# Patient Record
Sex: MALE | Race: WHITE | NOT HISPANIC OR LATINO | Employment: UNEMPLOYED | URBAN - METROPOLITAN AREA
[De-identification: names, ages, dates, MRNs, and addresses within clinical notes are randomized per-mention and may not be internally consistent; named-entity substitution may affect disease eponyms.]

---

## 2017-01-05 ENCOUNTER — HOSPITAL ENCOUNTER (OUTPATIENT)
Dept: WOUND CARE | Facility: CLINIC | Age: 52
Discharge: HOME | End: 2017-01-05
Attending: SURGERY | Admitting: SURGERY
Payer: MEDICARE

## 2017-01-05 PROCEDURE — A6212 FOAM DRG <=16 SQ IN W/BORDER: HCPCS

## 2017-01-05 PROCEDURE — A6022 COLLAGEN DRSG>16<=48 SQ IN: HCPCS

## 2017-01-05 PROCEDURE — 11042 DBRDMT SUBQ TIS 1ST 20SQCM/<: CPT

## 2017-01-06 NOTE — PROGRESS NOTES
"[facility]      DATE OF VISIT:  01/05/2017.       Nan Fountain is a 51-year-old nonparaplegic gentleman living at assisted living facility for care to his decubitus wounds.  Currently he has a left ischial wound that is recurrent.  His scrotal wound essentially has healed with the use of triad.  He has been using Chroma Gran on the left IT on a daily basis, rather than every other day, and while there seems to be some progress, there is a large chunk of \"proud flesh\" or hypertrophic granulation tissue that we felt the need to excise today sharply with scissors.  Hemostasis was achieved with silver nitrate.  Essentially that was a subcu excision.  Interestingly, this is right adjacent to very thick scar healed by secondary intention below that, so I do not know if it is mechanical in nature, being at the superior aspect of the ischial site, or not.  We will continue with the Chroma Gran on a daily basis.  He can cover that with a 4 x 4 and Medipore.  If his scrotal wound should open again, they can use triad p.r.n.  He is complaining of some scaliness over his right medial knee and we gave him some Sween cream that he can use p.r.n. and cover that with Mepilex if that becomes problematic.  Interestingly, after his visit we [facility], but it sounds like he has been snoring a lot and was having some withdrawal symptoms.  This is probably why [facility] was becoming [facility] him [facility] new [facility] through the visiting home services.  We are happy to cosign anything for any offloading surfaces, should the order come across our desk.  Otherwise, we will see Nan back in 02/16.  Please see nursing notes for dimensions of the wound.  His vital signs were within normal limits today.         BRAYDEN THOMAS MD             D: 01/05/2017 15:15   T: 01/05/2017 18:04   MT: DA      Name:     NAN FOUNTAIN   MRN:      " [id number]        Account:      [id number]   :      1965           Visit Date:   2017      Document: [id number]

## 2017-02-23 ENCOUNTER — HOSPITAL ENCOUNTER (OUTPATIENT)
Dept: WOUND CARE | Facility: CLINIC | Age: 52
Discharge: HOME | End: 2017-02-23
Attending: SURGERY | Admitting: SURGERY
Payer: MEDICARE

## 2017-02-23 PROCEDURE — 97602 WOUND(S) CARE NON-SELECTIVE: CPT

## 2017-02-23 PROCEDURE — A6212 FOAM DRG <=16 SQ IN W/BORDER: HCPCS

## 2017-02-23 NOTE — PROGRESS NOTES
[facility]       DATE OF SERVICE:  2017      Nan Fountain is a 51-year-old paraplegic gentleman who is here today for further evaluation and treatment of chronic pressure sore.  He has been using Promogran for the left IT wound, and even though it was sharply debrided and cauterized with silver nitrate last time, he is starting to get some regrowth of this hypertrophic granulation tissue again.  It was treated again with silver nitrate today and would like to use Medihoney alginate on a daily basis to try something different.  It might be somewhat antimicrobial, as well as debriding.  As far as the scrotum is concerned, the Triad worked well for that.  He only has very small little nicks.  Sween cream has been helping to preserve the right medial knee from breakdown on a p.r.n. basis.  Today Nan looked particularly somnolent and somewhat confused, not his usual peppy self, and we are wondering if that might be in part due to Lyrica which he just started for lower abdominal neuropathic pain.  He also states he has been having poor sleep and he also happens to be on Cymbalta for depression.  He denies using any illicit substances.  Essentially will just switch to Medihoney on a daily basis and see him back on .  Please see nursing notes for dimensions of the wound.  His vitals are within normal limits today.         BRAYDEN THOMAS MD             D: 2017 14:30   T: 2017 14:55   MT: JOSE      Name:     NAN FOUNTAIN   MRN:      [id number]        Account:      [id number]   :      1965           Visit Date:   2017      Document: [id number]

## 2017-04-06 ENCOUNTER — HOSPITAL ENCOUNTER (OUTPATIENT)
Dept: WOUND CARE | Facility: CLINIC | Age: 52
Discharge: HOME | End: 2017-04-06
Attending: SURGERY | Admitting: SURGERY
Payer: MEDICARE

## 2017-04-06 PROCEDURE — 97602 WOUND(S) CARE NON-SELECTIVE: CPT

## 2017-04-06 NOTE — PROGRESS NOTES
[facility]       DATE OF SERVICE:  04/06/2017      HISTORY OF PRESENT ILLNESS:  Parth Fountain is a 51-year-old paraplegic gentleman who has a longstanding history of decubitus wounds and surgeries in the past.  Currently is here for further followup of his chronic recurrent left ischial decubitus, which is within the previous scar tissue.  We have been using Medihoney on a daily basis and he usually covers that with a 4 x 4 folded in quarters and then held in position with Medipore tape along with skin prep.  There is a tiny little superficial kind of scrotal base wound on the right side that appears to be healing fine on its own.  As far as that wound is concerned, we will continue with some Calmoseptine that he has at home.  He feels that will be sufficient and he does not need a cover dressing for that side.  On the left side, we will continue with the Medihoney alginate even though the edges are a bit macerated.  I think there is less hypertrophic granulation tissue this visit and so I think that is more beneficial.  The patient can use a 4 x 4 and Medipore for coverage rather than the Mepilex, which usually does not stay.  The patient has been feeling ill the last several days, stating that he has had a very high fever and was seen by his primary yesterday.  It sounds like there was a dirty urine so he was started on Macrobid, although the cultures are not back yet.  He also has some appointments at the [facility] and asked them to help work him up as well.  Overall, he is quite subdued and not clear-headed today.  However, he is afebrile and his vitals are within normal limits with a BP of 125/71, heart rate of 84, respirations 16 and temperature 97.3.  At this point, we will see Parth back on 05/25.  Please see nursing notes for dimensions of the wounds.         BRAYDEN THOMAS MD             D: 04/06/2017 14:35   T: 04/06/2017 15:45   MT: DAREN      Name:     LEIA  NAN   MRN:      [id number]        Account:      [id number]   :      1965           Visit Date:   2017      Document: [id number]

## 2017-05-25 ENCOUNTER — HOSPITAL ENCOUNTER (OUTPATIENT)
Dept: WOUND CARE | Facility: CLINIC | Age: 52
Discharge: HOME | End: 2017-05-25
Attending: SURGERY | Admitting: SURGERY
Payer: MEDICARE

## 2017-05-25 PROCEDURE — 11042 DBRDMT SUBQ TIS 1ST 20SQCM/<: CPT
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
## 2017-06-20 ENCOUNTER — HOSPITAL ENCOUNTER (OUTPATIENT)
Dept: WOUND CARE | Facility: CLINIC | Age: 52
Discharge: HOME | End: 2017-06-20
Attending: PHYSICIAN ASSISTANT | Admitting: PHYSICIAN ASSISTANT
Payer: MEDICARE

## 2017-06-20 PROCEDURE — 27210376 ZZH DRESSING PROMGRAN

## 2017-06-20 PROCEDURE — 11042 DBRDMT SUBQ TIS 1ST 20SQCM/<: CPT

## 2017-06-20 PROCEDURE — 11042 DBRDMT SUBQ TIS 1ST 20SQCM/<: CPT | Performed by: PHYSICIAN ASSISTANT
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
## 2017-07-18 ENCOUNTER — HOSPITAL ENCOUNTER (OUTPATIENT)
Dept: WOUND CARE | Facility: CLINIC | Age: 52
Discharge: HOME | End: 2017-07-18
Attending: PHYSICIAN ASSISTANT | Admitting: PHYSICIAN ASSISTANT
Payer: MEDICARE

## 2017-07-18 VITALS
HEART RATE: 59 BPM | SYSTOLIC BLOOD PRESSURE: 121 MMHG | TEMPERATURE: 97.3 F | DIASTOLIC BLOOD PRESSURE: 75 MMHG | RESPIRATION RATE: 16 BRPM

## 2017-07-18 PROCEDURE — 11042 DBRDMT SUBQ TIS 1ST 20SQCM/<: CPT

## 2017-07-18 PROCEDURE — 11042 DBRDMT SUBQ TIS 1ST 20SQCM/<: CPT | Performed by: PHYSICIAN ASSISTANT

## 2017-07-18 PROCEDURE — A6021 COLLAGEN DRESSING <=16 SQ IN: HCPCS
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
## 2017-08-15 ENCOUNTER — HOSPITAL ENCOUNTER (OUTPATIENT)
Dept: WOUND CARE | Facility: CLINIC | Age: 52
Discharge: HOME | End: 2017-08-15
Attending: PHYSICIAN ASSISTANT | Admitting: PHYSICIAN ASSISTANT
Payer: MEDICARE

## 2017-08-15 VITALS
DIASTOLIC BLOOD PRESSURE: 71 MMHG | HEART RATE: 84 BPM | TEMPERATURE: 98.3 F | RESPIRATION RATE: 16 BRPM | SYSTOLIC BLOOD PRESSURE: 105 MMHG

## 2017-08-15 DIAGNOSIS — E63.9 NUTRITIONAL DEFICIENCY: Primary | ICD-10-CM

## 2017-08-15 PROCEDURE — 97597 DBRDMT OPN WND 1ST 20 CM/<: CPT | Performed by: PHYSICIAN ASSISTANT

## 2017-08-15 PROCEDURE — A6021 COLLAGEN DRESSING <=16 SQ IN: HCPCS

## 2017-08-15 PROCEDURE — 97597 DBRDMT OPN WND 1ST 20 CM/<: CPT

## 2017-08-15 RX ORDER — LACTOSE-REDUCED FOOD
1 LIQUID (ML) ORAL
Qty: 90 BOTTLE | Refills: 11 | Status: SHIPPED | OUTPATIENT
Start: 2017-08-15 | End: 2021-06-19

## 2017-08-16 ENCOUNTER — TELEPHONE (OUTPATIENT)
Dept: WOUND CARE | Facility: CLINIC | Age: 52
End: 2017-08-16

## 2017-08-31 ENCOUNTER — TELEPHONE (OUTPATIENT)
Dept: WOUND CARE | Facility: CLINIC | Age: 52
End: 2017-08-31

## 2017-08-31 NOTE — TELEPHONE ENCOUNTER
Shena, nurse with [facility], called to report a new open area on right IT 0.3x0.3x0.2; scrotal wound not improving with [employer] only.  Current plan is Promogran to left IT.  Writer discussed changes with [facility] Christine.  Promogran to all three areas until seen in clinic 9/12/2017.

## 2017-09-12 ENCOUNTER — HOSPITAL ENCOUNTER (OUTPATIENT)
Dept: WOUND CARE | Facility: CLINIC | Age: 52
Discharge: HOME | End: 2017-09-12
Attending: PHYSICIAN ASSISTANT | Admitting: PHYSICIAN ASSISTANT
Payer: MEDICARE

## 2017-09-12 VITALS
HEART RATE: 123 BPM | RESPIRATION RATE: 16 BRPM | SYSTOLIC BLOOD PRESSURE: 128 MMHG | TEMPERATURE: 99.2 F | DIASTOLIC BLOOD PRESSURE: 86 MMHG

## 2017-09-12 DIAGNOSIS — L89.303 DECUBITUS ULCER OF BUTTOCK, STAGE 3, UNSPECIFIED LATERALITY: Primary | ICD-10-CM

## 2017-09-12 PROCEDURE — 27210376 ZZH DRESSING PROMGRAN

## 2017-09-12 PROCEDURE — 97597 DBRDMT OPN WND 1ST 20 CM/<: CPT

## 2017-09-12 PROCEDURE — 11042 DBRDMT SUBQ TIS 1ST 20SQCM/<: CPT

## 2017-09-12 PROCEDURE — 97597 DBRDMT OPN WND 1ST 20 CM/<: CPT | Performed by: PHYSICIAN ASSISTANT
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
## 2017-10-09 ENCOUNTER — HOSPITAL ENCOUNTER (OUTPATIENT)
Dept: WOUND CARE | Facility: CLINIC | Age: 52
Discharge: HOME | End: 2017-10-09
Attending: PHYSICIAN ASSISTANT | Admitting: PHYSICIAN ASSISTANT
Payer: MEDICARE

## 2017-10-09 VITALS — TEMPERATURE: 98.4 F | SYSTOLIC BLOOD PRESSURE: 115 MMHG | DIASTOLIC BLOOD PRESSURE: 69 MMHG | HEART RATE: 80 BPM

## 2017-10-09 DIAGNOSIS — N30.01 ACUTE CYSTITIS WITH HEMATURIA: Primary | ICD-10-CM

## 2017-10-09 LAB
ALBUMIN UR-MCNC: 30 MG/DL
APPEARANCE UR: ABNORMAL
BACTERIA #/AREA URNS HPF: ABNORMAL /HPF
BILIRUB UR QL STRIP: NEGATIVE
CAOX CRY #/AREA URNS HPF: ABNORMAL /HPF
COLOR UR AUTO: YELLOW
GLUCOSE UR STRIP-MCNC: NEGATIVE MG/DL
HGB UR QL STRIP: ABNORMAL
HYALINE CASTS #/AREA URNS LPF: 8 /LPF (ref 0–2)
KETONES UR STRIP-MCNC: NEGATIVE MG/DL
LEUKOCYTE ESTERASE UR QL STRIP: ABNORMAL
MUCOUS THREADS #/AREA URNS LPF: PRESENT /LPF
NITRATE UR QL: NEGATIVE
PH UR STRIP: 6.5 PH (ref 5–7)
RBC #/AREA URNS AUTO: 114 /HPF (ref 0–2)
RENAL EPI CELLS #/AREA URNS HPF: 5 /HPF
SOURCE: ABNORMAL
SP GR UR STRIP: 1.01 (ref 1–1.03)
SQUAMOUS #/AREA URNS AUTO: 1 /HPF (ref 0–1)
UROBILINOGEN UR STRIP-MCNC: NORMAL MG/DL (ref 0–2)
WBC #/AREA URNS AUTO: 76 /HPF (ref 0–2)
WBC CLUMPS #/AREA URNS HPF: PRESENT /HPF
YEAST #/AREA URNS HPF: ABNORMAL /HPF

## 2017-10-09 PROCEDURE — 11042 DBRDMT SUBQ TIS 1ST 20SQCM/<: CPT | Performed by: PHYSICIAN ASSISTANT

## 2017-10-09 PROCEDURE — 27211158 ZZH IODOSORB GEL, 40 GM

## 2017-10-09 PROCEDURE — 87086 URINE CULTURE/COLONY COUNT: CPT | Performed by: PHYSICIAN ASSISTANT

## 2017-10-09 PROCEDURE — 81001 URINALYSIS AUTO W/SCOPE: CPT | Performed by: PHYSICIAN ASSISTANT

## 2017-10-09 PROCEDURE — 87088 URINE BACTERIA CULTURE: CPT | Performed by: PHYSICIAN ASSISTANT

## 2017-10-09 PROCEDURE — 87186 SC STD MICRODIL/AGAR DIL: CPT | Performed by: PHYSICIAN ASSISTANT

## 2017-10-09 PROCEDURE — 11042 DBRDMT SUBQ TIS 1ST 20SQCM/<: CPT

## 2017-10-09 RX ORDER — NITROFURANTOIN 25; 75 MG/1; MG/1
100 CAPSULE ORAL 2 TIMES DAILY
Qty: 20 CAPSULE | Refills: 0 | Status: SHIPPED | OUTPATIENT
Start: 2017-10-09 | End: 2017-10-19
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
## 2017-10-11 LAB
BACTERIA SPEC CULT: ABNORMAL
BACTERIA SPEC CULT: ABNORMAL
Lab: ABNORMAL
SPECIMEN SOURCE: ABNORMAL

## 2017-11-07 ENCOUNTER — HOSPITAL ENCOUNTER (OUTPATIENT)
Dept: WOUND CARE | Facility: CLINIC | Age: 52
Discharge: HOME | End: 2017-11-07
Attending: PHYSICIAN ASSISTANT | Admitting: PHYSICIAN ASSISTANT
Payer: MEDICARE

## 2017-11-07 VITALS
SYSTOLIC BLOOD PRESSURE: 134 MMHG | RESPIRATION RATE: 16 BRPM | TEMPERATURE: 98.7 F | HEART RATE: 94 BPM | DIASTOLIC BLOOD PRESSURE: 68 MMHG

## 2017-11-07 PROCEDURE — 27211158 ZZH IODOSORB GEL, 40 GM

## 2017-11-07 PROCEDURE — 11042 DBRDMT SUBQ TIS 1ST 20SQCM/<: CPT

## 2017-11-07 PROCEDURE — 11042 DBRDMT SUBQ TIS 1ST 20SQCM/<: CPT | Performed by: PHYSICIAN ASSISTANT

## 2017-11-07 PROCEDURE — 17250 CHEM CAUT OF GRANLTJ TISSUE: CPT
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
## 2017-11-29 ENCOUNTER — HOSPITAL ENCOUNTER (OUTPATIENT)
Dept: WOUND CARE | Facility: CLINIC | Age: 52
Discharge: HOME | End: 2017-11-29
Attending: PHYSICIAN ASSISTANT | Admitting: PHYSICIAN ASSISTANT
Payer: MEDICARE

## 2017-11-29 VITALS
SYSTOLIC BLOOD PRESSURE: 115 MMHG | RESPIRATION RATE: 16 BRPM | TEMPERATURE: 98.1 F | DIASTOLIC BLOOD PRESSURE: 71 MMHG | HEART RATE: 80 BPM

## 2017-11-29 PROCEDURE — 11042 DBRDMT SUBQ TIS 1ST 20SQCM/<: CPT | Performed by: PHYSICIAN ASSISTANT

## 2017-11-29 PROCEDURE — 27211158 ZZH IODOSORB GEL, 40 GM

## 2017-11-29 PROCEDURE — 11042 DBRDMT SUBQ TIS 1ST 20SQCM/<: CPT

## 2017-11-29 PROCEDURE — A6212 FOAM DRG <=16 SQ IN W/BORDER: HCPCS
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
## 2017-12-27 ENCOUNTER — HOSPITAL ENCOUNTER (OUTPATIENT)
Dept: WOUND CARE | Facility: CLINIC | Age: 52
Discharge: HOME | End: 2017-12-27
Attending: PHYSICIAN ASSISTANT | Admitting: PHYSICIAN ASSISTANT
Payer: MEDICARE

## 2017-12-27 VITALS — TEMPERATURE: 97.6 F | HEART RATE: 97 BPM | SYSTOLIC BLOOD PRESSURE: 99 MMHG | DIASTOLIC BLOOD PRESSURE: 71 MMHG

## 2017-12-27 PROCEDURE — 27211158 ZZH IODOSORB GEL, 40 GM

## 2017-12-27 PROCEDURE — 11042 DBRDMT SUBQ TIS 1ST 20SQCM/<: CPT | Performed by: PHYSICIAN ASSISTANT
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
## 2018-01-24 ENCOUNTER — HOSPITAL ENCOUNTER (OUTPATIENT)
Dept: WOUND CARE | Facility: CLINIC | Age: 53
Discharge: HOME | End: 2018-01-24
Attending: PHYSICIAN ASSISTANT | Admitting: PHYSICIAN ASSISTANT
Payer: MEDICARE

## 2018-01-24 VITALS — HEART RATE: 67 BPM | DIASTOLIC BLOOD PRESSURE: 74 MMHG | SYSTOLIC BLOOD PRESSURE: 130 MMHG | TEMPERATURE: 97.6 F

## 2018-01-24 PROCEDURE — A6212 FOAM DRG <=16 SQ IN W/BORDER: HCPCS

## 2018-01-24 PROCEDURE — 11042 DBRDMT SUBQ TIS 1ST 20SQCM/<: CPT | Performed by: PHYSICIAN ASSISTANT

## 2018-01-24 PROCEDURE — A6021 COLLAGEN DRESSING <=16 SQ IN: HCPCS

## 2018-01-24 PROCEDURE — 11042 DBRDMT SUBQ TIS 1ST 20SQCM/<: CPT
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
## 2018-02-19 ENCOUNTER — APPOINTMENT (OUTPATIENT)
Dept: CT IMAGING | Facility: CLINIC | Age: 53
End: 2018-02-19
Attending: EMERGENCY MEDICINE
Payer: MEDICARE

## 2018-02-19 ENCOUNTER — HOSPITAL ENCOUNTER (EMERGENCY)
Facility: CLINIC | Age: 53
Discharge: HOME | End: 2018-02-20
Attending: EMERGENCY MEDICINE | Admitting: EMERGENCY MEDICINE
Payer: MEDICARE
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
## 2018-02-19 LAB
ALBUMIN SERPL-MCNC: 4.2 G/DL (ref 3.4–5)
ALP SERPL-CCNC: 146 U/L (ref 40–150)
ALT SERPL W P-5'-P-CCNC: 21 U/L (ref 0–70)
AMPHETAMINES UR QL SCN: NEGATIVE
ANION GAP SERPL CALCULATED.3IONS-SCNC: 10 MMOL/L (ref 3–14)
AST SERPL W P-5'-P-CCNC: 36 U/L (ref 0–45)
BARBITURATES UR QL: NEGATIVE
BASOPHILS # BLD AUTO: 0.1 10E9/L (ref 0–0.2)
BASOPHILS # BLD AUTO: 0.1 10E9/L (ref 0–0.2)
BASOPHILS NFR BLD AUTO: 0.6 %
BASOPHILS NFR BLD AUTO: 0.6 %
BENZODIAZ UR QL: POSITIVE
BILIRUB SERPL-MCNC: 0.3 MG/DL (ref 0.2–1.3)
BUN SERPL-MCNC: 17 MG/DL (ref 7–30)
CALCIUM SERPL-MCNC: 9.6 MG/DL (ref 8.5–10.1)
CANNABINOIDS UR QL SCN: POSITIVE
CHLORIDE SERPL-SCNC: 99 MMOL/L (ref 94–109)
CO2 SERPL-SCNC: 24 MMOL/L (ref 20–32)
COCAINE UR QL: NEGATIVE
CREAT SERPL-MCNC: 0.67 MG/DL (ref 0.66–1.25)
DIFFERENTIAL METHOD BLD: ABNORMAL
DIFFERENTIAL METHOD BLD: NORMAL
EOSINOPHIL # BLD AUTO: 0.1 10E9/L (ref 0–0.7)
EOSINOPHIL # BLD AUTO: 0.1 10E9/L (ref 0–0.7)
EOSINOPHIL NFR BLD AUTO: 0.7 %
EOSINOPHIL NFR BLD AUTO: 0.7 %
ERYTHROCYTE [DISTWIDTH] IN BLOOD BY AUTOMATED COUNT: 13.2 % (ref 10–15)
ERYTHROCYTE [DISTWIDTH] IN BLOOD BY AUTOMATED COUNT: 13.5 % (ref 10–15)
ETHANOL SERPL-MCNC: 0.21 G/DL
ETHANOL UR QL SCN: POSITIVE
GFR SERPL CREATININE-BSD FRML MDRD: >90 ML/MIN/1.7M2
GLUCOSE SERPL-MCNC: 74 MG/DL (ref 70–99)
HCT VFR BLD AUTO: 46.6 % (ref 40–53)
HCT VFR BLD AUTO: 51.2 % (ref 40–53)
HGB BLD-MCNC: 16.4 G/DL (ref 13.3–17.7)
HGB BLD-MCNC: 18 G/DL (ref 13.3–17.7)
IMM GRANULOCYTES # BLD: 0.2 10E9/L (ref 0–0.4)
IMM GRANULOCYTES # BLD: 0.3 10E9/L (ref 0–0.4)
IMM GRANULOCYTES NFR BLD: 2.5 %
IMM GRANULOCYTES NFR BLD: 3.4 %
INR PPP: 0.91 (ref 0.86–1.14)
LACTATE BLD-SCNC: 2.5 MMOL/L (ref 0.7–2)
LIPASE SERPL-CCNC: 97 U/L (ref 73–393)
LYMPHOCYTES # BLD AUTO: 1.3 10E9/L (ref 0.8–5.3)
LYMPHOCYTES # BLD AUTO: 1.5 10E9/L (ref 0.8–5.3)
LYMPHOCYTES NFR BLD AUTO: 15.2 %
LYMPHOCYTES NFR BLD AUTO: 16.8 %
MCH RBC QN AUTO: 32.5 PG (ref 26.5–33)
MCH RBC QN AUTO: 32.7 PG (ref 26.5–33)
MCHC RBC AUTO-ENTMCNC: 35.2 G/DL (ref 31.5–36.5)
MCHC RBC AUTO-ENTMCNC: 35.2 G/DL (ref 31.5–36.5)
MCV RBC AUTO: 93 FL (ref 78–100)
MCV RBC AUTO: 93 FL (ref 78–100)
MONOCYTES # BLD AUTO: 0.5 10E9/L (ref 0–1.3)
MONOCYTES # BLD AUTO: 0.6 10E9/L (ref 0–1.3)
MONOCYTES NFR BLD AUTO: 6.1 %
MONOCYTES NFR BLD AUTO: 6.7 %
NEUTROPHILS # BLD AUTO: 6.4 10E9/L (ref 1.6–8.3)
NEUTROPHILS # BLD AUTO: 6.5 10E9/L (ref 1.6–8.3)
NEUTROPHILS NFR BLD AUTO: 72.7 %
NEUTROPHILS NFR BLD AUTO: 74 %
NRBC # BLD AUTO: 0 10*3/UL
NRBC # BLD AUTO: 0 10*3/UL
NRBC BLD AUTO-RTO: 0 /100
NRBC BLD AUTO-RTO: 0 /100
OPIATES UR QL SCN: NEGATIVE
PLATELET # BLD AUTO: 325 10E9/L (ref 150–450)
PLATELET # BLD AUTO: 344 10E9/L (ref 150–450)
POTASSIUM SERPL-SCNC: 3.7 MMOL/L (ref 3.4–5.3)
PROT SERPL-MCNC: 9.6 G/DL (ref 6.8–8.8)
RBC # BLD AUTO: 5.04 10E12/L (ref 4.4–5.9)
RBC # BLD AUTO: 5.5 10E12/L (ref 4.4–5.9)
SODIUM SERPL-SCNC: 133 MMOL/L (ref 133–144)
TSH SERPL DL<=0.005 MIU/L-ACNC: 0.4 MU/L (ref 0.4–4)
WBC # BLD AUTO: 8.8 10E9/L (ref 4–11)
WBC # BLD AUTO: 8.8 10E9/L (ref 4–11)

## 2018-02-19 PROCEDURE — 83605 ASSAY OF LACTIC ACID: CPT | Performed by: EMERGENCY MEDICINE

## 2018-02-19 PROCEDURE — 80320 DRUG SCREEN QUANTALCOHOLS: CPT | Performed by: EMERGENCY MEDICINE

## 2018-02-19 PROCEDURE — 25000132 ZZH RX MED GY IP 250 OP 250 PS 637: Mod: GY | Performed by: EMERGENCY MEDICINE

## 2018-02-19 PROCEDURE — 99285 EMERGENCY DEPT VISIT HI MDM: CPT | Mod: 25 | Performed by: EMERGENCY MEDICINE

## 2018-02-19 PROCEDURE — A9270 NON-COVERED ITEM OR SERVICE: HCPCS | Mod: GY | Performed by: EMERGENCY MEDICINE

## 2018-02-19 PROCEDURE — 96375 TX/PRO/DX INJ NEW DRUG ADDON: CPT | Performed by: EMERGENCY MEDICINE

## 2018-02-19 PROCEDURE — 85025 COMPLETE CBC W/AUTO DIFF WBC: CPT | Performed by: EMERGENCY MEDICINE

## 2018-02-19 PROCEDURE — 25000128 H RX IP 250 OP 636: Performed by: EMERGENCY MEDICINE

## 2018-02-19 PROCEDURE — 99285 EMERGENCY DEPT VISIT HI MDM: CPT | Mod: Z6 | Performed by: EMERGENCY MEDICINE

## 2018-02-19 PROCEDURE — 96374 THER/PROPH/DIAG INJ IV PUSH: CPT | Performed by: EMERGENCY MEDICINE

## 2018-02-19 PROCEDURE — 70450 CT HEAD/BRAIN W/O DYE: CPT

## 2018-02-19 PROCEDURE — 83690 ASSAY OF LIPASE: CPT | Performed by: EMERGENCY MEDICINE

## 2018-02-19 PROCEDURE — 84443 ASSAY THYROID STIM HORMONE: CPT | Performed by: EMERGENCY MEDICINE

## 2018-02-19 PROCEDURE — 80307 DRUG TEST PRSMV CHEM ANLYZR: CPT | Performed by: EMERGENCY MEDICINE

## 2018-02-19 PROCEDURE — 72125 CT NECK SPINE W/O DYE: CPT

## 2018-02-19 PROCEDURE — 85610 PROTHROMBIN TIME: CPT | Performed by: EMERGENCY MEDICINE

## 2018-02-19 PROCEDURE — 25000125 ZZHC RX 250: Performed by: EMERGENCY MEDICINE

## 2018-02-19 PROCEDURE — 80053 COMPREHEN METABOLIC PANEL: CPT | Performed by: EMERGENCY MEDICINE

## 2018-02-19 RX ORDER — FOLIC ACID 5 MG/ML
1 INJECTION, SOLUTION INTRAMUSCULAR; INTRAVENOUS; SUBCUTANEOUS ONCE
Status: COMPLETED | OUTPATIENT
Start: 2018-02-19 | End: 2018-02-19

## 2018-02-19 RX ORDER — ACETAMINOPHEN 325 MG/1
650 TABLET ORAL ONCE
Status: COMPLETED | OUTPATIENT
Start: 2018-02-19 | End: 2018-02-20

## 2018-02-19 RX ORDER — SODIUM CHLORIDE 9 MG/ML
INJECTION, SOLUTION INTRAVENOUS CONTINUOUS
Status: DISCONTINUED | OUTPATIENT
Start: 2018-02-19 | End: 2018-02-20 | Stop reason: HOSPADM

## 2018-02-19 RX ORDER — THIAMINE HYDROCHLORIDE 100 MG/ML
100 INJECTION, SOLUTION INTRAMUSCULAR; INTRAVENOUS ONCE
Status: COMPLETED | OUTPATIENT
Start: 2018-02-19 | End: 2018-02-19

## 2018-02-19 RX ORDER — TRAZODONE HYDROCHLORIDE 100 MG/1
100 TABLET ORAL ONCE
Status: COMPLETED | OUTPATIENT
Start: 2018-02-19 | End: 2018-02-19

## 2018-02-19 RX ORDER — MULTIPLE VITAMINS W/ MINERALS TAB 9MG-400MCG
1 TAB ORAL ONCE
Status: COMPLETED | OUTPATIENT
Start: 2018-02-19 | End: 2018-02-20

## 2018-02-19 RX ADMIN — TRAZODONE HYDROCHLORIDE 100 MG: 100 TABLET ORAL at 23:44

## 2018-02-19 RX ADMIN — FOLIC ACID 1 MG: 5 INJECTION, SOLUTION INTRAMUSCULAR; INTRAVENOUS; SUBCUTANEOUS at 22:52

## 2018-02-19 RX ADMIN — THIAMINE HYDROCHLORIDE 100 MG: 100 INJECTION, SOLUTION INTRAMUSCULAR; INTRAVENOUS at 22:53

## 2018-02-19 ASSESSMENT — ENCOUNTER SYMPTOMS
SHORTNESS OF BREATH: 0
CHILLS: 0
COUGH: 0
CONSTIPATION: 0
COLOR CHANGE: 0
FEVER: 0
BACK PAIN: 0
PALPITATIONS: 0
DIARRHEA: 0
HEMATURIA: 0
VOMITING: 0
ABDOMINAL PAIN: 0

## 2018-02-19 NOTE — ED AVS SNAPSHOT
[facility], Emergency Department    [address] [address]    Phone:  [phone]                                       Parth Fountain   MRN: [id number]    Department:  [facility], Emergency Department   Date of Visit:  2/19/2018           Patient Information     Date Of Birth          1965        Your diagnoses for this visit were:     Fall, initial encounter        You were seen by Daria Baugh MD.        Discharge Instructions         Please make an appointment to follow up with Your Primary Care Provider as soon as possible for reevaluation. You may need a referral to a neurologist.    Uncertain Causes of Fall  You have had a fall today. But the cause of your fall is not certain. Falls can occur due to slipping, tripping or losing your balance. A fall can also occur from a fainting spell or seizure.  While a fall can happen for a simple reason (tripping over something), falls in elderly people are often caused by a combination of things:    Age-related decline in function with worsening balance, stability, vision, and muscle strength    Chronic illness such as heart arrhythmias, heart valve disease, vascular disease, COPD, diabetes, strokes, arthritis    Effects or side effects of medicines    Dehydration.    Environmental hazards such as uneven or slippery ground, unfamiliar place, obstacles, uneven surfaces, or slippery ground    Situational factors (related to the activity being done, e.g., rushing to the bathroom)  Because the cause of your fall today is not certain, it is possible that a fainting spell or seizure was the cause. This means that it could happen again, without warning. If you fall again, without a cause, then you should return to this facility promptly to have further tests. Otherwise, follow up with your doctor as explained below.  It is normal to feel sore and tight in your muscles and back the next day, and not just the muscles you initially injured.  Remember, all the parts of your body are connected, so while initially one area hurts, the next day another may hurt. Also, when you injure yourself, it causes inflammation, which then causes the muscles to tighten up and hurt more. After the initial worsening, it should gradually improve over the next few days. However, more severe pain should be reported.  Even without a definite head injury, you can still get a concussion. Concussions and even bleeding can still occur, especially if you have had a recent injury or take blood thinner medicine. It is not unusual to have a mild headache and feel tired and even nauseous or dizzy.  Home care    Rest today and resume your normal activities as soon as you are feeling back to normal. It is best to remain with someone who can check on you for the next 24 hours to watch for another episode of falling.    If you were injured during the fall, follow the advice from your doctor regarding care of your injury.     If you become light-headed or dizzy, lie down immediately or sit and lean forward with your head down.    As a precaution, do not drive a car or operate dangerous equipment, do not take a bath alone (use a shower instead) and do not swim alone until you see your doctor. A condition causing fainting or seizures must be ruled out before resuming these activities.    You may use acetaminophen or ibuprofen to control pain, unless another pain medicine was prescribed. If you have chronic liver or kidney disease or ever had a stomach ulcer or gastrointestinal bleeding, talk with your doctor before using these medicines.    Keep your appointments for any further testing that may have been scheduled for you.  Follow-up care  Follow up with your healthcare provider, or as advised.  If X-rays or CT scan were done, you will be notified if there is a change in the reading, especially if it affects treatment.  Call 911  Call 911 if any of these occur:    Trouble  breathing    Confused or difficulty arousing    Fainting or loss of consciousness    Rapid or very slow heart rate    Seizure    Difficulty with speech or vision, weakness of an arm or leg    Difficulty walking or talking, loss of balance, numbness or weakness in one side of your body, facial droop  When to seek medical advice  Call your healthcare provider right away if any of these occur:    Another unexplained fall    Dizziness    Severe headache    Nausea and vomiting    Blood in vomit, stools (black or red color)  Date Last Reviewed: 11/5/2015 2000-2017 [employer]. [address] [place] [address]. All rights reserved. This information is not intended as a substitute for professional medical care. Always follow your healthcare professional's instructions.          Future Appointments        Provider Department Dept Phone Center    2/21/2018 1:30 PM Suzan Hanna PA-C [facility] [place] [phone] [facility]      24 Hour Appointment Hotline       To make an appointment at any [facility] clinic, call [phone] ([phone]). If you don't have a family doctor or clinic, we will help you find one. [facility] clinics are conveniently located to serve the needs of you and your family.             Review of your medicines      Our records show that you are taking the medicines listed below. If these are incorrect, please call your family doctor or clinic.        Dose / Directions Last dose taken    AMBIEN 10 MG tablet   Dose:  10 mg   Generic drug:  zolpidem        Take 10 mg by mouth nightly as needed.   Refills:  0        aspirin 325 MG tablet   Dose:  325 mg        Take 325 mg by mouth daily.   Refills:  0        baclofen 20 MG tablet   Commonly known as:  LIORESAL   Dose:  20 mg        Take 20 mg by mouth 4 times daily.   Refills:  0        BACTRIM DS PO   Dose:  1 tablet        Take 1 tablet by mouth daily   Refills:  0        bismuth  subsalicylate 525 MG/15ML Susp   Dose:  30 mL        Take 30 mLs by mouth every 4 hours as needed   Refills:  0        calcium carbonate 1250 MG tablet   Commonly known as:  OS-RIGOBERTO 500 mg [place]. Ca   Dose:  500 mg   Indication:  with meals        Take 500 mg by mouth 3 times daily   Refills:  0        * ENSURE Liqd   Dose:  3 Can        Take 3 Cans by mouth daily   Refills:  0        * ENSURE NUTRITION SHAKE Liqd   Dose:  1 Bottle   Quantity:  90 Bottle        Take 1 Bottle by mouth 3 times daily (with meals)   Refills:  11        eucerin cream   Dose:  0.5 inch        Apply 0.5 inches topically as needed.   Refills:  0        HYDROmorphone 4 MG tablet   Commonly known as:  DILAUDID   Dose:  4-8 mg   Quantity:  100 tablet        Take 4-8 mg by mouth every 3 hours as needed.   Refills:  0        LYRICA PO   Dose:  150 mg        Take 150 mg by mouth 2 times daily   Refills:  0        MULTIPLE VITAMIN/IRON OR        Refills:  0        order for DME   Quantity:  1 Month        Equipment being ordered: colostomy and urosotmy   Refills:  11        order for DME   Quantity:  1 Units        [facility] Fax: [phone]  Group 2 Air Mattress   Length of need: lifetime   Refills:  0        polyethylene glycol powder   Commonly known as:  MIRALAX/GLYCOLAX   Dose:  1 capful        Take 1 capful by mouth daily as needed.   Refills:  0        sennosides 8.6 MG tablet   Commonly known as:  SENOKOT   Dose:  1 tablet        Take 1 tablet by mouth 2 times daily as needed   Refills:  0        traZODone 100 MG tablet   Commonly known as:  DESYREL   Dose:  100 mg        Take 100 mg by mouth At Bedtime.   Refills:  0        TYLENOL 325 MG tablet   Dose:  2 tablet   Generic drug:  acetaminophen        Take 2 tablets by mouth every 4 hours as needed.   Refills:  0        VALIUM PO        5mg q noon; 10 mg q hs; and 5mg BID prn   Refills:  0        VITAMIN C CR PO   Dose:  500 mg        Take 500 mg by mouth 4 times daily.    Refills:  0        VITAMIN D PO   Dose:  800 Int'l Units        Take 800 Int'l Units by mouth 2 times daily.   Refills:  0        * Notice:  This list has 2 medication(s) that are the same as other medications prescribed for you. Read the directions carefully, and ask your doctor or other care provider to review them with you.            Procedures and tests performed during your visit     Procedure/Test Number of Times Performed    Alcohol ethyl 3    CBC with platelets differential 2    CT Head w/o Contrast 1    Cervical spine CT w/o contrast 1    Comprehensive metabolic panel 1    Drug abuse screen 6 urine (chem dep) 1    INR 1    Lactic acid whole blood 1    Lipase 1    TSH with free T4 reflex 1    UA with Microscopic 1    Urine Culture 1      Orders Needing Specimen Collection     None      Pending Results     Date and Time Order Name Status Description    2/20/2018 0241 Urine Culture In process             Pending Culture Results     Date and Time Order Name Status Description    2/20/2018 0241 Urine Culture In process             Pending Results Instructions     If you had any lab results that were not finalized at the time of your Discharge, you can call the ED Lab Result RN at [phone]. You will be contacted by this team for any positive Lab results or changes in treatment. The nurses are available 7 days a week from 10A to 6:30P.  You can leave a message 24 hours per day and they will return your call.        Thank you for choosing [facility]       Thank you for choosing [facility] for your care. Our goal is always to provide you with excellent care. Hearing back from our patients is one way we can continue to improve our services. Please take a few minutes to complete the written survey that you may receive in the mail after you visit with us. Thank you!        [employer] Information     [employer] lets you send messages to your doctor, view your test results, renew your prescriptions, schedule appointments  "and more. To sign up, go to [email or web address] . Click on \"Log in\" on the left side of the screen, which will take you to the Welcome page. Then click on \"Sign up Now\" on the right side of the page.     You will be asked to enter the access code listed below, as well as some personal information. Please follow the directions to create your username and password.     Your access code is: [email or web address]  Expires: 2018  4:57 AM     Your access code will  in 90 days. If you need help or a new code, please call your [facility] clinic or [phone].        Care EveryWhere ID     This is your Care EveryWhere ID. This could be used by other organizations to access your [facility] medical records  [id number]        Equal Access to Services     CHAVA MCGRATH : Khai Mcnulty, tri castillo, austen richter, jones michel . So [facility] [phone].    ATENCIÓN: Si habla español, tiene a harrison disposición servicios gratuitos de asistencia lingüística. Llame al [phone].    We comply with applicable federal civil rights laws and [place] laws. We do not discriminate on the basis of race, color, national origin, age, disability, sex, sexual orientation, or gender identity.            After Visit Summary       This is your record. Keep this with you and show to your community pharmacist(s) and doctor(s) at your next visit.                  "

## 2018-02-19 NOTE — ED AVS SNAPSHOT
[facility], [place], Emergency Department    [address] [place] [address]    Phone:  [phone]                                       Parth Fountain   MRN: [id number]    Department:  [facility], Emergency Department   Date of Visit:  2/19/2018           After Visit Summary Signature Page     I have received my discharge instructions, and my questions have been answered. I have discussed any challenges I see with this plan with the nurse or doctor.    ..........................................................................................................................................  Patient/Patient Representative Signature      ..........................................................................................................................................  Patient Representative Print Name and Relationship to Patient    ..................................................               ................................................  Date                                            Time    ..........................................................................................................................................  Reviewed by Signature/Title    ...................................................              ..............................................  Date                                                            Time

## 2018-02-20 VITALS
SYSTOLIC BLOOD PRESSURE: 134 MMHG | RESPIRATION RATE: 12 BRPM | DIASTOLIC BLOOD PRESSURE: 85 MMHG | WEIGHT: 114 LBS | TEMPERATURE: 98.1 F | OXYGEN SATURATION: 98 % | HEART RATE: 94 BPM | HEIGHT: 67 IN | BODY MASS INDEX: 17.89 KG/M2

## 2018-02-20 LAB
ALBUMIN UR-MCNC: 30 MG/DL
APPEARANCE UR: ABNORMAL
BILIRUB UR QL STRIP: NEGATIVE
COLOR UR AUTO: YELLOW
ETHANOL SERPL-MCNC: 0.06 G/DL
ETHANOL SERPL-MCNC: 0.14 G/DL
GLUCOSE UR STRIP-MCNC: NEGATIVE MG/DL
HGB UR QL STRIP: ABNORMAL
KETONES UR STRIP-MCNC: NEGATIVE MG/DL
LEUKOCYTE ESTERASE UR QL STRIP: ABNORMAL
MUCOUS THREADS #/AREA URNS LPF: PRESENT /LPF
NITRATE UR QL: NEGATIVE
PH UR STRIP: 6.5 PH (ref 5–7)
RBC #/AREA URNS AUTO: 6 /HPF (ref 0–2)
SOURCE: ABNORMAL
SP GR UR STRIP: 1.01 (ref 1–1.03)
UROBILINOGEN UR STRIP-MCNC: NORMAL MG/DL (ref 0–2)
WBC #/AREA URNS AUTO: 67 /HPF (ref 0–2)

## 2018-02-20 PROCEDURE — 80320 DRUG SCREEN QUANTALCOHOLS: CPT | Performed by: EMERGENCY MEDICINE

## 2018-02-20 PROCEDURE — A9270 NON-COVERED ITEM OR SERVICE: HCPCS | Mod: GY | Performed by: EMERGENCY MEDICINE

## 2018-02-20 PROCEDURE — 81001 URINALYSIS AUTO W/SCOPE: CPT | Mod: XU | Performed by: EMERGENCY MEDICINE

## 2018-02-20 PROCEDURE — 87186 SC STD MICRODIL/AGAR DIL: CPT | Performed by: EMERGENCY MEDICINE

## 2018-02-20 PROCEDURE — 25000132 ZZH RX MED GY IP 250 OP 250 PS 637: Mod: GY | Performed by: EMERGENCY MEDICINE

## 2018-02-20 PROCEDURE — 87086 URINE CULTURE/COLONY COUNT: CPT | Performed by: EMERGENCY MEDICINE

## 2018-02-20 PROCEDURE — 87088 URINE BACTERIA CULTURE: CPT | Performed by: EMERGENCY MEDICINE

## 2018-02-20 RX ADMIN — ACETAMINOPHEN 650 MG: 325 TABLET, FILM COATED ORAL at 00:12

## 2018-02-20 RX ADMIN — MULTIPLE VITAMINS W/ MINERALS TAB 1 TABLET: TAB at 01:02
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
## 2018-02-20 NOTE — ED NOTES
Pt was transferring to his bed from his wheelchair and slipped falling to the floor,scraped his head on the bed and hands on his wheelchair he thinks , no LOC, does complain of neck pain.

## 2018-02-20 NOTE — DISCHARGE INSTRUCTIONS
Please make an appointment to follow up with Your Primary Care Provider as soon as possible for reevaluation. You may need a referral to a neurologist.    Uncertain Causes of Fall  You have had a fall today. But the cause of your fall is not certain. Falls can occur due to slipping, tripping or losing your balance. A fall can also occur from a fainting spell or seizure.  While a fall can happen for a simple reason (tripping over something), falls in elderly people are often caused by a combination of things:    Age-related decline in function with worsening balance, stability, vision, and muscle strength    Chronic illness such as heart arrhythmias, heart valve disease, vascular disease, COPD, diabetes, strokes, arthritis    Effects or side effects of medicines    Dehydration.    Environmental hazards such as uneven or slippery ground, unfamiliar place, obstacles, uneven surfaces, or slippery ground    Situational factors (related to the activity being done, e.g., rushing to the bathroom)  Because the cause of your fall today is not certain, it is possible that a fainting spell or seizure was the cause. This means that it could happen again, without warning. If you fall again, without a cause, then you should return to this facility promptly to have further tests. Otherwise, follow up with your doctor as explained below.  It is normal to feel sore and tight in your muscles and back the next day, and not just the muscles you initially injured. Remember, all the parts of your body are connected, so while initially one area hurts, the next day another may hurt. Also, when you injure yourself, it causes inflammation, which then causes the muscles to tighten up and hurt more. After the initial worsening, it should gradually improve over the next few days. However, more severe pain should be reported.  Even without a definite head injury, you can still get a concussion. Concussions and even bleeding can still occur,  especially if you have had a recent injury or take blood thinner medicine. It is not unusual to have a mild headache and feel tired and even nauseous or dizzy.  Home care    Rest today and resume your normal activities as soon as you are feeling back to normal. It is best to remain with someone who can check on you for the next 24 hours to watch for another episode of falling.    If you were injured during the fall, follow the advice from your doctor regarding care of your injury.     If you become light-headed or dizzy, lie down immediately or sit and lean forward with your head down.    As a precaution, do not drive a car or operate dangerous equipment, do not take a bath alone (use a shower instead) and do not swim alone until you see your doctor. A condition causing fainting or seizures must be ruled out before resuming these activities.    You may use acetaminophen or ibuprofen to control pain, unless another pain medicine was prescribed. If you have chronic liver or kidney disease or ever had a stomach ulcer or gastrointestinal bleeding, talk with your doctor before using these medicines.    Keep your appointments for any further testing that may have been scheduled for you.  Follow-up care  Follow up with your healthcare provider, or as advised.  If X-rays or CT scan were done, you will be notified if there is a change in the reading, especially if it affects treatment.  Call 911  Call 911 if any of these occur:    Trouble breathing    Confused or difficulty arousing    Fainting or loss of consciousness    Rapid or very slow heart rate    Seizure    Difficulty with speech or vision, weakness of an arm or leg    Difficulty walking or talking, loss of balance, numbness or weakness in one side of your body, facial droop  When to seek medical advice  Call your healthcare provider right away if any of these occur:    Another unexplained fall    Dizziness    Severe headache    Nausea and vomiting    Blood in vomit,  stools (black or red color)  Date Last Reviewed: 11/5/2015 2000-2017 [employer]. [address] [place] [address], [place] [address]. All rights reserved. This information is not intended as a substitute for professional medical care. Always follow your healthcare professional's instructions.

## 2018-02-21 ENCOUNTER — TELEPHONE (OUTPATIENT)
Dept: EMERGENCY MEDICINE | Facility: CLINIC | Age: 53
End: 2018-02-21

## 2018-02-21 DIAGNOSIS — N39.0 URINARY TRACT INFECTION: ICD-10-CM

## 2018-02-21 DIAGNOSIS — L50.9 URTICARIA: ICD-10-CM

## 2018-02-21 RX ORDER — CEPHALEXIN 500 MG/1
500 CAPSULE ORAL 2 TIMES DAILY
Qty: 14 CAPSULE | Refills: 0 | Status: CANCELLED | OUTPATIENT
Start: 2018-02-21 | End: 2018-02-28

## 2018-02-21 ASSESSMENT — ENCOUNTER SYMPTOMS
NAUSEA: 1
NECK PAIN: 1
WOUND: 1
FATIGUE: 0
NECK STIFFNESS: 0

## 2018-02-21 NOTE — LETTER
February 23, 2018        Parth Fountain  [address]   [place] [address]          Dear Parth Fountain:    You were seen in the [facility] at [facility], [place], EMERGENCY DEPARTMENT on 2/19/2018.  We are unable to reach you by phone, so we are sending you this letter.     It is important that you call [facility] lab F/u nurse at [phone] as we have to make some changes in your treatment.     Best time to call back is between 10 a.m. and 6 p.m.      Sincerely,           [facility] F/u RN  [phone]
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
## 2018-02-22 LAB
BACTERIA SPEC CULT: ABNORMAL
BACTERIA SPEC CULT: ABNORMAL
SPECIMEN SOURCE: ABNORMAL

## 2018-02-22 RX ORDER — NITROFURANTOIN 25; 75 MG/1; MG/1
100 CAPSULE ORAL 2 TIMES DAILY
Qty: 10 CAPSULE | Refills: 0 | Status: CANCELLED | OUTPATIENT
Start: 2018-02-22 | End: 2018-02-27

## 2018-02-22 RX ORDER — CEPHALEXIN 500 MG/1
500 CAPSULE ORAL 4 TIMES DAILY
Qty: 40 CAPSULE | Refills: 0 | Status: CANCELLED | OUTPATIENT
Start: 2018-02-22 | End: 2018-03-04

## 2018-02-23 NOTE — TELEPHONE ENCOUNTER
Certified letter sent.  Will need to consult with Obs provider for treatment, treatment pending patient contact.      Carol Lopez RN    [facility] RN  Lung Nodule and ED Lab Results F/U RN  Epic pool (ED late result f/u RN) : [id number]   # [phone]

## 2018-02-24 RX ORDER — NITROFURANTOIN 25; 75 MG/1; MG/1
100 CAPSULE ORAL 2 TIMES DAILY
Qty: 20 CAPSULE | Refills: 0 | Status: SHIPPED | OUTPATIENT
Start: 2018-02-24 | End: 2018-03-06

## 2018-03-01 ENCOUNTER — HOSPITAL ENCOUNTER (OUTPATIENT)
Dept: WOUND CARE | Facility: CLINIC | Age: 53
Discharge: HOME | End: 2018-03-01
Attending: PHYSICIAN ASSISTANT | Admitting: PHYSICIAN ASSISTANT
Payer: MEDICARE

## 2018-03-01 VITALS — DIASTOLIC BLOOD PRESSURE: 82 MMHG | SYSTOLIC BLOOD PRESSURE: 139 MMHG | HEART RATE: 64 BPM | TEMPERATURE: 96.3 F

## 2018-03-01 DIAGNOSIS — L89.303 DECUBITUS ULCER OF BUTTOCK, STAGE 3, UNSPECIFIED LATERALITY: Primary | ICD-10-CM

## 2018-03-01 PROCEDURE — A6021 COLLAGEN DRESSING <=16 SQ IN: HCPCS

## 2018-03-01 PROCEDURE — 11042 DBRDMT SUBQ TIS 1ST 20SQCM/<: CPT

## 2018-03-01 PROCEDURE — 11042 DBRDMT SUBQ TIS 1ST 20SQCM/<: CPT | Performed by: PHYSICIAN ASSISTANT

## 2018-03-01 NOTE — MR AVS SNAPSHOT
MRN:[id number]                      After Visit Summary   3/1/2018    Parth Fountain    MRN: [id number]           Visit Information        Provider Department      3/1/2018  1:30 PM Suzan Hanna PA-C [facility]        Your next 10 appointments already scheduled     Mar 28, 2018  1:00 PM CDT   Return Visit with Suzan Hanna PA-C   [facility] ([facility])    [address]  [address]  [place] [address] [phone]                Further instructions from your care team             [facility]  [address], [place] [address]  Appointment Phone [phone] Nurse Advisors [phone]    Parth Fountain      1965  Home Health Care Agency Performing Wound Care:[facility] Phone: [phone] Fax: [phone]  Wound Dressing Change:Buttocks   Cleanse wound and surrounding skin with: Saline or wound cleasner  Cover wound with Endoform cut to size slightly larger than wound, moisten with saline  (this is supposed to dissolve into wound, if it doesn't remove and replace)  Cover wound with gauze and tegaderm to hold in place  Change dressing MWF  Repositioning:    Bed:  Reposition pt MINIMALLY every 1-2 hours in bed to relieve pressure and promote perfusion to tissue.     Chair:  When up to chair pt should not sit for longer than one hour total before either standing or returning to bed for at least 10 minutes, again to relieve pressure and promote perfusion to tissue.     o Pt should also sit on a chair cushion when up to the chair.     A diet high in protein is important for wound healing, we recommend getting 90 grams of protein per day. Taking protein shakes or bars are a good way to get extra protein in your diet.   **Set Up apt to have mattress and wheelchair evaluated by Yeimi Preciado at [facility].        Suzan Martinez  "BRENT 2018  Signing Physician Kaleb Daly M.D.    Call us at [phone] if you have any questions about your wounds, have redness or swelling around your wound, have a fever of 101 or greater or if you have any other problems or concerns. We answer the phone Monday through Friday 8 am to 4 pm, please leave a message as we check the voicemail frequently throughout the day.     Follow up with Provider - 4 weeks               MyChart Information     [employer] lets you send messages to your doctor, view your test results, renew your prescriptions, schedule appointments and more. To sign up, go to [email or web address] . Click on \"Log in\" on the left side of the screen, which will take you to the Welcome page. Then click on \"Sign up Now\" on the right side of the page.     You will be asked to enter the access code listed below, as well as some personal information. Please follow the directions to create your username and password.     Your access code is: [email or web address]  Expires: 2018  4:57 AM     Your access code will  in 90 days. If you need help or a new code, please call your [facility] clinic or [phone].        Care EveryWhere ID     This is your Care EveryWhere ID. This could be used by other organizations to access your [facility] medical records  [id number]        Equal Access to Services     CHAVA MCGRATH : Hadii rios Mcnulty, waaxda lujonnyadaha, qaybta kaalmajones saldana . So [facility] [phone].    ATENCIÓN: Si habla español, tiene a harrison disposición servicios gratuitos de asistencia lingüística. Llame al [phone].    We comply with applicable federal civil rights laws and [place] laws. We do not discriminate on the basis of race, color, national origin, age, disability, sex, sexual orientation, or gender identity.            "

## 2018-03-01 NOTE — DISCHARGE INSTRUCTIONS
[facility]  [address] [facility] [address] [address]  Appointment Phone [phone] Nurse Advisors [phone]    Parth Fountain      1965  Home Health Care Agency Performing Wound Care:[facility] Phone: [phone] Fax: [phone]  Wound Dressing Change:Buttocks   Cleanse wound and surrounding skin with: Saline or wound cleasner  Cover wound with Endoform cut to size slightly larger than wound, moisten with saline  (this is supposed to dissolve into wound, if it doesn't remove and replace)  Cover wound with gauze and tegaderm to hold in place  Change dressing MWF  Repositioning:    Bed:  Reposition pt MINIMALLY every 1-2 hours in bed to relieve pressure and promote perfusion to tissue.     Chair:  When up to chair pt should not sit for longer than one hour total before either standing or returning to bed for at least 10 minutes, again to relieve pressure and promote perfusion to tissue.     o Pt should also sit on a chair cushion when up to the chair.     A diet high in protein is important for wound healing, we recommend getting 90 grams of protein per day. Taking protein shakes or bars are a good way to get extra protein in your diet.   **Set Up apt to have mattress and wheelchair evaluated by Yeimi Preciado at [facility].        Suzan Martinez PA-C. March 1, 2018  Signing Physician Kaleb Daly M.D.    Call us at [phone] if you have any questions about your wounds, have redness or swelling around your wound, have a fever of 101 or greater or if you have any other problems or concerns. We answer the phone Monday through Friday 8 am to 4 pm, please leave a message as we check the voicemail frequently throughout the day.     Follow up with Provider - 4 weeks
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
## 2018-03-28 ENCOUNTER — HOSPITAL ENCOUNTER (OUTPATIENT)
Dept: WOUND CARE | Facility: CLINIC | Age: 53
Discharge: HOME | End: 2018-03-28
Attending: PHYSICIAN ASSISTANT | Admitting: PHYSICIAN ASSISTANT
Payer: MEDICARE

## 2018-03-28 VITALS — TEMPERATURE: 96.2 F | SYSTOLIC BLOOD PRESSURE: 134 MMHG | HEART RATE: 62 BPM | DIASTOLIC BLOOD PRESSURE: 79 MMHG

## 2018-03-28 PROCEDURE — A6021 COLLAGEN DRESSING <=16 SQ IN: HCPCS

## 2018-03-28 PROCEDURE — 11042 DBRDMT SUBQ TIS 1ST 20SQCM/<: CPT | Performed by: PHYSICIAN ASSISTANT

## 2018-03-28 PROCEDURE — 11042 DBRDMT SUBQ TIS 1ST 20SQCM/<: CPT

## 2018-03-28 NOTE — MR AVS SNAPSHOT
MRN:[id number]                      After Visit Summary   3/28/2018    Parth Fountain    MRN: [id number]           Visit Information        Provider Department      3/28/2018  1:00 PM Suzan Martinez PA-C [facility] [place]          Further instructions from your care team             [facility]  [address] [facility] [address], [place] [address]  Appointment Phone [phone] Nurse Advisors [phone]     Parth Fountain      1965  Home Health Care Agency Performing Wound Care:[facility] Phone: [phone] Fax: [phone]    Wound Dressing Change:Buttocks and bilateral ischial tuberosities  Cleanse wound and surrounding skin with: Saline or wound cleasner  Cover wound with Endoform cut to size slightly larger than wound, moisten with saline  (this is supposed to dissolve into wound, if it doesn't remove and replace)  Cover wound with gauze and tegaderm to hold in place  Change dressing Monday,Wednesday   Use Iodofoam dressing instead of Endoform on Fridays    Repositioning:    Bed:  Reposition pt MINIMALLY every 1-2 hours in bed to relieve pressure and promote perfusion to tissue.     Chair:  When up to chair pt should not sit for longer than one hour total before either standing or returning to bed for at least 10 minutes, again to relieve pressure and promote perfusion to tissue. Pt should also sit on a chair cushion when up to the chair.      A diet high in protein is important for wound healing, we recommend getting 90 grams of protein per day. Taking protein shakes or bars are a good way to get extra protein in your diet.     Follow up with your primary care physician about your insomnia.         Suzan Martinez PA-C. March 1, 2018  Signing Physician Kaleb Daly M.D.     Call us at [phone] if you have any questions about your wounds, have redness or swelling around your wound, have a fever of 101  "or greater or if you have any other problems or concerns. We answer the phone Monday through Friday 8 am to 4 pm, please leave a message as we check the voicemail frequently throughout the day.      Follow up with Renea 4 weeks       Amanda Information     [employer] lets you send messages to your doctor, view your test results, renew your prescriptions, schedule appointments and more. To sign up, go to [email or web address] . Click on \"Log in\" on the left side of the screen, which will take you to the Welcome page. Then click on \"Sign up Now\" on the right side of the page.     You will be asked to enter the access code listed below, as well as some personal information. Please follow the directions to create your username and password.     Your access code is: [email or web address]  Expires: 2018  5:57 AM     Your access code will  in 90 days. If you need help or a new code, please call your [facility] clinic or [phone].        Care EveryWhere ID     This is your Care EveryWhere ID. This could be used by other organizations to access your [facility] medical records  [id number]        Equal Access to Services     CHAVA MCGRATH : Khai Mcnulty, wadanna castillo, qaisabel richter, jones ruiz. So [facility] [phone].    ATENCIÓN: Si habla español, tiene a harrison disposición servicios gratuitos de asistencia lingüística. Paty al [phone].    We comply with applicable federal civil rights laws and [place] laws. We do not discriminate on the basis of race, color, national origin, age, disability, sex, sexual orientation, or gender identity.            "

## 2018-03-28 NOTE — DISCHARGE INSTRUCTIONS
[facility]  [address] [facility] [address] [address]  Appointment Phone [phone] Nurse Advisors [phone]     Parth Fountain      1965  Home Health Care Agency Performing Wound Care:[facility] Phone: [phone] Fax: [phone]    Wound Dressing Change:Buttocks and bilateral ischial tuberosities  Cleanse wound and surrounding skin with: Saline or wound cleasner  Cover wound with Endoform cut to size slightly larger than wound, moisten with saline  (this is supposed to dissolve into wound, if it doesn't remove and replace)  Cover wound with gauze and tegaderm to hold in place  Change dressing Monday,Wednesday   Use Iodofoam dressing instead of Endoform on Fridays    Repositioning:    Bed:  Reposition pt MINIMALLY every 1-2 hours in bed to relieve pressure and promote perfusion to tissue.     Chair:  When up to chair pt should not sit for longer than one hour total before either standing or returning to bed for at least 10 minutes, again to relieve pressure and promote perfusion to tissue. Pt should also sit on a chair cushion when up to the chair.      A diet high in protein is important for wound healing, we recommend getting 90 grams of protein per day. Taking protein shakes or bars are a good way to get extra protein in your diet.     Follow up with your primary care physician about your insomnia.         Suzan Martinez PA-C. March 1, 2018  Signing Physician Kaleb Daly M.D.     Call us at [phone] if you have any questions about your wounds, have redness or swelling around your wound, have a fever of 101 or greater or if you have any other problems or concerns. We answer the phone Monday through Friday 8 am to 4 pm, please leave a message as we check the voicemail frequently throughout the day.      Follow up with Renea 4 weeks
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
## 2018-04-06 ENCOUNTER — HOSPITAL ENCOUNTER (EMERGENCY)
Facility: CLINIC | Age: 53
Discharge: HOME | End: 2018-04-06
Attending: EMERGENCY MEDICINE | Admitting: EMERGENCY MEDICINE
Payer: MEDICARE

## 2018-04-06 ENCOUNTER — APPOINTMENT (OUTPATIENT)
Dept: CT IMAGING | Facility: CLINIC | Age: 53
End: 2018-04-06
Attending: EMERGENCY MEDICINE
Payer: MEDICARE

## 2018-04-06 ENCOUNTER — APPOINTMENT (OUTPATIENT)
Dept: GENERAL RADIOLOGY | Facility: CLINIC | Age: 53
End: 2018-04-06
Attending: EMERGENCY MEDICINE
Payer: MEDICARE

## 2018-04-06 VITALS
HEART RATE: 71 BPM | TEMPERATURE: 98.1 F | DIASTOLIC BLOOD PRESSURE: 104 MMHG | BODY MASS INDEX: 17.54 KG/M2 | WEIGHT: 112 LBS | SYSTOLIC BLOOD PRESSURE: 163 MMHG | RESPIRATION RATE: 12 BRPM | OXYGEN SATURATION: 100 %

## 2018-04-06 DIAGNOSIS — R11.0 NAUSEA: ICD-10-CM

## 2018-04-06 DIAGNOSIS — E86.0 DEHYDRATION: ICD-10-CM

## 2018-04-06 LAB
ALBUMIN SERPL-MCNC: 3.5 G/DL (ref 3.4–5)
ALBUMIN UR-MCNC: 10 MG/DL
ALP SERPL-CCNC: 147 U/L (ref 40–150)
ALT SERPL W P-5'-P-CCNC: 62 U/L (ref 0–70)
AMMONIA PLAS-SCNC: 35 UMOL/L (ref 10–50)
AMORPH CRY #/AREA URNS HPF: ABNORMAL /HPF
ANION GAP SERPL CALCULATED.3IONS-SCNC: 12 MMOL/L (ref 3–14)
APPEARANCE UR: ABNORMAL
AST SERPL W P-5'-P-CCNC: 61 U/L (ref 0–45)
BACTERIA #/AREA URNS HPF: ABNORMAL /HPF
BASOPHILS # BLD AUTO: 0 10E9/L (ref 0–0.2)
BASOPHILS NFR BLD AUTO: 0.5 %
BILIRUB SERPL-MCNC: 0.4 MG/DL (ref 0.2–1.3)
BILIRUB UR QL STRIP: NEGATIVE
BUN SERPL-MCNC: 21 MG/DL (ref 7–30)
CALCIUM SERPL-MCNC: 9.2 MG/DL (ref 8.5–10.1)
CHLORIDE SERPL-SCNC: 103 MMOL/L (ref 94–109)
CO2 SERPL-SCNC: 22 MMOL/L (ref 20–32)
COLOR UR AUTO: YELLOW
CREAT SERPL-MCNC: 0.48 MG/DL (ref 0.66–1.25)
DIFFERENTIAL METHOD BLD: ABNORMAL
EOSINOPHIL # BLD AUTO: 0 10E9/L (ref 0–0.7)
EOSINOPHIL NFR BLD AUTO: 0.6 %
ERYTHROCYTE [DISTWIDTH] IN BLOOD BY AUTOMATED COUNT: 13 % (ref 10–15)
ETHANOL SERPL-MCNC: <0.01 G/DL
GFR SERPL CREATININE-BSD FRML MDRD: >90 ML/MIN/1.7M2
GLUCOSE SERPL-MCNC: 88 MG/DL (ref 70–99)
GLUCOSE UR STRIP-MCNC: NEGATIVE MG/DL
HCT VFR BLD AUTO: 43.3 % (ref 40–53)
HGB BLD-MCNC: 15.2 G/DL (ref 13.3–17.7)
HGB UR QL STRIP: NEGATIVE
IMM GRANULOCYTES # BLD: 0.1 10E9/L (ref 0–0.4)
IMM GRANULOCYTES NFR BLD: 0.8 %
INR PPP: 0.94 (ref 0.86–1.14)
KETONES UR STRIP-MCNC: NEGATIVE MG/DL
LEUKOCYTE ESTERASE UR QL STRIP: ABNORMAL
LIPASE SERPL-CCNC: 102 U/L (ref 73–393)
LYMPHOCYTES # BLD AUTO: 0.8 10E9/L (ref 0.8–5.3)
LYMPHOCYTES NFR BLD AUTO: 12.6 %
MCH RBC QN AUTO: 33 PG (ref 26.5–33)
MCHC RBC AUTO-ENTMCNC: 35.1 G/DL (ref 31.5–36.5)
MCV RBC AUTO: 94 FL (ref 78–100)
MONOCYTES # BLD AUTO: 0.6 10E9/L (ref 0–1.3)
MONOCYTES NFR BLD AUTO: 8.7 %
MUCOUS THREADS #/AREA URNS LPF: PRESENT /LPF
NEUTROPHILS # BLD AUTO: 5.1 10E9/L (ref 1.6–8.3)
NEUTROPHILS NFR BLD AUTO: 76.8 %
NITRATE UR QL: POSITIVE
NRBC # BLD AUTO: 0 10*3/UL
NRBC BLD AUTO-RTO: 0 /100
PH UR STRIP: 6.5 PH (ref 5–7)
PLATELET # BLD AUTO: 147 10E9/L (ref 150–450)
POTASSIUM SERPL-SCNC: 3.7 MMOL/L (ref 3.4–5.3)
PROT SERPL-MCNC: 7.1 G/DL (ref 6.8–8.8)
RBC # BLD AUTO: 4.61 10E12/L (ref 4.4–5.9)
RBC #/AREA URNS AUTO: 2 /HPF (ref 0–2)
SODIUM SERPL-SCNC: 136 MMOL/L (ref 133–144)
SOURCE: ABNORMAL
SP GR UR STRIP: 1.01 (ref 1–1.03)
SQUAMOUS #/AREA URNS AUTO: <1 /HPF (ref 0–1)
TROPONIN I SERPL-MCNC: <0.015 UG/L (ref 0–0.04)
UROBILINOGEN UR STRIP-MCNC: NORMAL MG/DL (ref 0–2)
WBC # BLD AUTO: 6.7 10E9/L (ref 4–11)
WBC #/AREA URNS AUTO: 20 /HPF (ref 0–5)

## 2018-04-06 PROCEDURE — 87186 SC STD MICRODIL/AGAR DIL: CPT | Performed by: EMERGENCY MEDICINE

## 2018-04-06 PROCEDURE — 74176 CT ABD & PELVIS W/O CONTRAST: CPT

## 2018-04-06 PROCEDURE — 80320 DRUG SCREEN QUANTALCOHOLS: CPT | Performed by: EMERGENCY MEDICINE

## 2018-04-06 PROCEDURE — 93005 ELECTROCARDIOGRAM TRACING: CPT | Performed by: EMERGENCY MEDICINE

## 2018-04-06 PROCEDURE — 25000128 H RX IP 250 OP 636: Performed by: EMERGENCY MEDICINE

## 2018-04-06 PROCEDURE — 96375 TX/PRO/DX INJ NEW DRUG ADDON: CPT | Performed by: EMERGENCY MEDICINE

## 2018-04-06 PROCEDURE — 96374 THER/PROPH/DIAG INJ IV PUSH: CPT | Performed by: EMERGENCY MEDICINE

## 2018-04-06 PROCEDURE — 81001 URINALYSIS AUTO W/SCOPE: CPT | Performed by: EMERGENCY MEDICINE

## 2018-04-06 PROCEDURE — 82140 ASSAY OF AMMONIA: CPT | Performed by: EMERGENCY MEDICINE

## 2018-04-06 PROCEDURE — 93010 ELECTROCARDIOGRAM REPORT: CPT | Mod: Z6 | Performed by: EMERGENCY MEDICINE

## 2018-04-06 PROCEDURE — 99285 EMERGENCY DEPT VISIT HI MDM: CPT | Mod: 25 | Performed by: EMERGENCY MEDICINE

## 2018-04-06 PROCEDURE — 85025 COMPLETE CBC W/AUTO DIFF WBC: CPT | Performed by: EMERGENCY MEDICINE

## 2018-04-06 PROCEDURE — 96361 HYDRATE IV INFUSION ADD-ON: CPT | Performed by: EMERGENCY MEDICINE

## 2018-04-06 PROCEDURE — 83690 ASSAY OF LIPASE: CPT | Performed by: EMERGENCY MEDICINE

## 2018-04-06 PROCEDURE — 85610 PROTHROMBIN TIME: CPT | Performed by: EMERGENCY MEDICINE

## 2018-04-06 PROCEDURE — A9270 NON-COVERED ITEM OR SERVICE: HCPCS | Mod: GY | Performed by: EMERGENCY MEDICINE

## 2018-04-06 PROCEDURE — 25000132 ZZH RX MED GY IP 250 OP 250 PS 637: Mod: GY | Performed by: EMERGENCY MEDICINE

## 2018-04-06 PROCEDURE — 74019 RADEX ABDOMEN 2 VIEWS: CPT

## 2018-04-06 PROCEDURE — 84484 ASSAY OF TROPONIN QUANT: CPT | Performed by: EMERGENCY MEDICINE

## 2018-04-06 PROCEDURE — 87086 URINE CULTURE/COLONY COUNT: CPT | Performed by: EMERGENCY MEDICINE

## 2018-04-06 PROCEDURE — 87088 URINE BACTERIA CULTURE: CPT | Performed by: EMERGENCY MEDICINE

## 2018-04-06 PROCEDURE — 80053 COMPREHEN METABOLIC PANEL: CPT | Performed by: EMERGENCY MEDICINE

## 2018-04-06 RX ORDER — PROMETHAZINE HYDROCHLORIDE 25 MG/ML
12.5 INJECTION, SOLUTION INTRAMUSCULAR; INTRAVENOUS ONCE
Status: COMPLETED | OUTPATIENT
Start: 2018-04-06 | End: 2018-04-06

## 2018-04-06 RX ORDER — HYDROMORPHONE HYDROCHLORIDE 2 MG/1
4 TABLET ORAL ONCE
Status: COMPLETED | OUTPATIENT
Start: 2018-04-06 | End: 2018-04-06

## 2018-04-06 RX ORDER — SODIUM CHLORIDE 9 MG/ML
1000 INJECTION, SOLUTION INTRAVENOUS CONTINUOUS
Status: DISCONTINUED | OUTPATIENT
Start: 2018-04-06 | End: 2018-04-07 | Stop reason: HOSPADM

## 2018-04-06 RX ORDER — ONDANSETRON 2 MG/ML
8 INJECTION INTRAMUSCULAR; INTRAVENOUS ONCE
Status: COMPLETED | OUTPATIENT
Start: 2018-04-06 | End: 2018-04-06

## 2018-04-06 RX ORDER — ONDANSETRON 4 MG/1
4 TABLET, ORALLY DISINTEGRATING ORAL EVERY 6 HOURS PRN
Qty: 10 TABLET | Refills: 0 | Status: SHIPPED | OUTPATIENT
Start: 2018-04-06 | End: 2018-04-09

## 2018-04-06 RX ADMIN — PROMETHAZINE HYDROCHLORIDE 12.5 MG: 25 INJECTION INTRAMUSCULAR; INTRAVENOUS at 19:37

## 2018-04-06 RX ADMIN — ONDANSETRON 8 MG: 2 INJECTION INTRAMUSCULAR; INTRAVENOUS at 17:55

## 2018-04-06 RX ADMIN — SODIUM CHLORIDE 1000 ML: 9 INJECTION, SOLUTION INTRAVENOUS at 20:00

## 2018-04-06 RX ADMIN — HYDROMORPHONE HYDROCHLORIDE 4 MG: 2 TABLET ORAL at 23:18

## 2018-04-06 RX ADMIN — HYDROMORPHONE HYDROCHLORIDE 4 MG: 2 TABLET ORAL at 20:22

## 2018-04-06 RX ADMIN — RANITIDINE HYDROCHLORIDE 50 MG: 25 INJECTION INTRAMUSCULAR; INTRAVENOUS at 20:04

## 2018-04-06 RX ADMIN — SODIUM CHLORIDE 1000 ML: 9 INJECTION, SOLUTION INTRAVENOUS at 17:56

## 2018-04-06 ASSESSMENT — ENCOUNTER SYMPTOMS
FATIGUE: 1
NAUSEA: 1
APPETITE CHANGE: 1
VOMITING: 1
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
## 2018-04-06 NOTE — ED AVS SNAPSHOT
[facility], [place], Emergency Department    [address] [place] [id number]    Phone:  [phone]                                       Parth Fountain   MRN: [id number]    Department:  [facility], Emergency Department   Date of Visit:  4/6/2018           After Visit Summary Signature Page     I have received my discharge instructions, and my questions have been answered. I have discussed any challenges I see with this plan with the nurse or doctor.    ..........................................................................................................................................  Patient/Patient Representative Signature      ..........................................................................................................................................  Patient Representative Print Name and Relationship to Patient    ..................................................               ................................................  Date                                            Time    ..........................................................................................................................................  Reviewed by Signature/Title    ...................................................              ..............................................  Date                                                            Time

## 2018-04-06 NOTE — ED AVS SNAPSHOT
[facility], Emergency Department    [address] [facility] [address]    Phone:  [phone]                                       Parth Fountain   MRN: [id number]    Department:  [facility], Emergency Department   Date of Visit:  4/6/2018           Patient Information     Date Of Birth          1965        Your diagnoses for this visit were:     Nausea possible gastritis    Dehydration        You were seen by Byron Bowling MD and Ernesto Davenport MD.        Discharge Instructions       Keep hydrated.    Prescription for Zofran and Zantac [facility]    Please make an appointment to follow up with Your Primary Care Provider next week for recheck.    Return to the ER for worsening or fever.        Discharge References/Attachments     DEHYDRATION (ADULT) (ENGLISH)    GASTRITIS (ADULT) (ENGLISH)      Your next 10 appointments already scheduled     Apr 24, 2018  1:30 PM CDT   Return Visit with Suzan Martinez PA-C   [facility] [place] ([facility])    [address] [address] [phone]              24 Hour Appointment Hotline       To make an appointment at any [place] clinic, call [phone] ([phone]). If you don't have a family doctor or clinic, we will help you find one. [place] clinics are conveniently located to serve the needs of you and your family.             Review of your medicines      START taking        Dose / Directions Last dose taken    ondansetron 4 MG ODT tab   Commonly known as:  ZOFRAN ODT   Dose:  4 mg   Quantity:  10 tablet        Take 1 tablet (4 mg) by mouth every 6 hours as needed for nausea   Refills:  0        ranitidine 150 MG tablet   Commonly known as:  ZANTAC   Dose:  150 mg   Quantity:  60 tablet        Take 1 tablet (150 mg) by mouth 2 times daily   Refills:  0          Our records show that you are taking the medicines listed  below. If these are incorrect, please call your family doctor or clinic.        Dose / Directions Last dose taken    AMBIEN 10 MG tablet   Dose:  10 mg   Generic drug:  zolpidem        Take 10 mg by mouth nightly as needed.   Refills:  0        aspirin 325 MG tablet   Dose:  325 mg        Take 325 mg by mouth daily.   Refills:  0        baclofen 20 MG tablet   Commonly known as:  LIORESAL   Dose:  20 mg        Take 20 mg by mouth 4 times daily.   Refills:  0        BACTRIM DS PO   Dose:  1 tablet        Take 1 tablet by mouth daily   Refills:  0        bismuth subsalicylate 525 MG/15ML Susp   Dose:  30 mL        Take 30 mLs by mouth every 4 hours as needed   Refills:  0        calcium carbonate 1250 MG tablet   Commonly known as:  OS-RIGOBERTO 500 mg [place]. Ca   Dose:  500 mg   Indication:  with meals        Take 500 mg by mouth 3 times daily   Refills:  0        * ENSURE Liqd   Dose:  3 Can        Take 3 Cans by mouth daily   Refills:  0        * ENSURE NUTRITION SHAKE Liqd   Dose:  1 Bottle   Quantity:  90 Bottle        Take 1 Bottle by mouth 3 times daily (with meals)   Refills:  11        eucerin cream   Dose:  0.5 inch        Apply 0.5 inches topically as needed.   Refills:  0        HYDROmorphone 4 MG tablet   Commonly known as:  DILAUDID   Dose:  4-8 mg   Quantity:  100 tablet        Take 4-8 mg by mouth every 3 hours as needed.   Refills:  0        LYRICA PO   Dose:  150 mg        Take 150 mg by mouth 2 times daily   Refills:  0        MULTIPLE VITAMIN/IRON OR        Refills:  0        order for DME   Quantity:  1 Month        Equipment being ordered: colostomy and urosotmy   Refills:  11        * order for DME   Quantity:  1 Units        [employer] Fax: [phone]  Group 2 Air Mattress   Length of need: lifetime   Refills:  0        * order for DME   Quantity:  1 Device        Equipment being ordered: Pressure Mapping of wheelchair and home assessment of mattress with Yeimi Preciado   Refills:  0         polyethylene glycol powder   Commonly known as:  MIRALAX/GLYCOLAX   Dose:  1 capful        Take 1 capful by mouth daily as needed.   Refills:  0        sennosides 8.6 MG tablet   Commonly known as:  SENOKOT   Dose:  1 tablet        Take 1 tablet by mouth 2 times daily as needed   Refills:  0        traZODone 100 MG tablet   Commonly known as:  DESYREL   Dose:  100 mg        Take 100 mg by mouth At Bedtime.   Refills:  0        TYLENOL 325 MG tablet   Dose:  2 tablet   Generic drug:  acetaminophen        Take 2 tablets by mouth every 4 hours as needed.   Refills:  0        VALIUM PO        5mg q noon; 10 mg q hs; and 5mg BID prn   Refills:  0        VITAMIN C CR PO   Dose:  500 mg        Take 500 mg by mouth 4 times daily.   Refills:  0        VITAMIN D PO   Dose:  800 Int'l Units        Take 800 Int'l Units by mouth 2 times daily.   Refills:  0        * Notice:  This list has 4 medication(s) that are the same as other medications prescribed for you. Read the directions carefully, and ask your doctor or other care provider to review them with you.            Prescriptions were sent or printed at these locations (2 Prescriptions)                   [facility] [place], [place] - [address] [facility]   [address] [facility] [facility] [address], [facility] [address]    Telephone:  [phone]   Fax:  [phone]   Hours:                  E-Prescribed (2 of 2)         ondansetron (ZOFRAN ODT) 4 MG ODT tab               ranitidine (ZANTAC) 150 MG tablet                Procedures and tests performed during your visit     Abd/pelvis CT no contrast - Stone Protocol    Abdomen XR, 2 vw, flat and upright    Alcohol    Ammonia    CBC with platelets differential    Comprehensive metabolic panel    EKG 12-lead, tracing only    INR    Lipase    Peripheral IV: Standard    Pulse oximetry nursing    Troponin I    UA with Microscopic reflex to Culture    Urine Culture Aerobic Bacterial      Orders Needing Specimen Collection     None      Pending  "Results     Date and Time Order Name Status Description    2018 Abd/pelvis CT no contrast - Stone Protocol Preliminary     2018 1931 Urine Culture Aerobic Bacterial Preliminary     2018 1852 Abdomen XR, 2 vw, flat and upright Preliminary             Pending Culture Results     Date and Time Order Name Status Description    2018 1931 Urine Culture Aerobic Bacterial Preliminary             Pending Results Instructions     If you had any lab results that were not finalized at the time of your Discharge, you can call the ED Lab Result RN at [phone]. You will be contacted by this team for any positive Lab results or changes in treatment. The nurses are available 7 days a week from 10A to 6:30P.  You can leave a message 24 hours per day and they will return your call.        Thank you for choosing [facility]       Thank you for choosing [facility] for your care. Our goal is always to provide you with excellent care. Hearing back from our patients is one way we can continue to improve our services. Please take a few minutes to complete the written survey that you may receive in the mail after you visit with us. Thank you!        [employer] Information     [employer] lets you send messages to your doctor, view your test results, renew your prescriptions, schedule appointments and more. To sign up, go to [email or web address] . Click on \"Log in\" on the left side of the screen, which will take you to the Welcome page. Then click on \"Sign up Now\" on the right side of the page.     You will be asked to enter the access code listed below, as well as some personal information. Please follow the directions to create your username and password.     Your access code is: [email or web address]  Expires: 2018  5:57 AM     Your access code will  in 90 days. If you need help or a new code, please call your [facility] clinic or [phone].        Care EveryWhere ID     This is your Care EveryWhere ID. This could be " used by other organizations to access your [place] medical records  [id number]        Equal Access to Services     CHAVA MCGRATH : Khai Mcnulty, tri castillo, jones marcus. So [facility] [phone].    ATENCIÓN: Si habla español, tiene a harrison disposición servicios gratuitos de asistencia lingüística. Llame al [phone].    We comply with applicable federal civil rights laws and [place] laws. We do not discriminate on the basis of race, color, national origin, age, disability, sex, sexual orientation, or gender identity.            After Visit Summary       This is your record. Keep this with you and show to your community pharmacist(s) and doctor(s) at your next visit.

## 2018-04-06 NOTE — ED NOTES
BIBA with complaints of weakness, nausea and vomiting. Per EMS h has had some poss EKG findings, wanted to be seen by doc.

## 2018-04-07 ENCOUNTER — TELEPHONE (OUTPATIENT)
Dept: EMERGENCY MEDICINE | Facility: CLINIC | Age: 53
End: 2018-04-07

## 2018-04-07 DIAGNOSIS — N39.0 URINARY TRACT INFECTION: ICD-10-CM
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
## 2018-04-07 NOTE — DISCHARGE INSTRUCTIONS
Keep hydrated.    Prescription for Zofran and Zantac were E [facility]    Please make an appointment to follow up with Your Primary Care Provider next week for recheck.    Return to the ER for worsening or fever.

## 2018-04-07 NOTE — ED NOTES
Pt feeling much better, EMS arrived to take pt home. Pt needs to go by stretcher d/t his paraplegia as well as not having his own wheelchair here. Pt is too weak to transition himself from wheelchair to wheelchair. Wheel chair van was not an option d/t not enough transferring strength/hands.

## 2018-04-09 LAB
BACTERIA SPEC CULT: ABNORMAL
BACTERIA SPEC CULT: ABNORMAL
INTERPRETATION ECG - MUSE: NORMAL
Lab: ABNORMAL
SPECIMEN SOURCE: ABNORMAL

## 2018-04-09 NOTE — TELEPHONE ENCOUNTER
[facility] Emergency Department Lab result notification:    [place] ED lab result protocol used  Urine Culture Protocol    Reason for call  Notify of lab results, assess symptoms,  review ED providers recommendations/discharge instructions (if necessary) and advise per ED lab result f/u protocol    Lab Result  Final Urine Culture Report on 4/9/18.  Emergency Dept discharge antibiotic prescribed: None  Bacteria #1: >100,000 colonies/mL Escherichia coli   Bacteria #2: 10,000 to 50,000 colonies/mL Enterococcus faecium  Recommendations in treatment per  ED Lab result protocol.  Informationfrom ED visit on 04/07/2018 on previous notes in this encounter    RN Assessment (Patient s current Symptoms), include time called.  [Insert Left message here if message left]  At 1021 Left voicemail message requesting a call back to [phone] between 10 a.m. and 6:30 p.m., 7 days a week for patient's ED/UC lab results.  May leave a message 24/7, if no one available.       Martina Ochoa RN  [place] [facility] RN  Lung Nodule and ED Lab Result F/u RN  Epic pool (ED late result f/u RN): P [id number]   INCIDENTAL RADIOLOGY F/U NURSES: P [id number]  Ph# [phone]      Copy of Lab result   Exam Information   Exam Date Exam Time Accession # Results    4/6/18  7:31 PM [id number]    Component Results   Component Collected Lab   Specimen Description 04/06/2018  7:31    Unspecified Urine   Special Requests 04/06/2018  7:31 PM 75   Specimen received in preservative   Culture Micro (Abnormal) 04/06/2018  7:31    >100,000 colonies/mL   Escherichia coli      Culture Micro (Abnormal) 04/06/2018  7:31    10,000 to 50,000 colonies/mL   Enterococcus faecium      Culture & Susceptibility   ENTEROCOCCUS FAECIUM   Antibiotic Interpretation Sensitivity Unit Method Status   AMPICILLIN Sensitive <=2 ug/mL JM Final   NITROFURANTOIN Intermediate 64 ug/mL JM Final   PENICILLIN Sensitive 0.25 ug/mL JM Final   VANCOMYCIN  Sensitive <=0.5 ug/mL JM Final         ESCHERICHIA COLI   Antibiotic Interpretation Sensitivity Unit Method Status   AMPICILLIN Resistant >=32 ug/mL JM Final   AMPICILLIN/SULBACTAM Resistant >=32 ug/mL JM Final   CEFAZOLIN Sensitive <=4 ug/mL JM Final   Comment: Cefazolin JM breakpoints are for the treatment of uncomplicated urinary tract   infections.  For the treatment of systemic infections, please contact the   laboratory for additional testing.   CEFEPIME Sensitive <=1 ug/mL JM Final   CEFOXITIN Sensitive <=4 ug/mL JM Final   CEFTAZIDIME Sensitive <=1 ug/mL JM Final   CEFTRIAXONE Sensitive <=1 ug/mL JM Final   CIPROFLOXACIN Resistant >=4 ug/mL JM Final   GENTAMICIN Sensitive <=1 ug/mL JM Final   LEVOFLOXACIN Resistant >=8 ug/mL JM Final   NITROFURANTOIN Resistant 256 ug/mL JM Final   Piperacillin/Tazo Sensitive <=4 ug/mL JM Final   TOBRAMYCIN Sensitive <=1 ug/mL JM Final   Trimethoprim/Sulfa Resistant >=16/304 ug/mL JM Final

## 2018-04-10 RX ORDER — CEFDINIR 300 MG/1
300 CAPSULE ORAL 2 TIMES DAILY
Qty: 14 CAPSULE | Refills: 0 | Status: SHIPPED | OUTPATIENT
Start: 2018-04-10 | End: 2018-04-17

## 2018-04-10 RX ORDER — AMOXICILLIN 500 MG/1
500 CAPSULE ORAL 2 TIMES DAILY
Qty: 14 CAPSULE | Refills: 0 | Status: SHIPPED | OUTPATIENT
Start: 2018-04-10 | End: 2018-04-17

## 2018-04-10 NOTE — TELEPHONE ENCOUNTER
[facility]  Emergency Department Lab result notification     Patient/parent Name  Parth MCLEAN Assessment (Patient s current Symptoms), include time called.  [Insert Left message here if message left]  At 1149 pt states with regards to symptoms -  Nothing severe, I have a urostomy bag and when my urine is clear not infected, I don't get white stuff coming out of my stoma but when I have one there is the white/off white stuff there sometimes it can get bad that It plugs my stoma.  pt acknowledges sediment in bag at this time. Afebrile, denies any other symptoms.  Has been on prophylactic  Bactrim DS     At 1154 Consulted Lily ASIF,  ER CHARLES, RN notified Lily of situation she will look into treatment options she had another patient to attend to, therefore this RN will contact Lily for recommendations after 20 minutes or so.     At 1226 spoke with Lily CHANCE, who recommended starting Amoxicilin 500 mg BID 7, and cefdinir 300 mg BID 7 days while on these antibiotics pt can stop bactrim Ds that he takes regularly and he should follow up with primary for recheck after antibiotics completed.     RN Recommendations/Instructions per [place] ED lab result protocol  At 1230 relayed provider recommendations to pt who verbalized understanding.      Please Contact your PCP clinic or return to the Emergency department if your:      Symptoms worsen or other concerning symptom's.    PCP follow-up Questions asked: YES       Martina Ochoa RN  [place] [facility] RN  Lung Nodule and ED Lab Result F/u RN  Epic pool (ED late result f/u RN): P [id number]  FV INCIDENTAL RADIOLOGY F/U NURSES: P [id number]  # [phone]

## 2018-04-24 ENCOUNTER — HOSPITAL ENCOUNTER (OUTPATIENT)
Dept: WOUND CARE | Facility: CLINIC | Age: 53
Discharge: HOME | End: 2018-04-24
Attending: PHYSICIAN ASSISTANT | Admitting: PHYSICIAN ASSISTANT
Payer: MEDICARE

## 2018-04-24 VITALS — HEART RATE: 86 BPM | DIASTOLIC BLOOD PRESSURE: 95 MMHG | SYSTOLIC BLOOD PRESSURE: 157 MMHG

## 2018-04-24 PROCEDURE — 11042 DBRDMT SUBQ TIS 1ST 20SQCM/<: CPT | Performed by: PHYSICIAN ASSISTANT

## 2018-04-24 PROCEDURE — 11042 DBRDMT SUBQ TIS 1ST 20SQCM/<: CPT

## 2018-04-24 PROCEDURE — A6212 FOAM DRG <=16 SQ IN W/BORDER: HCPCS

## 2018-04-24 PROCEDURE — A6261 WOUND FILLER GEL/PASTE /OZ: HCPCS

## 2018-04-24 NOTE — MR AVS SNAPSHOT
MRN:[id number]                      After Visit Summary   4/24/2018    Parth Fountain    MRN: [id number]           Visit Information        Provider Department      4/24/2018  1:30 PM Suzan Martinez PA-C [facility]          Further instructions from your care team              [facility]  [address] [facility] [address], [place] [address]  Appointment Phone [phone] Nurse Advisors [phone]      Parth Fountain      1965  Home Health Care Agency Performing Wound Care:[employer] Phone: [phone] Fax: [phone]     Wound Dressing Change:Buttocks and bilateral ischial tuberosities   Cleanse wound and surrounding skin with: Saline or wound cleasner  Cover wound with Endoform cut to size slightly larger than wound, moisten with saline  (this is supposed to dissolve into wound, if it doesn't remove and replace)  Cover wound with gauze and tape  Change dressing Monday,Wednesday   Use Iodosorb dressing instead of Endoform on Fridays  Wound Dressing Change:Right Foot, heel and Right ankle  Cleanse wound with saline or wound cleanser  Cover wound with wound gel and mepilex border or gauze and tape  Change dressing MWF     Repositioning:    Bed:  Reposition pt MINIMALLY every 1-2 hours in bed to relieve pressure and promote perfusion to tissue.     Chair:  When up to chair pt should not sit for longer than one hour total before either standing or returning to bed for at least 10 minutes, again to relieve pressure and promote perfusion to tissue. Pt should also sit on a chair cushion when up to the chair.       A diet high in protein is important for wound healing, we recommend getting 90 grams of protein per day. Taking protein shakes or bars are a good way to get extra protein in your diet.             Suzan Martinez PA-C. April 24, 2018  Signing Physician Kaleb Daly M.D.      Call us at  "[phone] if you have any questions about your wounds, have redness or swelling around your wound, have a fever of 101 or greater or if you have any other problems or concerns. We answer the phone Monday through Friday 8 am to 4 pm, please leave a message as we check the voicemail frequently throughout the day.       Follow up with Renea 4 weeks       [facility]manda Information     [employer] lets you send messages to your doctor, view your test results, renew your prescriptions, schedule appointments and more. To sign up, go to [email or web address] . Click on \"Log in\" on the left side of the screen, which will take you to the Welcome page. Then click on \"Sign up Now\" on the right side of the page.     You will be asked to enter the access code listed below, as well as some personal information. Please follow the directions to create your username and password.     Your access code is: [email or web address]  Expires: 2018  5:57 AM     Your access code will  in 90 days. If you need help or a new code, please call your [place] clinic or [phone].        Care EveryWhere ID     This is your Care EveryWhere ID. This could be used by other organizations to access your [place] medical records  [id number]        Equal Access to Services     CHAVA MCGRATH : Khai Mcnulty, waaxda luqadaha, qaybta kaalmada neelam, jones ruiz. So [facility] [phone].    ATENCIÓN: Si habla español, tiene a harrison disposición servicios gratuitos de asistencia lingüística. Llame al [phone].    We comply with applicable federal civil rights laws and [place] laws. We do not discriminate on the basis of race, color, national origin, age, disability, sex, sexual orientation, or gender identity.            "

## 2018-04-24 NOTE — DISCHARGE INSTRUCTIONS
[facility]  [address] [facility] [address], [place] [address]  Appointment Phone [phone] Nurse Advisors [phone]      Parth Fountain      1965  Home Health Care Agency Performing Wound Care:[employer] Phone: [phone] Fax: [phone]     Wound Dressing Change:Buttocks and bilateral ischial tuberosities   Cleanse wound and surrounding skin with: Saline or wound cleasner  Cover wound with Endoform cut to size slightly larger than wound, moisten with saline  (this is supposed to dissolve into wound, if it doesn't remove and replace)  Cover wound with gauze and tape  Change dressing Monday,Wednesday   Use Iodosorb dressing instead of Endoform on Fridays  Wound Dressing Change:Right Foot, heel and Right ankle  Cleanse wound with saline or wound cleanser  Cover wound with wound gel and mepilex border or gauze and tape  Change dressing MWF     Repositioning:    Bed:  Reposition pt MINIMALLY every 1-2 hours in bed to relieve pressure and promote perfusion to tissue.     Chair:  When up to chair pt should not sit for longer than one hour total before either standing or returning to bed for at least 10 minutes, again to relieve pressure and promote perfusion to tissue. Pt should also sit on a chair cushion when up to the chair.       A diet high in protein is important for wound healing, we recommend getting 90 grams of protein per day. Taking protein shakes or bars are a good way to get extra protein in your diet.             Suzan Martinez PA-C. April 24, 2018  Signing Physician Kaleb Daly M.D.      Call us at [phone] if you have any questions about your wounds, have redness or swelling around your wound, have a fever of 101 or greater or if you have any other problems or concerns. We answer the phone Monday through Friday 8 am to 4 pm, please leave a message as we check the voicemail frequently throughout the day.       Follow up with Renea  4 weeks
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
## 2018-05-02 ENCOUNTER — TELEPHONE (OUTPATIENT)
Dept: WOUND CARE | Facility: CLINIC | Age: 53
End: 2018-05-02

## 2018-05-02 NOTE — TELEPHONE ENCOUNTER
Pt calling to report that he is noticing yellow slough in right ankle wound and right heel. He would like to use Medihoney gel to both areas since he has this product at his home. New orders sent to his home care agency.

## 2018-05-02 NOTE — PATIENT INSTRUCTIONS
[facility]  [address] [facility] [address], [place] [address]  Appointment Phone [phone] Nurse Advisors [phone]      Parth Fountain      1965  Home Health Care Agency Performing Wound Care:[facility] Phone: [phone] Fax: [phone]      Wound Dressing Change:Buttocks and bilateral ischial tuberosities   Cleanse wound and surrounding skin with: Saline or wound cleasner  Cover wound with Endoform cut to size slightly larger than wound, moisten with saline  (this is supposed to dissolve into wound, if it doesn't remove and replace)  Cover wound with gauze and tape  Change dressing Monday,Wednesday   Use Iodosorb dressing instead of Endoform on Fridays  Wound Dressing Change:Right Foot, heel and Right ankle  Cleanse wound with saline or wound cleanser  Cover wound with wound gel and mepilex border or gauze and tape. May use Medihoney gel if fibrin tissue is noted in wound bed  Change dressing MWF      Repositioning:    Bed:  Reposition pt MINIMALLY every 1-2 hours in bed to relieve pressure and promote perfusion to tissue.     Chair:  When up to chair pt should not sit for longer than one hour total before either standing or returning to bed for at least 10 minutes, again to relieve pressure and promote perfusion to tissue. Pt should also sit on a chair cushion when up to the chair.       A diet high in protein is important for wound healing, we recommend getting 90 grams of protein per day. Taking protein shakes or bars are a good way to get extra protein in your diet.               Suzan Martinez PA-C. April 24, 2018  Signing Physician Kaleb Daly M.D.      Call us at [phone] if you have any questions about your wounds, have redness or swelling around your wound, have a fever of 101 or greater or if you have any other problems or concerns. We answer the phone Monday through Friday 8 am to 4 pm, please leave a message as we check the  voicemail frequently throughout the day.       Follow up with Renea 4 weeks

## 2018-05-23 ENCOUNTER — HOSPITAL ENCOUNTER (OUTPATIENT)
Dept: WOUND CARE | Facility: CLINIC | Age: 53
Discharge: HOME | End: 2018-05-23
Attending: PHYSICIAN ASSISTANT | Admitting: PHYSICIAN ASSISTANT
Payer: MEDICARE

## 2018-05-23 VITALS — SYSTOLIC BLOOD PRESSURE: 100 MMHG | HEART RATE: 87 BPM | DIASTOLIC BLOOD PRESSURE: 58 MMHG | TEMPERATURE: 97.1 F

## 2018-05-23 DIAGNOSIS — L89.893: ICD-10-CM

## 2018-05-23 DIAGNOSIS — L89.313 DECUBITUS ULCER OF RIGHT BUTTOCK, STAGE 3 (H): ICD-10-CM

## 2018-05-23 PROCEDURE — 17250 CHEM CAUT OF GRANLTJ TISSUE: CPT

## 2018-05-23 PROCEDURE — 11042 DBRDMT SUBQ TIS 1ST 20SQCM/<: CPT | Performed by: PHYSICIAN ASSISTANT

## 2018-05-23 PROCEDURE — 11042 DBRDMT SUBQ TIS 1ST 20SQCM/<: CPT

## 2018-05-23 NOTE — MR AVS SNAPSHOT
MRN:[id number]                      After Visit Summary   5/23/2018    Parth Fountain    MRN: [id number]           Visit Information        Provider Department      5/23/2018  1:00 PM Suzan Martinez PA-C [facility] [place]          Further instructions from your care team             [facility]  [address] [facility] [address], [place] [address]  Appointment Phone [phone] Nurse Advisors [phone]    Parth Fountain      1965  Home Health Care Agency Performing Wound Care:[facility] Phone: [phone] Fax: [phone]  Wound Dressing Change:Right Lower Leg, All Open area on buttocks  Cleanse wound with: saline or wound cleanser  Cover wound with Medihoney Gel  Cover wound with gauze,  Change dressing daily.  Repositioning:    Bed:  Reposition pt MINIMALLY every 1-2 hours in bed to relieve pressure and promote perfusion to tissue.     Chair:  When up to chair pt should not sit for longer than one hour total before returning to bed for at least 10 minutes, again to relieve pressure and promote perfusion to tissue.     o Pt should also sit on a chair cushion when up to the chair.   Needs to eat better and drink more water -overall needs better nutrition and sleep to promote wound healing.   Suzan Martinez PA-C. May 23, 2018    Call us at [phone] if you have any questions about your wounds, have redness or swelling around your wound, have a fever of 101 or greater or if you have any other problems or concerns. We answer the phone Monday through Friday 8 am to 4 pm, please leave a message as we check the voicemail frequently throughout the day.     Follow up with Provider - 4 weeks         Care EveryWhere ID     This is your Care EveryWhere ID. This could be used by other organizations to access your [facility] medical records  [id number]        Equal Access to Services     CHAVA MORE: Khai diaz  arun Mcnulty, tri castillo, austen richter, jones ruiz. So [facility] [phone].    ATENCIÓN: Si habla español, tiene a harrison disposición servicios gratuitos de asistencia lingüística. Llame al [phone].    We comply with applicable federal civil rights laws and [place] laws. We do not discriminate on the basis of race, color, national origin, age, disability, sex, sexual orientation, or gender identity.

## 2018-05-23 NOTE — DISCHARGE INSTRUCTIONS
[facility]  [address] [facility] [address], [place] [address]  Appointment Phone [phone] Nurse Advisors [phone]    Parth Fountain      1965  Home Health Care Agency Performing Wound Care:[email or web address] Phone: [phone] Fax: [phone]  Wound Dressing Change:Right Lower Leg, All Open area on buttocks  Cleanse wound with: saline or wound cleanser  Cover wound with Medihoney Gel  Cover wound with gauze,  Change dressing daily.  Repositioning:    Bed:  Reposition pt MINIMALLY every 1-2 hours in bed to relieve pressure and promote perfusion to tissue.     Chair:  When up to chair pt should not sit for longer than one hour total before returning to bed for at least 10 minutes, again to relieve pressure and promote perfusion to tissue.     o Pt should also sit on a chair cushion when up to the chair.   Needs to eat better and drink more water -overall needs better nutrition and sleep to promote wound healing.   Suzan Martinez PA-C. May 23, 2018    Call us at [phone] if you have any questions about your wounds, have redness or swelling around your wound, have a fever of 101 or greater or if you have any other problems or concerns. We answer the phone Monday through Friday 8 am to 4 pm, please leave a message as we check the voicemail frequently throughout the day.     Follow up with Provider - 4 weeks

## 2018-05-28 ENCOUNTER — HOSPITAL ENCOUNTER (EMERGENCY)
Facility: CLINIC | Age: 53
Discharge: HOME | End: 2018-05-28
Attending: EMERGENCY MEDICINE | Admitting: EMERGENCY MEDICINE
Payer: MEDICARE

## 2018-05-28 ENCOUNTER — APPOINTMENT (OUTPATIENT)
Dept: ULTRASOUND IMAGING | Facility: CLINIC | Age: 53
End: 2018-05-28
Attending: EMERGENCY MEDICINE
Payer: MEDICARE

## 2018-05-28 VITALS
DIASTOLIC BLOOD PRESSURE: 101 MMHG | OXYGEN SATURATION: 99 % | HEART RATE: 70 BPM | SYSTOLIC BLOOD PRESSURE: 179 MMHG | RESPIRATION RATE: 16 BRPM | TEMPERATURE: 98.6 F | HEIGHT: 67 IN

## 2018-05-28 DIAGNOSIS — R10.10 PAIN OF UPPER ABDOMEN: ICD-10-CM

## 2018-05-28 DIAGNOSIS — N39.0 RECURRENT UTI: ICD-10-CM

## 2018-05-28 LAB
ALBUMIN SERPL-MCNC: 3.1 G/DL (ref 3.4–5)
ALBUMIN UR-MCNC: 10 MG/DL
ALP SERPL-CCNC: 162 U/L (ref 40–150)
ALT SERPL W P-5'-P-CCNC: 45 U/L (ref 0–70)
AMORPH CRY #/AREA URNS HPF: ABNORMAL /HPF
AMPHETAMINES UR QL SCN: NEGATIVE
ANION GAP SERPL CALCULATED.3IONS-SCNC: 13 MMOL/L (ref 3–14)
APPEARANCE UR: ABNORMAL
AST SERPL W P-5'-P-CCNC: 86 U/L (ref 0–45)
BACTERIA #/AREA URNS HPF: ABNORMAL /HPF
BARBITURATES UR QL: NEGATIVE
BASOPHILS # BLD AUTO: 0 10E9/L (ref 0–0.2)
BASOPHILS NFR BLD AUTO: 0.5 %
BENZODIAZ UR QL: POSITIVE
BILIRUB SERPL-MCNC: 1.4 MG/DL (ref 0.2–1.3)
BILIRUB UR QL STRIP: NEGATIVE
BUN SERPL-MCNC: 16 MG/DL (ref 7–30)
CALCIUM SERPL-MCNC: 8.1 MG/DL (ref 8.5–10.1)
CANNABINOIDS UR QL SCN: POSITIVE
CHLORIDE SERPL-SCNC: 100 MMOL/L (ref 94–109)
CO2 SERPL-SCNC: 19 MMOL/L (ref 20–32)
COCAINE UR QL: NEGATIVE
COLOR UR AUTO: YELLOW
CREAT SERPL-MCNC: 0.52 MG/DL (ref 0.66–1.25)
DIFFERENTIAL METHOD BLD: ABNORMAL
EOSINOPHIL # BLD AUTO: 0 10E9/L (ref 0–0.7)
EOSINOPHIL NFR BLD AUTO: 0.2 %
ERYTHROCYTE [DISTWIDTH] IN BLOOD BY AUTOMATED COUNT: 13.3 % (ref 10–15)
ETHANOL SERPL-MCNC: <0.01 G/DL
ETHANOL UR QL SCN: NEGATIVE
GFR SERPL CREATININE-BSD FRML MDRD: >90 ML/MIN/1.7M2
GLUCOSE BLDC GLUCOMTR-MCNC: 147 MG/DL (ref 70–99)
GLUCOSE BLDC GLUCOMTR-MCNC: 65 MG/DL (ref 70–99)
GLUCOSE SERPL-MCNC: 61 MG/DL (ref 70–99)
GLUCOSE UR STRIP-MCNC: NEGATIVE MG/DL
HCT VFR BLD AUTO: 40.3 % (ref 40–53)
HGB BLD-MCNC: 14 G/DL (ref 13.3–17.7)
HGB UR QL STRIP: ABNORMAL
IMM GRANULOCYTES # BLD: 0 10E9/L (ref 0–0.4)
IMM GRANULOCYTES NFR BLD: 0.2 %
INR PPP: 1.03 (ref 0.86–1.14)
KETONES UR STRIP-MCNC: 40 MG/DL
LEUKOCYTE ESTERASE UR QL STRIP: ABNORMAL
LIPASE SERPL-CCNC: 160 U/L (ref 73–393)
LYMPHOCYTES # BLD AUTO: 0.8 10E9/L (ref 0.8–5.3)
LYMPHOCYTES NFR BLD AUTO: 11.8 %
MCH RBC QN AUTO: 32.9 PG (ref 26.5–33)
MCHC RBC AUTO-ENTMCNC: 34.7 G/DL (ref 31.5–36.5)
MCV RBC AUTO: 95 FL (ref 78–100)
MONOCYTES # BLD AUTO: 0.6 10E9/L (ref 0–1.3)
MONOCYTES NFR BLD AUTO: 8.4 %
MUCOUS THREADS #/AREA URNS LPF: PRESENT /LPF
NEUTROPHILS # BLD AUTO: 5.2 10E9/L (ref 1.6–8.3)
NEUTROPHILS NFR BLD AUTO: 78.9 %
NITRATE UR QL: POSITIVE
NRBC # BLD AUTO: 0 10*3/UL
NRBC BLD AUTO-RTO: 0 /100
OPIATES UR QL SCN: POSITIVE
PH UR STRIP: 6.5 PH (ref 5–7)
PLATELET # BLD AUTO: 185 10E9/L (ref 150–450)
POTASSIUM SERPL-SCNC: 3.8 MMOL/L (ref 3.4–5.3)
PROT SERPL-MCNC: 6.5 G/DL (ref 6.8–8.8)
RBC # BLD AUTO: 4.25 10E12/L (ref 4.4–5.9)
RBC #/AREA URNS AUTO: 1 /HPF (ref 0–2)
SODIUM SERPL-SCNC: 132 MMOL/L (ref 133–144)
SOURCE: ABNORMAL
SP GR UR STRIP: 1.01 (ref 1–1.03)
TRANS CELLS #/AREA URNS HPF: <1 /HPF (ref 0–1)
UROBILINOGEN UR STRIP-MCNC: NORMAL MG/DL (ref 0–2)
WBC # BLD AUTO: 6.5 10E9/L (ref 4–11)
WBC #/AREA URNS AUTO: 26 /HPF (ref 0–5)

## 2018-05-28 PROCEDURE — 87086 URINE CULTURE/COLONY COUNT: CPT | Performed by: EMERGENCY MEDICINE

## 2018-05-28 PROCEDURE — 80320 DRUG SCREEN QUANTALCOHOLS: CPT | Performed by: EMERGENCY MEDICINE

## 2018-05-28 PROCEDURE — 99285 EMERGENCY DEPT VISIT HI MDM: CPT | Mod: 25 | Performed by: EMERGENCY MEDICINE

## 2018-05-28 PROCEDURE — 25000128 H RX IP 250 OP 636: Performed by: EMERGENCY MEDICINE

## 2018-05-28 PROCEDURE — 00000146 ZZHCL STATISTIC GLUCOSE BY METER IP

## 2018-05-28 PROCEDURE — 96376 TX/PRO/DX INJ SAME DRUG ADON: CPT | Performed by: EMERGENCY MEDICINE

## 2018-05-28 PROCEDURE — 85025 COMPLETE CBC W/AUTO DIFF WBC: CPT | Performed by: EMERGENCY MEDICINE

## 2018-05-28 PROCEDURE — 76705 ECHO EXAM OF ABDOMEN: CPT

## 2018-05-28 PROCEDURE — 96361 HYDRATE IV INFUSION ADD-ON: CPT | Performed by: EMERGENCY MEDICINE

## 2018-05-28 PROCEDURE — 80053 COMPREHEN METABOLIC PANEL: CPT | Performed by: EMERGENCY MEDICINE

## 2018-05-28 PROCEDURE — 96374 THER/PROPH/DIAG INJ IV PUSH: CPT | Performed by: EMERGENCY MEDICINE

## 2018-05-28 PROCEDURE — 99285 EMERGENCY DEPT VISIT HI MDM: CPT | Mod: Z6 | Performed by: EMERGENCY MEDICINE

## 2018-05-28 PROCEDURE — 96375 TX/PRO/DX INJ NEW DRUG ADDON: CPT | Performed by: EMERGENCY MEDICINE

## 2018-05-28 PROCEDURE — 83690 ASSAY OF LIPASE: CPT | Performed by: EMERGENCY MEDICINE

## 2018-05-28 PROCEDURE — 85610 PROTHROMBIN TIME: CPT | Performed by: EMERGENCY MEDICINE

## 2018-05-28 PROCEDURE — 81001 URINALYSIS AUTO W/SCOPE: CPT | Performed by: EMERGENCY MEDICINE

## 2018-05-28 PROCEDURE — 87088 URINE BACTERIA CULTURE: CPT | Performed by: EMERGENCY MEDICINE

## 2018-05-28 PROCEDURE — 25000125 ZZHC RX 250: Performed by: EMERGENCY MEDICINE

## 2018-05-28 PROCEDURE — 87186 SC STD MICRODIL/AGAR DIL: CPT | Performed by: EMERGENCY MEDICINE

## 2018-05-28 PROCEDURE — 80307 DRUG TEST PRSMV CHEM ANLYZR: CPT | Performed by: EMERGENCY MEDICINE

## 2018-05-28 RX ORDER — ONDANSETRON 2 MG/ML
4 INJECTION INTRAMUSCULAR; INTRAVENOUS EVERY 30 MIN PRN
Status: DISCONTINUED | OUTPATIENT
Start: 2018-05-28 | End: 2018-05-28 | Stop reason: HOSPADM

## 2018-05-28 RX ORDER — HYDROMORPHONE HYDROCHLORIDE 1 MG/ML
0.5 INJECTION, SOLUTION INTRAMUSCULAR; INTRAVENOUS; SUBCUTANEOUS ONCE
Status: COMPLETED | OUTPATIENT
Start: 2018-05-28 | End: 2018-05-28

## 2018-05-28 RX ORDER — HYDROMORPHONE HYDROCHLORIDE 1 MG/ML
0.5 INJECTION, SOLUTION INTRAMUSCULAR; INTRAVENOUS; SUBCUTANEOUS
Status: COMPLETED | OUTPATIENT
Start: 2018-05-28 | End: 2018-05-28

## 2018-05-28 RX ORDER — ONDANSETRON 4 MG/1
4 TABLET, ORALLY DISINTEGRATING ORAL EVERY 6 HOURS PRN
Qty: 10 TABLET | Refills: 0 | Status: SHIPPED | OUTPATIENT
Start: 2018-05-28 | End: 2018-05-31

## 2018-05-28 RX ORDER — HYDROMORPHONE HYDROCHLORIDE 1 MG/ML
0.5 INJECTION, SOLUTION INTRAMUSCULAR; INTRAVENOUS; SUBCUTANEOUS
Status: DISCONTINUED | OUTPATIENT
Start: 2018-05-28 | End: 2018-05-28

## 2018-05-28 RX ORDER — SUCRALFATE 1 G/1
1 TABLET ORAL
Qty: 28 TABLET | Refills: 0 | Status: SHIPPED | OUTPATIENT
Start: 2018-05-28 | End: 2018-06-04

## 2018-05-28 RX ORDER — SODIUM CHLORIDE 9 MG/ML
1000 INJECTION, SOLUTION INTRAVENOUS CONTINUOUS
Status: DISCONTINUED | OUTPATIENT
Start: 2018-05-28 | End: 2018-05-28 | Stop reason: HOSPADM

## 2018-05-28 RX ADMIN — ONDANSETRON 4 MG: 2 INJECTION INTRAMUSCULAR; INTRAVENOUS at 14:41

## 2018-05-28 RX ADMIN — HYDROMORPHONE HYDROCHLORIDE 0.5 MG: 1 INJECTION, SOLUTION INTRAMUSCULAR; INTRAVENOUS; SUBCUTANEOUS at 15:35

## 2018-05-28 RX ADMIN — SODIUM CHLORIDE 1000 ML: 9 INJECTION, SOLUTION INTRAVENOUS at 14:39

## 2018-05-28 RX ADMIN — SODIUM CHLORIDE 1000 ML: 9 INJECTION, SOLUTION INTRAVENOUS at 16:31

## 2018-05-28 RX ADMIN — HYDROMORPHONE HYDROCHLORIDE 0.5 MG: 1 INJECTION, SOLUTION INTRAMUSCULAR; INTRAVENOUS; SUBCUTANEOUS at 19:45

## 2018-05-28 RX ADMIN — HYDROMORPHONE HYDROCHLORIDE 0.5 MG: 1 INJECTION, SOLUTION INTRAMUSCULAR; INTRAVENOUS; SUBCUTANEOUS at 16:49

## 2018-05-28 RX ADMIN — PANTOPRAZOLE SODIUM 40 MG: 40 INJECTION, POWDER, FOR SOLUTION INTRAVENOUS at 14:43

## 2018-05-28 RX ADMIN — HYDROMORPHONE HYDROCHLORIDE 0.5 MG: 1 INJECTION, SOLUTION INTRAMUSCULAR; INTRAVENOUS; SUBCUTANEOUS at 14:40
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
## 2018-05-28 NOTE — ED TRIAGE NOTES
Pt BIBA for abdominal pain and nausea started that last night. C/O reduced appetite. Zofran given by EMS. Hx of paraplegia and frequent UTIs. Resides in an independent living center and receives home health services. Has urosotomy and colostomy.

## 2018-05-28 NOTE — ED AVS SNAPSHOT
[facility], Emergency Department    [address] [facility] [address]    Phone:  [phone]                                       Parth Fountain   MRN: [id number]    Department:  [facility], Emergency Department   Date of Visit:  5/28/2018           Patient Information     Date Of Birth          1965        Your diagnoses for this visit were:     Pain of upper abdomen with nausea/vomiting    Recurrent UTI        You were seen by Ernesto Davenport MD.        Discharge Instructions       Continue your ranitidine and antibiotics.    Keep hydrated.    Return to the ER for any worsening.    Check your culture results in 2 days.    Please make an appointment to follow up with Your Primary Care Provider in 2 days if not improving, otherwise follow-up in 1 week for recheck.        Discharge References/Attachments     EPIGASTRIC PAIN (UNCERTAIN CAUSE) (ENGLISH)      Your next 10 appointments already scheduled     Jun 20, 2018  1:00 PM CDT   Return Visit with Suzan Martinez PA-C   [facility] ([facility])    [address] [address] [phone]              24 Hour Appointment Hotline       To make an appointment at any [place] clinic, call [phone] ([phone]). If you don't have a family doctor or clinic, we will help you find one. [place] clinics are conveniently located to serve the needs of you and your family.             Review of your medicines      START taking        Dose / Directions Last dose taken    ondansetron 4 MG ODT tab   Commonly known as:  ZOFRAN ODT   Dose:  4 mg   Quantity:  10 tablet        Take 1 tablet (4 mg) by mouth every 6 hours as needed   Refills:  0        sucralfate 1 GM tablet   Commonly known as:  CARAFATE   Dose:  1 g   Quantity:  28 tablet        Take 1 tablet (1 g) by mouth 4 times daily (before meals and nightly) for 7 days   Refills:  0          Our records show that  you are taking the medicines listed below. If these are incorrect, please call your family doctor or clinic.        Dose / Directions Last dose taken    AMBIEN 10 MG tablet   Dose:  10 mg   Generic drug:  zolpidem        Take 10 mg by mouth nightly as needed.   Refills:  0        aspirin 325 MG tablet   Dose:  325 mg        Take 325 mg by mouth daily.   Refills:  0        baclofen 20 MG tablet   Commonly known as:  LIORESAL   Dose:  20 mg        Take 20 mg by mouth 4 times daily.   Refills:  0        BACTRIM DS PO   Dose:  1 tablet        Take 1 tablet by mouth daily   Refills:  0        bismuth subsalicylate 525 MG/15ML Susp   Dose:  30 mL        Take 30 mLs by mouth every 4 hours as needed   Refills:  0        calcium carbonate 500 MG tablet   Commonly known as:  OS-RIGOBERTO 500 mg [place] Ca   Dose:  500 mg   Indication:  with meals        Take 500 mg by mouth 3 times daily   Refills:  0        CYMBALTA PO   Dose:  60 mg        Take 60 mg by mouth   Refills:  0        * ENSURE Liqd   Dose:  3 Can        Take 3 Cans by mouth daily   Refills:  0        * ENSURE NUTRITION SHAKE Liqd   Dose:  1 Bottle   Quantity:  90 Bottle        Take 1 Bottle by mouth 3 times daily (with meals)   Refills:  11        eucerin cream   Dose:  0.5 inch        Apply 0.5 inches topically as needed.   Refills:  0        HYDROmorphone 4 MG tablet   Commonly known as:  DILAUDID   Dose:  4-8 mg   Quantity:  100 tablet        Take 4-8 mg by mouth every 3 hours as needed.   Refills:  0        * LYRICA PO   Dose:  150 mg        Take 150 mg by mouth 2 times daily   Refills:  0        * LYRICA PO        Refills:  0        MULTIPLE VITAMIN/IRON OR        Refills:  0        order for DME   Quantity:  1 Month        Equipment being ordered: colostomy and urosotmy   Refills:  11        * order for DME   Quantity:  1 Units        [employer] Fax: [phone]  Group 2 Air Mattress   Length of need: lifetime   Refills:  0        * order for DME    Quantity:  1 Device        Equipment being ordered: Pressure Mapping of wheelchair and home assessment of mattress with Yeimi Preciado   Refills:  0        polyethylene glycol powder   Commonly known as:  MIRALAX/GLYCOLAX   Dose:  1 capful        Take 1 capful by mouth daily as needed.   Refills:  0        ranitidine 150 MG tablet   Commonly known as:  ZANTAC   Dose:  150 mg   Quantity:  60 tablet        Take 1 tablet (150 mg) by mouth 2 times daily   Refills:  0        sennosides 8.6 MG tablet   Commonly known as:  SENOKOT   Dose:  1 tablet        Take 1 tablet by mouth 2 times daily as needed   Refills:  0        traZODone 100 MG tablet   Commonly known as:  DESYREL   Dose:  100 mg        Take 100 mg by mouth At Bedtime.   Refills:  0        TYLENOL 325 MG tablet   Dose:  2 tablet   Generic drug:  acetaminophen        Take 2 tablets by mouth every 4 hours as needed.   Refills:  0        VALIUM PO        5mg q noon; 10 mg q hs; and 5mg BID prn   Refills:  0        VITAMIN C CR PO   Dose:  500 mg        Take 500 mg by mouth 4 times daily.   Refills:  0        VITAMIN D PO   Dose:  800 Int'l Units        Take 800 Int'l Units by mouth 2 times daily.   Refills:  0        * Notice:  This list has 6 medication(s) that are the same as other medications prescribed for you. Read the directions carefully, and ask your doctor or other care provider to review them with you.            Prescriptions were sent or printed at these locations (2 Prescriptions)                   Other Prescriptions                Printed at Department/Unit printer (2 of 2)         ondansetron (ZOFRAN ODT) 4 MG ODT tab               sucralfate (CARAFATE) 1 GM tablet                Procedures and tests performed during your visit     Abdomen US, limited (RUQ only)    Alcohol    CBC with platelets differential    Cardiac Continuous Monitoring    Comprehensive metabolic panel    Drug abuse screen 6 urine (tox)    Glucose by meter    INR    Lipase     Peripheral IV: Standard    Pulse oximetry nursing    UA with Microscopic reflex to Culture    Urine Culture Aerobic Bacterial      Orders Needing Specimen Collection     None      Pending Results     Date and Time Order Name Status Description    5/28/2018 1651 Abdomen US, limited (RUQ only) Preliminary     5/28/2018 1613 Urine Culture Aerobic Bacterial Preliminary             Pending Culture Results     Date and Time Order Name Status Description    5/28/2018 1613 Urine Culture Aerobic Bacterial Preliminary             Pending Results Instructions     If you had any lab results that were not finalized at the time of your Discharge, you can call the ED Lab Result RN at [phone]. You will be contacted by this team for any positive Lab results or changes in treatment. The nurses are available 7 days a week from 10A to 6:30P.  You can leave a message 24 hours per day and they will return your call.        Thank you for choosing [facility]       Thank you for choosing [facility] for your care. Our goal is always to provide you with excellent care. Hearing back from our patients is one way we can continue to improve our services. Please take a few minutes to complete the written survey that you may receive in the mail after you visit with us. Thank you!        Care EveryWhere ID     This is your Care EveryWhere ID. This could be used by other organizations to access your [facility] medical records  [id number]        Equal Access to Services     CHAVA MCGRATH AH: Khai Mcnulty, tri castillo, jones marcus. So [facility] [phone].    ATENCIÓN: Si habla español, tiene a harrison disposición servicios gratuitos de asistencia lingüística. Llame al [phone].    We comply with applicable federal civil rights laws and [place] laws. We do not discriminate on the basis of race, color, national origin, age, disability, sex, sexual orientation, or gender  identity.            After Visit Summary       This is your record. Keep this with you and show to your community pharmacist(s) and doctor(s) at your next visit.

## 2018-05-28 NOTE — ED NOTES
Bed: ED10  Expected date:   Expected time:   Means of arrival:   Comments:  Parth Fountain     1965  [facility] abd pain nausea and vomiting, zofran

## 2018-05-28 NOTE — ED AVS SNAPSHOT
[facility] [place], Emergency Department    [address] [place] [address]    Phone:  [phone]                                       Parth Fountain   MRN: [id number]    Department:  [facility], Emergency Department   Date of Visit:  5/28/2018           After Visit Summary Signature Page     I have received my discharge instructions, and my questions have been answered. I have discussed any challenges I see with this plan with the nurse or doctor.    ..........................................................................................................................................  Patient/Patient Representative Signature      ..........................................................................................................................................  Patient Representative Print Name and Relationship to Patient    ..................................................               ................................................  Date                                            Time    ..........................................................................................................................................  Reviewed by Signature/Title    ...................................................              ..............................................  Date                                                            Time

## 2018-05-29 NOTE — DISCHARGE INSTRUCTIONS
Continue your ranitidine and antibiotics.    Keep hydrated.    Return to the ER for any worsening.    Check your culture results in 2 days.    Please make an appointment to follow up with Your Primary Care Provider in 2 days if not improving, otherwise follow-up in 1 week for recheck.

## 2018-05-29 NOTE — PROGRESS NOTES
Emergency Social Work Services Note    Date of  Intervention: 05/28/18  Last Emergency Department Visit:  5/18/18 at [facility]  Care Plan:  No  Collaborated with:  Marina, bedside RN; patient; Antony, Dispatcher at [facility]    Data:  Pt presented from home (an independent assisted living apt) for abdominal pain and nausea.  Marina, CARIDAD, states that pt is ready to d/c back home and needs a ride.      Intervention:  Chart reviewed.  KEARA met with pt who confirms his address and that he has a key/badge to get into his apt.  He states he is unable to sit in a standard w/c due to lack of tone in his legs and core due to his paraplegia, therefore needs a stretcher ride.  KEARA informed pt that the ride will be arranged with [facility].      KEARA called and arranged a stretcher ride with [facility] (Antony) for pickup at 8:15-8:30pm.  SW will complete PCS and give to nursing.    Assessment:  Stretcher ride back home.    Plan:    Anticipated Disposition:  Home with services.  Per chart, pt has home care services.    Barriers to d/c plan:  None.    Follow Up:  KEARA available as needed.    DESTIN Barnett  Social Work Services  Emergency Department   [phone] phone  [phone] pager  On-call pager, [phone], 1600 to midnight

## 2018-05-29 NOTE — ADDENDUM NOTE
Encounter addended by: Suzan Martinez PA-C on: 5/28/2018  8:41 PM<BR>     Actions taken: Pend clinical note

## 2018-05-29 NOTE — ADDENDUM NOTE
Encounter addended by: Suzan Martinez PA-C on: 5/29/2018  9:19 AM<BR>     Actions taken: Sign clinical note
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
## 2018-05-30 LAB
BACTERIA SPEC CULT: ABNORMAL
BACTERIA SPEC CULT: ABNORMAL
Lab: ABNORMAL
SPECIMEN SOURCE: ABNORMAL

## 2018-05-31 ENCOUNTER — TELEPHONE (OUTPATIENT)
Dept: EMERGENCY MEDICINE | Facility: CLINIC | Age: 53
End: 2018-05-31

## 2018-05-31 ENCOUNTER — TELEPHONE (OUTPATIENT)
Dept: WOUND CARE | Facility: CLINIC | Age: 53
End: 2018-05-31

## 2018-05-31 DIAGNOSIS — N30.00 ACUTE CYSTITIS WITHOUT HEMATURIA: ICD-10-CM

## 2018-05-31 DIAGNOSIS — L89.893 DECUBITUS ULCER OF RIGHT FOOT, STAGE 3 (H): ICD-10-CM

## 2018-05-31 DIAGNOSIS — L89.893 PRESSURE ULCER OF LEFT FOOT, STAGE 3 (H): Primary | ICD-10-CM

## 2018-05-31 RX ORDER — CEPHALEXIN 500 MG/1
500 CAPSULE ORAL 2 TIMES DAILY
Qty: 14 CAPSULE | Refills: 0 | Status: SHIPPED | OUTPATIENT
Start: 2018-05-31 | End: 2018-06-07

## 2018-05-31 NOTE — TELEPHONE ENCOUNTER
[facility]  [address] [facility] [address], [place] [address]  Appointment Phone [phone] Nurse Advisors [phone]    Parth Fountain      1965    [employer] [facility] Phone: [phone] Fax: [phone]    Wound Dressing Change to left foot, right lower leg, all open area on buttocks  Cleanse wounds with wound cleanser or saline  Cover wounds with Medihoney gel  Followed by gauze and medipore tape  Change dressing daily    Suzan Martinez PA-C. May 31, 2018  Signing Physician Dr. Kaleb Daly    Call us at [phone] if you have any questions about your wounds, have redness or swelling around your wound, have a fever of 101 or greater or if you have any other problems or concerns. We answer the phone Monday through Friday 8 am to 4 pm, please leave a message as we check the voicemail frequently throughout the day.     Follow up with Provider - June 20, 1:00pm

## 2018-05-31 NOTE — TELEPHONE ENCOUNTER
"[facility] Emergency Department Lab result notification [Adult-Male]    [facility] ED lab result protocol used  Urine Culture    Reason for call  Notify of lab results, assess symptoms,  review ED providers recommendations/discharge instructions (if necessary) and advise per ED lab result f/u protocol    Lab Result (including Rx patient on, if applicable)  Final Urine Culture Report on 5/31/18  Emergency Dept discharge antibiotic prescribed: None. Per care everywhere pt on Bactrim DS Prophylactically daily, and provider states \"Continue your ranitidine and antibiotics\"  #1. Bacteria, >100,000 colonies/mL Escherichia coli,  is [RESISTANT] to antibiotic.   #2. Bacteria, 10,000 to 50,000 colonies/mL Staphylococcus aureus,  is [RESISTANT] to antibiotic.   Change in treatment as per [facility] ED Lab result protocol.  Information table from ED Provider visit on 5/28/18  ED diagnosis: Pain of upper abdomen - with nausea/vomiting, hypoglycemia, ketosis   Recurrent UTI      ED provider Ernesto Davenport MD   Symptoms reported at ED visit (Chief complaint, HPI) Chief Complaint   Patient presents with     Abdominal Pain      HPI  Parth Fountain is a 53 year old male who paraplegic male with history significant for substance abuse, recurrent UTIs, and decubitus ulcers who presents the ER via ambulance for evaluation of upper abdominal pain associated with dry heaves and inability to take fluids over the last 24 hours.  Patient states he has had some upper abdominal pain for the last week.  Patient states he has been taking Pepto-Bismol and which is why his colostomy bag has turned black.  The patient states that he has had no hematemesis, but states his pain has been getting worse.  Patient states his urine is appearing very concentrated in his urostomy bag.  Patient states that the last 24 hours he has been unable to eat or drink anything which is why he presents here to the ER for evaluation.      ED providers Impression " and Plan (applicable information) Patient clinically was found to have some hypoglycemia and ketones in his urine but refused his IV banana bag.  Patient was given some apple juice but repeat glucose was only 65.  Patient will undergo a right upper quadrant ultrasound to rule out biliary disease but at this time the patient more than likely has a gastritis/PUD if the ultrasound is negative and hospitalization was recommended.     I have reviewed the findings, diagnosis, and plan with the patient.  Working diagnoses:   Pain of upper abdomen - with nausea/vomiting, hypoglycemia, ketosis   Recurrent UTI    Addendum:  Patient refusing hospitalization stating that he feels better and is able to drink fluids.  Right upper quadrant ultrasound reportedly negative.  At this time the patient will be sent home with the medications below          New Prescriptions     ONDANSETRON (ZOFRAN ODT) 4 MG ODT TAB    Take 1 tablet (4 mg) by mouth every 6 hours as needed     SUCRALFATE (CARAFATE) 1 GM TABLET    Take 1 tablet (1 g) by mouth 4 times daily (before meals and nightly) for 7 days       Continue your ranitidine and antibiotics.     Keep hydrated.     Return to the ER for any worsening.     Check your culture results in 2 days.     Please make an appointment to follow up with Your Primary Care Provider in 2 days if not improving, otherwise follow-up in 1 week for recheck.    Ernesto Davenport MD   Significant Medical hx, if applicable See highlighted above   Coumadin/Warfarin [Yes or No] No   Creatinine Level (mg/dl) 0.52   Creatinine clearance (ml/min), if applicable NA   Allergies NKA   Weight, if applicable NA      RN Assessment (Patient s current Symptoms), include time called.  [Insert Left message here if message left]  10:25 am Pt says he is feeling much better. Has not seen his PCP yet.     RN Recommendations/Instructions per [facility] ED lab result protocol  Patient notified of lab result and treatment  recommendations.  Rx for Cephalexin (Keflex) 500 mg capsule, 1 capsule (500 mg) by mouth BID times daily for 7 days. sent to [facility]  RN reviewed information about Advised to finish full course of antibiotics as prescribed and drink plenty of fluids. And follow up with your PCP as the ED provider recommended.      Please Contact your PCP clinic or return to the Emergency department if your:    Symptoms return.    Symptoms do not improve after 3 days on antibiotic.    Symptoms do not resolve after completing antibiotic.    Symptoms worsen or other concerning symptom's.    PCP follow-up Questions asked: YES       La Nena Israel RN  [facility] RN  Lung Nodule and ED Lab Result F/u RN  Epic pool (ED late result f/u RN): [phone] [id number]  Ph# [phone]

## 2018-06-01 NOTE — TELEPHONE ENCOUNTER
Patient calling reporting he is not happy with medihoney gel wants to try santyl. Rx sent to pharmacy for santyl.

## 2018-06-20 ENCOUNTER — HOSPITAL ENCOUNTER (OUTPATIENT)
Dept: WOUND CARE | Facility: CLINIC | Age: 53
Discharge: HOME | End: 2018-06-20
Attending: PHYSICIAN ASSISTANT | Admitting: PHYSICIAN ASSISTANT
Payer: MEDICARE

## 2018-06-20 VITALS — DIASTOLIC BLOOD PRESSURE: 94 MMHG | HEART RATE: 76 BPM | SYSTOLIC BLOOD PRESSURE: 149 MMHG | TEMPERATURE: 97.7 F

## 2018-06-20 DIAGNOSIS — L89.313 DECUBITUS ULCER OF ISCHIAL AREA, RIGHT, STAGE III (H): ICD-10-CM

## 2018-06-20 DIAGNOSIS — L89.323 DECUBITUS ULCER OF ISCHIAL AREA, LEFT, STAGE III (H): ICD-10-CM

## 2018-06-20 PROCEDURE — 17250 CHEM CAUT OF GRANLTJ TISSUE: CPT

## 2018-06-20 PROCEDURE — 11042 DBRDMT SUBQ TIS 1ST 20SQCM/<: CPT

## 2018-06-20 PROCEDURE — 11042 DBRDMT SUBQ TIS 1ST 20SQCM/<: CPT | Performed by: PHYSICIAN ASSISTANT
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
## 2018-06-20 NOTE — DISCHARGE INSTRUCTIONS
[facility]  [address] [facility] [address], [place] [address]  Appointment Phone [phone] Nurse Advisors [phone]    Parth Fountain      1965  Home Health Care Agency Performing Wound Care:[facility] Phone: [phone] Fax: [phone]    Wound Dressing Change: Left IT, Right IT, Left Buttocks  Cleanse wound with: saline or wound cleanser  Cover wound with Santyl (as thick as nickel)  Cover wound with gauze  Change dressing daily.  Repositioning:    Bed:  Reposition pt MINIMALLY every 1-2 hours in bed to relieve pressure and promote perfusion to tissue.     Chair:  When up to chair pt should not sit for longer than one hour total before either returning to bed for at least 10 minutes, again to relieve pressure and promote perfusion to tissue.     o Pt should also sit on a chair cushion when up to the chair.        Suzan Martinez PA-C. June 20, 2018    Call us at [phone] if you have any questions about your wounds, have redness or swelling around your wound, have a fever of 101 or greater or if you have any other problems or concerns. We answer the phone Monday through Friday 8 am to 4 pm, please leave a message as we check the voicemail frequently throughout the day.     Follow up with Provider - 4 weeks

## 2018-06-20 NOTE — MR AVS SNAPSHOT
MRN:[id number]                      After Visit Summary   6/20/2018    Parth Fountain    MRN: [id number]           Visit Information        Provider Department      6/20/2018  1:00 PM Suzan Martinez PA-C [facility]          Further instructions from your care team             [facility]  [address] [facility] [address], [place] [address]  Appointment Phone [phone] Nurse Advisors [phone]    Parth Fountain      1965  Home Health Care Agency Performing Wound Care:[facility] Phone: [phone] Fax: [phone]    Wound Dressing Change: Left IT, Right IT, Left Buttocks  Cleanse wound with: saline or wound cleanser  Cover wound with Santyl (as thick as nickel)  Cover wound with gauze  Change dressing daily.  Repositioning:    Bed:  Reposition pt MINIMALLY every 1-2 hours in bed to relieve pressure and promote perfusion to tissue.     Chair:  When up to chair pt should not sit for longer than one hour total before either returning to bed for at least 10 minutes, again to relieve pressure and promote perfusion to tissue.     o Pt should also sit on a chair cushion when up to the chair.        Suzan Martinez PA-C. June 20, 2018    Call us at [phone] if you have any questions about your wounds, have redness or swelling around your wound, have a fever of 101 or greater or if you have any other problems or concerns. We answer the phone Monday through Friday 8 am to 4 pm, please leave a message as we check the voicemail frequently throughout the day.     Follow up with Provider - 4 weeks               Care EveryWhere ID     This is your Care EveryWhere ID. This could be used by other organizations to access your [facility] medical records  [id number]        Equal Access to Services     CHAVA MCGRATH AH: Khai Mcnulty, tri castillo, jones marcus  maritza michel ah. So [facility] [phone].    ATENCIÓN: Si habla español, tiene a harrison disposición servicios gratuitos de asistencia lingüística. Llame al [phone].    We comply with applicable federal civil rights laws and [place] laws. We do not discriminate on the basis of race, color, national origin, age, disability, sex, sexual orientation, or gender identity.

## 2018-07-07 ENCOUNTER — APPOINTMENT (OUTPATIENT)
Dept: GENERAL RADIOLOGY | Facility: CLINIC | Age: 53
End: 2018-07-07
Attending: EMERGENCY MEDICINE
Payer: MEDICARE

## 2018-07-07 ENCOUNTER — APPOINTMENT (OUTPATIENT)
Dept: CT IMAGING | Facility: CLINIC | Age: 53
End: 2018-07-07
Attending: EMERGENCY MEDICINE
Payer: MEDICARE

## 2018-07-07 ENCOUNTER — HOSPITAL ENCOUNTER (EMERGENCY)
Facility: CLINIC | Age: 53
Discharge: HOME | End: 2018-07-07
Attending: EMERGENCY MEDICINE | Admitting: EMERGENCY MEDICINE
Payer: MEDICARE

## 2018-07-07 VITALS
SYSTOLIC BLOOD PRESSURE: 150 MMHG | OXYGEN SATURATION: 100 % | HEART RATE: 92 BPM | RESPIRATION RATE: 11 BRPM | TEMPERATURE: 99.3 F | DIASTOLIC BLOOD PRESSURE: 95 MMHG
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
## 2018-07-07 LAB
ALBUMIN SERPL-MCNC: 4.1 G/DL (ref 3.4–5)
ALBUMIN UR-MCNC: 30 MG/DL
ALP SERPL-CCNC: 150 U/L (ref 40–150)
ALT SERPL W P-5'-P-CCNC: 36 U/L (ref 0–70)
ANION GAP SERPL CALCULATED.3IONS-SCNC: 19 MMOL/L (ref 3–14)
APPEARANCE UR: CLEAR
APTT PPP: 32 SEC (ref 22–37)
AST SERPL W P-5'-P-CCNC: 55 U/L (ref 0–45)
BACTERIA #/AREA URNS HPF: ABNORMAL /HPF
BASOPHILS # BLD AUTO: 0 10E9/L (ref 0–0.2)
BASOPHILS NFR BLD AUTO: 0 %
BILIRUB SERPL-MCNC: 0.5 MG/DL (ref 0.2–1.3)
BILIRUB UR QL STRIP: NEGATIVE
BUN SERPL-MCNC: 22 MG/DL (ref 7–30)
CALCIUM SERPL-MCNC: 8.9 MG/DL (ref 8.5–10.1)
CHLORIDE SERPL-SCNC: 96 MMOL/L (ref 94–109)
CK SERPL-CCNC: 96 U/L (ref 30–300)
CO2 SERPL-SCNC: 16 MMOL/L (ref 20–32)
COLOR UR AUTO: ABNORMAL
CREAT SERPL-MCNC: 0.63 MG/DL (ref 0.66–1.25)
DIFFERENTIAL METHOD BLD: ABNORMAL
EOSINOPHIL # BLD AUTO: 0 10E9/L (ref 0–0.7)
EOSINOPHIL NFR BLD AUTO: 0 %
ERYTHROCYTE [DISTWIDTH] IN BLOOD BY AUTOMATED COUNT: 13.9 % (ref 10–15)
ETHANOL SERPL-MCNC: 0.09 G/DL
GFR SERPL CREATININE-BSD FRML MDRD: >90 ML/MIN/1.7M2
GLUCOSE SERPL-MCNC: 78 MG/DL (ref 70–99)
GLUCOSE UR STRIP-MCNC: NEGATIVE MG/DL
HCT VFR BLD AUTO: 47.3 % (ref 40–53)
HGB BLD-MCNC: 16.5 G/DL (ref 13.3–17.7)
HGB UR QL STRIP: ABNORMAL
HYALINE CASTS #/AREA URNS LPF: 1 /LPF (ref 0–2)
IMM GRANULOCYTES # BLD: 0 10E9/L (ref 0–0.4)
IMM GRANULOCYTES NFR BLD: 0.2 %
INR PPP: 1.11 (ref 0.86–1.14)
KETONES UR STRIP-MCNC: >150 MG/DL
LEUKOCYTE ESTERASE UR QL STRIP: NEGATIVE
LYMPHOCYTES # BLD AUTO: 1.1 10E9/L (ref 0.8–5.3)
LYMPHOCYTES NFR BLD AUTO: 8.7 %
MCH RBC QN AUTO: 33.7 PG (ref 26.5–33)
MCHC RBC AUTO-ENTMCNC: 34.9 G/DL (ref 31.5–36.5)
MCV RBC AUTO: 97 FL (ref 78–100)
MONOCYTES # BLD AUTO: 0.2 10E9/L (ref 0–1.3)
MONOCYTES NFR BLD AUTO: 1.9 %
MUCOUS THREADS #/AREA URNS LPF: PRESENT /LPF
NEUTROPHILS # BLD AUTO: 10.8 10E9/L (ref 1.6–8.3)
NEUTROPHILS NFR BLD AUTO: 89.2 %
NITRATE UR QL: NEGATIVE
NRBC # BLD AUTO: 0 10*3/UL
NRBC BLD AUTO-RTO: 0 /100
PH UR STRIP: 6.5 PH (ref 5–7)
PLATELET # BLD AUTO: 208 10E9/L (ref 150–450)
POTASSIUM SERPL-SCNC: 4.1 MMOL/L (ref 3.4–5.3)
PROT SERPL-MCNC: 8.2 G/DL (ref 6.8–8.8)
RBC # BLD AUTO: 4.89 10E12/L (ref 4.4–5.9)
RBC #/AREA URNS AUTO: <1 /HPF (ref 0–2)
SODIUM SERPL-SCNC: 130 MMOL/L (ref 133–144)
SOURCE: ABNORMAL
SP GR UR STRIP: 1.01 (ref 1–1.03)
SQUAMOUS #/AREA URNS AUTO: <1 /HPF (ref 0–1)
TROPONIN I SERPL-MCNC: <0.015 UG/L (ref 0–0.04)
UROBILINOGEN UR STRIP-MCNC: NORMAL MG/DL (ref 0–2)
WBC # BLD AUTO: 12.1 10E9/L (ref 4–11)
WBC #/AREA URNS AUTO: 3 /HPF (ref 0–5)

## 2018-07-07 PROCEDURE — 85610 PROTHROMBIN TIME: CPT | Performed by: EMERGENCY MEDICINE

## 2018-07-07 PROCEDURE — 71045 X-RAY EXAM CHEST 1 VIEW: CPT

## 2018-07-07 PROCEDURE — 70450 CT HEAD/BRAIN W/O DYE: CPT | Mod: XS

## 2018-07-07 PROCEDURE — 84484 ASSAY OF TROPONIN QUANT: CPT | Performed by: EMERGENCY MEDICINE

## 2018-07-07 PROCEDURE — 40000556 ZZH STATISTIC PERIPHERAL IV START W US GUIDANCE

## 2018-07-07 PROCEDURE — 96361 HYDRATE IV INFUSION ADD-ON: CPT | Performed by: EMERGENCY MEDICINE

## 2018-07-07 PROCEDURE — 25000128 H RX IP 250 OP 636: Performed by: EMERGENCY MEDICINE

## 2018-07-07 PROCEDURE — 96360 HYDRATION IV INFUSION INIT: CPT | Performed by: EMERGENCY MEDICINE

## 2018-07-07 PROCEDURE — A9270 NON-COVERED ITEM OR SERVICE: HCPCS | Mod: GY | Performed by: EMERGENCY MEDICINE

## 2018-07-07 PROCEDURE — 85730 THROMBOPLASTIN TIME PARTIAL: CPT | Performed by: EMERGENCY MEDICINE

## 2018-07-07 PROCEDURE — 82550 ASSAY OF CK (CPK): CPT | Performed by: EMERGENCY MEDICINE

## 2018-07-07 PROCEDURE — 80053 COMPREHEN METABOLIC PANEL: CPT | Performed by: EMERGENCY MEDICINE

## 2018-07-07 PROCEDURE — 70486 CT MAXILLOFACIAL W/O DYE: CPT

## 2018-07-07 PROCEDURE — 85025 COMPLETE CBC W/AUTO DIFF WBC: CPT | Performed by: EMERGENCY MEDICINE

## 2018-07-07 PROCEDURE — 99285 EMERGENCY DEPT VISIT HI MDM: CPT | Mod: 25 | Performed by: EMERGENCY MEDICINE

## 2018-07-07 PROCEDURE — 25000132 ZZH RX MED GY IP 250 OP 250 PS 637: Mod: GY | Performed by: EMERGENCY MEDICINE

## 2018-07-07 PROCEDURE — 99284 EMERGENCY DEPT VISIT MOD MDM: CPT | Mod: Z6 | Performed by: EMERGENCY MEDICINE

## 2018-07-07 PROCEDURE — 81001 URINALYSIS AUTO W/SCOPE: CPT | Performed by: EMERGENCY MEDICINE

## 2018-07-07 PROCEDURE — 80320 DRUG SCREEN QUANTALCOHOLS: CPT | Performed by: EMERGENCY MEDICINE

## 2018-07-07 PROCEDURE — 93005 ELECTROCARDIOGRAM TRACING: CPT | Performed by: EMERGENCY MEDICINE

## 2018-07-07 RX ORDER — LIDOCAINE HYDROCHLORIDE 10 MG/ML
INJECTION, SOLUTION EPIDURAL; INFILTRATION; INTRACAUDAL; PERINEURAL
Status: DISCONTINUED
Start: 2018-07-07 | End: 2018-07-07 | Stop reason: HOSPADM

## 2018-07-07 RX ORDER — HYDROMORPHONE HYDROCHLORIDE 2 MG/1
4 TABLET ORAL ONCE
Status: COMPLETED | OUTPATIENT
Start: 2018-07-07 | End: 2018-07-07

## 2018-07-07 RX ADMIN — HYDROMORPHONE HYDROCHLORIDE 4 MG: 2 TABLET ORAL at 13:48

## 2018-07-07 RX ADMIN — SODIUM CHLORIDE 1000 ML: 900 INJECTION, SOLUTION INTRAVENOUS at 16:10

## 2018-07-07 RX ADMIN — SODIUM CHLORIDE 1000 ML: 9 INJECTION, SOLUTION INTRAVENOUS at 13:11

## 2018-07-07 ASSESSMENT — ENCOUNTER SYMPTOMS
ABDOMINAL PAIN: 0
WOUND: 1

## 2018-07-07 NOTE — ED TRIAGE NOTES
Patient BIBA, baseline paraplegia, did fall at home today intoxicated. Medics found him on the floor today covered in his own ostomy and urostomy output. Bottle of [place] next to bedside. This is their 3rd response to his house since last night. Patient was unwilling to go to hospital, but police put on a transport hold. Patient living conditions unsanitary, SW updated. Laceration above patient's brow.

## 2018-07-07 NOTE — PROGRESS NOTES
"Social Work: Assessment with Discharge Plan    Patient Name:  Parth Fountain  :  1965  Age:  53 year old  MRN:  [id number]  Risk/Complexity Score:     Completed assessment with:  Chart review, ED RN, ED MD, PCA staff, home care staff, patient    Presenting Information   Reason for Referral:  Discharge plan and Potential need for community services upon discharge  Date of Intake:  2018  Referral Source:  Nurse and EMT  Decision Maker:  patient  Alternate Decision Maker:  none  Health Care Directive:  Declined completing  Living Situation:  Apartment with assistive services available  Previous Functional Status:  Assistance with Transportation:  Medicaid, Laundry:  PCAs on site and Housekeeping:  PCAs on site  Patient and family understanding of hospitalization:  \"I don't want to be here. They say I'm here because I drink and used narcotics, I didn't.\"  Cultural/Language/Spiritual Considerations:  Jain per demographics  Adjustment to Illness:  Appears irritable about ED visit, wishes to d/c    Physical Health  Reason for Admission:  No diagnosis found.  Services Needed/Recommended:  Home with homecare    Mental Health/Chemical Dependency  Diagnosis:  Patient denies any concerns but per chart review and PCA staff, patient is chronic ETOH   Support/Services in Place:  none  Services Needed/Recommended:  Supportive counseling    Support System  Significant relationship at present time:  PCAs and home care RN  Family of origin is available for support:  Reside [place], [place]  Other support available:  none  Gaps in support system:  Per PCAs, patient controls how much support he receives  Patient is caregiver to:  None     Provider Information   Primary Care Physician:  Maria Ines Emmanuel   [phone]   Clinic:  [address] 53*      :  (patient unsure who  may be)    Financial   Income Source:  disability  Financial " "Concerns:  None reported.  Insurance:    Payor/Plan Subscriber Name Rel Member # Group #   MEDICARE - MEDICARE NAN DUPREE  [id number]       ATTN CLAIMS, [address]   MEDICAID [place] - MEDIC[place]* NAN DUPREE  [id number]       [address] [address], [address]       Discharge Plan   Patient and family discharge goal:  Return to his apartment with home care   Provided education on discharge plan:  YES  Patient agreeable to discharge plan:  YES  A list of Medicare Certified Facilities was provided to the patient and/or family to encourage patient choice. Patient's choices for facility are:  Not applicable at time of assessment.   Will NH provide Skilled rehabilitation or complex medical:  Not applicable at time of assessment.   General information regarding anticipated insurance coverage and possible out of pocket cost was discussed. Patient and patient's family are aware patient may incur the cost of transportation to the facility, pending insurance payment: YES  Barriers to discharge:  TBD - Pending patient's progress and further recommendation.    Discharge Recommendations   Anticipated Disposition:  Home with services  Transportation Needs:  Medical:  Wheelchair  Name of Transportation Company and Phone:  [employer] (Ph: [phone])    Additional comments   SW consulted for assessment given concerns by EMS for patient's living conditions. Per EMS report, \"patient was found unresponsive, laying right-lateral on bedroom floor...empty 1.75 L bottle of [place] with trazodone and valium next to bed. He presented with lacerations to his head with both dired and fresh blood. His skin is dirty/oily. His cabrera bag and colostomy both spilled. He was saturated in his own waste...this is 3rd ambulance response to patient's residence in less than 12 hours. The patient is a chronic alcohol drinker.\" Patient brought to ED via EMS on a transport hold. SW met with patient to discuss. Patient is alert and oriented. He " "appears disheveled, dried blood on face laceration, hair appears dirty. Patient is evasive in behavior towards SW, states he does not wish to be in the ED, wants to go home. Appears to have diminished insight into his situation, denies any type of recent ETOH or narcotic abuse. SW obtained some information from patient and collateral information from chart review, PCA staff and home care staff.     Parth is a 53 year old paraplegic (reports paraplegic since August 2009) who resides in an independent \"with assisted services\" apartment in [place], [place]. He is noted as having 'dementia dx' on his chart records but amandantly denies any memory or other cognitive impairment. Parth reports he used to reside in a SNF but transferred into a memory care solely to re-active his CADI waiver benefits. He then moved from memory care into his apartment ~ 6 months ago. Parth has a manual and power w/c, hospital bed and trapeze in his apartment. Parth makes his own decisions, no guardianship or conservatorship in place. He has daily home care for wound care, cites good working relationship with his RN. PCA services available at his building and help him daily with household chores. Parth notes he manages his own toileting, transfers and rx as well as ostomy and urostomy bag changes. Parth notes occassional ETOH use but denies it is problemmatic and appears irritated that \"people seem to think it's an issue.\"     KEARA spoke to PCA and home care staff. PCA noted that Parth makes choices for when he wishes PCA assistance. They noted that every day, Parth's apartment \"trashed\" so staff do a daily cleanup each morning. They noted that Parth is a chronic ETOH user and appears to be depressed. KEARA also spoke to home care agency, unable to reach his direct care RN but spoke to supervisor. She noted that patient seen daily for wound care, no documented concerns re ETOH or vulnerable adult reports. She reports patient is on cefpodoxime 200 mg " BID for UTI which started 7/5/18. Updated ED MD. Home care RN visit scheduled for tomorrow, Sunday 7/8.    Plan: TBD - Pending patient's progress and further recommendation. Updated ED MD about living situation. Once medically stable, anticipate d/c back to apartment with assisted services. Patient is eager to d/c as soon as possible. PCA services avail 24 hours/day. Home Care RN daily visits to resume once d/c. Anticipate will need w/c transportation. KEARA filed vulnerable adult report ([id number]) given concerns about home hygiene and patient's disposition although this unfortunately appears to be chronic situation.     [facility] (p) [phone] (f) [phone] - home care RN visit scheduled for tomorrow, 7/8/18. KEARA will fax AVS for their review.  [facility] [phone]  Family contact - step dad shree Israel (p) [phone] - patient did NOT give permission for us to update family, noted he would do this on his own.     Zoila Crum, SUZE, [facility]  Social Work Services, Emergency Dept [facility]  Pager: [phone] Mon-Sat 9 am - 9 pm, on-call/after hours pager [phone]

## 2018-07-07 NOTE — ED AVS SNAPSHOT
[facility], Emergency Department    [address] [facility] [address]    Phone:  [phone]                                       Parth Fountain   MRN: [id number]    Department:  [facility], Emergency Department   Date of Visit:  7/7/2018           Patient Information     Date Of Birth          1965        Your diagnoses for this visit were:     Fall, initial encounter        You were seen by Mariusz Arce DO.      Follow-up Information     Follow up with Maria Ines Emmanuel In 1 day.    Specialty:  Internal Medicine    Contact information:    [facility]  [address]  [facility] [id number] [phone]          Discharge Instructions       See your doctor on Monday.  Continue taking your antibiotics.  Be careful about falling.  Return here if you feeling sick,  Fever.    Your next 10 appointments already scheduled     Jul 25, 2018  1:00 PM CDT   Return Visit with Suzan Martinez PA-C   [facility] [place] ([facility])    [address]  [address]  [facility] [address] [phone]              24 Hour Appointment Hotline       To make an appointment at any [facility], call [phone] ([phone]). If you don't have a family doctor or clinic, we will help you find one. [facility] clinics are conveniently located to serve the needs of you and your family.             Review of your medicines      Our records show that you are taking the medicines listed below. If these are incorrect, please call your family doctor or clinic.        Dose / Directions Last dose taken    AMBIEN 10 MG tablet   Dose:  10 mg   Generic drug:  zolpidem        Take 10 mg by mouth nightly as needed.   Refills:  0        aspirin 325 MG tablet   Dose:  325 mg        Take 325 mg by mouth daily.   Refills:  0        baclofen 20 MG tablet   Commonly known as:  LIORESAL   Dose:  20 mg        Take 20 mg by mouth 4 times daily.   Refills:  0         BACTRIM DS PO   Dose:  1 tablet        Take 1 tablet by mouth daily   Refills:  0        bismuth subsalicylate 525 MG/15ML Susp   Dose:  30 mL        Take 30 mLs by mouth every 4 hours as needed   Refills:  0        calcium carbonate 500 MG tablet   Commonly known as:  OS-RIGOBERTO 500 mg [place]. Ca   Dose:  500 mg   Indication:  with meals        Take 500 mg by mouth 3 times daily   Refills:  0        collagenase ointment   Commonly known as:  SANTYL   Quantity:  30 g        Apply topically daily   Refills:  2        CYMBALTA PO   Dose:  60 mg        Take 60 mg by mouth   Refills:  0        * ENSURE Liqd   Dose:  3 Can        Take 3 Cans by mouth daily   Refills:  0        * ENSURE NUTRITION SHAKE Liqd   Dose:  1 Bottle   Quantity:  90 Bottle        Take 1 Bottle by mouth 3 times daily (with meals)   Refills:  11        eucerin cream   Dose:  0.5 inch        Apply 0.5 inches topically as needed.   Refills:  0        HYDROmorphone 4 MG tablet   Commonly known as:  DILAUDID   Dose:  4-8 mg   Quantity:  100 tablet        Take 4-8 mg by mouth every 3 hours as needed.   Refills:  0        * LYRICA PO   Dose:  150 mg        Take 150 mg by mouth 2 times daily   Refills:  0        * LYRICA PO        Refills:  0        MULTIPLE VITAMIN/IRON OR        Refills:  0        polyethylene glycol powder   Commonly known as:  MIRALAX/GLYCOLAX   Dose:  1 capful        Take 1 capful by mouth daily as needed.   Refills:  0        ranitidine 150 MG tablet   Commonly known as:  ZANTAC   Dose:  150 mg   Quantity:  60 tablet        Take 1 tablet (150 mg) by mouth 2 times daily   Refills:  0        sennosides 8.6 MG tablet   Commonly known as:  SENOKOT   Dose:  1 tablet        Take 1 tablet by mouth 2 times daily as needed   Refills:  0        traZODone 100 MG tablet   Commonly known as:  DESYREL   Dose:  100 mg        Take 100 mg by mouth At Bedtime.   Refills:  0        TYLENOL 325 MG tablet   Dose:  2 tablet   Generic drug:  acetaminophen         Take 2 tablets by mouth every 4 hours as needed.   Refills:  0        VALIUM PO        5mg q noon; 10 mg q hs; and 5mg BID prn   Refills:  0        VITAMIN C CR PO   Dose:  500 mg        Take 500 mg by mouth 4 times daily.   Refills:  0        VITAMIN D PO   Dose:  800 Int'l Units        Take 800 Int'l Units by mouth 2 times daily.   Refills:  0        * Notice:  This list has 4 medication(s) that are the same as other medications prescribed for you. Read the directions carefully, and ask your doctor or other care provider to review them with you.            Procedures and tests performed during your visit     Alcohol level blood    CBC with platelets differential    CK total    Cardiac Continuous Monitoring    Comprehensive metabolic panel    EKG 12-lead, tracing only    Head CT w/o contrast    INR    Maxillofacial  CT w/o contrast    Partial thromboplastin time    Peripheral IV: Standard    Pulse oximetry nursing    Troponin I    UA with Microscopic reflex to Culture    Vascular Access Care Adult IP Consult    XR Chest Port 1 View      Orders Needing Specimen Collection     None      Pending Results     Date and Time Order Name Status Description    7/7/2018 1203 EKG 12-lead, tracing only Preliminary             Pending Culture Results     No orders found from 7/5/2018 to 7/8/2018.            Pending Results Instructions     If you had any lab results that were not finalized at the time of your Discharge, you can call the ED Lab Result RN at [phone]. You will be contacted by this team for any positive Lab results or changes in treatment. The nurses are available 7 days a week from 10A to 6:30P.  You can leave a message 24 hours per day and they will return your call.        Thank you for choosing [facility]       Thank you for choosing [facility] for your care. Our goal is always to provide you with excellent care. Hearing back from our patients is one way we can continue to improve our services. Please take a  few minutes to complete the written survey that you may receive in the mail after you visit with us. Thank you!        Care EveryWhere ID     This is your Care EveryWhere ID. This could be used by other organizations to access your [facility] medical records  [id number]        Equal Access to Services     CHAVA MCGRATH : Khai Mcnulty, tri castillo, austen richter, jones ruiz. So [facility] [phone].    ATENCIÓN: Si habla español, tiene a harrison disposición servicios gratuitos de asistencia lingüística. Llame al [phone].    We comply with applicable federal civil rights laws and [place] laws. We do not discriminate on the basis of race, color, national origin, age, disability, sex, sexual orientation, or gender identity.            After Visit Summary       This is your record. Keep this with you and show to your community pharmacist(s) and doctor(s) at your next visit.

## 2018-07-07 NOTE — DISCHARGE INSTRUCTIONS
See your doctor on Monday.  Continue taking your antibiotics.  Be careful about falling.  Return here if you feeling sick,  Fever.

## 2018-07-07 NOTE — ED NOTES
Bed: ED21  Expected date:   Expected time:   Means of arrival:   Comments:  EMS Service:  TAMARA 639    Med/Trauma C/o:  53-yr MALE; C/o fall (previous paraplegia); ETOH(?); lac to eyebrow.    Triaged:  YELLOW    ETA:  ~10:55am    Neeraj Robertson  July 7, 2018

## 2018-07-07 NOTE — ED AVS SNAPSHOT
[facility], Emergency Department    [address] [place] [address]    Phone:  [phone]                                       Parth Fountain   MRN: [id number]    Department:  [facility], Emergency Department   Date of Visit:  7/7/2018           After Visit Summary Signature Page     I have received my discharge instructions, and my questions have been answered. I have discussed any challenges I see with this plan with the nurse or doctor.    ..........................................................................................................................................  Patient/Patient Representative Signature      ..........................................................................................................................................  Patient Representative Print Name and Relationship to Patient    ..................................................               ................................................  Date                                            Time    ..........................................................................................................................................  Reviewed by Signature/Title    ...................................................              ..............................................  Date                                                            Time

## 2018-07-07 NOTE — LETTER
Transition Communication Hand-off for Care Transitions to Next Level of Care Provider    Name: Parth Fountain  : 1965  MRN #: [id number]  Primary Care Provider: GUS TAYLOR     Primary Clinic: [facility] [address] [facility] [address]     Reason for Hospitalization:  [facility] ED for evaluation post fall  Admit Date/Time: 2018 11:02 AM  Discharge Date: 2018  Payor Source: Payor: MEDICARE / Plan: MEDICARE / Product Type: Medicare /         Reason for Communication Hand-off Referral: Non-adherence to plan of care related to:  Other continued ETOH consumptoin, compliance issues with caregivers    Discharge Plan:  ELMO Alba brought on a  hold to the ED for evaluation post fall. EMS noted concerns about patient's living conditions and his ETOH abuse. Parth evaluated and once medically stable d/c back to his apartment with assisted services. He has daily wound care visits from RN so we updated home care staff as well as the caregivers who are avail 24 hours/day at Parth's apartment building.      Concern for non-adherence with plan of care:   Y/N - Yes    Follow-up plan:  Future Appointments  Date Time Provider Department Center   2018 1:00 PM [facility]an [facility], [facility]  Social Work Services, Emergency Dept [facility]  Pager: [phone] Mon-Sat 9 am - 9 pm, on-call/after hours pager [phone]    AVS/Discharge Summary is the source of truth; this is a helpful guide for improved communication of patient story
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
## 2018-07-08 LAB — INTERPRETATION ECG - MUSE: NORMAL

## 2018-07-25 ENCOUNTER — HOSPITAL ENCOUNTER (OUTPATIENT)
Dept: WOUND CARE | Facility: CLINIC | Age: 53
Discharge: HOME | End: 2018-07-25
Attending: PHYSICIAN ASSISTANT | Admitting: PHYSICIAN ASSISTANT
Payer: MEDICARE

## 2018-07-25 VITALS — TEMPERATURE: 98.5 F | SYSTOLIC BLOOD PRESSURE: 149 MMHG | DIASTOLIC BLOOD PRESSURE: 87 MMHG

## 2018-07-25 DIAGNOSIS — L89.323 DECUBITUS ULCER OF ISCHIAL AREA, LEFT, STAGE III (H): ICD-10-CM

## 2018-07-25 DIAGNOSIS — L89.313 DECUBITUS ULCER OF ISCHIAL AREA, RIGHT, STAGE III (H): ICD-10-CM

## 2018-07-25 PROCEDURE — 11042 DBRDMT SUBQ TIS 1ST 20SQCM/<: CPT | Performed by: PHYSICIAN ASSISTANT

## 2018-07-25 PROCEDURE — 11042 DBRDMT SUBQ TIS 1ST 20SQCM/<: CPT

## 2018-07-25 PROCEDURE — A6209 FOAM DRSG <=16 SQ IN W/O BDR: HCPCS

## 2018-07-25 NOTE — MR AVS SNAPSHOT
MRN:[id number]                      After Visit Summary   7/25/2018    Parth Fountain    MRN: [id number]           Visit Information        Provider Department      7/25/2018  1:00 PM Suzan Martinez PA-C [facility] [place]          Further instructions from your care team             [facility]  [address] [facility] [address], [place] [address]  Appointment Phone [phone] Nurse Advisors [phone]     Parth Fountain      1965  Home Health Care Agency Performing Wound Care:[facility] Phone: [phone] Fax: [phone]     Wound Dressing Change: Left IT, Right IT, Left Buttocks and Right Scrotal  Cleanse wound with: saline or wound cleanser  Pack wounds with hydrofera blue ready  Cover wound with gauze  Change dressing MWF.  Repositioning:    Bed:  Reposition pt MINIMALLY every 1-2 hours in bed to relieve pressure and promote perfusion to tissue.     Chair:  When up to chair pt should not sit for longer than one hour total before either returning to bed for at least 10 minutes, again to relieve pressure and promote perfusion to tissue.       Pt should also sit on a chair cushion when up to the chair.          Suzan Martinez PA-C.July 25, 2018  Call us at [phone] if you have any questions about your wounds, have redness or swelling around your wound, have a fever of 101 or greater or if you have any other problems or concerns. We answer the phone Monday through Friday 8 am to 4 pm, please leave a message as we check the voicemail frequently throughout the day.      Follow up with Provider - 4 weeks          Care EveryWhere ID     This is your Care EveryWhere ID. This could be used by other organizations to access your [place] medical records  [id number]        Equal Access to Services     CHAVA MCGRATH AH: Khai Mcnulty, tri castillo, jones marcus  roby michel ah. So [facility] [phone].    ATENCIÓN: Si habla español, tiene a harrison disposición servicios gratuitos de asistencia lingüística. Llame al [phone].    We comply with applicable federal civil rights laws and [place] laws. We do not discriminate on the basis of race, color, national origin, age, disability, sex, sexual orientation, or gender identity.
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
## 2018-07-25 NOTE — DISCHARGE INSTRUCTIONS
[facility]  [address] [facility] [address], [place] [address]  Appointment Phone [phone] Nurse Advisors [phone]     Parth Fountain      1965  Home Health Care Agency Performing Wound Care:[facility] Phone: [phone] Fax: [phone]     Wound Dressing Change: Left IT, Right IT, Left Buttocks and Right Scrotal  Cleanse wound with: saline or wound cleanser  Pack wounds with hydrofera blue ready  Cover wound with gauze  Change dressing MWF.  Repositioning:    Bed:  Reposition pt MINIMALLY every 1-2 hours in bed to relieve pressure and promote perfusion to tissue.     Chair:  When up to chair pt should not sit for longer than one hour total before either returning to bed for at least 10 minutes, again to relieve pressure and promote perfusion to tissue.       Pt should also sit on a chair cushion when up to the chair.          Suzan Martinez PA-C.July 25, 2018  Call us at [phone] if you have any questions about your wounds, have redness or swelling around your wound, have a fever of 101 or greater or if you have any other problems or concerns. We answer the phone Monday through Friday 8 am to 4 pm, please leave a message as we check the voicemail frequently throughout the day.      Follow up with Provider - 4 weeks

## 2018-08-21 ENCOUNTER — MEDICAL CORRESPONDENCE (OUTPATIENT)
Dept: HEALTH INFORMATION MANAGEMENT | Facility: CLINIC | Age: 53
End: 2018-08-21

## 2018-08-22 ENCOUNTER — HOSPITAL ENCOUNTER (OUTPATIENT)
Dept: WOUND CARE | Facility: CLINIC | Age: 53
Discharge: HOME | End: 2018-08-22
Attending: PHYSICIAN ASSISTANT | Admitting: PHYSICIAN ASSISTANT
Payer: MEDICARE

## 2018-08-22 VITALS
RESPIRATION RATE: 18 BRPM | HEART RATE: 115 BPM | TEMPERATURE: 98.3 F | DIASTOLIC BLOOD PRESSURE: 89 MMHG | SYSTOLIC BLOOD PRESSURE: 128 MMHG

## 2018-08-22 DIAGNOSIS — L89.323 DECUBITUS ULCER OF ISCHIAL AREA, LEFT, STAGE III (H): ICD-10-CM

## 2018-08-22 DIAGNOSIS — L89.313 DECUBITUS ULCER OF ISCHIAL AREA, RIGHT, STAGE III (H): ICD-10-CM

## 2018-08-22 PROCEDURE — 11042 DBRDMT SUBQ TIS 1ST 20SQCM/<: CPT

## 2018-08-22 PROCEDURE — A6209 FOAM DRSG <=16 SQ IN W/O BDR: HCPCS

## 2018-08-22 PROCEDURE — 11042 DBRDMT SUBQ TIS 1ST 20SQCM/<: CPT | Performed by: PHYSICIAN ASSISTANT

## 2018-08-22 NOTE — MR AVS SNAPSHOT
MRN:[id number]                      After Visit Summary   8/22/2018    Parth Fountain    MRN: [id number]           Visit Information        Provider Department      8/22/2018 11:30 AM Suzan Martinez PA-C [facility] [place]          Further instructions from your care team              [facility]  [address] [facility] [address], [place] [address]  Appointment Phone [phone] Nurse Advisors [phone]      Parth Fountain      1965  Home Health Care Agency Performing Wound Care:[facility] Phone: [phone] Fax: [phone]      Wound Dressing Change: Left IT, Right IT, Right Distal IT, Left Buttocks and Right Scrotal, Left Scrotal  Cleanse wound with: saline or wound cleanser  Pack wounds with hydrofera blue ready  Cover wound with gauze  Change dressing MWF    Repositioning:    Bed:  Reposition pt MINIMALLY every 1-2 hours in bed to relieve pressure and promote perfusion to tissue.     Chair:  When up to chair pt should not sit for longer than one hour total before either returning to bed for at least 10 minutes, again to relieve pressure and promote perfusion to tissue. Pt should also sit on a chair cushion when up to the chair.           Suzan Martinez PA-C August 22, 2018  Call us at [phone] if you have any questions about your wounds, have redness or swelling around your wound, have a fever of 101 or greater or if you have any other problems or concerns. We answer the phone Monday through Friday 8 am to 4 pm, please leave a message as we check the voicemail frequently throughout the day.       Follow up with Renea in 4 weeks           Care EveryWhere ID     This is your Care EveryWhere ID. This could be used by other organizations to access your [place] medical records  [id number]        Equal Access to Services     CHAVA MCGRATH AH: Khai Mcnulty, tri castillo, austen romano  jones richter ah. So [facility] [phone].    ATENCIÓN: Si habla español, tiene a harrison disposición servicios gratuitos de asistencia lingüística. Llame al [phone].    We comply with applicable federal civil rights laws and [place] laws. We do not discriminate on the basis of race, color, national origin, age, disability, sex, sexual orientation, or gender identity.

## 2018-08-22 NOTE — DISCHARGE INSTRUCTIONS
[facility]  [address] [facility] [address], [place] [address]  Appointment Phone [phone] Nurse Advisors [phone]      Parth Fountain      1965  Home Health Care Agency Performing Wound Care:[facility] Phone: [phone] Fax: [phone]      Wound Dressing Change: Left IT, Right IT, Right Distal IT, Left Buttocks and Right Scrotal, Left Scrotal  Cleanse wound with: saline or wound cleanser  Pack wounds with hydrofera blue ready  Cover wound with gauze  Change dressing MWF    Repositioning:    Bed:  Reposition pt MINIMALLY every 1-2 hours in bed to relieve pressure and promote perfusion to tissue.     Chair:  When up to chair pt should not sit for longer than one hour total before either returning to bed for at least 10 minutes, again to relieve pressure and promote perfusion to tissue. Pt should also sit on a chair cushion when up to the chair.           Suzan Martinez PA-C August 22, 2018  Call us at [phone] if you have any questions about your wounds, have redness or swelling around your wound, have a fever of 101 or greater or if you have any other problems or concerns. We answer the phone Monday through Friday 8 am to 4 pm, please leave a message as we check the voicemail frequently throughout the day.       Follow up with Renea in 4 weeks
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
## 2018-09-19 ENCOUNTER — HOSPITAL ENCOUNTER (OUTPATIENT)
Dept: WOUND CARE | Facility: CLINIC | Age: 53
Discharge: HOME | End: 2018-09-19
Attending: PHYSICIAN ASSISTANT | Admitting: PHYSICIAN ASSISTANT
Payer: MEDICARE

## 2018-09-19 VITALS
SYSTOLIC BLOOD PRESSURE: 139 MMHG | DIASTOLIC BLOOD PRESSURE: 87 MMHG | RESPIRATION RATE: 18 BRPM | HEART RATE: 102 BPM | TEMPERATURE: 97.5 F

## 2018-09-19 DIAGNOSIS — L89.323 DECUBITUS ULCER OF ISCHIAL AREA, LEFT, STAGE III (H): ICD-10-CM

## 2018-09-19 DIAGNOSIS — L89.313 DECUBITUS ULCER OF ISCHIAL AREA, RIGHT, STAGE III (H): ICD-10-CM

## 2018-09-19 PROCEDURE — 11042 DBRDMT SUBQ TIS 1ST 20SQCM/<: CPT

## 2018-09-19 PROCEDURE — A6022 COLLAGEN DRSG>16<=48 SQ IN: HCPCS

## 2018-09-19 PROCEDURE — 11042 DBRDMT SUBQ TIS 1ST 20SQCM/<: CPT | Performed by: PHYSICIAN ASSISTANT
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
## 2018-09-19 NOTE — MR AVS SNAPSHOT
MRN:[id number]                      After Visit Summary   9/19/2018    Parth Fountain    MRN: [id number]           Visit Information        Provider Department      9/19/2018 11:30 AM Suzan Martinez PA-C [facility] [place]        Your next 10 appointments already scheduled     Oct 16, 2018  1:00 PM CDT   Return Visit with Suzan Martinez PA-C   [facility] [place] ([facility])    [address]  [address]  [place] [address] [phone]                Further instructions from your care team              [facility] [place]  [address], [place] [address]  Appointment Phone [phone] Nurse Advisors [phone]      Parth Fountain      1965  Home Health Care Agency Performing Wound Care:[facility] Phone: [phone] Fax: [phone]      Wound Dressing Change: Left IT, Right IT, Right Distal IT, Left Buttocks and Right Scrotal, Left Scrotal  Cleanse wound with: saline or wound cleanser  Pack wounds with promgran/cassandra  Cover wound with gauze  Change dressing MWF     Repositioning:    Bed:  Reposition pt MINIMALLY every 1-2 hours in bed to relieve pressure and promote perfusion to tissue.     Chair:  When up to chair pt should not sit for longer than one hour total before either returning to bed for at least 10 minutes, again to relieve pressure and promote perfusion to tissue. Pt should also sit on a chair cushion when up to the chair.           Suzan Martinez PA-C September 19, 2018    Call us at [phone] if you have any questions about your wounds, have redness or swelling around your wound, have a fever of 101 or greater or if you have any other problems or concerns. We answer the phone Monday through Friday 8 am to 4 pm, please leave a message as we check the voicemail frequently throughout the day.       Follow up with Renea in 4  weeks           Care EveryWhere ID     This is your Care EveryWhere ID. This could be used by other organizations to access your [place] medical records  [id number]        Equal Access to Services     CHAVA MCGRATH : Khai Mcnulty, tri castillo, austen richter, jones ruiz. So [facility] [phone].    ATENCIÓN: Si habla español, tiene a harrison disposición servicios gratuitos de asistencia lingüística. Llame al [phone].    We comply with applicable federal civil rights laws and [place] laws. We do not discriminate on the basis of race, color, national origin, age, disability, sex, sexual orientation, or gender identity.

## 2018-09-19 NOTE — DISCHARGE INSTRUCTIONS
[facility]  [address] [facility] [address], [place] [address]  Appointment Phone [phone] Nurse Advisors [phone]      Parth Fountain      1965  Home Health Care Agency Performing Wound Care:[facility] Phone: [phone] Fax: [phone]      Wound Dressing Change: Left IT, Right IT, Right Distal IT, Left Buttocks and Right Scrotal, Left Scrotal  Cleanse wound with: saline or wound cleanser  Pack wounds with promgran/cassandra  Cover wound with gauze  Change dressing MWF     Repositioning:    Bed:  Reposition pt MINIMALLY every 1-2 hours in bed to relieve pressure and promote perfusion to tissue.     Chair:  When up to chair pt should not sit for longer than one hour total before either returning to bed for at least 10 minutes, again to relieve pressure and promote perfusion to tissue. Pt should also sit on a chair cushion when up to the chair.           Suzan Martinez PA-C September 19, 2018    Call us at [phone] if you have any questions about your wounds, have redness or swelling around your wound, have a fever of 101 or greater or if you have any other problems or concerns. We answer the phone Monday through Friday 8 am to 4 pm, please leave a message as we check the voicemail frequently throughout the day.       Follow up with Renea in 4 weeks

## 2018-10-22 ENCOUNTER — HOSPITAL ENCOUNTER (OUTPATIENT)
Dept: WOUND CARE | Facility: CLINIC | Age: 53
Discharge: HOME | End: 2018-10-22
Attending: PHYSICIAN ASSISTANT | Admitting: PHYSICIAN ASSISTANT
Payer: MEDICARE

## 2018-10-22 VITALS
DIASTOLIC BLOOD PRESSURE: 105 MMHG | SYSTOLIC BLOOD PRESSURE: 183 MMHG | HEART RATE: 112 BPM | TEMPERATURE: 98.8 F | RESPIRATION RATE: 18 BRPM

## 2018-10-22 DIAGNOSIS — L89.313 DECUBITUS ULCER OF ISCHIAL AREA, RIGHT, STAGE III (H): ICD-10-CM

## 2018-10-22 DIAGNOSIS — L89.323 DECUBITUS ULCER OF ISCHIAL AREA, LEFT, STAGE III (H): ICD-10-CM

## 2018-10-22 PROCEDURE — 11042 DBRDMT SUBQ TIS 1ST 20SQCM/<: CPT | Performed by: PHYSICIAN ASSISTANT

## 2018-10-22 PROCEDURE — A6209 FOAM DRSG <=16 SQ IN W/O BDR: HCPCS

## 2018-10-22 PROCEDURE — 11042 DBRDMT SUBQ TIS 1ST 20SQCM/<: CPT
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
## 2018-10-22 NOTE — MR AVS SNAPSHOT
MRN:[id number]                      After Visit Summary   10/22/2018    Parth Fountain    MRN: [id number]           Visit Information        Provider Department      10/22/2018  1:15 PM Suzan Martinez PA-C [facility] [place]          Further instructions from your care team              [facility]  [address] [facility] [address], [place] [address]  Appointment Phone [phone] Nurse Advisors [phone]      Parth Fountain      1965  Home Health Care Agency Performing Wound Care:[facility] Phone: [phone] Fax: [phone]      Wound Dressing Change: Left IT, Right IT, Right Distal IT, Left Buttocks and Right Scrotal, Left Scrotal  Cleanse wound with: saline or wound cleanser  Pack wounds with promgran/cassandra (Monday and Wed) alternating with hydrofera blue ready (Friday)  Cover wound with gauze  Change dressing MWF      Repositioning:  Bed:  Reposition pt MINIMALLY every 1-2 hours in bed to relieve pressure and promote perfusion to tissue.   Chair:  When up to chair pt should not sit for longer than one hour total before either returning to bed for at least 10 minutes, again to relieve pressure and promote perfusion to tissue. Pt should also sit on a chair cushion when up to the chair.           Suzan Martinez PA-C October 22, 2018       Call us at [phone] if you have any questions about your wounds, have redness or swelling around your wound, have a fever of 101 or greater or if you have any other problems or concerns. We answer the phone Monday through Friday 8 am to 4 pm, please leave a message as we check the voicemail frequently throughout the day.       Follow up with Renea in 4-6 weeks              Care EveryWhere ID     This is your Care EveryWhere ID. This could be used by other organizations to access your [place] medical records  [id number]        Equal Access to Services     CHAVA MCGRATH  AH: Khai Mcnulty, wadawitda lujonnyadaha, qamariamata kajones tiwari. So [facility] [phone].    ATENCIÓN: Si habla español, tiene a harrison disposición servicios gratuitos de asistencia lingüística. Llame al [phone].    We comply with applicable federal civil rights laws and [place] laws. We do not discriminate on the basis of race, color, national origin, age, disability, sex, sexual orientation, or gender identity.

## 2018-10-22 NOTE — DISCHARGE INSTRUCTIONS
[facility]  [address] [facility] [address], [place] [address]  Appointment Phone [phone] Nurse Advisors [phone]      Parth Fountain      1965  Home Health Care Agency Performing Wound Care:[facility] Phone: [phone] Fax: [phone]      Wound Dressing Change: Left IT, Right IT, Right Distal IT, Left Buttocks and Right Scrotal, Left Scrotal  Cleanse wound with: saline or wound cleanser  Pack wounds with promgran/cassandra (Monday and Wed) alternating with hydrofera blue ready (Friday)  Cover wound with gauze  Change dressing MWF      Repositioning:  Bed:  Reposition pt MINIMALLY every 1-2 hours in bed to relieve pressure and promote perfusion to tissue.   Chair:  When up to chair pt should not sit for longer than one hour total before either returning to bed for at least 10 minutes, again to relieve pressure and promote perfusion to tissue. Pt should also sit on a chair cushion when up to the chair.           Suzan Martinez PA-C October 22, 2018       Call us at [phone] if you have any questions about your wounds, have redness or swelling around your wound, have a fever of 101 or greater or if you have any other problems or concerns. We answer the phone Monday through Friday 8 am to 4 pm, please leave a message as we check the voicemail frequently throughout the day.       Follow up with Renea in 4-6 weeks

## 2018-12-03 ENCOUNTER — HOSPITAL ENCOUNTER (OUTPATIENT)
Dept: WOUND CARE | Facility: CLINIC | Age: 53
Discharge: HOME | End: 2018-12-03
Attending: PHYSICIAN ASSISTANT | Admitting: PHYSICIAN ASSISTANT
Payer: MEDICARE

## 2018-12-03 VITALS
SYSTOLIC BLOOD PRESSURE: 152 MMHG | HEART RATE: 93 BPM | DIASTOLIC BLOOD PRESSURE: 98 MMHG | RESPIRATION RATE: 18 BRPM | TEMPERATURE: 98.5 F

## 2018-12-03 DIAGNOSIS — L89.893 PRESSURE INJURY OF LEFT CALF, STAGE 3 (H): ICD-10-CM

## 2018-12-03 DIAGNOSIS — L89.323 DECUBITUS ULCER OF ISCHIAL AREA, LEFT, STAGE III (H): ICD-10-CM

## 2018-12-03 DIAGNOSIS — L89.313 DECUBITUS ULCER OF ISCHIAL AREA, RIGHT, STAGE III (H): ICD-10-CM

## 2018-12-03 PROCEDURE — 11042 DBRDMT SUBQ TIS 1ST 20SQCM/<: CPT

## 2018-12-03 PROCEDURE — 17250 CHEM CAUT OF GRANLTJ TISSUE: CPT | Mod: 59 | Performed by: PHYSICIAN ASSISTANT

## 2018-12-03 PROCEDURE — G0463 HOSPITAL OUTPT CLINIC VISIT: HCPCS | Mod: 25

## 2018-12-03 PROCEDURE — 17250 CHEM CAUT OF GRANLTJ TISSUE: CPT

## 2018-12-03 PROCEDURE — A6022 COLLAGEN DRSG>16<=48 SQ IN: HCPCS

## 2018-12-03 PROCEDURE — 11042 DBRDMT SUBQ TIS 1ST 20SQCM/<: CPT | Performed by: PHYSICIAN ASSISTANT

## 2018-12-03 NOTE — PROGRESS NOTES
Patient arrived for wound care visit. Certified Wound Care Nurse time spent evaluating patient record, completed a full evaluation and documented wound(s) & sandra-wound skin; provided recommendation based on treatment plan. Applied dressing, reviewed discharge instructions, patient education, and discussed plan of care with appropriate medical team staff members and patient and/or family members.

## 2018-12-03 NOTE — MR AVS SNAPSHOT
MRN:[id number]                      After Visit Summary   12/3/2018    Parth Fountain    MRN: [id number]           Visit Information        Provider Department      12/3/2018  1:00 PM Suzan Martinez PA-C [facility] [place]          Further instructions from your care team              [facility]  [address] [facility] [address], [place] [address]  Appointment Phone [phone] Nurse Advisors [phone]      Parth Fountain      1965  Home Health Care Agency Performing Wound Care:[facility] Phone: [phone] Fax: [phone]      Wound Dressing Change:Left Lateral Lower Leg  Cleanse wound with: saline or wound cleanser  Pack wounds with promgran/cassandra (Monday and Wed) alternating with hydrofera blue ready (Friday)  Cover wound with gauze  Change dressing MWF    Wound Dressing Change:Left IT, Right IT, Right Distal IT, Left Buttocks and Right Scrotal, Left Scrotal  Cleanse wound with saline or wound cleanser  Apply Triad Paste or Critic Aid Barrier Cream into all wounds (no cover dressings)   Apply daily  Wear underwear if worrying about getting it on pants     Repositioning:  Bed:  Reposition pt MINIMALLY every 1-2 hours in bed to relieve pressure and promote perfusion to tissue.   Chair:  When up to chair pt should not sit for longer than one hour total before either returning to bed for at least 10 minutes, again to relieve pressure and promote perfusion to tissue. Pt should also sit on a chair cushion when up to the chair.           Suzan Martinez PA-C December 3, 2018  Signing Physician Kaleb Daly M.D.         Call us at [phone] if you have any questions about your wounds, have redness or swelling around your wound, have a fever of 101 or greater or if you have any other problems or concerns. We answer the phone Monday through Friday 8 am to 4 pm, please leave a message as we check the voicemail  frequently throughout the day.       Follow up with Renea in 4-6 weeks       Care EveryWhere ID     This is your Care EveryWhere ID. This could be used by other organizations to access your [place] medical records  [id number]        Equal Access to Services     CHAVA RUIZ: Khai Mcnulty, tri castillo, austen kaalbrandon richter, jones ruiz. So [facility] [phone].    ATENCIÓN: Si habla español, tiene a harrison disposición servicios gratuitos de asistencia lingüística. Llame al [phone].    We comply with applicable federal civil rights laws and [place] laws. We do not discriminate on the basis of race, color, national origin, age, disability, sex, sexual orientation, or gender identity.
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
## 2018-12-03 NOTE — DISCHARGE INSTRUCTIONS
[facility]  [address] [facility] [address] [address]  Appointment Phone [phone] Nurse Advisors [phone]      Parth Fountain      1965  Home Health Care Agency Performing Wound Care:[facility] Phone: [phone] Fax: [phone]      Wound Dressing Change:Left Lateral Lower Leg  Cleanse wound with: saline or wound cleanser  Pack wounds with promgran/cassandra (Monday and Wed) alternating with hydrofera blue ready (Friday)  Cover wound with gauze  Change dressing MWF    Wound Dressing Change:Left IT, Right IT, Right Distal IT, Left Buttocks and Right Scrotal, Left Scrotal  Cleanse wound with saline or wound cleanser  Apply Triad Paste or Critic Aid Barrier Cream into all wounds (no cover dressings)   Apply daily  Wear underwear if worrying about getting it on pants     Repositioning:  Bed:  Reposition pt MINIMALLY every 1-2 hours in bed to relieve pressure and promote perfusion to tissue.   Chair:  When up to chair pt should not sit for longer than one hour total before either returning to bed for at least 10 minutes, again to relieve pressure and promote perfusion to tissue. Pt should also sit on a chair cushion when up to the chair.           Suzan Martinez PA-C December 3, 2018  Signing Physician Kaleb Daly M.D.         Call us at [phone] if you have any questions about your wounds, have redness or swelling around your wound, have a fever of 101 or greater or if you have any other problems or concerns. We answer the phone Monday through Friday 8 am to 4 pm, please leave a message as we check the voicemail frequently throughout the day.       Follow up with Renea in 4-6 weeks

## 2018-12-31 ENCOUNTER — TELEPHONE (OUTPATIENT)
Dept: WOUND CARE | Facility: CLINIC | Age: 53
End: 2018-12-31

## 2018-12-31 NOTE — TELEPHONE ENCOUNTER
Patients home care nurse calling reporting increase wound size on scrotal wounds and wants to cover them with gauze. She also notes that leg wound is very sloughy and wants to try santyl again. Orders given. Patient will follow up as scheduled.

## 2019-01-14 ENCOUNTER — HOSPITAL ENCOUNTER (OUTPATIENT)
Dept: WOUND CARE | Facility: CLINIC | Age: 54
Discharge: HOME | End: 2019-01-14
Attending: PHYSICIAN ASSISTANT | Admitting: PHYSICIAN ASSISTANT
Payer: MEDICARE

## 2019-01-14 DIAGNOSIS — L89.313 DECUBITUS ULCER OF ISCHIAL AREA, RIGHT, STAGE III (H): ICD-10-CM

## 2019-01-14 DIAGNOSIS — L89.323 DECUBITUS ULCER OF ISCHIAL AREA, LEFT, STAGE III (H): ICD-10-CM

## 2019-01-14 DIAGNOSIS — L89.893 PRESSURE INJURY OF LEFT CALF, STAGE 3 (H): ICD-10-CM

## 2019-01-14 PROCEDURE — A6212 FOAM DRG <=16 SQ IN W/BORDER: HCPCS

## 2019-01-14 PROCEDURE — 11042 DBRDMT SUBQ TIS 1ST 20SQCM/<: CPT | Performed by: PHYSICIAN ASSISTANT

## 2019-01-14 PROCEDURE — 11042 DBRDMT SUBQ TIS 1ST 20SQCM/<: CPT

## 2019-01-14 PROCEDURE — A6196 ALGINATE DRESSING <=16 SQ IN: HCPCS
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
## 2019-01-23 ENCOUNTER — HOSPITAL ENCOUNTER (EMERGENCY)
Facility: CLINIC | Age: 54
Discharge: HOME | End: 2019-01-23
Attending: FAMILY MEDICINE | Admitting: FAMILY MEDICINE
Payer: MEDICARE

## 2019-01-23 VITALS
TEMPERATURE: 96.5 F | DIASTOLIC BLOOD PRESSURE: 77 MMHG | SYSTOLIC BLOOD PRESSURE: 142 MMHG | HEART RATE: 85 BPM | OXYGEN SATURATION: 98 % | RESPIRATION RATE: 18 BRPM

## 2019-01-23 DIAGNOSIS — F10.10 ALCOHOL ABUSE: ICD-10-CM

## 2019-01-23 DIAGNOSIS — G82.20 PARAPLEGIA (H): ICD-10-CM

## 2019-01-23 PROCEDURE — 99283 EMERGENCY DEPT VISIT LOW MDM: CPT | Performed by: FAMILY MEDICINE

## 2019-01-23 PROCEDURE — 99283 EMERGENCY DEPT VISIT LOW MDM: CPT | Mod: Z6 | Performed by: FAMILY MEDICINE

## 2019-01-23 NOTE — ED AVS SNAPSHOT
[facility], [place], Emergency Department  [address]  [facility] [address]  Phone:  [phone]  Fax:  [phone]                                    Parth Fountain   MRN: [id number]    Department:  [facility], Emergency Department   Date of Visit:  1/23/2019           After Visit Summary Signature Page    I have received my discharge instructions, and my questions have been answered. I have discussed any challenges I see with this plan with the nurse or doctor.    ..........................................................................................................................................  Patient/Patient Representative Signature      ..........................................................................................................................................  Patient Representative Print Name and Relationship to Patient    ..................................................               ................................................  Date                                   Time    ..........................................................................................................................................  Reviewed by Signature/Title    ...................................................              ..............................................  Date                                               Time          22EPIC Rev 08/18

## 2019-01-24 NOTE — ED NOTES
Bed: ED17  Expected date: 1/23/19  Expected time: 8:29 PM  Means of arrival: Ambulance  Comments:  Clay 642  53M  Etoh, para

## 2019-01-24 NOTE — DISCHARGE INSTRUCTIONS
Discharged home follow-up with your primary care physician and discuss any possible concerns about alcohol use.
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
## 2019-02-12 ENCOUNTER — TELEPHONE (OUTPATIENT)
Dept: WOUND CARE | Facility: CLINIC | Age: 54
End: 2019-02-12

## 2019-02-18 ENCOUNTER — HOSPITAL ENCOUNTER (OUTPATIENT)
Dept: WOUND CARE | Facility: CLINIC | Age: 54
Discharge: HOME | End: 2019-02-18
Attending: PHYSICIAN ASSISTANT | Admitting: PHYSICIAN ASSISTANT
Payer: MEDICARE

## 2019-02-18 VITALS — RESPIRATION RATE: 16 BRPM | DIASTOLIC BLOOD PRESSURE: 64 MMHG | SYSTOLIC BLOOD PRESSURE: 113 MMHG | TEMPERATURE: 97.7 F

## 2019-02-18 DIAGNOSIS — L89.323 DECUBITUS ULCER OF ISCHIAL AREA, LEFT, STAGE III (H): ICD-10-CM

## 2019-02-18 DIAGNOSIS — L89.893 PRESSURE INJURY OF LEFT CALF, STAGE 3 (H): ICD-10-CM

## 2019-02-18 DIAGNOSIS — L89.313 DECUBITUS ULCER OF ISCHIAL AREA, RIGHT, STAGE III (H): ICD-10-CM

## 2019-02-18 DIAGNOSIS — L89.893 PRESSURE ULCER OF OTHER SITE, STAGE 3 (H): ICD-10-CM

## 2019-02-18 PROCEDURE — 11042 DBRDMT SUBQ TIS 1ST 20SQCM/<: CPT

## 2019-02-18 PROCEDURE — 11042 DBRDMT SUBQ TIS 1ST 20SQCM/<: CPT | Performed by: PHYSICIAN ASSISTANT

## 2019-02-18 PROCEDURE — A6022 COLLAGEN DRSG>16<=48 SQ IN: HCPCS
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
## 2019-02-18 NOTE — DISCHARGE INSTRUCTIONS
[facility]  [address] [facility] [address], [place] [address]  Appointment Phone [phone] Nurse Advisors [phone]    Parth Fountain      1965    Home Health Care Agency Performing Wound Care:[facility] Phone: [phone] Fax: [phone]   Wound Dressing Change:All Pelvis and left leg wound  Cleanse wound with saline or wound cleanser  Skin Care: Apply moisturizing cream to skin surrounding the wound (but not in the wound):right and left ischial tuberosity critic aid cream  Cover wound with cassandra cut to size of wound (this will dissolve into the wound)  Cover wound with gauze  Change dressing MWF.   Repositioning:Bedrest to promote wound healing.    Bed:  Reposition pt MINIMALLY every 1-2 hours in bed to relieve pressure and promote perfusion to tissue.     Chair:  When up to chair pt should not sit for longer than one hour total before either standing or returning to bed for at least 10 minutes, again to relieve pressure and promote perfusion to tissue.     o Pt should also sit on a chair cushion when up to the chair.        Suzan Martinez PA-C. February 18, 2019    Call us at [phone] if you have any questions about your wounds, have redness or swelling around your wound, have a fever of 101 or greater or if you have any other problems or concerns. We answer the phone Monday through Friday 8 am to 4 pm, please leave a message as we check the voicemail frequently throughout the day.     Follow up with Provider - 8 weeks

## 2019-05-01 ENCOUNTER — HOSPITAL ENCOUNTER (OUTPATIENT)
Dept: WOUND CARE | Facility: CLINIC | Age: 54
Discharge: HOME | End: 2019-05-01
Attending: PHYSICIAN ASSISTANT | Admitting: PHYSICIAN ASSISTANT
Payer: MEDICARE

## 2019-05-01 VITALS
TEMPERATURE: 97.3 F | HEART RATE: 89 BPM | SYSTOLIC BLOOD PRESSURE: 147 MMHG | DIASTOLIC BLOOD PRESSURE: 81 MMHG | RESPIRATION RATE: 18 BRPM

## 2019-05-01 DIAGNOSIS — L89.323 DECUBITUS ULCER OF ISCHIAL AREA, LEFT, STAGE III (H): ICD-10-CM

## 2019-05-01 DIAGNOSIS — L89.893 PRESSURE ULCER OF OTHER SITE, STAGE 3 (H): ICD-10-CM

## 2019-05-01 DIAGNOSIS — L89.893 PRESSURE INJURY OF LEFT CALF, STAGE 3 (H): ICD-10-CM

## 2019-05-01 DIAGNOSIS — L89.313 DECUBITUS ULCER OF ISCHIAL AREA, RIGHT, STAGE III (H): ICD-10-CM

## 2019-05-01 PROCEDURE — 11042 DBRDMT SUBQ TIS 1ST 20SQCM/<: CPT

## 2019-05-01 PROCEDURE — A6261 WOUND FILLER GEL/PASTE /OZ: HCPCS

## 2019-05-01 PROCEDURE — 11042 DBRDMT SUBQ TIS 1ST 20SQCM/<: CPT | Performed by: PHYSICIAN ASSISTANT
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
## 2019-05-01 NOTE — DISCHARGE INSTRUCTIONS
[facility]  [address] [facility] [address], [place] [address]  Appointment Phone [phone] Nurse Advisors [phone]     Parth Fountain 1965    Home Health Care Agency Performing Wound Care:[email or web address] Phone: [phone] Fax: [phone]    Wound Dressing Change:All Pelvic and left heel wound  Cleanse wounds with saline or wound cleanser  Skin Care: Apply moisturizing cream to skin surrounding the wound (but not in the wound):right and left ischial tuberosity critic aid cream, Sween 24 to lower leg dry skin  Cover wounds with Iodosorb  Cover wounds with gauze  Change dressing MWF.    Repositioning:Bedrest to promote wound healing.  Bed: Reposition pt MINIMALLY every 1-2 hours in bed to relieve pressure and promote perfusion to tissue.  Chair: When up to chair pt should not sit for longer than one hour total before returning to bed for at least 10 minutes, again to relieve pressure and promote perfusion to tissue. Pt should also sit on a chair cushion when up to the chair.    Suzan Martinez PA-C. May 1, 2019    Call us at [phone] if you have any questions about your wounds, have redness or swelling around your wound, have a fever of 101 or greater or if you have any other problems or concerns. We answer the phone Monday through Friday 8 am to 4 pm, please leave a message as we check the voicemail frequently throughout the day.    Follow up in 8 weeks

## 2019-06-26 ENCOUNTER — HOSPITAL ENCOUNTER (OUTPATIENT)
Dept: WOUND CARE | Facility: CLINIC | Age: 54
Discharge: HOME | End: 2019-06-26
Attending: PHYSICIAN ASSISTANT | Admitting: PHYSICIAN ASSISTANT
Payer: MEDICARE

## 2019-06-26 VITALS
HEART RATE: 98 BPM | SYSTOLIC BLOOD PRESSURE: 142 MMHG | RESPIRATION RATE: 16 BRPM | TEMPERATURE: 97.6 F | DIASTOLIC BLOOD PRESSURE: 94 MMHG

## 2019-06-26 DIAGNOSIS — L89.893 PRESSURE ULCER OF OTHER SITE, STAGE 3 (H): ICD-10-CM

## 2019-06-26 DIAGNOSIS — L89.323 DECUBITUS ULCER OF ISCHIAL AREA, LEFT, STAGE III (H): ICD-10-CM

## 2019-06-26 DIAGNOSIS — L89.313 DECUBITUS ULCER OF ISCHIAL AREA, RIGHT, STAGE III (H): ICD-10-CM

## 2019-06-26 PROCEDURE — 11042 DBRDMT SUBQ TIS 1ST 20SQCM/<: CPT

## 2019-06-26 PROCEDURE — 11042 DBRDMT SUBQ TIS 1ST 20SQCM/<: CPT | Performed by: PHYSICIAN ASSISTANT

## 2019-06-26 PROCEDURE — A6261 WOUND FILLER GEL/PASTE /OZ: HCPCS

## 2019-06-26 RX ORDER — ASCORBIC ACID 500 MG
500 TABLET ORAL
COMMUNITY
Start: 2019-04-19 | End: 2019-09-18

## 2019-06-26 RX ORDER — DULOXETIN HYDROCHLORIDE 60 MG/1
60 CAPSULE, DELAYED RELEASE ORAL EVERY MORNING
COMMUNITY
Start: 2019-06-06 | End: 2023-10-31

## 2019-06-26 RX ORDER — PREGABALIN 150 MG/1
300 CAPSULE ORAL 2 TIMES DAILY
Status: ON HOLD | COMMUNITY
Start: 2019-06-03 | End: 2023-11-06

## 2019-06-26 NOTE — DISCHARGE INSTRUCTIONS
[facility]  [address] [facility] [address], [place] [address]  Appointment Phone [phone] Nurse Advisors [phone]    Parth Fountain      1965    Home Health Care Agency Performing Wound Care:[facility] Phone:  [phone] Fax: [phone]  Wound Dressing Change:All Pelvic and left heel wound  Cleanse wounds with saline or wound cleanser  Skin Care: Apply moisturizing cream to skin surrounding the wound (but not in the  wound):right and left ischial tuberosity critic aid cream, Sween 24 to lower leg dry skin  Cover wounds with Iodosorb  Cover wounds with gauze  Change dressing MWF.  Repositioning:Bedrest to promote wound healing.  Bed: Reposition pt MINIMALLY every 1-2 hours in bed to relieve pressure and promote  perfusion to tissue.    Chair: When up to chair pt should not sit for longer than one hour total before  returning to bed for at least 10 minutes, again to relieve pressure and promote  perfusion to tissue. Pt should also sit on a chair cushion when up to the chair.   Suzan Martinez PA-C. June 26, 2019    Call us at [phone] if you have any questions about your wounds, have redness or swelling around your wound, have a fever of 101 or greater or if you have any other problems or concerns. We answer the phone Monday through Friday 8 am to 4 pm, please leave a message as we check the voicemail frequently throughout the day.     Follow up with Provider -  In 3 months
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
## 2019-09-18 ENCOUNTER — HOSPITAL ENCOUNTER (OUTPATIENT)
Dept: WOUND CARE | Facility: CLINIC | Age: 54
Discharge: HOME | End: 2019-09-18
Attending: PHYSICIAN ASSISTANT | Admitting: PHYSICIAN ASSISTANT
Payer: MEDICARE

## 2019-09-18 VITALS
RESPIRATION RATE: 16 BRPM | TEMPERATURE: 99 F | DIASTOLIC BLOOD PRESSURE: 91 MMHG | SYSTOLIC BLOOD PRESSURE: 147 MMHG | HEART RATE: 112 BPM

## 2019-09-18 DIAGNOSIS — L89.313 DECUBITUS ULCER OF ISCHIAL AREA, RIGHT, STAGE III (H): ICD-10-CM

## 2019-09-18 DIAGNOSIS — L89.323 DECUBITUS ULCER OF ISCHIAL AREA, LEFT, STAGE III (H): ICD-10-CM

## 2019-09-18 DIAGNOSIS — L89.893 PRESSURE ULCER OF OTHER SITE, STAGE 3 (H): ICD-10-CM

## 2019-09-18 DIAGNOSIS — G82.20 PARALYSIS OF BOTH LOWER LIMBS (H): ICD-10-CM

## 2019-09-18 DIAGNOSIS — I70.223 ATHEROSCLEROSIS OF NATIVE ARTERY OF BOTH LOWER EXTREMITIES WITH REST PAIN (H): Primary | ICD-10-CM

## 2019-09-18 PROCEDURE — 17250 CHEM CAUT OF GRANLTJ TISSUE: CPT

## 2019-09-18 PROCEDURE — 17250 CHEM CAUT OF GRANLTJ TISSUE: CPT | Performed by: PHYSICIAN ASSISTANT

## 2019-09-18 PROCEDURE — A6021 COLLAGEN DRESSING <=16 SQ IN: HCPCS

## 2019-09-18 NOTE — DISCHARGE INSTRUCTIONS
[facility]  [address] [address]  Appointment Phone [phone] Nurse Advisors [phone]    Parth Fountain 1965    Home Health Care Agency Performing Wound Care:  [facility] Phone: [phone] Fax: [phone]    Wound Dressing Change left ischial tuberosity and scrotal wound  Cleanse wounds with saline or wound cleanser  Cover wounds with Endoform  Cover wounds with gauze  Change dressing Monday, Wednesday and Friday    May use Sween 24 lotion to dry skin on lower legs    Please call our office if you want to change the type of dressings    Please call [facility] [phone] ([address] [place], [place]) to schedule your JOVAN appointment if you have not heard from them in two business days. Arrive 15 minutes early. If you need to cancel or change the appointment please call [facility] [phone] ([address] [place], [place])    Repositioning:Bedrest to promote wound healing.  Bed: Reposition pt MINIMALLY every 1-2 hours in bed to relieve pressure and promote perfusion to tissue.  Chair: When up to chair pt should not sit for longer than one hour total before returning to bed for at least 10 minutes, again to relieve pressure and promote  perfusion to tissue. Pt should also sit on a chair cushion when up to the chair.    Suzan Martinez PA-C. September 18, 2019    Call us at [phone] if you have any questions about your wounds, have redness or  swelling around your wound, have a fever of 101 or greater or if you have any other  problems or concerns. We answer the phone Monday through Friday 8 am to 4 pm,  please leave a message as we check the voicemail frequently throughout the day.    Follow up with Provider - In 3 months
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
## 2020-01-13 ENCOUNTER — HOSPITAL ENCOUNTER (OUTPATIENT)
Dept: WOUND CARE | Facility: CLINIC | Age: 55
Discharge: HOME | End: 2020-01-13
Attending: PHYSICIAN ASSISTANT | Admitting: PHYSICIAN ASSISTANT
Payer: MEDICARE

## 2020-01-13 VITALS
HEART RATE: 103 BPM | SYSTOLIC BLOOD PRESSURE: 134 MMHG | DIASTOLIC BLOOD PRESSURE: 74 MMHG | TEMPERATURE: 98 F | RESPIRATION RATE: 18 BRPM

## 2020-01-13 DIAGNOSIS — L89.893 PRESSURE ULCER OF OTHER SITE, STAGE 3 (H): ICD-10-CM

## 2020-01-13 DIAGNOSIS — L89.323 DECUBITUS ULCER OF ISCHIAL AREA, LEFT, STAGE III (H): ICD-10-CM

## 2020-01-13 PROCEDURE — 11042 DBRDMT SUBQ TIS 1ST 20SQCM/<: CPT | Performed by: PHYSICIAN ASSISTANT

## 2020-01-13 PROCEDURE — 11042 DBRDMT SUBQ TIS 1ST 20SQCM/<: CPT

## 2020-01-13 PROCEDURE — A6021 COLLAGEN DRESSING <=16 SQ IN: HCPCS
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
## 2020-01-13 NOTE — DISCHARGE INSTRUCTIONS
[facility]  [address], [place] [address]  Appointment Phone [phone]    Parth Fountain 1965    Home Health Care Agency Performing Wound Care:  [facility] Phone: [phone] Fax: [phone]    Wound Dressing Change perineum/scrotal wound  Cleanse wounds with saline or wound cleanser  Cover wounds with Endoform Antimicrobial  Cover wounds with gauze  Change dressing Monday, Wednesday and Friday    Suzan Martinez PA-C. January 13, 2020    Call us at [phone] if you have any questions about your wounds, have redness or  swelling around your wound, have a fever of 101 or greater or if you have any other  problems or concerns. We answer the phone Monday through Friday 8 am to 4 pm,  please leave a message as we check the voicemail frequently throughout the day.    Follow up with Renea Martinez PA-C in 3 months

## 2020-03-02 ENCOUNTER — TELEPHONE (OUTPATIENT)
Dept: WOUND CARE | Facility: CLINIC | Age: 55
End: 2020-03-02

## 2020-03-02 NOTE — TELEPHONE ENCOUNTER
[facility]ound    Who is the name of the provider?:  Michelle      What is the location you see this provider at?: [place]    Reason for call: Today new wound noted on left lateral thigh, with heavy drainage, 0.3 x 0.3 x 3.0 cm      Can we leave a detailed message on this number?  YES

## 2020-03-02 NOTE — TELEPHONE ENCOUNTER
LVM for nurse to apply absorptive product such as alginate silver, an abd pad and change it 3 x weekly. Recommend that the patient should be seen sooner for this wound. Called patient and LVM for him to make another appointment for this new wound sooner than his next one he already has scheduled.  Note I had to contact his brother for the patient's phone number as there was none listed.

## 2020-03-11 ENCOUNTER — TELEPHONE (OUTPATIENT)
Dept: WOUND CARE | Facility: CLINIC | Age: 55
End: 2020-03-11

## 2020-03-11 NOTE — TELEPHONE ENCOUNTER
Left a message for patient to call and make an appointment to see Renea Martinez PA-C sooner than 4-7-2020 to evaluate his new wound.

## 2020-03-11 NOTE — TELEPHONE ENCOUNTER
[facility]    Who is the name of the provider?:  Michelle      What is the location you see this provider at?: [place]    Reason for call:  Home care told him wound healing clinic is supposed to call him re his new wound    Can we leave a detailed message on this number?  YES

## 2020-03-26 ENCOUNTER — MEDICAL CORRESPONDENCE (OUTPATIENT)
Dept: HEALTH INFORMATION MANAGEMENT | Facility: CLINIC | Age: 55
End: 2020-03-26

## 2020-04-08 ENCOUNTER — TELEPHONE (OUTPATIENT)
Dept: WOUND CARE | Facility: CLINIC | Age: 55
End: 2020-04-08

## 2020-04-08 NOTE — TELEPHONE ENCOUNTER
REYNALDOM for Homecare Nurse Shena [phone] that the patient missed his appointment today with the clinic and we do not have a full wound assessment in order to create a new plan and order supplies. If she wants to give us her full wound care assessment we can work with that.   RUBEN for [employer] and let her know we cannot fill this out the surgical dressing prescription from [facility] because we have not seen this patient and not sure where the supply request came from.

## 2020-04-15 ENCOUNTER — HOSPITAL ENCOUNTER (OUTPATIENT)
Dept: WOUND CARE | Facility: CLINIC | Age: 55
End: 2020-04-15
Attending: PHYSICIAN ASSISTANT
Payer: MEDICARE

## 2020-04-15 DIAGNOSIS — L89.893 PRESSURE ULCER OF LEFT LEG, STAGE 3 (H): ICD-10-CM

## 2020-04-15 DIAGNOSIS — L89.223 PRESSURE ULCER OF UPPER THIGH, LEFT, STAGE III (H): ICD-10-CM

## 2020-04-15 DIAGNOSIS — L89.893 PRESSURE ULCER OF OTHER SITE, STAGE 3 (H): ICD-10-CM

## 2020-04-15 PROCEDURE — 99442 ZZC PHYSICIAN TELEPHONE EVALUATION 11-20 MIN: CPT | Performed by: PHYSICIAN ASSISTANT

## 2020-04-15 RX ORDER — PSEUDOEPHED/ACETAMINOPH/DIPHEN 30MG-500MG
500 TABLET ORAL EVERY 6 HOURS PRN
COMMUNITY
Start: 2019-11-18

## 2020-04-15 RX ORDER — TIZANIDINE 2 MG/1
2 TABLET ORAL 3 TIMES DAILY
COMMUNITY
Start: 2020-03-26 | End: 2021-06-19

## 2020-04-15 RX ORDER — LOPERAMIDE HCL 2 MG
2 CAPSULE ORAL 4 TIMES DAILY PRN
COMMUNITY
Start: 2019-04-22 | End: 2020-12-16

## 2020-04-15 NOTE — PROGRESS NOTES
Patient evaluated during telephone visit. Certified Wound Care Nurse time spent evaluating patient record, completed a full evaluation and documented wound(s) & sandra-wound skin; provided recommendation based on treatment Reviewed discharge instructions, patient education, and discussed plan of care with appropriate medical team staff members and patient and/or family members.

## 2020-04-15 NOTE — DISCHARGE INSTRUCTIONS
"[facility]  [address], [place] [address]  Appointment Phone [phone]    Parth AQUILES Fountain 1965    Home Health Care Agency Performing Wound Care:  [facility] Phone: [phone] Fax: [phone]    Wound dressing change perineum/scrotal wound  Cleanse wounds with saline or wound cleanser. Cover wounds with Iodosorb. Cover wounds with gauze and tape. Change dressing Monday,Wednesday and Friday    Wound Dressing recommendation to left thigh ulcer  Irrigate wound with saline or wound cleanser. Apply 1/4\" Iodoform packing strip into wound and cover with ABD pad and tape. Change Monday, Wednesday and Friday    [facility] will call you post COVID [phone] ([address] [place], [place]) to schedule your CT scan appointment . Arrive 15 minutes early.     ICD10 code for thigh wound L89.223  ICD10 code for scrotal wounds L89.893    An order for wound care supplies was sent to Bindu Martinez PA-C. April 15, 2020    Call us at [phone] if you have any questions about your wounds, have redness or  swelling around your wound, have a fever of 101 or greater or if you have any other  problems or concerns. We answer the phone Monday through Friday 8 am to 4 pm,  please leave a message as we check the voicemail frequently throughout the day.    Follow up with Dr. Peña in the clinic May 21st at 10:40  "
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
## 2020-05-13 ENCOUNTER — TELEPHONE (OUTPATIENT)
Dept: SURGERY | Facility: CLINIC | Age: 55
End: 2020-05-13

## 2020-05-13 NOTE — TELEPHONE ENCOUNTER
Called her [phone] and waiting for her to call back to schedule a video visit to get the best plan in place from the start.

## 2020-06-11 ENCOUNTER — HOSPITAL ENCOUNTER (OUTPATIENT)
Dept: WOUND CARE | Facility: CLINIC | Age: 55
Discharge: HOME | End: 2020-06-11
Attending: SURGERY | Admitting: SURGERY
Payer: MEDICARE

## 2020-06-11 VITALS
RESPIRATION RATE: 20 BRPM | SYSTOLIC BLOOD PRESSURE: 155 MMHG | HEART RATE: 107 BPM | DIASTOLIC BLOOD PRESSURE: 94 MMHG | TEMPERATURE: 96.6 F

## 2020-06-11 DIAGNOSIS — L89.223 PRESSURE ULCER OF UPPER THIGH, LEFT, STAGE III (H): ICD-10-CM

## 2020-06-11 DIAGNOSIS — L89.893 PRESSURE ULCER OF OTHER SITE, STAGE 3 (H): ICD-10-CM

## 2020-06-11 PROCEDURE — 11042 DBRDMT SUBQ TIS 1ST 20SQCM/<: CPT

## 2020-06-11 RX ORDER — CALCIUM CARBONATE 500(1250)
TABLET ORAL
COMMUNITY
Start: 2020-06-09 | End: 2020-09-09

## 2020-06-11 RX ORDER — CEPHALEXIN 500 MG/1
500 CAPSULE ORAL
COMMUNITY
Start: 2020-06-06 | End: 2020-07-16

## 2020-06-11 RX ORDER — ASCORBIC ACID 500 MG
TABLET ORAL
COMMUNITY
Start: 2020-06-02 | End: 2020-09-09

## 2020-06-11 RX ORDER — FOLIC ACID/MV,IRON,MIN/LUTEIN 0.4-18-25
1 TABLET ORAL DAILY
Status: ON HOLD | COMMUNITY
Start: 2020-05-21 | End: 2021-06-25

## 2020-06-11 RX ORDER — CEPHALEXIN 500 MG/1
CAPSULE ORAL
COMMUNITY
Start: 2020-06-06 | End: 2020-07-16

## 2020-06-11 NOTE — DISCHARGE INSTRUCTIONS
[facility]  [address] [address]    Call us at [phone] if you have any questions about your wounds, have redness or swelling around your wound, have a fever of 101 or greater or if you have any other problems or concerns. We answer the phone Monday through Friday 8 am to 4 pm, please leave a message as we check the voicemail frequently throughout the day.     Parth Fountain      1965    [facility] Performing Wound Care:  [facility] Phone: [phone] Fax: [phone]    Wound dressing change right hip  Cleanse wounds with saline or wound cleanser.   Cover wounds with AMD gauze.   Cover wounds with gauze and tape.   Change dressing daily    Wound Dressing recommendation to left thigh ulcer  Irrigate wound with saline or wound cleanser.   Pack wound with AMD gauze   Cover with ABD pad and tape.   Change dressing daily    Sween cream to skin as needed     Bekah Peña M.D.. June 11, 2020    Follow up with Provider - 4 weeks

## 2020-06-18 NOTE — PROGRESS NOTES
Visit Date:   06/11/2020      [facility]      This is a 55-year-old paraplegic gentleman I have known for many years.  I am actually not regularly seeing him anymore since he is really not much of a surgical candidate and he has had small wounds lately.  He has been followed by our physician's assistant, who is currently out on maternity leave, so I am seeing Parth again today.  For the most part, he is receiving his cares from [facility].  He has a small little stage II, kind of in the right hip region, but his main wound, right now is actually on the left lateral thigh distal to the area of previous incisions.  The pocket has quite a bit of undermining and the opening to the wound really is quite small, so I was not able to evaluate it very well other than to say that this is very friable bloody tissue inside.  In order to facilitate dressing changes and make sure that we are not missing anything, a Parth gave his written informed consent for a sharp excisional debridement of this left lateral thigh wound.  The electrocautery was used and this helped to excise skin and subcutaneous tissue to allow easier entry into this very hypertrophic lined pocket.  I actually used a curet and a rongeur and tried to get rid of some of the very friable tissue bloody tissue.  Ultimately, the Bovie was used to cauterize the entire surface of the wound and Surgifoam was used in addition for hemostasis.  I think the easiest thing here certainly is to use a dry AMD gauze for the left thigh and then might as well use it on the right hip as well which was much smaller and just a stage II.  Please see nursing notes for dimensions of the wounds and photos today.  His vitals were also included in their note.  He tolerated the procedure without difficulty or complication and I think he understands very well what we need to do to try and stimulate this to heal.  At this point, we will see Parth back in about a month.  If  his homecare has any issues or concerns, they can contact us.    Labs:   Recent Labs   Lab Test 18  1315  13  0801   ALBUMIN 4.1   < >  --    HGB 16.5   < > 7.7*   INR 1.11   < >  --    WBC 12.1*   < > 9.3   CRP  --   --  52.2*    < > = values in this interval not displayed.     Nutrition requirements were discussed with patient today.  Vitals:  BP (!) 155/94   Pulse 107   Temp 96.6  F (35.9  C) (Temporal)   Resp 20   Wound:     Photo:                    BRAYDEN THOMAS MD             D: 2020   T: 2020   MT:       Name:     NAN DUPREE   MRN:      [id number]        Account:      [id number]   :      1965           Visit Date:   2020      Document: [id number]

## 2020-06-19 ENCOUNTER — TELEPHONE (OUTPATIENT)
Dept: WOUND CARE | Facility: CLINIC | Age: 55
End: 2020-06-19

## 2020-06-19 NOTE — TELEPHONE ENCOUNTER
Patient's home care nurse called, only authorized for 3 visits per week for the patient.  Orders are for daily visits.

## 2020-06-19 NOTE — TELEPHONE ENCOUNTER
Returned call, spoke with Moses.  Advised to teach some one at group home to do dressings or they will need to change the dressings 3 times per week.  They are re certifying the the patient for home care today, so they will assess and take care of the matter.

## 2020-07-16 ENCOUNTER — HOSPITAL ENCOUNTER (OUTPATIENT)
Dept: WOUND CARE | Facility: CLINIC | Age: 55
Discharge: HOME | End: 2020-07-16
Attending: SURGERY | Admitting: SURGERY
Payer: MEDICARE

## 2020-07-16 VITALS
RESPIRATION RATE: 20 BRPM | HEART RATE: 109 BPM | DIASTOLIC BLOOD PRESSURE: 66 MMHG | SYSTOLIC BLOOD PRESSURE: 91 MMHG | TEMPERATURE: 98.3 F

## 2020-07-16 DIAGNOSIS — L89.213 PRESSURE ULCER OF RIGHT HIP, STAGE 3 (H): ICD-10-CM

## 2020-07-16 DIAGNOSIS — L89.223 PRESSURE ULCER OF UPPER THIGH, LEFT, STAGE III (H): ICD-10-CM

## 2020-07-16 PROCEDURE — 97602 WOUND(S) CARE NON-SELECTIVE: CPT

## 2020-07-16 PROCEDURE — 17250 CHEM CAUT OF GRANLTJ TISSUE: CPT

## 2020-07-16 NOTE — DISCHARGE INSTRUCTIONS
[facility]  [address] [address]    Call us at [phone] if you have any questions about your wounds, have redness or  swelling around your wound, have a fever of 101 or greater or if you have any other  problems or concerns. We answer the phone Monday through Friday 8 am to 4 pm,  please leave a message as we check the voicemail frequently throughout the day.    Parth Fountain 1965    [facility] Phone: [phone] Fax: [phone]    Wound dressing change recommendations to right hip and left thigh ulcer  Cleanse wounds with saline or wound cleanser.  Cover wounds with AMD gauze  Cover wounds with ABD pad and tape  Change dressing daily    Sween cream to skin as needed    Bekah Peña M.D. July 16, 2020    Follow up with Renea Martinez PA-C in 4 weeks

## 2020-07-17 NOTE — PROGRESS NOTES
Visit Date:   07/16/2020      [facility] VISIT NOTE      SUBJECTIVE:  This is a 55-year-old paraplegic gentleman who we have known for decades.  He is here today for his followup on a couple wounds, one of the left lateral thigh and the other kind of the right hip crease.  He is status post bilateral G[place] procedures, so he basically has pretty floppy legs, but he seems to do fairly well with that, despite the lack of stability.  He is here today in rare form, probably high on something very, very loquacious and exuberant, which is not typical.  He states that he was unable to get any of the products for wound care that we had ordered at his last visit last month.  It sounds like his Home Health, [facility] care providers did not get anywhere with [employer] either, so they have just been using whatever gauze products they can for the wounds.  He did try using some Calmoseptine for the small areas of the right hip crease, but just gauze for the left lateral thigh.  The left thigh is fairly clean and beefy after a pretty aggressive debridement of a very hypertrophic granular wound.  There is some slight undermining, but otherwise it looks quite clean.  We had ordered AMD for him with some ABD and some Medipore tape and some 4 x 4s and we will try to facilitate another order to [employer] or to his home health service for that.  As far as his right hip crease is concerned, there is some hypertrophic granulation tissue, albeit very small, so this was treated today with some silver nitrate after using topical 4% lidocaine per protocol by our nursing staff.  He tolerated this fine because he has no sensation in that area.  We will continue to order AMD on a daily basis for the left thigh as well as the right hip crease and he can follow-up with our physician's assistant, Renea Martinez, on 08/19 at 10:10 in the morning.  Please see nursing notes for dimensions of the wounds today.  His vitals  were within normal limits, other than a heart rate of 109.  You can also see photos of the wounds in the nursing notes.         BRAYDEN THOMAS MD             D: 2020   T: 2020   MT: DARNELL      Name:     NAN DUPREE   MRN:      [id number]        Account:      [id number]   :      1965           Visit Date:   2020      Document: [id number]

## 2020-08-19 ENCOUNTER — HOSPITAL ENCOUNTER (OUTPATIENT)
Dept: WOUND CARE | Facility: CLINIC | Age: 55
Discharge: HOME | End: 2020-08-19
Attending: PHYSICIAN ASSISTANT | Admitting: PHYSICIAN ASSISTANT
Payer: MEDICARE

## 2020-08-19 VITALS
RESPIRATION RATE: 18 BRPM | SYSTOLIC BLOOD PRESSURE: 151 MMHG | DIASTOLIC BLOOD PRESSURE: 96 MMHG | HEART RATE: 92 BPM | TEMPERATURE: 98.1 F

## 2020-08-19 DIAGNOSIS — L89.213 PRESSURE ULCER OF RIGHT HIP, STAGE 3 (H): ICD-10-CM

## 2020-08-19 DIAGNOSIS — L89.223 PRESSURE ULCER OF UPPER THIGH, LEFT, STAGE III (H): ICD-10-CM

## 2020-08-19 PROCEDURE — 11042 DBRDMT SUBQ TIS 1ST 20SQCM/<: CPT

## 2020-08-19 PROCEDURE — 11042 DBRDMT SUBQ TIS 1ST 20SQCM/<: CPT | Performed by: PHYSICIAN ASSISTANT

## 2020-08-19 RX ORDER — OMEGA-3S/DHA/EPA/FISH OIL/D3 300MG-1000
CAPSULE ORAL
COMMUNITY
Start: 2020-08-01 | End: 2020-09-09
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
## 2020-08-19 NOTE — DISCHARGE INSTRUCTIONS
"[facility]  [address] [facility] [address] [address]    Call us at [phone] if you have any questions about your wounds, have redness or  swelling around your wound, have a fever of 101 or greater or if you have any other  problems or concerns. We answer the phone Monday through Friday 8 am to 4 pm,  please leave a message as we check the voicemail frequently throughout the day.    Parth Fountain 1965    [facility] Phone: [phone] Fax: [phone]    Wound dressing change recommendations to right hip and left lateral thigh ulcers  Cleanse wounds with saline or wound cleanser  Cover wounds with Stacie  Cover wounds with ABD pad and 2\" medipore tape  Change dressing Monday, Wednesday and Friday    Pain scabs on both feet with Betadine, no cover dressing needed. Do Monday, Wednesday and Friday    Suzan Martinez PA-C, August 19, 2020    Follow up with Renea Martinez PA-C in 4 weeks  " Refill requested for Methylphenidate 54 mg    Last fill 7-19-18, #30 with 0 refills.  Send to [facility]    Last med check = 5-4-18  Next appt = 11-6-18

## 2020-09-08 ENCOUNTER — TELEPHONE (OUTPATIENT)
Dept: WOUND CARE | Facility: CLINIC | Age: 55
End: 2020-09-08

## 2020-09-09 ENCOUNTER — APPOINTMENT (OUTPATIENT)
Dept: ULTRASOUND IMAGING | Facility: CLINIC | Age: 55
DRG: 872 | End: 2020-09-09
Attending: EMERGENCY MEDICINE
Payer: MEDICARE

## 2020-09-09 ENCOUNTER — APPOINTMENT (OUTPATIENT)
Dept: ULTRASOUND IMAGING | Facility: CLINIC | Age: 55
DRG: 872 | End: 2020-09-09
Attending: INTERNAL MEDICINE
Payer: MEDICARE

## 2020-09-09 ENCOUNTER — HOSPITAL ENCOUNTER (INPATIENT)
Facility: CLINIC | Age: 55
LOS: 3 days | Discharge: HOME HEALTH | DRG: 872 | End: 2020-09-12
Attending: INTERNAL MEDICINE | Admitting: INTERNAL MEDICINE
Payer: MEDICARE

## 2020-09-09 ENCOUNTER — TELEPHONE (OUTPATIENT)
Dept: WOUND CARE | Facility: CLINIC | Age: 55
End: 2020-09-09

## 2020-09-09 DIAGNOSIS — L89.223 PRESSURE ULCER OF UPPER THIGH, LEFT, STAGE III (H): ICD-10-CM

## 2020-09-09 DIAGNOSIS — L89.213 PRESSURE ULCER OF RIGHT HIP, STAGE 3 (H): ICD-10-CM

## 2020-09-09 DIAGNOSIS — S81.801A OPEN WOUND OF RIGHT LOWER LEG, INITIAL ENCOUNTER: ICD-10-CM

## 2020-09-09 DIAGNOSIS — G82.20 PARALYSIS OF BOTH LOWER LIMBS (H): Primary | ICD-10-CM

## 2020-09-09 DIAGNOSIS — S80.11XA CONTUSION OF MULTIPLE SITES OF RIGHT LOWER EXTREMITY, INITIAL ENCOUNTER: ICD-10-CM

## 2020-09-09 DIAGNOSIS — R50.9 FEVER AND CHILLS: ICD-10-CM

## 2020-09-09 DIAGNOSIS — S82.209S CLOSED FRACTURE OF SHAFT OF TIBIA, UNSPECIFIED FRACTURE MORPHOLOGY, UNSPECIFIED LATERALITY, SEQUELA: ICD-10-CM

## 2020-09-09 DIAGNOSIS — M79.89 SWELLING OF LIMB: ICD-10-CM

## 2020-09-09 DIAGNOSIS — R50.9 HYPERTHERMIA-INDUCED DEFECT: ICD-10-CM

## 2020-09-09 DIAGNOSIS — T14.8XXA HEMATOMA OF SKIN: ICD-10-CM

## 2020-09-09 LAB
ANION GAP SERPL CALCULATED.3IONS-SCNC: 6 MMOL/L (ref 3–14)
APTT PPP: 28 SEC (ref 22–37)
BASOPHILS # BLD AUTO: 0 10E9/L (ref 0–0.2)
BASOPHILS NFR BLD AUTO: 0.3 %
BUN SERPL-MCNC: 16 MG/DL (ref 7–30)
CALCIUM SERPL-MCNC: 10.5 MG/DL (ref 8.5–10.1)
CHLORIDE SERPL-SCNC: 104 MMOL/L (ref 94–109)
CO2 SERPL-SCNC: 26 MMOL/L (ref 20–32)
CREAT SERPL-MCNC: 0.64 MG/DL (ref 0.66–1.25)
DIFFERENTIAL METHOD BLD: ABNORMAL
EOSINOPHIL # BLD AUTO: 0.1 10E9/L (ref 0–0.7)
EOSINOPHIL NFR BLD AUTO: 0.4 %
ERYTHROCYTE [DISTWIDTH] IN BLOOD BY AUTOMATED COUNT: 13.2 % (ref 10–15)
GFR SERPL CREATININE-BSD FRML MDRD: >90 ML/MIN/{1.73_M2}
GLUCOSE SERPL-MCNC: 78 MG/DL (ref 70–99)
HCT VFR BLD AUTO: 37.3 % (ref 40–53)
HGB BLD-MCNC: 12.1 G/DL (ref 13.3–17.7)
IMM GRANULOCYTES # BLD: 0.1 10E9/L (ref 0–0.4)
IMM GRANULOCYTES NFR BLD: 0.8 %
INR PPP: 1.08 (ref 0.86–1.14)
LACTATE BLD-SCNC: 0.7 MMOL/L (ref 0.7–2)
LYMPHOCYTES # BLD AUTO: 1.1 10E9/L (ref 0.8–5.3)
LYMPHOCYTES NFR BLD AUTO: 8 %
MCH RBC QN AUTO: 27.9 PG (ref 26.5–33)
MCHC RBC AUTO-ENTMCNC: 32.4 G/DL (ref 31.5–36.5)
MCV RBC AUTO: 86 FL (ref 78–100)
MONOCYTES # BLD AUTO: 1.1 10E9/L (ref 0–1.3)
MONOCYTES NFR BLD AUTO: 7.8 %
NEUTROPHILS # BLD AUTO: 11.4 10E9/L (ref 1.6–8.3)
NEUTROPHILS NFR BLD AUTO: 82.7 %
NRBC # BLD AUTO: 0 10*3/UL
NRBC BLD AUTO-RTO: 0 /100
PLATELET # BLD AUTO: 502 10E9/L (ref 150–450)
POTASSIUM SERPL-SCNC: 4.3 MMOL/L (ref 3.4–5.3)
RBC # BLD AUTO: 4.34 10E12/L (ref 4.4–5.9)
SODIUM SERPL-SCNC: 136 MMOL/L (ref 133–144)
WBC # BLD AUTO: 13.8 10E9/L (ref 4–11)

## 2020-09-09 PROCEDURE — 85730 THROMBOPLASTIN TIME PARTIAL: CPT | Performed by: INTERNAL MEDICINE

## 2020-09-09 PROCEDURE — 12000001 ZZH R&B MED SURG/OB UMMC

## 2020-09-09 PROCEDURE — 0J9N3ZX DRAINAGE OF RIGHT LOWER LEG SUBCUTANEOUS TISSUE AND FASCIA, PERCUTANEOUS APPROACH, DIAGNOSTIC: ICD-10-PCS | Performed by: EMERGENCY MEDICINE

## 2020-09-09 PROCEDURE — 25000128 H RX IP 250 OP 636: Performed by: EMERGENCY MEDICINE

## 2020-09-09 PROCEDURE — 76882 US LMTD JT/FCL EVL NVASC XTR: CPT | Mod: RT

## 2020-09-09 PROCEDURE — 25000132 ZZH RX MED GY IP 250 OP 250 PS 637: Mod: GY | Performed by: PHYSICIAN ASSISTANT

## 2020-09-09 PROCEDURE — 40000556 ZZH STATISTIC PERIPHERAL IV START W US GUIDANCE

## 2020-09-09 PROCEDURE — 85610 PROTHROMBIN TIME: CPT | Performed by: INTERNAL MEDICINE

## 2020-09-09 PROCEDURE — 99283 EMERGENCY DEPT VISIT LOW MDM: CPT | Mod: Z6 | Performed by: INTERNAL MEDICINE

## 2020-09-09 PROCEDURE — 36415 COLL VENOUS BLD VENIPUNCTURE: CPT | Performed by: INTERNAL MEDICINE

## 2020-09-09 PROCEDURE — 87070 CULTURE OTHR SPECIMN AEROBIC: CPT | Performed by: EMERGENCY MEDICINE

## 2020-09-09 PROCEDURE — 99223 1ST HOSP IP/OBS HIGH 75: CPT | Mod: AI | Performed by: INTERNAL MEDICINE

## 2020-09-09 PROCEDURE — 99207 ZZC APP CREDIT; MD BILLING SHARED VISIT: CPT | Performed by: PHYSICIAN ASSISTANT

## 2020-09-09 PROCEDURE — 93971 EXTREMITY STUDY: CPT | Mod: RT

## 2020-09-09 PROCEDURE — 83605 ASSAY OF LACTIC ACID: CPT | Performed by: INTERNAL MEDICINE

## 2020-09-09 PROCEDURE — 96365 THER/PROPH/DIAG IV INF INIT: CPT | Performed by: INTERNAL MEDICINE

## 2020-09-09 PROCEDURE — 82565 ASSAY OF CREATININE: CPT | Performed by: PHYSICIAN ASSISTANT

## 2020-09-09 PROCEDURE — 85025 COMPLETE CBC W/AUTO DIFF WBC: CPT | Performed by: INTERNAL MEDICINE

## 2020-09-09 PROCEDURE — 99285 EMERGENCY DEPT VISIT HI MDM: CPT | Mod: 25 | Performed by: INTERNAL MEDICINE

## 2020-09-09 PROCEDURE — 25000132 ZZH RX MED GY IP 250 OP 250 PS 637: Mod: GY | Performed by: INTERNAL MEDICINE

## 2020-09-09 PROCEDURE — 36415 COLL VENOUS BLD VENIPUNCTURE: CPT | Performed by: PHYSICIAN ASSISTANT

## 2020-09-09 PROCEDURE — 80048 BASIC METABOLIC PNL TOTAL CA: CPT | Performed by: INTERNAL MEDICINE

## 2020-09-09 RX ORDER — PREGABALIN 75 MG/1
150 CAPSULE ORAL 2 TIMES DAILY
Status: DISCONTINUED | OUTPATIENT
Start: 2020-09-09 | End: 2020-09-12 | Stop reason: HOSPADM

## 2020-09-09 RX ORDER — DOCUSATE SODIUM 100 MG/1
100 CAPSULE, LIQUID FILLED ORAL 2 TIMES DAILY
Status: DISCONTINUED | OUTPATIENT
Start: 2020-09-09 | End: 2020-09-12 | Stop reason: HOSPADM

## 2020-09-09 RX ORDER — PROCHLORPERAZINE MALEATE 10 MG
10 TABLET ORAL EVERY 6 HOURS PRN
Status: DISCONTINUED | OUTPATIENT
Start: 2020-09-09 | End: 2020-09-12 | Stop reason: HOSPADM

## 2020-09-09 RX ORDER — MULTIPLE VITAMINS W/ MINERALS TAB 9MG-400MCG
1 TAB ORAL DAILY
Status: DISCONTINUED | OUTPATIENT
Start: 2020-09-10 | End: 2020-09-12 | Stop reason: HOSPADM

## 2020-09-09 RX ORDER — ASCORBIC ACID 500 MG
500 TABLET ORAL 4 TIMES DAILY
Status: DISCONTINUED | OUTPATIENT
Start: 2020-09-09 | End: 2020-09-12 | Stop reason: HOSPADM

## 2020-09-09 RX ORDER — AMOXICILLIN 250 MG
1 CAPSULE ORAL 2 TIMES DAILY PRN
Status: DISCONTINUED | OUTPATIENT
Start: 2020-09-09 | End: 2020-09-12 | Stop reason: HOSPADM

## 2020-09-09 RX ORDER — TIZANIDINE 2 MG/1
2 TABLET ORAL 3 TIMES DAILY
Status: DISCONTINUED | OUTPATIENT
Start: 2020-09-09 | End: 2020-09-12 | Stop reason: HOSPADM

## 2020-09-09 RX ORDER — PROCHLORPERAZINE 25 MG
25 SUPPOSITORY, RECTAL RECTAL EVERY 12 HOURS PRN
Status: DISCONTINUED | OUTPATIENT
Start: 2020-09-09 | End: 2020-09-12 | Stop reason: HOSPADM

## 2020-09-09 RX ORDER — VANCOMYCIN HYDROCHLORIDE 1 G/200ML
1000 INJECTION, SOLUTION INTRAVENOUS EVERY 12 HOURS
Status: DISCONTINUED | OUTPATIENT
Start: 2020-09-10 | End: 2020-09-11

## 2020-09-09 RX ORDER — DULOXETIN HYDROCHLORIDE 60 MG/1
60 CAPSULE, DELAYED RELEASE ORAL DAILY
Status: DISCONTINUED | OUTPATIENT
Start: 2020-09-10 | End: 2020-09-12 | Stop reason: HOSPADM

## 2020-09-09 RX ORDER — ACETAMINOPHEN 325 MG/1
650 TABLET ORAL EVERY 6 HOURS PRN
Status: DISCONTINUED | OUTPATIENT
Start: 2020-09-09 | End: 2020-09-12 | Stop reason: HOSPADM

## 2020-09-09 RX ORDER — CLINDAMYCIN HCL 300 MG
300 CAPSULE ORAL EVERY 6 HOURS SCHEDULED
Status: DISCONTINUED | OUTPATIENT
Start: 2020-09-10 | End: 2020-09-10

## 2020-09-09 RX ORDER — VANCOMYCIN HYDROCHLORIDE 1 G/200ML
1000 INJECTION, SOLUTION INTRAVENOUS ONCE
Status: COMPLETED | OUTPATIENT
Start: 2020-09-09 | End: 2020-09-09

## 2020-09-09 RX ORDER — CALCIUM CARBONATE 500(1250)
500 TABLET ORAL 3 TIMES DAILY
Status: DISCONTINUED | OUTPATIENT
Start: 2020-09-10 | End: 2020-09-12 | Stop reason: HOSPADM

## 2020-09-09 RX ORDER — ONDANSETRON 2 MG/ML
4 INJECTION INTRAMUSCULAR; INTRAVENOUS EVERY 6 HOURS PRN
Status: DISCONTINUED | OUTPATIENT
Start: 2020-09-09 | End: 2020-09-12 | Stop reason: HOSPADM

## 2020-09-09 RX ORDER — TRAZODONE HYDROCHLORIDE 100 MG/1
100 TABLET ORAL AT BEDTIME
Status: DISCONTINUED | OUTPATIENT
Start: 2020-09-09 | End: 2020-09-12 | Stop reason: HOSPADM

## 2020-09-09 RX ORDER — BACLOFEN 20 MG/1
20 TABLET ORAL 4 TIMES DAILY
Status: DISCONTINUED | OUTPATIENT
Start: 2020-09-09 | End: 2020-09-12 | Stop reason: HOSPADM

## 2020-09-09 RX ORDER — POLYETHYLENE GLYCOL 3350 17 G/17G
17 POWDER, FOR SOLUTION ORAL DAILY PRN
Status: DISCONTINUED | OUTPATIENT
Start: 2020-09-09 | End: 2020-09-12 | Stop reason: HOSPADM

## 2020-09-09 RX ORDER — CLINDAMYCIN HCL 300 MG
300 CAPSULE ORAL ONCE
Status: COMPLETED | OUTPATIENT
Start: 2020-09-09 | End: 2020-09-09

## 2020-09-09 RX ORDER — ONDANSETRON 4 MG/1
4 TABLET, ORALLY DISINTEGRATING ORAL EVERY 6 HOURS PRN
Status: DISCONTINUED | OUTPATIENT
Start: 2020-09-09 | End: 2020-09-12 | Stop reason: HOSPADM

## 2020-09-09 RX ORDER — VANCOMYCIN HYDROCHLORIDE 1 G/200ML
1000 INJECTION, SOLUTION INTRAVENOUS EVERY 12 HOURS
Status: DISCONTINUED | OUTPATIENT
Start: 2020-09-09 | End: 2020-09-09

## 2020-09-09 RX ORDER — NALOXONE HYDROCHLORIDE 0.4 MG/ML
.1-.4 INJECTION, SOLUTION INTRAMUSCULAR; INTRAVENOUS; SUBCUTANEOUS
Status: DISCONTINUED | OUTPATIENT
Start: 2020-09-09 | End: 2020-09-12 | Stop reason: HOSPADM

## 2020-09-09 RX ORDER — ACETAMINOPHEN 325 MG/1
650 TABLET ORAL ONCE
Status: COMPLETED | OUTPATIENT
Start: 2020-09-09 | End: 2020-09-09

## 2020-09-09 RX ORDER — LOPERAMIDE HCL 2 MG
2 CAPSULE ORAL 4 TIMES DAILY PRN
Status: DISCONTINUED | OUTPATIENT
Start: 2020-09-09 | End: 2020-09-12 | Stop reason: HOSPADM

## 2020-09-09 RX ORDER — LIDOCAINE 40 MG/G
CREAM TOPICAL
Status: DISCONTINUED | OUTPATIENT
Start: 2020-09-09 | End: 2020-09-12 | Stop reason: HOSPADM

## 2020-09-09 RX ORDER — VARENICLINE TARTRATE 1 MG/1
1 TABLET, FILM COATED ORAL 2 TIMES DAILY
Status: DISCONTINUED | OUTPATIENT
Start: 2020-09-09 | End: 2020-09-12 | Stop reason: HOSPADM

## 2020-09-09 RX ORDER — AMOXICILLIN 250 MG
2 CAPSULE ORAL 2 TIMES DAILY PRN
Status: DISCONTINUED | OUTPATIENT
Start: 2020-09-09 | End: 2020-09-12 | Stop reason: HOSPADM

## 2020-09-09 RX ADMIN — CLINDAMYCIN HYDROCHLORIDE 300 MG: 300 CAPSULE ORAL at 15:04

## 2020-09-09 RX ADMIN — VANCOMYCIN HYDROCHLORIDE 1000 MG: 1 INJECTION, SOLUTION INTRAVENOUS at 20:33

## 2020-09-09 RX ADMIN — ACETAMINOPHEN 650 MG: 325 TABLET, FILM COATED ORAL at 21:51

## 2020-09-09 ASSESSMENT — MIFFLIN-ST. JEOR
SCORE: 1443.61
SCORE: 1478.53

## 2020-09-09 NOTE — ED NOTES
SIGN-OUT:  - Assumed care of this patient from Dr. Miller   - Pending at shift change: DVT ultrasound  - Tentative plan from original EM Physician: Follow-up on DVT ultrasound, if negative consider treating for cellulitis with clindamycin    UPDATES / REASSESSMENT:  - Results: DVT ultrasound without signs of DVT.  - Reassessment:   -On reassessment patient has an area on the lateral leg that is discrete, fluctuant extending down toward midcalf.  Does not appear to be an abscess, more consistent with seroma however patient does have an elevated white count and low-grade temp of 100.4.  Not particularly warm, red.  Not tender though patient has no sensation.  Obtained a soft tissue ultrasound to better characterize the mass performed, a complex fluid collection hematoma versus abscess; needle aspiration which showed dimas blood.  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      DISPOSITION:  -Admit for concern for infected hematoma.    MD Jenni Calabrese Evan Martin, MD  09/09/20 7501

## 2020-09-09 NOTE — ED TRIAGE NOTES
Pt BIBA from home. R leg swollen, warm to touch this morning. Hx DVT. Pt/staff reports R leg is now cold to touch. Vitals stable en route with EMS.

## 2020-09-09 NOTE — ED NOTES
Bed: ED26  Expected date:   Expected time:   Means of arrival:   Comments:  [address]  56yo M from group home, possible blood clot in leg, paraplegic

## 2020-09-09 NOTE — TELEPHONE ENCOUNTER
[facility] Wound    Who is the name of the provider?:  Michelle      What is the location you see this provider at?: [place]    Reason for call:  Called yesterday wanting a call back from RN re large, new wound on lower right leg.      Can we leave a detailed message on this number?  YES

## 2020-09-09 NOTE — TELEPHONE ENCOUNTER
Spoke to Parth and he says he saw his nurse Shena and she told him to go in to the ED. He describes that his leg is swollen with a big discolored deep blister on his lower leg. I recommended that he go into the ED to get it looked.

## 2020-09-09 NOTE — PHARMACY-VANCOMYCIN DOSING SERVICE
Pharmacy Vancomycin Initial Note  Date of Service 2020  Patient's  1965  55 year old, male    Indication: Skin and Soft Tissue Infection    Current estimated CrCl = Estimated Creatinine Clearance: 110.2 mL/min (A) (based on SCr of 0.64 mg/dL (L)).    Creatinine for last 3 days  2020:  2:24 PM Creatinine 0.64 mg/dL    Recent Vancomycin Level(s) for last 3 days  No results found for requested labs within last 72 hours.      Vancomycin IV Administrations (past 72 hours)      No vancomycin orders with administrations in past 72 hours.                Nephrotoxins and other renal medications (From now, onward)    Start     Dose/Rate Route Frequency Ordered Stop    09/10/20 0800  vancomycin (VANCOCIN) 1000 mg in dextrose 5% 200 mL PREMIX      1,000 mg  200 mL/hr over 1 Hours Intravenous EVERY 12 HOURS 20 1840      20 1840  vancomycin (VANCOCIN) 1000 mg in dextrose 5% 200 mL PREMIX      1,000 mg  200 mL/hr over 1 Hours Intravenous ONCE 20 1839            Contrast Orders - past 72 hours (72h ago, onward)    None                Plan:  1.  Start vancomycin  1000 mg IV q12h.   2.  Goal Trough Level: 10-15 mg/L   3.  Pharmacy will check trough levels as appropriate in 1-3 Days.    4. Serum creatinine levels will be ordered daily for the first week of therapy and at least twice weekly for subsequent weeks.    5. [place] method utilized to dose vancomycin therapy: Method 2    Urbano Leon, TristenD, BCPS
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
## 2020-09-10 ENCOUNTER — APPOINTMENT (OUTPATIENT)
Dept: OCCUPATIONAL THERAPY | Facility: CLINIC | Age: 55
DRG: 872 | End: 2020-09-10
Payer: MEDICARE

## 2020-09-10 ENCOUNTER — APPOINTMENT (OUTPATIENT)
Dept: GENERAL RADIOLOGY | Facility: CLINIC | Age: 55
DRG: 872 | End: 2020-09-10
Attending: PHYSICIAN ASSISTANT
Payer: MEDICARE

## 2020-09-10 ENCOUNTER — APPOINTMENT (OUTPATIENT)
Dept: ULTRASOUND IMAGING | Facility: CLINIC | Age: 55
DRG: 872 | End: 2020-09-10
Attending: STUDENT IN AN ORGANIZED HEALTH CARE EDUCATION/TRAINING PROGRAM
Payer: MEDICARE

## 2020-09-10 LAB
ALBUMIN SERPL-MCNC: 3.3 G/DL (ref 3.4–5)
ALP SERPL-CCNC: 112 U/L (ref 40–150)
ALT SERPL W P-5'-P-CCNC: 22 U/L (ref 0–70)
ANION GAP SERPL CALCULATED.3IONS-SCNC: 7 MMOL/L (ref 3–14)
AST SERPL W P-5'-P-CCNC: 15 U/L (ref 0–45)
BASOPHILS # BLD AUTO: 0.1 10E9/L (ref 0–0.2)
BASOPHILS NFR BLD AUTO: 0.4 %
BILIRUB SERPL-MCNC: 0.9 MG/DL (ref 0.2–1.3)
BUN SERPL-MCNC: 15 MG/DL (ref 7–30)
CALCIUM SERPL-MCNC: 9.7 MG/DL (ref 8.5–10.1)
CHLORIDE SERPL-SCNC: 104 MMOL/L (ref 94–109)
CO2 SERPL-SCNC: 26 MMOL/L (ref 20–32)
CREAT SERPL-MCNC: 0.66 MG/DL (ref 0.66–1.25)
CREAT SERPL-MCNC: 0.72 MG/DL (ref 0.66–1.25)
CRP SERPL-MCNC: 130 MG/L (ref 0–8)
DIFFERENTIAL METHOD BLD: ABNORMAL
EOSINOPHIL # BLD AUTO: 0.1 10E9/L (ref 0–0.7)
EOSINOPHIL NFR BLD AUTO: 1.1 %
ERYTHROCYTE [DISTWIDTH] IN BLOOD BY AUTOMATED COUNT: 13.3 % (ref 10–15)
ERYTHROCYTE [SEDIMENTATION RATE] IN BLOOD BY WESTERGREN METHOD: 68 MM/H (ref 0–20)
GFR SERPL CREATININE-BSD FRML MDRD: >90 ML/MIN/{1.73_M2}
GFR SERPL CREATININE-BSD FRML MDRD: >90 ML/MIN/{1.73_M2}
GLUCOSE SERPL-MCNC: 80 MG/DL (ref 70–99)
HCT VFR BLD AUTO: 34.4 % (ref 40–53)
HGB BLD-MCNC: 11 G/DL (ref 13.3–17.7)
IMM GRANULOCYTES # BLD: 0.1 10E9/L (ref 0–0.4)
IMM GRANULOCYTES NFR BLD: 1 %
LYMPHOCYTES # BLD AUTO: 1.6 10E9/L (ref 0.8–5.3)
LYMPHOCYTES NFR BLD AUTO: 13.4 %
MCH RBC QN AUTO: 28.3 PG (ref 26.5–33)
MCHC RBC AUTO-ENTMCNC: 32 G/DL (ref 31.5–36.5)
MCV RBC AUTO: 88 FL (ref 78–100)
MONOCYTES # BLD AUTO: 1 10E9/L (ref 0–1.3)
MONOCYTES NFR BLD AUTO: 8.4 %
NEUTROPHILS # BLD AUTO: 8.9 10E9/L (ref 1.6–8.3)
NEUTROPHILS NFR BLD AUTO: 75.7 %
NRBC # BLD AUTO: 0 10*3/UL
NRBC BLD AUTO-RTO: 0 /100
PLATELET # BLD AUTO: 458 10E9/L (ref 150–450)
POTASSIUM SERPL-SCNC: 3.7 MMOL/L (ref 3.4–5.3)
PROT SERPL-MCNC: 7.6 G/DL (ref 6.8–8.8)
RBC # BLD AUTO: 3.89 10E12/L (ref 4.4–5.9)
SODIUM SERPL-SCNC: 137 MMOL/L (ref 133–144)
WBC # BLD AUTO: 11.7 10E9/L (ref 4–11)

## 2020-09-10 PROCEDURE — 86140 C-REACTIVE PROTEIN: CPT | Performed by: PHYSICIAN ASSISTANT

## 2020-09-10 PROCEDURE — 25000128 H RX IP 250 OP 636: Performed by: PHYSICIAN ASSISTANT

## 2020-09-10 PROCEDURE — 73590 X-RAY EXAM OF LOWER LEG: CPT | Mod: RT

## 2020-09-10 PROCEDURE — 36415 COLL VENOUS BLD VENIPUNCTURE: CPT | Performed by: STUDENT IN AN ORGANIZED HEALTH CARE EDUCATION/TRAINING PROGRAM

## 2020-09-10 PROCEDURE — G0463 HOSPITAL OUTPT CLINIC VISIT: HCPCS

## 2020-09-10 PROCEDURE — 97530 THERAPEUTIC ACTIVITIES: CPT | Mod: GO | Performed by: OCCUPATIONAL THERAPIST

## 2020-09-10 PROCEDURE — 85652 RBC SED RATE AUTOMATED: CPT | Performed by: PHYSICIAN ASSISTANT

## 2020-09-10 PROCEDURE — 99233 SBSQ HOSP IP/OBS HIGH 50: CPT | Performed by: STUDENT IN AN ORGANIZED HEALTH CARE EDUCATION/TRAINING PROGRAM

## 2020-09-10 PROCEDURE — 85025 COMPLETE CBC W/AUTO DIFF WBC: CPT | Performed by: PHYSICIAN ASSISTANT

## 2020-09-10 PROCEDURE — 80053 COMPREHEN METABOLIC PANEL: CPT | Performed by: PHYSICIAN ASSISTANT

## 2020-09-10 PROCEDURE — 25000132 ZZH RX MED GY IP 250 OP 250 PS 637: Mod: GY | Performed by: PHYSICIAN ASSISTANT

## 2020-09-10 PROCEDURE — 36415 COLL VENOUS BLD VENIPUNCTURE: CPT | Performed by: PHYSICIAN ASSISTANT

## 2020-09-10 PROCEDURE — 87040 BLOOD CULTURE FOR BACTERIA: CPT | Performed by: STUDENT IN AN ORGANIZED HEALTH CARE EDUCATION/TRAINING PROGRAM

## 2020-09-10 PROCEDURE — 97535 SELF CARE MNGMENT TRAINING: CPT | Mod: GO | Performed by: OCCUPATIONAL THERAPIST

## 2020-09-10 PROCEDURE — 12000001 ZZH R&B MED SURG/OB UMMC

## 2020-09-10 PROCEDURE — 93971 EXTREMITY STUDY: CPT | Mod: LT

## 2020-09-10 PROCEDURE — 97165 OT EVAL LOW COMPLEX 30 MIN: CPT | Mod: GO | Performed by: OCCUPATIONAL THERAPIST

## 2020-09-10 PROCEDURE — 25000132 ZZH RX MED GY IP 250 OP 250 PS 637: Mod: GY | Performed by: STUDENT IN AN ORGANIZED HEALTH CARE EDUCATION/TRAINING PROGRAM

## 2020-09-10 RX ORDER — VITAMIN B COMPLEX
25 TABLET ORAL 4 TIMES DAILY
Status: DISCONTINUED | OUTPATIENT
Start: 2020-09-10 | End: 2020-09-12 | Stop reason: HOSPADM

## 2020-09-10 RX ADMIN — Medication 25 MCG: at 15:26

## 2020-09-10 RX ADMIN — TIZANIDINE 2 MG: 2 TABLET ORAL at 15:26

## 2020-09-10 RX ADMIN — TIZANIDINE 2 MG: 2 TABLET ORAL at 09:18

## 2020-09-10 RX ADMIN — CALCIUM 500 MG: 500 TABLET ORAL at 20:18

## 2020-09-10 RX ADMIN — CLINDAMYCIN HYDROCHLORIDE 300 MG: 300 CAPSULE ORAL at 00:16

## 2020-09-10 RX ADMIN — VARENICLINE TARTRATE 1 MG: 1 TABLET, FILM COATED ORAL at 09:18

## 2020-09-10 RX ADMIN — PREGABALIN 150 MG: 75 CAPSULE ORAL at 09:18

## 2020-09-10 RX ADMIN — CALCIUM 500 MG: 500 TABLET ORAL at 09:18

## 2020-09-10 RX ADMIN — BACLOFEN 20 MG: 20 TABLET ORAL at 09:19

## 2020-09-10 RX ADMIN — OXYCODONE HYDROCHLORIDE AND ACETAMINOPHEN 500 MG: 500 TABLET ORAL at 09:19

## 2020-09-10 RX ADMIN — BACLOFEN 20 MG: 20 TABLET ORAL at 11:55

## 2020-09-10 RX ADMIN — BISMUTH SUBSALICYLATE 30 ML: 262 LIQUID ORAL at 10:34

## 2020-09-10 RX ADMIN — PREGABALIN 150 MG: 75 CAPSULE ORAL at 20:17

## 2020-09-10 RX ADMIN — CALCIUM 500 MG: 500 TABLET ORAL at 18:16

## 2020-09-10 RX ADMIN — BACLOFEN 20 MG: 20 TABLET ORAL at 00:16

## 2020-09-10 RX ADMIN — CLINDAMYCIN HYDROCHLORIDE 300 MG: 300 CAPSULE ORAL at 06:33

## 2020-09-10 RX ADMIN — CHOLECALCIFEROL TAB 10 MCG (400 UNIT) 20 MCG: 10 TAB at 09:18

## 2020-09-10 RX ADMIN — LOPERAMIDE HYDROCHLORIDE 2 MG: 2 CAPSULE ORAL at 20:17

## 2020-09-10 RX ADMIN — VANCOMYCIN HYDROCHLORIDE 1000 MG: 1 INJECTION, SOLUTION INTRAVENOUS at 20:16

## 2020-09-10 RX ADMIN — VANCOMYCIN HYDROCHLORIDE 1000 MG: 1 INJECTION, SOLUTION INTRAVENOUS at 09:18

## 2020-09-10 RX ADMIN — Medication 25 MCG: at 18:16

## 2020-09-10 RX ADMIN — ACETAMINOPHEN 650 MG: 325 TABLET, FILM COATED ORAL at 19:16

## 2020-09-10 RX ADMIN — DULOXETINE HYDROCHLORIDE 60 MG: 60 CAPSULE, DELAYED RELEASE ORAL at 09:19

## 2020-09-10 RX ADMIN — VARENICLINE TARTRATE 1 MG: 1 TABLET, FILM COATED ORAL at 00:16

## 2020-09-10 RX ADMIN — ACETAMINOPHEN 650 MG: 325 TABLET, FILM COATED ORAL at 09:23

## 2020-09-10 RX ADMIN — VARENICLINE TARTRATE 1 MG: 1 TABLET, FILM COATED ORAL at 20:17

## 2020-09-10 RX ADMIN — TRAZODONE HYDROCHLORIDE 100 MG: 100 TABLET ORAL at 00:16

## 2020-09-10 RX ADMIN — OXYCODONE HYDROCHLORIDE AND ACETAMINOPHEN 500 MG: 500 TABLET ORAL at 18:16

## 2020-09-10 RX ADMIN — TIZANIDINE 2 MG: 2 TABLET ORAL at 20:16

## 2020-09-10 RX ADMIN — OXYCODONE HYDROCHLORIDE AND ACETAMINOPHEN 500 MG: 500 TABLET ORAL at 00:16

## 2020-09-10 RX ADMIN — OXYCODONE HYDROCHLORIDE AND ACETAMINOPHEN 500 MG: 500 TABLET ORAL at 11:55

## 2020-09-10 RX ADMIN — ASPIRIN 325 MG: 325 TABLET, DELAYED RELEASE ORAL at 09:18

## 2020-09-10 RX ADMIN — CHOLECALCIFEROL TAB 10 MCG (400 UNIT) 20 MCG: 10 TAB at 00:16

## 2020-09-10 RX ADMIN — Medication 25 MCG: at 20:17

## 2020-09-10 RX ADMIN — MULTIPLE VITAMINS W/ MINERALS TAB 1 TABLET: TAB at 09:18

## 2020-09-10 RX ADMIN — BACLOFEN 20 MG: 20 TABLET ORAL at 20:17

## 2020-09-10 RX ADMIN — PREGABALIN 150 MG: 75 CAPSULE ORAL at 00:16

## 2020-09-10 RX ADMIN — TIZANIDINE 2 MG: 2 TABLET ORAL at 00:16

## 2020-09-10 RX ADMIN — OXYCODONE HYDROCHLORIDE AND ACETAMINOPHEN 500 MG: 500 TABLET ORAL at 20:18

## 2020-09-10 RX ADMIN — BACLOFEN 20 MG: 20 TABLET ORAL at 15:26

## 2020-09-10 ASSESSMENT — ACTIVITIES OF DAILY LIVING (ADL)
ADLS_ACUITY_SCORE: 25
ADLS_ACUITY_SCORE: 20
ADLS_ACUITY_SCORE: 16
ADLS_ACUITY_SCORE: 20

## 2020-09-10 ASSESSMENT — MIFFLIN-ST. JEOR: SCORE: 1488.97

## 2020-09-10 NOTE — PROGRESS NOTES
Care Coordinator Progress Note    Admission Date/Time:  9/9/2020  Attending MD:  Blanco Vasquez MD    Data  Chart reviewed, discussed with interdisciplinary team.   Patient was admitted for:    Hematoma of skin  Fever and chills.    Assessment  Patient is a 55yr old male with a prior medical history of paraplegia, colostomy and ilial conduit, alcohol abuse, hx of DVT, chronic pressure ulcers who was admitted for R leg swelling and discoloration. Writer attempted to discuss home services/living arrangement. Patient voiced that he had been really busy today, confirms he is from a group home like setting and requested writer follow up with him tomorrow to further discuss discharge planning. RNCC will continue to follow for discharge planning.      Plan  Anticipated Discharge Date:  TBD  Anticipated Discharge Plan:  TBD, likely return to prior living arrangement.    Rosie Rodriguez, RNCC, BSN    [facility]    Medicine Group  [address] [place] [id number]    [email or web address]    Office: [phone] Pager: [phone]  To contact the weekend RNCC, page [phone].
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
## 2020-09-10 NOTE — PLAN OF CARE
[id number]:  Neuro: A&Ox4.   Activity: Not out of bed. Up with lift. Refuses repositions.   Vitals: VSS ex T-max 100.2.      LDAS: L PIV SL.   Cardiac: WNL. No acute changes.    Respiratory: Denies SOB. Stable on RA.   GI/: Urostomy in place with good urine output. Colostomy in place with brown soft stool.   Skin: R leg wound- skin is dusky and swollen.   Pain: Denied.   Diet: Regular.   Plan: Continue to monitor and follow POC.
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
## 2020-09-10 NOTE — PLAN OF CARE
PT- DEFER.     Discharge Planner PT   Patient plan for discharge: home  Current status: PT consult orders received. Per OT communication pt at baseline for mobility, no skilled inpatient PT needs identified. OT to follow while inpatient.   Barriers to return to prior living situation: no PT barriers  Recommendations for discharge: home w/ OP therapy, defer to OT note  Rationale for recommendations: see above. Will complete orders.        Entered by: Eve Prajapati 09/10/2020 12:37 PM
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
## 2020-09-10 NOTE — PHARMACY-ADMISSION MEDICATION HISTORY
"Admission medication history interview status for the 9/9/2020 admission is complete. See Epic admission navigator for allergy information, pharmacy, prior to admission medications and immunization status.     Medication history interview sources:  patient, surescripts pharmacy filling history    Changes made to PTA medication list (reason)  Added: none  Deleted: none  Changed: Bismuth to every AM (from prn), changed directions for vitamin d, duloxetine, loperamide, MVI, Tizanidine    Additional medication history information (including reliability of information, actions taken by pharmacist):     The patient was very knowledgeable about his medication regimen, and has every indication of being compliant with this regimen.    I removed the \"allergy\" to sertraline as Parth says he used to take and had no problems with the medication.    Parth would like his bismuth subsalicylate scheduled every morning as he takes it as home.      Prior to Admission medications    Medication Sig Last Dose Taking? Auth Provider   ACETAMINOPHEN EXTRA STRENGTH 500 MG tablet 500-1,000 mg every 6 hours as needed for mild pain or fever   at prn Yes Reported, Patient   Ascorbic Acid (VITAMIN C CR PO) Take 500 mg by mouth 4 times daily. 9/9/2020 at Unknown time Yes Reported, Patient   aspirin 325 MG tablet Take 325 mg by mouth daily. 9/9/2020 at Unknown time Yes Reported, Patient   baclofen (LIORESAL) 20 MG tablet Take 20 mg by mouth 4 times daily. 9/9/2020 at Unknown time Yes Reported, Patient   Bismuth Subsalicylate 525 MG/15ML SUSP Take 30 mLs by mouth every morning  9/9/2020 at AM Yes Reported, Patient   calcium carbonate (OS-RIGOBERTO 500 MG [place]. [place]) 500 MG tablet Take 500 mg by mouth 3 times daily  9/9/2020 at Unknown time Yes Reported, Patient   CERTAVITE/ANTIOXIDANTS tablet Take 1 tablet by mouth daily  9/9/2020 at Unknown time Yes Reported, Patient   Cholecalciferol (VITAMIN D PO) Take 400 Int'l Units by mouth 4 times daily  9/9/2020 at " "Unknown time Yes Reported, Patient   DULoxetine (CYMBALTA) 60 MG capsule Take 60 mg by mouth daily  9/9/2020 at Unknown time Yes Reported, Patient   loperamide (IMODIUM) 2 MG capsule Take 2 mg by mouth 4 times daily as needed for other (loose stool (ostomy))   at prn Yes Reported, Patient   LYRICA 150 MG capsule Take 150 mg by mouth 2 times daily  9/9/2020 at Unknown time Yes Reported, Patient   Skin Protectants, Misc. (EUCERIN) cream Apply 0.5 inches topically as needed for dry skin   at prn Yes Reported, Patient   tiZANidine (ZANAFLEX) 2 MG tablet Take 2 mg by mouth 3 times daily  9/9/2020 at Unknown time Yes Reported, Patient   TRAZodone (DESYREL) 100 MG tablet Take 100 mg by mouth At Bedtime. 9/8/2020 at HS Yes Reported, Patient   Varenicline Tartrate (CHANTIX PO) Take 1 mg by mouth 2 times daily  9/9/2020 at Unknown time Yes Reported, Patient   Nutritional Supplements (ENSURE NUTRITION SHAKE) LIQD Take 1 Bottle by mouth 3 times daily (with meals)   Suzan Martinez PA-C   order for [facility] Order Phone [phone] Fax [phone]    Primary Dressing to scrotal wound  Iodosorb   Qty 1 tube  Primary Dressing to left thigh 1/4\" Iodoform packing strip  Qty 1 bottle  Secondary Dressing to scrotal  2x2 gauze   Qty 200 ct loaf  Secondary Dressing 2\" medipore tape Qty 4 rolls  Secondary Dressing to thigh  5x9 ABD pad Qty 30  1 bottle of wound cleanser  Length of Need: 1 month  Frequency of dressing change: every other day   Suzan Martinez PA-C         Medication history completed by:      Guanako Wolf PharmD, [facility]    [phone]  Pager [phone]      "

## 2020-09-10 NOTE — PLAN OF CARE
Discharge Planner OT   Patient plan for discharge: home w/ OP therapy  Current status: pt mod A ADLS supine, VSS thoughout  Barriers to return to prior living situation: medical stauts  Recommendations for discharge: Home w/ OP therapy  Rationale for recommendations:   OP therapy at     [facility]  [address] [place] [address]    With Holli Bruno, OTR/L, ATP  Occupational Therapist   [phone]     To address w/c concerns, pt w/ BLE edema, unable to elevate LEs in current w/c and needs to return to supine to elevate BLE, and to address pt's sore on bottom and adjust cushion, w/c prn.          Entered by: Rosey Etienne 09/10/2020 9:51 AM
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
## 2020-09-10 NOTE — SUMMARY OF CARE
Reason for admission: Open wound of right lower leg  Admitted from:  ED  Report received from: Emi RN   2 RN skin assessment completed by: This writer and Chasity Santana, wound, pressure injury on hip and thigh, bruising, incisions   Bed Algorithm reevaluated: Yes   Was Pulsate ordered?: Yes  Belongings: Aleah pack, shirt, pants.

## 2020-09-11 LAB
ALBUMIN SERPL-MCNC: 3.4 G/DL (ref 3.4–5)
ALBUMIN UR-MCNC: 10 MG/DL
ALP SERPL-CCNC: 120 U/L (ref 40–150)
ALT SERPL W P-5'-P-CCNC: 20 U/L (ref 0–70)
ANION GAP SERPL CALCULATED.3IONS-SCNC: 5 MMOL/L (ref 3–14)
APPEARANCE UR: ABNORMAL
AST SERPL W P-5'-P-CCNC: 18 U/L (ref 0–45)
BACTERIA #/AREA URNS HPF: ABNORMAL /HPF
BASOPHILS # BLD AUTO: 0 10E9/L (ref 0–0.2)
BASOPHILS NFR BLD AUTO: 0.4 %
BILIRUB SERPL-MCNC: 0.5 MG/DL (ref 0.2–1.3)
BILIRUB UR QL STRIP: NEGATIVE
BUN SERPL-MCNC: 18 MG/DL (ref 7–30)
CALCIUM SERPL-MCNC: 10.3 MG/DL (ref 8.5–10.1)
CHLORIDE SERPL-SCNC: 105 MMOL/L (ref 94–109)
CO2 SERPL-SCNC: 28 MMOL/L (ref 20–32)
COLOR UR AUTO: YELLOW
CREAT SERPL-MCNC: 0.58 MG/DL (ref 0.66–1.25)
DIFFERENTIAL METHOD BLD: ABNORMAL
EOSINOPHIL # BLD AUTO: 0.2 10E9/L (ref 0–0.7)
EOSINOPHIL NFR BLD AUTO: 1.5 %
ERYTHROCYTE [DISTWIDTH] IN BLOOD BY AUTOMATED COUNT: 13.2 % (ref 10–15)
GFR SERPL CREATININE-BSD FRML MDRD: >90 ML/MIN/{1.73_M2}
GLUCOSE SERPL-MCNC: 82 MG/DL (ref 70–99)
GLUCOSE UR STRIP-MCNC: NEGATIVE MG/DL
HCT VFR BLD AUTO: 36.9 % (ref 40–53)
HGB BLD-MCNC: 12 G/DL (ref 13.3–17.7)
HGB UR QL STRIP: NEGATIVE
IMM GRANULOCYTES # BLD: 0.1 10E9/L (ref 0–0.4)
IMM GRANULOCYTES NFR BLD: 1 %
KETONES UR STRIP-MCNC: 10 MG/DL
LEUKOCYTE ESTERASE UR QL STRIP: ABNORMAL
LYMPHOCYTES # BLD AUTO: 1.2 10E9/L (ref 0.8–5.3)
LYMPHOCYTES NFR BLD AUTO: 11.5 %
MCH RBC QN AUTO: 28.5 PG (ref 26.5–33)
MCHC RBC AUTO-ENTMCNC: 32.5 G/DL (ref 31.5–36.5)
MCV RBC AUTO: 88 FL (ref 78–100)
MONOCYTES # BLD AUTO: 0.7 10E9/L (ref 0–1.3)
MONOCYTES NFR BLD AUTO: 6.8 %
MUCOUS THREADS #/AREA URNS LPF: PRESENT /LPF
NEUTROPHILS # BLD AUTO: 8.1 10E9/L (ref 1.6–8.3)
NEUTROPHILS NFR BLD AUTO: 78.8 %
NITRATE UR QL: POSITIVE
NRBC # BLD AUTO: 0 10*3/UL
NRBC BLD AUTO-RTO: 0 /100
PH UR STRIP: 6.5 PH (ref 5–7)
PLATELET # BLD AUTO: 489 10E9/L (ref 150–450)
POTASSIUM SERPL-SCNC: 3.9 MMOL/L (ref 3.4–5.3)
PROT SERPL-MCNC: 8.1 G/DL (ref 6.8–8.8)
RBC # BLD AUTO: 4.21 10E12/L (ref 4.4–5.9)
RBC #/AREA URNS AUTO: 0 /HPF (ref 0–2)
SODIUM SERPL-SCNC: 138 MMOL/L (ref 133–144)
SOURCE: ABNORMAL
SP GR UR STRIP: 1.01 (ref 1–1.03)
TRANS CELLS #/AREA URNS HPF: 1 /HPF (ref 0–1)
UROBILINOGEN UR STRIP-MCNC: NORMAL MG/DL (ref 0–2)
VANCOMYCIN SERPL-MCNC: 9.8 MG/L
WBC # BLD AUTO: 10.3 10E9/L (ref 4–11)
WBC #/AREA URNS AUTO: 11 /HPF (ref 0–5)

## 2020-09-11 PROCEDURE — 87186 SC STD MICRODIL/AGAR DIL: CPT | Performed by: STUDENT IN AN ORGANIZED HEALTH CARE EDUCATION/TRAINING PROGRAM

## 2020-09-11 PROCEDURE — 25000132 ZZH RX MED GY IP 250 OP 250 PS 637: Mod: GY | Performed by: PHYSICIAN ASSISTANT

## 2020-09-11 PROCEDURE — 81001 URINALYSIS AUTO W/SCOPE: CPT | Performed by: STUDENT IN AN ORGANIZED HEALTH CARE EDUCATION/TRAINING PROGRAM

## 2020-09-11 PROCEDURE — 87088 URINE BACTERIA CULTURE: CPT | Performed by: STUDENT IN AN ORGANIZED HEALTH CARE EDUCATION/TRAINING PROGRAM

## 2020-09-11 PROCEDURE — 85025 COMPLETE CBC W/AUTO DIFF WBC: CPT | Performed by: STUDENT IN AN ORGANIZED HEALTH CARE EDUCATION/TRAINING PROGRAM

## 2020-09-11 PROCEDURE — 99233 SBSQ HOSP IP/OBS HIGH 50: CPT | Performed by: STUDENT IN AN ORGANIZED HEALTH CARE EDUCATION/TRAINING PROGRAM

## 2020-09-11 PROCEDURE — 12000001 ZZH R&B MED SURG/OB UMMC

## 2020-09-11 PROCEDURE — 80053 COMPREHEN METABOLIC PANEL: CPT | Performed by: STUDENT IN AN ORGANIZED HEALTH CARE EDUCATION/TRAINING PROGRAM

## 2020-09-11 PROCEDURE — 80202 ASSAY OF VANCOMYCIN: CPT | Performed by: STUDENT IN AN ORGANIZED HEALTH CARE EDUCATION/TRAINING PROGRAM

## 2020-09-11 PROCEDURE — 87086 URINE CULTURE/COLONY COUNT: CPT | Performed by: STUDENT IN AN ORGANIZED HEALTH CARE EDUCATION/TRAINING PROGRAM

## 2020-09-11 PROCEDURE — 25000128 H RX IP 250 OP 636: Performed by: PHYSICIAN ASSISTANT

## 2020-09-11 PROCEDURE — 25000132 ZZH RX MED GY IP 250 OP 250 PS 637: Mod: GY | Performed by: STUDENT IN AN ORGANIZED HEALTH CARE EDUCATION/TRAINING PROGRAM

## 2020-09-11 PROCEDURE — 36415 COLL VENOUS BLD VENIPUNCTURE: CPT | Performed by: STUDENT IN AN ORGANIZED HEALTH CARE EDUCATION/TRAINING PROGRAM

## 2020-09-11 RX ORDER — SULFAMETHOXAZOLE/TRIMETHOPRIM 800-160 MG
1 TABLET ORAL 3 TIMES DAILY
Status: DISCONTINUED | OUTPATIENT
Start: 2020-09-11 | End: 2020-09-12 | Stop reason: HOSPADM

## 2020-09-11 RX ORDER — SULFAMETHOXAZOLE AND TRIMETHOPRIM 400; 80 MG/1; MG/1
1 TABLET ORAL 2 TIMES DAILY
Status: DISCONTINUED | OUTPATIENT
Start: 2020-09-11 | End: 2020-09-11

## 2020-09-11 RX ADMIN — Medication 25 MCG: at 19:18

## 2020-09-11 RX ADMIN — DULOXETINE HYDROCHLORIDE 60 MG: 60 CAPSULE, DELAYED RELEASE ORAL at 10:07

## 2020-09-11 RX ADMIN — TIZANIDINE 2 MG: 2 TABLET ORAL at 23:37

## 2020-09-11 RX ADMIN — Medication 25 MCG: at 14:03

## 2020-09-11 RX ADMIN — BACLOFEN 20 MG: 20 TABLET ORAL at 23:37

## 2020-09-11 RX ADMIN — PREGABALIN 150 MG: 75 CAPSULE ORAL at 20:49

## 2020-09-11 RX ADMIN — TRAZODONE HYDROCHLORIDE 100 MG: 100 TABLET ORAL at 00:05

## 2020-09-11 RX ADMIN — PREGABALIN 150 MG: 75 CAPSULE ORAL at 10:06

## 2020-09-11 RX ADMIN — TIZANIDINE 2 MG: 2 TABLET ORAL at 10:08

## 2020-09-11 RX ADMIN — Medication 25 MCG: at 23:37

## 2020-09-11 RX ADMIN — ASPIRIN 325 MG: 325 TABLET, DELAYED RELEASE ORAL at 10:07

## 2020-09-11 RX ADMIN — SULFAMETHOXAZOLE AND TRIMETHOPRIM 1 TABLET: 800; 160 TABLET ORAL at 16:28

## 2020-09-11 RX ADMIN — BACLOFEN 20 MG: 20 TABLET ORAL at 10:07

## 2020-09-11 RX ADMIN — BISMUTH SUBSALICYLATE 30 ML: 262 LIQUID ORAL at 10:05

## 2020-09-11 RX ADMIN — VARENICLINE TARTRATE 1 MG: 1 TABLET, FILM COATED ORAL at 20:49

## 2020-09-11 RX ADMIN — BACLOFEN 20 MG: 20 TABLET ORAL at 19:18

## 2020-09-11 RX ADMIN — ACETAMINOPHEN 650 MG: 325 TABLET, FILM COATED ORAL at 23:59

## 2020-09-11 RX ADMIN — OXYCODONE HYDROCHLORIDE AND ACETAMINOPHEN 500 MG: 500 TABLET ORAL at 10:09

## 2020-09-11 RX ADMIN — CALCIUM 500 MG: 500 TABLET ORAL at 14:03

## 2020-09-11 RX ADMIN — BACLOFEN 20 MG: 20 TABLET ORAL at 14:02

## 2020-09-11 RX ADMIN — TRAZODONE HYDROCHLORIDE 100 MG: 100 TABLET ORAL at 23:38

## 2020-09-11 RX ADMIN — OXYCODONE HYDROCHLORIDE AND ACETAMINOPHEN 500 MG: 500 TABLET ORAL at 23:37

## 2020-09-11 RX ADMIN — OXYCODONE HYDROCHLORIDE AND ACETAMINOPHEN 500 MG: 500 TABLET ORAL at 19:18

## 2020-09-11 RX ADMIN — SULFAMETHOXAZOLE AND TRIMETHOPRIM 1 TABLET: 800; 160 TABLET ORAL at 20:49

## 2020-09-11 RX ADMIN — MULTIPLE VITAMINS W/ MINERALS TAB 1 TABLET: TAB at 10:09

## 2020-09-11 RX ADMIN — CALCIUM 500 MG: 500 TABLET ORAL at 20:48

## 2020-09-11 RX ADMIN — VARENICLINE TARTRATE 1 MG: 1 TABLET, FILM COATED ORAL at 10:08

## 2020-09-11 RX ADMIN — OXYCODONE HYDROCHLORIDE AND ACETAMINOPHEN 500 MG: 500 TABLET ORAL at 14:02

## 2020-09-11 RX ADMIN — CALCIUM 500 MG: 500 TABLET ORAL at 10:06

## 2020-09-11 RX ADMIN — Medication 25 MCG: at 10:08

## 2020-09-11 RX ADMIN — TIZANIDINE 2 MG: 2 TABLET ORAL at 14:03

## 2020-09-11 RX ADMIN — VANCOMYCIN HYDROCHLORIDE 1000 MG: 1 INJECTION, SOLUTION INTRAVENOUS at 10:01

## 2020-09-11 ASSESSMENT — ACTIVITIES OF DAILY LIVING (ADL)
ADLS_ACUITY_SCORE: 16

## 2020-09-11 ASSESSMENT — MIFFLIN-ST. JEOR: SCORE: 1482.62

## 2020-09-11 NOTE — PHARMACY-VANCOMYCIN DOSING SERVICE
Drug Level Evaluation  Patient was receiving Vancomycin .  A level was drawn at 0913 on 9/11. The measured level result is 9.8 mg/L  This medication level is invalid because the medication was discontinued. No further assessment can be made at this time.      Guanako RamonD, [facility]    [phone]  Pager [phone]

## 2020-09-11 NOTE — PROGRESS NOTES
Care Coordinator - Discharge Planning    Admission Date/Time:  9/9/2020  Attending MD:  Blanco Vasquez MD     Data  Date of initial CC assessment:  9/11/2020  Chart reviewed, discussed with interdisciplinary team.   Patient was admitted for:   1. Hematoma of skin    2. Fever and chills         Assessment   Full assessment completed in previous note    Coordination of Care and Referrals:   Patient lives in a group home like setting called [facility] (Phone: [phone], Fax [phone]). Writer spoke with Samanta, [facility] staff member. Samanta confirms that patient can return over the weekend, no time constraints, would like to be updated on the day of discharge. Patient is open to [facility] for skilled nursing visits, resumption order in, they are aware of his hospitalization and potential discharge over the weekend. Patient will require a stretcher ride at discharge, confirmed that the group home is w/c accessible and staff will be there upon his arrival. Discharge medications to be sent to [facility] in [place]. Provider paged at this time to confirm discharge plan, RNCC will continue to follow for discharge planning.     [facility] (RN)  Phone: [phone]  Fax: [phone] Addendum:  Per discussion with the primary provider, patient may be medically ready for discharge tomorrow. Due to medical complexity, a [facility] stretcher ride has been arranged for 2:30pm tomorrow, PCS completed, bedside nurse updated. Weekend CC to assist w/rescheduleding the ride and updating the group home if patient is not medically ready tomorrow.      Plan  Anticipated Discharge Date:  9/12?   Anticipated Discharge Plan:  Return to group home w/resumption of home care services.     Rosie Rodriguez, FERNANDO, BSN    [facility]    Medicine Group  [address] [place] [address]    [email or web address]  [email or web address]    Office: [phone] Pager: [phone]  To  contact the weekend RNCC, page [phone].

## 2020-09-11 NOTE — DISCHARGE INSTRUCTIONS
Discharging nurse, please fax discharge orders to the following:     [facility]: Fax# [phone]   [facility]: Fax# [phone]

## 2020-09-11 NOTE — PLAN OF CARE
"Assumed cares [id number]    /63 (BP Location: Right arm)   Pulse 96   Temp 98.5  F (36.9  C) (Oral)   Resp 18   Ht 1.778 m (5' 10\")   Wt 65.8 kg (145 lb)   SpO2 91%   BMI 20.81 kg/m      Pain: Yes, chronic pain resolved with tylenol  Neuro: A&Ox4  Respiratory: WDL  Cardiac/Neurovascular: WDL  GI/: Urostomy and colostomy. Pt did not want RN to empty colostomy this shift and prefers to manage it independently (as much as possible)   Nutrition: Regular diet, tolerating well. Picks at food and eats slowly  Activity:  Assist x1, lift to transfer, able to turn independently when reminded  Skin: Chronic pressure ulcers, incisions, wounds, PIV  Access: PIV saline locked    Events this shift: No acute events this shift. Retimed trazodone to midnight per patient request    Plan: Continue to monitor and follow plan of care.     "

## 2020-09-11 NOTE — PLAN OF CARE
Neuro: A&Ox4; WDL  GI: colostomy; pt states cares for by self; refused assessment; regular diet  : voids with urostomy into cabrera bag; good output this shift  Resp: VSS on RA; denies SOB; refused resp assessment  Mobility: on pulsate mattress; refused repositioning; able to turn self with minimal help  Cardiac: WDL  Skin: stated wounds on feet open to air; R leg swelling; chronic pressure ulcers on legs; mepilex in place on bottom for blanchable redness; refused skin assessment.  Lines/Drains: L PIV saline locked and patent; urostomy with cabrera bag attached; colostomy (prefers to care for self; refused assessment)  Pain: states upper back pain relieved with repositioning; denied PRN tylenol this shift  Behavior: frustrated but calm  VS: VSS on RA; refused AM vitals    Plan of Care: Trazodone given and pt requested to sleep most of night.  Refused AM vitals and most of RN assessments; continue cares and assessments per provider instruction; monitor for changes.
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
## 2020-09-11 NOTE — PLAN OF CARE
"Assumed cares 0700 to 1900.     /65 (BP Location: Right arm)   Pulse 96   Temp 97.5  F (36.4  C) (Oral)   Resp 18   Ht 1.778 m (5' 10\")   Wt 64.7 kg (142 lb 11.2 oz)   SpO2 98%   BMI 20.48 kg/m       Pain: Reported in back, pt states this is chronic. Admin tylenol x 2.  Neuro: A and O x 4.  Respiratory: Lung sounds clear and equal on RA.  Cardiac/Neurovascular: HR and pulses WDL. Numbness in LEs. LEs have mild edema. RLE dusky in color.  GI/: Bowel sounds active. Colostomy appliance intact, has stool in it but pt refused to allow staff to empty it @ this time. Urostomy appliance intact, adequate UOP.  Nutrition: Good intake.  Activity: Pt turns self in bed. Encouraged pt to shift wx and turn self frequently.  Skin: Small open area on bottom, covered with mepilex per WOC RN recommendation. One wound on R hip and another similar wound on L thigh, per WOC these aren't due to be changed until tomorrow.  Lines: PIV in LUE, saline locked, site WDL.   Events this shift: Podiatry consulted. Xray of R leg done. US of L leg done. WOC saw pt. Pt placed on pulsatte mattress.     Plan: Cont to monitor.    Galilea Senior, RN on 9/10/2020 at 7:00 PM      "

## 2020-09-12 ENCOUNTER — PATIENT OUTREACH (OUTPATIENT)
Dept: CARE COORDINATION | Facility: CLINIC | Age: 55
End: 2020-09-12

## 2020-09-12 VITALS
RESPIRATION RATE: 16 BRPM | OXYGEN SATURATION: 98 % | HEART RATE: 95 BPM | BODY MASS INDEX: 20.79 KG/M2 | HEIGHT: 70 IN | DIASTOLIC BLOOD PRESSURE: 78 MMHG | WEIGHT: 145.2 LBS | TEMPERATURE: 97.7 F | SYSTOLIC BLOOD PRESSURE: 144 MMHG

## 2020-09-12 LAB
ALBUMIN SERPL-MCNC: 3.2 G/DL (ref 3.4–5)
ALP SERPL-CCNC: 113 U/L (ref 40–150)
ALT SERPL W P-5'-P-CCNC: 17 U/L (ref 0–70)
ANION GAP SERPL CALCULATED.3IONS-SCNC: 4 MMOL/L (ref 3–14)
AST SERPL W P-5'-P-CCNC: 16 U/L (ref 0–45)
BASOPHILS # BLD AUTO: 0.1 10E9/L (ref 0–0.2)
BASOPHILS NFR BLD AUTO: 0.5 %
BILIRUB SERPL-MCNC: 0.3 MG/DL (ref 0.2–1.3)
BUN SERPL-MCNC: 21 MG/DL (ref 7–30)
CALCIUM SERPL-MCNC: 9.7 MG/DL (ref 8.5–10.1)
CHLORIDE SERPL-SCNC: 106 MMOL/L (ref 94–109)
CO2 SERPL-SCNC: 28 MMOL/L (ref 20–32)
CREAT SERPL-MCNC: 0.6 MG/DL (ref 0.66–1.25)
DIFFERENTIAL METHOD BLD: ABNORMAL
EOSINOPHIL # BLD AUTO: 0.1 10E9/L (ref 0–0.7)
EOSINOPHIL NFR BLD AUTO: 1.4 %
ERYTHROCYTE [DISTWIDTH] IN BLOOD BY AUTOMATED COUNT: 13.2 % (ref 10–15)
GFR SERPL CREATININE-BSD FRML MDRD: >90 ML/MIN/{1.73_M2}
GLUCOSE SERPL-MCNC: 87 MG/DL (ref 70–99)
HCT VFR BLD AUTO: 36.2 % (ref 40–53)
HGB BLD-MCNC: 11.5 G/DL (ref 13.3–17.7)
IMM GRANULOCYTES # BLD: 0.1 10E9/L (ref 0–0.4)
IMM GRANULOCYTES NFR BLD: 1 %
LABORATORY COMMENT REPORT: NORMAL
LYMPHOCYTES # BLD AUTO: 1.1 10E9/L (ref 0.8–5.3)
LYMPHOCYTES NFR BLD AUTO: 11.9 %
MCH RBC QN AUTO: 27.6 PG (ref 26.5–33)
MCHC RBC AUTO-ENTMCNC: 31.8 G/DL (ref 31.5–36.5)
MCV RBC AUTO: 87 FL (ref 78–100)
MONOCYTES # BLD AUTO: 0.7 10E9/L (ref 0–1.3)
MONOCYTES NFR BLD AUTO: 7.1 %
NEUTROPHILS # BLD AUTO: 7.4 10E9/L (ref 1.6–8.3)
NEUTROPHILS NFR BLD AUTO: 78.1 %
NRBC # BLD AUTO: 0 10*3/UL
NRBC BLD AUTO-RTO: 0 /100
PLATELET # BLD AUTO: 542 10E9/L (ref 150–450)
POTASSIUM SERPL-SCNC: 4 MMOL/L (ref 3.4–5.3)
PROT SERPL-MCNC: 7.7 G/DL (ref 6.8–8.8)
RBC # BLD AUTO: 4.16 10E12/L (ref 4.4–5.9)
SARS-COV-2 RNA SPEC QL NAA+PROBE: NEGATIVE
SARS-COV-2 RNA SPEC QL NAA+PROBE: NORMAL
SODIUM SERPL-SCNC: 137 MMOL/L (ref 133–144)
SPECIMEN SOURCE: NORMAL
SPECIMEN SOURCE: NORMAL
WBC # BLD AUTO: 9.4 10E9/L (ref 4–11)

## 2020-09-12 PROCEDURE — 25000132 ZZH RX MED GY IP 250 OP 250 PS 637: Mod: GY | Performed by: STUDENT IN AN ORGANIZED HEALTH CARE EDUCATION/TRAINING PROGRAM

## 2020-09-12 PROCEDURE — 86769 SARS-COV-2 COVID-19 ANTIBODY: CPT | Performed by: STUDENT IN AN ORGANIZED HEALTH CARE EDUCATION/TRAINING PROGRAM

## 2020-09-12 PROCEDURE — 25000132 ZZH RX MED GY IP 250 OP 250 PS 637: Mod: GY | Performed by: PHYSICIAN ASSISTANT

## 2020-09-12 PROCEDURE — 85025 COMPLETE CBC W/AUTO DIFF WBC: CPT | Performed by: STUDENT IN AN ORGANIZED HEALTH CARE EDUCATION/TRAINING PROGRAM

## 2020-09-12 PROCEDURE — 36415 COLL VENOUS BLD VENIPUNCTURE: CPT | Performed by: STUDENT IN AN ORGANIZED HEALTH CARE EDUCATION/TRAINING PROGRAM

## 2020-09-12 PROCEDURE — 80053 COMPREHEN METABOLIC PANEL: CPT | Performed by: STUDENT IN AN ORGANIZED HEALTH CARE EDUCATION/TRAINING PROGRAM

## 2020-09-12 PROCEDURE — 99239 HOSP IP/OBS DSCHRG MGMT >30: CPT | Performed by: STUDENT IN AN ORGANIZED HEALTH CARE EDUCATION/TRAINING PROGRAM

## 2020-09-12 PROCEDURE — U0003 INFECTIOUS AGENT DETECTION BY NUCLEIC ACID (DNA OR RNA); SEVERE ACUTE RESPIRATORY SYNDROME CORONAVIRUS 2 (SARS-COV-2) (CORONAVIRUS DISEASE [COVID-19]), AMPLIFIED PROBE TECHNIQUE, MAKING USE OF HIGH THROUGHPUT TECHNOLOGIES AS DESCRIBED BY CMS-2020-01-R: HCPCS | Performed by: STUDENT IN AN ORGANIZED HEALTH CARE EDUCATION/TRAINING PROGRAM

## 2020-09-12 RX ORDER — SULFAMETHOXAZOLE/TRIMETHOPRIM 800-160 MG
1 TABLET ORAL 3 TIMES DAILY
Qty: 42 TABLET | Refills: 0 | Status: SHIPPED | OUTPATIENT
Start: 2020-09-12 | End: 2020-11-18

## 2020-09-12 RX ORDER — SULFAMETHOXAZOLE/TRIMETHOPRIM 800-160 MG
1 TABLET ORAL 3 TIMES DAILY
Qty: 42 TABLET | Refills: 0 | Status: SHIPPED | OUTPATIENT
Start: 2020-09-12 | End: 2020-09-12

## 2020-09-12 RX ADMIN — SULFAMETHOXAZOLE AND TRIMETHOPRIM 1 TABLET: 800; 160 TABLET ORAL at 08:45

## 2020-09-12 RX ADMIN — VARENICLINE TARTRATE 1 MG: 1 TABLET, FILM COATED ORAL at 08:45

## 2020-09-12 RX ADMIN — Medication 25 MCG: at 15:25

## 2020-09-12 RX ADMIN — CALCIUM 500 MG: 500 TABLET ORAL at 08:44

## 2020-09-12 RX ADMIN — Medication 25 MCG: at 11:21

## 2020-09-12 RX ADMIN — DULOXETINE HYDROCHLORIDE 60 MG: 60 CAPSULE, DELAYED RELEASE ORAL at 08:44

## 2020-09-12 RX ADMIN — CALCIUM 500 MG: 500 TABLET ORAL at 15:24

## 2020-09-12 RX ADMIN — OXYCODONE HYDROCHLORIDE AND ACETAMINOPHEN 500 MG: 500 TABLET ORAL at 15:25

## 2020-09-12 RX ADMIN — BACLOFEN 20 MG: 20 TABLET ORAL at 11:21

## 2020-09-12 RX ADMIN — OXYCODONE HYDROCHLORIDE AND ACETAMINOPHEN 500 MG: 500 TABLET ORAL at 08:45

## 2020-09-12 RX ADMIN — SULFAMETHOXAZOLE AND TRIMETHOPRIM 1 TABLET: 800; 160 TABLET ORAL at 15:25

## 2020-09-12 RX ADMIN — TIZANIDINE 2 MG: 2 TABLET ORAL at 08:45

## 2020-09-12 RX ADMIN — ACETAMINOPHEN 650 MG: 325 TABLET, FILM COATED ORAL at 11:20

## 2020-09-12 RX ADMIN — OXYCODONE HYDROCHLORIDE AND ACETAMINOPHEN 500 MG: 500 TABLET ORAL at 11:21

## 2020-09-12 RX ADMIN — MULTIPLE VITAMINS W/ MINERALS TAB 1 TABLET: TAB at 08:45

## 2020-09-12 RX ADMIN — PREGABALIN 150 MG: 75 CAPSULE ORAL at 08:45

## 2020-09-12 RX ADMIN — Medication 25 MCG: at 08:46

## 2020-09-12 RX ADMIN — BACLOFEN 20 MG: 20 TABLET ORAL at 15:24

## 2020-09-12 RX ADMIN — BISMUTH SUBSALICYLATE 30 ML: 262 LIQUID ORAL at 08:44

## 2020-09-12 RX ADMIN — ASPIRIN 325 MG: 325 TABLET, DELAYED RELEASE ORAL at 08:44

## 2020-09-12 ASSESSMENT — ACTIVITIES OF DAILY LIVING (ADL)
ADLS_ACUITY_SCORE: 16

## 2020-09-12 ASSESSMENT — MIFFLIN-ST. JEOR: SCORE: 1489.87

## 2020-09-12 ASSESSMENT — PAIN DESCRIPTION - DESCRIPTORS: DESCRIPTORS: ACHING;HEADACHE
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
## 2020-09-12 NOTE — PLAN OF CARE
"Assumed cares 1900 to 0700    /75 (BP Location: Right arm)   Pulse 78   Temp 98.9  F (37.2  C) (Oral)   Resp 18   Ht 1.778 m (5' 10\")   Wt 65.1 kg (143 lb 9.6 oz)   SpO2 97%   BMI 20.60 kg/m      Neuros: A&o x4.   Cardiac: WNL.   Resp: WNL on RA.   GI/: Urostomy & colostomy bags, pt refused assessment, appliance change & drainage this shift, stated that he will do it in the daytime.  Nutrition: Regular diet, good appetite.   IV/Drains: PIV SL.   Pain: Back, tylenol given 1x.   Skin: Mepilex in coccyx, right leg swelling, scabs on both feet, pt refused full skin assessment & wound cares.  Activity: Paraplegic. Pt refused repositioning, stated that he is able to shift in bed independently.     Plan of care: Discharge today at 2:30 pm back to group home.       "

## 2020-09-12 NOTE — PLAN OF CARE
Major Shift Events: VSS on room air. A&Ox4. Assist of one for cares. Wound care done per WOC,RN recs. Bed bath given. Fair appetite. Skipped breakfast. Ate majority of lunch tray. Ostomy intact. Urostomy draining yellow urine. UA sent. No complaints of pain. Will report to oncoming staff.   Plan: Tentative discharge tomorrow 9/12/20  *For vital signs and complete assessments, please see documentation flowsheets.
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
## 2020-09-13 NOTE — PROGRESS NOTES
Care Coordinator - Discharge Planning    Admission Date/Time:  9/9/2020  Attending MD:  Xin att. providers found     Data  Chart reviewed, discussed with interdisciplinary team.   Patient was admitted for:   1. Paralysis of both lower limbs (H)    2. Hematoma of skin    3. Fever and chills    4. Pressure ulcer of right hip, stage 3 (H)    5. Pressure ulcer of upper thigh, left, stage III (H)    6. Open wound of right lower leg, initial encounter    7. Closed fracture of shaft of tibia, unspecified fracture morphology, unspecified laterality, sequela         Received VM from AMADOU Love DON that when pt discharged he did not receive his antibiotic prescription.       Spoke with [facility] who confirmed we could carrier to the patient however pt would be charged $50  fee.   Reviewed that it was our mistake for not sending it with the patient.  She will discuss with her manager.    Spoke with AMADOU Love.  She confirmed she was able to secure the prescription from pt ;normal pharmacy last night but needs our pharmacy to release the prescription so the patient insurance can be billed.  Updated  [facility].     AMADOU called [facility] and asked orders to be faxed. RN can be reached at [phone].   left for RN.  ORders faxed to [employer] [phone]    Shae Avilez, RN BSN, PHN, ACM-RN  RN Care Coordinator  Internal Medicine    9/13/2020 8:26 AM

## 2020-09-14 LAB
BACTERIA SPEC CULT: NO GROWTH
SPECIMEN SOURCE: NORMAL

## 2020-09-14 NOTE — PLAN OF CARE
Occupational Therapy Discharge Summary    Reason for therapy discharge:    Discharged to home with home therapy.    Progress towards therapy goal(s). See goals on Care Plan in [facility] electronic health record for goal details.  Goals partially met.  Barriers to achieving goals:   discharge from facility.    Therapy recommendation(s):    Continued therapy is recommended.  Rationale/Recommendations:  To maximize ADL IND and safety.

## 2020-09-15 LAB
COVID-19 SPIKE RBD ABY TITER: NORMAL
COVID-19 SPIKE RBD ABY: NEGATIVE

## 2020-09-15 NOTE — PROGRESS NOTES
Attempted post discharge call  No answer and no voice mail box  No further calls were made at this time

## 2020-09-16 ENCOUNTER — HOSPITAL ENCOUNTER (OUTPATIENT)
Dept: WOUND CARE | Facility: CLINIC | Age: 55
Discharge: HOME | End: 2020-09-16
Attending: PHYSICIAN ASSISTANT | Admitting: PHYSICIAN ASSISTANT
Payer: MEDICARE

## 2020-09-16 VITALS
SYSTOLIC BLOOD PRESSURE: 160 MMHG | DIASTOLIC BLOOD PRESSURE: 103 MMHG | RESPIRATION RATE: 20 BRPM | TEMPERATURE: 97.2 F | HEART RATE: 132 BPM

## 2020-09-16 DIAGNOSIS — L89.223 PRESSURE ULCER OF UPPER THIGH, LEFT, STAGE III (H): ICD-10-CM

## 2020-09-16 DIAGNOSIS — L89.213 PRESSURE ULCER OF RIGHT HIP, STAGE 3 (H): ICD-10-CM

## 2020-09-16 LAB
BACTERIA SPEC CULT: ABNORMAL
BACTERIA SPEC CULT: NO GROWTH
BACTERIA SPEC CULT: NO GROWTH
Lab: ABNORMAL
SPECIMEN SOURCE: ABNORMAL
SPECIMEN SOURCE: NORMAL
SPECIMEN SOURCE: NORMAL

## 2020-09-16 PROCEDURE — 17250 CHEM CAUT OF GRANLTJ TISSUE: CPT | Performed by: PHYSICIAN ASSISTANT

## 2020-09-16 PROCEDURE — 17250 CHEM CAUT OF GRANLTJ TISSUE: CPT

## 2020-09-16 RX ORDER — CALCIUM CARBONATE 500(1250)
TABLET ORAL
COMMUNITY
Start: 2020-08-31 | End: 2020-12-16

## 2020-09-16 RX ORDER — ASCORBIC ACID 500 MG
TABLET ORAL
COMMUNITY
Start: 2020-08-31 | End: 2021-06-19

## 2020-09-16 RX ORDER — OMEGA-3S/DHA/EPA/FISH OIL/D3 300MG-1000
CAPSULE ORAL
COMMUNITY
Start: 2020-08-31 | End: 2021-02-26
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
## 2020-10-13 ENCOUNTER — TELEPHONE (OUTPATIENT)
Dept: WOUND CARE | Facility: CLINIC | Age: 55
End: 2020-10-13
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
## 2020-10-13 NOTE — TELEPHONE ENCOUNTER
M for the nurse Rafaela and asked her to send pics of his new foot wounds and we can make a plan if they are granular she can use the cassandra and cover with gauze. Left phone number [phone] to text pictures or she can call and we can talk.

## 2020-10-14 ASSESSMENT — ENCOUNTER SYMPTOMS
FEVER: 1
COLOR CHANGE: 1

## 2020-10-19 NOTE — TELEPHONE ENCOUNTER
Returned call to Rafaela who saw patient this morning and applied Stacie and ABD pads to wound beds and they look better today than when she initiated call on the 13th. Patient has a telephone visit in two days with Suzan Martinez PA-C.

## 2020-10-21 ENCOUNTER — HOSPITAL ENCOUNTER (OUTPATIENT)
Dept: WOUND CARE | Facility: CLINIC | Age: 55
End: 2020-10-21
Attending: PHYSICIAN ASSISTANT
Payer: MEDICARE

## 2020-10-21 DIAGNOSIS — S31.109S RIGHT GROIN WOUND, SEQUELA: ICD-10-CM

## 2020-10-21 DIAGNOSIS — L89.223 PRESSURE ULCER OF UPPER THIGH, LEFT, STAGE III (H): ICD-10-CM

## 2020-10-21 PROBLEM — S81.802A OPEN WOUND OF LEFT LOWER LEG: Status: ACTIVE | Noted: 2020-09-09

## 2020-10-21 PROCEDURE — 99441 PR PHYSICIAN TELEPHONE EVALUATION 5-10 MIN: CPT | Mod: 95 | Performed by: PHYSICIAN ASSISTANT

## 2020-10-21 RX ORDER — IBUPROFEN 200 MG
1 CAPSULE ORAL EVERY 8 HOURS
Status: ON HOLD | COMMUNITY
Start: 2020-06-09 | End: 2021-06-25
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
## 2020-10-21 NOTE — DISCHARGE INSTRUCTIONS
"[facility]  [address] [facility] [address], [facility] [address]    Call us at [phone] if you have any questions about your wounds, have redness or  swelling around your wound, have a fever of 101 or greater or if you have any other  problems or concerns. We answer the phone Monday through Friday 8 am to 4 pm,  please leave a message as we check the voicemail frequently throughout the day.    Parth Fountain 1965    [facility] Phone: [phone] Fax: [phone]    Wound dressing change recommendations to open areas on lower extremities:  Cleanse wounds with saline or wound cleanser  Cover wounds with Stacie  Cover wounds with ABD pad and 2\" medipore tape  Change dressing Monday, Wednesday and Friday    May use Betadine on any scabs on toes    May use Calmoseptine or Stacie to right groin/hip ulcer, cover with gauze and tape, change Monday, Wednesday and Friday    Suzan Martinez PA-C, October 21, 2020    Follow up with Renea Martinez PA-C as scheduled  "

## 2020-11-18 ENCOUNTER — HOSPITAL ENCOUNTER (OUTPATIENT)
Dept: WOUND CARE | Facility: CLINIC | Age: 55
Discharge: HOME | End: 2020-11-18
Attending: PHYSICIAN ASSISTANT | Admitting: PHYSICIAN ASSISTANT
Payer: MEDICARE

## 2020-11-18 VITALS
SYSTOLIC BLOOD PRESSURE: 105 MMHG | DIASTOLIC BLOOD PRESSURE: 67 MMHG | HEART RATE: 147 BPM | TEMPERATURE: 99.8 F | RESPIRATION RATE: 20 BRPM

## 2020-11-18 DIAGNOSIS — S81.802D OPEN WOUND OF LEFT LOWER LEG, SUBSEQUENT ENCOUNTER: ICD-10-CM

## 2020-11-18 PROCEDURE — 11042 DBRDMT SUBQ TIS 1ST 20SQCM/<: CPT | Performed by: PHYSICIAN ASSISTANT

## 2020-11-18 RX ORDER — VARENICLINE TARTRATE 1 MG/1
1 TABLET, FILM COATED ORAL 2 TIMES DAILY
Status: ON HOLD | COMMUNITY
Start: 2020-10-30 | End: 2021-09-10

## 2020-11-18 RX ORDER — LOPERAMIDE HCL 2 MG
4 CAPSULE ORAL AT BEDTIME
COMMUNITY
Start: 2020-11-13 | End: 2024-01-02
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
## 2020-11-18 NOTE — DISCHARGE INSTRUCTIONS
"[facility]  [address] [facility] [address], [facility] [address]    Call us at [phone] if you have any questions about your wounds, have redness or  swelling around your wound, have a fever of 101 or greater or if you have any other  problems or concerns. We answer the phone Monday through Friday 8 am to 4 pm,  please leave a message as we check the voicemail frequently throughout the day.    Parth Fountain 1965    [employer] [facility] Phone: [phone] Fax: [phone]    Wound dressing change recommendations to open areas on lower extremities:  Cleanse wounds with saline or wound cleanser  Cover wounds with Stacie  Cover wounds with ABD pad and 2\" medipore tape  Change dressing Monday, Wednesday and Friday    May use Betadine on any scabs on toes    May use Calmoseptine or Stacie to right groin/hip ulcer, cover with gauze and tape,  change Monday, Wednesday and Friday    Suzan Martinez PA-C, November 18, 2020    Follow up with Renea Martinez PA-C as scheduled  "

## 2020-12-09 ENCOUNTER — TELEPHONE (OUTPATIENT)
Dept: WOUND CARE | Facility: CLINIC | Age: 55
End: 2020-12-09

## 2020-12-09 NOTE — TELEPHONE ENCOUNTER
"[facility]    Who is the name of the provider?:  Michelle       What is the location you see this provider at?: [place]    Reason for call:  Needs to reorder wound care supplies today or tomorrow.  Need ICD10 codes more specific than \"unspecified open wound on right and left lower legs.\"    Can we leave a detailed message on this number?  YES       "

## 2020-12-09 NOTE — TELEPHONE ENCOUNTER
1. Stage III pressure ulcer of right hip  2. Full-thickness traumatic ulcerations of left lower leg and toes with fat-layer exposed    Called Shena LEY RN with [facility] and gave her the diagnosis listed in the note. And told her the cause. So she can order supplies. Told her she can call back.

## 2020-12-10 ENCOUNTER — TELEPHONE (OUTPATIENT)
Dept: WOUND CARE | Facility: CLINIC | Age: 55
End: 2020-12-10

## 2020-12-10 NOTE — TELEPHONE ENCOUNTER
Returned call to Shena to let her know that  S81.812A - Laceration without foreign body would be an appropriate ICD 10 code to use.

## 2020-12-10 NOTE — TELEPHONE ENCOUNTER
[facility]    Who is the name of the provider?:  Michelle      What is the location you see this provider at?: [place]    Reason for call:  Code given yesterday is the same code they already have which does not work.  Does the provider agree with this diagnosis code:  S81.812A - Laceration without foreign body    Can we leave a detailed message on this number?  YES

## 2020-12-15 NOTE — ADDENDUM NOTE
Encounter addended by: Ana M Cosme RN on: 12/15/2020 11:28 AM   Actions taken: Flowsheet data copied forward, LDA properties accepted, Flowsheet accepted, LDA removed

## 2020-12-16 ENCOUNTER — HOSPITAL ENCOUNTER (OUTPATIENT)
Dept: WOUND CARE | Facility: CLINIC | Age: 55
End: 2020-12-16
Attending: PHYSICIAN ASSISTANT
Payer: MEDICARE

## 2020-12-16 DIAGNOSIS — S31.109S RIGHT GROIN WOUND, SEQUELA: ICD-10-CM

## 2020-12-16 DIAGNOSIS — S81.802D OPEN WOUND OF LEFT LOWER LEG, SUBSEQUENT ENCOUNTER: ICD-10-CM

## 2020-12-16 PROCEDURE — 99213 OFFICE O/P EST LOW 20 MIN: CPT | Mod: 95 | Performed by: PHYSICIAN ASSISTANT

## 2020-12-16 RX ORDER — DIPHENHYD/PHENYLEPH/ACETAMINOP 12.5-5-325
TABLET ORAL
COMMUNITY
Start: 2019-11-07

## 2020-12-16 RX ORDER — SULFAMETHOXAZOLE/TRIMETHOPRIM 800-160 MG
TABLET ORAL
COMMUNITY
Start: 2020-09-12 | End: 2021-06-19

## 2020-12-16 NOTE — DISCHARGE INSTRUCTIONS
"[facility]  [address] [facility] [address] [address]    Call us at [phone] if you have any questions about your wounds, have redness or  swelling around your wound, have a fever of 101 or greater or if you have any other  problems or concerns. We answer the phone Monday through Friday 8 am to 4 pm,  please leave a message as we check the voicemail frequently throughout the day.    Parth Fountain 1965    [facility] Phone: [phone] Fax: [phone]    Wound dressing change recommendations to open areas on lower extremities and right groin  Cleanse wounds with saline or wound cleanser  Cover wounds with Silvercel  Cover wounds with ABD pad and 2\" medipore tape  Change dressing Monday, Wednesday and Friday    Suzan Martinez PA-C, December 16, 2020    Follow up as scheduled  "
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
## 2020-12-16 NOTE — PROGRESS NOTES
Patient evaluated during video visit. Certified Wound Care Nurse time spent evaluating patient record, completed a full evaluation and documented wound(s) & sandra-wound skin; provided recommendation based on treatment plan. Reviewed discharge instructions, patient education, and discussed plan of care with appropriate medical team staff members and patient and/or family members.

## 2021-01-13 ENCOUNTER — HOSPITAL ENCOUNTER (OUTPATIENT)
Dept: WOUND CARE | Facility: CLINIC | Age: 56
Discharge: HOME | End: 2021-01-13
Attending: PHYSICIAN ASSISTANT | Admitting: PHYSICIAN ASSISTANT
Payer: MEDICARE

## 2021-01-13 VITALS
DIASTOLIC BLOOD PRESSURE: 94 MMHG | HEART RATE: 105 BPM | TEMPERATURE: 98.9 F | SYSTOLIC BLOOD PRESSURE: 153 MMHG | RESPIRATION RATE: 20 BRPM

## 2021-01-13 DIAGNOSIS — S81.802S OPEN WOUND OF LEFT LOWER LEG, SEQUELA: ICD-10-CM

## 2021-01-13 DIAGNOSIS — S81.802D OPEN WOUND OF LEFT LOWER LEG, SUBSEQUENT ENCOUNTER: ICD-10-CM

## 2021-01-13 DIAGNOSIS — S31.109S RIGHT GROIN WOUND, SEQUELA: ICD-10-CM

## 2021-01-13 PROCEDURE — 17250 CHEM CAUT OF GRANLTJ TISSUE: CPT | Performed by: PHYSICIAN ASSISTANT

## 2021-01-13 PROCEDURE — 17250 CHEM CAUT OF GRANLTJ TISSUE: CPT

## 2021-01-13 NOTE — DISCHARGE INSTRUCTIONS
"[facility]  [address], [facility] [address]    Call us at [phone] if you have any questions about your wounds, have redness or  swelling around your wound, have a fever of 101 or greater or if you have any other  problems or concerns. We answer the phone Monday through Friday 8 am to 4 pm,  please leave a message as we check the voicemail frequently throughout the day.    Parth Fountain 1965    [facility] Phone: [phone] Fax: [phone]    Wound dressing change recommendations to right lateral lower leg and left lower leg ulcers  Cleanse wounds with saline or wound cleanser  Cover wounds with Xeroform  Cover wounds with ABD pad and 2\" medipore tape  Change dressing Monday, Wednesday and Friday    Wound dressing change to right groin  Cleanse wound with saline or wound cleanser  Cover wound with Silvercel  Cover with gauze and 2\" medipore tape  Change Monday, Wednesday and Friday    Suzan Martinez PA-C, January 13, 2021    Follow up as scheduled  "

## 2021-01-13 NOTE — PROGRESS NOTES
HISTORY OF PRESENT ILLNESS:   Mr. Parth Fountain is a 55-year-old paraplegic gentleman who returns to us today to follow-up on chronic pelvic pressure ulcers as well as lower leg wounds. PMHx of DVT, chronic UTI, EtOh abuse, and s/p colostomy. He has a long history of pressure ulcers with subsequent corrective surgeries by Dr. Peña. Parth's pelvic wounds have been very difficult to heal due to his surgical history. He has very little tissue overlying his bone and it is mostly made up of scar tissue. His progress waxes and wanes.    10/21/20: Televisit. Thigh wound healed. Scraped left leg and foot during a transfer and has several shallow ulcers on left lower leg and toes. Has an ulcer in his right groin/hip crease. This waxes and wanes. Uses Stacie when open and calmoseptine when more closed, this is working well for him.   11/18/20: Return visit. All of his wounds have improved. Has new traumatic wounds on bilateral shins.   12/16/20: Returns via video visit. Shin wounds are improving but very friable and hypergranular. Hip crease slightly more open today, also appears friable. Has been using Stacie to all wounds.   01/13/21: Returns for in office visit.  Leg wounds are improving with use of calcium alginate.  However, Parth notes that the dressings are sticking to the wounds and causing bleeding when removed.  His right groin wound is now quite hypertrophic.     OFFLOADING: On a Group 2 mattress. Still utilizing RoHo cushion on his wheelchair.      DRESSINGS: Dressing all wounds with calcium alginate      SOCIAL HISTORY:  Lives in a group home. He is still receiving home health care through [facility]. that comes and does dressing changes daily.      PHYSICAL EXAMINATION   GENERAL:  Parth is alert and oriented and in no acute distress.  Wound (used by [facility] only) 11/18/20 1234 Left pressure injury (Active)   Dressing Appearance moist drainage 01/13/21 1000   Length (cm) 1 01/13/21 1000   Width (cm) 2  01/13/21 1000   Depth (cm) 0.2 01/13/21 1000   Wound (cm^2) 2 cm^2 01/13/21 1000   Wound Volume (cm^3) 0.4 cm^3 01/13/21 1000   Wound healing % 25.93 01/13/21 1000   Drainage Characteristics/Odor yellow 01/13/21 1000   Drainage Amount moderate 01/13/21 1000            PROCEDURE: After verbal consent was obtained, hypergranulation tissue was treated with silver nitrate. Patient tolerated this well.     ASSESSMENT:    1. Stage III pressure ulcer of right hip  2. Full-thickness traumatic ulcerations of bilateral lower leg and toes with fat-layer exposed      PLAN:    1. Dress all leg wounds with Xeroform and cover dressing of choice  2. Dress right groin wound with calcium alginate and cover dressing of choice     FOLLOWUP:  6 weeks       TIFFANIE SINGH PA-C

## 2021-02-26 ENCOUNTER — HOSPITAL ENCOUNTER (OUTPATIENT)
Dept: WOUND CARE | Facility: CLINIC | Age: 56
Discharge: HOME | End: 2021-02-26
Attending: PHYSICIAN ASSISTANT | Admitting: PHYSICIAN ASSISTANT
Payer: MEDICARE

## 2021-02-26 VITALS — DIASTOLIC BLOOD PRESSURE: 94 MMHG | TEMPERATURE: 98.2 F | HEART RATE: 108 BPM | SYSTOLIC BLOOD PRESSURE: 167 MMHG

## 2021-02-26 DIAGNOSIS — S81.802S OPEN WOUND OF LEFT LOWER LEG, SEQUELA: Primary | ICD-10-CM

## 2021-02-26 DIAGNOSIS — S31.109S RIGHT GROIN WOUND, SEQUELA: ICD-10-CM

## 2021-02-26 DIAGNOSIS — S91.101S: ICD-10-CM

## 2021-02-26 PROCEDURE — 17250 CHEM CAUT OF GRANLTJ TISSUE: CPT | Performed by: PHYSICIAN ASSISTANT

## 2021-02-26 PROCEDURE — 17250 CHEM CAUT OF GRANLTJ TISSUE: CPT

## 2021-02-26 RX ORDER — CALCIUM CARBONATE 500(1250)
TABLET ORAL
COMMUNITY
Start: 2021-01-28 | End: 2021-06-19

## 2021-02-26 NOTE — DISCHARGE INSTRUCTIONS
"[facility]  [address] [facility] [address], [place] [address]     Call us at [phone] if you have any questions about your wounds, have redness or swelling around your wound, have a fever of 101 or greater or if you have any other problems or concerns. We answer the phone Monday through Friday 8 am to 4 pm, please leave a message as we check the voicemail frequently throughout the day.    Parth Fountain 1965  [employer] Phone: [phone] Fax: [phone]    Please call [facility] [phone] ([address] [place], [place]) to schedule your xray appointment if you have not heard from them in two business days.    Wound dressing change recommendations to right anterior Hip; Left lower leg ulcers and all foot and toe ulcers  Cleanse wounds with saline or wound cleanser  Cover wounds with Iodosorb gel  Cover wounds with gauze or ABD pad and secure with 2\" medipore tape  Change dressing Monday, Wednesday and Friday    Suzan Martinez PA-C, February 26, 2021  Follow up as scheduled  "

## 2021-03-16 NOTE — PROGRESS NOTES
HISTORY OF PRESENT ILLNESS:   Mr. Parth Fountain is a 55-year-old paraplegic gentleman who returns to us today to follow-up on chronic pelvic pressure ulcers as well as lower leg wounds. PMHx of DVT, chronic UTI, EtOh abuse, and s/p colostomy. He has a long history of pressure ulcers with subsequent corrective surgeries by Dr. Peña. Parth's pelvic wounds have been very difficult to heal due to his surgical history. He has very little tissue overlying his bone and it is mostly made up of scar tissue. His progress waxes and wanes.    10/21/20: Televisit. Thigh wound healed. Scraped left leg and foot during a transfer and has several shallow ulcers on left lower leg and toes. Has an ulcer in his right groin/hip crease. This waxes and wanes. Uses Stacie when open and calmoseptine when more closed, this is working well for him.   11/18/20: Return visit. All of his wounds have improved. Has new traumatic wounds on bilateral shins.   12/16/20: Returns via video visit. Shin wounds are improving but very friable and hypergranular. Hip crease slightly more open today, also appears friable. Has been using Stacie to all wounds.   01/13/21: Returns for in office visit.  Leg wounds are improving with use of calcium alginate.  However, Parth notes that the dressings are sticking to the wounds and causing bleeding when removed.  His right groin wound is now quite hypertrophic.  2/26/21: Returns for follow-up. Reports that he continues to reinjure his R lower leg. Hip wound is not really improving. Has new wound on R great toe with exposed bone.      OFFLOADING: On a Group 2 mattress. Still utilizing RoHo cushion on his wheelchair.      DRESSINGS: Dressing all wounds with calcium alginate      SOCIAL HISTORY:  Lives in a group home. He is still receiving home health care through [facility]. that comes and does dressing changes daily.      PHYSICAL EXAMINATION   GENERAL:  Parth is alert and oriented and in no acute  distress.  See wound care flowsheet for detailed exam and wound measurements.         PROCEDURE: After verbal consent was obtained, hypergranulation tissue was treated with silver nitrate. Patient tolerated this well.     ASSESSMENT:    1. Stage III pressure ulcer of right hip  2. Stage 4 pressure ulcer of R great toe  3. Full-thickness traumatic ulcerations of bilateral lower leg and toes with fat-layer exposed      PLAN:    1. Dress all leg wounds with Iodosorb and cover dressing of choice  2. X-ray of R great toe - suspect osteomyelitis     FOLLOWUP:  6 weeks     TIFFANIE SINGH PA-C

## 2021-04-09 ENCOUNTER — HOSPITAL ENCOUNTER (OUTPATIENT)
Dept: WOUND CARE | Facility: CLINIC | Age: 56
Discharge: HOME | End: 2021-04-09
Attending: PHYSICIAN ASSISTANT | Admitting: PHYSICIAN ASSISTANT
Payer: MEDICARE

## 2021-04-09 VITALS — TEMPERATURE: 98.6 F | HEART RATE: 74 BPM | SYSTOLIC BLOOD PRESSURE: 149 MMHG | DIASTOLIC BLOOD PRESSURE: 85 MMHG

## 2021-04-09 DIAGNOSIS — S31.109S RIGHT GROIN WOUND, SEQUELA: ICD-10-CM

## 2021-04-09 DIAGNOSIS — S81.802S OPEN WOUND OF LEFT LOWER LEG, SEQUELA: ICD-10-CM

## 2021-04-09 DIAGNOSIS — G82.20 PARALYSIS OF BOTH LOWER LIMBS (H): ICD-10-CM

## 2021-04-09 DIAGNOSIS — I73.9 PAD (PERIPHERAL ARTERY DISEASE) (H): Primary | ICD-10-CM

## 2021-04-09 PROCEDURE — 17250 CHEM CAUT OF GRANLTJ TISSUE: CPT | Performed by: PHYSICIAN ASSISTANT

## 2021-04-09 PROCEDURE — 17250 CHEM CAUT OF GRANLTJ TISSUE: CPT

## 2021-04-09 RX ORDER — ASPIRIN 325 MG/1
TABLET, FILM COATED ORAL
COMMUNITY
Start: 2021-02-27 | End: 2021-06-19

## 2021-04-09 NOTE — DISCHARGE INSTRUCTIONS
"   [facility]  [address] [facility] [address], [place] [address]    Call us at [phone] if you have any questions about your wounds, have redness or swelling around your wound, have a fever of 101 or greater or if you have any other problems or concerns. We answer the phone Monday through Friday 8 am to 4 pm, please leave a message as we check the voicemail frequently throughout the day.     Parth Fountain      1965    [employer] Phone: [phone] Fax: [phone]    Please call [facility] [phone] ([address] [place]) to schedule your xray appointment if you have not heard from them in two business days.  Please call [facility] Phone:  [phone] [address] [place] [address] for Ultra sound JOVAN and Duplex of left leg    Wound dressing change recommendations to right anterior Hip; Left lower leg ulcers and all foot and toe ulcers  Cleanse wounds with saline or wound cleanser  Cover all wounds with Xeroform dressings   Cover wounds with gauze or ABD pad and secure with 2\" medipore tape  Spandage from toes to knees  Change dressing Monday, Wednesday and Friday     Suzan Martinez PA-C. April 9, 2021  Return appointment as scheduled in 6 weeks    If you had a positive experience please indicate on your patient satisfaction survey form that [facility] will be sending you.    "

## 2021-04-09 NOTE — PROGRESS NOTES
HISTORY OF PRESENT ILLNESS:   Mr. Parth Fountain is a 56-year-old paraplegic gentleman who returns to us today to follow-up on chronic pelvic pressure ulcers as well as lower leg wounds. PMHx of DVT, chronic UTI, EtOh abuse, and s/p colostomy. He has a long history of pressure ulcers with subsequent corrective surgeries by Dr. Peña. Parth's pelvic wounds have been very difficult to heal due to his surgical history. He has very little tissue overlying his bone and it is mostly made up of scar tissue. His progress waxes and wanes.    10/21/20: Televisit. Thigh wound healed. Scraped left leg and foot during a transfer and has several shallow ulcers on left lower leg and toes. Has an ulcer in his right groin/hip crease. This waxes and wanes. Uses Stacie when open and calmoseptine when more closed, this is working well for him.   11/18/20: Return visit. All of his wounds have improved. Has new traumatic wounds on bilateral shins.   12/16/20: Returns via video visit. Shin wounds are improving but very friable and hypergranular. Hip crease slightly more open today, also appears friable. Has been using Stacie to all wounds.   01/13/21: Returns for in office visit.  Leg wounds are improving with use of calcium alginate.  However, Parth notes that the dressings are sticking to the wounds and causing bleeding when removed.  His right groin wound is now quite hypertrophic.  2/26/21: Returns for follow-up. Reports that he continues to reinjure his R lower leg. Hip wound is not really improving. Has new wound on R great toe with exposed bone.   4/9/21: Returns for follow-up. Still has exposed bone on R great toe and has not had the x-ray done we ordered at last visit. His R lower leg has worsened. R hip about the same.      OFFLOADING: On a Group 2 mattress. Still utilizing RoHo cushion on his wheelchair.      DRESSINGS: Dressing all wounds with calcium alginate      SOCIAL HISTORY:  Lives in a group home. He is still receiving  home health care through [facility]. that comes and does dressing changes daily.      PHYSICAL EXAMINATION   GENERAL:  Parth is alert and oriented and in no acute distress.  CV: Absent pedal pulses bilaterally  INTEGUMENTARY:  Wound (used by [facility] only) 10/21/20 1307 Right groin pressure injury (Active)   Thickness/Stage Stage 3 04/09/21 1100   Dressing Appearance moist drainage 04/09/21 1100   Base granulating;moist 04/09/21 1100   Periwound ecchymotic;edematous;excoriated 04/09/21 1100   Periwound Temperature warm 04/09/21 1100   Periwound Skin Turgor soft 04/09/21 1100   Length (cm) 0.8 04/09/21 1100   Width (cm) 1.5 04/09/21 1100   Depth (cm) 0.4 04/09/21 1100   Wound (cm^2) 1.2 cm^2 04/09/21 1100   Wound Volume (cm^3) 0.48 cm^3 04/09/21 1100   Wound healing % -380 04/09/21 1100   Drainage Characteristics/Odor serosanguineous 04/09/21 1100   Drainage Amount moderate 04/09/21 1100   Care, Wound chemical cautery applied 04/09/21 1100       Wound (used by [facility] only) 11/18/20 1234 Left pressure injury (Active)   Thickness/Stage Stage 3 04/09/21 1100   Dressing Appearance moist drainage 04/09/21 1100   Base granulating;moist 04/09/21 1100   Periwound excoriated;edematous;ecchymotic 04/09/21 1100   Periwound Temperature warm 04/09/21 1100   Periwound Skin Turgor soft 04/09/21 1100   Edges open 04/09/21 1100   Length (cm) 1.2 04/09/21 1100   Width (cm) 0.9 04/09/21 1100   Depth (cm) 0.1 04/09/21 1100   Wound (cm^2) 1.08 cm^2 04/09/21 1100   Wound Volume (cm^3) 0.11 cm^3 04/09/21 1100   Wound healing % 60 04/09/21 1100   Drainage Characteristics/Odor serosanguineous 04/09/21 1100   Drainage Amount moderate 04/09/21 1100   Care, Wound chemical cautery applied 04/09/21 1100       Wound (used by [facility] only) 02/26/21 1347 Left pressure injury (Active)   Thickness/Stage Stage 4 04/09/21 1100   Dressing Appearance moist drainage 04/09/21 1100   Base exposed structure 04/09/21 1100   Periwound excoriated 04/09/21 1100    Periwound Temperature warm 04/09/21 1100   Periwound Skin Turgor firm 04/09/21 1100   Edges callused 04/09/21 1100   Length (cm) 1.4 04/09/21 1100   Width (cm) 1 04/09/21 1100   Depth (cm) 0.1 04/09/21 1100   Wound (cm^2) 1.4 cm^2 04/09/21 1100   Wound Volume (cm^3) 0.14 cm^3 04/09/21 1100   Wound healing % -460 04/09/21 1100   Drainage Characteristics/Odor serosanguineous 04/09/21 1100   Drainage Amount moderate 04/09/21 1100   Care, Wound non-select wound debridement performed 04/09/21 1100   Dressing Care dressing applied 04/09/21 1100           PROCEDURE: After verbal consent was obtained, hypergranulation tissue was treated with silver nitrate. Patient tolerated this well.     ASSESSMENT:    1. Stage III pressure ulcer of right hip  2. Stage 4 pressure ulcer of R great toe  3. Full-thickness traumatic ulcerations of bilateral lower leg and toes with fat-layer exposed      PLAN:    1. Dress all leg wounds with xeroform and cover dressing of choice  2. X-ray of R great toe - suspect osteomyelitis  3. JOVAN and duplex of RLE to r/o arterial disease     FOLLOWUP:  6 weeks     TIFFANIE SINGH PA-C

## 2021-05-04 ENCOUNTER — TELEPHONE (OUTPATIENT)
Dept: WOUND CARE | Facility: CLINIC | Age: 56
End: 2021-05-04

## 2021-05-04 NOTE — TELEPHONE ENCOUNTER
Attempted to reach patient. No answer and voice mail is full. Left message for is home care nurse Shena to give patient a message: Can we switch his in clinic visit at 10:50 on Friday May 21 to a telephone visit which would require pictures of his wound be text to us? If he can not text pictures, can he come at 2:50 that same day for an in office visit.

## 2021-05-19 ENCOUNTER — HOSPITAL ENCOUNTER (OUTPATIENT)
Dept: WOUND CARE | Facility: CLINIC | Age: 56
End: 2021-05-19
Attending: PHYSICIAN ASSISTANT
Payer: MEDICARE

## 2021-05-19 DIAGNOSIS — S81.802S OPEN WOUND OF LEFT LOWER LEG, SEQUELA: ICD-10-CM

## 2021-05-19 DIAGNOSIS — S31.109S RIGHT GROIN WOUND, SEQUELA: ICD-10-CM

## 2021-05-19 PROCEDURE — 99442 PR PHYSICIAN TELEPHONE EVALUATION 11-20 MIN: CPT | Performed by: PHYSICIAN ASSISTANT

## 2021-05-19 NOTE — DISCHARGE INSTRUCTIONS
"   [facility]  [address] [facility] [address], [place] [address]    Call us at [phone] if you have any questions about your wounds, have redness or swelling around your wound, have a fever of 101 or greater or if you have any other problems or concerns. We answer the phone Monday through Friday 8 am to 4 pm, please leave a message as we check the voicemail frequently throughout the day.     Parth Fountain      1965    [employer] [facility] Phone: [phone] Fax: [phone]    Please call [facility] [phone] ([address]) to schedule your xray appointment if you have not heard from them in two business days.  Please call [facility] Phone: [phone] [address] [place] [address] for Ultra sound JOVAN and Duplex of left leg    Wound dressing change recommendations to right anterior Hip; Left lower leg ulcers and all foot and toe ulcers  Cleanse wounds with saline or wound cleanser  Cover all wounds with Xeroform dressings  Cover wounds with gauze or ABD pad and secure with 2\" medipore tape  Spandage from toes to knees  Change dressing Monday, Wednesday and Friday    Wound care to Left IT: M-W-F  Cleanse wound with wound cleanser, pat dry  Apply layer of Iodosorb gel to cover wound bed, cover with ABD or Mepilex dressing per preference  Change every Monday, Wednesday, Friday        Suzan Martinez PA-C. May 19, 2021    Follow up as scheduled at [facility]  If you had a positive experience please indicate on your patient satisfaction survey form that [facility] will be sending you.    If you have any billing related questions please call the [facility] Business office at [phone]. The clinic staff does not handle billing related matters.    "

## 2021-05-19 NOTE — PROGRESS NOTES
HISTORY OF PRESENT ILLNESS:   Mr. Parth Fountain is a 56-year-old paraplegic gentleman who returns to us today to follow-up on chronic pelvic pressure ulcers as well as lower leg wounds. PMHx of DVT, chronic UTI, EtOh abuse, and s/p colostomy. He has a long history of pressure ulcers with subsequent corrective surgeries by Dr. Peña. Parth's pelvic wounds have been very difficult to heal due to his surgical history. He has very little tissue overlying his bone and it is mostly made up of scar tissue. His progress waxes and wanes.    10/21/20: Televisit. Thigh wound healed. Scraped left leg and foot during a transfer and has several shallow ulcers on left lower leg and toes. Has an ulcer in his right groin/hip crease. This waxes and wanes. Uses Stacie when open and calmoseptine when more closed, this is working well for him.   11/18/20: Return visit. All of his wounds have improved. Has new traumatic wounds on bilateral shins.   12/16/20: Returns via video visit. Shin wounds are improving but very friable and hypergranular. Hip crease slightly more open today, also appears friable. Has been using Stacie to all wounds.   01/13/21: Returns for in office visit.  Leg wounds are improving with use of calcium alginate.  However, Parth notes that the dressings are sticking to the wounds and causing bleeding when removed.  His right groin wound is now quite hypertrophic.  2/26/21: Returns for follow-up. Reports that he continues to reinjure his R lower leg. Hip wound is not really improving. Has new wound on R great toe with exposed bone.   4/9/21: Returns for follow-up. Still has exposed bone on R great toe and has not had the x-ray done we ordered at last visit. His R lower leg has worsened. R hip about the same.   5/19/21: Returns for follow-up via telephone, has been using Xeroform. Has not done vascular studies or xray as recommended. Hip wound is improving. Has a recurrence of his L IT wound. His legs are marginally  "better, reports they are \"squishy.\"      OFFLOADING: On a Group 2 mattress. Still utilizing RoHo cushion on his wheelchair.      DRESSINGS: Dressing all wounds with calcium alginate      SOCIAL HISTORY:  Lives in a group home. He is still receiving home health care through [facility]. that comes and does dressing changes daily.      PHYSICAL EXAMINATION   INTEGUMENTARY:        ASSESSMENT:    1. Stage III pressure ulcer of right hip  2. Stage 4 pressure ulcer of R great toe  3. Full-thickness traumatic ulcerations of bilateral lower leg and toes with fat-layer exposed      PLAN:    1. Dress all leg wounds and hip wound with xeroform and cover dressing of choice  2. Start Iodosorb on IT wound  3. X-ray of R great toe - suspect osteomyelitis  4. JOVAN and duplex of RLE to r/o arterial disease     FOLLOWUP:  1 month     The patient has been notified of following:     \"This telephone visit will be conducted via a call between you and your physician/provider. We have found that certain health care needs can be provided without the need for a physical exam.  This service lets us provide the care you need with a short phone conversation.  If a prescription is necessary we can send it directly to your pharmacy.  If lab work is needed we can place an order for that and you can then stop by our lab to have the test done at a later time.    If during the course of the call the physician/provider feels a telephone visit is not appropriate, you will not be charged for this service.\"     Patient has given verbal consent for Telephone visit?  Yes  DURATION OF CALL: 12 minutes    TIFFANIE SINGH PA-C    "

## 2021-05-21 ENCOUNTER — TELEPHONE (OUTPATIENT)
Dept: WOUND CARE | Facility: CLINIC | Age: 56
End: 2021-05-21

## 2021-06-16 ENCOUNTER — TELEPHONE (OUTPATIENT)
Dept: WOUND CARE | Facility: CLINIC | Age: 56
End: 2021-06-16

## 2021-06-19 ENCOUNTER — APPOINTMENT (OUTPATIENT)
Dept: INTERVENTIONAL RADIOLOGY/VASCULAR | Facility: CLINIC | Age: 56
DRG: 871 | End: 2021-06-19
Payer: MEDICARE

## 2021-06-19 ENCOUNTER — APPOINTMENT (OUTPATIENT)
Dept: CT IMAGING | Facility: CLINIC | Age: 56
DRG: 871 | End: 2021-06-19
Attending: INTERNAL MEDICINE
Payer: MEDICARE

## 2021-06-19 ENCOUNTER — HOSPITAL ENCOUNTER (INPATIENT)
Facility: CLINIC | Age: 56
LOS: 11 days | Discharge: NURSING, REHAB OR LONG-TERM CARE | DRG: 871 | End: 2021-06-30
Attending: INTERNAL MEDICINE | Admitting: FAMILY MEDICINE
Payer: MEDICARE

## 2021-06-19 DIAGNOSIS — A41.9 SEPSIS, DUE TO UNSPECIFIED ORGANISM, UNSPECIFIED WHETHER ACUTE ORGAN DYSFUNCTION PRESENT (H): ICD-10-CM

## 2021-06-19 DIAGNOSIS — Z11.52 ENCOUNTER FOR SCREENING LABORATORY TESTING FOR COVID-19 VIRUS: ICD-10-CM

## 2021-06-19 DIAGNOSIS — N13.2 HYDRONEPHROSIS WITH URINARY OBSTRUCTION DUE TO URETERAL CALCULUS: ICD-10-CM

## 2021-06-19 DIAGNOSIS — R52 DEAFFERENTATION PAIN: ICD-10-CM

## 2021-06-19 DIAGNOSIS — R00.0 TACHYCARDIA, UNSPECIFIED: ICD-10-CM

## 2021-06-19 DIAGNOSIS — N39.0 RECURRENT UTI: ICD-10-CM

## 2021-06-19 DIAGNOSIS — J06.9 VIRAL UPPER RESPIRATORY TRACT INFECTION: Primary | ICD-10-CM

## 2021-06-19 DIAGNOSIS — Z98.890 HISTORY OF ILEAL CONDUIT: ICD-10-CM

## 2021-06-19 LAB
ALBUMIN SERPL-MCNC: 3.8 G/DL (ref 3.4–5)
ALBUMIN UR-MCNC: 30 MG/DL
ALP SERPL-CCNC: 103 U/L (ref 40–150)
ALT SERPL W P-5'-P-CCNC: 20 U/L (ref 0–70)
ANION GAP SERPL CALCULATED.3IONS-SCNC: 7 MMOL/L (ref 3–14)
APPEARANCE UR: ABNORMAL
AST SERPL W P-5'-P-CCNC: 26 U/L (ref 0–45)
B-HCG FREE SERPL-ACNC: 1.2 [IU]/L (ref 0.86–1.14)
BACTERIA #/AREA URNS HPF: ABNORMAL /HPF
BASOPHILS # BLD AUTO: 0.1 10E9/L (ref 0–0.2)
BASOPHILS NFR BLD AUTO: 0.3 %
BILIRUB SERPL-MCNC: 0.9 MG/DL (ref 0.2–1.3)
BILIRUB UR QL STRIP: NEGATIVE
BUN SERPL-MCNC: 17 MG/DL (ref 7–30)
CALCIUM SERPL-MCNC: 11 MG/DL (ref 8.5–10.1)
CHLORIDE SERPL-SCNC: 103 MMOL/L (ref 94–109)
CO2 BLDCOV-SCNC: 24 MMOL/L (ref 21–28)
CO2 SERPL-SCNC: 23 MMOL/L (ref 20–32)
COLOR UR AUTO: YELLOW
CREAT SERPL-MCNC: 0.98 MG/DL (ref 0.66–1.25)
CRP SERPL-MCNC: 73 MG/L (ref 0–8)
DIFFERENTIAL METHOD BLD: ABNORMAL
EOSINOPHIL # BLD AUTO: 0 10E9/L (ref 0–0.7)
EOSINOPHIL NFR BLD AUTO: 0.1 %
ERYTHROCYTE [DISTWIDTH] IN BLOOD BY AUTOMATED COUNT: 13 % (ref 10–15)
GFR SERPL CREATININE-BSD FRML MDRD: 84 ML/MIN/{1.73_M2}
GLUCOSE SERPL-MCNC: 78 MG/DL (ref 70–99)
GLUCOSE UR STRIP-MCNC: NEGATIVE MG/DL
HCT VFR BLD AUTO: 49.9 % (ref 40–53)
HGB BLD-MCNC: 16.2 G/DL (ref 13.3–17.7)
HGB UR QL STRIP: NEGATIVE
IMM GRANULOCYTES # BLD: 0.5 10E9/L (ref 0–0.4)
IMM GRANULOCYTES NFR BLD: 1.7 %
KETONES UR STRIP-MCNC: 10 MG/DL
LABORATORY COMMENT REPORT: NORMAL
LACTATE BLD-SCNC: 2.9 MMOL/L (ref 0.7–2.1)
LACTATE BLD-SCNC: 5.1 MMOL/L (ref 0.7–2)
LEUKOCYTE ESTERASE UR QL STRIP: ABNORMAL
LYMPHOCYTES # BLD AUTO: 0.3 10E9/L (ref 0.8–5.3)
LYMPHOCYTES NFR BLD AUTO: 1.1 %
MCH RBC QN AUTO: 27.5 PG (ref 26.5–33)
MCHC RBC AUTO-ENTMCNC: 32.5 G/DL (ref 31.5–36.5)
MCV RBC AUTO: 85 FL (ref 78–100)
MONOCYTES # BLD AUTO: 1.4 10E9/L (ref 0–1.3)
MONOCYTES NFR BLD AUTO: 4.9 %
MUCOUS THREADS #/AREA URNS LPF: PRESENT /LPF
NEUTROPHILS # BLD AUTO: 26.1 10E9/L (ref 1.6–8.3)
NEUTROPHILS NFR BLD AUTO: 91.9 %
NITRATE UR QL: NEGATIVE
NRBC # BLD AUTO: 0 10*3/UL
NRBC BLD AUTO-RTO: 0 /100
PCO2 BLDV: 45 MM HG (ref 40–50)
PH BLDV: 7.33 PH (ref 7.32–7.43)
PH UR STRIP: 7.5 PH (ref 5–7)
PLATELET # BLD AUTO: 453 10E9/L (ref 150–450)
PO2 BLDV: 36 MM HG (ref 25–47)
POTASSIUM SERPL-SCNC: 4.4 MMOL/L (ref 3.4–5.3)
PROT SERPL-MCNC: 8.5 G/DL (ref 6.8–8.8)
RADIOLOGIST FLAGS: ABNORMAL
RBC # BLD AUTO: 5.89 10E12/L (ref 4.4–5.9)
RBC #/AREA URNS AUTO: 4 /HPF (ref 0–2)
SAO2 % BLDV FROM PO2: 65 %
SARS-COV-2 RNA RESP QL NAA+PROBE: NEGATIVE
SODIUM SERPL-SCNC: 133 MMOL/L (ref 133–144)
SOURCE: ABNORMAL
SP GR UR STRIP: 1.02 (ref 1–1.03)
SPECIMEN SOURCE: NORMAL
SQUAMOUS #/AREA URNS AUTO: 0 /HPF (ref 0–1)
UROBILINOGEN UR STRIP-MCNC: NORMAL MG/DL (ref 0–2)
WBC # BLD AUTO: 28.4 10E9/L (ref 4–11)
WBC #/AREA URNS AUTO: 47 /HPF (ref 0–5)

## 2021-06-19 PROCEDURE — 120N000002 HC R&B MED SURG/OB UMMC

## 2021-06-19 PROCEDURE — 96365 THER/PROPH/DIAG IV INF INIT: CPT | Mod: 59 | Performed by: INTERNAL MEDICINE

## 2021-06-19 PROCEDURE — 80053 COMPREHEN METABOLIC PANEL: CPT | Performed by: STUDENT IN AN ORGANIZED HEALTH CARE EDUCATION/TRAINING PROGRAM

## 2021-06-19 PROCEDURE — 272N000508 HC NEEDLE CR8

## 2021-06-19 PROCEDURE — 74176 CT ABD & PELVIS W/O CONTRAST: CPT | Mod: MC

## 2021-06-19 PROCEDURE — 36415 COLL VENOUS BLD VENIPUNCTURE: CPT | Performed by: INTERNAL MEDICINE

## 2021-06-19 PROCEDURE — 96360 HYDRATION IV INFUSION INIT: CPT | Mod: 59 | Performed by: INTERNAL MEDICINE

## 2021-06-19 PROCEDURE — 83605 ASSAY OF LACTIC ACID: CPT

## 2021-06-19 PROCEDURE — 87040 BLOOD CULTURE FOR BACTERIA: CPT | Performed by: INTERNAL MEDICINE

## 2021-06-19 PROCEDURE — 0T9030Z DRAINAGE OF RIGHT KIDNEY WITH DRAINAGE DEVICE, PERCUTANEOUS APPROACH: ICD-10-PCS | Performed by: RADIOLOGY

## 2021-06-19 PROCEDURE — 99152 MOD SED SAME PHYS/QHP 5/>YRS: CPT | Mod: GC | Performed by: RADIOLOGY

## 2021-06-19 PROCEDURE — C1729 CATH, DRAINAGE: HCPCS

## 2021-06-19 PROCEDURE — 99291 CRITICAL CARE FIRST HOUR: CPT | Mod: 25 | Performed by: INTERNAL MEDICINE

## 2021-06-19 PROCEDURE — 87186 SC STD MICRODIL/AGAR DIL: CPT | Performed by: INTERNAL MEDICINE

## 2021-06-19 PROCEDURE — 87077 CULTURE AEROBIC IDENTIFY: CPT | Performed by: INTERNAL MEDICINE

## 2021-06-19 PROCEDURE — 96375 TX/PRO/DX INJ NEW DRUG ADDON: CPT | Performed by: INTERNAL MEDICINE

## 2021-06-19 PROCEDURE — 999N000127 HC STATISTIC PERIPHERAL IV START W US GUIDANCE

## 2021-06-19 PROCEDURE — 258N000003 HC RX IP 258 OP 636: Performed by: INTERNAL MEDICINE

## 2021-06-19 PROCEDURE — 50432 PLMT NEPHROSTOMY CATHETER: CPT

## 2021-06-19 PROCEDURE — C1769 GUIDE WIRE: HCPCS

## 2021-06-19 PROCEDURE — 87086 URINE CULTURE/COLONY COUNT: CPT | Performed by: INTERNAL MEDICINE

## 2021-06-19 PROCEDURE — 85025 COMPLETE CBC W/AUTO DIFF WBC: CPT | Performed by: INTERNAL MEDICINE

## 2021-06-19 PROCEDURE — U0003 INFECTIOUS AGENT DETECTION BY NUCLEIC ACID (DNA OR RNA); SEVERE ACUTE RESPIRATORY SYNDROME CORONAVIRUS 2 (SARS-COV-2) (CORONAVIRUS DISEASE [COVID-19]), AMPLIFIED PROBE TECHNIQUE, MAKING USE OF HIGH THROUGHPUT TECHNOLOGIES AS DESCRIBED BY CMS-2020-01-R: HCPCS | Performed by: INTERNAL MEDICINE

## 2021-06-19 PROCEDURE — 74176 CT ABD & PELVIS W/O CONTRAST: CPT | Mod: 26 | Performed by: RADIOLOGY

## 2021-06-19 PROCEDURE — 81001 URINALYSIS AUTO W/SCOPE: CPT | Performed by: STUDENT IN AN ORGANIZED HEALTH CARE EDUCATION/TRAINING PROGRAM

## 2021-06-19 PROCEDURE — 85610 PROTHROMBIN TIME: CPT

## 2021-06-19 PROCEDURE — 50432 PLMT NEPHROSTOMY CATHETER: CPT | Mod: RT | Performed by: RADIOLOGY

## 2021-06-19 PROCEDURE — U0005 INFEC AGEN DETEC AMPLI PROBE: HCPCS | Performed by: INTERNAL MEDICINE

## 2021-06-19 PROCEDURE — 86140 C-REACTIVE PROTEIN: CPT | Performed by: INTERNAL MEDICINE

## 2021-06-19 PROCEDURE — 87800 DETECT AGNT MULT DNA DIREC: CPT | Performed by: INTERNAL MEDICINE

## 2021-06-19 PROCEDURE — 81001 URINALYSIS AUTO W/SCOPE: CPT | Performed by: INTERNAL MEDICINE

## 2021-06-19 PROCEDURE — 99152 MOD SED SAME PHYS/QHP 5/>YRS: CPT

## 2021-06-19 PROCEDURE — 93005 ELECTROCARDIOGRAM TRACING: CPT | Performed by: INTERNAL MEDICINE

## 2021-06-19 PROCEDURE — 99285 EMERGENCY DEPT VISIT HI MDM: CPT | Mod: 25 | Performed by: INTERNAL MEDICINE

## 2021-06-19 PROCEDURE — 84484 ASSAY OF TROPONIN QUANT: CPT | Performed by: STUDENT IN AN ORGANIZED HEALTH CARE EDUCATION/TRAINING PROGRAM

## 2021-06-19 PROCEDURE — 250N000011 HC RX IP 250 OP 636: Performed by: STUDENT IN AN ORGANIZED HEALTH CARE EDUCATION/TRAINING PROGRAM

## 2021-06-19 PROCEDURE — 36556 INSERT NON-TUNNEL CV CATH: CPT | Performed by: INTERNAL MEDICINE

## 2021-06-19 PROCEDURE — 80053 COMPREHEN METABOLIC PANEL: CPT | Performed by: INTERNAL MEDICINE

## 2021-06-19 PROCEDURE — 99221 1ST HOSP IP/OBS SF/LOW 40: CPT | Mod: GC | Performed by: UROLOGY

## 2021-06-19 PROCEDURE — 250N000011 HC RX IP 250 OP 636: Performed by: INTERNAL MEDICINE

## 2021-06-19 PROCEDURE — 82803 BLOOD GASES ANY COMBINATION: CPT

## 2021-06-19 PROCEDURE — 96361 HYDRATE IV INFUSION ADD-ON: CPT | Performed by: INTERNAL MEDICINE

## 2021-06-19 PROCEDURE — 83605 ASSAY OF LACTIC ACID: CPT | Performed by: INTERNAL MEDICINE

## 2021-06-19 PROCEDURE — 36556 INSERT NON-TUNNEL CV CATH: CPT | Performed by: EMERGENCY MEDICINE

## 2021-06-19 PROCEDURE — C9803 HOPD COVID-19 SPEC COLLECT: HCPCS | Performed by: INTERNAL MEDICINE

## 2021-06-19 PROCEDURE — 99223 1ST HOSP IP/OBS HIGH 75: CPT | Mod: AI | Performed by: FAMILY MEDICINE

## 2021-06-19 PROCEDURE — 93010 ELECTROCARDIOGRAM REPORT: CPT | Performed by: INTERNAL MEDICINE

## 2021-06-19 PROCEDURE — 255N000002 HC RX 255 OP 636: Performed by: RADIOLOGY

## 2021-06-19 PROCEDURE — 250N000009 HC RX 250: Performed by: STUDENT IN AN ORGANIZED HEALTH CARE EDUCATION/TRAINING PROGRAM

## 2021-06-19 PROCEDURE — 96366 THER/PROPH/DIAG IV INF ADDON: CPT | Performed by: INTERNAL MEDICINE

## 2021-06-19 RX ORDER — NALOXONE HYDROCHLORIDE 0.4 MG/ML
0.4 INJECTION, SOLUTION INTRAMUSCULAR; INTRAVENOUS; SUBCUTANEOUS
Status: DISCONTINUED | OUTPATIENT
Start: 2021-06-19 | End: 2021-06-19 | Stop reason: HOSPADM

## 2021-06-19 RX ORDER — NALOXONE HYDROCHLORIDE 0.4 MG/ML
0.2 INJECTION, SOLUTION INTRAMUSCULAR; INTRAVENOUS; SUBCUTANEOUS
Status: DISCONTINUED | OUTPATIENT
Start: 2021-06-19 | End: 2021-06-19 | Stop reason: HOSPADM

## 2021-06-19 RX ORDER — HYDROMORPHONE HYDROCHLORIDE 1 MG/ML
0.5 INJECTION, SOLUTION INTRAMUSCULAR; INTRAVENOUS; SUBCUTANEOUS ONCE
Status: DISCONTINUED | OUTPATIENT
Start: 2021-06-19 | End: 2021-06-19

## 2021-06-19 RX ORDER — HYDRALAZINE HYDROCHLORIDE 20 MG/ML
10 INJECTION INTRAMUSCULAR; INTRAVENOUS ONCE
Status: COMPLETED | OUTPATIENT
Start: 2021-06-19 | End: 2021-06-19

## 2021-06-19 RX ORDER — PIPERACILLIN SODIUM, TAZOBACTAM SODIUM 3; .375 G/15ML; G/15ML
3.38 INJECTION, POWDER, LYOPHILIZED, FOR SOLUTION INTRAVENOUS ONCE
Status: COMPLETED | OUTPATIENT
Start: 2021-06-19 | End: 2021-06-19

## 2021-06-19 RX ORDER — KETOROLAC TROMETHAMINE 30 MG/ML
30 INJECTION, SOLUTION INTRAMUSCULAR; INTRAVENOUS ONCE
Status: COMPLETED | OUTPATIENT
Start: 2021-06-19 | End: 2021-06-19

## 2021-06-19 RX ORDER — ONDANSETRON 4 MG/1
4 TABLET, ORALLY DISINTEGRATING ORAL ONCE
Status: COMPLETED | OUTPATIENT
Start: 2021-06-19 | End: 2021-06-19

## 2021-06-19 RX ORDER — ONDANSETRON 2 MG/ML
4 INJECTION INTRAMUSCULAR; INTRAVENOUS ONCE
Status: COMPLETED | OUTPATIENT
Start: 2021-06-19 | End: 2021-06-19

## 2021-06-19 RX ORDER — ONDANSETRON 2 MG/ML
4 INJECTION INTRAMUSCULAR; INTRAVENOUS ONCE
Status: DISCONTINUED | OUTPATIENT
Start: 2021-06-19 | End: 2021-06-19

## 2021-06-19 RX ORDER — FLUMAZENIL 0.1 MG/ML
0.2 INJECTION, SOLUTION INTRAVENOUS
Status: DISCONTINUED | OUTPATIENT
Start: 2021-06-19 | End: 2021-06-19 | Stop reason: HOSPADM

## 2021-06-19 RX ORDER — ONDANSETRON 2 MG/ML
4 INJECTION INTRAMUSCULAR; INTRAVENOUS EVERY 6 HOURS PRN
Status: DISCONTINUED | OUTPATIENT
Start: 2021-06-19 | End: 2021-06-20

## 2021-06-19 RX ORDER — SODIUM CHLORIDE 9 MG/ML
INJECTION, SOLUTION INTRAVENOUS CONTINUOUS
Status: DISCONTINUED | OUTPATIENT
Start: 2021-06-19 | End: 2021-06-20

## 2021-06-19 RX ORDER — FENTANYL CITRATE 50 UG/ML
25-50 INJECTION, SOLUTION INTRAMUSCULAR; INTRAVENOUS EVERY 5 MIN PRN
Status: DISCONTINUED | OUTPATIENT
Start: 2021-06-19 | End: 2021-06-19 | Stop reason: HOSPADM

## 2021-06-19 RX ORDER — IODIXANOL 320 MG/ML
50 INJECTION, SOLUTION INTRAVASCULAR ONCE
Status: COMPLETED | OUTPATIENT
Start: 2021-06-19 | End: 2021-06-19

## 2021-06-19 RX ADMIN — PIPERACILLIN SODIUM AND TAZOBACTAM SODIUM 3.38 G: 3; .375 INJECTION, POWDER, LYOPHILIZED, FOR SOLUTION INTRAVENOUS at 19:36

## 2021-06-19 RX ADMIN — SODIUM CHLORIDE: 900 INJECTION, SOLUTION INTRAVENOUS at 23:12

## 2021-06-19 RX ADMIN — LIDOCAINE HYDROCHLORIDE 6 ML: 10 INJECTION, SOLUTION EPIDURAL; INFILTRATION; INTRACAUDAL; PERINEURAL at 22:05

## 2021-06-19 RX ADMIN — IODIXANOL 20 ML: 320 INJECTION, SOLUTION INTRAVASCULAR at 22:20

## 2021-06-19 RX ADMIN — HYDROMORPHONE HYDROCHLORIDE 1 MG: 1 INJECTION, SOLUTION INTRAMUSCULAR; INTRAVENOUS; SUBCUTANEOUS at 23:35

## 2021-06-19 RX ADMIN — KETOROLAC TROMETHAMINE 30 MG: 30 INJECTION, SOLUTION INTRAMUSCULAR; INTRAVENOUS at 18:18

## 2021-06-19 RX ADMIN — MIDAZOLAM 1 MG: 1 INJECTION INTRAMUSCULAR; INTRAVENOUS at 21:57

## 2021-06-19 RX ADMIN — SODIUM CHLORIDE 1000 ML: 9 INJECTION, SOLUTION INTRAVENOUS at 18:17

## 2021-06-19 RX ADMIN — ONDANSETRON 4 MG: 4 TABLET, ORALLY DISINTEGRATING ORAL at 19:12

## 2021-06-19 RX ADMIN — FENTANYL CITRATE 50 MCG: 50 INJECTION, SOLUTION INTRAMUSCULAR; INTRAVENOUS at 21:58

## 2021-06-19 RX ADMIN — HYDRALAZINE HYDROCHLORIDE 10 MG: 20 INJECTION INTRAMUSCULAR; INTRAVENOUS at 18:20

## 2021-06-19 RX ADMIN — ONDANSETRON 4 MG: 2 INJECTION INTRAMUSCULAR; INTRAVENOUS at 23:47

## 2021-06-19 RX ADMIN — VANCOMYCIN HYDROCHLORIDE 1500 MG: 10 INJECTION, POWDER, LYOPHILIZED, FOR SOLUTION INTRAVENOUS at 21:29

## 2021-06-19 ASSESSMENT — ENCOUNTER SYMPTOMS
WHEEZING: 0
LIGHT-HEADEDNESS: 0
NECK PAIN: 0
TROUBLE SWALLOWING: 0
ABDOMINAL PAIN: 1
SHORTNESS OF BREATH: 0
VOMITING: 0
DIARRHEA: 0
BACK PAIN: 1
HEMATURIA: 0
ADENOPATHY: 0
NUMBNESS: 0
HEADACHES: 1
CONFUSION: 0
NAUSEA: 1
CHILLS: 0
PALPITATIONS: 0
COUGH: 0
FEVER: 0
FLANK PAIN: 1
WEAKNESS: 0
DIFFICULTY URINATING: 1

## 2021-06-19 ASSESSMENT — MIFFLIN-ST. JEOR: SCORE: 1445.42

## 2021-06-19 NOTE — LETTER
Transition Communication Hand-off for Care Transitions to Next Level of Care Provider    Name: Parth Fountain  : 1965  MRN #: [id number]  Primary Care Provider: GUS TAYLOR     Primary Clinic: [facility] [address]  [facility] [address]     Reason for Hospitalization:  History of ileal conduit [Z98.890]  Hydronephrosis with urinary obstruction due to ureteral calculus [N13.2]  Sepsis, due to unspecified organism, unspecified whether acute organ dysfunction present (H) [A41.9]  Admit Date/Time: 2021  5:19 PM  Discharge Date:  Transition to group home today and home care agency will continue to follow at  setting.  Payor Source: Payor: MEDICARE / Plan: MEDICARE / Product Type: Medicare /   Discharge Plan:  Transition back to  Setting.  Discharge Needs Assessment:  Needs      Most Recent Value   Equipment Currently Used at Home  tub bench, wheelchair, manual, wheelchair, power          Follow-up plan:    Future Appointments   Date Time Provider Department Center   2021  8:20 AM Moose Vides MD [facility]   2021 11:30 AM Suzan Martinez PA-C [facility]              Referrals     Future Labs/Procedures    Adult Urology Referral     Comments:    Please be aware that coverage of these services is subject to the terms and limitations of your health insurance plan.  Call member services at your health plan with any benefit or coverage questions.  [facility] will call you to coordinate your care as prescribed by the provider.  If you don t hear from a representative within 2 business days, please call the number listed above.      Home care nursing referral     Comments:    [employer] (RN)   Phone: [phone]   Fax: [phone]    Resumption of skilled nursing for wound care.      Your provider has ordered home care nursing services. If you have not been contacted within 2 days of your discharge please call the inpatient  department phone number at [phone] .            Christina Boyd RN
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
## 2021-06-19 NOTE — ED TRIAGE NOTES
Pt arrives via EMS from group home. Presents with increased spasms and pain at urostomy site. Notes cloudy output from urostomy and possible stones in bag. History of paraplegia. EMS gave 1mg dilaudid and 4mg Zofran. 500 mL of NS. BS 98

## 2021-06-20 ENCOUNTER — APPOINTMENT (OUTPATIENT)
Dept: CARDIOLOGY | Facility: CLINIC | Age: 56
DRG: 871 | End: 2021-06-20
Attending: STUDENT IN AN ORGANIZED HEALTH CARE EDUCATION/TRAINING PROGRAM
Payer: MEDICARE

## 2021-06-20 ENCOUNTER — APPOINTMENT (OUTPATIENT)
Dept: GENERAL RADIOLOGY | Facility: CLINIC | Age: 56
DRG: 871 | End: 2021-06-20
Payer: MEDICARE

## 2021-06-20 PROBLEM — S81.802A OPEN WOUND OF LEFT LOWER LEG: Status: RESOLVED | Noted: 2020-09-09 | Resolved: 2021-06-20

## 2021-06-20 PROBLEM — M86.9 OSTEOMYELITIS OF ANKLE OR FOOT: Status: ACTIVE | Noted: 2017-06-12

## 2021-06-20 PROBLEM — M79.2 NEUROPATHIC PAIN: Status: ACTIVE | Noted: 2017-09-14

## 2021-06-20 PROBLEM — M85.89 OSTEOPENIA OF MULTIPLE SITES: Status: ACTIVE | Noted: 2017-06-26

## 2021-06-20 PROBLEM — Z93.3 COLOSTOMY IN PLACE (H): Status: ACTIVE | Noted: 2017-06-12

## 2021-06-20 PROBLEM — M86.9 OSTEOMYELITIS OF ANKLE OR FOOT: Status: RESOLVED | Noted: 2017-06-12 | Resolved: 2021-06-20

## 2021-06-20 PROBLEM — M62.50 MUSCULAR WASTING AND DISUSE ATROPHY: Status: ACTIVE | Noted: 2021-06-20

## 2021-06-20 LAB
ALBUMIN SERPL-MCNC: 2.6 G/DL (ref 3.4–5)
ALBUMIN SERPL-MCNC: 3.4 G/DL (ref 3.4–5)
ALBUMIN UR-MCNC: 100 MG/DL
ALP SERPL-CCNC: 69 U/L (ref 40–150)
ALP SERPL-CCNC: 98 U/L (ref 40–150)
ALT SERPL W P-5'-P-CCNC: 19 U/L (ref 0–70)
ALT SERPL W P-5'-P-CCNC: 19 U/L (ref 0–70)
ANION GAP SERPL CALCULATED.3IONS-SCNC: 12 MMOL/L (ref 3–14)
ANION GAP SERPL CALCULATED.3IONS-SCNC: 8 MMOL/L (ref 3–14)
APPEARANCE UR: ABNORMAL
AST SERPL W P-5'-P-CCNC: 16 U/L (ref 0–45)
AST SERPL W P-5'-P-CCNC: 27 U/L (ref 0–45)
BACTERIA #/AREA URNS HPF: ABNORMAL /HPF
BACTERIA SPEC CULT: NORMAL
BASOPHILS # BLD AUTO: 0 10E9/L (ref 0–0.2)
BASOPHILS NFR BLD AUTO: 0.1 %
BILIRUB SERPL-MCNC: 0.8 MG/DL (ref 0.2–1.3)
BILIRUB SERPL-MCNC: 1.2 MG/DL (ref 0.2–1.3)
BILIRUB UR QL STRIP: NEGATIVE
BUN SERPL-MCNC: 23 MG/DL (ref 7–30)
BUN SERPL-MCNC: 24 MG/DL (ref 7–30)
CALCIUM SERPL-MCNC: 10.9 MG/DL (ref 8.5–10.1)
CALCIUM SERPL-MCNC: 9.8 MG/DL (ref 8.5–10.1)
CHLORIDE SERPL-SCNC: 107 MMOL/L (ref 94–109)
CHLORIDE SERPL-SCNC: 107 MMOL/L (ref 94–109)
CO2 SERPL-SCNC: 17 MMOL/L (ref 20–32)
CO2 SERPL-SCNC: 22 MMOL/L (ref 20–32)
COLOR UR AUTO: ABNORMAL
CREAT SERPL-MCNC: 1.28 MG/DL (ref 0.66–1.25)
CREAT SERPL-MCNC: 1.29 MG/DL (ref 0.66–1.25)
CREAT SERPL-MCNC: 1.3 MG/DL (ref 0.66–1.25)
DIFFERENTIAL METHOD BLD: ABNORMAL
EOSINOPHIL # BLD AUTO: 0 10E9/L (ref 0–0.7)
EOSINOPHIL NFR BLD AUTO: 0 %
ERYTHROCYTE [DISTWIDTH] IN BLOOD BY AUTOMATED COUNT: 13.4 % (ref 10–15)
GFR SERPL CREATININE-BSD FRML MDRD: 60 ML/MIN/{1.73_M2}
GFR SERPL CREATININE-BSD FRML MDRD: 61 ML/MIN/{1.73_M2}
GFR SERPL CREATININE-BSD FRML MDRD: 61 ML/MIN/{1.73_M2}
GLUCOSE SERPL-MCNC: 78 MG/DL (ref 70–99)
GLUCOSE SERPL-MCNC: 79 MG/DL (ref 70–99)
GLUCOSE UR STRIP-MCNC: NEGATIVE MG/DL
HCT VFR BLD AUTO: 33.9 % (ref 40–53)
HGB BLD-MCNC: 11.2 G/DL (ref 13.3–17.7)
HGB UR QL STRIP: ABNORMAL
IMM GRANULOCYTES # BLD: 0.2 10E9/L (ref 0–0.4)
IMM GRANULOCYTES NFR BLD: 1 %
KETONES UR STRIP-MCNC: NEGATIVE MG/DL
LACTATE BLD-SCNC: 1.4 MMOL/L (ref 0.7–2)
LEUKOCYTE ESTERASE UR QL STRIP: ABNORMAL
LYMPHOCYTES # BLD AUTO: 0.2 10E9/L (ref 0.8–5.3)
LYMPHOCYTES NFR BLD AUTO: 1.1 %
Lab: NORMAL
MAGNESIUM SERPL-MCNC: 1.8 MG/DL (ref 1.6–2.3)
MCH RBC QN AUTO: 27.7 PG (ref 26.5–33)
MCHC RBC AUTO-ENTMCNC: 33 G/DL (ref 31.5–36.5)
MCV RBC AUTO: 84 FL (ref 78–100)
MONOCYTES # BLD AUTO: 0.8 10E9/L (ref 0–1.3)
MONOCYTES NFR BLD AUTO: 3.8 %
NEUTROPHILS # BLD AUTO: 20 10E9/L (ref 1.6–8.3)
NEUTROPHILS NFR BLD AUTO: 94 %
NITRATE UR QL: NEGATIVE
NRBC # BLD AUTO: 0 10*3/UL
NRBC BLD AUTO-RTO: 0 /100
PH UR STRIP: 7 PH (ref 5–7)
PHOSPHATE SERPL-MCNC: 2.9 MG/DL (ref 2.5–4.5)
PLATELET # BLD AUTO: 325 10E9/L (ref 150–450)
POTASSIUM SERPL-SCNC: 4 MMOL/L (ref 3.4–5.3)
POTASSIUM SERPL-SCNC: 5.3 MMOL/L (ref 3.4–5.3)
PROT SERPL-MCNC: 5.8 G/DL (ref 6.8–8.8)
PROT SERPL-MCNC: 7.7 G/DL (ref 6.8–8.8)
RBC # BLD AUTO: 4.04 10E12/L (ref 4.4–5.9)
RBC #/AREA URNS AUTO: 27 /HPF (ref 0–2)
SODIUM SERPL-SCNC: 135 MMOL/L (ref 133–144)
SODIUM SERPL-SCNC: 137 MMOL/L (ref 133–144)
SOURCE: ABNORMAL
SP GR UR STRIP: 1.01 (ref 1–1.03)
SPECIMEN SOURCE: NORMAL
SQUAMOUS #/AREA URNS AUTO: 0 /HPF (ref 0–1)
TROPONIN I SERPL-MCNC: <0.015 UG/L (ref 0–0.04)
UROBILINOGEN UR STRIP-MCNC: NORMAL MG/DL (ref 0–2)
VANCOMYCIN SERPL-MCNC: 13.6 MG/L
WBC # BLD AUTO: 21.3 10E9/L (ref 4–11)
WBC #/AREA URNS AUTO: 35 /HPF (ref 0–5)

## 2021-06-20 PROCEDURE — 80053 COMPREHEN METABOLIC PANEL: CPT | Performed by: STUDENT IN AN ORGANIZED HEALTH CARE EDUCATION/TRAINING PROGRAM

## 2021-06-20 PROCEDURE — 83605 ASSAY OF LACTIC ACID: CPT | Performed by: STUDENT IN AN ORGANIZED HEALTH CARE EDUCATION/TRAINING PROGRAM

## 2021-06-20 PROCEDURE — 250N000011 HC RX IP 250 OP 636: Performed by: FAMILY MEDICINE

## 2021-06-20 PROCEDURE — 83735 ASSAY OF MAGNESIUM: CPT | Performed by: STUDENT IN AN ORGANIZED HEALTH CARE EDUCATION/TRAINING PROGRAM

## 2021-06-20 PROCEDURE — 250N000011 HC RX IP 250 OP 636: Performed by: STUDENT IN AN ORGANIZED HEALTH CARE EDUCATION/TRAINING PROGRAM

## 2021-06-20 PROCEDURE — 120N000002 HC R&B MED SURG/OB UMMC

## 2021-06-20 PROCEDURE — 258N000003 HC RX IP 258 OP 636: Performed by: STUDENT IN AN ORGANIZED HEALTH CARE EDUCATION/TRAINING PROGRAM

## 2021-06-20 PROCEDURE — 71045 X-RAY EXAM CHEST 1 VIEW: CPT | Mod: 26 | Performed by: RADIOLOGY

## 2021-06-20 PROCEDURE — 255N000002 HC RX 255 OP 636: Performed by: FAMILY MEDICINE

## 2021-06-20 PROCEDURE — 93321 DOPPLER ECHO F-UP/LMTD STD: CPT

## 2021-06-20 PROCEDURE — 250N000013 HC RX MED GY IP 250 OP 250 PS 637: Performed by: STUDENT IN AN ORGANIZED HEALTH CARE EDUCATION/TRAINING PROGRAM

## 2021-06-20 PROCEDURE — 999N000065 XR CHEST PORT 1 VIEW

## 2021-06-20 PROCEDURE — 36592 COLLECT BLOOD FROM PICC: CPT | Performed by: FAMILY MEDICINE

## 2021-06-20 PROCEDURE — 36592 COLLECT BLOOD FROM PICC: CPT | Performed by: STUDENT IN AN ORGANIZED HEALTH CARE EDUCATION/TRAINING PROGRAM

## 2021-06-20 PROCEDURE — 80202 ASSAY OF VANCOMYCIN: CPT | Performed by: FAMILY MEDICINE

## 2021-06-20 PROCEDURE — 82565 ASSAY OF CREATININE: CPT | Performed by: STUDENT IN AN ORGANIZED HEALTH CARE EDUCATION/TRAINING PROGRAM

## 2021-06-20 PROCEDURE — 93325 DOPPLER ECHO COLOR FLOW MAPG: CPT | Mod: 26 | Performed by: INTERNAL MEDICINE

## 2021-06-20 PROCEDURE — 93308 TTE F-UP OR LMTD: CPT | Mod: 26 | Performed by: INTERNAL MEDICINE

## 2021-06-20 PROCEDURE — 85025 COMPLETE CBC W/AUTO DIFF WBC: CPT | Performed by: STUDENT IN AN ORGANIZED HEALTH CARE EDUCATION/TRAINING PROGRAM

## 2021-06-20 PROCEDURE — 258N000003 HC RX IP 258 OP 636: Performed by: FAMILY MEDICINE

## 2021-06-20 PROCEDURE — 93321 DOPPLER ECHO F-UP/LMTD STD: CPT | Mod: 26 | Performed by: INTERNAL MEDICINE

## 2021-06-20 PROCEDURE — 84100 ASSAY OF PHOSPHORUS: CPT | Performed by: STUDENT IN AN ORGANIZED HEALTH CARE EDUCATION/TRAINING PROGRAM

## 2021-06-20 RX ORDER — SODIUM CHLORIDE, SODIUM LACTATE, POTASSIUM CHLORIDE, CALCIUM CHLORIDE 600; 310; 30; 20 MG/100ML; MG/100ML; MG/100ML; MG/100ML
INJECTION, SOLUTION INTRAVENOUS CONTINUOUS
Status: DISCONTINUED | OUTPATIENT
Start: 2021-06-20 | End: 2021-06-22

## 2021-06-20 RX ORDER — NALOXONE HYDROCHLORIDE 0.4 MG/ML
0.4 INJECTION, SOLUTION INTRAMUSCULAR; INTRAVENOUS; SUBCUTANEOUS
Status: DISCONTINUED | OUTPATIENT
Start: 2021-06-20 | End: 2021-06-30 | Stop reason: HOSPADM

## 2021-06-20 RX ORDER — VARENICLINE TARTRATE 0.5 MG/1
1 TABLET, FILM COATED ORAL 2 TIMES DAILY
Status: DISCONTINUED | OUTPATIENT
Start: 2021-06-20 | End: 2021-06-30 | Stop reason: HOSPADM

## 2021-06-20 RX ORDER — BACLOFEN 10 MG/1
10 TABLET ORAL 4 TIMES DAILY
Status: DISCONTINUED | OUTPATIENT
Start: 2021-06-20 | End: 2021-06-30 | Stop reason: HOSPADM

## 2021-06-20 RX ORDER — MEROPENEM 1 G/1
1 INJECTION, POWDER, FOR SOLUTION INTRAVENOUS EVERY 8 HOURS
Status: DISCONTINUED | OUTPATIENT
Start: 2021-06-20 | End: 2021-06-20

## 2021-06-20 RX ORDER — CALCIUM CARBONATE 500(1250)
1 TABLET ORAL
Status: DISCONTINUED | OUTPATIENT
Start: 2021-06-20 | End: 2021-06-30 | Stop reason: HOSPADM

## 2021-06-20 RX ORDER — FAMOTIDINE 10 MG
10 TABLET ORAL 2 TIMES DAILY
Status: DISCONTINUED | OUTPATIENT
Start: 2021-06-20 | End: 2021-06-30 | Stop reason: HOSPADM

## 2021-06-20 RX ORDER — OXYCODONE HYDROCHLORIDE 5 MG/1
5 TABLET ORAL EVERY 4 HOURS PRN
Status: DISCONTINUED | OUTPATIENT
Start: 2021-06-20 | End: 2021-06-26

## 2021-06-20 RX ORDER — NALOXONE HYDROCHLORIDE 0.4 MG/ML
0.2 INJECTION, SOLUTION INTRAMUSCULAR; INTRAVENOUS; SUBCUTANEOUS
Status: DISCONTINUED | OUTPATIENT
Start: 2021-06-20 | End: 2021-06-30 | Stop reason: HOSPADM

## 2021-06-20 RX ORDER — TRAZODONE HYDROCHLORIDE 100 MG/1
100 TABLET ORAL AT BEDTIME
Status: DISCONTINUED | OUTPATIENT
Start: 2021-06-20 | End: 2021-06-30 | Stop reason: HOSPADM

## 2021-06-20 RX ORDER — PIPERACILLIN SODIUM, TAZOBACTAM SODIUM 3; .375 G/15ML; G/15ML
3.38 INJECTION, POWDER, LYOPHILIZED, FOR SOLUTION INTRAVENOUS EVERY 6 HOURS
Status: DISCONTINUED | OUTPATIENT
Start: 2021-06-20 | End: 2021-06-20

## 2021-06-20 RX ORDER — DULOXETIN HYDROCHLORIDE 60 MG/1
60 CAPSULE, DELAYED RELEASE ORAL DAILY
Status: DISCONTINUED | OUTPATIENT
Start: 2021-06-20 | End: 2021-06-30 | Stop reason: HOSPADM

## 2021-06-20 RX ORDER — HYDROMORPHONE HYDROCHLORIDE 1 MG/ML
0.5 INJECTION, SOLUTION INTRAMUSCULAR; INTRAVENOUS; SUBCUTANEOUS
Status: COMPLETED | OUTPATIENT
Start: 2021-06-20 | End: 2021-06-20

## 2021-06-20 RX ORDER — PREGABALIN 75 MG/1
150 CAPSULE ORAL 2 TIMES DAILY
Status: DISCONTINUED | OUTPATIENT
Start: 2021-06-20 | End: 2021-06-30 | Stop reason: HOSPADM

## 2021-06-20 RX ORDER — LOPERAMIDE HCL 2 MG
4 CAPSULE ORAL AT BEDTIME
Status: DISCONTINUED | OUTPATIENT
Start: 2021-06-20 | End: 2021-06-30 | Stop reason: HOSPADM

## 2021-06-20 RX ORDER — HYDROMORPHONE HYDROCHLORIDE 1 MG/ML
0.5 INJECTION, SOLUTION INTRAMUSCULAR; INTRAVENOUS; SUBCUTANEOUS
Status: DISCONTINUED | OUTPATIENT
Start: 2021-06-20 | End: 2021-06-20

## 2021-06-20 RX ORDER — PIPERACILLIN SODIUM, TAZOBACTAM SODIUM 3; .375 G/15ML; G/15ML
3.38 INJECTION, POWDER, LYOPHILIZED, FOR SOLUTION INTRAVENOUS EVERY 6 HOURS
Status: DISCONTINUED | OUTPATIENT
Start: 2021-06-20 | End: 2021-06-22

## 2021-06-20 RX ORDER — LIDOCAINE 40 MG/G
CREAM TOPICAL
Status: DISCONTINUED | OUTPATIENT
Start: 2021-06-20 | End: 2021-06-30 | Stop reason: HOSPADM

## 2021-06-20 RX ORDER — MULTIPLE VITAMINS W/ MINERALS TAB 9MG-400MCG
1 TAB ORAL DAILY
Status: DISCONTINUED | OUTPATIENT
Start: 2021-06-20 | End: 2021-06-30 | Stop reason: HOSPADM

## 2021-06-20 RX ORDER — ACETAMINOPHEN 325 MG/1
650 TABLET ORAL EVERY 6 HOURS
Status: DISCONTINUED | OUTPATIENT
Start: 2021-06-20 | End: 2021-06-26

## 2021-06-20 RX ORDER — ASCORBIC ACID 500 MG
500 TABLET ORAL 4 TIMES DAILY
Status: DISCONTINUED | OUTPATIENT
Start: 2021-06-20 | End: 2021-06-20

## 2021-06-20 RX ORDER — ONDANSETRON 4 MG/1
4 TABLET, ORALLY DISINTEGRATING ORAL EVERY 6 HOURS PRN
Status: DISCONTINUED | OUTPATIENT
Start: 2021-06-20 | End: 2021-06-30 | Stop reason: HOSPADM

## 2021-06-20 RX ADMIN — VARENICLINE TARTRATE 1 MG: 1 TABLET, FILM COATED ORAL at 20:29

## 2021-06-20 RX ADMIN — Medication 500 MG: at 11:00

## 2021-06-20 RX ADMIN — BACLOFEN 10 MG: 10 TABLET ORAL at 15:30

## 2021-06-20 RX ADMIN — Medication 500 MG: at 18:05

## 2021-06-20 RX ADMIN — ACETAMINOPHEN 650 MG: 325 TABLET, FILM COATED ORAL at 12:32

## 2021-06-20 RX ADMIN — PIPERACILLIN SODIUM AND TAZOBACTAM SODIUM 3.38 G: 3; .375 INJECTION, POWDER, LYOPHILIZED, FOR SOLUTION INTRAVENOUS at 12:32

## 2021-06-20 RX ADMIN — DULOXETINE HYDROCHLORIDE 60 MG: 60 CAPSULE, DELAYED RELEASE ORAL at 08:03

## 2021-06-20 RX ADMIN — SODIUM CHLORIDE, POTASSIUM CHLORIDE, SODIUM LACTATE AND CALCIUM CHLORIDE 1000 ML: 600; 310; 30; 20 INJECTION, SOLUTION INTRAVENOUS at 00:28

## 2021-06-20 RX ADMIN — ONDANSETRON 4 MG: 4 TABLET, ORALLY DISINTEGRATING ORAL at 09:09

## 2021-06-20 RX ADMIN — SODIUM CHLORIDE, POTASSIUM CHLORIDE, SODIUM LACTATE AND CALCIUM CHLORIDE: 600; 310; 30; 20 INJECTION, SOLUTION INTRAVENOUS at 14:19

## 2021-06-20 RX ADMIN — PREGABALIN 150 MG: 75 CAPSULE ORAL at 20:29

## 2021-06-20 RX ADMIN — OXYCODONE HYDROCHLORIDE 5 MG: 5 TABLET ORAL at 09:09

## 2021-06-20 RX ADMIN — FAMOTIDINE 10 MG: 10 TABLET, FILM COATED ORAL at 11:00

## 2021-06-20 RX ADMIN — VANCOMYCIN HYDROCHLORIDE 1250 MG: 10 INJECTION, POWDER, LYOPHILIZED, FOR SOLUTION INTRAVENOUS at 20:32

## 2021-06-20 RX ADMIN — SODIUM CHLORIDE, POTASSIUM CHLORIDE, SODIUM LACTATE AND CALCIUM CHLORIDE 1000 ML: 600; 310; 30; 20 INJECTION, SOLUTION INTRAVENOUS at 03:45

## 2021-06-20 RX ADMIN — SODIUM CHLORIDE: 900 INJECTION, SOLUTION INTRAVENOUS at 02:42

## 2021-06-20 RX ADMIN — ACETAMINOPHEN 650 MG: 325 TABLET, FILM COATED ORAL at 18:05

## 2021-06-20 RX ADMIN — BACLOFEN 10 MG: 10 TABLET ORAL at 20:29

## 2021-06-20 RX ADMIN — FAMOTIDINE 10 MG: 10 TABLET, FILM COATED ORAL at 20:31

## 2021-06-20 RX ADMIN — OXYCODONE HYDROCHLORIDE 5 MG: 5 TABLET ORAL at 20:29

## 2021-06-20 RX ADMIN — ACETAMINOPHEN 650 MG: 325 TABLET, FILM COATED ORAL at 02:40

## 2021-06-20 RX ADMIN — VARENICLINE TARTRATE 1 MG: 1 TABLET, FILM COATED ORAL at 08:04

## 2021-06-20 RX ADMIN — Medication 1 TABLET: at 08:03

## 2021-06-20 RX ADMIN — HYDROMORPHONE HYDROCHLORIDE 0.5 MG: 1 INJECTION, SOLUTION INTRAMUSCULAR; INTRAVENOUS; SUBCUTANEOUS at 14:01

## 2021-06-20 RX ADMIN — PIPERACILLIN SODIUM AND TAZOBACTAM SODIUM 3.38 G: 3; .375 INJECTION, POWDER, LYOPHILIZED, FOR SOLUTION INTRAVENOUS at 02:41

## 2021-06-20 RX ADMIN — PREGABALIN 150 MG: 75 CAPSULE ORAL at 08:03

## 2021-06-20 RX ADMIN — Medication 500 MG: at 08:04

## 2021-06-20 RX ADMIN — BISMUTH SUBSALICYLATE 30 ML: 262 LIQUID ORAL at 08:04

## 2021-06-20 RX ADMIN — BACLOFEN 10 MG: 10 TABLET ORAL at 08:03

## 2021-06-20 RX ADMIN — SODIUM CHLORIDE, POTASSIUM CHLORIDE, SODIUM LACTATE AND CALCIUM CHLORIDE 500 ML: 600; 310; 30; 20 INJECTION, SOLUTION INTRAVENOUS at 04:46

## 2021-06-20 RX ADMIN — LOPERAMIDE HYDROCHLORIDE 4 MG: 2 CAPSULE ORAL at 02:41

## 2021-06-20 RX ADMIN — SODIUM CHLORIDE, POTASSIUM CHLORIDE, SODIUM LACTATE AND CALCIUM CHLORIDE: 600; 310; 30; 20 INJECTION, SOLUTION INTRAVENOUS at 06:10

## 2021-06-20 RX ADMIN — ACETAMINOPHEN 650 MG: 325 TABLET, FILM COATED ORAL at 08:03

## 2021-06-20 RX ADMIN — BACLOFEN 10 MG: 10 TABLET ORAL at 11:00

## 2021-06-20 RX ADMIN — TRAZODONE HYDROCHLORIDE 100 MG: 50 TABLET ORAL at 02:41

## 2021-06-20 RX ADMIN — SODIUM CHLORIDE, POTASSIUM CHLORIDE, SODIUM LACTATE AND CALCIUM CHLORIDE: 600; 310; 30; 20 INJECTION, SOLUTION INTRAVENOUS at 22:49

## 2021-06-20 RX ADMIN — MEROPENEM 1 G: 1 INJECTION, POWDER, FOR SOLUTION INTRAVENOUS at 05:27

## 2021-06-20 RX ADMIN — ENOXAPARIN SODIUM 40 MG: 40 INJECTION SUBCUTANEOUS at 10:59

## 2021-06-20 RX ADMIN — PIPERACILLIN SODIUM AND TAZOBACTAM SODIUM 3.38 G: 3; .375 INJECTION, POWDER, LYOPHILIZED, FOR SOLUTION INTRAVENOUS at 18:05

## 2021-06-20 RX ADMIN — HUMAN ALBUMIN MICROSPHERES AND PERFLUTREN 6 ML: 10; .22 INJECTION, SOLUTION INTRAVENOUS at 11:59

## 2021-06-20 ASSESSMENT — ACTIVITIES OF DAILY LIVING (ADL)
DIFFICULTY_EATING/SWALLOWING: NO
DRESSING/BATHING_DIFFICULTY: YES
FALL_HISTORY_WITHIN_LAST_SIX_MONTHS: NO
TOILETING_ASSISTANCE: TOILETING DIFFICULTY, DEPENDENT
DRESSING/BATHING: DRESSING DIFFICULTY, DEPENDENT
CONCENTRATING,_REMEMBERING_OR_MAKING_DECISIONS_DIFFICULTY: NO
WALKING_OR_CLIMBING_STAIRS: TRANSFERRING DIFFICULTY, DEPENDENT
DOING_ERRANDS_INDEPENDENTLY_DIFFICULTY: YES
TOILETING_ISSUES: YES
DIFFICULTY_COMMUNICATING: NO
WEAR_GLASSES_OR_BLIND: NO
WALKING_OR_CLIMBING_STAIRS_DIFFICULTY: YES
EQUIPMENT_CURRENTLY_USED_AT_HOME: WHEELCHAIR, POWER

## 2021-06-20 ASSESSMENT — MIFFLIN-ST. JEOR: SCORE: 1402.33

## 2021-06-20 NOTE — ED NOTES
At approx 2320 writer at bedside, pt started to hyperventilate and have a panic attack, pt had mottled appearing skin and cyanotic around his lips. Pt was tachypneic in high 20s, tachycardic in 120-130s and diaphoretic. Pts oxygen was in the 80's on RA. Pt placed on oxymask 10L. During this time Dr. Stein and Dr. Cain at bedside assessing pt. Pt was c/o right sided flank pain and nausea. IM dilaudid and zofran given per providers orders (see MAR). Pt was able to calm down once IM dilaudid was given, respirations returned to low 20's and was able to keep oxygen saturations above 92% on RA, and cyanosis resolved. Was central line is accessed plan to give a liter of fluids. Will continue to monitor very closely and update providers with any changes.

## 2021-06-20 NOTE — PRE-PROCEDURE
GENERAL PRE-PROCEDURE:   Procedure:  Right PNT placement  Date/Time:  6/19/2021 9:55 PM    Written consent obtained?: Yes    Risks and benefits: Risks, benefits and alternatives were discussed    Consent given by:  Patient  Patient states understanding of procedure being performed: Yes    Patient's understanding of procedure matches consent: Yes    Procedure consent matches procedure scheduled: Yes    Expected level of sedation:  Moderate  Appropriately NPO:  Yes  ASA Class:  Class 2- mild systemic disease, no acute problems, no functional limitations  Mallampati  :  Grade 2- soft palate, base of uvula, tonsillar pillars, and portion of posterior pharyngeal wall visible  Lungs:  Lungs clear with good breath sounds bilaterally  Heart:  Normal heart sounds and rate  History & Physical reviewed:  History and physical reviewed and no updates needed  Statement of review:  I have reviewed the lab findings, diagnostic data, medications, and the plan for sedation

## 2021-06-20 NOTE — ED NOTES
Narrative: Central Line Placement     PROCEDURE:  Central Line Placement with Ultrasound Guidance.      Indications: Vascular access      Consent:                Risks (including but not limited to: pneumothorax,  bleeding, infection or artery puncture), benefits and alternatives were discussed with  patient and consent for procedure was obtained.      Timeout:                 Universal protocol was followed. TIME OUT     conducted just prior to starting procedure confirmed patient identity,    site/side, procedure, patient position, and availability of correct equipment    and implants.?  Yes      Medication:            Lidocaine      Procedure note:      Right Internal Jugular approach was selected and the right anterior neck was prepped, cleansed and    draped in a sterile fashion.  Mask, gown and gloves were used per sterile  protocol.  Patient was then placed into Trendelenburg position and lidocaine was used for local anesthesia.  Landmarks were identified and Vascular probe with ultrasound was used in a sterile fashion for guidence.  Introducer needle was then used to gain access to the central venous circulation.  Using Seldinger technique the  Triple lumen catheter was placed.  Catheter port(s) were aspirated and flushed.  Central line was sutured in place and sterile dressing applied.     Appropriate placement was confirmed by x-ray and no pneumothoraxwas visualized.      Patient Status:        Patient tolerated the procedure   well. There were  no complications.               Renny Vargas MD  06/20/21 0054

## 2021-06-20 NOTE — ED NOTES
Delay in IV fluids and IV antibiotics due to loss of IV access. IV team at bedside attempting to place IV. Once there is access will restart IV access and begin IV antibiotics.

## 2021-06-20 NOTE — ED NOTES
Writer went to give pt IV dilaudid and pt no longer in room. IR team transported pt to IR for nephrology tube placement.

## 2021-06-20 NOTE — ED NOTES
PIV placed by vascular access team. IV fluids restarted and IV vancomycin started. Pt c/o right side flank pain and spasms. Dr. Cain notified of pt request and will place order for IV pain medication.

## 2021-06-20 NOTE — ED NOTES
Dr. Cain notified of critical lactic level of 5.1 and that PIV access was lost again. Plan for provider to place internal jugular.
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
## 2021-06-20 NOTE — PHARMACY-VANCOMYCIN DOSING SERVICE
"Pharmacy Vancomycin Initial Note  Date of Service 2021  Patient's  1965  56 year old, male    Indication: Sepsis and Urinary Tract Infection    Current estimated CrCl = Estimated Creatinine Clearance: 73.8 mL/min (based on SCr of 0.98 mg/dL).    Creatinine for last 3 days  2021:  5:26 PM Creatinine 0.98 mg/dL    Recent Vancomycin Level(s) for last 3 days  No results found for requested labs within last 72 hours.      Vancomycin IV Administrations (past 72 hours)      No vancomycin orders with administrations in past 72 hours.                Nephrotoxins and other renal medications (From now, onward)    Start     Dose/Rate Route Frequency Ordered Stop    21 0800  vancomycin (VANCOCIN) 750 mg in sodium chloride 0.9 % 250 mL intermittent infusion      750 mg  over 90 Minutes Intravenous EVERY 12 HOURS 21 19121 1830  piperacillin-tazobactam (ZOSYN) 3.375 g vial to attach to  mL bag     Note to Pharmacy: For [facility], [facility] and [facility]: For Zosyn-naive patients, use the \"Zosyn initial dose + extended infusion\" order panel.    3.375 g  over 30 Minutes Intravenous ONCE 21 1827      21 1830  vancomycin 1500 mg in 0.9% NaCl 250 ml intermittent infusion 1,500 mg      1,500 mg  over 90 Minutes Intravenous ONCE 21 1829            Contrast Orders - past 72 hours (72h ago, onward)    None          Loading dose: 1500 mg at 00:00 2021.  Regimen: 750 mg every 12 hours for 3 doses.  Start time: 19:17 on 2021  Exposure target: AUC24 (range)400-600 mg/L.hr   AUC24,ss: 484 mg/L.hr  PAUC*: 70 %  Ctrough,ss: 15.5 mg/L  Pconc*: 28 %  Tox.: 11 %          Plan:  1. Give vancomycin 1500mg IV x1 now, then start vancomycin 750mg IV q12 hours  2. Vancomycin monitoring method: AUC  3. Vancomycin therapeutic monitoring goal: 400-600 mg*h/L  4. Pharmacy will check vancomycin levels as appropriate in 1-3 Days.    5. Serum creatinine levels will be ordered daily for the first week " of therapy and at least twice weekly for subsequent weeks.      Urbano eLon, PharmD, BCPS
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
## 2021-06-20 NOTE — PROGRESS NOTES
Patient Name: Parth Fountain  Medical Record Number: [id number]  Today's Date: 6/19/2021    Procedure: nephrostomy tube placement  Right   Proceduralist: Dr. SYMONE Meng, Dr. ROBERT Dick    Patient in room: 2135  Procedure Start: 2200  Procedure end:  2216  Sedation medications administered: 1 mg Versed, 50 mcg Fentanyl.  Patient out of room: 2225    Report given to: BRAYDON Mccormick    Other Notes: Pt arrived to IR room 1 from ed 4. Consent reviewed. Pt denies any questions or concerns regarding procedure. Pt positioned supine and monitored per protocol. Pt tolerated procedure without any noted complications. Pt transferred back to ED 4 with RN

## 2021-06-20 NOTE — PHARMACY-VANCOMYCIN DOSING SERVICE
Pharmacy Vancomycin Note  Date of Service 2021  Patient's  1965   56 year old, male    Indication: Sepsis and Urinary Tract Infection  Day of Therapy: 2  Current vancomycin regimen:  1500 mg IV x1 loading dose, followed by 750 mg IV q12h (patient only received loading dose prior to this level)  Current vancomycin monitoring method: AUC  Current vancomycin therapeutic monitoring goal: 400-600 mg*h/L    Current estimated CrCl = Estimated Creatinine Clearance: 57.4 mL/min (A) (based on SCr of 1.3 mg/dL (H)).    Creatinine for last 3 days  2021:  5:26 PM Creatinine 0.98 mg/dL; 11:20 PM Creatinine 1.28 mg/dL  2021:  4:06 AM Creatinine 1.29 mg/dL;  9:40 AM Creatinine 1.30 mg/dL    Recent Vancomycin Levels (past 3 days)  2021:  9:40 AM Vancomycin Level 13.6 mg/L    Vancomycin IV Administrations (past 72 hours)                   vancomycin 1500 mg in 0.9% NaCl 250 ml intermittent infusion 1,500 mg (mg) 1,500 mg New Bag 21 212                Nephrotoxins and other renal medications (From now, onward)    Start     Dose/Rate Route Frequency Ordered Stop    21 2130  vancomycin 1250 mg in 0.9% NaCl 250 mL intermittent infusion 1,250 mg      1,250 mg  over 90 Minutes Intravenous EVERY 24 HOURS 21 1039               Contrast Orders - past 72 hours (72h ago, onward)    Start     Dose/Rate Route Frequency Ordered Stop    21 2140  iodixanol (VISIPAQUE 320) injection 50 mL      50 mL NEPHROSTOMY TUBE ONCE 21 2220          Interpretation of levels and current regimen:  Vancomycin level is reflective of AUC greater than 600    Has serum creatinine changed greater than 50% in last 72 hours: No     Urine output:  diminished urine output    Renal Function: Worsening    Loading dose: N/A  Regimen: 1250 mg every 24 hours for 4 doses.  Start time: 22:37 on 2021  Exposure target: AUC24 (range)400-600 mg/L.hr   AUC24,ss: 536 mg/L.hr  PAUC*: 93 %  Ctrough,ss: 15.2  mg/L  Pconc*: 17 %  Tox.: 10 %    Plan:  1. Change dose to vancomycin 1250 mg IV q24h  2. Vancomycin monitoring method: AUC  3. Vancomycin therapeutic monitoring goal: 400-600 mg*h/L  4. Pharmacy will check vancomycin levels as appropriate in 1-3 Days.  5. Serum creatinine levels will be ordered daily for the first week of therapy and at least twice weekly for subsequent weeks.    Eve Chavira, PharmD Resident
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
## 2021-06-20 NOTE — ED NOTES
Dr. Cain at bedside inserting peripheral internal jugular due to loss of PIV and need for IV fluids STAT for critical lactic level.

## 2021-06-20 NOTE — ED NOTES
PIV placed by IV team infiltrated instantly. IV team placed a 22g in left lower forearm that is now infusing with no difficulties. IV fluids and antibiotics restarted. Will continue to monitor closely. Dr. Cain is aware of the delay in treatment. Istat INR completed and is within normal range, Dr. Cain is aware.

## 2021-06-20 NOTE — PROGRESS NOTES
"Addendum  Repeat blood pressure at 4:15 /51. This is 3/4 way through 1L bolus LR.  Shirlene Stein MD 4:22 AM      Brief Prog Note    Assessed patient at bedside (saw BP was 108/92). He was resting comfortably. I awoke him to repeat his pressure, which was 80s systolic on multiple attempts w/ MAPS >60 in both arms. Parth is feeling well right now and doesn't have any complaints.     Exam:   Vital signs:  Temp: 98.6  F (37  C) Temp src: Oral BP: (!) 75/64 Pulse: 104   Resp: 18 SpO2: 97 % O2 Device: None (Room air) Oxygen Delivery: 10 LPM Height: 165.1 cm (5' 5\") Weight: 65.5 kg (144 lb 8 oz)  Estimated body mass index is 24.05 kg/m  as calculated from the following:    Height as of this encounter: 1.651 m (5' 5\").    Weight as of this encounter: 65.5 kg (144 lb 8 oz).    Appears quite well.   Oriented to person, place, time, and event.   /bright spirits. Appropriately interactive.   Mildly tachycardic (but improved from prior).   Good cap refill.   Diaphoretic still.     Assessment/Plan:   Severe sepsis and currently hypotensive (new) but w/ MAPS >60. Repeat lactic acid hasn't been drawn yet- I spoke w/ nurse and she'll be calling lab about this. Patient looks quite well clinically Currently running 1L LR (note-- fluid resuscitation has been very interrupted d/t difficulty w/ access and then transferring upstairs. After this liter LR, he'll have received a total of 2.5L, granted it'll be over ~11 hours. Still dry on exam, so can bolus more fluids PRN. Continue checking pressures q15min. If MAPs fall  <60 or patient has new altered mentation, would talk w/ ICU about peripheral pressor vs broadening antibiotic coverage.     - Follow-up lactic acid (lab at bedside)  - Finish 1L LR  - Serial Bps q15 min; RN checking another right now- will page me if MAP <60 (goal MAP >60)  - Recheck patient in ~1 hour, or sooner as needed  - Broaden antibiotic coverage-- stop zosyn; start meropenem    Shirlene Stein MD "

## 2021-06-20 NOTE — PROGRESS NOTES
1/2 Blood cultures from 6/19/21 positive for GNR on 1st day.  Klebsiella pneumoniae bacteremia. Likely from urinary source.   - continue vanc/zosyn  - daily BCX until clearing  - susceptibilities pending    Renea Hurst MD
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
## 2021-06-20 NOTE — PLAN OF CARE
Major Shift Events: Received pt from ER at 0145, patient diaphoretic HR sinus tach, BP systolic 110's, RA 99% 02 saturation. Patient's BP at 0330 dropped to 70's systolic. Patient received fluid bolus total of 1.5L, BP systolic remained in the 110's after with MAP's above 60. Urostomy and Nephrostomy bags draining adequate output, patient denies pain, temp 98.6. Lactic Acid now 1.4    2 nurse skin assessment complete with primary RN and CARIDAD Hernandez    Refer to flow sheets for vitals and complete assessment.

## 2021-06-20 NOTE — PLAN OF CARE
Problem: Adult Inpatient Plan of Care  Goal: Absence of Hospital-Acquired Illness or Injury  Intervention: Identify and Manage Fall Risk  Recent Flowsheet Documentation  Taken 6/20/2021 1200 by Kaity Ambrose RN  Safety Promotion/Fall Prevention:   bed alarm on   fall prevention program maintained   safety round/check completed   room organization consistent  Taken 6/20/2021 0800 by Kaity Ambrose RN  Safety Promotion/Fall Prevention:   bed alarm on   fall prevention program maintained   safety round/check completed   room organization consistent  Intervention: Prevent Skin Injury  Recent Flowsheet Documentation  Taken 6/20/2021 1200 by Kaity Ambrose RN  Body Position: position changed independently  Taken 6/20/2021 0800 by Kaity Ambrose RN  Body Position: position changed independently  Intervention: Prevent and Manage VTE (Venous Thromboembolism) Risk  Recent Flowsheet Documentation  Taken 6/20/2021 1200 by Kaity Ambrose RN  VTE Prevention/Management:   anticoagulant therapy maintained   PROM (passive range of motion) performed   AROM (active range of motion) performed  Taken 6/20/2021 0800 by Kaity Ambrose RN  VTE Prevention/Management:   anticoagulant therapy maintained   PROM (passive range of motion) performed   AROM (active range of motion) performed  Intervention: Prevent Infection  Recent Flowsheet Documentation  Taken 6/20/2021 1200 by Kaity Ambrose RN  Infection Prevention:   environmental surveillance performed   rest/sleep promoted   personal protective equipment utilized   hand hygiene promoted   equipment surfaces disinfected   single patient room provided  Taken 6/20/2021 0800 by Kaity Ambrose RN  Infection Prevention:   environmental surveillance performed   rest/sleep promoted   personal protective equipment utilized   hand hygiene promoted   equipment surfaces disinfected   single patient room provided     Problem: Adjustment to Illness (Sepsis/Septic Shock)  Goal: Optimal  Coping  Outcome: Improving     Problem: Infection Progression (Sepsis)  Goal: Absence of Infection Signs and Symptoms  Outcome: Improving  Intervention: Initiate Sepsis Management  Recent Flowsheet Documentation  Taken 6/20/2021 1200 by Kaity Ambrose RN  Infection Prevention:   environmental surveillance performed   rest/sleep promoted   personal protective equipment utilized   hand hygiene promoted   equipment surfaces disinfected   single patient room provided  Taken 6/20/2021 0800 by Kaity Ambrose RN  Infection Prevention:   environmental surveillance performed   rest/sleep promoted   personal protective equipment utilized   hand hygiene promoted   equipment surfaces disinfected   single patient room provided  Intervention: Promote Recovery  Recent Flowsheet Documentation  Taken 6/20/2021 1200 by Kaity Ambrose RN  Activity Management: activity adjusted per tolerance  Taken 6/20/2021 0800 by Kaity Ambrose RN  Activity Management: activity adjusted per tolerance     Problem: Nutrition Impaired (Sepsis/Septic Shock)  Goal: Optimal Nutrition Intake  Outcome: Improving   Pt  A&Ox4, able to verbalize needs. Pt O2 saturation 94% on RA. VSS, sinus tach on monitor (see chart for details). Pt paraplegic PTA, able to reposition self. Pt nephrostomy and urostomy tubes patent, see charst for volumes. Plan of care to continue IV fluids, IV ABX, monitor VS closely,  manage pain, and drain care discussed with pt. Pt verbalized  understanding. All questions and concerns addressed at this time. Will continue to monitor and note all changes.

## 2021-06-20 NOTE — PROCEDURES
[facility]    Procedure: Right PNT placement    Date/Time: 6/19/2021 10:24 PM  Performed by: Korey Meng MD  Authorized by: Korey Meng MD   IR Fellow Physician: Korey Meng    UNIVERSAL PROTOCOL   Site Marked: NA  Prior Images Obtained and Reviewed:  Yes  Required items: Required blood products, implants, devices and special equipment available    Patient identity confirmed:  Verbally with patient, arm band, provided demographic data and hospital-assigned identification number  Patient was reevaluated immediately before administering moderate or deep sedation or anesthesia  Confirmation Checklist:  Patient's identity using two indicators, relevant allergies, procedure was appropriate and matched the consent or emergent situation and correct equipment/implants were available  Time out: Immediately prior to the procedure a time out was called    Universal Protocol: the Joint Commission Universal Protocol was followed    Preparation: Patient was prepped and draped in usual sterile fashion    ESBL (mL):  1         ANESTHESIA    Anesthesia: Local infiltration  Local Anesthetic:  Lidocaine 1% without epinephrine  Anesthetic Total (mL):  10      SEDATION    Patient Sedated: Yes    Sedation Type:  Moderate (conscious) sedation  Vital signs: Vital signs monitored during sedation    See dictated procedure note for full details.  Findings: 8F PNT placed in right kidney.   Purulent urine aspirated and sent to lab for analysis    Specimens: none    Complications: None    Condition: Stable    Plan: Right PNT to gravity drainage    PROCEDURE   Patient Tolerance:  Patient tolerated the procedure well with no immediate complications    Length of time physician/provider present for 1:1 monitoring during sedation: 20

## 2021-06-20 NOTE — PHARMACY-ADMISSION MEDICATION HISTORY
Admission Medication History Completed by Pharmacy    See [facility] Admission Navigator for allergy information, preferred outpatient pharmacy, prior to admission medications and immunization status.     Medication History Sources:     Patient, Dispense Report    Changes made to PTA medication list (reason):    Added:  o None    Deleted:  o Aspirin Adult 325 mg tablet (duplicate)  o Calcium carbonate 500 mg, elemental, (OSCAL) 500 mg tablet (duplicate)  o Ensure Nutrition Shake - Take 1 bottle PO TID (with meals)  o Sulfamethoxasole-trimethoprim (BACTRIM DS) 800-160 mg tablet  o Tizanidine 2 mg tablet - Take 2 mg PO TID  o Vitamin C (Ascorbic Acid) 500 mg Tablet (duplicate)    Changed:   o Baclofen 20 mg tablet, take 20 mg PO QID -> Baclofen 10 mg tablet, take 10 mg PO QID  o Chantix 1 mg tablet, no instructions -> take 1 mg PO BID  o Loperamide 2 mg capsule, take 4 mg PO with 1st loose stool then 2 mg with each additional loose stool -> take 4 mg PO at bedtime    Additional Information:    Patient was a reliable historian.    Prior to Admission medications    Medication Sig Last Dose Taking? Auth Provider   ACETAMINOPHEN EXTRA STRENGTH 500 MG tablet 500-1,000 mg every 6 hours as needed for mild pain or fever  6/19/2021 at 1300 Yes Reported, Patient   Ascorbic Acid (VITAMIN C CR PO) Take 500 mg by mouth 4 times daily. 6/19/2021 at AM Yes Reported, Patient   aspirin 325 MG tablet Take 325 mg by mouth daily. 6/19/2021 at 0730 Yes Reported, Patient   baclofen (LIORESAL) 10 MG tablet Take 10 mg by mouth 4 times daily  6/19/2021 at 1200 Yes Reported, Patient   Bismuth Subsalicylate 525 MG/15ML SUSP Take 30 mLs by mouth every morning  6/19/2021 at AM Yes Reported, Patient   calcium carbonate 500 mg, elemental, (OSCAL 500) 1250 (500 Ca) MG TABS tablet Take 1 tablet by mouth every 8 hours 6/19/2021 at AM Yes Reported, Patient   CERTAVITE/ANTIOXIDANTS tablet Take 1 tablet by mouth daily  6/19/2021 at AM Yes Reported, Patient    CHANTIX 1 MG tablet Take 1 mg by mouth 2 times daily  6/19/2021 at AM Yes Reported, Patient   cholecalciferol (VITAMIN D3) 10 mcg (400 units) TABS tablet TAKE 400IU (1 TABLET) BY MOUTH 4 TIMES A DAY. 6/19/2021 at AM Yes Reported, Patient   DULoxetine (CYMBALTA) 60 MG capsule Take 60 mg by mouth daily  6/19/2021 at AM Yes Reported, Patient   loperamide (IMODIUM) 2 MG capsule Take 4 mg by mouth At Bedtime  6/18/2021 at PM Yes Reported, Patient   LYRICA 150 MG capsule Take 150 mg by mouth 2 times daily  6/19/2021 at AM Yes Reported, Patient   Skin Protectants, Misc. (EUCERIN) cream Apply 0.5 inches topically as needed for dry skin  Past Month Yes Reported, Patient   TRAZodone (DESYREL) 100 MG tablet Take 100 mg by mouth At Bedtime. 6/18/2021 at PM Yes Reported, Patient   Disposable Gloves (VINYL GLOVES ONE SIZE) MISC Size Large. For ostomy use. For home use.   Reported, Patient     Date completed: 06/19/21    Medication history completed by: Ericka Green

## 2021-06-20 NOTE — ED NOTES
Pts third PIV infiltrated. Dr. Cain notified that pt has only received 1/4 of a liter of IV fluid bolus and one dose of IV zosyn. Writer notified PICC RN and was told that they do not place PICC lines on the weekend. Dr. Cain is aware and will attempt to place internal jugular.

## 2021-06-20 NOTE — ED NOTES
Pt back to ED rm 4 from IR. Pt is vitally stable, alert and oriented x4. IV infusing with fluids and vancomycin. Nephrology tube insertion site is clean, dry and intact. Pt denies any pain, will continue to monitor closely. Waiting for inpatient bed assignment.

## 2021-06-21 ENCOUNTER — APPOINTMENT (OUTPATIENT)
Dept: GENERAL RADIOLOGY | Facility: CLINIC | Age: 56
DRG: 871 | End: 2021-06-21
Payer: MEDICARE

## 2021-06-21 ENCOUNTER — APPOINTMENT (OUTPATIENT)
Dept: SPEECH THERAPY | Facility: CLINIC | Age: 56
DRG: 871 | End: 2021-06-21
Attending: STUDENT IN AN ORGANIZED HEALTH CARE EDUCATION/TRAINING PROGRAM
Payer: MEDICARE

## 2021-06-21 LAB
ALBUMIN SERPL-MCNC: 2.9 G/DL (ref 3.4–5)
ALP SERPL-CCNC: 85 U/L (ref 40–150)
ALT SERPL W P-5'-P-CCNC: 24 U/L (ref 0–70)
ANION GAP SERPL CALCULATED.3IONS-SCNC: 8 MMOL/L (ref 3–14)
AST SERPL W P-5'-P-CCNC: 33 U/L (ref 0–45)
BACTERIA SPEC CULT: NORMAL
BACTERIA SPEC CULT: NORMAL
BASOPHILS # BLD AUTO: 0 10E9/L (ref 0–0.2)
BASOPHILS NFR BLD AUTO: 0.1 %
BILIRUB SERPL-MCNC: 0.5 MG/DL (ref 0.2–1.3)
BUN SERPL-MCNC: 24 MG/DL (ref 7–30)
C PNEUM DNA SPEC QL NAA+PROBE: NOT DETECTED
CALCIUM SERPL-MCNC: 10.5 MG/DL (ref 8.5–10.1)
CHLORIDE SERPL-SCNC: 104 MMOL/L (ref 94–109)
CO2 SERPL-SCNC: 20 MMOL/L (ref 20–32)
CREAT SERPL-MCNC: 1.05 MG/DL (ref 0.66–1.25)
CRP SERPL-MCNC: 460 MG/L (ref 0–8)
DIFFERENTIAL METHOD BLD: ABNORMAL
EOSINOPHIL # BLD AUTO: 0 10E9/L (ref 0–0.7)
EOSINOPHIL NFR BLD AUTO: 0.3 %
ERYTHROCYTE [DISTWIDTH] IN BLOOD BY AUTOMATED COUNT: 14.1 % (ref 10–15)
FLUAV H1 2009 PAND RNA SPEC QL NAA+PROBE: NOT DETECTED
FLUAV H1 RNA SPEC QL NAA+PROBE: NOT DETECTED
FLUAV H3 RNA SPEC QL NAA+PROBE: NOT DETECTED
FLUAV RNA SPEC QL NAA+PROBE: NOT DETECTED
FLUBV RNA SPEC QL NAA+PROBE: NOT DETECTED
GFR SERPL CREATININE-BSD FRML MDRD: 76 ML/MIN/{1.73_M2}
GLUCOSE BLDC GLUCOMTR-MCNC: 126 MG/DL (ref 70–99)
GLUCOSE BLDC GLUCOMTR-MCNC: 135 MG/DL (ref 70–99)
GLUCOSE BLDC GLUCOMTR-MCNC: 71 MG/DL (ref 70–99)
GLUCOSE BLDC GLUCOMTR-MCNC: 96 MG/DL (ref 70–99)
GLUCOSE BLDC GLUCOMTR-MCNC: 98 MG/DL (ref 70–99)
GLUCOSE SERPL-MCNC: 41 MG/DL (ref 70–99)
HADV DNA SPEC QL NAA+PROBE: NOT DETECTED
HCOV PNL SPEC NAA+PROBE: NOT DETECTED
HCT VFR BLD AUTO: 42.4 % (ref 40–53)
HGB BLD-MCNC: 13.4 G/DL (ref 13.3–17.7)
HMPV RNA SPEC QL NAA+PROBE: NOT DETECTED
HPIV1 RNA SPEC QL NAA+PROBE: NOT DETECTED
HPIV2 RNA SPEC QL NAA+PROBE: NOT DETECTED
HPIV3 RNA SPEC QL NAA+PROBE: NOT DETECTED
HPIV4 RNA SPEC QL NAA+PROBE: NOT DETECTED
IMM GRANULOCYTES # BLD: 0.1 10E9/L (ref 0–0.4)
IMM GRANULOCYTES NFR BLD: 0.8 %
INTERPRETATION ECG - MUSE: NORMAL
LACTATE BLD-SCNC: 1.1 MMOL/L (ref 0.7–2)
LACTATE BLD-SCNC: 2.4 MMOL/L (ref 0.7–2)
LACTATE BLD-SCNC: 2.5 MMOL/L (ref 0.7–2)
LYMPHOCYTES # BLD AUTO: 0.4 10E9/L (ref 0.8–5.3)
LYMPHOCYTES NFR BLD AUTO: 2.8 %
M PNEUMO DNA SPEC QL NAA+PROBE: NOT DETECTED
MAGNESIUM SERPL-MCNC: 2.1 MG/DL (ref 1.6–2.3)
MCH RBC QN AUTO: 27.9 PG (ref 26.5–33)
MCHC RBC AUTO-ENTMCNC: 31.6 G/DL (ref 31.5–36.5)
MCV RBC AUTO: 88 FL (ref 78–100)
MICROBIOLOGIST REVIEW: ABNORMAL
MONOCYTES # BLD AUTO: 0.4 10E9/L (ref 0–1.3)
MONOCYTES NFR BLD AUTO: 2.6 %
NEUTROPHILS # BLD AUTO: 13.6 10E9/L (ref 1.6–8.3)
NEUTROPHILS NFR BLD AUTO: 93.4 %
NRBC # BLD AUTO: 0 10*3/UL
NRBC BLD AUTO-RTO: 0 /100
PHOSPHATE SERPL-MCNC: 3 MG/DL (ref 2.5–4.5)
PLATELET # BLD AUTO: 265 10E9/L (ref 150–450)
POTASSIUM SERPL-SCNC: 4.3 MMOL/L (ref 3.4–5.3)
PROT SERPL-MCNC: 7.4 G/DL (ref 6.8–8.8)
RBC # BLD AUTO: 4.81 10E12/L (ref 4.4–5.9)
RSV RNA SPEC QL NAA+PROBE: NOT DETECTED
RSV RNA SPEC QL NAA+PROBE: NOT DETECTED
RV+EV RNA SPEC QL NAA+PROBE: ABNORMAL
SODIUM SERPL-SCNC: 132 MMOL/L (ref 133–144)
SPECIMEN SOURCE: NORMAL
WBC # BLD AUTO: 14.6 10E9/L (ref 4–11)

## 2021-06-21 PROCEDURE — 250N000013 HC RX MED GY IP 250 OP 250 PS 637: Performed by: STUDENT IN AN ORGANIZED HEALTH CARE EDUCATION/TRAINING PROGRAM

## 2021-06-21 PROCEDURE — 87633 RESP VIRUS 12-25 TARGETS: CPT | Performed by: STUDENT IN AN ORGANIZED HEALTH CARE EDUCATION/TRAINING PROGRAM

## 2021-06-21 PROCEDURE — 83605 ASSAY OF LACTIC ACID: CPT | Performed by: PHYSICIAN ASSISTANT

## 2021-06-21 PROCEDURE — 36415 COLL VENOUS BLD VENIPUNCTURE: CPT | Performed by: FAMILY MEDICINE

## 2021-06-21 PROCEDURE — 258N000003 HC RX IP 258 OP 636: Performed by: STUDENT IN AN ORGANIZED HEALTH CARE EDUCATION/TRAINING PROGRAM

## 2021-06-21 PROCEDURE — 999N001017 HC STATISTIC GLUCOSE BY METER IP

## 2021-06-21 PROCEDURE — 80053 COMPREHEN METABOLIC PANEL: CPT | Performed by: STUDENT IN AN ORGANIZED HEALTH CARE EDUCATION/TRAINING PROGRAM

## 2021-06-21 PROCEDURE — 87486 CHLMYD PNEUM DNA AMP PROBE: CPT | Performed by: STUDENT IN AN ORGANIZED HEALTH CARE EDUCATION/TRAINING PROGRAM

## 2021-06-21 PROCEDURE — 36592 COLLECT BLOOD FROM PICC: CPT | Performed by: STUDENT IN AN ORGANIZED HEALTH CARE EDUCATION/TRAINING PROGRAM

## 2021-06-21 PROCEDURE — 36415 COLL VENOUS BLD VENIPUNCTURE: CPT | Performed by: STUDENT IN AN ORGANIZED HEALTH CARE EDUCATION/TRAINING PROGRAM

## 2021-06-21 PROCEDURE — 120N000002 HC R&B MED SURG/OB UMMC

## 2021-06-21 PROCEDURE — 250N000011 HC RX IP 250 OP 636: Performed by: STUDENT IN AN ORGANIZED HEALTH CARE EDUCATION/TRAINING PROGRAM

## 2021-06-21 PROCEDURE — 999N000198 HC STATISTIC WOC PT EDUCATION, 16-30 MIN

## 2021-06-21 PROCEDURE — 258N000003 HC RX IP 258 OP 636: Performed by: FAMILY MEDICINE

## 2021-06-21 PROCEDURE — 86140 C-REACTIVE PROTEIN: CPT | Performed by: STUDENT IN AN ORGANIZED HEALTH CARE EDUCATION/TRAINING PROGRAM

## 2021-06-21 PROCEDURE — 92610 EVALUATE SWALLOWING FUNCTION: CPT | Mod: GN | Performed by: SPEECH-LANGUAGE PATHOLOGIST

## 2021-06-21 PROCEDURE — 85025 COMPLETE CBC W/AUTO DIFF WBC: CPT | Performed by: STUDENT IN AN ORGANIZED HEALTH CARE EDUCATION/TRAINING PROGRAM

## 2021-06-21 PROCEDURE — 83605 ASSAY OF LACTIC ACID: CPT | Performed by: FAMILY MEDICINE

## 2021-06-21 PROCEDURE — 87581 M.PNEUMON DNA AMP PROBE: CPT | Performed by: STUDENT IN AN ORGANIZED HEALTH CARE EDUCATION/TRAINING PROGRAM

## 2021-06-21 PROCEDURE — 87040 BLOOD CULTURE FOR BACTERIA: CPT | Performed by: STUDENT IN AN ORGANIZED HEALTH CARE EDUCATION/TRAINING PROGRAM

## 2021-06-21 PROCEDURE — 71045 X-RAY EXAM CHEST 1 VIEW: CPT

## 2021-06-21 PROCEDURE — 36592 COLLECT BLOOD FROM PICC: CPT | Performed by: PHYSICIAN ASSISTANT

## 2021-06-21 PROCEDURE — 83605 ASSAY OF LACTIC ACID: CPT | Performed by: STUDENT IN AN ORGANIZED HEALTH CARE EDUCATION/TRAINING PROGRAM

## 2021-06-21 PROCEDURE — 250N000011 HC RX IP 250 OP 636: Performed by: FAMILY MEDICINE

## 2021-06-21 PROCEDURE — 71045 X-RAY EXAM CHEST 1 VIEW: CPT | Mod: 26 | Performed by: RADIOLOGY

## 2021-06-21 PROCEDURE — 83735 ASSAY OF MAGNESIUM: CPT | Performed by: STUDENT IN AN ORGANIZED HEALTH CARE EDUCATION/TRAINING PROGRAM

## 2021-06-21 PROCEDURE — 84100 ASSAY OF PHOSPHORUS: CPT | Performed by: STUDENT IN AN ORGANIZED HEALTH CARE EDUCATION/TRAINING PROGRAM

## 2021-06-21 PROCEDURE — 99233 SBSQ HOSP IP/OBS HIGH 50: CPT | Mod: GC | Performed by: FAMILY MEDICINE

## 2021-06-21 PROCEDURE — 92526 ORAL FUNCTION THERAPY: CPT | Mod: GN | Performed by: SPEECH-LANGUAGE PATHOLOGIST

## 2021-06-21 RX ORDER — OXYCODONE HYDROCHLORIDE 5 MG/1
5 TABLET ORAL ONCE
Status: DISCONTINUED | OUTPATIENT
Start: 2021-06-21 | End: 2021-06-21

## 2021-06-21 RX ORDER — NICOTINE POLACRILEX 4 MG
15-30 LOZENGE BUCCAL
Status: DISCONTINUED | OUTPATIENT
Start: 2021-06-21 | End: 2021-06-30 | Stop reason: HOSPADM

## 2021-06-21 RX ORDER — DEXTROSE MONOHYDRATE 25 G/50ML
25-50 INJECTION, SOLUTION INTRAVENOUS
Status: DISCONTINUED | OUTPATIENT
Start: 2021-06-21 | End: 2021-06-30 | Stop reason: HOSPADM

## 2021-06-21 RX ORDER — LOPERAMIDE HCL 2 MG
2 CAPSULE ORAL 4 TIMES DAILY PRN
Status: DISCONTINUED | OUTPATIENT
Start: 2021-06-21 | End: 2021-06-30 | Stop reason: HOSPADM

## 2021-06-21 RX ADMIN — LOPERAMIDE HYDROCHLORIDE 2 MG: 2 CAPSULE ORAL at 18:16

## 2021-06-21 RX ADMIN — VARENICLINE TARTRATE 1 MG: 1 TABLET, FILM COATED ORAL at 21:07

## 2021-06-21 RX ADMIN — SODIUM CHLORIDE, POTASSIUM CHLORIDE, SODIUM LACTATE AND CALCIUM CHLORIDE 1000 ML: 600; 310; 30; 20 INJECTION, SOLUTION INTRAVENOUS at 14:32

## 2021-06-21 RX ADMIN — PIPERACILLIN SODIUM AND TAZOBACTAM SODIUM 3.38 G: 3; .375 INJECTION, POWDER, LYOPHILIZED, FOR SOLUTION INTRAVENOUS at 12:22

## 2021-06-21 RX ADMIN — VANCOMYCIN HYDROCHLORIDE 1250 MG: 10 INJECTION, POWDER, LYOPHILIZED, FOR SOLUTION INTRAVENOUS at 21:07

## 2021-06-21 RX ADMIN — DULOXETINE HYDROCHLORIDE 60 MG: 60 CAPSULE, DELAYED RELEASE ORAL at 09:33

## 2021-06-21 RX ADMIN — LOPERAMIDE HYDROCHLORIDE 4 MG: 2 CAPSULE ORAL at 00:09

## 2021-06-21 RX ADMIN — BACLOFEN 10 MG: 10 TABLET ORAL at 09:34

## 2021-06-21 RX ADMIN — LOPERAMIDE HYDROCHLORIDE 4 MG: 2 CAPSULE ORAL at 21:07

## 2021-06-21 RX ADMIN — DEXTROSE 15 G: 15 GEL ORAL at 10:13

## 2021-06-21 RX ADMIN — FAMOTIDINE 10 MG: 10 TABLET, FILM COATED ORAL at 21:07

## 2021-06-21 RX ADMIN — PIPERACILLIN SODIUM AND TAZOBACTAM SODIUM 3.38 G: 3; .375 INJECTION, POWDER, LYOPHILIZED, FOR SOLUTION INTRAVENOUS at 06:10

## 2021-06-21 RX ADMIN — Medication 500 MG: at 12:22

## 2021-06-21 RX ADMIN — SODIUM CHLORIDE, POTASSIUM CHLORIDE, SODIUM LACTATE AND CALCIUM CHLORIDE: 600; 310; 30; 20 INJECTION, SOLUTION INTRAVENOUS at 09:35

## 2021-06-21 RX ADMIN — DEXTROSE 15 G: 15 GEL ORAL at 11:21

## 2021-06-21 RX ADMIN — BISMUTH SUBSALICYLATE 30 ML: 262 LIQUID ORAL at 09:36

## 2021-06-21 RX ADMIN — BACLOFEN 10 MG: 10 TABLET ORAL at 12:22

## 2021-06-21 RX ADMIN — FAMOTIDINE 10 MG: 10 TABLET, FILM COATED ORAL at 09:33

## 2021-06-21 RX ADMIN — PIPERACILLIN SODIUM AND TAZOBACTAM SODIUM 3.38 G: 3; .375 INJECTION, POWDER, LYOPHILIZED, FOR SOLUTION INTRAVENOUS at 18:16

## 2021-06-21 RX ADMIN — TRAZODONE HYDROCHLORIDE 100 MG: 50 TABLET ORAL at 21:07

## 2021-06-21 RX ADMIN — ACETAMINOPHEN 650 MG: 325 TABLET, FILM COATED ORAL at 12:22

## 2021-06-21 RX ADMIN — OXYCODONE HYDROCHLORIDE 5 MG: 5 TABLET ORAL at 09:33

## 2021-06-21 RX ADMIN — ENOXAPARIN SODIUM 40 MG: 40 INJECTION SUBCUTANEOUS at 09:35

## 2021-06-21 RX ADMIN — Medication 500 MG: at 18:16

## 2021-06-21 RX ADMIN — PIPERACILLIN SODIUM AND TAZOBACTAM SODIUM 3.38 G: 3; .375 INJECTION, POWDER, LYOPHILIZED, FOR SOLUTION INTRAVENOUS at 00:11

## 2021-06-21 RX ADMIN — SODIUM CHLORIDE, POTASSIUM CHLORIDE, SODIUM LACTATE AND CALCIUM CHLORIDE 1000 ML: 600; 310; 30; 20 INJECTION, SOLUTION INTRAVENOUS at 09:35

## 2021-06-21 RX ADMIN — BACLOFEN 10 MG: 10 TABLET ORAL at 16:16

## 2021-06-21 RX ADMIN — VARENICLINE TARTRATE 1 MG: 1 TABLET, FILM COATED ORAL at 09:34

## 2021-06-21 RX ADMIN — PREGABALIN 150 MG: 75 CAPSULE ORAL at 09:34

## 2021-06-21 RX ADMIN — Medication 1 TABLET: at 09:34

## 2021-06-21 RX ADMIN — Medication 500 MG: at 09:34

## 2021-06-21 RX ADMIN — BACLOFEN 10 MG: 10 TABLET ORAL at 21:11

## 2021-06-21 RX ADMIN — PREGABALIN 150 MG: 75 CAPSULE ORAL at 21:07

## 2021-06-21 RX ADMIN — ACETAMINOPHEN 650 MG: 325 TABLET, FILM COATED ORAL at 00:15

## 2021-06-21 RX ADMIN — ACETAMINOPHEN 650 MG: 325 TABLET, FILM COATED ORAL at 18:16
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
## 2021-06-21 NOTE — PROGRESS NOTES
"Sepsis Evaluation Progress Note    I was called to see Parth Fountain due to abnormal vital signs triggering the Sepsis SIRS screening alert. He is known to have an infection.     Physical Exam   Vital Signs:  Temp: 98.7  F (37.1  C) Temp src: Oral BP: 138/76 Pulse: 105   Resp: 18 SpO2: 96 % O2 Device: Nasal cannula Oxygen Delivery: 2 LPM     General: in no acute distress  Mental Status: AAOx4.   CVS: Tachycardic, HS dual  Resp: CTAB    Data   Lactic Acid   Date Value Ref Range Status   06/20/2021 1.4 0.7 - 2.0 mmol/L Final   06/19/2021 5.1 (HH) 0.7 - 2.0 mmol/L Final     Comment:     Critical Value called to and read back by  DEMETRIUS BOSCH IN [facility] 6/20/21, 23:30 BY 1129.       Lactate for Sepsis Protocol   Date Value Ref Range Status   06/21/2021 2.4 (H) 0.7 - 2.0 mmol/L Final     Comment:     Significant result called to and read back by BERNICE   09/09/2020 0.7 0.7 - 2.0 mmol/L Final       Assessment & Plan   Parth Fountain meets SIRS criteria but does NOT have a lactate >2 or other evidence of acute organ damage.  These vital sign, lab and physical exam findings constitute a diagnosis of SEPSIS.    Sepsis Time-Zero (time Sepsis diagnosis confirmed): 23:20  06/20/21    Anti-infectives (From now, onward)    Start     Dose/Rate Route Frequency Ordered Stop    06/20/21 2130  vancomycin 1250 mg in 0.9% NaCl 250 mL intermittent infusion 1,250 mg      1,250 mg  over 90 Minutes Intravenous EVERY 24 HOURS 06/20/21 1039      06/20/21 1200  piperacillin-tazobactam (ZOSYN) 3.375 g vial to attach to  mL bag     Note to Pharmacy: For [facility], [facility] and [facility]: For Zosyn-naive patients, use the \"Zosyn initial dose + extended infusion\" order panel.    3.375 g  over 30 Minutes Intravenous EVERY 6 HOURS 06/20/21 1124          Current antibiotic coverage is appropriate for source of infection. IV Vanc/Zosyn    # Severe sepsis  # Pyelonephritis   # Obstructive Nephrolithiasis (BL) w/ right-sided hydronephrosis  # Bacteremia   # " Hypoglycemia  Pt with Sepsis and bacteremia 2/2 to pyelonephritis currently being treated with IV Vanc/Zosy and 125 ml/hr LR who on exam is in no acute distress. Of note does have a sore throat and cough. Though there is a source of infection it is possible there could also be an pneumonia given upper respiratory symptoms, needing supplemental O2, and Klebsiella can cause pneumonia. Pt also has a lacate of 2.4, with elevated CRP and white blood count. Less likely a PE given improvement in O2 sats with supplementation, no CP, on DVT prophylaxis, and known cause for tachycardia. If not improving this would be next step. Also noted on labs to be hypoglycemic likely due to sepsis as not a known diabetic.   - 1 L of LR over 2 hours bolus  - CXR  - RSV   - Hypoglycemia protocol    1330  Lactate 2.5 will give 1 L LR bolus over 1 hour and repeat LA at 1700      Disposition: The patient will remain on the current unit. We will continue to monitor this patient closely..  Dulce Subramanian MD    Sepsis Criteria   Sepsis: 2+ SIRS criteria due to infection  Severe Sepsis: Sepsis AND 1+ new sign of acute organ dysfunction (Note: lactate >2 or acute encephalopathy each qualify as organ dysfunction)  Septic Shock: Sepsis AND hypotension despite volume resuscitation with 30 ml/kg crystalloid or lactate >=4  Note: HYPOTENSION is defined as 2 BP readings measured 3 hrs apart that have a SBP <90, MAP <65, or decrease >40 mmHg, occurring 6 hrs before or after t-zero

## 2021-06-21 NOTE — PROGRESS NOTES
Rapid Response Team Note    Assessment   In assessment a rapid response was called on Parth Fountain due to SIRS/Sepsis trigger. This presentation is likely due to klebsiella bacteremia from urinary source vs developing PNA vs PE and worsened by T8/9 paraplgia.     Plan   -  Agree with Vanc/Zosyn, repeat cultures as ordered by primary team  -  Primary team plans to order IV bolus  -  Agree with plan for CXR as d/w primary team. Consider V/Q if ongoing hypoxia given new hypoxia (also with HAYDEE)   -  The [facility] primary team was able to be reached and they are in agreement with the above plan.  -  Disposition: The patient will remain on the current unit. We will continue to monitor this patient closely.  -  Reassessment and plan follow-up will be performed by the primary team    Susie Bob PA-C  [facility] AMCOM Job Code Contact #[phone]    Hospital Course   Brief Summary of events leading to rapid response:   Patient is admitted with bacteremia due to urologic source. He underwent PNT placement  per IR. He reports feeling better today. He denies O2 use at BL but says he was put on oxygen overnight. Denies dyspnea or chest pain. No ongoing fevers. Continues to have pain at PNT site since surgery. No change in stool output into ostomy     Admission Diagnosis:   History of ileal conduit [Z98.890]  Hydronephrosis with urinary obstruction due to ureteral calculus [N13.2]  Sepsis, due to unspecified organism, unspecified whether acute organ dysfunction present (H) [A41.9]     Physical Exam   Temp: 99.4  F (37.4  C) Temp  Min: 98.6  F (37  C)  Max: 99.4  F (37.4  C)  Resp: 18 Resp  Min: 15  Max: 18  SpO2: 96 % SpO2  Min: 90 %  Max: 97 %  Pulse: 105 Pulse  Min: 92  Max: 119    No data recorded  BP: 138/76 Systolic (24hrs), Av , Min:86 , Max:138   Diastolic (24hrs), Av, Min:56, Max:93     I/Os: I/O last 3 completed shifts:  In: 3459.17 [P.O.:720; I.V.:2739.17]  Out: 2450 [Urine:2450]  "    Exam:   General: chronically ill appearing  Mental Status: AAOx4.  CV: Tachycardic to 100s, no murmur noted  Resp: Breathing comfortably 2L. Lung sounds diminished due to abdominal pain with inhalation  Abdomen: Urostomy and PNT draining clear urine. Colostomy with gas and some green stool. Soft, non-tender with active bowel sounds. No guarding or rebound   Skin: No rash noted     Significant Results and Procedures   Lactic Acid:   Recent Labs   Lab Test 06/21/21  0844 06/20/21  0406 06/19/21  2320 06/19/21  1807 09/09/20  2326   LACT  --  1.4 5.1* 2.9*  --    LACTS 2.4*  --   --   --  0.7     CBC:   Recent Labs   Lab Test 06/21/21  0844 06/20/21  0406 06/19/21  1726   WBC 14.6* 21.3* 28.4*   HGB 13.4 11.2* 16.2   HCT 42.4 33.9* 49.9    325 453*        Sepsis Evaluation   The patient is known to have an infection.  Parth Fountain meets SIRS criteria AND has a lactate >2 or other evidence of acute organ damage.  These vital signs, lab and physical exam findings constitute a diagnosis of SEVERE SEPSIS.    Sepsis Time-Zero (time severe sepsis diagnosis confirmed): 2243 06/19/21 as this was the time when Lactate resulted, and the level was > 2.0     Anti-infectives (From now, onward)    Start     Dose/Rate Route Frequency Ordered Stop    06/20/21 2130  vancomycin 1250 mg in 0.9% NaCl 250 mL intermittent infusion 1,250 mg      1,250 mg  over 90 Minutes Intravenous EVERY 24 HOURS 06/20/21 1039      06/20/21 1200  piperacillin-tazobactam (ZOSYN) 3.375 g vial to attach to  mL bag     Note to Pharmacy: For [facility], [facility] and [facility]: For Zosyn-naive patients, use the \"Zosyn initial dose + extended infusion\" order panel.    3.375 g  over 30 Minutes Intravenous EVERY 6 HOURS 06/20/21 1124          Current antibiotic coverage is appropriate for source of infection.    3 Hour Severe Sepsis Bundle Completion:  1. Initial Lactic Acid result shown above. Repeat lactic acid ordered for 2 hours from now.   2. Blood Cultures " before Antibiotics: Yes  3. Broad Spectrum Antibiotics Administered: yes  4. Fluids: Per primary
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
## 2021-06-21 NOTE — PROGRESS NOTES
Pt alert *4 VSS,sats 96% 2l NC afebrile Tmax 99.ST from day shift MD aware ,nephrostomy and urostomy and colostomy intact adequate output ,pt able to reposition wounds dry no drainage.
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
## 2021-06-21 NOTE — PROGRESS NOTES
Brief Urology Progress Note:    Message sent for outpatient follow-up in 2-3 weeks to discuss definitive stone management. Please notify Urology if patient remains hospitalized at that time so outpatient cares can be further coordinated. Urology will sign off.    Al Milton MD  Urology Resident, PGY-2

## 2021-06-22 ENCOUNTER — APPOINTMENT (OUTPATIENT)
Dept: SPEECH THERAPY | Facility: CLINIC | Age: 56
DRG: 871 | End: 2021-06-22
Payer: MEDICARE

## 2021-06-22 ENCOUNTER — APPOINTMENT (OUTPATIENT)
Dept: EDUCATION SERVICES | Facility: CLINIC | Age: 56
End: 2021-06-22
Payer: MEDICARE

## 2021-06-22 LAB
ALBUMIN SERPL-MCNC: 2 G/DL (ref 3.4–5)
ALP SERPL-CCNC: 69 U/L (ref 40–150)
ALT SERPL W P-5'-P-CCNC: 16 U/L (ref 0–70)
ANION GAP SERPL CALCULATED.3IONS-SCNC: 4 MMOL/L (ref 3–14)
AST SERPL W P-5'-P-CCNC: 17 U/L (ref 0–45)
BACTERIA SPEC CULT: ABNORMAL
BASOPHILS # BLD AUTO: 0 10E9/L (ref 0–0.2)
BASOPHILS NFR BLD AUTO: 0.3 %
BILIRUB SERPL-MCNC: 0.4 MG/DL (ref 0.2–1.3)
BUN SERPL-MCNC: 15 MG/DL (ref 7–30)
CALCIUM SERPL-MCNC: 9.7 MG/DL (ref 8.5–10.1)
CHLORIDE SERPL-SCNC: 107 MMOL/L (ref 94–109)
CO2 SERPL-SCNC: 26 MMOL/L (ref 20–32)
CREAT SERPL-MCNC: 0.84 MG/DL (ref 0.66–1.25)
DIFFERENTIAL METHOD BLD: ABNORMAL
EOSINOPHIL # BLD AUTO: 0.2 10E9/L (ref 0–0.7)
EOSINOPHIL NFR BLD AUTO: 1.7 %
ERYTHROCYTE [DISTWIDTH] IN BLOOD BY AUTOMATED COUNT: 14.4 % (ref 10–15)
GFR SERPL CREATININE-BSD FRML MDRD: >90 ML/MIN/{1.73_M2}
GLUCOSE SERPL-MCNC: 108 MG/DL (ref 70–99)
HCT VFR BLD AUTO: 34 % (ref 40–53)
HGB BLD-MCNC: 10.8 G/DL (ref 13.3–17.7)
IMM GRANULOCYTES # BLD: 0.1 10E9/L (ref 0–0.4)
IMM GRANULOCYTES NFR BLD: 1 %
LYMPHOCYTES # BLD AUTO: 0.6 10E9/L (ref 0.8–5.3)
LYMPHOCYTES NFR BLD AUTO: 5.4 %
MAGNESIUM SERPL-MCNC: 1.8 MG/DL (ref 1.6–2.3)
MCH RBC QN AUTO: 27.6 PG (ref 26.5–33)
MCHC RBC AUTO-ENTMCNC: 31.8 G/DL (ref 31.5–36.5)
MCV RBC AUTO: 87 FL (ref 78–100)
MONOCYTES # BLD AUTO: 0.5 10E9/L (ref 0–1.3)
MONOCYTES NFR BLD AUTO: 4.6 %
NEUTROPHILS # BLD AUTO: 9.9 10E9/L (ref 1.6–8.3)
NEUTROPHILS NFR BLD AUTO: 87 %
NRBC # BLD AUTO: 0 10*3/UL
NRBC BLD AUTO-RTO: 0 /100
PHOSPHATE SERPL-MCNC: 1.9 MG/DL (ref 2.5–4.5)
PLATELET # BLD AUTO: 226 10E9/L (ref 150–450)
POTASSIUM SERPL-SCNC: 4 MMOL/L (ref 3.4–5.3)
PROT SERPL-MCNC: 5.7 G/DL (ref 6.8–8.8)
RBC # BLD AUTO: 3.92 10E12/L (ref 4.4–5.9)
SODIUM SERPL-SCNC: 137 MMOL/L (ref 133–144)
SPECIMEN SOURCE: ABNORMAL
VANCOMYCIN SERPL-MCNC: 16.5 MG/L
WBC # BLD AUTO: 11.4 10E9/L (ref 4–11)

## 2021-06-22 PROCEDURE — 250N000013 HC RX MED GY IP 250 OP 250 PS 637: Performed by: STUDENT IN AN ORGANIZED HEALTH CARE EDUCATION/TRAINING PROGRAM

## 2021-06-22 PROCEDURE — 84100 ASSAY OF PHOSPHORUS: CPT | Performed by: STUDENT IN AN ORGANIZED HEALTH CARE EDUCATION/TRAINING PROGRAM

## 2021-06-22 PROCEDURE — 85025 COMPLETE CBC W/AUTO DIFF WBC: CPT | Performed by: STUDENT IN AN ORGANIZED HEALTH CARE EDUCATION/TRAINING PROGRAM

## 2021-06-22 PROCEDURE — 250N000011 HC RX IP 250 OP 636: Performed by: STUDENT IN AN ORGANIZED HEALTH CARE EDUCATION/TRAINING PROGRAM

## 2021-06-22 PROCEDURE — 258N000003 HC RX IP 258 OP 636: Performed by: STUDENT IN AN ORGANIZED HEALTH CARE EDUCATION/TRAINING PROGRAM

## 2021-06-22 PROCEDURE — 80053 COMPREHEN METABOLIC PANEL: CPT | Performed by: STUDENT IN AN ORGANIZED HEALTH CARE EDUCATION/TRAINING PROGRAM

## 2021-06-22 PROCEDURE — 92526 ORAL FUNCTION THERAPY: CPT | Mod: GN

## 2021-06-22 PROCEDURE — 80202 ASSAY OF VANCOMYCIN: CPT | Performed by: STUDENT IN AN ORGANIZED HEALTH CARE EDUCATION/TRAINING PROGRAM

## 2021-06-22 PROCEDURE — 36592 COLLECT BLOOD FROM PICC: CPT | Performed by: STUDENT IN AN ORGANIZED HEALTH CARE EDUCATION/TRAINING PROGRAM

## 2021-06-22 PROCEDURE — 99233 SBSQ HOSP IP/OBS HIGH 50: CPT | Mod: GC | Performed by: STUDENT IN AN ORGANIZED HEALTH CARE EDUCATION/TRAINING PROGRAM

## 2021-06-22 PROCEDURE — 120N000002 HC R&B MED SURG/OB UMMC

## 2021-06-22 PROCEDURE — 87040 BLOOD CULTURE FOR BACTERIA: CPT | Performed by: STUDENT IN AN ORGANIZED HEALTH CARE EDUCATION/TRAINING PROGRAM

## 2021-06-22 PROCEDURE — 83735 ASSAY OF MAGNESIUM: CPT | Performed by: STUDENT IN AN ORGANIZED HEALTH CARE EDUCATION/TRAINING PROGRAM

## 2021-06-22 RX ORDER — VANCOMYCIN HYDROCHLORIDE 1 G/200ML
1000 INJECTION, SOLUTION INTRAVENOUS EVERY 12 HOURS
Status: DISCONTINUED | OUTPATIENT
Start: 2021-06-22 | End: 2021-06-22

## 2021-06-22 RX ORDER — BENZONATATE 100 MG/1
100 CAPSULE ORAL 3 TIMES DAILY PRN
Status: DISCONTINUED | OUTPATIENT
Start: 2021-06-22 | End: 2021-06-30 | Stop reason: HOSPADM

## 2021-06-22 RX ORDER — GUAIFENESIN 600 MG/1
600 TABLET, EXTENDED RELEASE ORAL 2 TIMES DAILY
Status: DISCONTINUED | OUTPATIENT
Start: 2021-06-22 | End: 2021-06-27

## 2021-06-22 RX ORDER — CEFTRIAXONE 2 G/1
2 INJECTION, POWDER, FOR SOLUTION INTRAMUSCULAR; INTRAVENOUS EVERY 24 HOURS
Status: DISCONTINUED | OUTPATIENT
Start: 2021-06-22 | End: 2021-06-24

## 2021-06-22 RX ADMIN — ACETAMINOPHEN 650 MG: 325 TABLET, FILM COATED ORAL at 06:19

## 2021-06-22 RX ADMIN — PIPERACILLIN SODIUM AND TAZOBACTAM SODIUM 3.38 G: 3; .375 INJECTION, POWDER, LYOPHILIZED, FOR SOLUTION INTRAVENOUS at 00:06

## 2021-06-22 RX ADMIN — FAMOTIDINE 10 MG: 10 TABLET, FILM COATED ORAL at 20:10

## 2021-06-22 RX ADMIN — OXYCODONE HYDROCHLORIDE 5 MG: 5 TABLET ORAL at 18:42

## 2021-06-22 RX ADMIN — PREGABALIN 150 MG: 75 CAPSULE ORAL at 08:18

## 2021-06-22 RX ADMIN — TRAZODONE HYDROCHLORIDE 100 MG: 50 TABLET ORAL at 22:47

## 2021-06-22 RX ADMIN — BISMUTH SUBSALICYLATE 30 ML: 262 LIQUID ORAL at 08:21

## 2021-06-22 RX ADMIN — SODIUM CHLORIDE, POTASSIUM CHLORIDE, SODIUM LACTATE AND CALCIUM CHLORIDE: 600; 310; 30; 20 INJECTION, SOLUTION INTRAVENOUS at 08:52

## 2021-06-22 RX ADMIN — PREGABALIN 150 MG: 75 CAPSULE ORAL at 20:10

## 2021-06-22 RX ADMIN — VANCOMYCIN HYDROCHLORIDE 1000 MG: 1 INJECTION, SOLUTION INTRAVENOUS at 09:43

## 2021-06-22 RX ADMIN — BENZONATATE 100 MG: 100 CAPSULE ORAL at 11:12

## 2021-06-22 RX ADMIN — ENOXAPARIN SODIUM 40 MG: 40 INJECTION SUBCUTANEOUS at 09:31

## 2021-06-22 RX ADMIN — BACLOFEN 10 MG: 10 TABLET ORAL at 11:54

## 2021-06-22 RX ADMIN — POTASSIUM & SODIUM PHOSPHATES POWDER PACK 280-160-250 MG 2 PACKET: 280-160-250 PACK at 20:10

## 2021-06-22 RX ADMIN — SODIUM CHLORIDE, POTASSIUM CHLORIDE, SODIUM LACTATE AND CALCIUM CHLORIDE 1000 ML: 600; 310; 30; 20 INJECTION, SOLUTION INTRAVENOUS at 16:26

## 2021-06-22 RX ADMIN — CEFTRIAXONE 2 G: 2 INJECTION, POWDER, FOR SOLUTION INTRAMUSCULAR; INTRAVENOUS at 11:00

## 2021-06-22 RX ADMIN — LOPERAMIDE HYDROCHLORIDE 2 MG: 2 CAPSULE ORAL at 08:52

## 2021-06-22 RX ADMIN — VARENICLINE TARTRATE 1 MG: 1 TABLET, FILM COATED ORAL at 08:18

## 2021-06-22 RX ADMIN — VARENICLINE TARTRATE 1 MG: 1 TABLET, FILM COATED ORAL at 20:10

## 2021-06-22 RX ADMIN — ACETAMINOPHEN 650 MG: 325 TABLET, FILM COATED ORAL at 00:06

## 2021-06-22 RX ADMIN — BACLOFEN 10 MG: 10 TABLET ORAL at 20:10

## 2021-06-22 RX ADMIN — ACETAMINOPHEN 650 MG: 325 TABLET, FILM COATED ORAL at 12:56

## 2021-06-22 RX ADMIN — BACLOFEN 10 MG: 10 TABLET ORAL at 15:35

## 2021-06-22 RX ADMIN — GUAIFENESIN 600 MG: 600 TABLET ORAL at 20:10

## 2021-06-22 RX ADMIN — POTASSIUM & SODIUM PHOSPHATES POWDER PACK 280-160-250 MG 2 PACKET: 280-160-250 PACK at 09:30

## 2021-06-22 RX ADMIN — PIPERACILLIN SODIUM AND TAZOBACTAM SODIUM 3.38 G: 3; .375 INJECTION, POWDER, LYOPHILIZED, FOR SOLUTION INTRAVENOUS at 06:19

## 2021-06-22 RX ADMIN — ACETAMINOPHEN 650 MG: 325 TABLET, FILM COATED ORAL at 18:15

## 2021-06-22 RX ADMIN — Medication 500 MG: at 08:18

## 2021-06-22 RX ADMIN — OXYCODONE HYDROCHLORIDE 5 MG: 5 TABLET ORAL at 08:17

## 2021-06-22 RX ADMIN — LOPERAMIDE HYDROCHLORIDE 4 MG: 2 CAPSULE ORAL at 22:47

## 2021-06-22 RX ADMIN — Medication 500 MG: at 18:15

## 2021-06-22 RX ADMIN — BACLOFEN 10 MG: 10 TABLET ORAL at 08:18

## 2021-06-22 RX ADMIN — DULOXETINE HYDROCHLORIDE 60 MG: 60 CAPSULE, DELAYED RELEASE ORAL at 08:18

## 2021-06-22 RX ADMIN — POTASSIUM & SODIUM PHOSPHATES POWDER PACK 280-160-250 MG 2 PACKET: 280-160-250 PACK at 14:17

## 2021-06-22 RX ADMIN — Medication 500 MG: at 11:54

## 2021-06-22 RX ADMIN — Medication 1 TABLET: at 08:17

## 2021-06-22 RX ADMIN — FAMOTIDINE 10 MG: 10 TABLET, FILM COATED ORAL at 08:18

## 2021-06-22 NOTE — PLAN OF CARE
Major Shift Events:  A&Ox4. No tele orders. -140s. Sating >93% on 1L NC, unable to wean to RA d/t frequent desaturations. Fair, productive cough. Tessalon pearls given x1.  BM via colostomy. Good UO from urostomy and nephrostomy. Tolerating regular diet. 1L LR bolus given. Phosphorous replaced. T&R as tolerated, pt refusing to be turned every 2 hours. Pt educated on the importance for repositioning for pressure sores. Pulsate mattress ordered and pt refused mattress, agreeable to turn every couple hours if he does not need to switch to the pulsate mattress.      Plan: Transfer to med/surg when bed available. Phos recheck at 0200.     For vital signs and complete assessments, please see documentation flowsheets.
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
## 2021-06-22 NOTE — PROGRESS NOTES
Antimicrobial Stewardship Team Note    Antimicrobial Stewardship Program - A joint venture between [facility] and  Physicians to optimize antibiotic management.  NOT a formal consult - Restricted Antimicrobial Review     Patient: Parth Fountain  MRN: [id number]  Allergies: Patient has no known allergies.    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 He was admitted on 6/219 with sepsis w/ obstructive ureteral stone.     HPI:  The patient presented to the emergency department on 6/19 with right flank and back pain, abdominal pain, nausea, difficulty urinating, and headache. Upon physical exam, distension and tenderness present in abdomen. CT of the abdomen and pelvis found obstructing right ureteral stone measuring 8 mm and mild right hydronephrosis. Urology was consulted and recommended the patient follow-up after discharge with a kidney stone specialist to discuss outpatient PCNL. IR consulted & decompressed his kidney and placed a right sided nephrostomy tube placement, which drained clear yellow urine. After IR PNT placement, patient clinically worsened - Patient was rigorous w/ mottled appearing skin, tachypneic to high 20s, tachycardic to high 120s, lactate 5.1 Due to signs of severe sepsis the patient was initiated on sepsis protocol w/ fluids and Zosyn in the ED. WBC 28.4, urine culture and blood cultures obtained for further work-up. Urine culture showed mixed urogenital javad and UA remarkable for slightly cloudy urine, large leukocyte esterase, WBC 47, bacteria (few), ketones 10, protein albumin 30. Blood cultures from 6/19 positive for GNR - Klebsiella pneumonia bacteremia.   *The patient does have a history of ESBL UTI in 2010, and a more recent UTI in 2020 w/ E faecalis sensitive to Zosyn.      Interval History  6/20 - Placed on nasal cannula for supp. O2. Given one dose of  meropenem. Continued on vancomycin + Zosyn for bacteremia likely due to UTI/ urinary source.  6/21 - Meropenem discontinued. Patient remained septic w/ with tachycardia and elevated WBC & CRP. Abnormal vital signs (lactate 2.5) triggering the Sepsis SIRS protocol again, later resolved.  Medicine team considering possible pneumonia given upper respiratory symptoms (cough, sore throat) & supplemental O2. Chest XR on 6/21 showed increased retrocardiac and perihilar opacities and trace bilateral pleural effusions (atelectasis vs. infection).   6/22 - Patient continued on vancomycin and Zosyn. Blood culture sensitivities show resistance to ampicillin only. Remains on supplemental oxygen, WBC (14.6) & CRP (13.6) downtrending. Repeat blood cultures NGTD x 1 day. Respiratory panel PCR (NP swab) detected human rhinovirus/enterovirus.        Active Anti-infective Medications   (From admission, onward)                 Start     Stop    06/20/21 2130  vancomycin 1250 mg  1,250 mg,   Intravenous,   EVERY 24 HOURS     Sepsis, Urinary Tract Infection        --    06/20/21 1200  piperacillin-tazobactam  3.375 g,   Intravenous,   EVERY 6 HOURS     Pyelonephritis, Sepsis        --                  Assessment:   Bacteremia w/ likely urinary tract source   Klebsiella pneumoniae found in blood cultures indicates true gram-negative bacteremia, with likely source being the urinary tract. Urine analysis results support the urinary tract as source of infection, and obstructing right stones likely contributing to patient's symptoms & pain. Pneumonia is not likely source of bacteremia, as rhinovirus/enterovirus detected indicate viral infection and did not detect Klebsiella. Current vancomycin therapy no longer needed, as blood cultures show gram negative bacteremia and no MRSA detected. Zosyn therapy should be narrowed appropriately - Klebsiella pneumoniae and previous ESBL UTI infection do put patient at risk for ESBL infection, however  sensitivities from blood cultures do not show ESBL present & the culture is only resistant to ampicillin. An agent such as ceftriaxone will provide adequate Klebsiella coverage, but as the patient has been tolerating other oral medications, transitioning to oral abx therapy is recommended.  An oral agent such as levofloxacin would provide adequate Klebsiella coverage with appropriate bioavailability and is preferred for ease of discharge. The presence of obstruction w/ ureteral stones may be a nidus for infection, and along with the patient's complicated history, a duration of therapy of 14 days from first negative blood cultures is appropriate.     Recommendation/Interventions:  Agree with stopping vancomycin/zosyn  Agree with ceftriaxone 2 gm IV daily, when ready for oral therapy, recommend levofloxacin 750 mg PO once daily to complete 14-day abx course from negative blood cultures    Pharmacy took the following actions: Completed note and paged team.    Discussed with ID Staff - Dr. Antony Avila, LINH; Dr. Haile, PharmD: Dr. Cooley, PharmD     Renea Valladares, PharmD PD4 student   [phone]    Vital Signs/Clinical Features:  Vitals         06/20 0700  -  06/21 0659 06/21 0700  -  06/22 0659 06/22 0700  -  06/22 1545   Most Recent    Temp ( F) 98.5 -  99.1    98 -  99.4    98.5 -  98.8     98.8 (37.1)    Pulse 92 -  119    85 -  115    94 -  105     105    Resp 15 -  18    16 -  18    17 -  18     18    BP 86/58 -  136/70    101/63 -  156/99    120/74 -  143/90     120/74    SpO2 (%) 90 -  97    92 -  98    94 -  96     95            Labs  Estimated Creatinine Clearance: 88.8 mL/min (based on SCr of 0.84 mg/dL).  Recent Labs   Lab Test 06/19/21  1726 06/19/21  2320 06/20/21  0406 06/20/21  0940 06/21/21  0844 06/22/21  0656   CR 0.98 1.28* 1.29* 1.30* 1.05 0.84       Recent Labs   Lab Test 09/11/20  0913 09/12/20  0642 06/19/21  1726 06/20/21  0406 06/21/21  0844 06/22/21  0656   WBC 10.3 9.4 28.4* 21.3* 14.6*  11.4*   ANEU 8.1 7.4 26.1* 20.0* 13.6* 9.9*   ALYM 1.2 1.1 0.3* 0.2* 0.4* 0.6*   NICOLE 0.7 0.7 1.4* 0.8 0.4 0.5   AEOS 0.2 0.1 0.0 0.0 0.0 0.2   HGB 12.0* 11.5* 16.2 11.2* 13.4 10.8*   HCT 36.9* 36.2* 49.9 33.9* 42.4 34.0*   MCV 88 87 85 84 88 87   * 542* 453* 325 265 226       Recent Labs   Lab Test 09/12/20  0642 06/19/21  1726 06/19/21  2320 06/20/21  0406 06/21/21  0844 06/22/21  0656   BILITOTAL 0.3 0.9 1.2 0.8 0.5 0.4   ALKPHOS 113 103 98 69 85 69   ALBUMIN 3.2* 3.8 3.4 2.6* 2.9* 2.0*   AST 16 26 27 16 33 17   ALT 17 20 19 19 24 16       Recent Labs   Lab Test 11/23/13  0801 02/19/18  2215 09/10/20  0609 06/19/21  1726 06/19/21  1807 06/19/21  2320 06/20/21  0406 06/21/21  0844 06/21/21  1200 06/21/21  1647   LACT  --  2.5*  --   --  2.9* 5.1* 1.4  --  2.5* 1.1   CRP 52.2*  --  130.0* 73.0*  --   --   --  460.0*  --   --    SED  --   --  68*  --   --   --   --   --   --   --        Recent Labs   Lab Test 06/22/21  0656   VANCOMYCIN 16.5       Culture Results:  7-Day Micro Results       Procedure Component Value Units Date/Time    Blood culture [[id number]] Collected: 06/22/21 0710    Order Status: Completed Lab Status: Preliminary result Updated: 06/22/21 1256    Specimen: Blood      Specimen Description Blood     Culture Micro No growth after 4 hours    Blood culture [[id number]] Collected: 06/22/21 0655    Order Status: Completed Lab Status: Preliminary result Updated: 06/22/21 1253    Specimen: Blood      Specimen Description Blood     Culture Micro No growth after 4 hours    Respiratory Panel PCR - NP Swab [[id number]]  (Abnormal) Collected: 06/21/21 1445    Order Status: Completed Lab Status: Final result Updated: 06/21/21 1820    Specimen: Nasopharyngeal swab      Adenovirus Not Detected     Coronavirus Not Detected     Comment: This test detects Coronavirus 229E, HKU1, NL63, and OC43 but does not   distinguish between them. It does not detect MERS ( Respiratory   Syndrome), SARS (Severe Acute  Respiratory Syndrome) or 2019-nCoV (2019 Novel)   Coronavirus.          Human Metapneumovirus Not Detected     Human Rhinovirus/Enterovirus Detected, Abnormal Result     Comment: Critical Value/Significant Value called to and read back by  Nelli Bradford RN 06/21/21 @1814 ZAK          Influenza A Not Detected     Influenza A, H1 Not Detected     Influenza A 2009 H1N1 Not Detected     Influenza A, H3 Not Detected     Influenza B Not Detected     Parainfluenza Virus 1 Not Detected     Parainfluenza Virus 2 Not Detected     Parainfluenza Virus 3 Not Detected     Parainfluenza Virus 4 Not Detected     Respiratory Syncytial Virus A Not Detected     Respiratory Syncytial Virus B Not Detected     Chlamydia pneumoniae Not Detected     Mycoplasma pneumoniae Not Detected     Respiratory Virus Comment See comment below     Comment:    The ePlex Respiratory Viral Panel is a qualitative nucleic acid, multiplex, in   vitro diagnostic test for the simultaneous detection and identification of   multiple respiratory viral and bacterial nucleic acids in nasopharyngeal swabs   collected in viral transport media from individuals exhibiting signs and   symptoms of respiratory infection.  The test detects Adenovirus, Coronavirus, Human Metapneumovirus, Human   Rhinovirus/Enterovirus, Influenza A (H1, H3, 2009 H1N1), Influenza B,   Respiratory Syncytial Virus A, Respiratory Syncytial Virus B, Parainfluenza   Virus (1, 2, 3, 4), Chlamydia pneumoniae and Mycoplasma pneumoniae.  The assay has received FDA approval for the testing of nasopharyngeal (NP)   swabs only. This test has been verified and is performed by the [facility]   [facility] at [facility]. This test is used for   clinical purposes and should not be regarded as investigational or for   research.  This laboratory is certified under the Clinical Laboratory Improvement   Amendments of 1988 (CLIA-88) as qualified to perform high complexity clinical    laboratory testing.         Blood culture [[id number]] Collected: 06/21/21 0939    Order Status: Completed Lab Status: Preliminary result Updated: 06/22/21 1252    Specimen: Blood      Specimen Description Blood Blue port     Culture Micro No growth after 1 day    Blood culture [[id number]] Collected: 06/21/21 0843    Order Status: Completed Lab Status: Preliminary result Updated: 06/22/21 1252    Specimen: Blood      Specimen Description Blood Right Hand     Culture Micro No growth after 1 day    Urine Culture Aerobic Bacterial [[id number]] Collected: 06/19/21 2215    Order Status: Completed Lab Status: Final result Updated: 06/21/21 0413    Specimen: Unspecified Urine      Specimen Description Unspecified Urine     Culture Micro >100,000 colonies/mL  mixed urogenital javad  Susceptibility testing not routinely done        Multiple morphotypes present with no predominant organism.  Growth consistent with   probable contamination during collection.  Suggest repeat specimen if clinically   indicated.      Blood Culture ONE site [[id number]]  (Abnormal)  (Susceptibility) Collected: 06/19/21 1935    Order Status: Completed Lab Status: Final result Updated: 06/22/21 0725    Specimen: Blood from Arm, Forearm Only, Right      Specimen Description Blood Right Arm     Culture Micro Cultured on the 1st day of incubation:  Klebsiella pneumoniae        Critical Value/Significant Value, preliminary result only, called to and read back by  Kaity Ambrose R.N. on [id number] at 11:26am 06.20.2021 JT.        (Note)  POSITIVE for KLEBSIELLA PNEUMONIAE by Verigene multiplex nucleic acid  test. Final identification and antimicrobial susceptibility testing  will be verified by standard methods.    Specimen tested with Verigene multiplex, gram-negative blood culture  nucleic acid test for the following targets: Acinetobacter sp.,  Citrobacter sp., Enterobacter sp., Proteus sp., E. coli, K.  pneumoniae/oxytoca, P. aeruginosa, and the following  resistance  markers: CTXM, KPC, NDM, VIM, IMP and OXA.      Critical Value/Significant Value called to and read back by MAGDIEL NICOLAS RN @ 6/20/21 1350 MK       Susceptibility       Klebsiella pneumoniae (1)       Antibiotic Interpretation Method Status    AMPICILLIN Resistant JM Final     Intrinsically Resistant    AMPICILLIN/SULBACTAM Sensitive JM Final    CEFEPIME Sensitive JM Final    CEFTAZIDIME Sensitive JM Final    CEFTRIAXONE Sensitive JM Final    CIPROFLOXACIN Sensitive JM Final    GENTAMICIN Sensitive JM Final    LEVOFLOXACIN Sensitive JM Final    Piperacillin/Tazo Sensitive JM Final    TOBRAMYCIN Sensitive JM Final    Trimethoprim/Sulfa Sensitive JM Final    MEROPENEM Sensitive JM Final                       Blood Culture ONE site [[id number]] Collected: 06/19/21 1908    Order Status: Completed Lab Status: Preliminary result Updated: 06/22/21 0716    Specimen: Blood      Specimen Description Blood Left Arm     Special Requests Received in aerobic bottle only     Culture Micro No growth after 3 days    Blood Culture ONE site     Order Status: Canceled Lab Status: No result     Specimen: Blood     Blood Culture ONE site     Order Status: Canceled Lab Status: No result     Specimen: Blood     Urine Culture [[id number]] Collected: 06/19/21 1812    Order Status: Completed Lab Status: Final result Updated: 06/20/21 1644    Specimen: Unspecified Urine from Urine clean catch      Specimen Description Unspecified Urine     Special Requests Specimen received in preservative     Culture Micro 50,000 to 100,000 colonies/mL  mixed urogenital javad  Susceptibility testing not routinely done      Stone analysis     Order Status: No result Lab Status: No result     Specimen: Calculus/Stone     Blood Culture ONE site     Order Status: Canceled Lab Status: No result     Specimen: Blood             Recent Labs   Lab Test 05/28/18  1613 07/07/18  1513 09/11/20  1623 06/19/21  1813 06/19/21  2215   URINEPH 6.5 6.5 6.5  7.5* 7.0   NITRITE Positive* Negative Positive* Negative Negative   LEUKEST Large* Negative Small* Large* Large*   WBCU 26* 3 11* 47* 35*             Recent Labs   Lab Test 06/21/21  1445   IFLUA Not Detected   FLUAH1 Not Detected   FLUAH3 Not Detected   DM9931 Not Detected   IFLUB Not Detected   RSVA Not Detected   RSVB Not Detected   PIV1 Not Detected   PIV2 Not Detected   PIV3 Not Detected   HMPV Not Detected       Recent Labs   Lab Test 11/09/13  1800   CDBPCT Negative: Clostridium difficile target DNA sequences NOT detected, presumed  negative for Clostridium difficile toxin B or the number of bacteria present  may be below the limit of detection for the test.  FDA approved assay performed using REES46 GeneXpert real-time PCR.  A negative result does not exclude actual disease due to Clostridium difficile  and may be due to improper collection, handling and storage of the specimen or  the number of organisms in the specimen is below the detection limit of the  assay.       Imaging: Xr Chest Port 1 View    Result Date: 6/21/2021  EXAM: XR CHEST PORT 1 VIEW  6/21/2021 10:15 AM HISTORY:  Cough, tachycardia, concern for infection   COMPARISON:  6/20/2021 FINDINGS: Single view of the chest. Postsurgical changes of thoracic spine. Right IJ central venous catheter tip at the low SVC. Trachea is midline. Cardiomediastinal silhouette is within normal limits. Retrocardiac and perihilar opacities. Low lung volumes. Blunting of the costophrenic sulci. No pneumothorax. Degenerative change of the left shoulder. Healed fracture left clavicle.     IMPRESSION: 1. Increased retrocardiac and perihilar opacities, atelectasis (in the setting of low lung volumes) versus infection. 2. Trace bilateral pleural effusions. I have personally reviewed the examination and initial interpretation and I agree with the findings. LIZZY PARIKH, DO    Xr Chest Port 1 View    Result Date: 6/20/2021  EXAM: XR chest portable 1 view  6/20/2021 2:26  AM  HISTORY: Right IJ placement COMPARISON: 7/7/2018 TECHNIQUE: Portable AP radiograph of the chest FINDINGS: Postsurgical changes of the spine. Right IJ CVC with tip projecting over the inferior SVC. Cardiac silhouette appears within normal limits. Interstitial perihilar pulmonary opacities, left greater than right, similar to comparisons. No pleural effusion. No pneumothorax.     IMPRESSION: 1.  Right IJ CVC appears within normal limits, tip projects over the lower SVC. 2.  Stable interstitial perihilar opacities. I have personally reviewed the examination and initial interpretation and I agree with the findings. AMANDA GUEVARA MD    Ir Nephrostomy Tube Placement Right    Result Date: 6/20/2021  PROCEDURES 6/19/2021: 1. Percutaneous right antegrade pyelogram (through needle). 2. Percutaneous right nephrostomy tube placement. Clinical History: 56-year-old male with paraplegia and bilateral renal calculi presenting with obstructing right UVJ calculus with hydronephrosis. Concern for sepsis. Comparisons: CT abdomen pelvis 6/19/2021. Staff Radiologist: Eduardo Dick M.D. Fellow(s): Korey Meng M.D. Monitoring: Patient was placed on continuous monitoring with intravenous conscious sedation administered by the IR nursing staff and supervised by the IR attending. Patient remained stable throughout the procedure. Medications: 1. Versed IV: 1 mg 2. Fentanyl IV: 50 mcg Sedation time: 20 minutes face-to-face. Fluoroscopy time: 3.8 minutes. Contrast: No intravenous contrast administered. 20 mL of Visipaque 320 in the right renal pelvic calyceal system. PROCEDURE: The patient understood the limitations, alternatives, and risks of the procedure and requested the procedure be performed. Both written and oral consent were obtained. Patient placed prone on the interventional table. The right flank was prepped and draped in the usual sterile fashion. 1% lidocaine without epinephrine was used for local anesthesia. Under  ultrasound guidance, 21 gauge needle nephrostomy was made into a posterior calyx. Cloudy urine aspirated and sent to the laboratory for analysis. Antegrade pyelography was performed through the needle. Needle exchanged over 0.018 guidewire for the [employer] AccuStick II sheath/cannula. Cannula and inner coaxial 4 Grenadian dilator removed over guidewire. 6 Grenadian sheath removed over 0.035 and 0.018 guidewires. 0.018 guidewire left as a safety wire. Track dilated over 0.035 guidewire to 8 Grenadian size. 8 Grenadian scanner locking pigtail catheter was advanced over the guidewire into the collecting system under fluoroscopic guidance. Guidewire removed. Pigtail formed and locked in the renal pelvis. Position documented with contrast. 2-0 nylon catheter-retaining suture and sterile dressing applied. Catheter to gravity drainage. No immediate complication.  FINDINGS: Moderately dilated right pelvicalyceal system.     IMPRESSION: 1. Percutaneous antegrade pyelogram performed, demonstrating moderate right hydronephrosis. 2. 8 Grenadian scanner locking pigtail catheter placed as right percutaneous nephrostomy tube. Catheter to gravity drainage. 3. Cloudy urine aspirated and sent to the lab for analysis. PLAN: -Follow up with urology. -Patient to come for routine exchange of right PNT in 3 months or sooner if required. Procedure performed by Dr. Meng under my supervision.  I, Dr. Eduardo Dick, was present for the entire procedure. I have personally reviewed the examination and initial interpretation and I agree with the findings. EDUARDO DICK MD    Echo Limited    Result Date: 2021  [id number]^MAYTE  [facility],[place] [facility] [address] [place] [id number]  Name: NAN DUPREE MRN: [id number] : 1965 Study Date: 2021 11:42 AM Age: 56 yrs Gender: Male Patient Location: [facility] [place] Reason For Study: Tachycardia  Ordering Physician: MAYTE ELLIOTT Referring Physician: MAYTE ELLIOTT Performed By: Luis Juan Carlosyaricleopatraisaias  BSA: 1.7 m2 Height: 65 in Weight: 144 lb HR: 104 BP: 124/64 mmHg ______________________________________________________________________________ Procedure Limited Portable Echo Adult. Contrast Optison. ______________________________________________________________________________ Interpretation Summary Left ventricular function, chamber size, wall motion, and wall thickness are normal.The EF is 60-65%. Right ventricular function, chamber size, wall motion, and thickness are normal. Trace tricuspid insufficiency is present. The peak velocity of the tricuspid regurgitant jet is not obtainable. The inferior vena cava was normal in size with preserved respiratory variability. No pericardial effusion is present.  No recent echo for direct comparison ______________________________________________________________________________ Left Ventricle Left ventricular function, chamber size, wall motion, and wall thickness are normal.The EF is 60-65%. Left ventricular diastolic function is not assessable. No regional wall motion abnormalities are seen.  Right Ventricle Right ventricular function, chamber size, wall motion, and thickness are normal.  Atria The atria cannot be assessed.  Mitral Valve Mild mitral annular calcification is present.  Aortic Valve Aortic valve is normal in structure and function. The aortic valve is tricuspid. No aortic regurgitation is present.  Tricuspid Valve The tricuspid valve is normal. Trace tricuspid insufficiency is present. The peak velocity of the tricuspid regurgitant jet is not obtainable.  Pulmonic Valve The pulmonic valve is normal.  Vessels The aorta root is normal. The inferior vena cava was normal in size with preserved respiratory variability.  Pericardium No pericardial effusion is present.  Compared to Previous Study No recent echo for direct comparison.  ______________________________________________________________________________ MMode/2D Measurements & Calculations asc Aorta Diam: 3.1 cm  Doppler Measurements & Calculations PA V2 max: 99.9 cm/sec PA max P.0 mmHg  ______________________________________________________________________________ Report approved by: Arin CORDOVA 2021 08:00 AM       Ct Abdomen Pelvis W/o Contrast    Result Date: 2021  EXAMINATION: CT ABDOMEN PELVIS W/O CONTRAST, 2021 6:51 PM TECHNIQUE:  Helical CT images from the lung bases through the symphysis pubis were obtained without contrast.  Coronal and sagittal reformatted images were generated at a workstation for further assessment. COMPARISON: CT 2018 HISTORY: Right flank pain FINDINGS: Liver: No suspicious liver lesions on this noncontrast examination. Surgical clip adjacent to the falciform ligament. Gallbladder: No cholelithiasis or evidence of acute cholecystitis. No intra- or extra-hepatic biliary ductal dilatation. Spleen: Unremarkable noncontrast appearance. Pancreas: Partial fatty atrophy. Adrenal glands: No discrete nodules. Kidneys: 8 mm stone of the right ureter (series 2, image 55) with mild right hydronephrosis. There are multiple additional stones within the renal pelves, including a 10 mm right renal stone and a 11 mm left renal stone. No left hydronephrosis. Mild nonspecific right perinephric fat stranding. There is additionally fat stranding adjacent to the proximal right ureter, likely from passage of stone. Postsurgical changes of right lower quadrant urostomy with ileal conduit. Subcentimeter hypodensity of the left kidney, too small to characterize by CT, but likely represents a benign renal cyst. Pelvic organs: No pelvic mass. Bowel: Postsurgical changes of colostomy. No evidence of obstruction. There is hyperdense material within the appendix without appendiceal dilatation or periappendiceal inflammatory changes. Lymph nodes: No suspicious  lymphadenopathy. Peritoneum / Retroperitoneum: No pneumoperitoneum. No organized fluid collections. No free air. Abdominal vasculature: No abdominal aortic aneurysm. Atherosclerotic calcifications of the abdominal aorta and iliac arteries. Bones and soft tissues: Posterior spinal fusion of the thoracic spine is partially visualized. Chronic posterior left rib fracture deformities. Bilateral dislocation of the hips. Degenerative changes of the visualized spine. Again demonstrated are indentations in the posterior subcutaneous tissues posterior to the ischium bilaterally, not significantly changed from 4/6/2018 that may represent decubitus ulcers. Lower thorax: Mild bibasilar atelectasis. The heart is not enlarged. Coronary artery calcification. Small sliding hiatal hernia.     IMPRESSION: 1. Obstructing right ureteral stone measuring 8 mm. Mild right hydronephrosis. There is mild nonspecific right perinephric fat stranding. Fat stranding adjacent to the proximal right ureter, likely from passage of stone. 2. Multiple additional stones of the renal pelves bilaterally. No left hydronephrosis. 3. Postsurgical changes of right lower quadrant urostomy. 4. Not significantly changed from 4/6/2018, indentations in the skin posterior to the ischium bilaterally, may represent previous or current decubitus ulcers. [Urgent Result: Obstructing right ureteral stone with mild right hydronephrosis] Finding was identified on 6/19/2021 7:11 PM. Dr. Cain was contacted by Dr. Indigo Dimas at 6/19/2021 7:15 PM and verbalized understanding of the urgent finding. I have personally reviewed the examination and initial interpretation and I agree with the findings. VIDA YUAN MD

## 2021-06-22 NOTE — PLAN OF CARE
End of Shift Summary. See flowsheets for vital signs and detailed assessment.    Changes this shift: Triggered SIRS alert, lactic 2.4->2.5->1.4 today. Received total of 2L fluid bolus. Another set of blood cultures sent today. Nasal swab positive for human rhinovirus. Pt with hypoglycemia, improved with glucose gel and increased PO intake. Pt refused WOC to change appliances, states he changes them himself. Refuses turns, states he does not have a tailbone so he cannot get bedsores.     Plan:  Monitor for increased signs of sepsis and hypoglycemia.

## 2021-06-22 NOTE — TELEPHONE ENCOUNTER
Called and spoke to Tiffani at [facility] who can see the order and will reach out to the patient to schedule his JOVAN and arterial duplex ultrasound.   
Parth is currently admitted.     2 attempts have been made to schedule US per Dr. Martinez.    Attempts:   6/14/2021  MB Full  6/17/2021 MB Full    Dorothy TIPTON   
Parth needs an order or referral for vascular to set up an appointment.    He called and they had no record of him needing anything.  
General

## 2021-06-22 NOTE — CONSULTS
"  06/22/21 0943 DouglastingCarolina larson, RN     Patient seen at bedside alone for PNT education. Is unable to do any cares independently. States he lives in a group home which has \"RN\" care three times a week. Discussed all cares needed for managing PNT including anchoring tube, site care and bandage change, emptying and recording drainage and when to call care team. Literature given: Handwashing and Skin Care and Percutaneous Nephrostomy Tube Home Care Instructions.    "

## 2021-06-22 NOTE — PROGRESS NOTES
Major Shift Events:  Patient sleeping most of the shift. Reports being tired and not sleeping well yesterday. Good urine output from drains. VSS.   Plan: Continue to monitor infection.   For vital signs and complete assessments, please see documentation flowsheets.

## 2021-06-22 NOTE — PHARMACY-VANCOMYCIN DOSING SERVICE
"Pharmacy Vancomycin Note  Date of Service 2021  Patient's  1965   56 year old, male    Indication: Sepsis and Urinary Tract Infection  Day of Therapy: 4   Current vancomycin regimen:  1250mg IV q24h  Current vancomycin monitoring method: AUC  Current vancomycin therapeutic monitoring goal: 400-600 mg*h/L    Current estimated CrCl = Estimated Creatinine Clearance: 88.8 mL/min (based on SCr of 0.84 mg/dL).    Creatinine for last 3 days  2021:  5:26 PM Creatinine 0.98 mg/dL; 11:20 PM Creatinine 1.28 mg/dL  2021:  4:06 AM Creatinine 1.29 mg/dL;  9:40 AM Creatinine 1.30 mg/dL  2021:  8:44 AM Creatinine 1.05 mg/dL  2021:  6:56 AM Creatinine 0.84 mg/dL    Recent Vancomycin Levels (past 3 days)  2021:  9:40 AM Vancomycin Level 13.6 mg/L  2021:  6:56 AM Vancomycin Level 16.5 mg/L    Vancomycin IV Administrations (past 72 hours)                   vancomycin 1250 mg in 0.9% NaCl 250 mL intermittent infusion 1,250 mg (mg) 1,250 mg New Bag 21     1,250 mg New Bag 21    vancomycin 1500 mg in 0.9% NaCl 250 ml intermittent infusion 1,500 mg (mg) 1,500 mg New Bag 21                Nephrotoxins and other renal medications (From now, onward)    Start     Dose/Rate Route Frequency Ordered Stop    21 213  vancomycin 1250 mg in 0.9% NaCl 250 mL intermittent infusion 1,250 mg      1,250 mg  over 90 Minutes Intravenous EVERY 24 HOURS 21 1039      21 1200  piperacillin-tazobactam (ZOSYN) 3.375 g vial to attach to  mL bag     Note to Pharmacy: For [facility], [facility] and [facility]: For Zosyn-naive patients, use the \"Zosyn initial dose + extended infusion\" order panel.    3.375 g  over 30 Minutes Intravenous EVERY 6 HOURS 21 1124               Contrast Orders - past 72 hours (72h ago, onward)    Start     Dose/Rate Route Frequency Ordered Stop    21 1200  perflutren diluted 1mL to 2mL with saline (OPTISON) diluted injection 6 mL      6 mL " Intravenous ONCE 06/20/21 1159 06/20/21 1159    06/19/21 2140  iodixanol (VISIPAQUE 320) injection 50 mL      50 mL NEPHROSTOMY TUBE ONCE 06/19/21 2137 06/19/21 2220          Interpretation of levels and current regimen:  Vancomycin level is reflective of AUC less than 400    Has serum creatinine changed greater than 50% in last 72 hours: No    Urine output:  unable to determine    Renal Function: Improving    Old Regimen:  Loading dose: N/A  Regimen: 1250 mg every 24 hours   Exposure target: AUC24 (range)400-600 mg/L.hr   AUC24,ss: 357 mg/L.hr  PAUC*: 27 %  Ctrough,ss: 8.1 mg/L  Pconc*: 0 %  Tox.: 5 %    New Regimen:  Loading dose: N/A  Regimen: 1000 mg every 12 hours   Exposure target: AUC24 (range)400-600 mg/L.hr   AUC24,ss: 565 mg/L.hr  PAUC*: 98 %  Ctrough,ss: 16.9 mg/L  Pconc*: 26 %  Tox.: 13 %    Plan:  1. Increase Dose to Vancomycin 1000mg IV q12h (15.7mg/kg)  2. Vancomycin monitoring method: AUC  3. Vancomycin therapeutic monitoring goal: 400-600 mg*h/L  4. Pharmacy will check vancomycin levels as appropriate in 1-3 Days.  5. Serum creatinine levels will be ordered a minimum of twice weekly.    Damián Woodward, [facility]

## 2021-06-22 NOTE — PLAN OF CARE
Speech Language Therapy Discharge Summary    Reason for therapy discharge:    All goals and outcomes met, no further needs identified.    Progress towards therapy goal(s). See goals on Care Plan in [facility] electronic health record for goal details.  Goals met    Therapy recommendation(s):    No further therapy is recommended.          Recommend pt continue regular tetxures and thin liquids. Pt should be fully upright and alert for all PO, take small sips/bites, self-select softer textures, and slow pacing. Pt is able to independently recall and implement safe swallow strategies; goals met.

## 2021-06-23 ENCOUNTER — APPOINTMENT (OUTPATIENT)
Dept: CT IMAGING | Facility: CLINIC | Age: 56
DRG: 871 | End: 2021-06-23
Attending: STUDENT IN AN ORGANIZED HEALTH CARE EDUCATION/TRAINING PROGRAM
Payer: MEDICARE

## 2021-06-23 ENCOUNTER — APPOINTMENT (OUTPATIENT)
Dept: GENERAL RADIOLOGY | Facility: CLINIC | Age: 56
DRG: 871 | End: 2021-06-23
Attending: INTERNAL MEDICINE
Payer: MEDICARE

## 2021-06-23 LAB
ALBUMIN SERPL-MCNC: 1.9 G/DL (ref 3.4–5)
ALP SERPL-CCNC: 75 U/L (ref 40–150)
ALT SERPL W P-5'-P-CCNC: 15 U/L (ref 0–70)
ANION GAP SERPL CALCULATED.3IONS-SCNC: 5 MMOL/L (ref 3–14)
AST SERPL W P-5'-P-CCNC: 15 U/L (ref 0–45)
BASOPHILS # BLD AUTO: 0 10E9/L (ref 0–0.2)
BASOPHILS NFR BLD AUTO: 0.4 %
BILIRUB SERPL-MCNC: 0.3 MG/DL (ref 0.2–1.3)
BUN SERPL-MCNC: 11 MG/DL (ref 7–30)
CALCIUM SERPL-MCNC: 9 MG/DL (ref 8.5–10.1)
CHLORIDE SERPL-SCNC: 103 MMOL/L (ref 94–109)
CO2 SERPL-SCNC: 29 MMOL/L (ref 20–32)
CREAT SERPL-MCNC: 0.78 MG/DL (ref 0.66–1.25)
CRP SERPL-MCNC: 240 MG/L (ref 0–8)
DIFFERENTIAL METHOD BLD: ABNORMAL
EOSINOPHIL # BLD AUTO: 0.1 10E9/L (ref 0–0.7)
EOSINOPHIL NFR BLD AUTO: 1.5 %
ERYTHROCYTE [DISTWIDTH] IN BLOOD BY AUTOMATED COUNT: 14.1 % (ref 10–15)
ERYTHROCYTE [DISTWIDTH] IN BLOOD BY AUTOMATED COUNT: 14.2 % (ref 10–15)
GFR SERPL CREATININE-BSD FRML MDRD: >90 ML/MIN/{1.73_M2}
GLUCOSE SERPL-MCNC: 121 MG/DL (ref 70–99)
HCT VFR BLD AUTO: 30.8 % (ref 40–53)
HCT VFR BLD AUTO: 31.5 % (ref 40–53)
HGB BLD-MCNC: 10.1 G/DL (ref 13.3–17.7)
HGB BLD-MCNC: 9.8 G/DL (ref 13.3–17.7)
IMM GRANULOCYTES # BLD: 0.1 10E9/L (ref 0–0.4)
IMM GRANULOCYTES NFR BLD: 1.5 %
LACTATE BLD-SCNC: 0.8 MMOL/L (ref 0.7–2)
LYMPHOCYTES # BLD AUTO: 1 10E9/L (ref 0.8–5.3)
LYMPHOCYTES NFR BLD AUTO: 10.8 %
MAGNESIUM SERPL-MCNC: 1.7 MG/DL (ref 1.6–2.3)
MCH RBC QN AUTO: 27.1 PG (ref 26.5–33)
MCH RBC QN AUTO: 27.3 PG (ref 26.5–33)
MCHC RBC AUTO-ENTMCNC: 31.8 G/DL (ref 31.5–36.5)
MCHC RBC AUTO-ENTMCNC: 32.1 G/DL (ref 31.5–36.5)
MCV RBC AUTO: 85 FL (ref 78–100)
MCV RBC AUTO: 85 FL (ref 78–100)
MONOCYTES # BLD AUTO: 0.9 10E9/L (ref 0–1.3)
MONOCYTES NFR BLD AUTO: 9.2 %
MRSA DNA SPEC QL NAA+PROBE: NEGATIVE
NEUTROPHILS # BLD AUTO: 7.2 10E9/L (ref 1.6–8.3)
NEUTROPHILS NFR BLD AUTO: 76.6 %
NRBC # BLD AUTO: 0 10*3/UL
NRBC BLD AUTO-RTO: 0 /100
PHOSPHATE SERPL-MCNC: 3.1 MG/DL (ref 2.5–4.5)
PHOSPHATE SERPL-MCNC: 3.4 MG/DL (ref 2.5–4.5)
PLATELET # BLD AUTO: 233 10E9/L (ref 150–450)
PLATELET # BLD AUTO: 237 10E9/L (ref 150–450)
POTASSIUM SERPL-SCNC: 4 MMOL/L (ref 3.4–5.3)
PROCALCITONIN SERPL-MCNC: 7.39 NG/ML
PROT SERPL-MCNC: 5.5 G/DL (ref 6.8–8.8)
RBC # BLD AUTO: 3.62 10E12/L (ref 4.4–5.9)
RBC # BLD AUTO: 3.7 10E12/L (ref 4.4–5.9)
SODIUM SERPL-SCNC: 137 MMOL/L (ref 133–144)
SPECIMEN SOURCE: NORMAL
WBC # BLD AUTO: 9.4 10E9/L (ref 4–11)
WBC # BLD AUTO: 9.4 10E9/L (ref 4–11)

## 2021-06-23 PROCEDURE — 84145 PROCALCITONIN (PCT): CPT | Performed by: STUDENT IN AN ORGANIZED HEALTH CARE EDUCATION/TRAINING PROGRAM

## 2021-06-23 PROCEDURE — 250N000011 HC RX IP 250 OP 636: Performed by: STUDENT IN AN ORGANIZED HEALTH CARE EDUCATION/TRAINING PROGRAM

## 2021-06-23 PROCEDURE — 84100 ASSAY OF PHOSPHORUS: CPT | Performed by: STUDENT IN AN ORGANIZED HEALTH CARE EDUCATION/TRAINING PROGRAM

## 2021-06-23 PROCEDURE — 250N000013 HC RX MED GY IP 250 OP 250 PS 637: Performed by: STUDENT IN AN ORGANIZED HEALTH CARE EDUCATION/TRAINING PROGRAM

## 2021-06-23 PROCEDURE — 80053 COMPREHEN METABOLIC PANEL: CPT | Performed by: STUDENT IN AN ORGANIZED HEALTH CARE EDUCATION/TRAINING PROGRAM

## 2021-06-23 PROCEDURE — 71045 X-RAY EXAM CHEST 1 VIEW: CPT | Mod: 26 | Performed by: RADIOLOGY

## 2021-06-23 PROCEDURE — 999N000127 HC STATISTIC PERIPHERAL IV START W US GUIDANCE

## 2021-06-23 PROCEDURE — 85027 COMPLETE CBC AUTOMATED: CPT | Performed by: STUDENT IN AN ORGANIZED HEALTH CARE EDUCATION/TRAINING PROGRAM

## 2021-06-23 PROCEDURE — 86140 C-REACTIVE PROTEIN: CPT | Performed by: STUDENT IN AN ORGANIZED HEALTH CARE EDUCATION/TRAINING PROGRAM

## 2021-06-23 PROCEDURE — 258N000003 HC RX IP 258 OP 636: Performed by: STUDENT IN AN ORGANIZED HEALTH CARE EDUCATION/TRAINING PROGRAM

## 2021-06-23 PROCEDURE — 99233 SBSQ HOSP IP/OBS HIGH 50: CPT | Mod: GC | Performed by: STUDENT IN AN ORGANIZED HEALTH CARE EDUCATION/TRAINING PROGRAM

## 2021-06-23 PROCEDURE — 87040 BLOOD CULTURE FOR BACTERIA: CPT | Performed by: STUDENT IN AN ORGANIZED HEALTH CARE EDUCATION/TRAINING PROGRAM

## 2021-06-23 PROCEDURE — 74177 CT ABD & PELVIS W/CONTRAST: CPT | Mod: ME

## 2021-06-23 PROCEDURE — 83735 ASSAY OF MAGNESIUM: CPT | Performed by: STUDENT IN AN ORGANIZED HEALTH CARE EDUCATION/TRAINING PROGRAM

## 2021-06-23 PROCEDURE — 74177 CT ABD & PELVIS W/CONTRAST: CPT | Mod: 26 | Performed by: RADIOLOGY

## 2021-06-23 PROCEDURE — G1004 CDSM NDSC: HCPCS | Mod: GC | Performed by: RADIOLOGY

## 2021-06-23 PROCEDURE — 87641 MR-STAPH DNA AMP PROBE: CPT | Performed by: STUDENT IN AN ORGANIZED HEALTH CARE EDUCATION/TRAINING PROGRAM

## 2021-06-23 PROCEDURE — 87640 STAPH A DNA AMP PROBE: CPT | Performed by: STUDENT IN AN ORGANIZED HEALTH CARE EDUCATION/TRAINING PROGRAM

## 2021-06-23 PROCEDURE — 85025 COMPLETE CBC W/AUTO DIFF WBC: CPT | Performed by: STUDENT IN AN ORGANIZED HEALTH CARE EDUCATION/TRAINING PROGRAM

## 2021-06-23 PROCEDURE — 36592 COLLECT BLOOD FROM PICC: CPT | Performed by: STUDENT IN AN ORGANIZED HEALTH CARE EDUCATION/TRAINING PROGRAM

## 2021-06-23 PROCEDURE — 120N000002 HC R&B MED SURG/OB UMMC

## 2021-06-23 PROCEDURE — 83605 ASSAY OF LACTIC ACID: CPT | Performed by: INTERNAL MEDICINE

## 2021-06-23 PROCEDURE — 36415 COLL VENOUS BLD VENIPUNCTURE: CPT | Performed by: STUDENT IN AN ORGANIZED HEALTH CARE EDUCATION/TRAINING PROGRAM

## 2021-06-23 PROCEDURE — 71045 X-RAY EXAM CHEST 1 VIEW: CPT

## 2021-06-23 RX ORDER — IOPAMIDOL 755 MG/ML
88 INJECTION, SOLUTION INTRAVASCULAR ONCE
Status: COMPLETED | OUTPATIENT
Start: 2021-06-23 | End: 2021-06-23

## 2021-06-23 RX ORDER — PIPERACILLIN SODIUM, TAZOBACTAM SODIUM 3; .375 G/15ML; G/15ML
3.38 INJECTION, POWDER, LYOPHILIZED, FOR SOLUTION INTRAVENOUS EVERY 6 HOURS
Status: DISCONTINUED | OUTPATIENT
Start: 2021-06-23 | End: 2021-06-24

## 2021-06-23 RX ORDER — VANCOMYCIN HYDROCHLORIDE 1 G/200ML
1000 INJECTION, SOLUTION INTRAVENOUS EVERY 12 HOURS
Status: DISCONTINUED | OUTPATIENT
Start: 2021-06-23 | End: 2021-06-24

## 2021-06-23 RX ADMIN — PIPERACILLIN SODIUM AND TAZOBACTAM SODIUM 3.38 G: 3; .375 INJECTION, POWDER, LYOPHILIZED, FOR SOLUTION INTRAVENOUS at 02:07

## 2021-06-23 RX ADMIN — VARENICLINE TARTRATE 1 MG: 1 TABLET, FILM COATED ORAL at 20:13

## 2021-06-23 RX ADMIN — FAMOTIDINE 10 MG: 10 TABLET, FILM COATED ORAL at 07:35

## 2021-06-23 RX ADMIN — BACLOFEN 10 MG: 10 TABLET ORAL at 20:13

## 2021-06-23 RX ADMIN — Medication 500 MG: at 07:35

## 2021-06-23 RX ADMIN — ACETAMINOPHEN 650 MG: 325 TABLET, FILM COATED ORAL at 00:25

## 2021-06-23 RX ADMIN — VANCOMYCIN HYDROCHLORIDE 1000 MG: 1 INJECTION, SOLUTION INTRAVENOUS at 14:54

## 2021-06-23 RX ADMIN — PIPERACILLIN SODIUM AND TAZOBACTAM SODIUM 3.38 G: 3; .375 INJECTION, POWDER, LYOPHILIZED, FOR SOLUTION INTRAVENOUS at 07:33

## 2021-06-23 RX ADMIN — TRAZODONE HYDROCHLORIDE 100 MG: 50 TABLET ORAL at 22:13

## 2021-06-23 RX ADMIN — Medication 500 MG: at 11:59

## 2021-06-23 RX ADMIN — VANCOMYCIN HYDROCHLORIDE 1000 MG: 1 INJECTION, SOLUTION INTRAVENOUS at 02:57

## 2021-06-23 RX ADMIN — ACETAMINOPHEN 650 MG: 325 TABLET, FILM COATED ORAL at 06:44

## 2021-06-23 RX ADMIN — BACLOFEN 10 MG: 10 TABLET ORAL at 07:35

## 2021-06-23 RX ADMIN — BISMUTH SUBSALICYLATE 30 ML: 262 LIQUID ORAL at 07:39

## 2021-06-23 RX ADMIN — ENOXAPARIN SODIUM 40 MG: 40 INJECTION SUBCUTANEOUS at 10:19

## 2021-06-23 RX ADMIN — BACLOFEN 10 MG: 10 TABLET ORAL at 11:59

## 2021-06-23 RX ADMIN — PREGABALIN 150 MG: 75 CAPSULE ORAL at 20:13

## 2021-06-23 RX ADMIN — FAMOTIDINE 10 MG: 10 TABLET, FILM COATED ORAL at 22:13

## 2021-06-23 RX ADMIN — GUAIFENESIN 600 MG: 600 TABLET ORAL at 07:35

## 2021-06-23 RX ADMIN — ACETAMINOPHEN 650 MG: 325 TABLET, FILM COATED ORAL at 18:45

## 2021-06-23 RX ADMIN — PREGABALIN 150 MG: 75 CAPSULE ORAL at 07:35

## 2021-06-23 RX ADMIN — IOPAMIDOL 88 ML: 755 INJECTION, SOLUTION INTRAVENOUS at 11:40

## 2021-06-23 RX ADMIN — SODIUM CHLORIDE, POTASSIUM CHLORIDE, SODIUM LACTATE AND CALCIUM CHLORIDE 1000 ML: 600; 310; 30; 20 INJECTION, SOLUTION INTRAVENOUS at 02:07

## 2021-06-23 RX ADMIN — PIPERACILLIN SODIUM AND TAZOBACTAM SODIUM 3.38 G: 3; .375 INJECTION, POWDER, LYOPHILIZED, FOR SOLUTION INTRAVENOUS at 18:46

## 2021-06-23 RX ADMIN — Medication 500 MG: at 18:45

## 2021-06-23 RX ADMIN — BACLOFEN 10 MG: 10 TABLET ORAL at 16:18

## 2021-06-23 RX ADMIN — ACETAMINOPHEN 650 MG: 325 TABLET, FILM COATED ORAL at 13:08

## 2021-06-23 RX ADMIN — LOPERAMIDE HYDROCHLORIDE 4 MG: 2 CAPSULE ORAL at 22:13

## 2021-06-23 RX ADMIN — PIPERACILLIN SODIUM AND TAZOBACTAM SODIUM 3.38 G: 3; .375 INJECTION, POWDER, LYOPHILIZED, FOR SOLUTION INTRAVENOUS at 13:08

## 2021-06-23 RX ADMIN — GUAIFENESIN 600 MG: 600 TABLET ORAL at 20:13

## 2021-06-23 RX ADMIN — ONDANSETRON 4 MG: 4 TABLET, ORALLY DISINTEGRATING ORAL at 06:47

## 2021-06-23 RX ADMIN — VARENICLINE TARTRATE 1 MG: 1 TABLET, FILM COATED ORAL at 07:35

## 2021-06-23 RX ADMIN — DULOXETINE HYDROCHLORIDE 60 MG: 60 CAPSULE, DELAYED RELEASE ORAL at 07:35

## 2021-06-23 RX ADMIN — OXYCODONE HYDROCHLORIDE 5 MG: 5 TABLET ORAL at 11:59

## 2021-06-23 RX ADMIN — Medication 1 TABLET: at 07:35

## 2021-06-23 RX ADMIN — LOPERAMIDE HYDROCHLORIDE 2 MG: 2 CAPSULE ORAL at 18:48

## 2021-06-23 NOTE — PROGRESS NOTES
Major Shift Events:  Spiked Temp overnight. ABX changed. 1L fluid bolus given. Chest x-ray obtained. Labs and blood cultures drawn. T high of 102/7/ Most recent 99.7.   Plan: Continue to monitor.   For vital signs and complete assessments, please see documentation flowsheets.

## 2021-06-23 NOTE — PLAN OF CARE
Major Shift Events:  A&Ox4. No tele orders. -140s. Tmax 100 degree F, most recent temp 98.3 degree F. Sating >93% on 1L NC, unable to wean to RA d/t frequent desaturations. Fair cough, sputum sample needed. BM via colostomy. Good UO from urostomy and nephrostomy. Abdominal CT completed. Regular diet, poor appetite. T&R as tolerated, pt refusing to be turned every 2 hours.     Plan: Transfer to med/surg when bed available.      For vital signs and complete assessments, please see documentation flowsheets.

## 2021-06-23 NOTE — PROGRESS NOTES
"[facility]   BRIEF PROGRESS NOTE    SUBJECTIVE  Paged by RN about new fever to 102.7.     Patient feeling feverish/ chilled alternating.     OBJECTIVE:  /76 (BP Location: Left arm)   Pulse 114   Temp 102.7  F (39.3  C)   Resp 16   Ht 1.651 m (5' 5\")   Wt 65.5 kg (144 lb 8 oz)   SpO2 98%   BMI 24.05 kg/m      Exam:   General: Alert and oriented, uncomfortable, slightly diaphoretic  Skin: Warm and dry, no abnormalities noted.  Eyes: Extra-ocular muscles intact, pupils equal and reactive.  ENT: Speech intact, nasal passages open, no hearing impairment noted.  CV: Tachycardic. No cyanosis or pallor, warm and well perfused.  Respiratory: Diffuse rales bilaterally. No respiratory distress, no accessory muscle use.  Neuro: Gait and station normal, comprehension intact. Gross and fine motor skills intact.   Psychiatric: Mood and affect appear normal.   Extremities: Warm, able to move all four extremities at will.      ASSESSMENT/PLAN:    # Severe sepsis  Patient with known Klebsiella bacteremia likely related to pyelonephritis of obstructing kidney stone with new fever to 102.7, worsened tachycardia to 120s. Lactate 0.8. Urine clear in bag. Possibly just normal fever curve of pyelonephritis, but given that this is new fever since admission in the setting of coming off vanc/zosyn earlier today, will expand coverage and re-evaluate. Other likely source would be a developing pneumonia given +rhinovirus and poor-sounding lungs.    1. Repeat blood cx, cbc, crp, procal  2. 1L LR bolus  3. CTX-->vanc/zosyn    Please see daily rounding note for full A/P.  Smooth Hansen MD  1:35 AM    "
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
## 2021-06-23 NOTE — PHARMACY-VANCOMYCIN DOSING SERVICE
"Pharmacy Vancomycin Initial Note (Restart)  Date of Service 2021  Patient's  1965  56 year old, male    Indication: Pyelonephritis and Sepsis    Current estimated CrCl = Estimated Creatinine Clearance: 88.8 mL/min (based on SCr of 0.84 mg/dL).    Creatinine for last 3 days  2021:  4:06 AM Creatinine 1.29 mg/dL;  9:40 AM Creatinine 1.30 mg/dL  2021:  8:44 AM Creatinine 1.05 mg/dL  2021:  6:56 AM Creatinine 0.84 mg/dL    Recent Vancomycin Level(s) for last 3 days  2021:  9:40 AM Vancomycin Level 13.6 mg/L  2021:  6:56 AM Vancomycin Level 16.5 mg/L      Vancomycin IV Administrations (past 72 hours)                   vancomycin (VANCOCIN) 1000 mg in dextrose 5% 200 mL PREMIX (mg) 1,000 mg New Bag 21 0943    vancomycin 1250 mg in 0.9% NaCl 250 mL intermittent infusion 1,250 mg (mg) 1,250 mg New Bag 21     1,250 mg New Bag 21                Nephrotoxins and other renal medications (From now, onward)    Start     Dose/Rate Route Frequency Ordered Stop    21 0200  vancomycin (VANCOCIN) 1000 mg in dextrose 5% 200 mL PREMIX      1,000 mg  200 mL/hr over 1 Hours Intravenous EVERY 12 HOURS 21 0115      21 0130  piperacillin-tazobactam (ZOSYN) 3.375 g vial to attach to  mL bag     Note to Pharmacy: For [facility], [facility] and [facility]: For Zosyn-naive patients, use the \"Zosyn initial dose + extended infusion\" order panel.    3.375 g  over 30 Minutes Intravenous EVERY 6 HOURS 21 0115            Contrast Orders - past 72 hours (72h ago, onward)    Start     Dose/Rate Route Frequency Ordered Stop    21 1200  perflutren diluted 1mL to 2mL with saline (OPTISON) diluted injection 6 mL      6 mL Intravenous ONCE 21 1159 21 1159          Restart prior treatment course.  Regimen: Continue 1000 mg every 12 hours   Start time: 13:26 on 2021  Exposure target: AUC24 (range)400-600 mg/L.hr   AUC24,ss: 568 mg/L.hr  PAUC*: 97 " %  Ctrough,ss: 16.8 mg/L  Pconc*: 26 %  Tox.: 12 %          Plan:  1. Restart vancomycin  1000 mg IV q12h (Prior dose)  2. Vancomycin monitoring method: AUC  3. Vancomycin therapeutic monitoring goal: 400-600 mg*h/L  4. Pharmacy will check vancomycin levels as appropriate in 1-3 Days.    5. Serum creatinine levels will be ordered daily for the first week of therapy and at least twice weekly for subsequent weeks.      Bernardo Thompson, MARYH

## 2021-06-24 ENCOUNTER — APPOINTMENT (OUTPATIENT)
Dept: OCCUPATIONAL THERAPY | Facility: CLINIC | Age: 56
DRG: 871 | End: 2021-06-24
Payer: MEDICARE

## 2021-06-24 ENCOUNTER — PRE VISIT (OUTPATIENT)
Dept: UROLOGY | Facility: CLINIC | Age: 56
End: 2021-06-24

## 2021-06-24 LAB
ALBUMIN SERPL-MCNC: 2.1 G/DL (ref 3.4–5)
ALP SERPL-CCNC: 94 U/L (ref 40–150)
ALT SERPL W P-5'-P-CCNC: 14 U/L (ref 0–70)
ANION GAP SERPL CALCULATED.3IONS-SCNC: 3 MMOL/L (ref 3–14)
AST SERPL W P-5'-P-CCNC: 12 U/L (ref 0–45)
BASOPHILS # BLD AUTO: 0.1 10E9/L (ref 0–0.2)
BASOPHILS NFR BLD AUTO: 0.5 %
BILIRUB SERPL-MCNC: 0.4 MG/DL (ref 0.2–1.3)
BUN SERPL-MCNC: 14 MG/DL (ref 7–30)
CALCIUM SERPL-MCNC: 9.2 MG/DL (ref 8.5–10.1)
CHLORIDE SERPL-SCNC: 102 MMOL/L (ref 94–109)
CO2 SERPL-SCNC: 32 MMOL/L (ref 20–32)
CREAT SERPL-MCNC: 0.82 MG/DL (ref 0.66–1.25)
CRP SERPL-MCNC: 220 MG/L (ref 0–8)
DIFFERENTIAL METHOD BLD: ABNORMAL
EOSINOPHIL # BLD AUTO: 0.2 10E9/L (ref 0–0.7)
EOSINOPHIL NFR BLD AUTO: 2.1 %
ERYTHROCYTE [DISTWIDTH] IN BLOOD BY AUTOMATED COUNT: 13.9 % (ref 10–15)
ERYTHROCYTE [DISTWIDTH] IN BLOOD BY AUTOMATED COUNT: 14.1 % (ref 10–15)
GFR SERPL CREATININE-BSD FRML MDRD: >90 ML/MIN/{1.73_M2}
GLUCOSE SERPL-MCNC: 85 MG/DL (ref 70–99)
HCT VFR BLD AUTO: 31.9 % (ref 40–53)
HCT VFR BLD AUTO: 33.4 % (ref 40–53)
HGB BLD-MCNC: 10.4 G/DL (ref 13.3–17.7)
HGB BLD-MCNC: 10.8 G/DL (ref 13.3–17.7)
IMM GRANULOCYTES # BLD: 0.4 10E9/L (ref 0–0.4)
IMM GRANULOCYTES NFR BLD: 3.8 %
LACTATE BLD-SCNC: 0.8 MMOL/L (ref 0.7–2)
LYMPHOCYTES # BLD AUTO: 1.1 10E9/L (ref 0.8–5.3)
LYMPHOCYTES NFR BLD AUTO: 11.4 %
MAGNESIUM SERPL-MCNC: 1.8 MG/DL (ref 1.6–2.3)
MCH RBC QN AUTO: 27.5 PG (ref 26.5–33)
MCH RBC QN AUTO: 27.6 PG (ref 26.5–33)
MCHC RBC AUTO-ENTMCNC: 32.3 G/DL (ref 31.5–36.5)
MCHC RBC AUTO-ENTMCNC: 32.6 G/DL (ref 31.5–36.5)
MCV RBC AUTO: 84 FL (ref 78–100)
MCV RBC AUTO: 85 FL (ref 78–100)
MONOCYTES # BLD AUTO: 1.4 10E9/L (ref 0–1.3)
MONOCYTES NFR BLD AUTO: 14.8 %
NEUTROPHILS # BLD AUTO: 6.5 10E9/L (ref 1.6–8.3)
NEUTROPHILS NFR BLD AUTO: 67.4 %
NRBC # BLD AUTO: 0 10*3/UL
NRBC BLD AUTO-RTO: 0 /100
PHOSPHATE SERPL-MCNC: 3.9 MG/DL (ref 2.5–4.5)
PLATELET # BLD AUTO: 249 10E9/L (ref 150–450)
PLATELET # BLD AUTO: 254 10E9/L (ref 150–450)
POTASSIUM SERPL-SCNC: 4 MMOL/L (ref 3.4–5.3)
PROT SERPL-MCNC: 6.1 G/DL (ref 6.8–8.8)
RBC # BLD AUTO: 3.78 10E12/L (ref 4.4–5.9)
RBC # BLD AUTO: 3.92 10E12/L (ref 4.4–5.9)
SODIUM SERPL-SCNC: 137 MMOL/L (ref 133–144)
VANCOMYCIN SERPL-MCNC: 26.4 MG/L
WBC # BLD AUTO: 10.2 10E9/L (ref 4–11)
WBC # BLD AUTO: 9.7 10E9/L (ref 4–11)

## 2021-06-24 PROCEDURE — 80053 COMPREHEN METABOLIC PANEL: CPT | Performed by: STUDENT IN AN ORGANIZED HEALTH CARE EDUCATION/TRAINING PROGRAM

## 2021-06-24 PROCEDURE — 85025 COMPLETE CBC W/AUTO DIFF WBC: CPT | Performed by: INTERNAL MEDICINE

## 2021-06-24 PROCEDURE — 250N000011 HC RX IP 250 OP 636: Performed by: STUDENT IN AN ORGANIZED HEALTH CARE EDUCATION/TRAINING PROGRAM

## 2021-06-24 PROCEDURE — 999N000111 HC STATISTIC OT IP EVAL DEFER: Performed by: OCCUPATIONAL THERAPIST

## 2021-06-24 PROCEDURE — 36415 COLL VENOUS BLD VENIPUNCTURE: CPT | Performed by: STUDENT IN AN ORGANIZED HEALTH CARE EDUCATION/TRAINING PROGRAM

## 2021-06-24 PROCEDURE — 97110 THERAPEUTIC EXERCISES: CPT | Mod: GO | Performed by: OCCUPATIONAL THERAPIST

## 2021-06-24 PROCEDURE — 85027 COMPLETE CBC AUTOMATED: CPT | Performed by: STUDENT IN AN ORGANIZED HEALTH CARE EDUCATION/TRAINING PROGRAM

## 2021-06-24 PROCEDURE — 87040 BLOOD CULTURE FOR BACTERIA: CPT | Performed by: STUDENT IN AN ORGANIZED HEALTH CARE EDUCATION/TRAINING PROGRAM

## 2021-06-24 PROCEDURE — 80202 ASSAY OF VANCOMYCIN: CPT | Performed by: STUDENT IN AN ORGANIZED HEALTH CARE EDUCATION/TRAINING PROGRAM

## 2021-06-24 PROCEDURE — 99233 SBSQ HOSP IP/OBS HIGH 50: CPT | Mod: GC | Performed by: STUDENT IN AN ORGANIZED HEALTH CARE EDUCATION/TRAINING PROGRAM

## 2021-06-24 PROCEDURE — 258N000003 HC RX IP 258 OP 636: Performed by: STUDENT IN AN ORGANIZED HEALTH CARE EDUCATION/TRAINING PROGRAM

## 2021-06-24 PROCEDURE — 36592 COLLECT BLOOD FROM PICC: CPT | Performed by: STUDENT IN AN ORGANIZED HEALTH CARE EDUCATION/TRAINING PROGRAM

## 2021-06-24 PROCEDURE — 97165 OT EVAL LOW COMPLEX 30 MIN: CPT | Mod: GO | Performed by: OCCUPATIONAL THERAPIST

## 2021-06-24 PROCEDURE — 97535 SELF CARE MNGMENT TRAINING: CPT | Mod: GO | Performed by: OCCUPATIONAL THERAPIST

## 2021-06-24 PROCEDURE — 250N000013 HC RX MED GY IP 250 OP 250 PS 637: Performed by: STUDENT IN AN ORGANIZED HEALTH CARE EDUCATION/TRAINING PROGRAM

## 2021-06-24 PROCEDURE — 99222 1ST HOSP IP/OBS MODERATE 55: CPT | Performed by: INTERNAL MEDICINE

## 2021-06-24 PROCEDURE — 120N000002 HC R&B MED SURG/OB UMMC

## 2021-06-24 PROCEDURE — 999N000147 HC STATISTIC PT IP EVAL DEFER

## 2021-06-24 PROCEDURE — 84100 ASSAY OF PHOSPHORUS: CPT | Performed by: STUDENT IN AN ORGANIZED HEALTH CARE EDUCATION/TRAINING PROGRAM

## 2021-06-24 PROCEDURE — 86140 C-REACTIVE PROTEIN: CPT | Performed by: STUDENT IN AN ORGANIZED HEALTH CARE EDUCATION/TRAINING PROGRAM

## 2021-06-24 PROCEDURE — 83605 ASSAY OF LACTIC ACID: CPT | Performed by: STUDENT IN AN ORGANIZED HEALTH CARE EDUCATION/TRAINING PROGRAM

## 2021-06-24 PROCEDURE — 83735 ASSAY OF MAGNESIUM: CPT | Performed by: STUDENT IN AN ORGANIZED HEALTH CARE EDUCATION/TRAINING PROGRAM

## 2021-06-24 RX ORDER — CEFTRIAXONE 2 G/1
2 INJECTION, POWDER, FOR SOLUTION INTRAMUSCULAR; INTRAVENOUS EVERY 24 HOURS
Status: DISCONTINUED | OUTPATIENT
Start: 2021-06-24 | End: 2021-06-25

## 2021-06-24 RX ORDER — SODIUM CHLORIDE, SODIUM LACTATE, POTASSIUM CHLORIDE, CALCIUM CHLORIDE 600; 310; 30; 20 MG/100ML; MG/100ML; MG/100ML; MG/100ML
INJECTION, SOLUTION INTRAVENOUS CONTINUOUS
Status: DISCONTINUED | OUTPATIENT
Start: 2021-06-24 | End: 2021-06-28

## 2021-06-24 RX ADMIN — LOPERAMIDE HYDROCHLORIDE 4 MG: 2 CAPSULE ORAL at 22:01

## 2021-06-24 RX ADMIN — BACLOFEN 10 MG: 10 TABLET ORAL at 12:04

## 2021-06-24 RX ADMIN — OXYCODONE HYDROCHLORIDE 5 MG: 5 TABLET ORAL at 08:37

## 2021-06-24 RX ADMIN — PIPERACILLIN SODIUM AND TAZOBACTAM SODIUM 3.38 G: 3; .375 INJECTION, POWDER, LYOPHILIZED, FOR SOLUTION INTRAVENOUS at 08:50

## 2021-06-24 RX ADMIN — GUAIFENESIN 600 MG: 600 TABLET ORAL at 19:59

## 2021-06-24 RX ADMIN — DULOXETINE HYDROCHLORIDE 60 MG: 60 CAPSULE, DELAYED RELEASE ORAL at 08:38

## 2021-06-24 RX ADMIN — PIPERACILLIN SODIUM AND TAZOBACTAM SODIUM 3.38 G: 3; .375 INJECTION, POWDER, LYOPHILIZED, FOR SOLUTION INTRAVENOUS at 12:35

## 2021-06-24 RX ADMIN — TOBRAMYCIN 100 MG: 40 INJECTION INTRAMUSCULAR; INTRAVENOUS at 16:12

## 2021-06-24 RX ADMIN — SODIUM CHLORIDE, POTASSIUM CHLORIDE, SODIUM LACTATE AND CALCIUM CHLORIDE 1000 ML: 600; 310; 30; 20 INJECTION, SOLUTION INTRAVENOUS at 22:00

## 2021-06-24 RX ADMIN — BACLOFEN 10 MG: 10 TABLET ORAL at 16:10

## 2021-06-24 RX ADMIN — BENZONATATE 100 MG: 100 CAPSULE ORAL at 17:23

## 2021-06-24 RX ADMIN — OXYCODONE HYDROCHLORIDE 5 MG: 5 TABLET ORAL at 17:24

## 2021-06-24 RX ADMIN — BACLOFEN 10 MG: 10 TABLET ORAL at 08:38

## 2021-06-24 RX ADMIN — GUAIFENESIN 600 MG: 600 TABLET ORAL at 08:37

## 2021-06-24 RX ADMIN — VANCOMYCIN HYDROCHLORIDE 1000 MG: 1 INJECTION, SOLUTION INTRAVENOUS at 01:23

## 2021-06-24 RX ADMIN — ACETAMINOPHEN 650 MG: 325 TABLET, FILM COATED ORAL at 06:52

## 2021-06-24 RX ADMIN — FAMOTIDINE 10 MG: 10 TABLET, FILM COATED ORAL at 19:59

## 2021-06-24 RX ADMIN — BACLOFEN 10 MG: 10 TABLET ORAL at 19:59

## 2021-06-24 RX ADMIN — FAMOTIDINE 10 MG: 10 TABLET, FILM COATED ORAL at 08:38

## 2021-06-24 RX ADMIN — Medication 500 MG: at 08:39

## 2021-06-24 RX ADMIN — ENOXAPARIN SODIUM 40 MG: 40 INJECTION SUBCUTANEOUS at 09:53

## 2021-06-24 RX ADMIN — VARENICLINE TARTRATE 1 MG: 1 TABLET, FILM COATED ORAL at 08:38

## 2021-06-24 RX ADMIN — PREGABALIN 150 MG: 75 CAPSULE ORAL at 19:59

## 2021-06-24 RX ADMIN — VARENICLINE TARTRATE 1 MG: 1 TABLET, FILM COATED ORAL at 19:59

## 2021-06-24 RX ADMIN — ACETAMINOPHEN 650 MG: 325 TABLET, FILM COATED ORAL at 00:38

## 2021-06-24 RX ADMIN — SODIUM CHLORIDE, POTASSIUM CHLORIDE, SODIUM LACTATE AND CALCIUM CHLORIDE 1000 ML: 600; 310; 30; 20 INJECTION, SOLUTION INTRAVENOUS at 08:39

## 2021-06-24 RX ADMIN — TRAZODONE HYDROCHLORIDE 100 MG: 50 TABLET ORAL at 22:01

## 2021-06-24 RX ADMIN — PREGABALIN 150 MG: 75 CAPSULE ORAL at 08:37

## 2021-06-24 RX ADMIN — Medication 500 MG: at 17:24

## 2021-06-24 RX ADMIN — ACETAMINOPHEN 650 MG: 325 TABLET, FILM COATED ORAL at 18:32

## 2021-06-24 RX ADMIN — OXYCODONE HYDROCHLORIDE 5 MG: 5 TABLET ORAL at 22:31

## 2021-06-24 RX ADMIN — ACETAMINOPHEN 650 MG: 325 TABLET, FILM COATED ORAL at 12:04

## 2021-06-24 RX ADMIN — PIPERACILLIN SODIUM AND TAZOBACTAM SODIUM 3.38 G: 3; .375 INJECTION, POWDER, LYOPHILIZED, FOR SOLUTION INTRAVENOUS at 00:38

## 2021-06-24 RX ADMIN — CEFTRIAXONE SODIUM 2 G: 2 INJECTION, POWDER, FOR SOLUTION INTRAMUSCULAR; INTRAVENOUS at 17:26

## 2021-06-24 RX ADMIN — Medication 1 TABLET: at 08:37

## 2021-06-24 RX ADMIN — SODIUM CHLORIDE, POTASSIUM CHLORIDE, SODIUM LACTATE AND CALCIUM CHLORIDE: 600; 310; 30; 20 INJECTION, SOLUTION INTRAVENOUS at 09:53

## 2021-06-24 ASSESSMENT — ACTIVITIES OF DAILY LIVING (ADL): PREVIOUS_RESPONSIBILITIES: HOUSEKEEPING;LAUNDRY

## 2021-06-24 ASSESSMENT — MIFFLIN-ST. JEOR: SCORE: 1445.87
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
## 2021-06-24 NOTE — PLAN OF CARE
Major Shift Events: Mr. Fountain had no acute events this shift. Patient complained of persistent cough, and received PRN oxy for flank pain from coughing. T max this shift 99.3. Tolerating general diet. Pt refusing repositioning, and getting up to the wheel chair until there is a cushion. VSS.     Plan: Notify primary team of changes, continue with POC.     For vital signs and complete assessments, please see documentation flowsheets.
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
## 2021-06-24 NOTE — PLAN OF CARE
PT / 6D - PT orders received. Per chart review and discussion with OT - pt lives in group home and pivot transfers to/from electric wheelchair at baseline. Pt will only require one therapy discipline to address rehab needs. OT to follow while inpatient. PT complete orders.

## 2021-06-24 NOTE — PROGRESS NOTES
OT: It is of concern that this pt has been hospitalized for 5 days with no Trapeze, despite pt request as well as no assistance for OOB. Per pt report he has been supine for past 5 days, therapist today offering OOB at time of evaluation, pt requesting Geomat or another type of cushion,  one is ordered. RN encouraged to get/assist pt to a chair or WC to promote continued functional I and cardiopulmonary fitness as well as pressure sore prevention as therapist now leaving for the day. Therapies will continue to promote/facilitate mobility next session.
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
## 2021-06-24 NOTE — PROGRESS NOTES
Major Shift Events:  Stable shift T max 100.2. Continue IV abx. Resistive to turn and repositions. Rested well overnight.  Plan: Transfer to med/surg. Potential discharge 6/25.  For vital signs and complete assessments, please see documentation flowsheets.

## 2021-06-24 NOTE — PLAN OF CARE
Time of notification: 9:27 AM  Provider notified: Dr. Hurst  Patient status: FYI: Vanco level 26.4  Temp:  [98.3  F (36.8  C)-100.2  F (37.9  C)] 99  F (37.2  C)  Pulse:  [] 102  Resp:  [13-16] 15  BP: (106-146)/(56-91) 142/89  SpO2:  [93 %-97 %] 94 %  Orders received: no orders

## 2021-06-24 NOTE — PHARMACY-VANCOMYCIN DOSING SERVICE
"Pharmacy Vancomycin Note  Date of Service 2021  Patient's  1965   56 year old, male    Indication: Pyelonephritis and Sepsis  Day of Therapy: 6  Current vancomycin regimen:  1000 mg IV q12h  Current vancomycin monitoring method: AUC  Current vancomycin therapeutic monitoring goal: 400-600 mg*h/L    Current estimated CrCl = Estimated Creatinine Clearance: 97.1 mL/min (based on SCr of 0.82 mg/dL).    Creatinine for last 3 days  2021:  6:56 AM Creatinine 0.84 mg/dL  2021:  4:10 AM Creatinine 0.78 mg/dL  2021:  4:35 AM Creatinine 0.82 mg/dL    Recent Vancomycin Levels (past 3 days)  2021:  6:56 AM Vancomycin Level 16.5 mg/L  2021:  7:37 AM Vancomycin Level 26.4 mg/L (6.25 hr level)     Vancomycin IV Administrations (past 72 hours)                   vancomycin (VANCOCIN) 1000 mg in dextrose 5% 200 mL PREMIX (mg) 1,000 mg New Bag 21 0123     1,000 mg New Bag 21 1454     1,000 mg New Bag  0257    vancomycin (VANCOCIN) 1000 mg in dextrose 5% 200 mL PREMIX (mg) 1,000 mg New Bag 21 0943    vancomycin 1250 mg in 0.9% NaCl 250 mL intermittent infusion 1,250 mg (mg) 1,250 mg New Bag 21 2107                Nephrotoxins and other renal medications (From now, onward)    Start     Dose/Rate Route Frequency Ordered Stop    21 0200  vancomycin (VANCOCIN) 1000 mg in dextrose 5% 200 mL PREMIX      1,000 mg  200 mL/hr over 1 Hours Intravenous EVERY 12 HOURS 21 0115      21 0130  piperacillin-tazobactam (ZOSYN) 3.375 g vial to attach to  mL bag     Note to Pharmacy: For [facility], [facility] and [facility]: For Zosyn-naive patients, use the \"Zosyn initial dose + extended infusion\" order panel.    3.375 g  over 30 Minutes Intravenous EVERY 6 HOURS 21 0115               Contrast Orders - past 72 hours (72h ago, onward)    Start     Dose/Rate Route Frequency Ordered Stop    21 1130  iopamidol (ISOVUE-370) solution 88 mL      88 mL Intravenous ONCE 21 1123 " 06/23/21 1140          Interpretation of levels and current regimen:  Vancomycin level is reflective of AUC greater than 600    Has serum creatinine changed greater than 50% in last 72 hours: No    Urine output:  good urine output    Renal Function: Stable    Old Regimen:  Loading dose: N/A  Regimen: 1000 mg IV every 12 hours.  Exposure target: AUC24 (range)400-600 mg/L.hr   AUC24,ss: 610 mg/L.hr  Probability of AUC24 > 400: 100 %  Ctrough,ss: 19.3 mg/L  Probability of Ctrough,ss > 20: 43 %  Probability of nephrotoxicity (Lodise GIBSON 2009): 16 %    New Regimen:  Loading dose: N/A  Regimen: 750 mg IV every 12 hours.  Exposure target: AUC24 (range)400-600 mg/L.hr   AUC24,ss: 463 mg/L.hr  Probability of AUC24 > 400: 84 %  Ctrough,ss: 14.7 mg/L  Probability of Ctrough,ss > 20: 8 %  Probability of nephrotoxicity (Lodise GIBSON 2009): 10 %      Plan:  1. Decrease Dose to vancomycin 750mg IV q12h (11.8mg/kg)    2. Vancomycin monitoring method: AUC  3. Vancomycin therapeutic monitoring goal: 400-600 mg*h/L  4. Pharmacy will check vancomycin levels as appropriate in 1-3 Days.  5. Serum creatinine levels will be ordered a minimum of twice weekly.    Damián Woodward, [facility]

## 2021-06-25 LAB
ALBUMIN SERPL-MCNC: 2.2 G/DL (ref 3.4–5)
ALP SERPL-CCNC: 99 U/L (ref 40–150)
ALT SERPL W P-5'-P-CCNC: 12 U/L (ref 0–70)
ANION GAP SERPL CALCULATED.3IONS-SCNC: 6 MMOL/L (ref 3–14)
AST SERPL W P-5'-P-CCNC: 10 U/L (ref 0–45)
BACTERIA SPEC CULT: NO GROWTH
BASOPHILS # BLD AUTO: 0 10E9/L (ref 0–0.2)
BASOPHILS NFR BLD AUTO: 0 %
BILIRUB SERPL-MCNC: 0.3 MG/DL (ref 0.2–1.3)
BUN SERPL-MCNC: 10 MG/DL (ref 7–30)
CALCIUM SERPL-MCNC: 9.6 MG/DL (ref 8.5–10.1)
CHLORIDE SERPL-SCNC: 98 MMOL/L (ref 94–109)
CO2 SERPL-SCNC: 30 MMOL/L (ref 20–32)
CREAT SERPL-MCNC: 0.73 MG/DL (ref 0.66–1.25)
DIFFERENTIAL METHOD BLD: ABNORMAL
EOSINOPHIL # BLD AUTO: 0.3 10E9/L (ref 0–0.7)
EOSINOPHIL NFR BLD AUTO: 2.6 %
ERYTHROCYTE [DISTWIDTH] IN BLOOD BY AUTOMATED COUNT: 13.9 % (ref 10–15)
GFR SERPL CREATININE-BSD FRML MDRD: >90 ML/MIN/{1.73_M2}
GLUCOSE SERPL-MCNC: 78 MG/DL (ref 70–99)
HCT VFR BLD AUTO: 32.5 % (ref 40–53)
HGB BLD-MCNC: 10.2 G/DL (ref 13.3–17.7)
LYMPHOCYTES # BLD AUTO: 1.4 10E9/L (ref 0.8–5.3)
LYMPHOCYTES NFR BLD AUTO: 14.7 %
Lab: NORMAL
MAGNESIUM SERPL-MCNC: 1.8 MG/DL (ref 1.6–2.3)
MCH RBC QN AUTO: 27.1 PG (ref 26.5–33)
MCHC RBC AUTO-ENTMCNC: 31.4 G/DL (ref 31.5–36.5)
MCV RBC AUTO: 86 FL (ref 78–100)
METAMYELOCYTES # BLD: 0.2 10E9/L
METAMYELOCYTES NFR BLD MANUAL: 1.7 %
MONOCYTES # BLD AUTO: 1.3 10E9/L (ref 0–1.3)
MONOCYTES NFR BLD AUTO: 12.9 %
MYELOCYTES # BLD: 0.4 10E9/L
MYELOCYTES NFR BLD MANUAL: 4.3 %
NEUTROPHILS # BLD AUTO: 6.2 10E9/L (ref 1.6–8.3)
NEUTROPHILS NFR BLD AUTO: 63.8 %
PHOSPHATE SERPL-MCNC: 3.2 MG/DL (ref 2.5–4.5)
PLATELET # BLD AUTO: 255 10E9/L (ref 150–450)
PLATELET # BLD EST: ABNORMAL 10*3/UL
POTASSIUM SERPL-SCNC: 3.6 MMOL/L (ref 3.4–5.3)
PROCALCITONIN SERPL-MCNC: 1.7 NG/ML
PROT SERPL-MCNC: 6.4 G/DL (ref 6.8–8.8)
RBC # BLD AUTO: 3.76 10E12/L (ref 4.4–5.9)
RBC MORPH BLD: NORMAL
SODIUM SERPL-SCNC: 134 MMOL/L (ref 133–144)
SPECIMEN SOURCE: NORMAL
WBC # BLD AUTO: 9.7 10E9/L (ref 4–11)

## 2021-06-25 PROCEDURE — 99239 HOSP IP/OBS DSCHRG MGMT >30: CPT | Mod: GC | Performed by: FAMILY MEDICINE

## 2021-06-25 PROCEDURE — 83735 ASSAY OF MAGNESIUM: CPT | Performed by: STUDENT IN AN ORGANIZED HEALTH CARE EDUCATION/TRAINING PROGRAM

## 2021-06-25 PROCEDURE — 99232 SBSQ HOSP IP/OBS MODERATE 35: CPT | Mod: GC | Performed by: STUDENT IN AN ORGANIZED HEALTH CARE EDUCATION/TRAINING PROGRAM

## 2021-06-25 PROCEDURE — 250N000013 HC RX MED GY IP 250 OP 250 PS 637: Performed by: STUDENT IN AN ORGANIZED HEALTH CARE EDUCATION/TRAINING PROGRAM

## 2021-06-25 PROCEDURE — 99233 SBSQ HOSP IP/OBS HIGH 50: CPT | Performed by: INTERNAL MEDICINE

## 2021-06-25 PROCEDURE — 258N000003 HC RX IP 258 OP 636: Performed by: STUDENT IN AN ORGANIZED HEALTH CARE EDUCATION/TRAINING PROGRAM

## 2021-06-25 PROCEDURE — 85025 COMPLETE CBC W/AUTO DIFF WBC: CPT | Performed by: STUDENT IN AN ORGANIZED HEALTH CARE EDUCATION/TRAINING PROGRAM

## 2021-06-25 PROCEDURE — 80053 COMPREHEN METABOLIC PANEL: CPT | Performed by: STUDENT IN AN ORGANIZED HEALTH CARE EDUCATION/TRAINING PROGRAM

## 2021-06-25 PROCEDURE — 250N000011 HC RX IP 250 OP 636: Performed by: STUDENT IN AN ORGANIZED HEALTH CARE EDUCATION/TRAINING PROGRAM

## 2021-06-25 PROCEDURE — 120N000002 HC R&B MED SURG/OB UMMC

## 2021-06-25 PROCEDURE — 84145 PROCALCITONIN (PCT): CPT | Performed by: STUDENT IN AN ORGANIZED HEALTH CARE EDUCATION/TRAINING PROGRAM

## 2021-06-25 PROCEDURE — 84100 ASSAY OF PHOSPHORUS: CPT | Performed by: STUDENT IN AN ORGANIZED HEALTH CARE EDUCATION/TRAINING PROGRAM

## 2021-06-25 PROCEDURE — 36592 COLLECT BLOOD FROM PICC: CPT | Performed by: STUDENT IN AN ORGANIZED HEALTH CARE EDUCATION/TRAINING PROGRAM

## 2021-06-25 RX ORDER — ERTAPENEM 1 G/1
1 INJECTION, POWDER, LYOPHILIZED, FOR SOLUTION INTRAMUSCULAR; INTRAVENOUS EVERY 24 HOURS
Status: DISCONTINUED | OUTPATIENT
Start: 2021-06-25 | End: 2021-06-28

## 2021-06-25 RX ORDER — GUAIFENESIN 600 MG/1
600 TABLET, EXTENDED RELEASE ORAL 2 TIMES DAILY PRN
Qty: 60 TABLET | Refills: 0 | Status: ON HOLD | OUTPATIENT
Start: 2021-06-25 | End: 2021-10-11

## 2021-06-25 RX ORDER — CIPROFLOXACIN 500 MG/1
500 TABLET, FILM COATED ORAL 2 TIMES DAILY
Qty: 18 TABLET | Refills: 0 | Status: SHIPPED | OUTPATIENT
Start: 2021-06-25 | End: 2021-07-04

## 2021-06-25 RX ADMIN — FAMOTIDINE 10 MG: 10 TABLET, FILM COATED ORAL at 08:41

## 2021-06-25 RX ADMIN — BACLOFEN 10 MG: 10 TABLET ORAL at 19:24

## 2021-06-25 RX ADMIN — ACETAMINOPHEN 650 MG: 325 TABLET, FILM COATED ORAL at 12:25

## 2021-06-25 RX ADMIN — TRAZODONE HYDROCHLORIDE 100 MG: 50 TABLET ORAL at 23:40

## 2021-06-25 RX ADMIN — ACETAMINOPHEN 650 MG: 325 TABLET, FILM COATED ORAL at 00:13

## 2021-06-25 RX ADMIN — BACLOFEN 10 MG: 10 TABLET ORAL at 14:59

## 2021-06-25 RX ADMIN — ENOXAPARIN SODIUM 40 MG: 40 INJECTION SUBCUTANEOUS at 08:42

## 2021-06-25 RX ADMIN — ACETAMINOPHEN 650 MG: 325 TABLET, FILM COATED ORAL at 19:24

## 2021-06-25 RX ADMIN — BISMUTH SUBSALICYLATE 30 ML: 262 LIQUID ORAL at 08:42

## 2021-06-25 RX ADMIN — VARENICLINE TARTRATE 1 MG: 1 TABLET, FILM COATED ORAL at 20:51

## 2021-06-25 RX ADMIN — BENZONATATE 100 MG: 100 CAPSULE ORAL at 14:58

## 2021-06-25 RX ADMIN — GUAIFENESIN 600 MG: 600 TABLET ORAL at 08:41

## 2021-06-25 RX ADMIN — OXYCODONE HYDROCHLORIDE 5 MG: 5 TABLET ORAL at 20:49

## 2021-06-25 RX ADMIN — BACLOFEN 10 MG: 10 TABLET ORAL at 12:25

## 2021-06-25 RX ADMIN — Medication 500 MG: at 08:42

## 2021-06-25 RX ADMIN — Medication 500 MG: at 20:56

## 2021-06-25 RX ADMIN — Medication 1 TABLET: at 08:41

## 2021-06-25 RX ADMIN — VARENICLINE TARTRATE 1 MG: 1 TABLET, FILM COATED ORAL at 08:41

## 2021-06-25 RX ADMIN — SODIUM CHLORIDE, POTASSIUM CHLORIDE, SODIUM LACTATE AND CALCIUM CHLORIDE: 600; 310; 30; 20 INJECTION, SOLUTION INTRAVENOUS at 08:47

## 2021-06-25 RX ADMIN — GUAIFENESIN 600 MG: 600 TABLET ORAL at 19:24

## 2021-06-25 RX ADMIN — BENZONATATE 100 MG: 100 CAPSULE ORAL at 21:06

## 2021-06-25 RX ADMIN — ACETAMINOPHEN 650 MG: 325 TABLET, FILM COATED ORAL at 08:40

## 2021-06-25 RX ADMIN — PREGABALIN 150 MG: 75 CAPSULE ORAL at 19:24

## 2021-06-25 RX ADMIN — PREGABALIN 150 MG: 75 CAPSULE ORAL at 08:41

## 2021-06-25 RX ADMIN — ERTAPENEM SODIUM 1 G: 1 INJECTION, POWDER, LYOPHILIZED, FOR SOLUTION INTRAMUSCULAR; INTRAVENOUS at 10:29

## 2021-06-25 RX ADMIN — OXYCODONE HYDROCHLORIDE 5 MG: 5 TABLET ORAL at 08:57

## 2021-06-25 RX ADMIN — LOPERAMIDE HYDROCHLORIDE 4 MG: 2 CAPSULE ORAL at 23:41

## 2021-06-25 RX ADMIN — DULOXETINE HYDROCHLORIDE 60 MG: 60 CAPSULE, DELAYED RELEASE ORAL at 08:40

## 2021-06-25 RX ADMIN — BACLOFEN 10 MG: 10 TABLET ORAL at 08:41

## 2021-06-25 RX ADMIN — OXYCODONE HYDROCHLORIDE 5 MG: 5 TABLET ORAL at 14:59

## 2021-06-25 RX ADMIN — FAMOTIDINE 10 MG: 10 TABLET, FILM COATED ORAL at 20:51

## 2021-06-25 RX ADMIN — SODIUM CHLORIDE, POTASSIUM CHLORIDE, SODIUM LACTATE AND CALCIUM CHLORIDE: 600; 310; 30; 20 INJECTION, SOLUTION INTRAVENOUS at 15:45

## 2021-06-25 RX ADMIN — Medication 500 MG: at 12:25

## 2021-06-25 ASSESSMENT — MIFFLIN-ST. JEOR: SCORE: 1459.03
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
## 2021-06-25 NOTE — PROGRESS NOTES
"[facility]   BRIEF PROGRESS NOTE    SUBJECTIVE  Paged by RN for new fever today to 101.2, tachy to 105.  Patient felt somewhat chilled initially, now better. Denies fevers. No worsened SOB or sputum production. No abd pain, n/v. Lots of yello, noncloudy urine output today.      OBJECTIVE:  BP (!) 152/84 (BP Location: Right arm)   Pulse 105   Temp 101.2  F (38.4  C) (Oral)   Resp 18   Ht 1.651 m (5' 5\")   Wt 69.9 kg (154 lb 1.6 oz)   SpO2 95%   BMI 25.64 kg/m      Exam:   General: Alert and oriented, in no acute distress.  Skin: Warm and dry, no abnormalities noted.  Eyes: Extra-ocular muscles intact, pupils equal and reactive.  ENT: Speech intact with  Unchanged horse voice, nasal passages open, no hearing impairment noted.  CV: tachycardic. No cyanosis or pallor, warm and well perfused.  Respiratory: Mild coarse breath sounds bilaterally, improved from 6/23. No respiratory distress, no accessory muscle use.  Psychiatric: Mood and affect appear normal.   Extremities: Warm, able to move all four extremities at will.      ASSESSMENT/PLAN:    # Fever  #Pylenonephritis with obstructing stone  Patient with new fever today to 101.2, tachy to 105. Earlier today stopped vanc/zosyn. Start CTX, tobra x1 dose. Lactate normal at 0.8. Fever likely related to ongoing pyelonephritis. Other likely source could be possible brewing pneumonia, though with symptomatic improvement and improved lung sounds on exam from 2 nights ago. No other obvious source of infection.    1. CBC, blood cultures  2. 1L LR  3. Continue current abx regimen for now  4. Continue to monitor for worsening clinical change or worsening blood markers.    Please see daily rounding note for full A/P.  Smooth Hansen MD  8:49 PM    "
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
## 2021-06-25 NOTE — DISCHARGE SUMMARY
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 Subsalicylate 525 MG/15ML SUSP Take 30 mLs by mouth every morning       CHANTIX 1 MG tablet Take 1 mg by mouth 2 times daily       cholecalciferol (VITAMIN D3) 10 mcg (400 units) TABS tablet TAKE 400IU (1 TABLET) BY MOUTH 4 TIMES A DAY.      DULoxetine (CYMBALTA) 60 MG capsule Take 60 mg by mouth daily       loperamide (IMODIUM) 2 MG capsule Take 4 mg by mouth At Bedtime       LYRICA 150 MG capsule Take 150 mg by mouth 2 times daily       Skin Protectants, Misc. (EUCERIN) cream Apply 0.5 inches topically as needed for dry skin       TRAZodone (DESYREL) 100 MG tablet Take 100 mg by mouth At Bedtime.      Disposable Gloves (VINYL GLOVES ONE SIZE) MISC Size Large. For ostomy use. For home use.         STOP taking these medications       Ascorbic Acid (VITAMIN C CR PO) Comments:   Reason for Stopping:         calcium carbonate 500 mg, elemental, (OSCAL 500) 1250 (500 Ca) MG TABS tablet Comments:   Reason for Stopping:         CERTAVITE/ANTIOXIDANTS tablet Comments:   Reason for Stopping:             Allergies   No  Known Allergies

## 2021-06-25 NOTE — PROGRESS NOTES
Care Management Follow Up    Length of Stay (days): 6    Expected Discharge Date: 06/28/21     Concerns to be Addressed:       Patient plan of care discussed at interdisciplinary rounds: Yes    Anticipated Discharge Disposition: Group Home(stretcher ride)     Anticipated Discharge Services:  Home care through   Anticipated Discharge DME: None    Patient/family educated on Medicare website which has current facility and service quality ratings:  NA  Education Provided on the Discharge Plan:  Yes  Patient/Family in Agreement with the Plan: unable to assess    Referrals Placed by CM/SW:  Home infusion  Private pay costs discussed: Not applicable    Additional Information:  This writer called and spoke with Tao , patient's RN at the  to update and discuss about discharge. Parth may need IV antibiotics at discharge. Tao states that they do not usually have nurses at the  that will be able to assist with administration of IV antibiotics. They prefer if providers are able to switch the antibiotics to oral. This was communicated to the providing team- they will speak with infectious disease to come up with a plan for discharge. At this time, the discharge is planned for Monday of next week.      [facility] (RN for wound care)   Phone: [phone]   Fax: [phone]       Swapna Small RN   Float RN Care Coordinator  Pager [phone] (unit RNCC pager)     To get in touch with the Weekend & Holiday on call RN Care Coordinator:  Pager:  [phone] OR Care Coordinator job code/pager [phone]

## 2021-06-25 NOTE — PROGRESS NOTES
Triggered sepsis- Provider notified 1L bolus ordered with cultures and lac.  Pt A&O 4 moving upper extremities only  Pain : oxy and scheduled tylenol  Resp: cough  On 2Lnc  CV: BP Elevated, HR low 100's  Access: TLC Tight internal jugular  Tubes: Nephrostomy and urostomy   Skin: scabs blisters and lesions on bilateral legs
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
## 2021-06-26 LAB
ALBUMIN SERPL-MCNC: 2.2 G/DL (ref 3.4–5)
ALP SERPL-CCNC: 98 U/L (ref 40–150)
ALT SERPL W P-5'-P-CCNC: 12 U/L (ref 0–70)
ANION GAP SERPL CALCULATED.3IONS-SCNC: 5 MMOL/L (ref 3–14)
AST SERPL W P-5'-P-CCNC: 12 U/L (ref 0–45)
BASOPHILS # BLD AUTO: 0 10E9/L (ref 0–0.2)
BASOPHILS NFR BLD AUTO: 0 %
BILIRUB SERPL-MCNC: 0.5 MG/DL (ref 0.2–1.3)
BUN SERPL-MCNC: 8 MG/DL (ref 7–30)
CALCIUM SERPL-MCNC: 9.5 MG/DL (ref 8.5–10.1)
CHLORIDE SERPL-SCNC: 97 MMOL/L (ref 94–109)
CO2 SERPL-SCNC: 30 MMOL/L (ref 20–32)
CREAT SERPL-MCNC: 0.71 MG/DL (ref 0.66–1.25)
CRP SERPL-MCNC: 180 MG/L (ref 0–8)
DIFFERENTIAL METHOD BLD: ABNORMAL
EOSINOPHIL # BLD AUTO: 0 10E9/L (ref 0–0.7)
EOSINOPHIL NFR BLD AUTO: 0 %
ERYTHROCYTE [DISTWIDTH] IN BLOOD BY AUTOMATED COUNT: 13.6 % (ref 10–15)
GFR SERPL CREATININE-BSD FRML MDRD: >90 ML/MIN/{1.73_M2}
GLUCOSE SERPL-MCNC: 77 MG/DL (ref 70–99)
HCT VFR BLD AUTO: 33 % (ref 40–53)
HGB BLD-MCNC: 10.6 G/DL (ref 13.3–17.7)
LYMPHOCYTES # BLD AUTO: 0.9 10E9/L (ref 0.8–5.3)
LYMPHOCYTES NFR BLD AUTO: 8 %
MAGNESIUM SERPL-MCNC: 1.7 MG/DL (ref 1.6–2.3)
MCH RBC QN AUTO: 27.2 PG (ref 26.5–33)
MCHC RBC AUTO-ENTMCNC: 32.1 G/DL (ref 31.5–36.5)
MCV RBC AUTO: 85 FL (ref 78–100)
METAMYELOCYTES # BLD: 0.4 10E9/L
METAMYELOCYTES NFR BLD MANUAL: 3.6 %
MONOCYTES # BLD AUTO: 0.4 10E9/L (ref 0–1.3)
MONOCYTES NFR BLD AUTO: 3.6 %
MYELOCYTES # BLD: 0.6 10E9/L
MYELOCYTES NFR BLD MANUAL: 5.4 %
NEUTROPHILS # BLD AUTO: 9.3 10E9/L (ref 1.6–8.3)
NEUTROPHILS NFR BLD AUTO: 79.4 %
NEUTS HYPERSEG BLD QL SMEAR: PRESENT
NRBC # BLD AUTO: 0.2 10*3/UL
NRBC BLD AUTO-RTO: 2 /100
PHOSPHATE SERPL-MCNC: 3.2 MG/DL (ref 2.5–4.5)
PLATELET # BLD AUTO: 322 10E9/L (ref 150–450)
PLATELET # BLD EST: ABNORMAL 10*3/UL
POTASSIUM SERPL-SCNC: 4.2 MMOL/L (ref 3.4–5.3)
PROT SERPL-MCNC: 6.7 G/DL (ref 6.8–8.8)
RBC # BLD AUTO: 3.89 10E12/L (ref 4.4–5.9)
SODIUM SERPL-SCNC: 131 MMOL/L (ref 133–144)
WBC # BLD AUTO: 11.7 10E9/L (ref 4–11)

## 2021-06-26 PROCEDURE — 120N000002 HC R&B MED SURG/OB UMMC

## 2021-06-26 PROCEDURE — 86140 C-REACTIVE PROTEIN: CPT | Performed by: STUDENT IN AN ORGANIZED HEALTH CARE EDUCATION/TRAINING PROGRAM

## 2021-06-26 PROCEDURE — 36592 COLLECT BLOOD FROM PICC: CPT | Performed by: STUDENT IN AN ORGANIZED HEALTH CARE EDUCATION/TRAINING PROGRAM

## 2021-06-26 PROCEDURE — 84100 ASSAY OF PHOSPHORUS: CPT | Performed by: STUDENT IN AN ORGANIZED HEALTH CARE EDUCATION/TRAINING PROGRAM

## 2021-06-26 PROCEDURE — 250N000011 HC RX IP 250 OP 636: Performed by: STUDENT IN AN ORGANIZED HEALTH CARE EDUCATION/TRAINING PROGRAM

## 2021-06-26 PROCEDURE — 99233 SBSQ HOSP IP/OBS HIGH 50: CPT | Mod: GC | Performed by: STUDENT IN AN ORGANIZED HEALTH CARE EDUCATION/TRAINING PROGRAM

## 2021-06-26 PROCEDURE — 83735 ASSAY OF MAGNESIUM: CPT | Performed by: STUDENT IN AN ORGANIZED HEALTH CARE EDUCATION/TRAINING PROGRAM

## 2021-06-26 PROCEDURE — 258N000003 HC RX IP 258 OP 636: Performed by: STUDENT IN AN ORGANIZED HEALTH CARE EDUCATION/TRAINING PROGRAM

## 2021-06-26 PROCEDURE — 250N000013 HC RX MED GY IP 250 OP 250 PS 637: Performed by: STUDENT IN AN ORGANIZED HEALTH CARE EDUCATION/TRAINING PROGRAM

## 2021-06-26 PROCEDURE — 85025 COMPLETE CBC W/AUTO DIFF WBC: CPT | Performed by: STUDENT IN AN ORGANIZED HEALTH CARE EDUCATION/TRAINING PROGRAM

## 2021-06-26 PROCEDURE — 80053 COMPREHEN METABOLIC PANEL: CPT | Performed by: STUDENT IN AN ORGANIZED HEALTH CARE EDUCATION/TRAINING PROGRAM

## 2021-06-26 RX ORDER — ACETAMINOPHEN 325 MG/1
975 TABLET ORAL EVERY 6 HOURS
Status: DISCONTINUED | OUTPATIENT
Start: 2021-06-26 | End: 2021-06-30 | Stop reason: HOSPADM

## 2021-06-26 RX ORDER — ONDANSETRON 2 MG/ML
4-8 INJECTION INTRAMUSCULAR; INTRAVENOUS EVERY 6 HOURS PRN
Status: DISCONTINUED | OUTPATIENT
Start: 2021-06-26 | End: 2021-06-30 | Stop reason: HOSPADM

## 2021-06-26 RX ORDER — HYDROMORPHONE HYDROCHLORIDE 1 MG/ML
.3-.5 INJECTION, SOLUTION INTRAMUSCULAR; INTRAVENOUS; SUBCUTANEOUS EVERY 4 HOURS PRN
Status: DISCONTINUED | OUTPATIENT
Start: 2021-06-26 | End: 2021-06-27

## 2021-06-26 RX ORDER — OXYCODONE HYDROCHLORIDE 5 MG/1
5-10 TABLET ORAL EVERY 4 HOURS PRN
Status: DISCONTINUED | OUTPATIENT
Start: 2021-06-26 | End: 2021-06-29

## 2021-06-26 RX ADMIN — BACLOFEN 10 MG: 10 TABLET ORAL at 09:33

## 2021-06-26 RX ADMIN — FAMOTIDINE 10 MG: 10 TABLET, FILM COATED ORAL at 21:17

## 2021-06-26 RX ADMIN — DULOXETINE HYDROCHLORIDE 60 MG: 60 CAPSULE, DELAYED RELEASE ORAL at 09:31

## 2021-06-26 RX ADMIN — HYDROMORPHONE HYDROCHLORIDE 0.5 MG: 1 INJECTION, SOLUTION INTRAMUSCULAR; INTRAVENOUS; SUBCUTANEOUS at 23:43

## 2021-06-26 RX ADMIN — ONDANSETRON 4 MG: 2 INJECTION INTRAMUSCULAR; INTRAVENOUS at 08:07

## 2021-06-26 RX ADMIN — ACETAMINOPHEN 975 MG: 325 TABLET, FILM COATED ORAL at 14:35

## 2021-06-26 RX ADMIN — TRAZODONE HYDROCHLORIDE 100 MG: 50 TABLET ORAL at 23:43

## 2021-06-26 RX ADMIN — ACETAMINOPHEN 650 MG: 325 TABLET, FILM COATED ORAL at 01:02

## 2021-06-26 RX ADMIN — OXYCODONE HYDROCHLORIDE 10 MG: 5 TABLET ORAL at 15:46

## 2021-06-26 RX ADMIN — VARENICLINE TARTRATE 1 MG: 1 TABLET, FILM COATED ORAL at 09:37

## 2021-06-26 RX ADMIN — ACETAMINOPHEN 975 MG: 325 TABLET, FILM COATED ORAL at 21:16

## 2021-06-26 RX ADMIN — BACLOFEN 10 MG: 10 TABLET ORAL at 21:16

## 2021-06-26 RX ADMIN — Medication 500 MG: at 09:33

## 2021-06-26 RX ADMIN — BACLOFEN 10 MG: 10 TABLET ORAL at 15:46

## 2021-06-26 RX ADMIN — ACETAMINOPHEN 650 MG: 325 TABLET, FILM COATED ORAL at 08:00

## 2021-06-26 RX ADMIN — LOPERAMIDE HYDROCHLORIDE 4 MG: 2 CAPSULE ORAL at 23:44

## 2021-06-26 RX ADMIN — SODIUM CHLORIDE, POTASSIUM CHLORIDE, SODIUM LACTATE AND CALCIUM CHLORIDE: 600; 310; 30; 20 INJECTION, SOLUTION INTRAVENOUS at 18:08

## 2021-06-26 RX ADMIN — PREGABALIN 150 MG: 75 CAPSULE ORAL at 09:34

## 2021-06-26 RX ADMIN — HYDROMORPHONE HYDROCHLORIDE 0.5 MG: 1 INJECTION, SOLUTION INTRAMUSCULAR; INTRAVENOUS; SUBCUTANEOUS at 12:43

## 2021-06-26 RX ADMIN — HYDROMORPHONE HYDROCHLORIDE 0.5 MG: 1 INJECTION, SOLUTION INTRAMUSCULAR; INTRAVENOUS; SUBCUTANEOUS at 18:05

## 2021-06-26 RX ADMIN — OXYCODONE HYDROCHLORIDE 5 MG: 5 TABLET ORAL at 09:35

## 2021-06-26 RX ADMIN — SODIUM CHLORIDE, POTASSIUM CHLORIDE, SODIUM LACTATE AND CALCIUM CHLORIDE 150 ML/HR: 600; 310; 30; 20 INJECTION, SOLUTION INTRAVENOUS at 09:58

## 2021-06-26 RX ADMIN — BACLOFEN 10 MG: 10 TABLET ORAL at 12:44

## 2021-06-26 RX ADMIN — ENOXAPARIN SODIUM 40 MG: 40 INJECTION SUBCUTANEOUS at 09:36

## 2021-06-26 RX ADMIN — PREGABALIN 150 MG: 75 CAPSULE ORAL at 21:16

## 2021-06-26 RX ADMIN — GUAIFENESIN 600 MG: 600 TABLET ORAL at 21:16

## 2021-06-26 RX ADMIN — Medication 500 MG: at 12:44

## 2021-06-26 RX ADMIN — VARENICLINE TARTRATE 1 MG: 1 TABLET, FILM COATED ORAL at 21:45

## 2021-06-26 RX ADMIN — Medication 500 MG: at 18:08

## 2021-06-26 RX ADMIN — FAMOTIDINE 10 MG: 10 TABLET, FILM COATED ORAL at 09:32

## 2021-06-26 RX ADMIN — GUAIFENESIN 600 MG: 600 TABLET ORAL at 09:34

## 2021-06-26 RX ADMIN — ERTAPENEM SODIUM 1 G: 1 INJECTION, POWDER, LYOPHILIZED, FOR SOLUTION INTRAMUSCULAR; INTRAVENOUS at 09:36

## 2021-06-26 RX ADMIN — OXYCODONE HYDROCHLORIDE 5 MG: 5 TABLET ORAL at 04:19

## 2021-06-26 ASSESSMENT — ACTIVITIES OF DAILY LIVING (ADL): ADLS_ACUITY_SCORE: 20

## 2021-06-26 NOTE — PLAN OF CARE
Major Shift Events: Pt remains alert and oriented x4. VSS afebrile. Received tylenol around the clock  And IVAB as prescribed. Required 2 person assistance. In no acute distress. Remains on nasal cannula 2 liters 02 sat within normal limits. Active bowel sounds x 4 quads. Medicated x1 for nausea with Zofran  IV effective, regular diet  Poor appetite although  Intake of ensure and encouraged po intake. Colostomy active with liquid brown stool. Adequate urine via urostomy and right  nephrostomy tube. Repositioned and skin integrity maintained see flow sheet for assessment. Pain management  Control with dilaudid 0.5mg IV and oxycodone 10 mg po.pt on contact and droplet isolation precautions observed.    Plan: Report given to MAGALY Farah RN 7 B 23 . LAYA [place] parent notified transfer. Pt transferred via bed accompanied by transport with belongings and precautions observed.
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
## 2021-06-27 LAB
ALBUMIN SERPL-MCNC: 2.3 G/DL (ref 3.4–5)
ALP SERPL-CCNC: 84 U/L (ref 40–150)
ALT SERPL W P-5'-P-CCNC: 12 U/L (ref 0–70)
ANION GAP SERPL CALCULATED.3IONS-SCNC: 6 MMOL/L (ref 3–14)
AST SERPL W P-5'-P-CCNC: 23 U/L (ref 0–45)
BACTERIA SPEC CULT: NO GROWTH
BACTERIA SPEC CULT: NO GROWTH
BASOPHILS # BLD AUTO: 0.2 10E9/L (ref 0–0.2)
BASOPHILS NFR BLD AUTO: 1.7 %
BILIRUB SERPL-MCNC: 0.2 MG/DL (ref 0.2–1.3)
BUN SERPL-MCNC: 9 MG/DL (ref 7–30)
CALCIUM SERPL-MCNC: 9.8 MG/DL (ref 8.5–10.1)
CHLORIDE SERPL-SCNC: 98 MMOL/L (ref 94–109)
CO2 SERPL-SCNC: 30 MMOL/L (ref 20–32)
CREAT SERPL-MCNC: 0.64 MG/DL (ref 0.66–1.25)
CRP SERPL-MCNC: 150 MG/L (ref 0–8)
DIFFERENTIAL METHOD BLD: ABNORMAL
EOSINOPHIL # BLD AUTO: 0.3 10E9/L (ref 0–0.7)
EOSINOPHIL NFR BLD AUTO: 2.6 %
ERYTHROCYTE [DISTWIDTH] IN BLOOD BY AUTOMATED COUNT: 13.7 % (ref 10–15)
GFR SERPL CREATININE-BSD FRML MDRD: >90 ML/MIN/{1.73_M2}
GLUCOSE SERPL-MCNC: 88 MG/DL (ref 70–99)
HCT VFR BLD AUTO: 32.1 % (ref 40–53)
HGB BLD-MCNC: 10.2 G/DL (ref 13.3–17.7)
LABORATORY COMMENT REPORT: NORMAL
LYMPHOCYTES # BLD AUTO: 1.3 10E9/L (ref 0.8–5.3)
LYMPHOCYTES NFR BLD AUTO: 11.2 %
MAGNESIUM SERPL-MCNC: 2 MG/DL (ref 1.6–2.3)
MCH RBC QN AUTO: 27.3 PG (ref 26.5–33)
MCHC RBC AUTO-ENTMCNC: 31.8 G/DL (ref 31.5–36.5)
MCV RBC AUTO: 86 FL (ref 78–100)
MONOCYTES # BLD AUTO: 0.9 10E9/L (ref 0–1.3)
MONOCYTES NFR BLD AUTO: 7.8 %
MYELOCYTES # BLD: 0.4 10E9/L
MYELOCYTES NFR BLD MANUAL: 3.4 %
NEUTROPHILS # BLD AUTO: 8.4 10E9/L (ref 1.6–8.3)
NEUTROPHILS NFR BLD AUTO: 73.3 %
PHOSPHATE SERPL-MCNC: 3.5 MG/DL (ref 2.5–4.5)
PLATELET # BLD AUTO: 335 10E9/L (ref 150–450)
PLATELET # BLD EST: ABNORMAL 10*3/UL
POTASSIUM SERPL-SCNC: 4.3 MMOL/L (ref 3.4–5.3)
PROCALCITONIN SERPL-MCNC: 0.52 NG/ML
PROT SERPL-MCNC: 6.6 G/DL (ref 6.8–8.8)
RBC # BLD AUTO: 3.74 10E12/L (ref 4.4–5.9)
RBC MORPH BLD: NORMAL
SARS-COV-2 RNA RESP QL NAA+PROBE: NEGATIVE
SODIUM SERPL-SCNC: 134 MMOL/L (ref 133–144)
SPECIMEN SOURCE: NORMAL
WBC # BLD AUTO: 11.5 10E9/L (ref 4–11)

## 2021-06-27 PROCEDURE — 84145 PROCALCITONIN (PCT): CPT | Performed by: STUDENT IN AN ORGANIZED HEALTH CARE EDUCATION/TRAINING PROGRAM

## 2021-06-27 PROCEDURE — 250N000011 HC RX IP 250 OP 636: Performed by: STUDENT IN AN ORGANIZED HEALTH CARE EDUCATION/TRAINING PROGRAM

## 2021-06-27 PROCEDURE — 250N000013 HC RX MED GY IP 250 OP 250 PS 637: Performed by: STUDENT IN AN ORGANIZED HEALTH CARE EDUCATION/TRAINING PROGRAM

## 2021-06-27 PROCEDURE — 120N000002 HC R&B MED SURG/OB UMMC

## 2021-06-27 PROCEDURE — 36592 COLLECT BLOOD FROM PICC: CPT | Performed by: STUDENT IN AN ORGANIZED HEALTH CARE EDUCATION/TRAINING PROGRAM

## 2021-06-27 PROCEDURE — 80053 COMPREHEN METABOLIC PANEL: CPT | Performed by: STUDENT IN AN ORGANIZED HEALTH CARE EDUCATION/TRAINING PROGRAM

## 2021-06-27 PROCEDURE — 99233 SBSQ HOSP IP/OBS HIGH 50: CPT | Mod: GC | Performed by: STUDENT IN AN ORGANIZED HEALTH CARE EDUCATION/TRAINING PROGRAM

## 2021-06-27 PROCEDURE — 83735 ASSAY OF MAGNESIUM: CPT | Performed by: STUDENT IN AN ORGANIZED HEALTH CARE EDUCATION/TRAINING PROGRAM

## 2021-06-27 PROCEDURE — 85025 COMPLETE CBC W/AUTO DIFF WBC: CPT | Performed by: INTERNAL MEDICINE

## 2021-06-27 PROCEDURE — 86140 C-REACTIVE PROTEIN: CPT | Performed by: STUDENT IN AN ORGANIZED HEALTH CARE EDUCATION/TRAINING PROGRAM

## 2021-06-27 PROCEDURE — U0005 INFEC AGEN DETEC AMPLI PROBE: HCPCS | Performed by: STUDENT IN AN ORGANIZED HEALTH CARE EDUCATION/TRAINING PROGRAM

## 2021-06-27 PROCEDURE — 84100 ASSAY OF PHOSPHORUS: CPT | Performed by: STUDENT IN AN ORGANIZED HEALTH CARE EDUCATION/TRAINING PROGRAM

## 2021-06-27 PROCEDURE — 258N000003 HC RX IP 258 OP 636: Performed by: STUDENT IN AN ORGANIZED HEALTH CARE EDUCATION/TRAINING PROGRAM

## 2021-06-27 PROCEDURE — U0003 INFECTIOUS AGENT DETECTION BY NUCLEIC ACID (DNA OR RNA); SEVERE ACUTE RESPIRATORY SYNDROME CORONAVIRUS 2 (SARS-COV-2) (CORONAVIRUS DISEASE [COVID-19]), AMPLIFIED PROBE TECHNIQUE, MAKING USE OF HIGH THROUGHPUT TECHNOLOGIES AS DESCRIBED BY CMS-2020-01-R: HCPCS | Performed by: STUDENT IN AN ORGANIZED HEALTH CARE EDUCATION/TRAINING PROGRAM

## 2021-06-27 RX ORDER — OXYCODONE HYDROCHLORIDE 5 MG/1
5 TABLET ORAL EVERY 6 HOURS PRN
Qty: 12 TABLET | Refills: 0 | Status: CANCELLED | OUTPATIENT
Start: 2021-06-27 | End: 2021-06-30

## 2021-06-27 RX ORDER — HYDROMORPHONE HYDROCHLORIDE 1 MG/ML
0.3 INJECTION, SOLUTION INTRAMUSCULAR; INTRAVENOUS; SUBCUTANEOUS EVERY 4 HOURS PRN
Status: DISCONTINUED | OUTPATIENT
Start: 2021-06-27 | End: 2021-06-28

## 2021-06-27 RX ORDER — GUAIFENESIN 600 MG/1
600 TABLET, EXTENDED RELEASE ORAL 2 TIMES DAILY PRN
Status: DISCONTINUED | OUTPATIENT
Start: 2021-06-27 | End: 2021-06-30 | Stop reason: HOSPADM

## 2021-06-27 RX ADMIN — OXYCODONE HYDROCHLORIDE 10 MG: 5 TABLET ORAL at 11:52

## 2021-06-27 RX ADMIN — PREGABALIN 150 MG: 75 CAPSULE ORAL at 19:47

## 2021-06-27 RX ADMIN — OXYCODONE HYDROCHLORIDE 10 MG: 5 TABLET ORAL at 17:24

## 2021-06-27 RX ADMIN — DULOXETINE HYDROCHLORIDE 60 MG: 60 CAPSULE, DELAYED RELEASE ORAL at 07:54

## 2021-06-27 RX ADMIN — PREGABALIN 150 MG: 75 CAPSULE ORAL at 08:13

## 2021-06-27 RX ADMIN — TRAZODONE HYDROCHLORIDE 100 MG: 50 TABLET ORAL at 23:22

## 2021-06-27 RX ADMIN — ACETAMINOPHEN 975 MG: 325 TABLET, FILM COATED ORAL at 14:52

## 2021-06-27 RX ADMIN — HYDROMORPHONE HYDROCHLORIDE 0.3 MG: 1 INJECTION, SOLUTION INTRAMUSCULAR; INTRAVENOUS; SUBCUTANEOUS at 14:51

## 2021-06-27 RX ADMIN — BACLOFEN 10 MG: 10 TABLET ORAL at 21:13

## 2021-06-27 RX ADMIN — ERTAPENEM SODIUM 1 G: 1 INJECTION, POWDER, LYOPHILIZED, FOR SOLUTION INTRAMUSCULAR; INTRAVENOUS at 10:29

## 2021-06-27 RX ADMIN — Medication 500 MG: at 17:24

## 2021-06-27 RX ADMIN — Medication 500 MG: at 11:52

## 2021-06-27 RX ADMIN — GUAIFENESIN 600 MG: 600 TABLET ORAL at 07:56

## 2021-06-27 RX ADMIN — VARENICLINE TARTRATE 1 MG: 1 TABLET, FILM COATED ORAL at 19:48

## 2021-06-27 RX ADMIN — HYDROMORPHONE HYDROCHLORIDE 0.3 MG: 1 INJECTION, SOLUTION INTRAMUSCULAR; INTRAVENOUS; SUBCUTANEOUS at 19:47

## 2021-06-27 RX ADMIN — ENOXAPARIN SODIUM 40 MG: 40 INJECTION SUBCUTANEOUS at 07:53

## 2021-06-27 RX ADMIN — SODIUM CHLORIDE, POTASSIUM CHLORIDE, SODIUM LACTATE AND CALCIUM CHLORIDE: 600; 310; 30; 20 INJECTION, SOLUTION INTRAVENOUS at 08:17

## 2021-06-27 RX ADMIN — ACETAMINOPHEN 975 MG: 325 TABLET, FILM COATED ORAL at 19:47

## 2021-06-27 RX ADMIN — BISMUTH SUBSALICYLATE 30 ML: 262 LIQUID ORAL at 08:03

## 2021-06-27 RX ADMIN — FAMOTIDINE 10 MG: 10 TABLET, FILM COATED ORAL at 07:53

## 2021-06-27 RX ADMIN — SODIUM CHLORIDE, POTASSIUM CHLORIDE, SODIUM LACTATE AND CALCIUM CHLORIDE: 600; 310; 30; 20 INJECTION, SOLUTION INTRAVENOUS at 21:15

## 2021-06-27 RX ADMIN — ACETAMINOPHEN 975 MG: 325 TABLET, FILM COATED ORAL at 07:54

## 2021-06-27 RX ADMIN — OXYCODONE HYDROCHLORIDE 10 MG: 5 TABLET ORAL at 23:21

## 2021-06-27 RX ADMIN — Medication 1 TABLET: at 07:55

## 2021-06-27 RX ADMIN — FAMOTIDINE 10 MG: 10 TABLET, FILM COATED ORAL at 19:47

## 2021-06-27 RX ADMIN — VARENICLINE TARTRATE 1 MG: 1 TABLET, FILM COATED ORAL at 07:54

## 2021-06-27 RX ADMIN — ONDANSETRON 4 MG: 4 TABLET, ORALLY DISINTEGRATING ORAL at 23:27

## 2021-06-27 RX ADMIN — OXYCODONE HYDROCHLORIDE 10 MG: 5 TABLET ORAL at 07:29

## 2021-06-27 RX ADMIN — Medication 500 MG: at 07:55

## 2021-06-27 RX ADMIN — BACLOFEN 10 MG: 10 TABLET ORAL at 17:24

## 2021-06-27 RX ADMIN — HYDROMORPHONE HYDROCHLORIDE 0.5 MG: 1 INJECTION, SOLUTION INTRAMUSCULAR; INTRAVENOUS; SUBCUTANEOUS at 08:13

## 2021-06-27 RX ADMIN — BACLOFEN 10 MG: 10 TABLET ORAL at 07:55

## 2021-06-27 RX ADMIN — LOPERAMIDE HYDROCHLORIDE 4 MG: 2 CAPSULE ORAL at 21:13

## 2021-06-27 RX ADMIN — BACLOFEN 10 MG: 10 TABLET ORAL at 11:52

## 2021-06-27 RX ADMIN — SODIUM CHLORIDE, POTASSIUM CHLORIDE, SODIUM LACTATE AND CALCIUM CHLORIDE: 600; 310; 30; 20 INJECTION, SOLUTION INTRAVENOUS at 14:59

## 2021-06-27 ASSESSMENT — ACTIVITIES OF DAILY LIVING (ADL)
ADLS_ACUITY_SCORE: 21
ADLS_ACUITY_SCORE: 18
ADLS_ACUITY_SCORE: 20
ADLS_ACUITY_SCORE: 21
ADLS_ACUITY_SCORE: 18
ADLS_ACUITY_SCORE: 21

## 2021-06-27 NOTE — PROGRESS NOTES
Pt A&O 4 moving upper extremities only  Pain : scheduled tylenol amd iv dilaudid  Resp: cough Infrequest  On 2Lnc  CV: BP stable, HR low 90's  Access: TLC right internal jugular, piv x 1  Tubes: Nephrostomy and urostomy and colostomy  Skin: scabs blisters and and wound on bilateral legs
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
## 2021-06-28 PROBLEM — N12 PYELONEPHRITIS: Status: ACTIVE | Noted: 2021-06-28

## 2021-06-28 LAB
ALBUMIN SERPL-MCNC: 2.2 G/DL (ref 3.4–5)
ALP SERPL-CCNC: 79 U/L (ref 40–150)
ALT SERPL W P-5'-P-CCNC: 12 U/L (ref 0–70)
ANION GAP SERPL CALCULATED.3IONS-SCNC: 4 MMOL/L (ref 3–14)
AST SERPL W P-5'-P-CCNC: 9 U/L (ref 0–45)
BACTERIA SPEC CULT: NO GROWTH
BACTERIA SPEC CULT: NO GROWTH
BASOPHILS # BLD AUTO: 0.1 10E9/L (ref 0–0.2)
BASOPHILS NFR BLD AUTO: 0.5 %
BILIRUB SERPL-MCNC: 0.6 MG/DL (ref 0.2–1.3)
BUN SERPL-MCNC: 9 MG/DL (ref 7–30)
CALCIUM SERPL-MCNC: 9.6 MG/DL (ref 8.5–10.1)
CHLORIDE SERPL-SCNC: 98 MMOL/L (ref 94–109)
CO2 SERPL-SCNC: 31 MMOL/L (ref 20–32)
CREAT SERPL-MCNC: 0.59 MG/DL (ref 0.66–1.25)
CRP SERPL-MCNC: 120 MG/L (ref 0–8)
DIFFERENTIAL METHOD BLD: ABNORMAL
EOSINOPHIL # BLD AUTO: 0.3 10E9/L (ref 0–0.7)
EOSINOPHIL NFR BLD AUTO: 2.9 %
ERYTHROCYTE [DISTWIDTH] IN BLOOD BY AUTOMATED COUNT: 13.5 % (ref 10–15)
GFR SERPL CREATININE-BSD FRML MDRD: >90 ML/MIN/{1.73_M2}
GLUCOSE SERPL-MCNC: 82 MG/DL (ref 70–99)
HCT VFR BLD AUTO: 32.6 % (ref 40–53)
HGB BLD-MCNC: 10.2 G/DL (ref 13.3–17.7)
IMM GRANULOCYTES # BLD: 0.5 10E9/L (ref 0–0.4)
IMM GRANULOCYTES NFR BLD: 5 %
LYMPHOCYTES # BLD AUTO: 1.4 10E9/L (ref 0.8–5.3)
LYMPHOCYTES NFR BLD AUTO: 13.6 %
MAGNESIUM SERPL-MCNC: 2 MG/DL (ref 1.6–2.3)
MCH RBC QN AUTO: 26.8 PG (ref 26.5–33)
MCHC RBC AUTO-ENTMCNC: 31.3 G/DL (ref 31.5–36.5)
MCV RBC AUTO: 86 FL (ref 78–100)
MONOCYTES # BLD AUTO: 0.8 10E9/L (ref 0–1.3)
MONOCYTES NFR BLD AUTO: 7.5 %
NEUTROPHILS # BLD AUTO: 7.3 10E9/L (ref 1.6–8.3)
NEUTROPHILS NFR BLD AUTO: 70.5 %
NRBC # BLD AUTO: 0 10*3/UL
NRBC BLD AUTO-RTO: 0 /100
PHOSPHATE SERPL-MCNC: 3.5 MG/DL (ref 2.5–4.5)
PLATELET # BLD AUTO: 392 10E9/L (ref 150–450)
POTASSIUM SERPL-SCNC: 4 MMOL/L (ref 3.4–5.3)
PROT SERPL-MCNC: 6.6 G/DL (ref 6.8–8.8)
RBC # BLD AUTO: 3.8 10E12/L (ref 4.4–5.9)
SODIUM SERPL-SCNC: 133 MMOL/L (ref 133–144)
SPECIMEN SOURCE: NORMAL
SPECIMEN SOURCE: NORMAL
WBC # BLD AUTO: 10.4 10E9/L (ref 4–11)

## 2021-06-28 PROCEDURE — 250N000011 HC RX IP 250 OP 636: Performed by: STUDENT IN AN ORGANIZED HEALTH CARE EDUCATION/TRAINING PROGRAM

## 2021-06-28 PROCEDURE — 99232 SBSQ HOSP IP/OBS MODERATE 35: CPT | Mod: GC | Performed by: STUDENT IN AN ORGANIZED HEALTH CARE EDUCATION/TRAINING PROGRAM

## 2021-06-28 PROCEDURE — 84100 ASSAY OF PHOSPHORUS: CPT | Performed by: STUDENT IN AN ORGANIZED HEALTH CARE EDUCATION/TRAINING PROGRAM

## 2021-06-28 PROCEDURE — 250N000013 HC RX MED GY IP 250 OP 250 PS 637: Performed by: STUDENT IN AN ORGANIZED HEALTH CARE EDUCATION/TRAINING PROGRAM

## 2021-06-28 PROCEDURE — 258N000003 HC RX IP 258 OP 636: Performed by: STUDENT IN AN ORGANIZED HEALTH CARE EDUCATION/TRAINING PROGRAM

## 2021-06-28 PROCEDURE — 85025 COMPLETE CBC W/AUTO DIFF WBC: CPT | Performed by: STUDENT IN AN ORGANIZED HEALTH CARE EDUCATION/TRAINING PROGRAM

## 2021-06-28 PROCEDURE — 120N000002 HC R&B MED SURG/OB UMMC

## 2021-06-28 PROCEDURE — 99231 SBSQ HOSP IP/OBS SF/LOW 25: CPT | Performed by: INTERNAL MEDICINE

## 2021-06-28 PROCEDURE — 86140 C-REACTIVE PROTEIN: CPT | Performed by: STUDENT IN AN ORGANIZED HEALTH CARE EDUCATION/TRAINING PROGRAM

## 2021-06-28 PROCEDURE — 80053 COMPREHEN METABOLIC PANEL: CPT | Performed by: STUDENT IN AN ORGANIZED HEALTH CARE EDUCATION/TRAINING PROGRAM

## 2021-06-28 PROCEDURE — 83735 ASSAY OF MAGNESIUM: CPT | Performed by: STUDENT IN AN ORGANIZED HEALTH CARE EDUCATION/TRAINING PROGRAM

## 2021-06-28 PROCEDURE — 36592 COLLECT BLOOD FROM PICC: CPT | Performed by: STUDENT IN AN ORGANIZED HEALTH CARE EDUCATION/TRAINING PROGRAM

## 2021-06-28 RX ORDER — CIPROFLOXACIN 500 MG/1
500 TABLET, FILM COATED ORAL EVERY 12 HOURS SCHEDULED
Status: DISCONTINUED | OUTPATIENT
Start: 2021-06-29 | End: 2021-06-30 | Stop reason: HOSPADM

## 2021-06-28 RX ADMIN — OXYCODONE HYDROCHLORIDE 10 MG: 5 TABLET ORAL at 06:08

## 2021-06-28 RX ADMIN — DULOXETINE HYDROCHLORIDE 60 MG: 60 CAPSULE, DELAYED RELEASE ORAL at 08:27

## 2021-06-28 RX ADMIN — HYDROMORPHONE HYDROCHLORIDE 0.3 MG: 1 INJECTION, SOLUTION INTRAMUSCULAR; INTRAVENOUS; SUBCUTANEOUS at 03:02

## 2021-06-28 RX ADMIN — ACETAMINOPHEN 975 MG: 325 TABLET, FILM COATED ORAL at 02:51

## 2021-06-28 RX ADMIN — ONDANSETRON 4 MG: 4 TABLET, ORALLY DISINTEGRATING ORAL at 10:40

## 2021-06-28 RX ADMIN — SODIUM CHLORIDE, POTASSIUM CHLORIDE, SODIUM LACTATE AND CALCIUM CHLORIDE: 600; 310; 30; 20 INJECTION, SOLUTION INTRAVENOUS at 03:05

## 2021-06-28 RX ADMIN — PREGABALIN 150 MG: 75 CAPSULE ORAL at 08:27

## 2021-06-28 RX ADMIN — ENOXAPARIN SODIUM 40 MG: 40 INJECTION SUBCUTANEOUS at 08:28

## 2021-06-28 RX ADMIN — OXYCODONE HYDROCHLORIDE 10 MG: 5 TABLET ORAL at 19:58

## 2021-06-28 RX ADMIN — Medication 500 MG: at 08:28

## 2021-06-28 RX ADMIN — FAMOTIDINE 10 MG: 10 TABLET, FILM COATED ORAL at 08:28

## 2021-06-28 RX ADMIN — BACLOFEN 10 MG: 10 TABLET ORAL at 19:53

## 2021-06-28 RX ADMIN — OXYCODONE HYDROCHLORIDE 10 MG: 5 TABLET ORAL at 15:06

## 2021-06-28 RX ADMIN — Medication 500 MG: at 12:50

## 2021-06-28 RX ADMIN — Medication 500 MG: at 18:20

## 2021-06-28 RX ADMIN — ACETAMINOPHEN 975 MG: 325 TABLET, FILM COATED ORAL at 19:52

## 2021-06-28 RX ADMIN — Medication 1 TABLET: at 08:27

## 2021-06-28 RX ADMIN — ACETAMINOPHEN 975 MG: 325 TABLET, FILM COATED ORAL at 15:06

## 2021-06-28 RX ADMIN — ACETAMINOPHEN 975 MG: 325 TABLET, FILM COATED ORAL at 08:27

## 2021-06-28 RX ADMIN — LOPERAMIDE HYDROCHLORIDE 4 MG: 2 CAPSULE ORAL at 22:25

## 2021-06-28 RX ADMIN — FAMOTIDINE 10 MG: 10 TABLET, FILM COATED ORAL at 19:53

## 2021-06-28 RX ADMIN — BACLOFEN 10 MG: 10 TABLET ORAL at 12:50

## 2021-06-28 RX ADMIN — BISMUTH SUBSALICYLATE 30 ML: 262 LIQUID ORAL at 08:30

## 2021-06-28 RX ADMIN — BACLOFEN 10 MG: 10 TABLET ORAL at 15:06

## 2021-06-28 RX ADMIN — VARENICLINE TARTRATE 1 MG: 1 TABLET, FILM COATED ORAL at 22:25

## 2021-06-28 RX ADMIN — VARENICLINE TARTRATE 1 MG: 1 TABLET, FILM COATED ORAL at 08:27

## 2021-06-28 RX ADMIN — PREGABALIN 150 MG: 75 CAPSULE ORAL at 19:53

## 2021-06-28 RX ADMIN — TRAZODONE HYDROCHLORIDE 100 MG: 50 TABLET ORAL at 22:57

## 2021-06-28 RX ADMIN — OXYCODONE HYDROCHLORIDE 10 MG: 5 TABLET ORAL at 10:40

## 2021-06-28 RX ADMIN — ERTAPENEM SODIUM 1 G: 1 INJECTION, POWDER, LYOPHILIZED, FOR SOLUTION INTRAMUSCULAR; INTRAVENOUS at 08:22

## 2021-06-28 RX ADMIN — BACLOFEN 10 MG: 10 TABLET ORAL at 08:28

## 2021-06-28 ASSESSMENT — ACTIVITIES OF DAILY LIVING (ADL)
ADLS_ACUITY_SCORE: 20
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
## 2021-06-28 NOTE — PLAN OF CARE
"Vital signs:  Temp: 98.2  F (36.8  C) Temp src: Oral BP: (!) 151/83 Pulse: 90   Resp: 18 SpO2: 94 % O2 Device: Nasal cannula Oxygen Delivery: 2 LPM Height: 165.1 cm (5' 5\") Weight: 71.2 kg (157 lb)    [id number]:    Activity: Repositions self in bed. Refuses help with turning.   Neuros: A & O x4. Neuro intact.   Cardiac: WDL. BPs 130s-150s/70s-80s. Asymptomatic.   Respiratory: LS coarse throughout. O2 sats above 92% on RA when awake. De-sats to 86% when sleeping, applied 2 L/min NC, O2 sats remained above 92%. Denies SOB. Unlabored.   GI/: BS+, has gas in colostomy bag, had small stool output, patient manages. Urostomy and right PNT has good clear yellow urine output.   Diet: On regular diet. Patient stated no appetite.   Skin: Healing ulcers on bilateral heels w/ mepilex, scars throughout body from previous procedures.   Lines: LR at 150 mL/hr and TKO via triple internal jugular.   Labs: None.   Pain/nausea: PRN IV Dilaudid 0.3mg x2 and PRN oral oxycodone 10mg x2 effective for abdominal pain control, slept during night. PRN Zofran x1 for nausea from coughing fit at 2330 effective.   New changes this shift: None.   Plan: Continue POC.     "
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
## 2021-06-28 NOTE — PROGRESS NOTES
Care Management Follow Up    Length of Stay (days): 9    Expected Discharge Date: 06/28/21     Concerns to be Addressed:       Patient plan of care discussed at interdisciplinary rounds: Yes    Anticipated Discharge Disposition: Group Home(stretcher ride)     Anticipated Discharge Services: Home Care, resumption   Anticipated Discharge DME: None    Patient/family educated on Medicare website which has current facility and service quality ratings:  NA  Education Provided on the Discharge Plan:  Yes  Patient/Family in Agreement with the Plan: unable to assess    Referrals Placed by CM/SW:  NA  Private pay costs discussed: Not applicable    Additional Information:  Per MD team patient is anticipated to be medically ready for discharge to home Wednesday on po abx.  Updated AMADOU Burger RN, of this plan.  He asked that patient return home in the morning if possible.  Will need stretcher ride arranged.      Group Home   CARIDAD Burger   Ph: [phone]  Fax: [phone]    [facility] (RN)   Phone: [phone]  Fax: [phone]          [facility] Thompson  Phone: [phone]  Pager: [phone]  To contact the weekend RNCC, Page: [phone]

## 2021-06-29 LAB
ALBUMIN SERPL-MCNC: 2.3 G/DL (ref 3.4–5)
ALP SERPL-CCNC: 86 U/L (ref 40–150)
ALT SERPL W P-5'-P-CCNC: 12 U/L (ref 0–70)
ANION GAP SERPL CALCULATED.3IONS-SCNC: 6 MMOL/L (ref 3–14)
AST SERPL W P-5'-P-CCNC: 12 U/L (ref 0–45)
BACTERIA SPEC CULT: NO GROWTH
BACTERIA SPEC CULT: NO GROWTH
BASOPHILS # BLD AUTO: 0.1 10E9/L (ref 0–0.2)
BASOPHILS NFR BLD AUTO: 0.6 %
BILIRUB SERPL-MCNC: 0.3 MG/DL (ref 0.2–1.3)
BUN SERPL-MCNC: 11 MG/DL (ref 7–30)
CALCIUM SERPL-MCNC: 10.3 MG/DL (ref 8.5–10.1)
CHLORIDE SERPL-SCNC: 95 MMOL/L (ref 94–109)
CO2 SERPL-SCNC: 29 MMOL/L (ref 20–32)
CREAT SERPL-MCNC: 0.7 MG/DL (ref 0.66–1.25)
DIFFERENTIAL METHOD BLD: ABNORMAL
EOSINOPHIL # BLD AUTO: 0.2 10E9/L (ref 0–0.7)
EOSINOPHIL NFR BLD AUTO: 1.8 %
ERYTHROCYTE [DISTWIDTH] IN BLOOD BY AUTOMATED COUNT: 13.7 % (ref 10–15)
GFR SERPL CREATININE-BSD FRML MDRD: >90 ML/MIN/{1.73_M2}
GLUCOSE SERPL-MCNC: 87 MG/DL (ref 70–99)
HCT VFR BLD AUTO: 35.1 % (ref 40–53)
HGB BLD-MCNC: 11.1 G/DL (ref 13.3–17.7)
IMM GRANULOCYTES # BLD: 0.5 10E9/L (ref 0–0.4)
IMM GRANULOCYTES NFR BLD: 4.3 %
INTERPRETATION ECG - MUSE: NORMAL
LYMPHOCYTES # BLD AUTO: 1.7 10E9/L (ref 0.8–5.3)
LYMPHOCYTES NFR BLD AUTO: 14.1 %
MAGNESIUM SERPL-MCNC: 2.1 MG/DL (ref 1.6–2.3)
MCH RBC QN AUTO: 27 PG (ref 26.5–33)
MCHC RBC AUTO-ENTMCNC: 31.6 G/DL (ref 31.5–36.5)
MCV RBC AUTO: 85 FL (ref 78–100)
MONOCYTES # BLD AUTO: 0.7 10E9/L (ref 0–1.3)
MONOCYTES NFR BLD AUTO: 5.8 %
NEUTROPHILS # BLD AUTO: 8.8 10E9/L (ref 1.6–8.3)
NEUTROPHILS NFR BLD AUTO: 73.4 %
NRBC # BLD AUTO: 0 10*3/UL
NRBC BLD AUTO-RTO: 0 /100
PHOSPHATE SERPL-MCNC: 3.6 MG/DL (ref 2.5–4.5)
PLATELET # BLD AUTO: 505 10E9/L (ref 150–450)
POTASSIUM SERPL-SCNC: 4.1 MMOL/L (ref 3.4–5.3)
PROCALCITONIN SERPL-MCNC: 0.23 NG/ML
PROT SERPL-MCNC: 7.2 G/DL (ref 6.8–8.8)
RBC # BLD AUTO: 4.11 10E12/L (ref 4.4–5.9)
SODIUM SERPL-SCNC: 131 MMOL/L (ref 133–144)
SPECIMEN SOURCE: NORMAL
SPECIMEN SOURCE: NORMAL
WBC # BLD AUTO: 12 10E9/L (ref 4–11)

## 2021-06-29 PROCEDURE — 36592 COLLECT BLOOD FROM PICC: CPT | Performed by: STUDENT IN AN ORGANIZED HEALTH CARE EDUCATION/TRAINING PROGRAM

## 2021-06-29 PROCEDURE — 85025 COMPLETE CBC W/AUTO DIFF WBC: CPT | Performed by: STUDENT IN AN ORGANIZED HEALTH CARE EDUCATION/TRAINING PROGRAM

## 2021-06-29 PROCEDURE — 84145 PROCALCITONIN (PCT): CPT | Performed by: STUDENT IN AN ORGANIZED HEALTH CARE EDUCATION/TRAINING PROGRAM

## 2021-06-29 PROCEDURE — 250N000011 HC RX IP 250 OP 636: Performed by: STUDENT IN AN ORGANIZED HEALTH CARE EDUCATION/TRAINING PROGRAM

## 2021-06-29 PROCEDURE — 93005 ELECTROCARDIOGRAM TRACING: CPT

## 2021-06-29 PROCEDURE — 80053 COMPREHEN METABOLIC PANEL: CPT | Performed by: STUDENT IN AN ORGANIZED HEALTH CARE EDUCATION/TRAINING PROGRAM

## 2021-06-29 PROCEDURE — 83735 ASSAY OF MAGNESIUM: CPT | Performed by: STUDENT IN AN ORGANIZED HEALTH CARE EDUCATION/TRAINING PROGRAM

## 2021-06-29 PROCEDURE — 250N000013 HC RX MED GY IP 250 OP 250 PS 637: Performed by: STUDENT IN AN ORGANIZED HEALTH CARE EDUCATION/TRAINING PROGRAM

## 2021-06-29 PROCEDURE — 93010 ELECTROCARDIOGRAM REPORT: CPT | Performed by: INTERNAL MEDICINE

## 2021-06-29 PROCEDURE — 99232 SBSQ HOSP IP/OBS MODERATE 35: CPT | Mod: GC | Performed by: FAMILY MEDICINE

## 2021-06-29 PROCEDURE — 84100 ASSAY OF PHOSPHORUS: CPT | Performed by: STUDENT IN AN ORGANIZED HEALTH CARE EDUCATION/TRAINING PROGRAM

## 2021-06-29 PROCEDURE — 120N000002 HC R&B MED SURG/OB UMMC

## 2021-06-29 RX ORDER — OXYCODONE HYDROCHLORIDE 5 MG/1
5-10 TABLET ORAL EVERY 6 HOURS PRN
Status: DISCONTINUED | OUTPATIENT
Start: 2021-06-29 | End: 2021-06-30 | Stop reason: HOSPADM

## 2021-06-29 RX ADMIN — BACLOFEN 10 MG: 10 TABLET ORAL at 13:18

## 2021-06-29 RX ADMIN — ACETAMINOPHEN 975 MG: 325 TABLET, FILM COATED ORAL at 13:17

## 2021-06-29 RX ADMIN — PREGABALIN 150 MG: 75 CAPSULE ORAL at 21:13

## 2021-06-29 RX ADMIN — OXYCODONE HYDROCHLORIDE 10 MG: 5 TABLET ORAL at 22:55

## 2021-06-29 RX ADMIN — CIPROFLOXACIN HYDROCHLORIDE 500 MG: 500 TABLET, FILM COATED ORAL at 08:10

## 2021-06-29 RX ADMIN — ACETAMINOPHEN 975 MG: 325 TABLET, FILM COATED ORAL at 21:13

## 2021-06-29 RX ADMIN — OXYCODONE HYDROCHLORIDE 10 MG: 5 TABLET ORAL at 04:50

## 2021-06-29 RX ADMIN — Medication 500 MG: at 19:10

## 2021-06-29 RX ADMIN — CIPROFLOXACIN HYDROCHLORIDE 500 MG: 500 TABLET, FILM COATED ORAL at 21:13

## 2021-06-29 RX ADMIN — ACETAMINOPHEN 975 MG: 325 TABLET, FILM COATED ORAL at 04:50

## 2021-06-29 RX ADMIN — Medication 1 TABLET: at 08:09

## 2021-06-29 RX ADMIN — OXYCODONE HYDROCHLORIDE 10 MG: 5 TABLET ORAL at 16:56

## 2021-06-29 RX ADMIN — Medication 500 MG: at 08:10

## 2021-06-29 RX ADMIN — TRAZODONE HYDROCHLORIDE 100 MG: 50 TABLET ORAL at 22:55

## 2021-06-29 RX ADMIN — VARENICLINE TARTRATE 1 MG: 1 TABLET, FILM COATED ORAL at 21:13

## 2021-06-29 RX ADMIN — VARENICLINE TARTRATE 1 MG: 1 TABLET, FILM COATED ORAL at 08:08

## 2021-06-29 RX ADMIN — PREGABALIN 150 MG: 75 CAPSULE ORAL at 08:14

## 2021-06-29 RX ADMIN — FAMOTIDINE 10 MG: 10 TABLET, FILM COATED ORAL at 21:13

## 2021-06-29 RX ADMIN — ACETAMINOPHEN 975 MG: 325 TABLET, FILM COATED ORAL at 08:07

## 2021-06-29 RX ADMIN — Medication 500 MG: at 13:18

## 2021-06-29 RX ADMIN — BACLOFEN 10 MG: 10 TABLET ORAL at 16:56

## 2021-06-29 RX ADMIN — FAMOTIDINE 10 MG: 10 TABLET, FILM COATED ORAL at 08:09

## 2021-06-29 RX ADMIN — OXYCODONE HYDROCHLORIDE 10 MG: 5 TABLET ORAL at 00:09

## 2021-06-29 RX ADMIN — LOPERAMIDE HYDROCHLORIDE 4 MG: 2 CAPSULE ORAL at 21:13

## 2021-06-29 RX ADMIN — BACLOFEN 10 MG: 10 TABLET ORAL at 21:13

## 2021-06-29 RX ADMIN — DULOXETINE HYDROCHLORIDE 60 MG: 60 CAPSULE, DELAYED RELEASE ORAL at 08:09

## 2021-06-29 RX ADMIN — BISMUTH SUBSALICYLATE 30 ML: 262 LIQUID ORAL at 08:10

## 2021-06-29 RX ADMIN — ONDANSETRON 4 MG: 4 TABLET, ORALLY DISINTEGRATING ORAL at 14:18

## 2021-06-29 RX ADMIN — BACLOFEN 10 MG: 10 TABLET ORAL at 08:09

## 2021-06-29 RX ADMIN — ENOXAPARIN SODIUM 40 MG: 40 INJECTION SUBCUTANEOUS at 08:10

## 2021-06-29 RX ADMIN — OXYCODONE HYDROCHLORIDE 10 MG: 5 TABLET ORAL at 09:47

## 2021-06-29 ASSESSMENT — ACTIVITIES OF DAILY LIVING (ADL)
ADLS_ACUITY_SCORE: 20
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
## 2021-06-29 NOTE — PLAN OF CARE
"/69 (BP Location: Right arm)   Pulse 82   Temp 98.6  F (37  C) (Oral)   Resp 20   Ht 1.651 m (5' 5\")   Wt 71.2 kg (157 lb)   SpO2 94%   BMI 26.13 kg/m      6380-9774   Neuros: A&Ox4, refusing certain cares/repositioning. Insistent on performing individual cares. CMS intact    Cardiac: BP + DE stable. Denies cardiac chest pain   Respiratory: Pt denies shortness of breath. Voice hoarse. LS clear/diminished. Satting >92% on RA, placed on 1 L NC briefly overnight for comfort   GI/: Urostomy w/ adequate output. R PNT w/ large amt of urine leakage d/t tube kinking and leakage around insertion site d/t lack of repositioning and laying on pts back. Site reinforced w/ split gauze and Flexi-track changed x1. Colostomy w/ small amt of stool in bag, did not empty during shift  Diet/Nausea: Regular diet w/ poor appetite. No nausea reported   Skin: Midline incision healed otherwise of overt skin deficits   Lines: R internal jugular infusing TKO   Labs: Reviewed   Pain: Abdominal/Flank pain managed w/ oral oxy 10 mg x2 w/ scheduled tylenol given   Activity: Pt refusing assistance w/ repositioning stating, \"I'm okay, I can repositioning myself if I need\" and persisted to lay on his back more the majority of the shift   New changes this shift: R PNT kinked w/ large amt of urinary leakage    Plan: Continue plan of care     "

## 2021-06-29 NOTE — PROGRESS NOTES
Care Management Follow Up    Length of Stay (days): 10    Expected Discharge Date: 06/30/21     Concerns to be Addressed: discharge planning      Patient plan of care discussed at interdisciplinary rounds: Yes    Anticipated Discharge Disposition: Group Home(stretcher ride)     Anticipated Discharge Services: Return to     Anticipated Discharge DME: None    Patient/family educated on Medicare website which has current facility and service quality ratings:  NA  Education Provided on the Discharge Plan:  Yes  Patient/Family in Agreement with the Plan: yes    Referrals Placed by CM/SW:  NA  Private pay costs discussed: Not applicable    Additional Information:  Per MD team patient is anticipated to be medically ready for discharge to home tomorrow.  Patient needs a stretcher ride arranged as he does not have his specialized w/c here with him and is unable to sit in a standard w/c.   [employer] stretcher ride arranged for 10:30am, PCS form completed.  Updated AMADOU Burger RN, with this information.  He asked that orders are faxed in the morning and if there are any new medications that they be sent with him.  Updated the team with this information.  Left vm for Kathleen with [facility] [employer] to update them on discharge plan.  RNCC will continue to follow and assist with discharge planning.     [employer] [facility]  Phone: [phone]    [facility]   CARIDAD Burger   Ph: [phone]  Fax: [phone]     [facility] [employer] (RN)   Phone: [phone]  Fax: [phone]        FERNANDO Thompson  Phone: [phone]  Pager: [phone]  To contact the weekend RNCC, Page: [phone]
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
## 2021-06-30 VITALS
WEIGHT: 157 LBS | OXYGEN SATURATION: 93 % | HEART RATE: 90 BPM | SYSTOLIC BLOOD PRESSURE: 139 MMHG | RESPIRATION RATE: 18 BRPM | HEIGHT: 65 IN | DIASTOLIC BLOOD PRESSURE: 71 MMHG | TEMPERATURE: 98.5 F | BODY MASS INDEX: 26.16 KG/M2

## 2021-06-30 LAB
ALBUMIN SERPL-MCNC: 2.4 G/DL (ref 3.4–5)
ALP SERPL-CCNC: 78 U/L (ref 40–150)
ALT SERPL W P-5'-P-CCNC: 12 U/L (ref 0–70)
ANION GAP SERPL CALCULATED.3IONS-SCNC: 4 MMOL/L (ref 3–14)
AST SERPL W P-5'-P-CCNC: 13 U/L (ref 0–45)
BACTERIA SPEC CULT: NO GROWTH
BACTERIA SPEC CULT: NO GROWTH
BASOPHILS # BLD AUTO: 0.1 10E9/L (ref 0–0.2)
BASOPHILS NFR BLD AUTO: 0.6 %
BILIRUB SERPL-MCNC: 0.3 MG/DL (ref 0.2–1.3)
BUN SERPL-MCNC: 12 MG/DL (ref 7–30)
CALCIUM SERPL-MCNC: 9.7 MG/DL (ref 8.5–10.1)
CHLORIDE SERPL-SCNC: 94 MMOL/L (ref 94–109)
CO2 SERPL-SCNC: 30 MMOL/L (ref 20–32)
CREAT SERPL-MCNC: 0.69 MG/DL (ref 0.66–1.25)
CRP SERPL-MCNC: 84 MG/L (ref 0–8)
DIFFERENTIAL METHOD BLD: ABNORMAL
EOSINOPHIL # BLD AUTO: 0.2 10E9/L (ref 0–0.7)
EOSINOPHIL NFR BLD AUTO: 1.6 %
ERYTHROCYTE [DISTWIDTH] IN BLOOD BY AUTOMATED COUNT: 13.6 % (ref 10–15)
GFR SERPL CREATININE-BSD FRML MDRD: >90 ML/MIN/{1.73_M2}
GLUCOSE SERPL-MCNC: 86 MG/DL (ref 70–99)
HCT VFR BLD AUTO: 33.6 % (ref 40–53)
HGB BLD-MCNC: 10.7 G/DL (ref 13.3–17.7)
IMM GRANULOCYTES # BLD: 0.5 10E9/L (ref 0–0.4)
IMM GRANULOCYTES NFR BLD: 4 %
LYMPHOCYTES # BLD AUTO: 1.7 10E9/L (ref 0.8–5.3)
LYMPHOCYTES NFR BLD AUTO: 14.7 %
MAGNESIUM SERPL-MCNC: 2.1 MG/DL (ref 1.6–2.3)
MCH RBC QN AUTO: 27.1 PG (ref 26.5–33)
MCHC RBC AUTO-ENTMCNC: 31.8 G/DL (ref 31.5–36.5)
MCV RBC AUTO: 85 FL (ref 78–100)
MONOCYTES # BLD AUTO: 0.7 10E9/L (ref 0–1.3)
MONOCYTES NFR BLD AUTO: 5.8 %
NEUTROPHILS # BLD AUTO: 8.6 10E9/L (ref 1.6–8.3)
NEUTROPHILS NFR BLD AUTO: 73.3 %
NRBC # BLD AUTO: 0 10*3/UL
NRBC BLD AUTO-RTO: 0 /100
PHOSPHATE SERPL-MCNC: 3.6 MG/DL (ref 2.5–4.5)
PLATELET # BLD AUTO: 555 10E9/L (ref 150–450)
POTASSIUM SERPL-SCNC: 4 MMOL/L (ref 3.4–5.3)
PROT SERPL-MCNC: 7 G/DL (ref 6.8–8.8)
RBC # BLD AUTO: 3.95 10E12/L (ref 4.4–5.9)
SODIUM SERPL-SCNC: 129 MMOL/L (ref 133–144)
SPECIMEN SOURCE: NORMAL
SPECIMEN SOURCE: NORMAL
WBC # BLD AUTO: 11.8 10E9/L (ref 4–11)

## 2021-06-30 PROCEDURE — 85025 COMPLETE CBC W/AUTO DIFF WBC: CPT | Performed by: STUDENT IN AN ORGANIZED HEALTH CARE EDUCATION/TRAINING PROGRAM

## 2021-06-30 PROCEDURE — 250N000011 HC RX IP 250 OP 636: Performed by: STUDENT IN AN ORGANIZED HEALTH CARE EDUCATION/TRAINING PROGRAM

## 2021-06-30 PROCEDURE — 93005 ELECTROCARDIOGRAM TRACING: CPT

## 2021-06-30 PROCEDURE — 250N000013 HC RX MED GY IP 250 OP 250 PS 637: Performed by: STUDENT IN AN ORGANIZED HEALTH CARE EDUCATION/TRAINING PROGRAM

## 2021-06-30 PROCEDURE — 36592 COLLECT BLOOD FROM PICC: CPT | Performed by: STUDENT IN AN ORGANIZED HEALTH CARE EDUCATION/TRAINING PROGRAM

## 2021-06-30 PROCEDURE — 86140 C-REACTIVE PROTEIN: CPT | Performed by: STUDENT IN AN ORGANIZED HEALTH CARE EDUCATION/TRAINING PROGRAM

## 2021-06-30 PROCEDURE — 83735 ASSAY OF MAGNESIUM: CPT | Performed by: STUDENT IN AN ORGANIZED HEALTH CARE EDUCATION/TRAINING PROGRAM

## 2021-06-30 PROCEDURE — 80053 COMPREHEN METABOLIC PANEL: CPT | Performed by: STUDENT IN AN ORGANIZED HEALTH CARE EDUCATION/TRAINING PROGRAM

## 2021-06-30 PROCEDURE — 93010 ELECTROCARDIOGRAM REPORT: CPT | Performed by: INTERNAL MEDICINE

## 2021-06-30 PROCEDURE — 84100 ASSAY OF PHOSPHORUS: CPT | Performed by: STUDENT IN AN ORGANIZED HEALTH CARE EDUCATION/TRAINING PROGRAM

## 2021-06-30 RX ORDER — OXYCODONE HYDROCHLORIDE 5 MG/1
5-10 TABLET ORAL EVERY 6 HOURS PRN
Qty: 12 TABLET | Refills: 0 | Status: SHIPPED | OUTPATIENT
Start: 2021-06-30 | End: 2021-07-03

## 2021-06-30 RX ADMIN — PREGABALIN 150 MG: 75 CAPSULE ORAL at 08:39

## 2021-06-30 RX ADMIN — ENOXAPARIN SODIUM 40 MG: 40 INJECTION SUBCUTANEOUS at 08:42

## 2021-06-30 RX ADMIN — Medication 500 MG: at 11:12

## 2021-06-30 RX ADMIN — OXYCODONE HYDROCHLORIDE 10 MG: 5 TABLET ORAL at 11:12

## 2021-06-30 RX ADMIN — FAMOTIDINE 10 MG: 10 TABLET, FILM COATED ORAL at 08:40

## 2021-06-30 RX ADMIN — VARENICLINE TARTRATE 1 MG: 1 TABLET, FILM COATED ORAL at 08:39

## 2021-06-30 RX ADMIN — BACLOFEN 10 MG: 10 TABLET ORAL at 08:39

## 2021-06-30 RX ADMIN — Medication 1 TABLET: at 08:40

## 2021-06-30 RX ADMIN — ACETAMINOPHEN 975 MG: 325 TABLET, FILM COATED ORAL at 08:38

## 2021-06-30 RX ADMIN — Medication 500 MG: at 08:40

## 2021-06-30 RX ADMIN — DULOXETINE HYDROCHLORIDE 60 MG: 60 CAPSULE, DELAYED RELEASE ORAL at 08:40

## 2021-06-30 RX ADMIN — OXYCODONE HYDROCHLORIDE 10 MG: 5 TABLET ORAL at 05:14

## 2021-06-30 RX ADMIN — CIPROFLOXACIN HYDROCHLORIDE 500 MG: 500 TABLET, FILM COATED ORAL at 08:39

## 2021-06-30 ASSESSMENT — ACTIVITIES OF DAILY LIVING (ADL)
ADLS_ACUITY_SCORE: 20

## 2021-06-30 ASSESSMENT — ENCOUNTER SYMPTOMS
NAUSEA: 0
PALPITATIONS: 0
HEADACHES: 0
MYALGIAS: 0
SHORTNESS OF BREATH: 0
FLANK PAIN: 0
HEMATURIA: 0
COLOR CHANGE: 0
CONFUSION: 0
ACTIVITY CHANGE: 0
COUGH: 0
ARTHRALGIAS: 0
APPETITE CHANGE: 0
ABDOMINAL PAIN: 0

## 2021-06-30 NOTE — PLAN OF CARE
"/67 (BP Location: Right arm)   Pulse 95   Temp 99  F (37.2  C) (Oral)   Resp 18   Ht 1.651 m (5' 5\")   Wt 71.2 kg (157 lb)   SpO2 92%   BMI 26.13 kg/m      VSS: Tmax 99 but pt reports feeling feverish.   Neuro: A&Ox4; calls appropriately. No sensation below the waist LLE, RLE.   Pain: c/o generalized pain. Currently managed with scheduled baclofen, tylenol and PRN oxycodone.   Cardiac: WNL  Respiratory: sats mid 90s on RA.   Diet: Regular, poor appeite.   GI/: Colostomy with good output. Urostomy with adequate UOP. R PNT intact   Skin: Blanchable redness on bottom and groin areas. Barrier cream applied. Heels with old pressure injuries elevated in boots. Pt refusing repositioning.   IV Access: (R) Triple lumen internal jugular, 2 lumens infusing TKO, the other hep locked. (L) PIV- SL  Activity: Refusing repositioning. Self repositions.      Plan: Continue to monitor, discharge to group home tomorrow at 1030am via stretcher.   "

## 2021-06-30 NOTE — PLAN OF CARE
"/71 (BP Location: Right arm)   Pulse 83   Temp 98.5  F (36.9  C) (Oral)   Resp 18   Ht 1.651 m (5' 5\")   Wt 71.2 kg (157 lb)   SpO2 90%   BMI 26.13 kg/m      [id number]   Neuros: A&Ox4, refusing certain cares/repositioning. Insistent on performing individual cares. CMS intact    Cardiac: BP + IL stable. Denies cardiac chest pain   Respiratory: Pt denies shortness of breath. Voice hoarse. LS clear/diminished. Satting >92% on RA  GI/: Urostomy w/ adequate output. R PNT w/ adequate UOP. Colostomy w/ small amt of stool in bag, did not empty during shift, pt refused   Diet/Nausea: Regular diet w/ poor appetite. No nausea reported   Skin: Midline incision healed otherwise of overt skin deficits. Old healed wound incisions   Lines: R internal jugular infusing TKO   Labs: Reviewed   Pain: Abdominal/Flank pain managed w/ oral oxy 10 mg x1, refused scheduled tylenol   Activity: Pt refusing assistance w/ repositioning stating, \"I'm okay, I can repositioning myself if I need\". Laid on R side entirety of shift   New changes this shift: Possible discharge this AM~1030    Plan: Continue plan of care   "

## 2021-06-30 NOTE — PLAN OF CARE
Discharged to: Group home   Transportation: [facility]  Time: 1130  Prescriptions: sent with patient   Belongings: packed by NST  PIV/Access: removed  Care Plan and Education discontinued: done  Paperwork: reviewed & sent with patient

## 2021-06-30 NOTE — PROGRESS NOTES
Care Management Discharge Note    Discharge Date: 06/30/21       Discharge Disposition: Group Home(stretcher ride)    Discharge Services:  Home care with [facility] following at group home setting.    Discharge DME: None    Discharge Transportation: [facility] transport at 10:30 a.m.this morning. PCS transport form is with the [facility].    Private pay costs discussed: NA    Patient/family educated on Medicare website which has current facility and service quality ratings:  Return to group home setting.    Education Provided on the Discharge Plan:  Patient and group home staff.    Handoff Referral Completed: Yes    Additional Information:    Discharge orders faxed to both patients group home and home health care company.  Care Management RN Care Coordinator available to assist with transition needs prn.  Patients discharge medications are being picked up in pharmacy and the  NST arranging this intervention to assist with 10:30 am discharge.      DELIA Walton.S.COOKIE., R.N., P.H.N..  Care Coordinator     Pager   [facility]      Addendum:  Patients transportation will be delayed til 11:30 a.m.The  Staff as follows was notified about the delayed transport and he was given the phone number to [facility] should he have questions/for report from bedside RN if needed after he reviews the discharge orders that he confirmed he received.    [facility] [place]   CARIDAD Burger   Ph: [phone]  Fax: [phone]

## 2021-07-01 LAB — INTERPRETATION ECG - MUSE: NORMAL

## 2021-07-05 ENCOUNTER — PRE VISIT (OUTPATIENT)
Dept: UROLOGY | Facility: CLINIC | Age: 56
End: 2021-07-05

## 2021-07-05 NOTE — TELEPHONE ENCOUNTER
Reason for visit: Consult     Relevant information: Pyelonephritis    Records/imaging/labs/orders: In Epic    Pt called: No    At Rooming: Normal

## 2021-07-16 ENCOUNTER — TELEPHONE (OUTPATIENT)
Dept: UROLOGY | Facility: CLINIC | Age: 56
End: 2021-07-16

## 2021-07-16 ENCOUNTER — PREP FOR PROCEDURE (OUTPATIENT)
Dept: UROLOGY | Facility: CLINIC | Age: 56
End: 2021-07-16

## 2021-07-16 ENCOUNTER — OFFICE VISIT (OUTPATIENT)
Dept: UROLOGY | Facility: CLINIC | Age: 56
End: 2021-07-16
Payer: MEDICARE

## 2021-07-16 VITALS
HEART RATE: 71 BPM | WEIGHT: 145 LBS | BODY MASS INDEX: 20.76 KG/M2 | SYSTOLIC BLOOD PRESSURE: 161 MMHG | HEIGHT: 70 IN | DIASTOLIC BLOOD PRESSURE: 97 MMHG

## 2021-07-16 DIAGNOSIS — N39.0 RECURRENT UTI: ICD-10-CM

## 2021-07-16 DIAGNOSIS — Z11.59 ENCOUNTER FOR SCREENING FOR OTHER VIRAL DISEASES: ICD-10-CM

## 2021-07-16 DIAGNOSIS — N20.0 KIDNEY STONE: Primary | ICD-10-CM

## 2021-07-16 DIAGNOSIS — A41.9 SEPSIS, DUE TO UNSPECIFIED ORGANISM, UNSPECIFIED WHETHER ACUTE ORGAN DYSFUNCTION PRESENT (H): ICD-10-CM

## 2021-07-16 DIAGNOSIS — N13.2 HYDRONEPHROSIS WITH URINARY OBSTRUCTION DUE TO URETERAL CALCULUS: ICD-10-CM

## 2021-07-16 PROCEDURE — 99204 OFFICE O/P NEW MOD 45 MIN: CPT | Performed by: UROLOGY

## 2021-07-16 PROCEDURE — 99207 PR NO CHARGE NURSE ONLY: CPT

## 2021-07-16 ASSESSMENT — PATIENT HEALTH QUESTIONNAIRE - PHQ9: SUM OF ALL RESPONSES TO PHQ QUESTIONS 1-9: 0

## 2021-07-16 ASSESSMENT — PAIN SCALES - GENERAL: PAINLEVEL: MODERATE PAIN (5)

## 2021-07-16 ASSESSMENT — MIFFLIN-ST. JEOR: SCORE: 1483.97

## 2021-07-16 NOTE — PROGRESS NOTES
ASSESSMENT and PLAN   Kidney stones.  Bilateral.    -Complicated by ileal conduit and spinal cord injury (T7).  This will change surgical approach to stones and increase his risk of post op complications.      During counseling for this visit, we covered the natural history of kidney stones, the risk of progression to symptomatic pain/infection, and the possibility of renal failure/kidney damage.  We covered treatment options including observation, ureteroscopy, extracorporeal shock wave lithotripsy (ESWL), and percutaneous nephrolithotomy (PCNL).  We covered surgical risks which include but are not limited to heart attack, stroke, blood clot in the legs or lungs, death, injury to surrounding organs (intestine, liver, spleen, pancreas, lung, muscles, nerves), loss of sensation around incisions, decreased renal function, infection, injury to ureter, injury to bladder, and urethral strictures.  Additional procedures may be necessary in the perioperative period.    Plan for right percutaneous nephrolithotomy.  I will plan for left percutaneous nephrolithotomy at a later date.  -urinalysis today.  ____________________________________________________________________    CHIEF COMPLAINT  It was my pleasure to see Parth Fountain who is a 56 year old male here for evaluation of kidney stones.    HPI  He is here for bilateral kidney stones.  He was discharged 6/25/21 (discharge summary reviewed) for sepsis and bilateral kidney stones.  He has completed his course of cipro.  He is back to baseline.    This is his first kidney stones.    He has ileal conduit and loop colostomy.        6/23/21 CT Abdomen/Pelvis without contrast   I reviewed the radiologic images and report from this radiologic exam.  My independent interpretation is:    Bilateral kidney stones.  8mm in right ureter.    Radiologist Impression  1. Areas of cortical hypoattenuation throughout the right kidney  indicating pyelonephritis. No drainable abscess  identified.  Right-sided percutaneous nephrostomy tube has been intervally placed.  8 mm right mid ureteric stone in unchanged position. Scattered other  collecting system stones in unchanged position.     2. Similar indentations in the skin posterior to the pelvis  bilaterally.      I reviewed the following labs  I reviewed the following laboratory data and went over findings with patient:  Recent Labs   Lab Test 06/30/21  0742 06/29/21  0556 06/28/21  0854 06/27/21  0646   WBC 11.8* 12.0* 10.4 11.5*   HGB 10.7* 11.1* 10.2* 10.2*   * 505* 392 335     Recent Labs   Lab Test 06/30/21  0742 06/29/21  0556 06/28/21  0854 06/27/21  0645   CR 0.69 0.70 0.59* 0.64*   GFRESTIMATED >90 >90 >90 >90   GFRESTBLACK >90 >90 >90 >90   GLC 86 87 82 88     Past Surgical History:   Procedure Laterality Date     C PELVIS/HIP JOINT SURGERY UNLISTED       C SPINAL FUSION,ANT,EA ADNL LEVEL       COLOSTOMY       INCISION AND DRAINAGE ABDOMEN WASHOUT, COMBINED  11/2/2013    Procedure: COMBINED INCISION AND DRAINAGE ABDOMEN WASHOUT;  Exploratory Laparotomy, Abdominal Washout with Abdominal Closure;  Surgeon: Ghada Heller MD;  Location: [facility]     IR NEPHROSTOMY TUBE PLACEMENT RIGHT  6/19/2021     IRRIGATION AND DEBRIDEMENT DECUBITUS WITH FLAP CLOSURE, COMBINED  7/18/2012    Procedure: COMBINED IRRIGATION AND DEBRIDEMENT DECUBITUS WITH FLAP CLOSURE;  Perineal and Scrotal Wound Debridement, scrotal flap advancement and local tissue rearrangement ;  Surgeon: Herlinda Peña MD;  Location: [facility]     LAPAROTOMY EXPLORATORY  10/31/2013    Procedure: LAPAROTOMY EXPLORATORY;  Exploratory Laparotomy, lysis of adhesions greater than 90 minutes, repair of internal hernia x2, reduction of small bowel volvulous, repair of small bowel enterotomy and trauma closure;  Surgeon: Gahda Heller MD;  Location: [facility] OR     ORTHOPEDIC SURGERY      hip surgery 2010     STOMA CARE       wound closure[           CC  Patient  Care Team:  Maria Ines Emmanuel as PCP - General (Internal Medicine)  Moose Vides MD as MD (Urology)  Shivani Guillermo, RN as Registered Nurse (Oncology)  SELF, REFERRED    Copy to patient  NAN DUPREE  [address]  [facility] [address]

## 2021-07-16 NOTE — NURSING NOTE
Pre Op Teaching Flowsheet                                        Pre and Post op Patient Education  Relevant Diagnosis: Kidney stone  Teaching Topic:  Pre and post op teaching for right percutaneous nephrolithotomy   Person Involved in teaching:  patient      Motivation Level: High  Asks Questions: Yes  Eager to Learn:  Yes  Cooperative: Yes  Receptive (willing/able to accept information):  Yes  Patient demonstrates understanding of the following:  Date and time of surgery: 08/23/21   Location of surgery: [place]  History and Physical and any other testing necessary prior to surgery Pre Op Physical with: PCP on After 7/23/21 to be scheduled by patient  Required time line for completion of History and Physical and any pre-op testing: No more than 30 days prior to surgery date    NPO Guidelines: NPO per Anesthesia Guidelines : Reviewed (surgery packet for further information).    Patient demonstrates understanding of the following:  Patient understands the need for a responsible adult to drive them home and someone to stay with them for the first 24 hours post-operatively: 2-3 day hospital stay  Pre-op bowel prep: N/A  Pre-op showering/scrub information with Hibiclens Soap: Yes  Medications to take the day of surgery: Pre op Physical for instruction.   Blood thinner medications discussed and when to stop (if applicable):  Yes  Diabetes medication management (if applicable):  Patient to discuss with Primary Care Provider  Discussed pain control after surgery: pain scale, pain medications and pain management techniques  Infection Prevention: Patient demonstrates understanding of the following:  Patient instructed on hand hygiene:  Yes  Surgical procedure site care taught: Yes  Signs and symptoms of infection taught:  Yes  Wound care will be taught at the time of discharge.    Urine anylisis and Urine Culture ordered on: Today and at pre op appointment  Pre op Covid testing on: 08/19/21 MG  Post-op follow-up:  09/10/21  Discussed how to contact the hospital, nurse, and clinic scheduling staff if necessary.     Surgical instructions given to patient in clinic: Yes.   Instructional materials used/given/mailed: Before your surgery packet , Medications to avoid before surgery , Showering or Bathing instructions before surgery  and What to expect after surgery  Total time with patient: 10 minutes   Mica Alfonso RN

## 2021-07-16 NOTE — PATIENT INSTRUCTIONS
Schedule surgery for right percutaneous nephrolithotomy     It was a pleasure meeting with you today.  Thank you for allowing me and my team the privilege of caring for you today.  YOU are the reason we are here, and I truly hope we provided you with the excellent service you deserve.  Please let us know if there is anything else we can do for you so that we can be sure you are leaving completely satisfied with your care experience.        Kati Bernardo, EMT   July 16, 2021  9:18 AM

## 2021-07-16 NOTE — LETTER
7/16/2021       RE: Parth Fountain  [address]  [place] [address]     Dear Colleague,    Thank you for referring your patient, Parth Fountain, to the [facility] [place] [facility]. Please see a copy of my visit note below.    ASSESSMENT and PLAN   Kidney stones.  Bilateral.    -Complicated by ileal conduit and spinal cord injury (T7).  This will change surgical approach to stones and increase his risk of post op complications.      During counseling for this visit, we covered the natural history of kidney stones, the risk of progression to symptomatic pain/infection, and the possibility of renal failure/kidney damage.  We covered treatment options including observation, ureteroscopy, extracorporeal shock wave lithotripsy (ESWL), and percutaneous nephrolithotomy (PCNL).  We covered surgical risks which include but are not limited to heart attack, stroke, blood clot in the legs or lungs, death, injury to surrounding organs (intestine, liver, spleen, pancreas, lung, muscles, nerves), loss of sensation around incisions, decreased renal function, infection, injury to ureter, injury to bladder, and urethral strictures.  Additional procedures may be necessary in the perioperative period.    Plan for right percutaneous nephrolithotomy.  I will plan for left percutaneous nephrolithotomy at a later date.  -urinalysis today.  ____________________________________________________________________    CHIEF COMPLAINT  It was my pleasure to see Parth Fountain who is a 56 year old male here for evaluation of kidney stones.    HPI  He is here for bilateral kidney stones.  He was discharged 6/25/21 (discharge summary reviewed) for sepsis and bilateral kidney stones.  He has completed his course of cipro.  He is back to baseline.    This is his first kidney stones.    He has ileal conduit and loop colostomy.        6/23/21 CT Abdomen/Pelvis without  contrast   I reviewed the radiologic images and report from this radiologic exam.  My independent interpretation is:    Bilateral kidney stones.  8mm in right ureter.    Radiologist Impression  1. Areas of cortical hypoattenuation throughout the right kidney  indicating pyelonephritis. No drainable abscess identified.  Right-sided percutaneous nephrostomy tube has been intervally placed.  8 mm right mid ureteric stone in unchanged position. Scattered other  collecting system stones in unchanged position.     2. Similar indentations in the skin posterior to the pelvis  bilaterally.      I reviewed the following labs  I reviewed the following laboratory data and went over findings with patient:  Recent Labs   Lab Test 06/30/21  0742 06/29/21  0556 06/28/21  0854 06/27/21  0646   WBC 11.8* 12.0* 10.4 11.5*   HGB 10.7* 11.1* 10.2* 10.2*   * 505* 392 335     Recent Labs   Lab Test 06/30/21  0742 06/29/21  0556 06/28/21  0854 06/27/21  0645   CR 0.69 0.70 0.59* 0.64*   GFRESTIMATED >90 >90 >90 >90   GFRESTBLACK >90 >90 >90 >90   GLC 86 87 82 88     Past Surgical History:   Procedure Laterality Date     C PELVIS/HIP JOINT SURGERY UNLISTED       C SPINAL FUSION,ANT,EA ADNL LEVEL       COLOSTOMY       INCISION AND DRAINAGE ABDOMEN WASHOUT, COMBINED  11/2/2013    Procedure: COMBINED INCISION AND DRAINAGE ABDOMEN WASHOUT;  Exploratory Laparotomy, Abdominal Washout with Abdominal Closure;  Surgeon: Ghada Heller MD;  [facility]     IR NEPHROSTOMY TUBE PLACEMENT RIGHT  6/19/2021     IRRIGATION AND DEBRIDEMENT DECUBITUS WITH FLAP CLOSURE, COMBINED  7/18/2012    Procedure: COMBINED IRRIGATION AND DEBRIDEMENT DECUBITUS WITH FLAP CLOSURE;  Perineal and Scrotal Wound Debridement, scrotal flap advancement and local tissue rearrangement ;  Surgeon: Herlinda Peña MD;  [facility]     LAPAROTOMY EXPLORATORY  10/31/2013    Procedure: LAPAROTOMY EXPLORATORY;  Exploratory Laparotomy, lysis of adhesions  greater than 90 minutes, repair of internal hernia x2, reduction of small bowel volvulous, repair of small bowel enterotomy and trauma closure;  Surgeon: Ghada Heller MD;  Location: [facility]     ORTHOPEDIC SURGERY      hip surgery 2010     STOMA CARE       wound closure[           CC  Patient Care Team:  Maria Ines Emmanuel as PCP - General (Internal Medicine)  Moose Vides MD as MD (Urology)  Shivani Guillermo, RN as Registered Nurse (Oncology)  SELF, REFERRED    Copy to patient  NAN DUPREE  [address]  [facility] [address]

## 2021-07-16 NOTE — TELEPHONE ENCOUNTER
Patient is scheduled for surgery with Dr. Vides    Spoke with: Mica MCLEAN spoke to patient in clinic face to face     Date of Surgery: Monday 08/23/21     Location: [facility]     Informed patient they will need an adult  Yes    Pre op with Provider melissa    H&P: Scheduled with Patient will call and schedule pre-op with his PCP Maria Ines Emmanuel    Pre-procedure COVID-19 Test: Thursday 08/19/21 @ 9:00am [place] [facility]     Additional imaging/appointments: 2 week post op in person visit with Dr. Vides Friday 09/10/21 @ 8:45am     Surgery packet: Mica MCLEAN gave to patient in clinic 07/16/21      Additional comments: melissa

## 2021-08-16 ENCOUNTER — TELEPHONE (OUTPATIENT)
Dept: UROLOGY | Facility: CLINIC | Age: 56
End: 2021-08-16

## 2021-08-16 DIAGNOSIS — N39.0 URINARY TRACT INFECTION: Primary | ICD-10-CM

## 2021-08-16 RX ORDER — CIPROFLOXACIN 500 MG/1
500 TABLET, FILM COATED ORAL 2 TIMES DAILY
Qty: 14 TABLET | Refills: 0 | Status: ON HOLD | OUTPATIENT
Start: 2021-08-16 | End: 2021-09-10

## 2021-08-16 NOTE — TELEPHONE ENCOUNTER
Patient called and anderson called and ordered cipro 500 mg bid La Nena Gardiner LPN Staff Nurse

## 2021-08-16 NOTE — TELEPHONE ENCOUNTER
[facility] Call Center    Phone Message    May a detailed message be left on voicemail: no     Reason for Call: Other: Parth Kim calling about DOS is: 8/23, supposed to be on Antibiotics 1 week before.  Please call him and advise.       Action Taken: Message routed to:  [facility] Urology    Travel Screening: Not Applicable

## 2021-08-16 NOTE — TELEPHONE ENCOUNTER
[facility] Call Center    Phone Message    May a detailed message be left on voicemail: yes     Reason for Call: Other: Pt calling to touch base again on a RX for Cipro 500 needed before his upcoming surgery. Pt has questions and is very worried his surgery will be postponed. Please follow up with pt on any update for this request     Action Taken: Message routed to:  [facility] Urology    Travel Screening: Not Applicable

## 2021-08-17 ENCOUNTER — TRANSFERRED RECORDS (OUTPATIENT)
Dept: MULTI SPECIALTY CLINIC | Facility: CLINIC | Age: 56
End: 2021-08-17

## 2021-08-17 LAB
CREATININE (EXTERNAL): 1 MG/DL (ref 0.72–1.25)
GFR ESTIMATED (EXTERNAL): >60 ML/MIN/1.73M2
GFR ESTIMATED (IF AFRICAN AMERICAN) (EXTERNAL): >60 ML/MIN/1.73M2
GLUCOSE (EXTERNAL): 104 MG/DL (ref 65–100)
POTASSIUM (EXTERNAL): 4.3 MMOL/L (ref 3.5–5)

## 2021-08-19 ENCOUNTER — LAB (OUTPATIENT)
Dept: LAB | Facility: CLINIC | Age: 56
End: 2021-08-19
Attending: UROLOGY
Payer: MEDICARE

## 2021-08-19 DIAGNOSIS — Z11.59 ENCOUNTER FOR SCREENING FOR OTHER VIRAL DISEASES: ICD-10-CM

## 2021-08-19 LAB — SARS-COV-2 RNA RESP QL NAA+PROBE: NEGATIVE

## 2021-08-19 PROCEDURE — U0003 INFECTIOUS AGENT DETECTION BY NUCLEIC ACID (DNA OR RNA); SEVERE ACUTE RESPIRATORY SYNDROME CORONAVIRUS 2 (SARS-COV-2) (CORONAVIRUS DISEASE [COVID-19]), AMPLIFIED PROBE TECHNIQUE, MAKING USE OF HIGH THROUGHPUT TECHNOLOGIES AS DESCRIBED BY CMS-2020-01-R: HCPCS

## 2021-08-19 PROCEDURE — U0005 INFEC AGEN DETEC AMPLI PROBE: HCPCS

## 2021-08-20 ENCOUNTER — HOSPITAL ENCOUNTER (OUTPATIENT)
Dept: WOUND CARE | Facility: CLINIC | Age: 56
End: 2021-08-20
Attending: PHYSICIAN ASSISTANT
Payer: MEDICARE

## 2021-08-20 ENCOUNTER — TELEPHONE (OUTPATIENT)
Dept: UROLOGY | Facility: CLINIC | Age: 56
End: 2021-08-20

## 2021-08-20 DIAGNOSIS — S31.109S RIGHT GROIN WOUND, SEQUELA: ICD-10-CM

## 2021-08-20 DIAGNOSIS — S81.801D OPEN WOUND OF BOTH LOWER EXTREMITIES WITH COMPLICATION, SUBSEQUENT ENCOUNTER: ICD-10-CM

## 2021-08-20 DIAGNOSIS — S81.802D OPEN WOUND OF BOTH LOWER EXTREMITIES WITH COMPLICATION, SUBSEQUENT ENCOUNTER: ICD-10-CM

## 2021-08-20 PROBLEM — S81.801A OPEN WOUND OF BOTH LEGS WITH COMPLICATION: Status: ACTIVE | Noted: 2021-08-20

## 2021-08-20 PROBLEM — S81.802A OPEN WOUND OF BOTH LEGS WITH COMPLICATION: Status: ACTIVE | Noted: 2021-08-20

## 2021-08-20 PROCEDURE — 99442 PR PHYSICIAN TELEPHONE EVALUATION 11-20 MIN: CPT | Performed by: PHYSICIAN ASSISTANT

## 2021-08-20 NOTE — DISCHARGE INSTRUCTIONS
"   [facility]  [address] [facility] [address], [place] [address]    Call us at [phone] if you have any questions about your wounds, have redness or swelling around your wound, have a fever of 101 or greater or if you have any other problems or concerns. We answer the phone Monday through Friday 8 am to 4 pm, please leave a message as we check the voicemail frequently throughout the day.     Parth Fountain      1965  [place] Phone: [phone]   Fax: [phone]    Please call [facility] [phone] ([address]) to schedule your xray appointment if you have not heard from them in two business days.  Please call [facility] Phone: [phone] [address] [place] [address] for Ultra sound JOVAN and Duplex of left leg    Wound dressing change recommendations to all open areas: Right anterior Hip; Left lower leg ulcers and all foot and toe ulcers  Cleanse wounds with saline or wound cleanser  Cover all wounds with Xeroform dressings  Apply Calmoseptine to Right hip periwound to protect skin from drainage  Apply small amount of Iodosorb paste to all open areas to feet and toes  Cover wounds with gauze or ABD pad and secure with 2\" medipore tape  Spandage from toes to knees  Change dressing Monday, Wednesday and Friday    Wound care to Left IT: M-W-F  Cleanse wound with wound cleanser, pat dry  Apply layer of Iodosorb gel to cover wound bed, cover with ABD or Mepilex dressing per preference  Change every Monday, Wednesday, Friday     Suzan Martinez PA-C. August 20, 2021    Return to [facility] as scheduled  If you had a positive experience please indicate on your patient satisfaction survey form that [facility] will be sending you.    If you have any billing related questions please call the [facility] Business office at [phone]. The clinic staff does not handle billing related matters.    "

## 2021-08-20 NOTE — TELEPHONE ENCOUNTER
Called patient to make aware of surgery arrival time change for Dr. Vides surgery on 08/23/21. Arrival time left on voicemail of 5:30am and procedure start time of 7:30am. Left direct extension for patient to call with questions or concerns. Yancy ASIF Yvonne-Operative Coordinator for General and Urology Surgery

## 2021-08-20 NOTE — PROGRESS NOTES
Patient called for a telephone visit. Certified Wound Care Nurse time spent evaluating patient record, completed a full evaluation and documented wound(s) & sandra-wound skin; provided recommendation based on treatment plan. Reviewed discharge instructions, patient education, and discussed plan of care with appropriate medical team staff members and patient and/or family members.     Pt will call back to get imaging that was ordered but cancelled due to hospitalization.  Education provided on wound care and treatment with good teachback.

## 2021-08-20 NOTE — PROGRESS NOTES
HISTORY OF PRESENT ILLNESS:   Mr. Parth Fountain is a 56-year-old paraplegic gentleman who returns to us today to follow-up on chronic pelvic pressure ulcers as well as lower leg wounds. PMHx of DVT, chronic UTI, EtOh abuse, and s/p colostomy. He has a long history of pressure ulcers with subsequent corrective surgeries by Dr. Peña. Parth's pelvic wounds have been very difficult to heal due to his surgical history. He has very little tissue overlying his bone and it is mostly made up of scar tissue. His progress waxes and wanes.    10/21/20: Televisit. Thigh wound healed. Scraped left leg and foot during a transfer and has several shallow ulcers on left lower leg and toes. Has an ulcer in his right groin/hip crease. This waxes and wanes. Uses Stacie when open and calmoseptine when more closed, this is working well for him.   11/18/20: Return visit. All of his wounds have improved. Has new traumatic wounds on bilateral shins.   12/16/20: Returns via video visit. Shin wounds are improving but very friable and hypergranular. Hip crease slightly more open today, also appears friable. Has been using Stacie to all wounds.   01/13/21: Returns for in office visit.  Leg wounds are improving with use of calcium alginate.  However, Parth notes that the dressings are sticking to the wounds and causing bleeding when removed.  His right groin wound is now quite hypertrophic.  2/26/21: Returns for follow-up. Reports that he continues to reinjure his R lower leg. Hip wound is not really improving. Has new wound on R great toe with exposed bone.   4/9/21: Returns for follow-up. Still has exposed bone on R great toe and has not had the x-ray done we ordered at last visit. His R lower leg has worsened. R hip about the same.   5/19/21: Returns for follow-up via telephone, has been using Xeroform. Has not done vascular studies or xray as recommended. Hip wound is improving. Has a recurrence of his L IT wound. His legs are marginally  "better, reports they are \"squishy.\"    8/20: Returns for televisit. Had to reschedule from in-clinic visit due to staffing issues. Left toe has new wound, lateral left leg still open. Has been using xeroform. Thinks R toe is closed up now. R hip comes and goes, uses Calmoseptine PRN. Has lithotripsy this upcoming week. Has not had xray or ABIs.      OFFLOADING: On a Group 2 mattress. Still utilizing RoHo cushion on his wheelchair.       SOCIAL HISTORY:  Lives in a group home. He is still receiving home health care through [facility]. that comes and does dressing changes daily.      PHYSICAL EXAMINATION   INTEGUMENTARY:  Wound (used by OP I only) 10/21/20 1307 Right groin pressure injury (Active)   Thickness/Stage Stage 3 08/20/21 1100   Dressing Appearance moist drainage 08/20/21 1100   Base granulating 08/20/21 1100   Periwound edematous;ecchymotic 08/20/21 1100   Periwound Temperature warm 08/20/21 1100   Periwound Skin Turgor soft 08/20/21 1100   Length (cm) 0.8 08/20/21 1100   Width (cm) 1.4 08/20/21 1100   Depth (cm) 0.4 08/20/21 1100   Wound (cm^2) 1.12 cm^2 08/20/21 1100   Wound Volume (cm^3) 0.45 cm^3 08/20/21 1100   Wound healing % -348 08/20/21 1100   Drainage Characteristics/Odor serosanguineous 08/20/21 1100   Drainage Amount moderate 08/20/21 1100       Wound (used by OP [facility]I only) 12/16/20 1253 Right skin tear (Active)   Thickness/Stage full thickness 08/20/21 1100   Dressing Appearance moist drainage 08/20/21 1100   Base granulating 08/20/21 1100   Periwound intact 08/20/21 1100   Periwound Temperature warm 08/20/21 1100   Length (cm) 1 08/20/21 1100   Width (cm) 1.4 08/20/21 1100   Depth (cm) 0.8 08/20/21 1100   Wound (cm^2) 1.4 cm^2 08/20/21 1100   Wound Volume (cm^3) 1.12 cm^3 08/20/21 1100   Wound healing % -250 08/20/21 1100   Drainage Characteristics/Odor serous 08/20/21 1100   Drainage Amount copious 08/20/21 1100       Wound (used by OP [facility] only) 02/26/21 1347 Left pressure injury " "(Active)   Thickness/Stage Stage 4 08/20/21 1100   Dressing Appearance moist drainage 08/20/21 1100   Periwound excoriated 08/20/21 1100   Periwound Temperature warm 08/20/21 1100   Periwound Skin Turgor soft 08/20/21 1100   Edges open 08/20/21 1100   Length (cm) 1.4 08/20/21 1100   Width (cm) 1 08/20/21 1100   Depth (cm) 0.1 08/20/21 1100   Wound (cm^2) 1.4 cm^2 08/20/21 1100   Wound Volume (cm^3) 0.14 cm^3 08/20/21 1100   Wound healing % -460 08/20/21 1100   Drainage Characteristics/Odor serous 08/20/21 1100   Drainage Amount moderate 08/20/21 1100           ASSESSMENT:    1. Stage III pressure ulcer of right hip  2. Stage 4 pressure ulcer of R great toe  3. Full-thickness traumatic ulcerations of bilateral lower leg and toes with fat-layer exposed      PLAN:    1. Dress all leg wounds with xeroform and cover dressing of choice   2. Hip wound with Calmoseptine PRN  3. Dress toe wounds with Iodosorb  4. X-ray of R great toe - suspect osteomyelitis  5. JOVAN and duplex of RLE to r/o arterial disease     FOLLOWUP:  1 month     The patient has been notified of following:     \"This telephone visit will be conducted via a call between you and your physician/provider. We have found that certain health care needs can be provided without the need for a physical exam.  This service lets us provide the care you need with a short phone conversation.  If a prescription is necessary we can send it directly to your pharmacy.  If lab work is needed we can place an order for that and you can then stop by our lab to have the test done at a later time.    If during the course of the call the physician/provider feels a telephone visit is not appropriate, you will not be charged for this service.\"     Patient has given verbal consent for Telephone visit?  Yes  DURATION OF CALL: 12 minutes    TIFFANIE SINGH PA-C    "

## 2021-08-22 ENCOUNTER — ANESTHESIA EVENT (OUTPATIENT)
Dept: SURGERY | Facility: CLINIC | Age: 56
End: 2021-08-22
Payer: MEDICARE

## 2021-08-22 ASSESSMENT — LIFESTYLE VARIABLES: TOBACCO_USE: 1
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
## 2021-08-23 ENCOUNTER — ANESTHESIA (OUTPATIENT)
Dept: SURGERY | Facility: CLINIC | Age: 56
End: 2021-08-23
Payer: MEDICARE

## 2021-08-23 ENCOUNTER — HOSPITAL ENCOUNTER (OUTPATIENT)
Facility: CLINIC | Age: 56
Discharge: NURSING, REHAB OR LONG-TERM CARE | End: 2021-08-23
Attending: UROLOGY | Admitting: UROLOGY
Payer: MEDICARE

## 2021-08-23 ENCOUNTER — APPOINTMENT (OUTPATIENT)
Dept: GENERAL RADIOLOGY | Facility: CLINIC | Age: 56
End: 2021-08-23
Attending: UROLOGY
Payer: MEDICARE

## 2021-08-23 VITALS
OXYGEN SATURATION: 99 % | SYSTOLIC BLOOD PRESSURE: 148 MMHG | DIASTOLIC BLOOD PRESSURE: 97 MMHG | HEART RATE: 89 BPM | TEMPERATURE: 98.1 F | RESPIRATION RATE: 16 BRPM

## 2021-08-23 DIAGNOSIS — N20.0 KIDNEY STONE: ICD-10-CM

## 2021-08-23 LAB — GLUCOSE BLDC GLUCOMTR-MCNC: 94 MG/DL (ref 70–99)

## 2021-08-23 PROCEDURE — 250N000011 HC RX IP 250 OP 636: Performed by: UROLOGY

## 2021-08-23 PROCEDURE — 999N000179 XR SURGERY CARM FLUORO LESS THAN 5 MIN W STILLS: Mod: TC

## 2021-08-23 PROCEDURE — 250N000013 HC RX MED GY IP 250 OP 250 PS 637

## 2021-08-23 PROCEDURE — C1758 CATHETER, URETERAL: HCPCS | Performed by: UROLOGY

## 2021-08-23 PROCEDURE — 710N000012 HC RECOVERY PHASE 2, PER MINUTE: Performed by: UROLOGY

## 2021-08-23 PROCEDURE — 272N000001 HC OR GENERAL SUPPLY STERILE: Performed by: UROLOGY

## 2021-08-23 PROCEDURE — 258N000003 HC RX IP 258 OP 636: Performed by: ANESTHESIOLOGY

## 2021-08-23 PROCEDURE — 250N000013 HC RX MED GY IP 250 OP 250 PS 637: Performed by: ANESTHESIOLOGY

## 2021-08-23 PROCEDURE — 250N000025 HC SEVOFLURANE, PER MIN: Performed by: UROLOGY

## 2021-08-23 PROCEDURE — 250N000011 HC RX IP 250 OP 636: Performed by: ANESTHESIOLOGY

## 2021-08-23 PROCEDURE — C1729 CATH, DRAINAGE: HCPCS | Performed by: UROLOGY

## 2021-08-23 PROCEDURE — 258N000003 HC RX IP 258 OP 636: Performed by: UROLOGY

## 2021-08-23 PROCEDURE — C1725 CATH, TRANSLUMIN NON-LASER: HCPCS | Performed by: UROLOGY

## 2021-08-23 PROCEDURE — C1887 CATHETER, GUIDING: HCPCS | Performed by: UROLOGY

## 2021-08-23 PROCEDURE — 82365 CALCULUS SPECTROSCOPY: CPT | Performed by: UROLOGY

## 2021-08-23 PROCEDURE — 710N000011 HC RECOVERY PHASE 1, LEVEL 3, PER MIN: Performed by: UROLOGY

## 2021-08-23 PROCEDURE — C1769 GUIDE WIRE: HCPCS | Performed by: UROLOGY

## 2021-08-23 PROCEDURE — 370N000017 HC ANESTHESIA TECHNICAL FEE, PER MIN: Performed by: UROLOGY

## 2021-08-23 PROCEDURE — 999N000141 HC STATISTIC PRE-PROCEDURE NURSING ASSESSMENT: Performed by: UROLOGY

## 2021-08-23 PROCEDURE — 255N000002 HC RX 255 OP 636: Performed by: UROLOGY

## 2021-08-23 PROCEDURE — 50080 PERQ NL/PL LITHOTRP SMPL<2CM: CPT | Mod: RT | Performed by: STUDENT IN AN ORGANIZED HEALTH CARE EDUCATION/TRAINING PROGRAM

## 2021-08-23 PROCEDURE — 50435 EXCHANGE NEPHROSTOMY CATH: CPT | Mod: RT | Performed by: STUDENT IN AN ORGANIZED HEALTH CARE EDUCATION/TRAINING PROGRAM

## 2021-08-23 PROCEDURE — 360N000085 HC SURGERY LEVEL 5 W/ FLUORO, PER MIN: Performed by: UROLOGY

## 2021-08-23 PROCEDURE — 250N000009 HC RX 250: Performed by: ANESTHESIOLOGY

## 2021-08-23 RX ORDER — ERTAPENEM 1 G/1
1 INJECTION, POWDER, LYOPHILIZED, FOR SOLUTION INTRAMUSCULAR; INTRAVENOUS
Status: DISCONTINUED | OUTPATIENT
Start: 2021-08-23 | End: 2021-08-23 | Stop reason: HOSPADM

## 2021-08-23 RX ORDER — FENTANYL CITRATE 50 UG/ML
INJECTION, SOLUTION INTRAMUSCULAR; INTRAVENOUS PRN
Status: DISCONTINUED | OUTPATIENT
Start: 2021-08-23 | End: 2021-08-23

## 2021-08-23 RX ORDER — EPHEDRINE SULFATE 50 MG/ML
INJECTION, SOLUTION INTRAMUSCULAR; INTRAVENOUS; SUBCUTANEOUS PRN
Status: DISCONTINUED | OUTPATIENT
Start: 2021-08-23 | End: 2021-08-23

## 2021-08-23 RX ORDER — LIDOCAINE HYDROCHLORIDE 20 MG/ML
INJECTION, SOLUTION INFILTRATION; PERINEURAL PRN
Status: DISCONTINUED | OUTPATIENT
Start: 2021-08-23 | End: 2021-08-23

## 2021-08-23 RX ORDER — CIPROFLOXACIN 500 MG/1
500 TABLET, FILM COATED ORAL 2 TIMES DAILY
Qty: 6 TABLET | Refills: 0 | Status: SHIPPED | OUTPATIENT
Start: 2021-08-23 | End: 2021-08-26

## 2021-08-23 RX ORDER — ONDANSETRON 4 MG/1
4 TABLET, ORALLY DISINTEGRATING ORAL EVERY 30 MIN PRN
Status: DISCONTINUED | OUTPATIENT
Start: 2021-08-23 | End: 2021-08-23 | Stop reason: HOSPADM

## 2021-08-23 RX ORDER — LABETALOL HYDROCHLORIDE 5 MG/ML
5 INJECTION, SOLUTION INTRAVENOUS EVERY 5 MIN PRN
Status: DISCONTINUED | OUTPATIENT
Start: 2021-08-23 | End: 2021-08-23 | Stop reason: HOSPADM

## 2021-08-23 RX ORDER — MEPERIDINE HYDROCHLORIDE 25 MG/ML
12.5 INJECTION INTRAMUSCULAR; INTRAVENOUS; SUBCUTANEOUS
Status: DISCONTINUED | OUTPATIENT
Start: 2021-08-23 | End: 2021-08-23 | Stop reason: HOSPADM

## 2021-08-23 RX ORDER — GLYCOPYRROLATE 0.2 MG/ML
INJECTION, SOLUTION INTRAMUSCULAR; INTRAVENOUS PRN
Status: DISCONTINUED | OUTPATIENT
Start: 2021-08-23 | End: 2021-08-23

## 2021-08-23 RX ORDER — NALOXONE HYDROCHLORIDE 0.4 MG/ML
0.4 INJECTION, SOLUTION INTRAMUSCULAR; INTRAVENOUS; SUBCUTANEOUS
Status: DISCONTINUED | OUTPATIENT
Start: 2021-08-23 | End: 2021-08-23 | Stop reason: HOSPADM

## 2021-08-23 RX ORDER — SODIUM CHLORIDE, SODIUM LACTATE, POTASSIUM CHLORIDE, CALCIUM CHLORIDE 600; 310; 30; 20 MG/100ML; MG/100ML; MG/100ML; MG/100ML
INJECTION, SOLUTION INTRAVENOUS CONTINUOUS
Status: DISCONTINUED | OUTPATIENT
Start: 2021-08-23 | End: 2021-08-23 | Stop reason: HOSPADM

## 2021-08-23 RX ORDER — HYDROMORPHONE HCL IN WATER/PF 6 MG/30 ML
0.2 PATIENT CONTROLLED ANALGESIA SYRINGE INTRAVENOUS EVERY 5 MIN PRN
Status: DISCONTINUED | OUTPATIENT
Start: 2021-08-23 | End: 2021-08-23 | Stop reason: HOSPADM

## 2021-08-23 RX ORDER — ACETAMINOPHEN 325 MG/1
975 TABLET ORAL ONCE
Status: COMPLETED | OUTPATIENT
Start: 2021-08-23 | End: 2021-08-23

## 2021-08-23 RX ORDER — FENTANYL CITRATE 50 UG/ML
50 INJECTION, SOLUTION INTRAMUSCULAR; INTRAVENOUS EVERY 5 MIN PRN
Status: DISCONTINUED | OUTPATIENT
Start: 2021-08-23 | End: 2021-08-23 | Stop reason: HOSPADM

## 2021-08-23 RX ORDER — NALOXONE HYDROCHLORIDE 0.4 MG/ML
0.2 INJECTION, SOLUTION INTRAMUSCULAR; INTRAVENOUS; SUBCUTANEOUS
Status: DISCONTINUED | OUTPATIENT
Start: 2021-08-23 | End: 2021-08-23 | Stop reason: HOSPADM

## 2021-08-23 RX ORDER — LIDOCAINE 40 MG/G
CREAM TOPICAL
Status: DISCONTINUED | OUTPATIENT
Start: 2021-08-23 | End: 2021-08-23 | Stop reason: HOSPADM

## 2021-08-23 RX ORDER — PROPOFOL 10 MG/ML
INJECTION, EMULSION INTRAVENOUS PRN
Status: DISCONTINUED | OUTPATIENT
Start: 2021-08-23 | End: 2021-08-23

## 2021-08-23 RX ORDER — KETOROLAC TROMETHAMINE 30 MG/ML
INJECTION, SOLUTION INTRAMUSCULAR; INTRAVENOUS PRN
Status: DISCONTINUED | OUTPATIENT
Start: 2021-08-23 | End: 2021-08-23

## 2021-08-23 RX ORDER — ONDANSETRON 2 MG/ML
INJECTION INTRAMUSCULAR; INTRAVENOUS PRN
Status: DISCONTINUED | OUTPATIENT
Start: 2021-08-23 | End: 2021-08-23

## 2021-08-23 RX ORDER — ONDANSETRON 2 MG/ML
4 INJECTION INTRAMUSCULAR; INTRAVENOUS EVERY 30 MIN PRN
Status: DISCONTINUED | OUTPATIENT
Start: 2021-08-23 | End: 2021-08-23 | Stop reason: HOSPADM

## 2021-08-23 RX ORDER — OXYCODONE HYDROCHLORIDE 5 MG/1
5 TABLET ORAL EVERY 4 HOURS PRN
Status: DISCONTINUED | OUTPATIENT
Start: 2021-08-23 | End: 2021-08-23 | Stop reason: HOSPADM

## 2021-08-23 RX ORDER — ALBUTEROL SULFATE 0.83 MG/ML
2.5 SOLUTION RESPIRATORY (INHALATION) EVERY 4 HOURS PRN
Status: DISCONTINUED | OUTPATIENT
Start: 2021-08-23 | End: 2021-08-23 | Stop reason: HOSPADM

## 2021-08-23 RX ORDER — FENTANYL CITRATE 50 UG/ML
25-50 INJECTION, SOLUTION INTRAMUSCULAR; INTRAVENOUS
Status: DISCONTINUED | OUTPATIENT
Start: 2021-08-23 | End: 2021-08-23 | Stop reason: HOSPADM

## 2021-08-23 RX ORDER — OXYCODONE HYDROCHLORIDE 5 MG/1
5 TABLET ORAL EVERY 6 HOURS PRN
Qty: 12 TABLET | Refills: 0 | Status: SHIPPED | OUTPATIENT
Start: 2021-08-23 | End: 2021-08-26

## 2021-08-23 RX ORDER — FLUMAZENIL 0.1 MG/ML
0.2 INJECTION, SOLUTION INTRAVENOUS
Status: DISCONTINUED | OUTPATIENT
Start: 2021-08-23 | End: 2021-08-23 | Stop reason: HOSPADM

## 2021-08-23 RX ORDER — DEXAMETHASONE SODIUM PHOSPHATE 4 MG/ML
INJECTION, SOLUTION INTRA-ARTICULAR; INTRALESIONAL; INTRAMUSCULAR; INTRAVENOUS; SOFT TISSUE PRN
Status: DISCONTINUED | OUTPATIENT
Start: 2021-08-23 | End: 2021-08-23

## 2021-08-23 RX ADMIN — HYDROMORPHONE HYDROCHLORIDE 0.2 MG: 0.2 INJECTION, SOLUTION INTRAMUSCULAR; INTRAVENOUS; SUBCUTANEOUS at 10:32

## 2021-08-23 RX ADMIN — PROPOFOL 150 MG: 10 INJECTION, EMULSION INTRAVENOUS at 07:47

## 2021-08-23 RX ADMIN — PHENYLEPHRINE HYDROCHLORIDE 100 MCG: 10 INJECTION INTRAVENOUS at 08:36

## 2021-08-23 RX ADMIN — PHENYLEPHRINE HYDROCHLORIDE 100 MCG: 10 INJECTION INTRAVENOUS at 09:22

## 2021-08-23 RX ADMIN — NOREPINEPHRINE BITARTRATE 12 MCG: 1 INJECTION, SOLUTION, CONCENTRATE INTRAVENOUS at 08:25

## 2021-08-23 RX ADMIN — ROCURONIUM BROMIDE 50 MG: 10 INJECTION INTRAVENOUS at 07:47

## 2021-08-23 RX ADMIN — Medication 5 MG: at 08:21

## 2021-08-23 RX ADMIN — FENTANYL CITRATE 50 MCG: 50 INJECTION, SOLUTION INTRAMUSCULAR; INTRAVENOUS at 10:24

## 2021-08-23 RX ADMIN — Medication 10 MG: at 08:05

## 2021-08-23 RX ADMIN — SODIUM CHLORIDE, POTASSIUM CHLORIDE, SODIUM LACTATE AND CALCIUM CHLORIDE: 600; 310; 30; 20 INJECTION, SOLUTION INTRAVENOUS at 07:59

## 2021-08-23 RX ADMIN — ACETAMINOPHEN 975 MG: 325 TABLET, FILM COATED ORAL at 06:48

## 2021-08-23 RX ADMIN — FENTANYL CITRATE 150 MCG: 50 INJECTION, SOLUTION INTRAMUSCULAR; INTRAVENOUS at 07:44

## 2021-08-23 RX ADMIN — ERTAPENEM SODIUM 1 G: 1 INJECTION, POWDER, LYOPHILIZED, FOR SOLUTION INTRAMUSCULAR; INTRAVENOUS at 08:29

## 2021-08-23 RX ADMIN — PHENYLEPHRINE HYDROCHLORIDE 200 MCG: 10 INJECTION INTRAVENOUS at 08:46

## 2021-08-23 RX ADMIN — ROCURONIUM BROMIDE 10 MG: 10 INJECTION INTRAVENOUS at 08:59

## 2021-08-23 RX ADMIN — PROPOFOL 30 MG: 10 INJECTION, EMULSION INTRAVENOUS at 09:49

## 2021-08-23 RX ADMIN — Medication 5 MG: at 08:18

## 2021-08-23 RX ADMIN — Medication 10 MG: at 08:01

## 2021-08-23 RX ADMIN — DEXAMETHASONE SODIUM PHOSPHATE 4 MG: 4 INJECTION, SOLUTION INTRA-ARTICULAR; INTRALESIONAL; INTRAMUSCULAR; INTRAVENOUS; SOFT TISSUE at 08:25

## 2021-08-23 RX ADMIN — OXYCODONE HYDROCHLORIDE 5 MG: 5 TABLET ORAL at 11:58

## 2021-08-23 RX ADMIN — KETOROLAC TROMETHAMINE 30 MG: 30 INJECTION, SOLUTION INTRAMUSCULAR at 09:47

## 2021-08-23 RX ADMIN — FENTANYL CITRATE 50 MCG: 50 INJECTION, SOLUTION INTRAMUSCULAR; INTRAVENOUS at 08:58

## 2021-08-23 RX ADMIN — ONDANSETRON 4 MG: 2 INJECTION INTRAMUSCULAR; INTRAVENOUS at 09:30

## 2021-08-23 RX ADMIN — PHENYLEPHRINE HYDROCHLORIDE 100 MCG: 10 INJECTION INTRAVENOUS at 08:32

## 2021-08-23 RX ADMIN — GLYCOPYRROLATE 0.2 MG: 0.2 INJECTION, SOLUTION INTRAMUSCULAR; INTRAVENOUS at 08:27

## 2021-08-23 RX ADMIN — LIDOCAINE HYDROCHLORIDE 100 MG: 20 INJECTION, SOLUTION INFILTRATION; PERINEURAL at 07:47

## 2021-08-23 NOTE — BRIEF OP NOTE
[facility]    Brief Operative Note    Pre-operative diagnosis: Kidney stone [N20.0]  Post-operative diagnosis Same as pre-operative diagnosis    Procedure: Procedure(s):  NEPHROLITHOTOMY, PERCUTANEOUS, STANDBY HOLMIUM LASER, PERCUTANEOUS NEPHROSTOMT TUBE EXCHANGE  ureteroscopy, standby holmium laser, percutaneous nephrostomy tube exchange  Surgeon: Surgeon(s) and Role:     * Moose Vides MD - Primary     * Sandeep Gurrola MD - Resident - Assisting  Anesthesia: Combined General with Block   Estimated blood loss: Less than 50 ml  Drains:  8 Fr R percutaneous nephrostomy tube   Specimens:   ID Type Source Tests Collected by Time Destination   A : right ureter stone Calculus/Stone Gallbladder with Stone(s) STONE ANALYSIS Moose Vides MD 8/23/2021  9:35 AM      Findings:   8mm ureteral stone, renal pelvis with debris but no fragment >2mm  Complications: None.  Implants: * No implants in log *

## 2021-08-23 NOTE — ANESTHESIA PROCEDURE NOTES
Airway       Patient location during procedure: OR       Procedure Start/Stop Times: 8/23/2021 7:49 AM  Staff -        Anesthesiologist:  Eduardo Tucker MD       Resident/Fellow: Eduardo Leblanc       Performed By: resident  Consent for Airway        Urgency: elective  Indications and Patient Condition       Indications for airway management: sandra-procedural       Induction type:intravenous       Mask difficulty assessment: 1 - vent by mask    Final Airway Details       Final airway type: endotracheal airway       Successful airway: ETT - single  Endotracheal Airway Details        ETT size (mm): 7.5       Cuffed: yes       Successful intubation technique: direct laryngoscopy       DL Blade Type: MAC 4       Grade View of Cords: 1       Adjucts: stylet       Position: Right       Measured from: gums/teeth       Secured at (cm): 24       Bite block used: None    Post intubation assessment        Placement verified by: capnometry, equal breath sounds and chest rise        Number of attempts at approach: 1       Secured with: other (comment) (cloth strap)       Ease of procedure: easy       Dentition: Intact and Unchanged    Additional Comments       ETT placed by CA-1 under direct supervision of staff anesthesiologist.
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
## 2021-08-23 NOTE — DISCHARGE INSTRUCTIONS
[facility] [place]  Same-Day Surgery   Adult Discharge Orders & Instructions       *Take it easy when you get home.  Remember, same day surgery DOES NOT MEAN SAME DAY RECOVERY!    *Healing is a gradual process and you will need some time to recover - you may be more tired than you realize at first.    *Rest and relax for at least the first 24 hours at home.  You'll feel better and heal faster if you take good care of yourself.    For 24 hours after surgery    1. A responsible adult must stay with you for at least 24 hours after you leave the hospital.   2. Do not drink alcohol, drive, or use heavy equipment for 24 hours. This is because you have had anesthesia medications.  3. Avoid strenuous and risky activities for 24 hours.   4. You may feel lightheaded, if so, sit for a few minutes before standing.  You may need someone to help you get up. Ask for help when climbing stairs.  5. If you have nausea: Drink only clear liquids such as water, juice, soda, or broth. Rest may also help. Be sure to drink enough fluids. Move to a  regular diet as you feel able.  6. You may have a slight fever. Call the doctor if your fever is over 100 F (37.7 C) (taken under the tongue) or lasts longer than 24 hours.  7. You might have a dry mouth, sore throat, muscle aches or trouble sleeping.  These should go away after 24 hours.  8. Do not make important or legal decisions.     Call your doctor for any of the followin.  Signs of infection: fever, growing tenderness at the surgery site, a large amount of drainage or bleeding, severe pain, foul-smelling drainage, redness, swelling. Please call if you experience any of these symptoms.    2. If it has been 8 to 10 hours since surgery and you are still not able to urinate (pass water), please call.    3.  If you have a headache for over 24 hours, please call.    To contact a doctor, call Dr Moose Vides at [phone]--Urology Clinic or:    '    "[phone] and ask for \"the resident on call for nephrology\" (this is the hospital and is answered 24 hours a day)    '   Emergency Department: [facility]: [phone]       (TTY for hearing impaired: [phone])    "

## 2021-08-23 NOTE — ANESTHESIA CARE TRANSFER NOTE
Patient: Parth Fountain    Procedure(s):  NEPHROLITHOTOMY, PERCUTANEOUS, STANDBY HOLMIUM LASER, PERCUTANEOUS NEPHROSTOMT TUBE EXCHANGE  ureteroscopy, standby holmium laser, percutaneous nephrostomy tube exchange    Diagnosis: Kidney stone [N20.0]  Diagnosis Additional Information: No value filed.    Anesthesia Type:   General     Note:    Oropharynx: oropharynx clear of all foreign objects  Level of Consciousness: awake and drowsy  Oxygen Supplementation: face mask  Level of Supplemental Oxygen (L/min / FiO2): 6  Independent Airway: airway patency satisfactory and stable  Dentition: dentition unchanged  Vital Signs Stable: post-procedure vital signs reviewed and stable  Report to RN Given: handoff report given  Patient transferred to: PACU    Handoff Report: Identifed the Patient, Identified the Reponsible Provider, Reviewed the pertinent medical history, Discussed the surgical course, Reviewed Intra-OP anesthesia mangement and issues during anesthesia, Set expectations for post-procedure period and Allowed opportunity for questions and acknowledgement of understanding      Vitals:  Vitals Value Taken Time   /71 08/23/21 1007   Temp     Pulse 97 08/23/21 1009   Resp     SpO2 100 % 08/23/21 1009   Vitals shown include unvalidated device data.    Electronically Signed By: Eduardo Tucker MD  August 23, 2021  10:10 AM

## 2021-08-23 NOTE — ANESTHESIA POSTPROCEDURE EVALUATION
Patient: Parth Fountain    Procedure(s):  NEPHROLITHOTOMY, PERCUTANEOUS, STANDBY HOLMIUM LASER, PERCUTANEOUS NEPHROSTOMT TUBE EXCHANGE  ureteroscopy, standby holmium laser, percutaneous nephrostomy tube exchange    Diagnosis:Kidney stone [N20.0]  Diagnosis Additional Information: No value filed.    Anesthesia Type:  General    Note:  Disposition: Outpatient   Postop Pain Control: Uneventful            Sign Out: Well controlled pain   PONV: No   Neuro/Psych: Uneventful            Sign Out: Acceptable/Baseline neuro status   Airway/Respiratory: Uneventful            Sign Out: Acceptable/Baseline resp. status   CV/Hemodynamics: Uneventful            Sign Out: Acceptable CV status; No obvious hypovolemia; No obvious fluid overload   Other NRE: NONE   DID A NON-ROUTINE EVENT OCCUR? No           Last vitals:  Vitals Value Taken Time   /73 08/23/21 1050   Temp 36.9  C (98.4  F) 08/23/21 1045   Pulse 98 08/23/21 1100   Resp 20 08/23/21 1045   SpO2 92 % 08/23/21 1100   Vitals shown include unvalidated device data.    Electronically Signed By: Eduardo Tucker MD  August 23, 2021  11:01 AM

## 2021-08-24 LAB
APPEARANCE STONE: NORMAL
COMPN STONE: NORMAL
NUMBER STONE: 1
SIZE STONE: NORMAL MM
SPECIMEN WT: 21 MG

## 2021-08-27 DIAGNOSIS — N13.2 HYDRONEPHROSIS WITH URINARY OBSTRUCTION DUE TO URETERAL CALCULUS: ICD-10-CM

## 2021-08-27 DIAGNOSIS — N20.0 KIDNEY STONE: Primary | ICD-10-CM

## 2021-08-30 ENCOUNTER — HOSPITAL ENCOUNTER (OUTPATIENT)
Facility: CLINIC | Age: 56
End: 2021-08-30
Admitting: STUDENT IN AN ORGANIZED HEALTH CARE EDUCATION/TRAINING PROGRAM
Payer: MEDICARE

## 2021-08-30 DIAGNOSIS — Z11.59 ENCOUNTER FOR SCREENING FOR OTHER VIRAL DISEASES: ICD-10-CM
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
## 2021-09-02 ENCOUNTER — PRE VISIT (OUTPATIENT)
Dept: UROLOGY | Facility: CLINIC | Age: 56
End: 2021-09-02

## 2021-09-02 NOTE — TELEPHONE ENCOUNTER
Reason for visit: follow up     Relevant information: post op, renal calculi    Records/imaging/labs/orders: in epic    Pt called: no    At Rooming: normal

## 2021-09-03 ENCOUNTER — TELEPHONE (OUTPATIENT)
Dept: UROLOGY | Facility: CLINIC | Age: 56
End: 2021-09-03

## 2021-09-03 ENCOUNTER — TELEPHONE (OUTPATIENT)
Dept: INTERVENTIONAL RADIOLOGY/VASCULAR | Facility: CLINIC | Age: 56
End: 2021-09-03

## 2021-09-03 NOTE — TELEPHONE ENCOUNTER
[facility] Call Center    Phone Message    May a detailed message be left on voicemail: yes     Reason for Call: Other: . pt received a call stating he has surgery on sept 10th and imaging appt, he thought he was only having an office visit with , please call michael thank you    Action Taken: Message routed to:  [facility] uro    Travel Screening: Not Applicable

## 2021-09-07 NOTE — IR NOTE
Interventional Radiology Nursing Note    Patient Name: Parth Fountain  Medical Record Number: [id number]  Today's Date: September 7, 2021    Procedure: antegrade nephrostogram    D: Patient scheduled for IR after seeing Dr Vides in clinic Friday; September 10, 2021.   A: Spoke with Patient. Explained need for COVID testing. Patient was agreeable to getting COVID nasal swab prior to 8:20 am appointment with Dr Vides at the [facility]. COVID nasal swab is scheduled for 8 am in the [facility] lab. Patient denied having symptoms of COVID. Patient to travel from the [facility] to the [facility] waiting room in the [facility] at [address] There is transportation available between the hospital and the [facility].   P: Followed up with [facility] Nurse Donna ([phone]). She was given the above information. Both the Patient and Donna were given the IR nurse line phone number.     Elva Muñoz RN  Interventional Radiology Nurse line: [phone]

## 2021-09-10 ENCOUNTER — LAB (OUTPATIENT)
Dept: LAB | Facility: CLINIC | Age: 56
End: 2021-09-10
Attending: UROLOGY

## 2021-09-10 ENCOUNTER — HOSPITAL ENCOUNTER (OUTPATIENT)
Facility: CLINIC | Age: 56
Discharge: HOME | End: 2021-09-10
Attending: UROLOGY | Admitting: PHYSICIAN ASSISTANT
Payer: MEDICARE

## 2021-09-10 ENCOUNTER — APPOINTMENT (OUTPATIENT)
Dept: MEDSURG UNIT | Facility: CLINIC | Age: 56
End: 2021-09-10
Attending: UROLOGY
Payer: MEDICARE

## 2021-09-10 ENCOUNTER — PREP FOR PROCEDURE (OUTPATIENT)
Dept: UROLOGY | Facility: CLINIC | Age: 56
End: 2021-09-10

## 2021-09-10 ENCOUNTER — APPOINTMENT (OUTPATIENT)
Dept: INTERVENTIONAL RADIOLOGY/VASCULAR | Facility: CLINIC | Age: 56
End: 2021-09-10
Attending: UROLOGY
Payer: MEDICARE

## 2021-09-10 ENCOUNTER — OFFICE VISIT (OUTPATIENT)
Dept: UROLOGY | Facility: CLINIC | Age: 56
End: 2021-09-10
Payer: MEDICARE

## 2021-09-10 VITALS
HEIGHT: 70 IN | WEIGHT: 146 LBS | DIASTOLIC BLOOD PRESSURE: 88 MMHG | SYSTOLIC BLOOD PRESSURE: 142 MMHG | BODY MASS INDEX: 20.9 KG/M2 | HEART RATE: 77 BPM

## 2021-09-10 VITALS
RESPIRATION RATE: 16 BRPM | HEART RATE: 78 BPM | SYSTOLIC BLOOD PRESSURE: 125 MMHG | DIASTOLIC BLOOD PRESSURE: 67 MMHG | TEMPERATURE: 98.3 F | OXYGEN SATURATION: 95 %

## 2021-09-10 DIAGNOSIS — Z11.59 ENCOUNTER FOR SCREENING FOR OTHER VIRAL DISEASES: ICD-10-CM

## 2021-09-10 DIAGNOSIS — N20.0 KIDNEY STONE: Primary | ICD-10-CM

## 2021-09-10 DIAGNOSIS — N20.0 KIDNEY STONE: ICD-10-CM

## 2021-09-10 DIAGNOSIS — N13.2 HYDRONEPHROSIS WITH URINARY OBSTRUCTION DUE TO URETERAL CALCULUS: ICD-10-CM

## 2021-09-10 LAB
ANION GAP SERPL CALCULATED.3IONS-SCNC: 9 MMOL/L (ref 3–14)
BASOPHILS # BLD AUTO: 0.1 10E3/UL (ref 0–0.2)
BASOPHILS NFR BLD AUTO: 1 %
BUN SERPL-MCNC: 30 MG/DL (ref 7–30)
CALCIUM SERPL-MCNC: 10.8 MG/DL (ref 8.5–10.1)
CHLORIDE BLD-SCNC: 102 MMOL/L (ref 94–109)
CO2 SERPL-SCNC: 27 MMOL/L (ref 20–32)
CREAT SERPL-MCNC: 0.84 MG/DL (ref 0.66–1.25)
EOSINOPHIL # BLD AUTO: 0.1 10E3/UL (ref 0–0.7)
EOSINOPHIL NFR BLD AUTO: 1 %
ERYTHROCYTE [DISTWIDTH] IN BLOOD BY AUTOMATED COUNT: 14.2 % (ref 10–15)
GFR SERPL CREATININE-BSD FRML MDRD: >90 ML/MIN/1.73M2
GLUCOSE BLD-MCNC: 80 MG/DL (ref 70–99)
HCT VFR BLD AUTO: 42.9 % (ref 40–53)
HGB BLD-MCNC: 13.7 G/DL (ref 13.3–17.7)
IMM GRANULOCYTES # BLD: 0.1 10E3/UL
IMM GRANULOCYTES NFR BLD: 1 %
INR PPP: 1 (ref 0.85–1.15)
LYMPHOCYTES # BLD AUTO: 1.4 10E3/UL (ref 0.8–5.3)
LYMPHOCYTES NFR BLD AUTO: 11 %
MCH RBC QN AUTO: 27.1 PG (ref 26.5–33)
MCHC RBC AUTO-ENTMCNC: 31.9 G/DL (ref 31.5–36.5)
MCV RBC AUTO: 85 FL (ref 78–100)
MONOCYTES # BLD AUTO: 0.9 10E3/UL (ref 0–1.3)
MONOCYTES NFR BLD AUTO: 8 %
NEUTROPHILS # BLD AUTO: 9.7 10E3/UL (ref 1.6–8.3)
NEUTROPHILS NFR BLD AUTO: 78 %
NRBC # BLD AUTO: 0 10E3/UL
NRBC BLD AUTO-RTO: 0 /100
PLATELET # BLD AUTO: 378 10E3/UL (ref 150–450)
POTASSIUM BLD-SCNC: 4.2 MMOL/L (ref 3.4–5.3)
RBC # BLD AUTO: 5.06 10E6/UL (ref 4.4–5.9)
SARS-COV-2 RNA RESP QL NAA+PROBE: NEGATIVE
SODIUM SERPL-SCNC: 138 MMOL/L (ref 133–144)
WBC # BLD AUTO: 12.2 10E3/UL (ref 4–11)

## 2021-09-10 PROCEDURE — 85610 PROTHROMBIN TIME: CPT | Performed by: NURSE PRACTITIONER

## 2021-09-10 PROCEDURE — 258N000003 HC RX IP 258 OP 636: Performed by: NURSE PRACTITIONER

## 2021-09-10 PROCEDURE — 50431 NJX PX NFROSGRM &/URTRGRM: CPT | Mod: 73

## 2021-09-10 PROCEDURE — 999N000127 HC STATISTIC PERIPHERAL IV START W US GUIDANCE

## 2021-09-10 PROCEDURE — 99024 POSTOP FOLLOW-UP VISIT: CPT | Performed by: UROLOGY

## 2021-09-10 PROCEDURE — 255N000002 HC RX 255 OP 636: Performed by: UROLOGY

## 2021-09-10 PROCEDURE — 99152 MOD SED SAME PHYS/QHP 5/>YRS: CPT

## 2021-09-10 PROCEDURE — 999N000133 HC STATISTIC PP CARE STAGE 2

## 2021-09-10 PROCEDURE — C1887 CATHETER, GUIDING: HCPCS

## 2021-09-10 PROCEDURE — C1729 CATH, DRAINAGE: HCPCS

## 2021-09-10 PROCEDURE — 36415 COLL VENOUS BLD VENIPUNCTURE: CPT | Performed by: NURSE PRACTITIONER

## 2021-09-10 PROCEDURE — 250N000011 HC RX IP 250 OP 636: Performed by: STUDENT IN AN ORGANIZED HEALTH CARE EDUCATION/TRAINING PROGRAM

## 2021-09-10 PROCEDURE — 99152 MOD SED SAME PHYS/QHP 5/>YRS: CPT | Performed by: PHYSICIAN ASSISTANT

## 2021-09-10 PROCEDURE — C1769 GUIDE WIRE: HCPCS

## 2021-09-10 PROCEDURE — 80048 BASIC METABOLIC PNL TOTAL CA: CPT | Performed by: NURSE PRACTITIONER

## 2021-09-10 PROCEDURE — 85025 COMPLETE CBC W/AUTO DIFF WBC: CPT | Performed by: NURSE PRACTITIONER

## 2021-09-10 PROCEDURE — 250N000011 HC RX IP 250 OP 636: Performed by: NURSE PRACTITIONER

## 2021-09-10 PROCEDURE — 50389 REMOVE RENAL TUBE W/FLUORO: CPT

## 2021-09-10 PROCEDURE — 50431 NJX PX NFROSGRM &/URTRGRM: CPT | Mod: 52 | Performed by: PHYSICIAN ASSISTANT

## 2021-09-10 PROCEDURE — U0003 INFECTIOUS AGENT DETECTION BY NUCLEIC ACID (DNA OR RNA); SEVERE ACUTE RESPIRATORY SYNDROME CORONAVIRUS 2 (SARS-COV-2) (CORONAVIRUS DISEASE [COVID-19]), AMPLIFIED PROBE TECHNIQUE, MAKING USE OF HIGH THROUGHPUT TECHNOLOGIES AS DESCRIBED BY CMS-2020-01-R: HCPCS | Performed by: UROLOGY

## 2021-09-10 RX ORDER — LIDOCAINE 40 MG/G
CREAM TOPICAL
Status: DISCONTINUED | OUTPATIENT
Start: 2021-09-10 | End: 2021-09-10 | Stop reason: HOSPADM

## 2021-09-10 RX ORDER — CEFAZOLIN SODIUM 2 G/50ML
2 SOLUTION INTRAVENOUS
Status: CANCELLED | OUTPATIENT
Start: 2021-09-10

## 2021-09-10 RX ORDER — AMPICILLIN 1 G/1
1 INJECTION, POWDER, FOR SOLUTION INTRAMUSCULAR; INTRAVENOUS
Status: COMPLETED | OUTPATIENT
Start: 2021-09-10 | End: 2021-09-10

## 2021-09-10 RX ORDER — NALOXONE HYDROCHLORIDE 0.4 MG/ML
0.2 INJECTION, SOLUTION INTRAMUSCULAR; INTRAVENOUS; SUBCUTANEOUS
Status: DISCONTINUED | OUTPATIENT
Start: 2021-09-10 | End: 2021-09-10 | Stop reason: HOSPADM

## 2021-09-10 RX ORDER — FLUMAZENIL 0.1 MG/ML
0.2 INJECTION, SOLUTION INTRAVENOUS
Status: DISCONTINUED | OUTPATIENT
Start: 2021-09-10 | End: 2021-09-10 | Stop reason: HOSPADM

## 2021-09-10 RX ORDER — NALOXONE HYDROCHLORIDE 0.4 MG/ML
0.4 INJECTION, SOLUTION INTRAMUSCULAR; INTRAVENOUS; SUBCUTANEOUS
Status: DISCONTINUED | OUTPATIENT
Start: 2021-09-10 | End: 2021-09-10 | Stop reason: HOSPADM

## 2021-09-10 RX ORDER — SODIUM CHLORIDE 9 MG/ML
INJECTION, SOLUTION INTRAVENOUS CONTINUOUS
Status: DISCONTINUED | OUTPATIENT
Start: 2021-09-10 | End: 2021-09-10 | Stop reason: HOSPADM

## 2021-09-10 RX ORDER — IOPAMIDOL 510 MG/ML
100 INJECTION, SOLUTION INTRAVASCULAR ONCE
Status: COMPLETED | OUTPATIENT
Start: 2021-09-10 | End: 2021-09-10

## 2021-09-10 RX ORDER — CEFAZOLIN SODIUM 2 G/50ML
2 SOLUTION INTRAVENOUS SEE ADMIN INSTRUCTIONS
Status: CANCELLED | OUTPATIENT
Start: 2021-09-10

## 2021-09-10 RX ORDER — FENTANYL CITRATE 50 UG/ML
25-50 INJECTION, SOLUTION INTRAMUSCULAR; INTRAVENOUS EVERY 5 MIN PRN
Status: DISCONTINUED | OUTPATIENT
Start: 2021-09-10 | End: 2021-09-10 | Stop reason: HOSPADM

## 2021-09-10 RX ADMIN — GENTAMICIN SULFATE 120 MG: 40 INJECTION, SOLUTION INTRAMUSCULAR; INTRAVENOUS at 11:59

## 2021-09-10 RX ADMIN — FENTANYL CITRATE 50 MCG: 50 INJECTION, SOLUTION INTRAMUSCULAR; INTRAVENOUS at 12:46

## 2021-09-10 RX ADMIN — IOPAMIDOL 20 ML: 510 INJECTION, SOLUTION INTRAVASCULAR at 12:59

## 2021-09-10 RX ADMIN — MIDAZOLAM 1 MG: 1 INJECTION INTRAMUSCULAR; INTRAVENOUS at 12:46

## 2021-09-10 RX ADMIN — AMPICILLIN SODIUM 1 G: 1 INJECTION, POWDER, FOR SOLUTION INTRAMUSCULAR; INTRAVENOUS at 11:33

## 2021-09-10 RX ADMIN — MIDAZOLAM 1 MG: 1 INJECTION INTRAMUSCULAR; INTRAVENOUS at 12:32

## 2021-09-10 RX ADMIN — SODIUM CHLORIDE: 9 INJECTION, SOLUTION INTRAVENOUS at 11:32

## 2021-09-10 RX ADMIN — FENTANYL CITRATE 50 MCG: 50 INJECTION, SOLUTION INTRAMUSCULAR; INTRAVENOUS at 12:32

## 2021-09-10 ASSESSMENT — PAIN SCALES - GENERAL: PAINLEVEL: SEVERE PAIN (6)

## 2021-09-10 ASSESSMENT — MIFFLIN-ST. JEOR: SCORE: 1488.5

## 2021-09-10 NOTE — NURSING NOTE
Chief Complaint   Patient presents with     Surgical Followup     talk about other procedure on left kidney     - Ally LIN, EMT  Urology Clinic

## 2021-09-10 NOTE — PATIENT INSTRUCTIONS
Please follow-up with your primary care provider as scheduled and leave a urine sample for analysis and culture prior to your surgery.

## 2021-09-10 NOTE — DISCHARGE INSTRUCTIONS
"[facility]  Interventional Radiology   Drainage Tube Instructions      AFTER YOU GO HOME:    Have an adult stay with you for 24 hours.     Drink plenty of fluids and resume your regular diet.    For 24 hours:       Do not drive or operate machinery at home or at work    No alcoholic beverages    Do not make any important legal decisions    No heavy lifting greater than 10 lb for 7 days    Do not submerge site in water for 2 weeks    Do not scrub site or apply lotions or powders near the site for at least 3 days     Call Your Physician if:    You develop nausea or vomiting.    Your develop hives or rash or unexplained itching    Additional Instructions: Please call for the following problems:    Skin around old tube is red, painful or has any drainage.    You have increased pain in your abdomen    Fever greater than 100.5 F and chills    You feel nauseated and  \"just not right\"      Change dressing every 48 hour or when it gets wet for 3-5 days    Interventional Radiology Department    Physician:  Dr. Kelley                            Date: September 10, 2021  Telephone numbers: [phone]...Monday-Friday 8:00am-4:30pm                                  [phone]... After 4:30 Monday-Friday, Weekends and Holiday. Ask for the Interventional Radiologist on call. Someone is on call 24 hours a day.                                                                "

## 2021-09-10 NOTE — PROGRESS NOTES
Pre procedure complete. Awaiting COVID-19 and lab results. VSS. Pt c/o chronic back pain. Appropriately NPO. NS @ 75 mL/hr via R PIV. Pre procedure antibiotics given per orders; see MAR. Friend (David) will transport home post procedure.

## 2021-09-10 NOTE — PROCEDURES
[facility]    Procedure: IR Procedure Note    Date/Time: 9/10/2021 1:19 PM  Performed by: Jose F Kelley PA-C  Authorized by: Jose F Kelley PA-C     UNIVERSAL PROTOCOL   Site Marked: NA  Prior Images Obtained and Reviewed:  Yes  Required items: Required blood products, implants, devices and special equipment available    Patient identity confirmed:  Verbally with patient, arm band, provided demographic data and hospital-assigned identification number  Patient was reevaluated immediately before administering moderate or deep sedation or anesthesia  Confirmation Checklist:  Patient's identity using two indicators, relevant allergies, procedure was appropriate and matched the consent or emergent situation and correct equipment/implants were available  Time out: Immediately prior to the procedure a time out was called    Universal Protocol: the Joint Commission Universal Protocol was followed    Preparation: Patient was prepped and draped in usual sterile fashion           ANESTHESIA    Anesthesia: Local infiltration  Local Anesthetic:  Lidocaine 1% without epinephrine      SEDATION    Patient Sedated: Yes    Vital signs: Vital signs monitored during sedation    See dictated procedure note for full details.  Findings: Sedation medications administered: 100 mcg fentanyl and 2 mg versed  Sedation time: 20 minutes    Specimens: none    Complications: None    Condition: Stable    PROCEDURE   Patient Tolerance:  Patient tolerated the procedure well with no immediate complications  Describe Procedure: Parth KWAN Александр  [id number]    Patient with history of stone disease, right PNT placed 6/19/21. Request for antegrade nephrostogram and possible PNT removal. Unable to perform nephrostogram as PNT is pulled back out of renal pelvis. Attempt to navigate tract back to renal pelvis unsuccessful. Contrast injection does not appear to outline renal pelvis; access  lost, unable to perform nephrostogram, unable to confirm ureteral patency. Images reviewed with IR Dr. Abdalla. Decision made to remove PNT and patient instructed to follow-up with Dr. Vides in urology.    Sedation medications administered: 100 mcg fentanyl and 2 mg versed  Sedation time: 20 minutes  Length of time physician/provider present for 1:1 monitoring during sedation: 20
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
## 2021-09-10 NOTE — PROGRESS NOTES
Patient tolerated recovery stage well. VSS, R flank site clean/dry/intact, no hematoma, and denies pain. Patient tolerated PO food and fluids. Teaching was done and discharge instructions were given. Patient ambulated, voided, and PIV was removed. Patient discharged from the hospital via wheel chair to home with David (friend).
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
## 2021-09-10 NOTE — PRE-PROCEDURE
GENERAL PRE-PROCEDURE:   Procedure:  Possible percutaneous nephrostomy tube removal, possible exchange.  Date/Time:  9/10/2021 11:11 AM    Verbal consent obtained?: Yes    Written consent obtained?: Yes    Risks and benefits: Risks, benefits and alternatives were discussed    Patient states understanding of procedure being performed: Yes    Patient's understanding of procedure matches consent: Yes    Procedure consent matches procedure scheduled: Yes    Expected level of sedation:  Moderate  Appropriately NPO:  Yes  ASA Class:  2  Mallampati  :  Grade 2- soft palate, base of uvula, tonsillar pillars, and portion of posterior pharyngeal wall visible  Lungs:  Lungs clear with good breath sounds bilaterally  Heart:  Normal heart sounds and rate  History & Physical reviewed:  History and physical reviewed and no updates needed  Statement of review:  I have reviewed the lab findings, diagnostic data, medications, and the plan for sedation

## 2021-09-10 NOTE — IR NOTE
Patient Name: Parth Fountain  Medical Record Number: [id number]  Today's Date: 9/10/2021    Procedure: Right nephrostogram and right percutaneous nephrostomy tube removal  Proceduralist: Al Kelley PA-C    Procedure Start: 1215  Procedure end: 1249  Sedation medications administered: 100 mcg fentanyl and 2 mg versed    Report given to: Pretty MCLEAN 2A  : TYRONE    Other Notes: Pt arrived to IR room 2 from . Consent reviewed. Pt denies any questions or concerns regarding procedure. Pt positioned lateral right side up and monitored per protocol. Pt tolerated procedure without any noted complications. Pt transferred back to .

## 2021-09-10 NOTE — PROGRESS NOTES
"Pt back from IR s/p Rt nephrostogram and PNT removal.  VSS.  Pt alert and oriented x4.  Pt c/o left upper back pain, states \"It is less than pre procedure.\"  Rt flank dressing F/D/I.  Pt has friend picking him up after recovery time completed.  "

## 2021-09-14 ENCOUNTER — TELEPHONE (OUTPATIENT)
Dept: UROLOGY | Facility: CLINIC | Age: 56
End: 2021-09-14
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
## 2021-09-15 ENCOUNTER — TELEPHONE (OUTPATIENT)
Dept: UROLOGY | Facility: CLINIC | Age: 56
End: 2021-09-15

## 2021-09-15 NOTE — CONFIDENTIAL NOTE
Patient is scheduled for surgery with Dr. Peewee Pena with Parth    Date of Surgery: 10/20/21    Location: [facility] OR    Informed patient they will need an adult  yes    H&P: Scheduled with PAC 10/7/21    Pre-procedure COVID-19 Test: 10/18/21    Additional imaging/appointments: n/a    Surgery packet: to be mailed by 9/15/21     Additional comments: n/a

## 2021-09-16 DIAGNOSIS — Z11.59 ENCOUNTER FOR SCREENING FOR OTHER VIRAL DISEASES: ICD-10-CM

## 2021-09-16 NOTE — TELEPHONE ENCOUNTER
FUTURE VISIT INFORMATION      SURGERY INFORMATION:    Date: 10/20/21    [facility]    Surgeon:  Moose Vides MD    Anesthesia Type:  general    Procedure: NEPHROLITHOTOMY, PERCUTANEOUS, USING HOLMIUM LASER, stent, possible nephrostomy tube    Consult: ov 9/10    RECORDS REQUESTED FROM:       Primary Care Provider: Maria Ines Emmanuel MD  - [place]    Pertinent Medical History: PAD    Most recent EKG+ Tracin21    Most recent ECHO: 21

## 2021-09-17 ENCOUNTER — PATIENT OUTREACH (OUTPATIENT)
Dept: UROLOGY | Facility: CLINIC | Age: 56
End: 2021-09-17

## 2021-09-17 ENCOUNTER — MYC MEDICAL ADVICE (OUTPATIENT)
Dept: UROLOGY | Facility: CLINIC | Age: 56
End: 2021-09-17

## 2021-09-17 DIAGNOSIS — N20.0 KIDNEY STONE: Primary | ICD-10-CM

## 2021-09-24 ENCOUNTER — HOSPITAL ENCOUNTER (OUTPATIENT)
Dept: WOUND CARE | Facility: CLINIC | Age: 56
Discharge: HOME | End: 2021-09-24
Attending: PHYSICIAN ASSISTANT | Admitting: PHYSICIAN ASSISTANT
Payer: MEDICARE

## 2021-09-24 VITALS — SYSTOLIC BLOOD PRESSURE: 145 MMHG | HEART RATE: 93 BPM | TEMPERATURE: 97.3 F | DIASTOLIC BLOOD PRESSURE: 89 MMHG

## 2021-09-24 DIAGNOSIS — S81.802D OPEN WOUND OF BOTH LOWER EXTREMITIES WITH COMPLICATION, SUBSEQUENT ENCOUNTER: ICD-10-CM

## 2021-09-24 DIAGNOSIS — S31.109S RIGHT GROIN WOUND, SEQUELA: ICD-10-CM

## 2021-09-24 DIAGNOSIS — S81.801D OPEN WOUND OF BOTH LOWER EXTREMITIES WITH COMPLICATION, SUBSEQUENT ENCOUNTER: ICD-10-CM

## 2021-09-24 PROCEDURE — 17250 CHEM CAUT OF GRANLTJ TISSUE: CPT

## 2021-09-24 NOTE — DISCHARGE INSTRUCTIONS
"   [facility]  [address] [facility] [address], [place] [address]    Call us at [phone] if you have any questions about your wounds, have redness or swelling around your wound, have a fever of 101 or greater or if you have any other problems or concerns. We answer the phone Monday through Friday 8 am to 4 pm, please leave a message as we check the voicemail frequently throughout the day.     Parth Fountain      [address] Phone: [phone] Fax: [phone]    Please call [facility] [phone] ([address]) to schedule your xray appointment if you have not heard from them in two business days.  Please call [facility] Phone: [phone] [address] [place] [address] for Ultra sound JOVAN and Duplex of left leg    Wound dressing change recommendations to all open areas: Right anterior Hip; Left lower leg ulcers and all foot and toe ulcers  Cleanse wounds with soap and water or saline or wound cleanser  Swab all open areas with Betadine and fan dry or Apply small amount of Iodosorb paste to all open areas to feet and toes  Cover all wounds with Xeroform dressings  Cover wounds with gauze or ABD pad and secure with 2\" medipore tape  Spandage from toes to knees  Change dressing Monday, Wednesday and Friday    Wound care to Left IT: M-W-F  Cleanse wound with wound cleanser, pat dry  Apply layer of Iodosorb gel to cover wound bed, cover with ABD or Mepilex dressing per preference  Change every Monday, Wednesday, Friday     Suzan Martinez PA-C. September 24, 2021    Return to [facility] as scheduled  If you had a positive experience please indicate on your patient satisfaction survey form that [facility] will be sending you.    If you have any billing related questions please call the [facility] Business office at [phone]. The clinic staff does not handle billing related matters.    "

## 2021-09-24 NOTE — PROGRESS NOTES
HISTORY OF PRESENT ILLNESS:   Mr. Parth Fountian is a 56-year-old paraplegic gentleman who returns to us today to follow-up on chronic pelvic pressure ulcers as well as lower leg wounds. PMHx of DVT, chronic UTI, EtOh abuse, and s/p colostomy. He has a long history of pressure ulcers with subsequent corrective surgeries by Dr. Peña. Parth's pelvic wounds have been very difficult to heal due to his surgical history. He has very little tissue overlying his bone and it is mostly made up of scar tissue. His progress waxes and wanes.    10/21/20: Televisit. Thigh wound healed. Scraped left leg and foot during a transfer and has several shallow ulcers on left lower leg and toes. Has an ulcer in his right groin/hip crease. This waxes and wanes. Uses Stacie when open and calmoseptine when more closed, this is working well for him.   11/18/20: Return visit. All of his wounds have improved. Has new traumatic wounds on bilateral shins.   12/16/20: Returns via video visit. Shin wounds are improving but very friable and hypergranular. Hip crease slightly more open today, also appears friable. Has been using Stacie to all wounds.   01/13/21: Returns for in office visit.  Leg wounds are improving with use of calcium alginate.  However, Parth notes that the dressings are sticking to the wounds and causing bleeding when removed.  His right groin wound is now quite hypertrophic.  2/26/21: Returns for follow-up. Reports that he continues to reinjure his R lower leg. Hip wound is not really improving. Has new wound on R great toe with exposed bone.   4/9/21: Returns for follow-up. Still has exposed bone on R great toe and has not had the x-ray done we ordered at last visit. His R lower leg has worsened. R hip about the same.   5/19/21: Returns for follow-up via telephone, has been using Xeroform. Has not done vascular studies or xray as recommended. Hip wound is improving. Has a recurrence of his L IT wound. His legs are marginally  "better, reports they are \"squishy.\"    8/20: Returns for televisit. Had to reschedule from in-clinic visit due to staffing issues. Left toe has new wound, lateral left leg still open. Has been using xeroform. Thinks R toe is closed up now. R hip comes and goes, uses Calmoseptine PRN. Has lithotripsy this upcoming week. Has not had xray or ABIs.   09/24/21: Returns for in person follow-up. His hip/groin wound has completely healed, however this usually comes and goes. Has new minor right heel wound. Continues to have issues with left lower leg, today during the visit it is apparent he has some kind of vessel in this area that easily bleeds causing a hematoma in this area. Fortunately the wound is fairly superficial. He has not had ABIs or x rays as recommended. Fortunately his toe appears stable today without any exposed bone.      OFFLOADING: On a Group 2 mattress. Still utilizing RoHo cushion on his wheelchair.       SOCIAL HISTORY:  Lives in a group home. He is still receiving home health care through [facility]. that comes and does dressing changes daily.      PHYSICAL EXAMINATION   INTEGUMENTARY:  Wound (used by [facility] only) 12/16/20 1253 Left skin tear (Active)   Thickness/Stage full thickness 09/24/21 1145   Dressing Appearance moist drainage 09/24/21 1145   Base granulating 09/24/21 1145   Periwound intact 09/24/21 1145   Periwound Temperature warm 09/24/21 1145   Periwound Skin Turgor soft 09/24/21 1145   Edges open 09/24/21 1145   Length (cm) 1.7 09/24/21 1145   Width (cm) 2 09/24/21 1145   Depth (cm) 0.1 09/24/21 1145   Wound (cm^2) 3.4 cm^2 09/24/21 1145   Wound Volume (cm^3) 0.34 cm^3 09/24/21 1145   Wound healing % -750 09/24/21 1145   Drainage Characteristics/Odor serosanguineous 09/24/21 1145   Drainage Amount moderate 09/24/21 1145   Care, Wound chemical cautery applied 09/24/21 1145       Wound (used by [facility] only) 02/26/21 1347 Left pressure injury (Active)   Thickness/Stage Stage 4 09/24/21 " 1145   Dressing Appearance moist drainage 09/24/21 1145   Base granulating 09/24/21 1145   Periwound intact 09/24/21 1145   Periwound Temperature warm 09/24/21 1145   Periwound Skin Turgor soft 09/24/21 1145   Edges open 09/24/21 1145   Length (cm) 0.5 09/24/21 1145   Width (cm) 0.5 09/24/21 1145   Depth (cm) 0 09/24/21 1145   Wound (cm^2) 0.25 cm^2 09/24/21 1145   Wound Volume (cm^3) 0 cm^3 09/24/21 1145   Wound healing % 0 09/24/21 1145   Drainage Characteristics/Odor sanguineous 09/24/21 1145   Drainage Amount moderate 09/24/21 1145   Care, Wound non-select wound debridement performed 09/24/21 1145       Wound (used by [facility] only) 09/24/21 1151 Right (Active)   Thickness/Stage full thickness 09/24/21 1145   Dressing Appearance moist drainage 09/24/21 1145   Base granulating 09/24/21 1145   Periwound excoriated 09/24/21 1145   Periwound Temperature warm 09/24/21 1145   Periwound Skin Turgor soft 09/24/21 1145   Edges open 09/24/21 1145   Length (cm) 1 09/24/21 1145   Width (cm) 1.5 09/24/21 1145   Depth (cm) 0.1 09/24/21 1145   Wound (cm^2) 1.5 cm^2 09/24/21 1145   Wound Volume (cm^3) 0.15 cm^3 09/24/21 1145   Drainage Characteristics/Odor serosanguineous 09/24/21 1145   Drainage Amount moderate 09/24/21 1145   Care, Wound chemical cautery applied 09/24/21 1145                 PROCEDURE: After verbal consent was obtained, hypergranulation tissue was treated with silver nitrate. Patient tolerated this well.     ASSESSMENT:    1. Stage 4 pressure ulcer of R great toe  2. Full-thickness traumatic ulcerations of bilateral lower leg and toes with fat-layer exposed      PLAN:    1. Dress all leg wounds with betadine and xeroform and cover dressing of choice   2. Hip wound with Calmoseptine PRN  3. Dress toe wounds with betadine or iodosorb and cover dressing of choice  4. X-ray of R great toe - suspect osteomyelitis  5. JOVAN and duplex of RLE to r/o arterial disease     FOLLOWUP:  1 month       TIFFANIE SINGH PA-C

## 2021-09-25 ENCOUNTER — HEALTH MAINTENANCE LETTER (OUTPATIENT)
Age: 56
End: 2021-09-25

## 2021-09-27 DIAGNOSIS — Z11.59 ENCOUNTER FOR SCREENING FOR OTHER VIRAL DISEASES: ICD-10-CM

## 2021-10-05 ENCOUNTER — TELEPHONE (OUTPATIENT)
Dept: INTERVENTIONAL RADIOLOGY/VASCULAR | Facility: CLINIC | Age: 56
End: 2021-10-05

## 2021-10-07 ENCOUNTER — VIRTUAL VISIT (OUTPATIENT)
Dept: SURGERY | Facility: CLINIC | Age: 56
End: 2021-10-07
Payer: MEDICARE

## 2021-10-07 ENCOUNTER — PRE VISIT (OUTPATIENT)
Dept: SURGERY | Facility: CLINIC | Age: 56
End: 2021-10-07

## 2021-10-07 ENCOUNTER — ANESTHESIA EVENT (OUTPATIENT)
Dept: SURGERY | Facility: CLINIC | Age: 56
End: 2021-10-07

## 2021-10-07 DIAGNOSIS — N20.0 KIDNEY STONE: ICD-10-CM

## 2021-10-07 DIAGNOSIS — Z01.818 PREOP EXAMINATION: Primary | ICD-10-CM

## 2021-10-07 PROCEDURE — 99203 OFFICE O/P NEW LOW 30 MIN: CPT | Mod: 95 | Performed by: PHYSICIAN ASSISTANT

## 2021-10-07 RX ORDER — CALCIUM CARBONATE 500(1250)
1 TABLET ORAL 3 TIMES DAILY
COMMUNITY
End: 2023-10-31

## 2021-10-07 RX ORDER — VARENICLINE TARTRATE 1 MG/1
1 TABLET, FILM COATED ORAL 2 TIMES DAILY
COMMUNITY
End: 2022-04-11

## 2021-10-07 RX ORDER — MULTIPLE VITAMINS W/ MINERALS TAB 9MG-400MCG
1 TAB ORAL EVERY MORNING
COMMUNITY
End: 2024-01-02

## 2021-10-07 ASSESSMENT — LIFESTYLE VARIABLES: TOBACCO_USE: 1

## 2021-10-07 ASSESSMENT — ENCOUNTER SYMPTOMS: SEIZURES: 0

## 2021-10-07 ASSESSMENT — COPD QUESTIONNAIRES: COPD: 0

## 2021-10-07 ASSESSMENT — PAIN SCALES - GENERAL: PAINLEVEL: SEVERE PAIN (6)
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
## 2021-10-07 NOTE — PATIENT INSTRUCTIONS
Preparing for Your Surgery      Name:  Parth Fountain   MRN:  [id number]   :  1965   Today's Date:  10/7/2021     **Your COVID appointment is tomorrow, 10/8/21 at 11:45 am -  [facility]  [address] [place] [address]**      Arriving for surgery:  Surgery date:  10/20/21  Arrival time:  9:45 am    Restrictions due to COVID 19:       One visitor is allowed in the Pre Op area.       When you go into surgery, one visitor is allowed to wait in the Surgery Waiting Room       (provided there is enough space to social distance).         In hospital patients are allowed 1 visitor per day       The visitor may change daily     Visiting Hours: 8 am - 8:30 pm   No ill visitors.   All visitors must wear face mask.    [employer] parking is available for anyone with mobility limitations or disabilities.  ([place]  24 hours/ 7 days a week; [facility]  7 am- 3:30 pm, Mon- Fri)    Please come to:     [facility] [address]  [address] [place] [address]    - ? parking is available in front of the hospital      -    Please proceed to [address] on the 3rd floor. [phone]?     - ?If you are in need of directions, wheelchair or escort please stop at the Information Desk in the lobby.  Inform the information person that you are here for surgery; a wheelchair and escort to [address] will be provided.?     What can I eat or drink?  -  You may eat and drink normally for up to 8 hours before your surgery. (Until 3:00 am)  -  You may have clear liquids until 2 hours before surgery. (Until 9:45 am)    Examples of clear liquids:  Water  Clear broth  Juices (apple, white grape, white cranberry  and cider) without pulp  Noncarbonated, powder based beverages  (lemonade and Marcus-Aid)  Sodas (Sprite, 7-Up, ginger ale and seltzer)  Coffee or tea (without milk or cream)  Gatorade    -  No Alcohol for at least 24 hours before surgery      Which medicines can I take?    **Hold Aspirin for 7 days before surgery - last dose 10/6/21.**     Hold Multivitamins for 7 days before surgery.  Hold Supplements for 7 days before surgery.  Hold Ibuprofen (Advil, Motrin) for 1 day before surgery--unless otherwise directed by surgeon.  Hold Naproxen (Aleve) for 4 days before surgery.    -  DO NOT take these medications the day of surgery:  Cepacol lozenge  Bismuth Subsalicylate (Pepto Bismol)  Calcium Carbonate (Os-Mac)   Mucinex  Loperamide (Imodium)  Varenicline (Chantix)    -  PLEASE TAKE these medications the day of surgery:  Acetaminophen (Tylenol)  Baclofen (Lioresal)  Duloxetine (Cymbalta)  Lyrica    How do I prepare myself?  - Please take 2 showers before surgery using Scrubcare or Hibiclens soap.    Use this soap only from the neck to your toes.     Leave the soap on your skin for one minute--then rinse thoroughly.      You may use your own shampoo and conditioner; no other hair products.   - Please remove all jewelry and body piercings.  - No lotions, deodorants or fragrance.  - Bring your ID and insurance card.    -If you have a Deep Brain Stimulator, Spinal Cord Stimulator or any neuro stimulator device---you must bring the remote control to the hospital     - All patients are required to have a Covid-19 test within 4 days of surgery/procedure.      -Patients will be contacted by the [facility] scheduling team within 1 week of surgery to make an appointment.      - Patients may call the Scheduling team at [phone] if they have not been scheduled within 4 days of  surgery.      ALL PATIENTS GOING HOME THE SAME DAY OF SURGERY ARE REQUIRED TO HAVE A RESPONSIBLE ADULT TO DRIVE AND BE IN ATTENDANCE WITH THEM FOR 24 HOURS FOLLOWING SURGERY.      Questions or Concerns:    - For any questions regarding the day of surgery or your hospital stay, please contact the Pre Admission Nursing Office at [phone].       - If you have health changes  between today and your surgery please call your surgeon.       For questions after surgery please call your surgeons office.

## 2021-10-07 NOTE — PROGRESS NOTES
Parth is a 56 year old who is being evaluated via a billable video visit.      How would you like to obtain your AVS? Mail a copy  If the video visit is dropped, the invitation should be resent by: Text to cell phone: [phone]  HPI     Review of Systems         Objective    Vitals - Patient Reported  Pain Score: Severe Pain (6)        Physical Exam     COOKIE Arreguin LPN

## 2021-10-08 ENCOUNTER — TELEPHONE (OUTPATIENT)
Dept: UROLOGY | Facility: CLINIC | Age: 56
End: 2021-10-08

## 2021-10-08 ENCOUNTER — LAB (OUTPATIENT)
Dept: LAB | Facility: CLINIC | Age: 56
End: 2021-10-08
Attending: UROLOGY

## 2021-10-08 DIAGNOSIS — Z11.59 ENCOUNTER FOR SCREENING FOR OTHER VIRAL DISEASES: ICD-10-CM

## 2021-10-08 LAB — SARS-COV-2 RNA RESP QL NAA+PROBE: NEGATIVE

## 2021-10-08 PROCEDURE — U0003 INFECTIOUS AGENT DETECTION BY NUCLEIC ACID (DNA OR RNA); SEVERE ACUTE RESPIRATORY SYNDROME CORONAVIRUS 2 (SARS-COV-2) (CORONAVIRUS DISEASE [COVID-19]), AMPLIFIED PROBE TECHNIQUE, MAKING USE OF HIGH THROUGHPUT TECHNOLOGIES AS DESCRIBED BY CMS-2020-01-R: HCPCS | Performed by: UROLOGY

## 2021-10-08 NOTE — CONFIDENTIAL NOTE
Spoke to patient and relayed that we needed to reschedule his PCNL surgery that was scheduled with Dr. Vides on Wednesday 10/20/21 to Monday 10/18/21 due to a change in his schedule. Rescheduled patient's corresponding covid test as well to Thursday 10/14/21 at the [facility] location. Patient acknowledged and agreed to all changes made.

## 2021-10-08 NOTE — CONFIDENTIAL NOTE
Spoke to Tao at patient's group home and relayed that we needed to reschedule patient's PCNL surgery that was scheduled with Dr. Vides on Wednesday 10/20/21 to Monday 10/18/21 due to a change in his schedule. Rescheduled patient's corresponding covid test as well to Thursday 10/14/21 at the [facility] location. Tao acknowledged and agreed to all changes made and made note that he would make the patients nurse aware of changes as well.

## 2021-10-11 ENCOUNTER — APPOINTMENT (OUTPATIENT)
Dept: MEDSURG UNIT | Facility: CLINIC | Age: 56
End: 2021-10-11
Attending: UROLOGY
Payer: MEDICARE

## 2021-10-11 ENCOUNTER — APPOINTMENT (OUTPATIENT)
Dept: INTERVENTIONAL RADIOLOGY/VASCULAR | Facility: CLINIC | Age: 56
End: 2021-10-11
Attending: UROLOGY
Payer: MEDICARE

## 2021-10-11 ENCOUNTER — HOSPITAL ENCOUNTER (OUTPATIENT)
Facility: CLINIC | Age: 56
Discharge: NURSING, REHAB OR LONG-TERM CARE | End: 2021-10-11
Attending: UROLOGY | Admitting: RADIOLOGY
Payer: MEDICARE

## 2021-10-11 VITALS
HEIGHT: 70 IN | HEART RATE: 82 BPM | OXYGEN SATURATION: 100 % | RESPIRATION RATE: 20 BRPM | WEIGHT: 145.94 LBS | TEMPERATURE: 98.3 F | DIASTOLIC BLOOD PRESSURE: 85 MMHG | SYSTOLIC BLOOD PRESSURE: 144 MMHG | BODY MASS INDEX: 20.89 KG/M2

## 2021-10-11 DIAGNOSIS — N20.0 KIDNEY STONE: ICD-10-CM

## 2021-10-11 LAB
ANION GAP SERPL CALCULATED.3IONS-SCNC: 5 MMOL/L (ref 3–14)
APTT PPP: 31 SECONDS (ref 22–38)
BASOPHILS # BLD AUTO: 0.1 10E3/UL (ref 0–0.2)
BASOPHILS NFR BLD AUTO: 1 %
BUN SERPL-MCNC: 27 MG/DL (ref 7–30)
CALCIUM SERPL-MCNC: 10.6 MG/DL (ref 8.5–10.1)
CHLORIDE BLD-SCNC: 103 MMOL/L (ref 94–109)
CO2 SERPL-SCNC: 29 MMOL/L (ref 20–32)
CREAT SERPL-MCNC: 0.75 MG/DL (ref 0.66–1.25)
EOSINOPHIL # BLD AUTO: 0.3 10E3/UL (ref 0–0.7)
EOSINOPHIL NFR BLD AUTO: 3 %
ERYTHROCYTE [DISTWIDTH] IN BLOOD BY AUTOMATED COUNT: 13.6 % (ref 10–15)
GFR SERPL CREATININE-BSD FRML MDRD: >90 ML/MIN/1.73M2
GLUCOSE BLD-MCNC: 81 MG/DL (ref 70–99)
HCT VFR BLD AUTO: 43.5 % (ref 40–53)
HGB BLD-MCNC: 14 G/DL (ref 13.3–17.7)
IMM GRANULOCYTES # BLD: 0.1 10E3/UL
IMM GRANULOCYTES NFR BLD: 1 %
INR PPP: 0.95 (ref 0.85–1.15)
LYMPHOCYTES # BLD AUTO: 1.8 10E3/UL (ref 0.8–5.3)
LYMPHOCYTES NFR BLD AUTO: 17 %
MCH RBC QN AUTO: 27.5 PG (ref 26.5–33)
MCHC RBC AUTO-ENTMCNC: 32.2 G/DL (ref 31.5–36.5)
MCV RBC AUTO: 86 FL (ref 78–100)
MONOCYTES # BLD AUTO: 0.7 10E3/UL (ref 0–1.3)
MONOCYTES NFR BLD AUTO: 7 %
NEUTROPHILS # BLD AUTO: 7.4 10E3/UL (ref 1.6–8.3)
NEUTROPHILS NFR BLD AUTO: 71 %
NRBC # BLD AUTO: 0 10E3/UL
NRBC BLD AUTO-RTO: 0 /100
PLATELET # BLD AUTO: 360 10E3/UL (ref 150–450)
POTASSIUM BLD-SCNC: 4.2 MMOL/L (ref 3.4–5.3)
RBC # BLD AUTO: 5.09 10E6/UL (ref 4.4–5.9)
SODIUM SERPL-SCNC: 137 MMOL/L (ref 133–144)
WBC # BLD AUTO: 10.3 10E3/UL (ref 4–11)

## 2021-10-11 PROCEDURE — 99152 MOD SED SAME PHYS/QHP 5/>YRS: CPT

## 2021-10-11 PROCEDURE — 272N000508 HC NEEDLE CR8

## 2021-10-11 PROCEDURE — 250N000011 HC RX IP 250 OP 636: Performed by: STUDENT IN AN ORGANIZED HEALTH CARE EDUCATION/TRAINING PROGRAM

## 2021-10-11 PROCEDURE — 250N000009 HC RX 250: Performed by: STUDENT IN AN ORGANIZED HEALTH CARE EDUCATION/TRAINING PROGRAM

## 2021-10-11 PROCEDURE — 85730 THROMBOPLASTIN TIME PARTIAL: CPT | Performed by: NURSE PRACTITIONER

## 2021-10-11 PROCEDURE — 250N000013 HC RX MED GY IP 250 OP 250 PS 637: Performed by: NURSE PRACTITIONER

## 2021-10-11 PROCEDURE — 50432 PLMT NEPHROSTOMY CATHETER: CPT

## 2021-10-11 PROCEDURE — 80048 BASIC METABOLIC PNL TOTAL CA: CPT | Performed by: NURSE PRACTITIONER

## 2021-10-11 PROCEDURE — 258N000003 HC RX IP 258 OP 636: Performed by: NURSE PRACTITIONER

## 2021-10-11 PROCEDURE — 36415 COLL VENOUS BLD VENIPUNCTURE: CPT | Performed by: NURSE PRACTITIONER

## 2021-10-11 PROCEDURE — 999N000127 HC STATISTIC PERIPHERAL IV START W US GUIDANCE

## 2021-10-11 PROCEDURE — 272N000504 HC NEEDLE CR4

## 2021-10-11 PROCEDURE — 255N000002 HC RX 255 OP 636: Performed by: UROLOGY

## 2021-10-11 PROCEDURE — 50432 PLMT NEPHROSTOMY CATHETER: CPT | Mod: LT | Performed by: RADIOLOGY

## 2021-10-11 PROCEDURE — C1729 CATH, DRAINAGE: HCPCS

## 2021-10-11 PROCEDURE — 250N000011 HC RX IP 250 OP 636: Performed by: NURSE PRACTITIONER

## 2021-10-11 PROCEDURE — 85610 PROTHROMBIN TIME: CPT | Mod: GZ | Performed by: NURSE PRACTITIONER

## 2021-10-11 PROCEDURE — 85025 COMPLETE CBC W/AUTO DIFF WBC: CPT | Performed by: NURSE PRACTITIONER

## 2021-10-11 PROCEDURE — 99153 MOD SED SAME PHYS/QHP EA: CPT

## 2021-10-11 PROCEDURE — 999N000133 HC STATISTIC PP CARE STAGE 2

## 2021-10-11 PROCEDURE — 99152 MOD SED SAME PHYS/QHP 5/>YRS: CPT | Mod: GC | Performed by: RADIOLOGY

## 2021-10-11 PROCEDURE — 999N000142 HC STATISTIC PROCEDURE PREP ONLY

## 2021-10-11 RX ORDER — SODIUM CHLORIDE 9 MG/ML
INJECTION, SOLUTION INTRAVENOUS CONTINUOUS
Status: DISCONTINUED | OUTPATIENT
Start: 2021-10-11 | End: 2021-10-11 | Stop reason: HOSPADM

## 2021-10-11 RX ORDER — NALOXONE HYDROCHLORIDE 0.4 MG/ML
0.2 INJECTION, SOLUTION INTRAMUSCULAR; INTRAVENOUS; SUBCUTANEOUS
Status: DISCONTINUED | OUTPATIENT
Start: 2021-10-11 | End: 2021-10-11 | Stop reason: HOSPADM

## 2021-10-11 RX ORDER — OXYCODONE HYDROCHLORIDE 5 MG/1
5 TABLET ORAL ONCE
Status: COMPLETED | OUTPATIENT
Start: 2021-10-11 | End: 2021-10-11

## 2021-10-11 RX ORDER — NALOXONE HYDROCHLORIDE 0.4 MG/ML
0.4 INJECTION, SOLUTION INTRAMUSCULAR; INTRAVENOUS; SUBCUTANEOUS
Status: DISCONTINUED | OUTPATIENT
Start: 2021-10-11 | End: 2021-10-11 | Stop reason: HOSPADM

## 2021-10-11 RX ORDER — FENTANYL CITRATE 50 UG/ML
25-50 INJECTION, SOLUTION INTRAMUSCULAR; INTRAVENOUS EVERY 5 MIN PRN
Status: DISCONTINUED | OUTPATIENT
Start: 2021-10-11 | End: 2021-10-11 | Stop reason: HOSPADM

## 2021-10-11 RX ORDER — AMPICILLIN 1 G/1
1 INJECTION, POWDER, FOR SOLUTION INTRAMUSCULAR; INTRAVENOUS
Status: COMPLETED | OUTPATIENT
Start: 2021-10-11 | End: 2021-10-11

## 2021-10-11 RX ORDER — FLUMAZENIL 0.1 MG/ML
0.2 INJECTION, SOLUTION INTRAVENOUS
Status: DISCONTINUED | OUTPATIENT
Start: 2021-10-11 | End: 2021-10-11 | Stop reason: HOSPADM

## 2021-10-11 RX ORDER — IODIXANOL 320 MG/ML
50 INJECTION, SOLUTION INTRAVASCULAR ONCE
Status: COMPLETED | OUTPATIENT
Start: 2021-10-11 | End: 2021-10-11

## 2021-10-11 RX ORDER — LIDOCAINE 40 MG/G
CREAM TOPICAL
Status: DISCONTINUED | OUTPATIENT
Start: 2021-10-11 | End: 2021-10-11 | Stop reason: HOSPADM

## 2021-10-11 RX ADMIN — SODIUM CHLORIDE: 9 INJECTION, SOLUTION INTRAVENOUS at 10:10

## 2021-10-11 RX ADMIN — FENTANYL CITRATE 200 MCG: 50 INJECTION INTRAMUSCULAR; INTRAVENOUS at 12:52

## 2021-10-11 RX ADMIN — OXYCODONE HYDROCHLORIDE 5 MG: 5 TABLET ORAL at 13:44

## 2021-10-11 RX ADMIN — AMPICILLIN SODIUM 1 G: 1 INJECTION, POWDER, FOR SOLUTION INTRAMUSCULAR; INTRAVENOUS at 10:09

## 2021-10-11 RX ADMIN — LIDOCAINE HYDROCHLORIDE 10 ML: 10 INJECTION, SOLUTION EPIDURAL; INFILTRATION; INTRACAUDAL; PERINEURAL at 12:53

## 2021-10-11 RX ADMIN — GENTAMICIN SULFATE 120 MG: 40 INJECTION, SOLUTION INTRAMUSCULAR; INTRAVENOUS at 10:09

## 2021-10-11 RX ADMIN — MIDAZOLAM 4 MG: 1 INJECTION INTRAMUSCULAR; INTRAVENOUS at 12:53

## 2021-10-11 RX ADMIN — IODIXANOL 10 ML: 320 INJECTION, SOLUTION INTRAVASCULAR at 12:53

## 2021-10-11 ASSESSMENT — MIFFLIN-ST. JEOR: SCORE: 1488.25

## 2021-10-11 NOTE — PROGRESS NOTES
Pt reporting left flank pain after PNT placement. Requesting oxycodone for pain. States that tylenol and ibuprofen do not work well for pain.    Discussed with 2A RN. One time dose of oxycodone 5mg to be prescribed while in post-procedure unit.    Marily Brumfield, DNP, APRN  Interventional Radiology

## 2021-10-11 NOTE — PROCEDURES
[facility]    Procedure: IR Procedure Note    Date/Time: 10/11/2021 12:58 PM  Performed by: Leo Camacho DO  Authorized by: Leo Camacho DO   IR Fellow Physician: Doug    UNIVERSAL PROTOCOL   Site Marked: NA  Prior Images Obtained and Reviewed:  Yes  Required items: Required blood products, implants, devices and special equipment available    Patient identity confirmed:  Verbally with patient, arm band, provided demographic data and hospital-assigned identification number  Patient was reevaluated immediately before administering moderate or deep sedation or anesthesia  Confirmation Checklist:  Patient's identity using two indicators, relevant allergies, procedure was appropriate and matched the consent or emergent situation and correct equipment/implants were available  Time out: Immediately prior to the procedure a time out was called    Universal Protocol: the Joint Commission Universal Protocol was followed    Preparation: Patient was prepped and draped in usual sterile fashion           ANESTHESIA    Anesthesia: Local infiltration  Local Anesthetic:  Lidocaine 1% without epinephrine      SEDATION    Patient Sedated: Yes    Sedation Type:  Moderate (conscious) sedation  Sedation:  Fentanyl and midazolam  Vital signs: Vital signs monitored during sedation    See dictated procedure note for full details.  Findings: Uncomplicated placement of left-sided percutaneous nephrostomy tube for nephrolithotomy.    Specimens: none    Complications: None    Condition: Stable    Plan: Patient can be discharged after 3 hours bedrest if meeting appropriate discharge criteria.  Follow-up with urology for stone removal.  If patient still requires the tube in 3 months, routine change should be scheduled.    PROCEDURE   Patient Tolerance:  Patient tolerated the procedure well with no immediate complications    Length of time physician/provider present for 1:1  monitoring during sedation: 90

## 2021-10-11 NOTE — PROGRESS NOTES
Patient Name: Parth Fountain  Medical Record Number: [id number]  Today's Date: 10/11/2021    Procedure: Image guided left percutaneous nephrostomy tube placement.  Proceduralist: MD Presley  Pathology present: n/a    Procedure Start: 1127  Procedure end: 1255  Sedation medications administered: Fentanyl; 200 mcg  Versed; 4mg    Report given to: 2A,RN  : n/a    Other Notes: Pt arrived to IR room 1 from . Consent reviewed. Pt denies any questions or concerns regarding procedure. Pt positioned sidelying and monitored per protocol. Pt tolerated procedure without any noted complications. Pt transferred back to .  Anna Charles, RN

## 2021-10-11 NOTE — DISCHARGE INSTRUCTIONS
"[facility]  Discharge Instructions for   Percutaneous Nephrostomy   Tube Placement    After you go home:    Have an adult stay with you for 24 hours.    Drink plenty of fluids.    Resume a regular diet unless otherwise ordered by your physician.      For 24 Hours:    Relax and take it easy.    Do not drive or operate machines at home or at work.    No alcohol for 24 hours.    Do not make any important or legal decisions.    Do not do any strenuous exercise or lifting greater that 10 lbs for at least               2 days following your procedure.    CALL THE PHYSICIAN IF:    You start bleeding from the procedure site. If you do start to bleed from the site lie down and hold some pressure on the site. Your physician will tell you if you need to return to the hospital.    You develop nausea or vomiting.    You develop hives or a rash or any unexplained itching.    ADDITIONAL INSTRUCTIONS:  Please call for the following problems:  1. No urine draining from the nephrostomy tube. Check that tube is not kinked.  2. Urine leaking around tube.  3. Urine becomes very foul smelling or new or fresh blood in urine  4. The skin around the tube is red, painful, or has drainage.  5. You have pain in your back, over your kidney.  6. You have a fever of 100.5 F and chills  7. You feel nauseated and \"just not right.\"    Change the dressing initially the next day to check the insertion site.  After that change every other day.  Clean around tube site with washcloth and antibacterial soap.      [facility] INTERVENTIONAL RADIOLOGY DEPARTMENT  Procedure Physician: Blanca Camacho Date:October 11, 2021  Telephone Numbers:  [phone]     Monday-Friday 8:00AM-4:30PM                       [phone]     After 4:30 PM Monday-Friday, Weekends and Holidays. Ask for Interventional Radiologist on Call. Someone is available 24 hours a day.  [facility] toll free number: [phone] Monday- Friday 8:00AM -4:30PM.    I  "

## 2021-10-11 NOTE — PROGRESS NOTES
Pt prepped for PNT placement.PIV placed and labs sent and NS started and ABX's started.Consent signed and questions answered.

## 2021-10-11 NOTE — PROGRESS NOTES
Returned from IR s/p left PNT placement.  Left flank site CDI.  Left PNT draining red urine.  No clots noted.  C/O left flank and shoulder pain.  Resting in bed.

## 2021-10-11 NOTE — PROGRESS NOTES
Patient tolerated recovery stage well. VSS, left PNT site clean/dry/intact, no hematoma, and denies pain. Patient tolerated PO food and fluids. Teaching was done and discharge instructions were given. Patient ilial conduit was draining blooding tinged urine and PNT bag is draining the same color of urine and PIV was removed.Pt was instructed if the bleeding gets worse to notify MD.Patient discharged from the hospital via wheel chair to home with David.

## 2021-10-11 NOTE — SEDATION DOCUMENTATION
[facility] Procedure Note        Sedation:      Performed by: Leo Camacho DO  Authorized by: Leo Camacho DO    Pre-Procedure Assessment done at 1030.    Expected Level:  Moderate Sedation    Indication:  Sedation is required to allow for Left PNT placement    Consent obtained from patient after discussing the risks, benefits and alternatives.    PO Intake:  Appropriately NPO for procedure    ASA Class:  Class 2 - MILD SYSTEMIC DISEASE, NO ACUTE PROBLEMS, NO FUNCTIONAL LIMITATIONS.    Mallampati:  Grade 2:  Soft palate, base of uvula, tonsillar pillars, and portion of posterior pharyngeal wall visible    Lungs: Lungs Clear with good breath sounds bilaterally.     Heart: Normal heart sounds and rate    History and physical reviewed and no updates needed. I have reviewed the lab findings, diagnostic data, medications, and the plan for sedation. I have determined this patient to be an appropriate candidate for the planned sedation and procedure and have reassessed the patient IMMEDIATELY PRIOR to sedation and procedure.

## 2021-10-13 ENCOUNTER — TELEPHONE (OUTPATIENT)
Dept: UROLOGY | Facility: CLINIC | Age: 56
End: 2021-10-13

## 2021-10-13 DIAGNOSIS — N13.2 HYDRONEPHROSIS WITH URINARY OBSTRUCTION DUE TO URETERAL CALCULUS: ICD-10-CM

## 2021-10-13 DIAGNOSIS — N20.0 KIDNEY STONE: Primary | ICD-10-CM

## 2021-10-13 RX ORDER — CIPROFLOXACIN 500 MG/1
500 TABLET, FILM COATED ORAL 2 TIMES DAILY
Qty: 10 TABLET | Refills: 0 | Status: SHIPPED | OUTPATIENT
Start: 2021-10-13 | End: 2021-10-18

## 2021-10-13 NOTE — TELEPHONE ENCOUNTER
[facility] Call Center    Phone Message    May a detailed message be left on voicemail: yes     Reason for Call: Other: Parth called about his upcoming surgery, he is aware it was moved up.  However, he is concerned that he should be taking an antibiotic before surgery.  Advised would need to have Dr. Vides's staff look into that as there is nothing noted about it in his chart.  Please call Parth to discuss if he should be taking an antibiotic prior to surgery.    Action Taken: Message routed to:  [facility] ([facility] uro    Travel Screening: Not Applicable

## 2021-10-13 NOTE — TELEPHONE ENCOUNTER
Patient started empirically on Cipro through his upcoming procedure.  Patient notified. CARIDAD DESHPANDE

## 2021-10-14 ENCOUNTER — LAB (OUTPATIENT)
Dept: URGENT CARE | Facility: URGENT CARE | Age: 56
End: 2021-10-14
Payer: MEDICARE

## 2021-10-14 DIAGNOSIS — Z11.59 ENCOUNTER FOR SCREENING FOR OTHER VIRAL DISEASES: ICD-10-CM

## 2021-10-14 LAB — SARS-COV-2 RNA RESP QL NAA+PROBE: NEGATIVE

## 2021-10-14 PROCEDURE — U0003 INFECTIOUS AGENT DETECTION BY NUCLEIC ACID (DNA OR RNA); SEVERE ACUTE RESPIRATORY SYNDROME CORONAVIRUS 2 (SARS-COV-2) (CORONAVIRUS DISEASE [COVID-19]), AMPLIFIED PROBE TECHNIQUE, MAKING USE OF HIGH THROUGHPUT TECHNOLOGIES AS DESCRIBED BY CMS-2020-01-R: HCPCS

## 2021-10-14 PROCEDURE — U0005 INFEC AGEN DETEC AMPLI PROBE: HCPCS

## 2021-10-17 ENCOUNTER — ANESTHESIA EVENT (OUTPATIENT)
Dept: SURGERY | Facility: CLINIC | Age: 56
End: 2021-10-17
Payer: MEDICARE

## 2021-10-18 ENCOUNTER — ANESTHESIA (OUTPATIENT)
Dept: SURGERY | Facility: CLINIC | Age: 56
End: 2021-10-18
Payer: MEDICARE

## 2021-10-18 ENCOUNTER — HOSPITAL ENCOUNTER (OUTPATIENT)
Facility: CLINIC | Age: 56
Discharge: HOME | End: 2021-10-18
Attending: UROLOGY | Admitting: UROLOGY
Payer: MEDICARE

## 2021-10-18 ENCOUNTER — APPOINTMENT (OUTPATIENT)
Dept: GENERAL RADIOLOGY | Facility: CLINIC | Age: 56
End: 2021-10-18
Attending: UROLOGY
Payer: MEDICARE

## 2021-10-18 VITALS
BODY MASS INDEX: 20.76 KG/M2 | TEMPERATURE: 97.5 F | HEART RATE: 98 BPM | WEIGHT: 145 LBS | OXYGEN SATURATION: 98 % | DIASTOLIC BLOOD PRESSURE: 81 MMHG | RESPIRATION RATE: 18 BRPM | SYSTOLIC BLOOD PRESSURE: 154 MMHG | HEIGHT: 70 IN

## 2021-10-18 DIAGNOSIS — N20.0 KIDNEY STONE: ICD-10-CM

## 2021-10-18 DIAGNOSIS — S31.109S RIGHT GROIN WOUND, SEQUELA: ICD-10-CM

## 2021-10-18 DIAGNOSIS — G82.20 PARALYSIS OF BOTH LOWER LIMBS (H): ICD-10-CM

## 2021-10-18 DIAGNOSIS — Z93.3 COLOSTOMY IN PLACE (H): ICD-10-CM

## 2021-10-18 DIAGNOSIS — R52 DEAFFERENTATION PAIN: ICD-10-CM

## 2021-10-18 DIAGNOSIS — N13.2 HYDRONEPHROSIS WITH URINARY OBSTRUCTION DUE TO URETERAL CALCULUS: ICD-10-CM

## 2021-10-18 DIAGNOSIS — F33.1 MAJOR DEPRESSIVE DISORDER, RECURRENT EPISODE, MODERATE (H): ICD-10-CM

## 2021-10-18 DIAGNOSIS — M62.50 MUSCULAR WASTING AND DISUSE ATROPHY: ICD-10-CM

## 2021-10-18 DIAGNOSIS — F41.9 ANXIETY: ICD-10-CM

## 2021-10-18 DIAGNOSIS — N12 PYELONEPHRITIS: Primary | ICD-10-CM

## 2021-10-18 DIAGNOSIS — Z98.890 HISTORY OF ILEAL CONDUIT: ICD-10-CM

## 2021-10-18 DIAGNOSIS — F17.200 TOBACCO USE DISORDER: ICD-10-CM

## 2021-10-18 DIAGNOSIS — Z91.199 PERSONAL HISTORY OF NONCOMPLIANCE WITH MEDICAL TREATMENT, PRESENTING HAZARDS TO HEALTH: ICD-10-CM

## 2021-10-18 DIAGNOSIS — N39.0 RECURRENT UTI: ICD-10-CM

## 2021-10-18 DIAGNOSIS — M79.2 NEUROPATHIC PAIN: ICD-10-CM

## 2021-10-18 DIAGNOSIS — I73.9 PAD (PERIPHERAL ARTERY DISEASE) (H): ICD-10-CM

## 2021-10-18 DIAGNOSIS — M85.89 OSTEOPENIA OF MULTIPLE SITES: ICD-10-CM

## 2021-10-18 DIAGNOSIS — N36.0 URETHRAL FISTULA: ICD-10-CM

## 2021-10-18 DIAGNOSIS — A41.9 SEPSIS, DUE TO UNSPECIFIED ORGANISM, UNSPECIFIED WHETHER ACUTE ORGAN DYSFUNCTION PRESENT (H): ICD-10-CM

## 2021-10-18 LAB
ABO/RH(D): ABNORMAL
ANTIBODY SCREEN: POSITIVE
GLUCOSE BLDC GLUCOMTR-MCNC: 88 MG/DL (ref 70–99)
GLUCOSE BLDC GLUCOMTR-MCNC: 88 MG/DL (ref 70–99)
SPECIMEN EXPIRATION DATE: ABNORMAL

## 2021-10-18 PROCEDURE — 258N000003 HC RX IP 258 OP 636: Performed by: ANESTHESIOLOGY

## 2021-10-18 PROCEDURE — 250N000011 HC RX IP 250 OP 636: Performed by: NURSE ANESTHETIST, CERTIFIED REGISTERED

## 2021-10-18 PROCEDURE — 93010 ELECTROCARDIOGRAM REPORT: CPT | Performed by: INTERNAL MEDICINE

## 2021-10-18 PROCEDURE — 84999 UNLISTED CHEMISTRY PROCEDURE: CPT | Performed by: UROLOGY

## 2021-10-18 PROCEDURE — 93005 ELECTROCARDIOGRAM TRACING: CPT

## 2021-10-18 PROCEDURE — C1729 CATH, DRAINAGE: HCPCS | Performed by: UROLOGY

## 2021-10-18 PROCEDURE — 250N000009 HC RX 250: Performed by: ANESTHESIOLOGY

## 2021-10-18 PROCEDURE — 86870 RBC ANTIBODY IDENTIFICATION: CPT | Performed by: UROLOGY

## 2021-10-18 PROCEDURE — 360N000085 HC SURGERY LEVEL 5 W/ FLUORO, PER MIN: Performed by: UROLOGY

## 2021-10-18 PROCEDURE — 86850 RBC ANTIBODY SCREEN: CPT | Performed by: ANESTHESIOLOGY

## 2021-10-18 PROCEDURE — 250N000011 HC RX IP 250 OP 636: Performed by: ANESTHESIOLOGY

## 2021-10-18 PROCEDURE — 86900 BLOOD TYPING SEROLOGIC ABO: CPT | Performed by: ANESTHESIOLOGY

## 2021-10-18 PROCEDURE — 250N000013 HC RX MED GY IP 250 OP 250 PS 637: Performed by: ANESTHESIOLOGY

## 2021-10-18 PROCEDURE — C1887 CATHETER, GUIDING: HCPCS | Performed by: UROLOGY

## 2021-10-18 PROCEDURE — C1894 INTRO/SHEATH, NON-LASER: HCPCS | Performed by: UROLOGY

## 2021-10-18 PROCEDURE — 86880 COOMBS TEST DIRECT: CPT | Performed by: UROLOGY

## 2021-10-18 PROCEDURE — 710N000012 HC RECOVERY PHASE 2, PER MINUTE: Performed by: UROLOGY

## 2021-10-18 PROCEDURE — 370N000017 HC ANESTHESIA TECHNICAL FEE, PER MIN: Performed by: UROLOGY

## 2021-10-18 PROCEDURE — 50431 NJX PX NFROSGRM &/URTRGRM: CPT | Mod: 58 | Performed by: UROLOGY

## 2021-10-18 PROCEDURE — 86901 BLOOD TYPING SEROLOGIC RH(D): CPT | Mod: 91 | Performed by: UROLOGY

## 2021-10-18 PROCEDURE — 50080 PERQ NL/PL LITHOTRP SMPL<2CM: CPT | Mod: 58 | Performed by: UROLOGY

## 2021-10-18 PROCEDURE — 710N000011 HC RECOVERY PHASE 1, LEVEL 3, PER MIN: Performed by: UROLOGY

## 2021-10-18 PROCEDURE — C1769 GUIDE WIRE: HCPCS | Performed by: UROLOGY

## 2021-10-18 PROCEDURE — 82962 GLUCOSE BLOOD TEST: CPT

## 2021-10-18 PROCEDURE — 52356 CYSTO/URETERO W/LITHOTRIPSY: CPT | Mod: 58 | Performed by: UROLOGY

## 2021-10-18 PROCEDURE — 250N000025 HC SEVOFLURANE, PER MIN: Performed by: UROLOGY

## 2021-10-18 PROCEDURE — 81403 MOPATH PROCEDURE LEVEL 4: CPT | Performed by: UROLOGY

## 2021-10-18 PROCEDURE — 250N000009 HC RX 250: Performed by: NURSE ANESTHETIST, CERTIFIED REGISTERED

## 2021-10-18 PROCEDURE — C1758 CATHETER, URETERAL: HCPCS | Performed by: UROLOGY

## 2021-10-18 PROCEDURE — 999N000141 HC STATISTIC PRE-PROCEDURE NURSING ASSESSMENT: Performed by: UROLOGY

## 2021-10-18 PROCEDURE — 272N000001 HC OR GENERAL SUPPLY STERILE: Performed by: UROLOGY

## 2021-10-18 PROCEDURE — 999N000179 XR SURGERY CARM FLUORO LESS THAN 5 MIN W STILLS: Mod: TC

## 2021-10-18 PROCEDURE — 82365 CALCULUS SPECTROSCOPY: CPT | Performed by: UROLOGY

## 2021-10-18 PROCEDURE — 255N000002 HC RX 255 OP 636: Performed by: UROLOGY

## 2021-10-18 PROCEDURE — 86900 BLOOD TYPING SEROLOGIC ABO: CPT | Performed by: UROLOGY

## 2021-10-18 PROCEDURE — 250N000011 HC RX IP 250 OP 636: Performed by: STUDENT IN AN ORGANIZED HEALTH CARE EDUCATION/TRAINING PROGRAM

## 2021-10-18 RX ORDER — OXYCODONE HYDROCHLORIDE 5 MG/1
5 TABLET ORAL EVERY 6 HOURS PRN
Qty: 5 TABLET | Refills: 0 | Status: SHIPPED | OUTPATIENT
Start: 2021-10-18 | End: 2022-04-11

## 2021-10-18 RX ORDER — ONDANSETRON 2 MG/ML
INJECTION INTRAMUSCULAR; INTRAVENOUS PRN
Status: DISCONTINUED | OUTPATIENT
Start: 2021-10-18 | End: 2021-10-18

## 2021-10-18 RX ORDER — NALOXONE HYDROCHLORIDE 0.4 MG/ML
0.4 INJECTION, SOLUTION INTRAMUSCULAR; INTRAVENOUS; SUBCUTANEOUS
Status: DISCONTINUED | OUTPATIENT
Start: 2021-10-18 | End: 2021-10-18 | Stop reason: HOSPADM

## 2021-10-18 RX ORDER — HYDROMORPHONE HCL IN WATER/PF 6 MG/30 ML
.2-.4 PATIENT CONTROLLED ANALGESIA SYRINGE INTRAVENOUS EVERY 10 MIN PRN
Status: DISCONTINUED | OUTPATIENT
Start: 2021-10-18 | End: 2021-10-18 | Stop reason: HOSPADM

## 2021-10-18 RX ORDER — NALOXONE HYDROCHLORIDE 0.4 MG/ML
0.2 INJECTION, SOLUTION INTRAMUSCULAR; INTRAVENOUS; SUBCUTANEOUS
Status: DISCONTINUED | OUTPATIENT
Start: 2021-10-18 | End: 2021-10-18 | Stop reason: HOSPADM

## 2021-10-18 RX ORDER — FENTANYL CITRATE 50 UG/ML
25-50 INJECTION, SOLUTION INTRAMUSCULAR; INTRAVENOUS
Status: DISCONTINUED | OUTPATIENT
Start: 2021-10-18 | End: 2021-10-18 | Stop reason: HOSPADM

## 2021-10-18 RX ORDER — PHENYLEPHRINE HCL IN 0.9% NACL 50MG/250ML
.1-1 PLASTIC BAG, INJECTION (ML) INTRAVENOUS CONTINUOUS
Status: DISCONTINUED | OUTPATIENT
Start: 2021-10-18 | End: 2021-10-18 | Stop reason: HOSPADM

## 2021-10-18 RX ORDER — ERTAPENEM 1 G/1
INJECTION, POWDER, LYOPHILIZED, FOR SOLUTION INTRAMUSCULAR; INTRAVENOUS PRN
Status: DISCONTINUED | OUTPATIENT
Start: 2021-10-18 | End: 2021-10-18

## 2021-10-18 RX ORDER — LIDOCAINE HYDROCHLORIDE 20 MG/ML
INJECTION, SOLUTION INFILTRATION; PERINEURAL PRN
Status: DISCONTINUED | OUTPATIENT
Start: 2021-10-18 | End: 2021-10-18

## 2021-10-18 RX ORDER — EPHEDRINE SULFATE 50 MG/ML
INJECTION, SOLUTION INTRAMUSCULAR; INTRAVENOUS; SUBCUTANEOUS PRN
Status: DISCONTINUED | OUTPATIENT
Start: 2021-10-18 | End: 2021-10-18

## 2021-10-18 RX ORDER — FENTANYL CITRATE 50 UG/ML
25-50 INJECTION, SOLUTION INTRAMUSCULAR; INTRAVENOUS EVERY 5 MIN PRN
Status: DISCONTINUED | OUTPATIENT
Start: 2021-10-18 | End: 2021-10-18 | Stop reason: HOSPADM

## 2021-10-18 RX ORDER — SODIUM CHLORIDE, SODIUM LACTATE, POTASSIUM CHLORIDE, CALCIUM CHLORIDE 600; 310; 30; 20 MG/100ML; MG/100ML; MG/100ML; MG/100ML
INJECTION, SOLUTION INTRAVENOUS CONTINUOUS
Status: DISCONTINUED | OUTPATIENT
Start: 2021-10-18 | End: 2021-10-18 | Stop reason: HOSPADM

## 2021-10-18 RX ORDER — GLYCOPYRROLATE 0.2 MG/ML
INJECTION, SOLUTION INTRAMUSCULAR; INTRAVENOUS PRN
Status: DISCONTINUED | OUTPATIENT
Start: 2021-10-18 | End: 2021-10-18

## 2021-10-18 RX ORDER — FLUMAZENIL 0.1 MG/ML
0.2 INJECTION, SOLUTION INTRAVENOUS
Status: DISCONTINUED | OUTPATIENT
Start: 2021-10-18 | End: 2021-10-18 | Stop reason: HOSPADM

## 2021-10-18 RX ORDER — HYDRALAZINE HYDROCHLORIDE 20 MG/ML
2.5-5 INJECTION INTRAMUSCULAR; INTRAVENOUS EVERY 10 MIN PRN
Status: DISCONTINUED | OUTPATIENT
Start: 2021-10-18 | End: 2021-10-18 | Stop reason: HOSPADM

## 2021-10-18 RX ORDER — OXYCODONE HYDROCHLORIDE 5 MG/1
5 TABLET ORAL EVERY 4 HOURS PRN
Status: DISCONTINUED | OUTPATIENT
Start: 2021-10-18 | End: 2021-10-18 | Stop reason: HOSPADM

## 2021-10-18 RX ORDER — LIDOCAINE 40 MG/G
CREAM TOPICAL
Status: DISCONTINUED | OUTPATIENT
Start: 2021-10-18 | End: 2021-10-18 | Stop reason: HOSPADM

## 2021-10-18 RX ORDER — ONDANSETRON 4 MG/1
4 TABLET, ORALLY DISINTEGRATING ORAL EVERY 30 MIN PRN
Status: DISCONTINUED | OUTPATIENT
Start: 2021-10-18 | End: 2021-10-18 | Stop reason: HOSPADM

## 2021-10-18 RX ORDER — CIPROFLOXACIN 500 MG/1
500 TABLET, FILM COATED ORAL 2 TIMES DAILY
Qty: 10 TABLET | Refills: 0 | Status: SHIPPED | OUTPATIENT
Start: 2021-10-18 | End: 2021-10-23

## 2021-10-18 RX ORDER — VANCOMYCIN HYDROCHLORIDE 1 G/20ML
INJECTION, POWDER, LYOPHILIZED, FOR SOLUTION INTRAVENOUS PRN
Status: DISCONTINUED | OUTPATIENT
Start: 2021-10-18 | End: 2021-10-18

## 2021-10-18 RX ORDER — CEFAZOLIN SODIUM 2 G/100ML
2 INJECTION, SOLUTION INTRAVENOUS
Status: DISCONTINUED | OUTPATIENT
Start: 2021-10-18 | End: 2021-10-18 | Stop reason: HOSPADM

## 2021-10-18 RX ORDER — ONDANSETRON 2 MG/ML
4 INJECTION INTRAMUSCULAR; INTRAVENOUS EVERY 30 MIN PRN
Status: DISCONTINUED | OUTPATIENT
Start: 2021-10-18 | End: 2021-10-18 | Stop reason: HOSPADM

## 2021-10-18 RX ORDER — PROPOFOL 10 MG/ML
INJECTION, EMULSION INTRAVENOUS PRN
Status: DISCONTINUED | OUTPATIENT
Start: 2021-10-18 | End: 2021-10-18

## 2021-10-18 RX ORDER — MEPERIDINE HYDROCHLORIDE 25 MG/ML
12.5 INJECTION INTRAMUSCULAR; INTRAVENOUS; SUBCUTANEOUS
Status: DISCONTINUED | OUTPATIENT
Start: 2021-10-18 | End: 2021-10-18 | Stop reason: HOSPADM

## 2021-10-18 RX ORDER — CEFAZOLIN SODIUM 2 G/100ML
2 INJECTION, SOLUTION INTRAVENOUS SEE ADMIN INSTRUCTIONS
Status: DISCONTINUED | OUTPATIENT
Start: 2021-10-18 | End: 2021-10-18 | Stop reason: HOSPADM

## 2021-10-18 RX ORDER — DEXAMETHASONE SODIUM PHOSPHATE 4 MG/ML
INJECTION, SOLUTION INTRA-ARTICULAR; INTRALESIONAL; INTRAMUSCULAR; INTRAVENOUS; SOFT TISSUE PRN
Status: DISCONTINUED | OUTPATIENT
Start: 2021-10-18 | End: 2021-10-18

## 2021-10-18 RX ORDER — LABETALOL HYDROCHLORIDE 5 MG/ML
5-10 INJECTION, SOLUTION INTRAVENOUS EVERY 10 MIN PRN
Status: DISCONTINUED | OUTPATIENT
Start: 2021-10-18 | End: 2021-10-18 | Stop reason: HOSPADM

## 2021-10-18 RX ADMIN — HYDROMORPHONE HYDROCHLORIDE 0.4 MG: 0.2 INJECTION, SOLUTION INTRAMUSCULAR; INTRAVENOUS; SUBCUTANEOUS at 17:03

## 2021-10-18 RX ADMIN — SODIUM CHLORIDE, POTASSIUM CHLORIDE, SODIUM LACTATE AND CALCIUM CHLORIDE: 600; 310; 30; 20 INJECTION, SOLUTION INTRAVENOUS at 15:56

## 2021-10-18 RX ADMIN — VANCOMYCIN HYDROCHLORIDE 1000 MG: 1 INJECTION, POWDER, LYOPHILIZED, FOR SOLUTION INTRAVENOUS at 13:00

## 2021-10-18 RX ADMIN — SUGAMMADEX 200 MG: 100 INJECTION, SOLUTION INTRAVENOUS at 15:47

## 2021-10-18 RX ADMIN — FENTANYL CITRATE 50 MCG: 50 INJECTION INTRAMUSCULAR; INTRAVENOUS at 16:16

## 2021-10-18 RX ADMIN — FENTANYL CITRATE 75 MCG: 50 INJECTION, SOLUTION INTRAMUSCULAR; INTRAVENOUS at 12:30

## 2021-10-18 RX ADMIN — GLYCOPYRROLATE 0.2 MG: 0.2 INJECTION, SOLUTION INTRAMUSCULAR; INTRAVENOUS at 15:02

## 2021-10-18 RX ADMIN — GLYCOPYRROLATE 0.2 MG: 0.2 INJECTION, SOLUTION INTRAMUSCULAR; INTRAVENOUS at 13:01

## 2021-10-18 RX ADMIN — FENTANYL CITRATE 50 MCG: 50 INJECTION INTRAMUSCULAR; INTRAVENOUS at 16:29

## 2021-10-18 RX ADMIN — ROCURONIUM BROMIDE 10 MG: 50 INJECTION, SOLUTION INTRAVENOUS at 13:43

## 2021-10-18 RX ADMIN — Medication 5 MG: at 12:50

## 2021-10-18 RX ADMIN — ERTAPENEM SODIUM 1 G: 1 INJECTION, POWDER, LYOPHILIZED, FOR SOLUTION INTRAMUSCULAR; INTRAVENOUS at 12:58

## 2021-10-18 RX ADMIN — OXYCODONE HYDROCHLORIDE 5 MG: 5 TABLET ORAL at 17:22

## 2021-10-18 RX ADMIN — ONDANSETRON 4 MG: 2 INJECTION INTRAMUSCULAR; INTRAVENOUS at 15:46

## 2021-10-18 RX ADMIN — ROCURONIUM BROMIDE 10 MG: 50 INJECTION, SOLUTION INTRAVENOUS at 15:33

## 2021-10-18 RX ADMIN — Medication 5 MG: at 12:55

## 2021-10-18 RX ADMIN — Medication 5 MG: at 13:01

## 2021-10-18 RX ADMIN — MIDAZOLAM 2 MG: 1 INJECTION INTRAMUSCULAR; INTRAVENOUS at 12:16

## 2021-10-18 RX ADMIN — PROPOFOL 120 MG: 10 INJECTION, EMULSION INTRAVENOUS at 12:30

## 2021-10-18 RX ADMIN — ROCURONIUM BROMIDE 60 MG: 50 INJECTION, SOLUTION INTRAVENOUS at 12:32

## 2021-10-18 RX ADMIN — SODIUM CHLORIDE, POTASSIUM CHLORIDE, SODIUM LACTATE AND CALCIUM CHLORIDE: 600; 310; 30; 20 INJECTION, SOLUTION INTRAVENOUS at 12:17

## 2021-10-18 RX ADMIN — Medication 10 MG: at 12:44

## 2021-10-18 RX ADMIN — LIDOCAINE HYDROCHLORIDE 100 MG: 20 INJECTION, SOLUTION INFILTRATION; PERINEURAL at 12:30

## 2021-10-18 RX ADMIN — DEXAMETHASONE SODIUM PHOSPHATE 4 MG: 4 INJECTION, SOLUTION INTRA-ARTICULAR; INTRALESIONAL; INTRAMUSCULAR; INTRAVENOUS; SOFT TISSUE at 13:41

## 2021-10-18 RX ADMIN — FENTANYL CITRATE 25 MCG: 50 INJECTION, SOLUTION INTRAMUSCULAR; INTRAVENOUS at 16:00

## 2021-10-18 ASSESSMENT — MIFFLIN-ST. JEOR: SCORE: 1483.97

## 2021-10-18 NOTE — ANESTHESIA PROCEDURE NOTES
Airway       Patient location during procedure: OR       Procedure Start/Stop Times: 10/18/2021 12:34 PM  Staff -        Anesthesiologist:  Zohaib Greene MD       CRNA: Christy Quinteros APRN CRNA       Other Anesthesia Staff: Lilian Arnold       Performed By: CHANDNI  Consent for Airway        Urgency: elective  Indications and Patient Condition       Indications for airway management: sandra-procedural       Induction type:intravenous       Mask difficulty assessment: 3 - difficult mask (inadequate, unstable, or two providers) +/- NMBA    Final Airway Details       Final airway type: endotracheal airway       Successful airway: ETT - single and Oral  Endotracheal Airway Details        ETT size (mm): 7.0       Cuffed: yes       Cuff volume (mL): 8       Successful intubation technique: direct laryngoscopy       DL Blade Type: Rivas 2       Grade View of Cords: 1       Adjucts: stylet       Position: Right       Measured from: lips       Secured at (cm): 21       Bite block used: None    Post intubation assessment        Placement verified by: capnometry, equal breath sounds and chest rise        Number of attempts at approach: 1       Number of other approaches attempted: 0       Secured with: other (comment) (tube tamer)       Ease of procedure: easy       Dentition: Intact and Unchanged

## 2021-10-18 NOTE — DISCHARGE INSTRUCTIONS
[facility]  Same-Day Surgery   Adult Discharge Orders & Instructions   For 24 hours after surgery  1. Get plenty of rest.  A responsible adult must stay with you for at least 24 hours after you leave the hospital.   2. Do not drive or use heavy equipment.  If you have weakness or tingling, don't drive or use heavy equipment until this feeling goes away.  3. Do not drink alcohol.  4. Avoid strenuous or risky activities.  Ask for help when climbing stairs.   5. You may feel lightheaded.  IF so, sit for a few minutes before standing.  Have someone help you get up.   6. If you have nausea (feel sick to your stomach): Drink only clear liquids such as apple juice, ginger ale, broth or 7-Up.  Rest may also help.  Be sure to drink enough fluids.  Move to a regular diet as you feel able.  7. You may have a slight fever. Call the doctor if your fever is over 100 F (37.7 C) (taken under the tongue) or lasts longer than 24 hours.  8. You may have a dry mouth, a sore throat, muscle aches or trouble sleeping.  These should go away after 24 hours.  9. Do not make important or legal decisions.   Call your doctor for any of the followin.  Signs of infection (fever, growing tenderness at the surgery site, a large amount of drainage or bleeding, severe pain, foul-smelling drainage, redness, swelling).    2. It has been over 8 to 10 hours since surgery and you are still not able to urinate (pass water).    3.  Headache for over 24 hours.    To contact a doctor, call Dr Moose Vides at [phone]--Urology Clinic or:      [phone] and ask for the resident on call for Urology (answered 24 hours a day)      Emergency Department: [facility]: [phone]
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
## 2021-10-18 NOTE — BRIEF OP NOTE
[facility]    Brief Operative Note    Pre-operative diagnosis: Kidney stone [N20.0]  Post-operative diagnosis Same as pre-operative diagnosis    Procedure: Procedure(s):  NEPHROLITHOTOMY, PERCUTANEOUS, USING HOLMIUM LASER, stent, possible nephrostomy tube  Ureteroscopy,  Surgeon: Surgeon(s) and Role:  Panel 1:     * Moose Vides MD - Primary     * Marce Swanson MD - Assisting  Panel 2:     * Moose Vides MD - Primary  Anesthesia: Combined General with Block   Estimated Blood Loss: Minimal    Drains: 26 cm single J stent in left ureter  Specimens:   ID Type Source Tests Collected by Time Destination   A : Left Kidney stone Calculus/Stone Kidney, Left STONE ANALYSIS Moose Vides MD 10/18/2021  3:20 PM      Findings:   see operative report.  Complications: None.  Implants: * No implants in log *    Plan  Monitor in PACU, discharge if meeting criteria  5 additional days cipro  CT prior to clinic visit in 2 weeks

## 2021-10-18 NOTE — OR NURSING
1730. ABILIO Boss verbalized sign out note. Patient still hypertensive but within range of admission BP.     1800. Discharge instructions reviewed with group home staff Todd over the phone, confirmed there is 24 hour staff overnight. Rx picked up at pharmacy.     1845. Patient's friend David picked up patient from hospital to take back to group home.   Is This A New Presentation, Or A Follow-Up?: Dandruff

## 2021-10-18 NOTE — OP NOTE
OPERATIVE REPORT 10/18/21    PREOPERATIVE DIAGNOSIS: left renal stones.     POSTOPERATIVE DIAGNOSIS: same    PROCEDURES PERFORMED:   1. Antegrade left nephrostogram.   2. Removal of indwelling left nephrostomy tube.   3. Percutaneous left nephroscopy with basket removal of stones  4. Retrograde pyelogram  5. Left ureteroscopy with holmium laser lithotripsy and basket removal of stones  6. Use of C-arm and interpretation of the fluoroscopic image.   7.  6-Malay x 26 cm single J stent placement     STAFF SURGEON: Moose Vides MD.     : Marce Swanson MD     ESTIMATED BLOOD LOSS:  minimal  SPECIMENS: Stone fragment sent for analysis.     SIGNIFICANT FINDINGS: The patient is visually stone free at the end of the procedure.     BRIEF OPERATIVE INDICATIONS:  Parth Fountain is a 56 year old man with complex past medical history who presented in June 2021 with urosepsis and bilateral renal stones. He underwent right PCNL 8/23/21 and L PNT placement 10/11/21 and presents today for stone clearance on the left side. He was counseled on risks, benefits, and alternatives and elected to proceed.    PROCEDURE PERFORMED: After identifying and marking the patient in the preoperative holding area, the patient was brought to the operating room and placed in supine position. Preoperative timeout was then completed to verify the patient and procedure to be performed. Once adequate anesthesia was obtained, the patient was placed in prone position and all the pressure points were well supported. Subsequently, the patient was then prepped and draped in a standard sterile fashion.   We began our procedure by doing the antegrade nephrostogram to delineate the anatomy of the renal pelvis. Images were saved. We then placed a sensor guidewire into the left renal pelvis under fluoroscopic guidance and cut the stitch and removed the percutaneous nephrostomy tube. After removing the nephrostomy tube, we placed a 5  Fr open ended catheter over the sensor guidewire and then we successfully advanced the catheter down the ureter under fluoroscopic guidance. The sensor wire was seen coiling in the ileal conduit.    We backed off the 5 Fr and then advanced a 10/12Fr dual lumen catheter over the sensor wire and placed a second superstiff wire. The sensor wire was clamped to the drape to act as a safety. We carefully advanced a 16Fr peel away sheath over the superstiff wire under fluoroscopy. We then passed the flexible ureteroscope via the sheath. Immediately we encountered several small stones which we removed with a halo basket. The stones were noted to be very soft and would crumble during extraction attempts.    The calyx of access was in the upper pole and we were unable to survey the other calyces. Therefore, we elected to replace an 8 Fr nephrostomy tube, which we passed over the superstiff. Antegrade nephrostogram confirmed our placement and a good curl was seen on fluoroscopy.. We passed the sensor wire down the ureter and secured this to the patient with silk ties and a tegaderm. The nephrostomy tube was secured with a drain stitch.    We then repositioned the patient supine and re-prepped and draped his urostomy. We carefully advanced a 22 Fr rigid cystoscope a few centimeters into the conduit and immediately encountered our sensor wire, which was brought to the stoma with a flexible grasper. A 5 Fr open ended catheter was passed over the sensor wire up the left ureter under fluoroscopy. The sensor wire was then removed from above. We passed a new sensor wire via the 5 Fr and exchanged the open ended catheter for a dual lumen catheter. We then advanced a superstiff wire under fluoroscopy. We passed a 11/13 36cm ureteral access sheath over our sensor wire under fluoroscopy.    We then performed antegrade nephroscopy which did allow us to survey all calyces. The percutaneous nephrostomy tube limited vision so was also  removed from above. The upper pole had a bifid appearance. In the more lateral calyx there were several soft stones that were dusted with the 200 micron holmium laser on settings of 3 J and 50 Hz. The midpole was clear. The lower pole had numerous small stones that were dusted with the laser. The patient was felt to be visually stone free at conclusion of our procedure.    Pullback ureteroscopy demonstrated no ureteral lesions or injury. We then advanced a 6 Fr x 26 cm single J IR stent over the sensor wire and confirmed good curl in the renal pelvis with fluoroscopy. A new urostomy appliance was affixed to the patient and the end of the stent was secured in the patient's urostomy bag. The previous PNT site was dressed with a primapore.    This concluded our procedure. The patient was awakened from anesthesia, brought to the recovery room without any immediate postoperative complication.     Plan: Home on 5 additional days of ciprofloxacin. Follow up in 2 weeks in clinic with CT prior

## 2021-10-18 NOTE — ANESTHESIA CARE TRANSFER NOTE
Patient: Parth Fountain    Procedure: Procedure(s):  NEPHROLITHOTOMY, PERCUTANEOUS, USING HOLMIUM LASER, stent, possible nephrostomy tube  Ureteroscopy,       Diagnosis: Kidney stone [N20.0]  Diagnosis Additional Information: No value filed.    Anesthesia Type:   No value filed.     Note:    Oropharynx: oropharynx clear of all foreign objects  Level of Consciousness: awake  Oxygen Supplementation: nasal cannula  Level of Supplemental Oxygen (L/min / FiO2): 4      Vital Signs Stable: post-procedure vital signs reviewed and stable  Report to RN Given: handoff report given  Patient transferred to: PACU    Handoff Report: Identifed the Patient, Identified the Reponsible Provider, Reviewed the pertinent medical history, Discussed the surgical course, Reviewed Intra-OP anesthesia mangement and issues during anesthesia, Set expectations for post-procedure period and Allowed opportunity for questions and acknowledgement of understanding      Vitals:  Vitals Value Taken Time   BP     Temp     Pulse     Resp     SpO2 99 % 10/18/21 1558   Vitals shown include unvalidated device data.    Electronically Signed By: LIDYA Woodard CRNA  October 18, 2021  4:00 PM

## 2021-10-18 NOTE — OR NURSING
Was told by CRNA that patient had intraop ST elevation that surgeon and MDA are aware of. Still present in PACU recovery - Dr. Guillermo Monreal notified - EKG ordered.    Dr. Guillermo Monreal reviewed EKG - no changes since last EKG. Patient vitally stable. No interventions ordered.

## 2021-10-19 LAB
ATRIAL RATE - MUSE: 88 BPM
DIASTOLIC BLOOD PRESSURE - MUSE: NORMAL MMHG
INTERPRETATION ECG - MUSE: NORMAL
P AXIS - MUSE: 38 DEGREES
PR INTERVAL - MUSE: 114 MS
QRS DURATION - MUSE: 102 MS
QT - MUSE: 382 MS
QTC - MUSE: 462 MS
R AXIS - MUSE: -4 DEGREES
SYSTOLIC BLOOD PRESSURE - MUSE: NORMAL MMHG
T AXIS - MUSE: 74 DEGREES
VENTRICULAR RATE- MUSE: 88 BPM

## 2021-10-19 NOTE — TELEPHONE ENCOUNTER
----- Message from Shivani Guillermo RN sent at 10/18/2021  4:24 PM CDT -----  Regarding: FW: f/u 10/29 with CT prior  Please see below and call and schedule accordingly. Thank you  ----- Message -----  From: Marce Swanson MD  Sent: 10/18/2021   3:54 PM CDT  To: Shivani Guillermo RN  Subject: f/u 10/29 with CT prior                          Jak Parrish,    Mr Fountain has follow up with Dr Vides 10/29. Just letting you know he will need a CT scan prior (ordering it now). Thanks!    Melissa

## 2021-10-21 LAB
APPEARANCE STONE: NORMAL
COMPN STONE: NORMAL
NUMBER STONE: 3
SIZE STONE: NORMAL MM
SPECIMEN WT: 42 MG

## 2021-10-22 NOTE — ANESTHESIA POSTPROCEDURE EVALUATION
Patient: Parth Fountain    Procedure: Procedure(s):  NEPHROLITHOTOMY, PERCUTANEOUS, USING HOLMIUM LASER, stent placement, removal of nephrostomy tube, retrogrades.  Ureteroscopy,       Diagnosis:Kidney stone [N20.0]  Diagnosis Additional Information: No value filed.    Anesthesia Type:  No value filed.    Note:  Disposition: Admission   Postop Pain Control: Uneventful            Sign Out: Well controlled pain   PONV: No   Neuro/Psych: Uneventful            Sign Out: Acceptable/Baseline neuro status   Airway/Respiratory: Uneventful            Sign Out: Acceptable/Baseline resp. status   CV/Hemodynamics: Uneventful            Sign Out: Acceptable CV status; No obvious hypovolemia; No obvious fluid overload   Other NRE: NONE   DID A NON-ROUTINE EVENT OCCUR? No           Last vitals:  Vitals Value Taken Time   /94 10/18/21 1730   Temp 36.4  C (97.5  F) 10/18/21 1730   Pulse 93 10/18/21 1730   Resp 20 10/18/21 1730   SpO2 98 % 10/18/21 1730       Electronically Signed By: Eduardo Tucker MD  October 22, 2021  9:22 AM

## 2021-10-26 ENCOUNTER — HOSPITAL ENCOUNTER (OUTPATIENT)
Dept: WOUND CARE | Facility: CLINIC | Age: 56
Discharge: HOME | End: 2021-10-26
Attending: PHYSICIAN ASSISTANT | Admitting: PHYSICIAN ASSISTANT
Payer: MEDICARE

## 2021-10-26 ENCOUNTER — PRE VISIT (OUTPATIENT)
Dept: UROLOGY | Facility: CLINIC | Age: 56
End: 2021-10-26

## 2021-10-26 VITALS — HEART RATE: 100 BPM | DIASTOLIC BLOOD PRESSURE: 86 MMHG | SYSTOLIC BLOOD PRESSURE: 152 MMHG | TEMPERATURE: 97.4 F

## 2021-10-26 DIAGNOSIS — S31.109S RIGHT GROIN WOUND, SEQUELA: ICD-10-CM

## 2021-10-26 DIAGNOSIS — S81.802D OPEN WOUND OF BOTH LOWER EXTREMITIES WITH COMPLICATION, SUBSEQUENT ENCOUNTER: ICD-10-CM

## 2021-10-26 DIAGNOSIS — S81.801D OPEN WOUND OF BOTH LOWER EXTREMITIES WITH COMPLICATION, SUBSEQUENT ENCOUNTER: ICD-10-CM

## 2021-10-26 PROCEDURE — 88312 SPECIAL STAINS GROUP 1: CPT | Mod: TC | Performed by: PHYSICIAN ASSISTANT

## 2021-10-26 PROCEDURE — 17250 CHEM CAUT OF GRANLTJ TISSUE: CPT | Performed by: PHYSICIAN ASSISTANT

## 2021-10-26 NOTE — PROGRESS NOTES
HISTORY OF PRESENT ILLNESS:   Mr. Parth Fountain is a 56-year-old paraplegic gentleman who returns to us today to follow-up on chronic pelvic pressure ulcers as well as lower leg wounds. PMHx of DVT, chronic UTI, EtOh abuse, and s/p colostomy. He has a long history of pressure ulcers with subsequent corrective surgeries by Dr. Peña. Parth's pelvic wounds have been very difficult to heal due to his surgical history. He has very little tissue overlying his bone and it is mostly made up of scar tissue. His progress waxes and wanes.    10/21/20: Televisit. Thigh wound healed. Scraped left leg and foot during a transfer and has several shallow ulcers on left lower leg and toes. Has an ulcer in his right groin/hip crease. This waxes and wanes. Uses Stacie when open and calmoseptine when more closed, this is working well for him.   11/18/20: Return visit. All of his wounds have improved. Has new traumatic wounds on bilateral shins.   12/16/20: Returns via video visit. Shin wounds are improving but very friable and hypergranular. Hip crease slightly more open today, also appears friable. Has been using Stacie to all wounds.   01/13/21: Returns for in office visit.  Leg wounds are improving with use of calcium alginate.  However, Parth notes that the dressings are sticking to the wounds and causing bleeding when removed.  His right groin wound is now quite hypertrophic.  2/26/21: Returns for follow-up. Reports that he continues to reinjure his R lower leg. Hip wound is not really improving. Has new wound on R great toe with exposed bone.   4/9/21: Returns for follow-up. Still has exposed bone on R great toe and has not had the x-ray done we ordered at last visit. His R lower leg has worsened. R hip about the same.   5/19/21: Returns for follow-up via telephone, has been using Xeroform. Has not done vascular studies or xray as recommended. Hip wound is improving. Has a recurrence of his L IT wound. His legs are marginally  "better, reports they are \"squishy.\"    8/20: Returns for televisit. Had to reschedule from in-clinic visit due to staffing issues. Left toe has new wound, lateral left leg still open. Has been using xeroform. Thinks R toe is closed up now. R hip comes and goes, uses Calmoseptine PRN. Has lithotripsy this upcoming week. Has not had xray or ABIs.   09/24/21: Returns for in person follow-up. His hip/groin wound has completely healed, however this usually comes and goes. Has new minor right heel wound. Continues to have issues with left lower leg, today during the visit it is apparent he has some kind of vessel in this area that easily bleeds causing a hematoma in this area. Fortunately the wound is fairly superficial. He has not had ABIs or x rays as recommended. Fortunately his toe appears stable today without any exposed bone.   10/26/21: Returns for follow-up. Left leg has regressed, tissue now very friable and hypertrophic. Toe continues to be resolved. Hip wound currently resolved.      OFFLOADING: On a Group 2 mattress. Still utilizing RoHo cushion on his wheelchair.       SOCIAL HISTORY:  Lives in a group home. He is still receiving home health care through [facility]. that comes and does dressing changes daily.      PHYSICAL EXAMINATION   INTEGUMENTARY:  Wound (used by OP [facility] only) 12/16/20 1253 Left skin tear (Active)   Thickness/Stage full thickness 10/26/21 1124   Dressing Appearance moist drainage 10/26/21 1124   Base hypergranulation 10/26/21 1124   Periwound intact 10/26/21 1124   Periwound Temperature warm 10/26/21 1124   Periwound Skin Turgor soft 10/26/21 1124   Edges open 10/26/21 1124   Length (cm) 2 10/26/21 1124   Width (cm) 2.3 10/26/21 1124   Depth (cm) 0.1 10/26/21 1124   Wound (cm^2) 4.6 cm^2 10/26/21 1124   Wound Volume (cm^3) 0.46 cm^3 10/26/21 1124   Wound healing % -1050 10/26/21 1124   Drainage Characteristics/Odor serosanguineous 10/26/21 1124   Drainage Amount moderate 10/26/21 1124 "   Care, Wound non-select wound debridement performed;chemical cautery applied 10/26/21 1124       Wound (used by OP [facility] only) 10/26/21 1125 Left (Active)   Thickness/Stage full thickness 10/26/21 1124   Dressing Appearance moist drainage 10/26/21 1124   Base hypergranulation 10/26/21 1124   Periwound intact 10/26/21 1124   Periwound Temperature warm 10/26/21 1124   Periwound Skin Turgor soft 10/26/21 1124   Edges open 10/26/21 1124   Length (cm) 2.4 10/26/21 1124   Width (cm) 3.2 10/26/21 1124   Depth (cm) 0.3 10/26/21 1124   Wound (cm^2) 7.68 cm^2 10/26/21 1124   Wound Volume (cm^3) 2.3 cm^3 10/26/21 1124   Drainage Characteristics/Odor serosanguineous 10/26/21 1124   Drainage Amount moderate 10/26/21 1124   Care, Wound debrided 10/26/21 1124              PROCEDURE: After verbal consent was obtained, hypergranulation tissue was treated with silver nitrate. Patient tolerated this well.     ASSESSMENT:    1. Full-thickness traumatic ulcerations of left lower leg and toes with fat-layer exposed      PLAN:    1. Dress leg wounds with Ioplex and spandage   2. Hip wound with Calmoseptine PRN  3. JOVAN and duplex of RLE to r/o arterial disease  4. Biopsy obtained today of leg wound     FOLLOWUP:  1 month       TIFFANIE SINGH PA-C

## 2021-10-26 NOTE — TELEPHONE ENCOUNTER
Reason for visit: cysto stent removal     Relevant information: renal stones    Records/imaging/labs/orders: CT, in epic    Pt called: no    At Rooming: stent grasper, cipro

## 2021-10-26 NOTE — DISCHARGE INSTRUCTIONS
"   [facility]  [address] [facility] [address], [place] [address]    Call us at [phone] if you have any questions about your wounds, have redness or swelling around your wound, have a fever of 101 or greater or if you have any other problems or concerns. We answer the phone Monday through Friday 8 am to 4 pm, please leave a message as we check the voicemail frequently throughout the day.     Parth Fountain      1965    [place] Phone: [phone] Fax: [phone]    Please call [facility] Phone: [phone] [address] [facility] [address] for Ultra sound JOVAN and Duplex of left leg  Wound dressing change recommendations to all open areas:  Left lower leg ulcers  Cleanse wounds with soap and water or saline or wound cleanser  Cover all wounds with  iooplex foam dressings  Cover wounds with gauze or ABD pad and secure with 2\" medipore tape  Spandage from toes to knees  Change dressing Monday, Wednesday and Friday    Apply silver nitrate to wound to any hyper granulation tissue once weekly on Wednesdays      Suzan Martinez PA-C. October 26, 2021      If you had a positive experience please indicate on your patient satisfaction survey form that [facility] will be sending you.    If you have any billing related questions please call the [facility] office at [phone]. The clinic staff does not handle billing related matters.    "

## 2021-10-27 ENCOUNTER — ANCILLARY PROCEDURE (OUTPATIENT)
Dept: CT IMAGING | Facility: CLINIC | Age: 56
End: 2021-10-27
Attending: STUDENT IN AN ORGANIZED HEALTH CARE EDUCATION/TRAINING PROGRAM
Payer: MEDICARE

## 2021-10-27 DIAGNOSIS — N12 PYELONEPHRITIS: ICD-10-CM

## 2021-10-27 PROCEDURE — 74176 CT ABD & PELVIS W/O CONTRAST: CPT | Mod: MG | Performed by: RADIOLOGY

## 2021-10-27 PROCEDURE — G1004 CDSM NDSC: HCPCS | Performed by: RADIOLOGY

## 2021-11-11 LAB
ANTIBODY SCREEN, TUBE: NORMAL
Lab: NORMAL
PERFORMING LABORATORY: NORMAL
SPECIMEN EXPIRATION DATE: NORMAL
SPECIMEN STATUS: NORMAL
TEST NAME: NORMAL

## 2021-11-15 LAB
PATH REPORT.COMMENTS IMP SPEC: NORMAL
PATH REPORT.FINAL DX SPEC: NORMAL
PATH REPORT.GROSS SPEC: NORMAL
PATH REPORT.MICROSCOPIC SPEC OTHER STN: NORMAL
PATH REPORT.RELEVANT HX SPEC: NORMAL

## 2021-11-15 PROCEDURE — 88312 SPECIAL STAINS GROUP 1: CPT | Mod: 26 | Performed by: DERMATOLOGY

## 2021-11-15 PROCEDURE — 88305 TISSUE EXAM BY PATHOLOGIST: CPT | Mod: 26 | Performed by: DERMATOLOGY

## 2021-11-17 ENCOUNTER — TELEPHONE (OUTPATIENT)
Dept: WOUND CARE | Facility: CLINIC | Age: 56
End: 2021-11-17
Payer: MEDICARE

## 2021-11-17 LAB — ANTIBODY ID: NORMAL

## 2021-11-17 NOTE — TELEPHONE ENCOUNTER
"Left message for Parth to call back. Ok to give him this message if I am not available to speak with him:     \"Biopsy came back on leg, it is NOT cancerous, but looks like a condition called pyoderma gangrenosum which is when your body attacks your skin for unknown reasons. I want to stop the current treatment and start a new medicine. We will send the new directions to [place].\"      *please get pharmacy and send in betamethasone ointment with sig: \"Apply topically to wounds and sandra wound with dressing changes\" ok to order largest size and give 1 refill    "

## 2021-11-17 NOTE — TELEPHONE ENCOUNTER
"ATTENTION! 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

## 2021-11-28 ENCOUNTER — HOSPITAL ENCOUNTER (EMERGENCY)
Facility: CLINIC | Age: 56
Discharge: NURSING, REHAB OR LONG-TERM CARE | End: 2021-11-28
Attending: EMERGENCY MEDICINE | Admitting: EMERGENCY MEDICINE
Payer: MEDICARE

## 2021-11-28 ENCOUNTER — APPOINTMENT (OUTPATIENT)
Dept: CT IMAGING | Facility: CLINIC | Age: 56
End: 2021-11-28
Payer: MEDICARE

## 2021-11-28 VITALS
WEIGHT: 139 LBS | SYSTOLIC BLOOD PRESSURE: 163 MMHG | TEMPERATURE: 98.3 F | HEIGHT: 70 IN | DIASTOLIC BLOOD PRESSURE: 101 MMHG | OXYGEN SATURATION: 99 % | BODY MASS INDEX: 19.9 KG/M2 | HEART RATE: 90 BPM | RESPIRATION RATE: 16 BRPM

## 2021-11-28 DIAGNOSIS — N39.0 URINARY TRACT INFECTION WITHOUT HEMATURIA, SITE UNSPECIFIED: ICD-10-CM

## 2021-11-28 DIAGNOSIS — R51.9 NONINTRACTABLE HEADACHE, UNSPECIFIED CHRONICITY PATTERN, UNSPECIFIED HEADACHE TYPE: ICD-10-CM

## 2021-11-28 LAB
ALBUMIN SERPL-MCNC: 3.8 G/DL (ref 3.4–5)
ALBUMIN UR-MCNC: 10 MG/DL
ALP SERPL-CCNC: 80 U/L (ref 40–150)
ALT SERPL W P-5'-P-CCNC: 20 U/L (ref 0–70)
ANION GAP SERPL CALCULATED.3IONS-SCNC: 11 MMOL/L (ref 3–14)
APPEARANCE UR: ABNORMAL
AST SERPL W P-5'-P-CCNC: 12 U/L (ref 0–45)
BACTERIA #/AREA URNS HPF: ABNORMAL /HPF
BASOPHILS # BLD AUTO: 0 10E3/UL (ref 0–0.2)
BASOPHILS NFR BLD AUTO: 0 %
BILIRUB SERPL-MCNC: 0.5 MG/DL (ref 0.2–1.3)
BILIRUB UR QL STRIP: NEGATIVE
BUN SERPL-MCNC: 9 MG/DL (ref 7–30)
CALCIUM SERPL-MCNC: 10.2 MG/DL (ref 8.5–10.1)
CHLORIDE BLD-SCNC: 99 MMOL/L (ref 94–109)
CO2 SERPL-SCNC: 22 MMOL/L (ref 20–32)
COLOR UR AUTO: ABNORMAL
CREAT SERPL-MCNC: 0.61 MG/DL (ref 0.66–1.25)
EOSINOPHIL # BLD AUTO: 0.1 10E3/UL (ref 0–0.7)
EOSINOPHIL NFR BLD AUTO: 1 %
ERYTHROCYTE [DISTWIDTH] IN BLOOD BY AUTOMATED COUNT: 15 % (ref 10–15)
GFR SERPL CREATININE-BSD FRML MDRD: >90 ML/MIN/1.73M2
GLUCOSE BLD-MCNC: 86 MG/DL (ref 70–99)
GLUCOSE UR STRIP-MCNC: NEGATIVE MG/DL
HCT VFR BLD AUTO: 41.1 % (ref 40–53)
HGB BLD-MCNC: 13.6 G/DL (ref 13.3–17.7)
HGB UR QL STRIP: NEGATIVE
HOLD SPECIMEN: NORMAL
IMM GRANULOCYTES # BLD: 0.1 10E3/UL
IMM GRANULOCYTES NFR BLD: 1 %
KETONES UR STRIP-MCNC: 10 MG/DL
LEUKOCYTE ESTERASE UR QL STRIP: ABNORMAL
LYMPHOCYTES # BLD AUTO: 0.9 10E3/UL (ref 0.8–5.3)
LYMPHOCYTES NFR BLD AUTO: 9 %
MCH RBC QN AUTO: 27.4 PG (ref 26.5–33)
MCHC RBC AUTO-ENTMCNC: 33.1 G/DL (ref 31.5–36.5)
MCV RBC AUTO: 83 FL (ref 78–100)
MONOCYTES # BLD AUTO: 0.7 10E3/UL (ref 0–1.3)
MONOCYTES NFR BLD AUTO: 7 %
MUCOUS THREADS #/AREA URNS LPF: PRESENT /LPF
NEUTROPHILS # BLD AUTO: 8.2 10E3/UL (ref 1.6–8.3)
NEUTROPHILS NFR BLD AUTO: 82 %
NITRATE UR QL: POSITIVE
NRBC # BLD AUTO: 0 10E3/UL
NRBC BLD AUTO-RTO: 0 /100
PH UR STRIP: 6 [PH] (ref 5–7)
PLATELET # BLD AUTO: 380 10E3/UL (ref 150–450)
POTASSIUM BLD-SCNC: 3.5 MMOL/L (ref 3.4–5.3)
PROT SERPL-MCNC: 8 G/DL (ref 6.8–8.8)
RBC # BLD AUTO: 4.97 10E6/UL (ref 4.4–5.9)
RBC URINE: 4 /HPF
SODIUM SERPL-SCNC: 132 MMOL/L (ref 133–144)
SP GR UR STRIP: 1.01 (ref 1–1.03)
TRANSITIONAL EPI: <1 /HPF
UROBILINOGEN UR STRIP-MCNC: NORMAL MG/DL
WBC # BLD AUTO: 10 10E3/UL (ref 4–11)
WBC URINE: 36 /HPF

## 2021-11-28 PROCEDURE — 81001 URINALYSIS AUTO W/SCOPE: CPT | Performed by: EMERGENCY MEDICINE

## 2021-11-28 PROCEDURE — 258N000003 HC RX IP 258 OP 636: Performed by: EMERGENCY MEDICINE

## 2021-11-28 PROCEDURE — G1004 CDSM NDSC: HCPCS | Mod: GC | Performed by: RADIOLOGY

## 2021-11-28 PROCEDURE — 85025 COMPLETE CBC W/AUTO DIFF WBC: CPT

## 2021-11-28 PROCEDURE — 250N000011 HC RX IP 250 OP 636: Performed by: EMERGENCY MEDICINE

## 2021-11-28 PROCEDURE — 70450 CT HEAD/BRAIN W/O DYE: CPT | Mod: 26 | Performed by: RADIOLOGY

## 2021-11-28 PROCEDURE — 99284 EMERGENCY DEPT VISIT MOD MDM: CPT | Mod: 25 | Performed by: EMERGENCY MEDICINE

## 2021-11-28 PROCEDURE — 250N000011 HC RX IP 250 OP 636

## 2021-11-28 PROCEDURE — 70450 CT HEAD/BRAIN W/O DYE: CPT | Mod: ME

## 2021-11-28 PROCEDURE — 87086 URINE CULTURE/COLONY COUNT: CPT | Performed by: EMERGENCY MEDICINE

## 2021-11-28 PROCEDURE — 96375 TX/PRO/DX INJ NEW DRUG ADDON: CPT | Performed by: EMERGENCY MEDICINE

## 2021-11-28 PROCEDURE — 99284 EMERGENCY DEPT VISIT MOD MDM: CPT | Performed by: EMERGENCY MEDICINE

## 2021-11-28 PROCEDURE — 96365 THER/PROPH/DIAG IV INF INIT: CPT | Performed by: EMERGENCY MEDICINE

## 2021-11-28 PROCEDURE — 96366 THER/PROPH/DIAG IV INF ADDON: CPT | Performed by: EMERGENCY MEDICINE

## 2021-11-28 PROCEDURE — 36415 COLL VENOUS BLD VENIPUNCTURE: CPT

## 2021-11-28 PROCEDURE — 999N000127 HC STATISTIC PERIPHERAL IV START W US GUIDANCE

## 2021-11-28 PROCEDURE — 250N000013 HC RX MED GY IP 250 OP 250 PS 637

## 2021-11-28 PROCEDURE — 82040 ASSAY OF SERUM ALBUMIN: CPT

## 2021-11-28 RX ORDER — ONDANSETRON 2 MG/ML
4 INJECTION INTRAMUSCULAR; INTRAVENOUS
Status: COMPLETED | OUTPATIENT
Start: 2021-11-28 | End: 2021-11-28

## 2021-11-28 RX ORDER — KETOROLAC TROMETHAMINE 30 MG/ML
30 INJECTION, SOLUTION INTRAMUSCULAR; INTRAVENOUS ONCE
Status: COMPLETED | OUTPATIENT
Start: 2021-11-28 | End: 2021-11-28

## 2021-11-28 RX ORDER — ACETAMINOPHEN 325 MG/1
650 TABLET ORAL ONCE
Status: COMPLETED | OUTPATIENT
Start: 2021-11-28 | End: 2021-11-28

## 2021-11-28 RX ORDER — CEFTRIAXONE 1 G/1
1 INJECTION, POWDER, FOR SOLUTION INTRAMUSCULAR; INTRAVENOUS ONCE
Status: COMPLETED | OUTPATIENT
Start: 2021-11-28 | End: 2021-11-28

## 2021-11-28 RX ORDER — CEFDINIR 300 MG/1
300 CAPSULE ORAL 2 TIMES DAILY
Qty: 20 CAPSULE | Refills: 0 | Status: SHIPPED | OUTPATIENT
Start: 2021-11-28 | End: 2021-12-08

## 2021-11-28 RX ORDER — LIDOCAINE 40 MG/G
CREAM TOPICAL
Status: DISCONTINUED | OUTPATIENT
Start: 2021-11-28 | End: 2021-11-29 | Stop reason: HOSPADM

## 2021-11-28 RX ORDER — DIPHENHYDRAMINE HYDROCHLORIDE 50 MG/ML
25 INJECTION INTRAMUSCULAR; INTRAVENOUS ONCE
Status: COMPLETED | OUTPATIENT
Start: 2021-11-28 | End: 2021-11-28

## 2021-11-28 RX ORDER — SODIUM CHLORIDE 9 MG/ML
INJECTION, SOLUTION INTRAVENOUS CONTINUOUS
Status: DISCONTINUED | OUTPATIENT
Start: 2021-11-28 | End: 2021-11-29 | Stop reason: HOSPADM

## 2021-11-28 RX ORDER — HYDROMORPHONE HCL IN WATER/PF 6 MG/30 ML
0.2 PATIENT CONTROLLED ANALGESIA SYRINGE INTRAVENOUS ONCE
Status: COMPLETED | OUTPATIENT
Start: 2021-11-28 | End: 2021-11-28

## 2021-11-28 RX ORDER — OXYCODONE HYDROCHLORIDE 5 MG/1
5 TABLET ORAL ONCE
Status: COMPLETED | OUTPATIENT
Start: 2021-11-28 | End: 2021-11-28

## 2021-11-28 RX ORDER — HYDROMORPHONE HCL IN WATER/PF 6 MG/30 ML
0.2 PATIENT CONTROLLED ANALGESIA SYRINGE INTRAVENOUS EVERY 6 HOURS PRN
Status: DISCONTINUED | OUTPATIENT
Start: 2021-11-28 | End: 2021-11-29 | Stop reason: HOSPADM

## 2021-11-28 RX ADMIN — OXYCODONE HYDROCHLORIDE 5 MG: 5 TABLET ORAL at 20:43

## 2021-11-28 RX ADMIN — CEFTRIAXONE SODIUM 1 G: 1 INJECTION, POWDER, FOR SOLUTION INTRAMUSCULAR; INTRAVENOUS at 18:33

## 2021-11-28 RX ADMIN — KETOROLAC TROMETHAMINE 30 MG: 30 INJECTION, SOLUTION INTRAMUSCULAR at 18:33

## 2021-11-28 RX ADMIN — ACETAMINOPHEN 650 MG: 325 TABLET, FILM COATED ORAL at 18:33

## 2021-11-28 RX ADMIN — DIPHENHYDRAMINE HYDROCHLORIDE 25 MG: 50 INJECTION, SOLUTION INTRAMUSCULAR; INTRAVENOUS at 18:36

## 2021-11-28 RX ADMIN — SODIUM CHLORIDE 1000 ML: 9 INJECTION, SOLUTION INTRAVENOUS at 18:32

## 2021-11-28 RX ADMIN — ONDANSETRON 4 MG: 2 INJECTION INTRAMUSCULAR; INTRAVENOUS at 18:34

## 2021-11-28 RX ADMIN — HYDROMORPHONE HYDROCHLORIDE 0.2 MG: 0.2 INJECTION, SOLUTION INTRAMUSCULAR; INTRAVENOUS; SUBCUTANEOUS at 22:21

## 2021-11-28 ASSESSMENT — MIFFLIN-ST. JEOR: SCORE: 1456.75
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
## 2021-11-28 NOTE — ED TRIAGE NOTES
Triage Assessment & Note:    Patient presents with: PT c/o UTI. PT reports fever, poor PO intake and general weakness.  PT also c/o headache.  PT is a paraplegic.     Home Treatments/Remedies: None    Febrile / Afebrile? Afebrile     Duration of C/o: 4 days     Boaz Gaspar RN  November 28, 2021

## 2021-11-29 LAB — BACTERIA UR CULT: NORMAL

## 2021-11-29 NOTE — DISCHARGE INSTRUCTIONS
Your blood work is stable.     Your CT head is normal.     You have a urinary tract infection.  You received your first dose of antibiotics in the ED.      Please continue taking the oral antibiotics as prescribed.     Return to the ER if any other problems/concerns.

## 2021-11-30 ENCOUNTER — TELEPHONE (OUTPATIENT)
Dept: WOUND CARE | Facility: CLINIC | Age: 56
End: 2021-11-30
Payer: MEDICARE

## 2021-12-01 ENCOUNTER — TELEPHONE (OUTPATIENT)
Dept: WOUND CARE | Facility: CLINIC | Age: 56
End: 2021-12-01
Payer: MEDICARE

## 2021-12-01 DIAGNOSIS — S81.802D OPEN WOUND OF BOTH LOWER EXTREMITIES WITH COMPLICATION, SUBSEQUENT ENCOUNTER: Primary | ICD-10-CM

## 2021-12-01 DIAGNOSIS — S81.801D OPEN WOUND OF BOTH LOWER EXTREMITIES WITH COMPLICATION, SUBSEQUENT ENCOUNTER: Primary | ICD-10-CM

## 2021-12-01 RX ORDER — BETAMETHASONE DIPROPIONATE 0.5 MG/G
OINTMENT, AUGMENTED TOPICAL 2 TIMES DAILY
Qty: 50 G | Refills: 1 | Status: ON HOLD | OUTPATIENT
Start: 2021-12-01 | End: 2024-06-21

## 2021-12-01 NOTE — TELEPHONE ENCOUNTER
[facility]    Who is the name of the provider?:  Michelle      What is the location you see this provider at?: [place]    Reason for call:  Was Betamethasone ordered from pharmacy, if not please order.      Can we leave a detailed message on this number?  YES

## 2021-12-02 ENCOUNTER — HOSPITAL ENCOUNTER (EMERGENCY)
Facility: CLINIC | Age: 56
Discharge: HOME | End: 2021-12-03
Attending: EMERGENCY MEDICINE | Admitting: EMERGENCY MEDICINE
Payer: MEDICARE

## 2021-12-02 ENCOUNTER — APPOINTMENT (OUTPATIENT)
Dept: ULTRASOUND IMAGING | Facility: CLINIC | Age: 56
End: 2021-12-02
Attending: EMERGENCY MEDICINE
Payer: MEDICARE

## 2021-12-02 ENCOUNTER — APPOINTMENT (OUTPATIENT)
Dept: GENERAL RADIOLOGY | Facility: CLINIC | Age: 56
End: 2021-12-02
Attending: EMERGENCY MEDICINE
Payer: MEDICARE

## 2021-12-02 DIAGNOSIS — S91.112A LACERATION OF LEFT GREAT TOE WITHOUT DAMAGE TO NAIL, FOREIGN BODY PRESENCE UNSPECIFIED, INITIAL ENCOUNTER: ICD-10-CM

## 2021-12-02 PROCEDURE — 73660 X-RAY EXAM OF TOE(S): CPT | Mod: LT

## 2021-12-02 PROCEDURE — 12002 RPR S/N/AX/GEN/TRNK2.6-7.5CM: CPT | Performed by: EMERGENCY MEDICINE

## 2021-12-02 PROCEDURE — 99284 EMERGENCY DEPT VISIT MOD MDM: CPT | Mod: 25 | Performed by: EMERGENCY MEDICINE

## 2021-12-02 PROCEDURE — 73660 X-RAY EXAM OF TOE(S): CPT | Mod: 26 | Performed by: RADIOLOGY

## 2021-12-02 PROCEDURE — 250N000013 HC RX MED GY IP 250 OP 250 PS 637: Performed by: EMERGENCY MEDICINE

## 2021-12-02 PROCEDURE — 93925 LOWER EXTREMITY STUDY: CPT | Mod: 26 | Performed by: RADIOLOGY

## 2021-12-02 PROCEDURE — 93925 LOWER EXTREMITY STUDY: CPT

## 2021-12-02 RX ORDER — OXYCODONE HYDROCHLORIDE 5 MG/1
5 TABLET ORAL ONCE
Status: COMPLETED | OUTPATIENT
Start: 2021-12-02 | End: 2021-12-02

## 2021-12-02 RX ADMIN — OXYCODONE HYDROCHLORIDE 5 MG: 5 TABLET ORAL at 23:21

## 2021-12-02 ASSESSMENT — ENCOUNTER SYMPTOMS: WOUND: 1

## 2021-12-03 ENCOUNTER — TELEPHONE (OUTPATIENT)
Dept: ORTHOPEDICS | Facility: CLINIC | Age: 56
End: 2021-12-03
Payer: MEDICARE

## 2021-12-03 VITALS
SYSTOLIC BLOOD PRESSURE: 125 MMHG | BODY MASS INDEX: 20.09 KG/M2 | DIASTOLIC BLOOD PRESSURE: 75 MMHG | TEMPERATURE: 98.6 F | HEART RATE: 85 BPM | WEIGHT: 140 LBS | RESPIRATION RATE: 16 BRPM | OXYGEN SATURATION: 97 %

## 2021-12-03 PROCEDURE — 250N000013 HC RX MED GY IP 250 OP 250 PS 637: Performed by: EMERGENCY MEDICINE

## 2021-12-03 RX ORDER — CEFDINIR 300 MG/1
300 CAPSULE ORAL ONCE
Status: COMPLETED | OUTPATIENT
Start: 2021-12-03 | End: 2021-12-03

## 2021-12-03 RX ADMIN — CEFDINIR 300 MG: 300 CAPSULE ORAL at 03:51
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
## 2021-12-03 NOTE — DISCHARGE INSTRUCTIONS
Follow up with Podiatry this week for close follow up of wound healing.  If your wound does not heal well, you may also need to be seen by vascular surgery.     Please call the vascular surgery clinic ([phone]) in order to schedule an JOVAN evaluation for your feet.    Change your dressing every day. Make sure your wound nurse watches this wound carefully.   Return to the emergency department for any worsening redness, swelling, drainage, pain, fever or any other concerns as given or discussed.       Wear a hard sole shoe.     Keep taking your antibiotics for your UTI, this will prevent infection for your foot too.     If you develop fever, redness, swelling, or purulent drainage return to the ER immediately.
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
## 2021-12-03 NOTE — PROGRESS NOTES
Orthopaedic Surgery Curbside Consult Note     Orthopedic Surgery was contacted to provide curbside consultation and weigh in on the care of Parth Fountain received in the ED for laceration of left foot. Of note, patient is 57 yo M PMH paralysis and PAD.     I was called by Dr. Watts on 12/2/21. Per Dr. Watts's report the patient sustained the laceration via an unknown mechanism ~24 hours ago. The laceration is roughly 6 cm in length. There was no gross contamination present. Patient is non-ambulatory due to paralysis and has vascular changes in his distal extremity per Dr. Watts's exam. The patient has been on Cefdenir for several days for UTI. Dr. Watts consulted with pharmacy who did not recommended adding any additional antibiotic coverage at this time.      Based on the information provided, I recommended the following:  - Bedside irrigation of the wound and loose approximation of the laceration by ED staff  - Continue antibiotics  - Daily dressing changes to the laceration site (patient has wound care home nursing)  - Hard sole shoe for protection  - ED to discuss with vascular team regarding if he is candidate for revascularization procedures/if vascular follow-up is indicated  - Follow-up in podiatry clinic in 1 week for wound check (scheduling message sent)     Dr. Watts did not request that I personally see or examine the patient at this time.      My recommendations are not intended to take the place of the Dr. Watts's clinical judgment, which should always be utilized to provide the most appropriate care to meet the unique needs of each patient.        Juan Hoyt MD  Orthopaedic Surgery PGY4  Pager: [phone]

## 2021-12-03 NOTE — ED TRIAGE NOTES
55yo male bed bound/paraplegic comes into the ED via EMS for L foot laceration.   Pt reports foot must have fallen off the foot rest and cut his toe.  Pt denies any sensation or feeling to his lower extremities.   3-4cm laceration noted under the big toe. Bleeding controlled at this time.

## 2021-12-03 NOTE — TELEPHONE ENCOUNTER
----- Message from Edel Balbuena ATC sent at 12/3/2021  8:38 AM CST -----  Regarding: FW: Scheduling podiatry follow-up  Please offer 8:00 am on sat 12/11/21 with  (ok per team , even though there is a hold).   ----- Message -----  From: Juan Lozano MD  Sent: 12/2/2021  11:01 PM CST  [place] [facility]  Subject: Scheduling podiatry follow-up                    Please schedule follow-up in 1 week with available podiatry provider for wound check of left foot laceration. Thank you.

## 2021-12-03 NOTE — ED PROVIDER NOTES
Patient taken in [place] from prior attending.   Seen by vascular surgery with recs for outpt f/u with podiatry and then vascular surgery if needed for poor wound healing or other concerns.     Vascular surgery requested ABIs prior to discharge, however unfortunately this will need to be done as an outpatient as I was informed by ultrasound that ABIs cannot be done overnight as there is no available read or equipment needed available to complete the study as requested surgery aware and will place order for outpatient testing.    Discussed with patient he is comfortable with this plan.    - Patient agrees to our plan and is ready and eager for discharge. Care plan, follow up plan, and reasons to return immediately to the ED were dicussed in detail and summarized as noted in the discharge instructions.        Eyad Taylor MD  12/03/21 [id number]

## 2021-12-05 NOTE — RESULT ENCOUNTER NOTE
MrHeaven Fountain,   Your CT shows small stones in each kidney.  This is improved from prior to your procedure.  You do still need your stent removed.  This can cause pain or kidney damage if not removed.  Please call the clinic to schedule this appointment.  Thank you, Kale Vides.

## 2021-12-06 NOTE — TELEPHONE ENCOUNTER
DIAGNOSIS: foot    APPOINTMENT DATE: 12.11.21   NOTES STATUS DETAILS   [facility]   OFFICE NOTE from other specialist N/A    DISCHARGE SUMMARY from hospital NA    DISCHARGE REPORT from the ER Internal 12.2.21 [facility] ED   OPERATIVE REPORT N/A    MEDICATION LIST Internal    EMG (for Spine) N/A    IMPLANT RECORD/STICKER N/A    LABS     CBC/DIFF N/A    CULTURES N/A    INJECTIONS DONE IN RADIOLOGY N/A    MRI N/A    CT SCAN N/A    US Internal 12.2.21 lower extremity   XRAYS (IMAGES & REPORTS) Internal 12.2.21 L toe   TUMOR     PATHOLOGY  Slides & report N/A

## 2021-12-07 ENCOUNTER — HOSPITAL ENCOUNTER (OUTPATIENT)
Dept: WOUND CARE | Facility: CLINIC | Age: 56
Discharge: HOME | End: 2021-12-07
Attending: PHYSICIAN ASSISTANT | Admitting: PHYSICIAN ASSISTANT
Payer: MEDICARE

## 2021-12-07 ENCOUNTER — TELEPHONE (OUTPATIENT)
Dept: OTHER | Facility: CLINIC | Age: 56
End: 2021-12-07

## 2021-12-07 VITALS — RESPIRATION RATE: 18 BRPM | TEMPERATURE: 97.6 F

## 2021-12-07 DIAGNOSIS — S31.109S RIGHT GROIN WOUND, SEQUELA: ICD-10-CM

## 2021-12-07 DIAGNOSIS — I73.9 PAD (PERIPHERAL ARTERY DISEASE) (H): Primary | ICD-10-CM

## 2021-12-07 DIAGNOSIS — S81.802D OPEN WOUND OF BOTH LOWER EXTREMITIES WITH COMPLICATION, SUBSEQUENT ENCOUNTER: ICD-10-CM

## 2021-12-07 DIAGNOSIS — S81.801D OPEN WOUND OF BOTH LOWER EXTREMITIES WITH COMPLICATION, SUBSEQUENT ENCOUNTER: ICD-10-CM

## 2021-12-07 PROCEDURE — 97602 WOUND(S) CARE NON-SELECTIVE: CPT

## 2021-12-07 PROCEDURE — 99214 OFFICE O/P EST MOD 30 MIN: CPT | Performed by: PHYSICIAN ASSISTANT

## 2021-12-07 RX ORDER — CALCIUM CARBONATE 500(1250)
TABLET ORAL
COMMUNITY
Start: 2021-11-24 | End: 2022-04-11

## 2021-12-07 RX ORDER — ASCORBIC ACID 500 MG
TABLET ORAL
COMMUNITY
Start: 2021-11-24 | End: 2024-01-02

## 2021-12-07 RX ORDER — BACLOFEN 20 MG/1
TABLET ORAL
COMMUNITY
Start: 2021-11-24 | End: 2022-02-01

## 2021-12-07 NOTE — DISCHARGE INSTRUCTIONS
"Parth KWAN Александр      1965    [facility] Phone: [phone] Fax: [phone]  Please call [facility] Phone: [phone] for appointment with provider to get your blood flow fixed   Wound dressing change recommendations Left lower leg  Cleanse wounds with soap and water or saline or wound cleanser  Cover with betamethasone ointment Cover wounds with gauze or ABD pad and secure with 2\" medipore tape  Change dressing Monday, Wednesday and Friday  Wound Dressing Change:Any Opening on Toe/Foot  Cleanse with wound cleanser  Paint with betadine and cover with gauze  Change dressing MICAELA JONES F. Catherine Cecconi, PA-C. December 7, 2021    Call us at [phone] if you have any questions about your wounds, have redness or swelling around your wound, have a fever of 101 or greater or if you have any other problems or concerns. We answer the phone Monday through Friday 8 am to 4 pm, please leave a message as we check the voicemail frequently throughout the day.     If you had a positive experience please indicate on your patient satisfaction survey form that [facility] will be sending you.    If you have any billing related questions please call the [facility] Business office at [phone]. The clinic staff does not handle billing related matters.    "

## 2021-12-07 NOTE — TELEPHONE ENCOUNTER
Pt referred to [facility] by Suzan Martinez PA-C for PAD and non-healing wound.     Patient has imaging in Epic.    Pt needs to be scheduled for JOVAN US and consult with vascular surgery.  Will route to scheduling to coordinate an appointment in next week.    Martha LAMASN, RN    [facility]  [facility]  Office: [phone]  Fax: [phone]

## 2021-12-07 NOTE — PROGRESS NOTES
ASSESSMENT:    1. Pyoderma gangrenosum of left lower leg  2. Peripheral arterial disease      PLAN:    1. Dress leg wounds with betamethasone, gauze and spandage  2. Dress left foot wounds with betadine, follow-up with Dr. Patrick next week  3. Hip wound with Calmoseptine PRN  4. Vascular surgery referral     HISTORY OF PRESENT ILLNESS:   Mr. Parth Fountain is a 56-year-old paraplegic gentleman who returns to us today to follow-up on chronic pelvic pressure ulcers as well as lower leg wounds. PMHx of DVT, chronic UTI, EtOh abuse, and s/p colostomy. He has a long history of pressure ulcers with subsequent corrective surgeries by Dr. Peña. Parth's pelvic wounds have been very difficult to heal due to his surgical history. He has very little tissue overlying his bone and it is mostly made up of scar tissue. His progress waxes and wanes.    10/21/20: Televisit. Thigh wound healed. Scraped left leg and foot during a transfer and has several shallow ulcers on left lower leg and toes. Has an ulcer in his right groin/hip crease. This waxes and wanes. Uses Stacie when open and calmoseptine when more closed, this is working well for him.   11/18/20: Return visit. All of his wounds have improved. Has new traumatic wounds on bilateral shins.   12/16/20: Returns via video visit. Shin wounds are improving but very friable and hypergranular. Hip crease slightly more open today, also appears friable. Has been using Stacie to all wounds.   01/13/21: Returns for in office visit.  Leg wounds are improving with use of calcium alginate.  However, Parth notes that the dressings are sticking to the wounds and causing bleeding when removed.  His right groin wound is now quite hypertrophic.  2/26/21: Returns for follow-up. Reports that he continues to reinjure his R lower leg. Hip wound is not really improving. Has new wound on R great toe with exposed bone.   4/9/21: Returns for follow-up. Still has exposed bone on R great toe and has  "not had the x-ray done we ordered at last visit. His R lower leg has worsened. R hip about the same.   5/19/21: Returns for follow-up via telephone, has been using Xeroform. Has not done vascular studies or xray as recommended. Hip wound is improving. Has a recurrence of his L IT wound. His legs are marginally better, reports they are \"squishy.\"    8/20: Returns for televisit. Had to reschedule from in-clinic visit due to staffing issues. Left toe has new wound, lateral left leg still open. Has been using xeroform. Thinks R toe is closed up now. R hip comes and goes, uses Calmoseptine PRN. Has lithotripsy this upcoming week. Has not had xray or ABIs.   09/24/21: Returns for in person follow-up. His hip/groin wound has completely healed, however this usually comes and goes. Has new minor right heel wound. Continues to have issues with left lower leg, today during the visit it is apparent he has some kind of vessel in this area that easily bleeds causing a hematoma in this area. Fortunately the wound is fairly superficial. He has not had ABIs or x rays as recommended. Fortunately his toe appears stable today without any exposed bone.   10/26/21: Returns for follow-up. Left leg has regressed, tissue now very friable and hypertrophic. Toe continues to be resolved. Hip wound currently resolved. Biopsy performed came back suspicious for pyoderma gangrenosum.    12/2/21: Seen in ED for laceration of foot. Arterial duplex performed was suspicious for arterial disease.   12/07/21: Orders were changed after biopsy results last visit to begin high potency topical steroid ointment, unfortunately there was some confusion on this and he hasn't started it. Staff has just been cleaning and covering with gauze and the wound is much improved.      OFFLOADING: On a Group 2 mattress. Still utilizing RoHo cushion on his wheelchair.       SOCIAL HISTORY:  Lives in a group home. He is still receiving home health care through [facility] that comes and does dressing changes daily.      PHYSICAL EXAMINATION   INTEGUMENTARY:  Wound (used by [facility] only) 12/16/20 1253 Left skin tear (Active)   Thickness/Stage full thickness 12/07/21 1000   Dressing Appearance moist drainage 12/07/21 1000   Base granulating 12/07/21 1000   Periwound intact 12/07/21 1000   Periwound Temperature warm 12/07/21 1000   Periwound Skin Turgor soft 12/07/21 1000   Edges open 12/07/21 1000   Length (cm) 1.2 12/07/21 1000   Width (cm) 0.9 12/07/21 1000   Depth (cm) 0.1 12/07/21 1000   Wound (cm^2) 1.08 cm^2 12/07/21 1000   Wound Volume (cm^3) 0.11 cm^3 12/07/21 1000   Wound healing % -170 12/07/21 1000   Drainage Characteristics/Odor serosanguineous 12/07/21 1000   Drainage Amount moderate 12/07/21 1000   Care, Wound non-select wound debridement performed 12/07/21 1000       [facility] 10/26/21 1125 Left (Active)   Thickness/Stage full thickness 12/07/21 1000   Dressing Appearance moist drainage 12/07/21 1000   Base granulating 12/07/21 1000   Periwound intact 12/07/21 1000   Periwound Temperature warm 12/07/21 1000   Periwound Skin Turgor soft 12/07/21 1000   Edges open 12/07/21 1000   Length (cm) 0.9 12/07/21 1000   Width (cm) 1.4 12/07/21 1000   Depth (cm) 0.1 12/07/21 1000   Wound (cm^2) 1.26 cm^2 12/07/21 1000   Wound Volume (cm^3) 0.13 cm^3 12/07/21 1000   Wound healing % 83.59 12/07/21 1000   Drainage Characteristics/Odor serosanguineous 12/07/21 1000   Drainage Amount moderate 12/07/21 1000   Care, Wound non-select wound debridement performed 12/07/21 1000       [facility]) 12/07/21 1021 Left (Active)   Thickness/Stage full thickness 12/07/21 1000   Dressing Appearance moist drainage 12/07/21 1000   Base granulating 12/07/21 1000   Periwound intact 12/07/21 1000   Periwound Temperature warm 12/07/21 1000   Periwound Skin Turgor soft 12/07/21 1000   Edges open 12/07/21 1000   Length (cm) 4 12/07/21 1000   Width (cm) 1.5 12/07/21 1000    Depth (cm) 0.3 12/07/21 1000   Wound (cm^2) 6 cm^2 12/07/21 1000   Wound Volume (cm^3) 1.8 cm^3 12/07/21 1000   Drainage Characteristics/Odor serosanguineous 12/07/21 1000   Drainage Amount moderate 12/07/21 1000   Care, Wound non-select wound debridement performed 12/07/21 1000                MDM: 30-39 minutes were spent on the date of the visit reviewing previous chart notes, evaluating patient and developing the treatment plan, this excludes any time spent on procedures.   Electronically signed by Suzan Martinez PA-C on December 7, 2021  Suzan Martinez PA-C

## 2021-12-08 ENCOUNTER — TELEPHONE (OUTPATIENT)
Dept: UROLOGY | Facility: CLINIC | Age: 56
End: 2021-12-08
Payer: MEDICARE

## 2021-12-10 ENCOUNTER — PRE VISIT (OUTPATIENT)
Dept: UROLOGY | Facility: CLINIC | Age: 56
End: 2021-12-10
Payer: MEDICARE

## 2021-12-10 NOTE — TELEPHONE ENCOUNTER
Reason for visit: cysto w/stent removal     Relevant information: s/p nephrolithotomy    Records/imaging/labs/orders: in epic    Pt called: no    At Rooming: stent grasper, blue box

## 2021-12-11 ENCOUNTER — PRE VISIT (OUTPATIENT)
Dept: ORTHOPEDICS | Facility: CLINIC | Age: 56
End: 2021-12-11

## 2021-12-15 ENCOUNTER — TELEPHONE (OUTPATIENT)
Dept: WOUND CARE | Facility: CLINIC | Age: 56
End: 2021-12-15
Payer: MEDICARE

## 2021-12-16 ENCOUNTER — TELEPHONE (OUTPATIENT)
Dept: WOUND CARE | Facility: CLINIC | Age: 56
End: 2021-12-16
Payer: MEDICARE

## 2021-12-16 NOTE — TELEPHONE ENCOUNTER
Ulcers on pelvic re-opened he IT they are using iodosorb gel, scrotum they are using xeroform. He has ulcers on right ankle and foot that he did not show us they will use betadine to those. He has scheduled follow up in January.

## 2021-12-16 NOTE — TELEPHONE ENCOUNTER
Per chart scheduled to see Dr. Chapa 12/21/21.     Needs to be schedule for JOVAN prior to visit with Dr. Chapa.       Marce Gan    [facility]   [phone]

## 2021-12-20 NOTE — TELEPHONE ENCOUNTER
Scheduled US and rescheduled Dr. Chapa for 12/23/21.       Marce Gan    [facility]   [phone]

## 2021-12-20 NOTE — PROGRESS NOTES
[facility]    Parth Fountain is been followed at the wound clinic for bilateral leg ulcerations.  Primary concern is left leg.  12/3/2021 arterial duplex indicative bilateral distal SFA/popliteal disease.    Patient gets multiple contusions and ulcerations to his legs.  Most of these heal up toys had a chronic very slow healing noninfected ulcers the left anterior lateral mid calf.    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  Injury to left great toe/metatarsal requiring sutures in the ED on 2021-12-02.      PMH: Medications: Lyrica, baclofen, trazodone, Cymbalta, aspirin, oxycodone, Chantix      Medical: History of DVT    Chronic UTI    Alcohol abuse history    Paraplegia-wheelchair-bound.  No distal sensation to his legs.    Prior wound biopsy suspicious for pyoderma granulosum.  Foot laceration requiring sutures in ED 12/2/2021    Lives in a group home.  Roho wheelchair cushion.  Multiple surgeries by Dr. Peña for pressure ulcers  History of exposed bone on right great toe.  12/2/2021 left toe x-rays with erosion distal articular surface of the left proximal phalanx and resorption/erosion left second middle phalanx      11/28/2021 laboratory: SCr= 0.61   Hgb= 13.6    2021-12-23 JOVAN reveals bilateral PAD.  Suspect SFA disease with biphasic femoral waveforms bilaterally but monophasic bilateral popliteal and distal waveforms.  Index of 0.48 on the right and 0.72 on the left digital.    Exam: Alert and appropriate.  Very pleasant and talkative.  In his motorized wheelchair.   Blood pressure 111/56.  Pulse 125.   Chest= clear   Extremities= paraplegia.  No contractures.  No motor or sensory function.    Ulceration over right lateral amputated 5th metatarsal area    more superficial ulcerations x2 in the left lateral calf    Sutured area over the left medial great toe is healed well though there is a 0.6 x   0.5 cm open area  proximally.     Sutures removed from the right great toe area.  Overall healing well.   Bedside Doppler with monophasic waveforms to DP and PT bilaterally those stronger on the left compared to the right side    Impression: Definite evidence of infra inguinal PAD and possible iliac disease.  He does have the nonhealing ulcerations.  Somewhat more concerned on the right side since ER more distal ulcers and less so on the left calf.  Obviously improved blood flow would be beneficial to aid in healing and help prevent future ulcerations.  With his paraplegia he is very prone to contusions to both of his calfs.     We discussed the possibility aortic angiogram with runoffs bilaterally with  initial attention to the right leg to see if a lesion such as the SFA would be amenable to angioplasty and stenting.  He has normal renal function and this is able to be done with no significant risk.  Would continue on his aspirin.  He wants to think about this and make a decision.  May wait until he has his next wound care appointment in several weeks before making his decision.     Angiogram would be performed as an outpatient with our interventional radiologist at [facility].    Over 30 minutes with patient today reviewing records from the wound clinic plus [facility] along with testing performed today to make these recommendations.      Felipe Chapa MD  This note was created using Dragon voice recognition software which may result in transcription errors.

## 2021-12-22 ENCOUNTER — TELEPHONE (OUTPATIENT)
Dept: UROLOGY | Facility: CLINIC | Age: 56
End: 2021-12-22
Payer: MEDICARE

## 2021-12-22 NOTE — TELEPHONE ENCOUNTER
----- Message from Shivani Guillermo RN sent at 12/22/2021 10:42 AM CST -----  Regarding: RE: Stent removal  11:20 on Jan 14th with Dr. Vides. Thank you  ----- Message -----  From: Jovita Raphael  Sent: 12/20/2021   9:37 AM CST  To: Shivani Guillermo RN  Subject: Stent removal                                    Can you please help me, should it really be with any provider? Also how soon since this is a post op patient from [facility].   ----- Message -----  From: Kervin Harper  Sent: 12/20/2021   8:44 AM CST  To: Jovita Raphael  Subject: RE: reschedule                                   Next available stent removal with any provider    Thank you  ----- Message -----  From: Jovita Raphael  Sent: 12/18/2021  12:30 PM CST  To: Shivani Rae RN  Subject: RE: reschedule                                   Where? This was a post op for a cysto/sent removal?   ----- Message -----  From: Kervin Harper  Sent: 12/17/2021  11:38 AM CST  To: [facility]  Subject: reschedule                                       This pt showed up ~3.5 hours early to his appointment and thought his appointment was at that time. He got upset and left. Can we get him rescheduled?     Thank you  Kervin

## 2021-12-22 NOTE — TELEPHONE ENCOUNTER
Scheduled and called nurse Thompson with info. Told her last time pt came a few hours early and left swearing and not seen. She said she will make sure that doesn't happen for this appt. I also mailed out the visit info

## 2021-12-23 ENCOUNTER — HOSPITAL ENCOUNTER (OUTPATIENT)
Dept: ULTRASOUND IMAGING | Facility: CLINIC | Age: 56
End: 2021-12-23
Attending: SURGERY
Payer: MEDICARE

## 2021-12-23 ENCOUNTER — OFFICE VISIT (OUTPATIENT)
Dept: OTHER | Facility: CLINIC | Age: 56
End: 2021-12-23
Attending: PHYSICIAN ASSISTANT
Payer: MEDICARE

## 2021-12-23 VITALS — HEART RATE: 108 BPM | SYSTOLIC BLOOD PRESSURE: 154 MMHG | DIASTOLIC BLOOD PRESSURE: 75 MMHG

## 2021-12-23 DIAGNOSIS — S81.801D OPEN WOUND OF BOTH LOWER EXTREMITIES WITH COMPLICATION, SUBSEQUENT ENCOUNTER: ICD-10-CM

## 2021-12-23 DIAGNOSIS — S81.802D OPEN WOUND OF BOTH LOWER EXTREMITIES WITH COMPLICATION, SUBSEQUENT ENCOUNTER: ICD-10-CM

## 2021-12-23 DIAGNOSIS — I73.9 PAD (PERIPHERAL ARTERY DISEASE) (H): Primary | ICD-10-CM

## 2021-12-23 DIAGNOSIS — I73.9 PAD (PERIPHERAL ARTERY DISEASE) (H): ICD-10-CM

## 2021-12-23 PROCEDURE — 93924 LWR XTR VASC STDY BILAT: CPT

## 2021-12-23 PROCEDURE — 99203 OFFICE O/P NEW LOW 30 MIN: CPT | Performed by: SURGERY

## 2021-12-23 PROCEDURE — G0463 HOSPITAL OUTPT CLINIC VISIT: HCPCS

## 2021-12-23 NOTE — PROGRESS NOTES
[facility]        Patient is here for a consult.     Pt is currently taking Aspirin.    BP (!) 154/75 (BP Location: Right arm, Patient Position: Chair, Cuff Size: Adult Regular)   Pulse 108     The provider has been notified that the patient has no concerns.     Questions patient would like addressed today are: N/A.    Refills are needed: N/A    Has homecare services and agency name:  Xin Howard MA

## 2021-12-23 NOTE — PATIENT INSTRUCTIONS
Parth Fountain,    Your visit to [facility] for your surgery or procedure is coming soon and we look forward to seeing you! This friendly reminder and pre-procedure checklist will help to ensure your procedure/surgery goes smoothly and meets your expectations. At [facility], our goal is to provide you with a great patient experience and to deliver genuine, professional care to every patient.     Please complete all the steps in advance of your visit. If you have any questions about the items listed below, please give our office a call. We can be reached at [phone].      If you take blood thinners: SEE SPECIFIC INSTRUCTIONS BELOW    PLEASE DO NOT STOP YOUR ASPIRIN OR PLAVIX UNLESS SPECIFICALLY DIRECTED BY THE VASCULAR SURGEON TO STOP!  - In most cases Vascular surgeons want you to continue these. This is different from most NON vascular surgeries and may not be well known by your Primary Care Provider      Not Applicable    IF YOU NEED TO RESCHEDULE OR CANCEL YOUR PROCEDURE FOR ANY REASON PLEASE CALL THE CLINIC AS SOON AS POSSIBLE -[phone].    Before the procedure  Prepare for the peripheral angiography as follows:     [] A Pre-op physical within 30 days of the procedure is required. You will need to set up an appointment with your primary care provider.      Do not eat or drink anything after midnight before your procedure. If your provider says to take your normal medicines, swallow them with only small sips of water.    Tell your healthcare provider about all medicines you take and any allergies you may have.    Arrange for a family member or friend to drive you home.    PLEASE BE SURE TO ADDRESS WITH YOUR PCP WHAT TO DO WITH YOUR INSULIN AND METFORMIN PRIOR TO YOUR ANGIOGRAM    Peripheral Angiography    Peripheral angiography is an outpatient procedure that makes a  map  of the vessels (arteries) in your lower body, legs, and arms, using X-ray and dye.This map can show  where blood flow may be blocked.    An angiogram is commonly performed under sedation with the use of local anesthesia.      The procedure usually starts with a needle put into the femoral (groin) artery. From one treatment site, areas all over the body can be treated.  After access is established, catheters (thin tubes) and wires are threaded through the arterial system to a specific area of interest or throughout the entire body.  As a contrast agent (iodine dye) is injected, X-ray images are taken to let your vascular surgeon view the flow of the dye and identify blockages. The surgeon can then choose the best mode of therapy for you - whether during or following the angiogram. This decision depends on your symptoms and the severity and characteristics of the blockages.  Two common therapies that can be provided during the angiogram are balloon angioplasty and stent placement.                     Angioplasty can be used to open arterial blockages. Guided by X-ray, your vascular surgeon navigates through the blockage with a wire and introduces a special device equipped with an inflatable balloon. After positioning the balloon device across the blocked portion of the artery, the vascular surgeon inflates the balloon to expand the artery and compress the blockage. The balloon is then deflated and removed while keeping the wire in place across the area that has been treated. Next, contrast dye is injected to assess the result. Treatment is considered a success if blood flow is improved and less than 30% of the blockage remains. If the vessel is still considerably narrowed, placing a stent may be the next step.      Stents are used to prop open an artery at the site of a narrowing. Stents are generally placed after balloon angioplasty when there is residual narrowing or insufficient blood flow in a treated vessel. Stents are considered a permanent implant and cannot be used if you have a metal allergy. Stents that are  used in the leg are constructed of a nickel-titanium alloy (Nitinol), a memory-shaped metal. This alloy has a predetermined size and shape at body temperature and expands to this size and shape after being introduced through a catheter. These stents resist kinking and are flexible so that damage from activities that involve your legs is minimized.    If surgery is felt to be a better option, your vascular surgeon will obtain any additional X-ray images needed to plan a surgical bypass of the blocked vessel/s and will then conclude the angiogram.          During the procedure    You may get medicine through an IV (intravenous) line to relax you. You re given an injection to numb the insertion site. Then, a tiny skin cut (incision) is made near an artery in your groin.    Your provider inserts a thin tube (catheter) through the incision. He or she then threads the catheter into an artery while looking at a video monitor.    Contrast  dye  is injected into the catheter to confirm position. You may feel warmth or pressure in your legs and back. You lie still as X-rays are taken. The catheter is then taken out.  After the procedure  You ll be taken to a recovery area. A healthcare provider will apply pressure to the site for about 10 minutes. Your healthcare provider will tell you how long to lie down and keep the insertion site still. Your healthcare provider will discuss the results with you soon after the procedure.  Back at home  On the day you get home, don t drive, don t exercise, avoid walking and taking stairs, and avoid bending and lifting. Your healthcare provider may give you other care instructions.    Call your healthcare provider  Call your healthcare provider right away if:    You notice a lump or bleeding at the insertion site    You feel pain at the insertion site    You become lightheaded or dizzy    You have leg pain or numbness    You do not urinate in 8 hours      Angiogram Procedure Discharge  Instructions:     1. If you received sedation for your procedure: Do not drive or operate heavy machinery for the rest of the day.     2. Avoid strenuous activity for 72 hours (3 days):                        - Do not lift greater than 10 pounds.                        - Excessive exercise                        - Straining                        - Return to your normal activities as you tolerate after the 3 days restriction     3. Avoid tub baths, Jacuzzis, hot tubs and pools for 72 hours (3 days) or until puncture site is will healed.     4. You may shower beginning tomorrow. Do not scrub puncture site(s) until well healed, pat dry.     5. You can expect to return to work 1-2 days after your procedure - depending on the nature of your profession.     6. It is normal to have some tenderness and minimal swelling at puncture site. A small area of discoloration may be present. Tenderness typically subsides in 24-48 hours. A small knot may also be present at puncture site for 6-8 weeks, this can be a normal part of the healing process.     After the angiogram If you:      1. Experience any bleeding or active swelling from puncture site: Lie down, firmly apply pressure to puncture site and CALL 9-1-1     2. Fever greater than 101 degrees Fahrenheit.     3. Redness, swelling, warmth to touch, or purulent (yellow/green/foul smelling) drainage from the puncture site.     4. Increasing pain, tenderness or swelling at puncture site OR of arm/leg near puncture site.     5. Feeling weak or faint.     6. Change in color, temperature, or sensation of arm/leg where puncture was made.     Call us with any other questions or concerns after your procedure: [phone].    You will need to have an ultrasound 2-3 weeks after your angiogram and should be scheduled at the time of your follow up appt.  Further follow up will be based on ultrasound results. Typical follow up is every 3 months for the first year, then every 6 months to  one year thereafter.       All invasive procedures can have complications. While the risk of an angiogram is low it is not zero. The most common complications are related to the arterial access site.       Risks/ Complications     Bruising is Common  You will likely have bruising (ecchymosis) where the artery was entered.      Pain and Bleeding  Less commonly, patients experience pain and bleeding that may include blood collecting under the skin (hematoma).      Blockage and Leakage   In rare cases, the access artery can become blocked. Infrequently, patients experience persistent leakage of blood where the artery was entered, which can result in the formation of a pseudoaneurysm--a blood-filled sac--that may require further treatment.    Other complications related to an angiogram include:   Allergic reaction to the iodine contrast dye, which can lead to the development of kidney failure.  Very rarely during balloon angioplasty and/or stent placement, part of the arterial blockage can break off (embolism) and travel to more distant arteries. This can worsen blood flow.        Notify our office right away, if you have any changes in your health status, or if you develop a cold, flu, diarrhea, infection, fever or sore throat before your scheduled surgery date. We can be reached at [phone].  Monday-Friday 8 am-4:30 pm if you have any questions.   Thank you for choosing [facility]  If you have any questions or need to reschedule your appointment, please call [phone]

## 2021-12-23 NOTE — NURSING NOTE
Patient will call if he decides to pursue angiogram.  Blue surgery form completed and placed in Dr. Chapa's TBD surgery folder.  ADAMS SanchesN, RN-[facility] [place]

## 2022-01-13 ENCOUNTER — PRE VISIT (OUTPATIENT)
Dept: UROLOGY | Facility: CLINIC | Age: 57
End: 2022-01-13
Payer: MEDICARE

## 2022-01-13 NOTE — TELEPHONE ENCOUNTER
Reason for visit: cysto/stent removal     Relevant information: post op, s/p nephrolithotomy    Records/imaging/labs/orders: in epic    Pt called: no    At Rooming: stent grasper, blue box

## 2022-01-14 ENCOUNTER — OFFICE VISIT (OUTPATIENT)
Dept: UROLOGY | Facility: CLINIC | Age: 57
End: 2022-01-14
Payer: MEDICARE

## 2022-01-14 VITALS — DIASTOLIC BLOOD PRESSURE: 98 MMHG | HEART RATE: 96 BPM | SYSTOLIC BLOOD PRESSURE: 176 MMHG

## 2022-01-14 DIAGNOSIS — N20.0 KIDNEY STONE: Primary | ICD-10-CM

## 2022-01-14 LAB
ALBUMIN UR-MCNC: 30 MG/DL
APPEARANCE UR: ABNORMAL
BACTERIA #/AREA URNS HPF: ABNORMAL /HPF
BILIRUB UR QL STRIP: NEGATIVE
COLOR UR AUTO: YELLOW
GLUCOSE UR STRIP-MCNC: NEGATIVE MG/DL
HGB UR QL STRIP: ABNORMAL
KETONES UR STRIP-MCNC: NEGATIVE MG/DL
LEUKOCYTE ESTERASE UR QL STRIP: ABNORMAL
MUCOUS THREADS #/AREA URNS LPF: PRESENT /LPF
NITRATE UR QL: POSITIVE
PH UR STRIP: 7 [PH] (ref 5–7)
RBC URINE: >182 /HPF
SP GR UR STRIP: 1.01 (ref 1–1.03)
SQUAMOUS EPITHELIAL: 1 /HPF
UROBILINOGEN UR STRIP-MCNC: NORMAL MG/DL
WBC CLUMPS #/AREA URNS HPF: PRESENT /HPF
WBC URINE: 169 /HPF

## 2022-01-14 PROCEDURE — 81001 URINALYSIS AUTO W/SCOPE: CPT | Performed by: PATHOLOGY

## 2022-01-14 PROCEDURE — 99213 OFFICE O/P EST LOW 20 MIN: CPT | Mod: 24 | Performed by: UROLOGY

## 2022-01-14 PROCEDURE — 87086 URINE CULTURE/COLONY COUNT: CPT | Performed by: UROLOGY

## 2022-01-14 RX ORDER — CIPROFLOXACIN 500 MG/1
500 TABLET, FILM COATED ORAL 2 TIMES DAILY
Qty: 20 TABLET | Refills: 0 | Status: SHIPPED | OUTPATIENT
Start: 2022-01-14 | End: 2022-04-11

## 2022-01-14 NOTE — LETTER
1/14/2022       RE: Parth Fountain  [address]  [place] [address]     Dear Colleague,    Thank you for referring your patient, Parth Fountain, to the [facility] UROLOGY CL[facility] [place] at [facility]. Please see a copy of my visit note below.    ASSESSMENT and PLAN   History Kidney stones.  Bilateral.  Complicated by ileal conduit and spinal cord injury (T7).      8/23/21 right percutaneous nephrolithotomy.  Matrix stone.  10/18/21 left percutaneous nephrolithotomy.  Matrix stone.    Observation for remaining small kidney stones.      Possible urinary tract infection today.      Plan    Urinalysis and urine culture today    cipro for 10 days    Return to clinic next week for stent removal.    ____________________________________________________________________    CHIEF COMPLAINT  It was my pleasure to see Parth Fountain who is a 56 year old male here for evaluation of kidney stones.    HPI  He is here for stent removal but feels like he has urinary tract infection.  Symptoms are foul smelling urine and poor apetite.  These are his usual symptoms..      10/27/21 CT Abdomen/Pelvis without contrast   I reviewed the radiologic images and report from this radiologic exam.  My independent interpretation is:    Remaining stones are small and we will plan observation.    Radiologist Impression  1.  Bilateral nonobstructing renal calculi. Interval placement of a  left ureteral stent. Previously described right ureteropelvic junction  calculus is no longer seen. No hydronephrosis.  2.  Extensive postsurgical changes in the pelvis. Persistent skin  indentations extending to the acetabulum may indicate decubitus  ulcers.          I reviewed the following labs  I reviewed the following laboratory data and went over findings with patient:  Recent Labs   Lab Test 06/30/21  0742 06/29/21  0556 06/28/21  0854 06/27/21  0646   WBC 11.8* 12.0* 10.4 11.5*   HGB 10.7*  11.1* 10.2* 10.2*   * 505* 392 335     Recent Labs   Lab Test 06/30/21  0742 06/29/21  0556 06/28/21  0854 06/27/21  0645   CR 0.69 0.70 0.59* 0.64*   GFRESTIMATED >90 >90 >90 >90   GFRESTBLACK >90 >90 >90 >90   GLC 86 87 82 88       CC  Patient Care Team:  Maria Ines Emmanuel as PCP - General (Internal Medicine)  oMose Vides MD as MD (Urology)  Shivani Guillermo, RN as Registered Nurse (Urology)  Moose Vides MD as Assigned Surgical Provider  Maria Ines Emmanuel as Referring Physician (Internal Medicine)  Daria Fong PA-C as Physician Assistant (Anesthesiology)  SELF, REFERRED    Copy to patient  NAN DUPREE  [address]  [facility] [address]

## 2022-01-14 NOTE — PROGRESS NOTES
ASSESSMENT and PLAN   History Kidney stones.  Bilateral.  Complicated by ileal conduit and spinal cord injury (T7).      8/23/21 right percutaneous nephrolithotomy.  Matrix stone.  10/18/21 left percutaneous nephrolithotomy.  Matrix stone.    Observation for remaining small kidney stones.      Possible urinary tract infection today.      Plan    Urinalysis and urine culture today    cipro for 10 days    Return to clinic next week for stent removal.    ____________________________________________________________________    CHIEF COMPLAINT  It was my pleasure to see Parth Fountain who is a 56 year old male here for evaluation of kidney stones.    HPI  He is here for stent removal but feels like he has urinary tract infection.  Symptoms are foul smelling urine and poor apetite.  These are his usual symptoms..      10/27/21 CT Abdomen/Pelvis without contrast   I reviewed the radiologic images and report from this radiologic exam.  My independent interpretation is:    Remaining stones are small and we will plan observation.    Radiologist Impression  1.  Bilateral nonobstructing renal calculi. Interval placement of a  left ureteral stent. Previously described right ureteropelvic junction  calculus is no longer seen. No hydronephrosis.  2.  Extensive postsurgical changes in the pelvis. Persistent skin  indentations extending to the acetabulum may indicate decubitus  ulcers.          I reviewed the following labs  I reviewed the following laboratory data and went over findings with patient:  Recent Labs   Lab Test 06/30/21  0742 06/29/21  0556 06/28/21  0854 06/27/21  0646   WBC 11.8* 12.0* 10.4 11.5*   HGB 10.7* 11.1* 10.2* 10.2*   * 505* 392 335     Recent Labs   Lab Test 06/30/21  0742 06/29/21  0556 06/28/21  0854 06/27/21  0645   CR 0.69 0.70 0.59* 0.64*   GFRESTIMATED >90 >90 >90 >90   GFRESTBLACK >90 >90 >90 >90   GLC 86 87 82 88       CC  Patient Care Team:  Maria Ines Emmanuel as PCP - General (Internal  Medicine)  Moose Vides MD as MD (Urology)  Shivani Guillermo, RN as Registered Nurse (Urology)  Moose Vides MD as Assigned Surgical Provider  Maria Ines Emmanuel as Referring Physician (Internal Medicine)  Daria Fong PA-C as Physician Assistant (Anesthesiology)  SELF, REFERRED    Copy to patient  NAN DUPREE  [address]  [facility] [address]

## 2022-01-14 NOTE — PATIENT INSTRUCTIONS
Please get take your course of antibiotics and follow up on 1/21/22 for a stent removal at 9:00am    It was a pleasure meeting with you today.  Thank you for allowing me and my team the privilege of caring for you today.  YOU are the reason we are here, and I truly hope we provided you with the excellent service you deserve.  Please let us know if there is anything else we can do for you so that we can be sure you are leaving completely satisfied with your care experience.

## 2022-01-15 LAB — BACTERIA UR CULT: NORMAL

## 2022-01-21 ENCOUNTER — OFFICE VISIT (OUTPATIENT)
Dept: UROLOGY | Facility: CLINIC | Age: 57
End: 2022-01-21
Payer: MEDICARE

## 2022-01-21 VITALS
HEART RATE: 93 BPM | HEIGHT: 65 IN | WEIGHT: 145 LBS | DIASTOLIC BLOOD PRESSURE: 77 MMHG | SYSTOLIC BLOOD PRESSURE: 150 MMHG | BODY MASS INDEX: 24.16 KG/M2

## 2022-01-21 DIAGNOSIS — N20.0 KIDNEY STONE: Primary | ICD-10-CM

## 2022-01-21 PROCEDURE — 99024 POSTOP FOLLOW-UP VISIT: CPT | Performed by: UROLOGY

## 2022-01-21 ASSESSMENT — MIFFLIN-ST. JEOR: SCORE: 1404.6

## 2022-01-21 ASSESSMENT — PAIN SCALES - GENERAL: PAINLEVEL: NO PAIN (0)

## 2022-01-21 NOTE — LETTER
1/21/2022       RE: Parth Fountain  [address]  [place] [address]     Dear Colleague,    Thank you for referring your patient, Parth Fountain, to the [facility] UROLOGY CLINIC [place] at [facility]. Please see a copy of my visit note below.    ASSESSMENT and PLAN   History Kidney stones.  Bilateral.  Complicated by ileal conduit and spinal cord injury (T7).      8/23/21 right percutaneous nephrolithotomy.  Matrix stone.  10/18/21 left percutaneous nephrolithotomy.  Matrix stone.    Observation for remaining small kidney stones.    Single J removed today.      Plan    CT in 1 year.  Follow-up visit with PA.    ____________________________________________________________________    CHIEF COMPLAINT  It was my pleasure to see Parth Fountain who is a 56 year old male here for evaluation of kidney stones.    HPI  He is here for stent removal.  He feels well today.      10/27/21 CT Abdomen/Pelvis without contrast   I reviewed the radiologic images and report from this radiologic exam.  My independent interpretation is:    Remaining stones are small and we will plan observation.    Radiologist Impression  1.  Bilateral nonobstructing renal calculi. Interval placement of a  left ureteral stent. Previously described right ureteropelvic junction  calculus is no longer seen. No hydronephrosis.  2.  Extensive postsurgical changes in the pelvis. Persistent skin  indentations extending to the acetabulum may indicate decubitus  ulcers.          I reviewed the following labs  I reviewed the following laboratory data and went over findings with patient:  Recent Labs   Lab Test 06/30/21  0742 06/29/21  0556 06/28/21  0854 06/27/21  0646   WBC 11.8* 12.0* 10.4 11.5*   HGB 10.7* 11.1* 10.2* 10.2*   * 505* 392 335     Recent Labs   Lab Test 06/30/21  0742 06/29/21  0556 06/28/21  0854 06/27/21  0645   CR 0.69 0.70 0.59* 0.64*   GFRESTIMATED >90 >90 >90 >90    GFRESTBLACK >90 >90 >90 >90   GLC 86 87 82 88       Procedure:  The ostomy bag was removed.  The end of the single J was cut and the entire tube was easily removed.    CC  Patient Care Team:  Maria Ines Emmanuel as PCP - General (Internal Medicine)  Moose Vides MD as MD (Urology)  Shivani Guillermo, RN as Registered Nurse (Urology)  Moose Vides MD as Assigned Surgical Provider  Maria Ines Emmanuel as Referring Physician (Internal Medicine)  Daria Fong PA-C as Physician Assistant (Anesthesiology)  SELF, REFERRED    Copy to patient  NAN DUPREE  [address]  [facility] [address]

## 2022-01-21 NOTE — PATIENT INSTRUCTIONS
Please follow up in six months with Dr. Vides and get imaging in one month.    It was a pleasure meeting with you today.  Thank you for allowing me and my team the privilege of caring for you today.  YOU are the reason we are here, and I truly hope we provided you with the excellent service you deserve.  Please let us know if there is anything else we can do for you so that we can be sure you are leaving completely satisfied with your care experience.
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
## 2022-01-24 ENCOUNTER — TELEPHONE (OUTPATIENT)
Dept: WOUND CARE | Facility: CLINIC | Age: 57
End: 2022-01-24
Payer: MEDICARE

## 2022-01-24 NOTE — TELEPHONE ENCOUNTER
Advised patient no- showed appointment. He should come to scheduled appointment on Friday to get orders. Gave verbal for xeroform. Will discuss pressure mapping at appointment.

## 2022-01-24 NOTE — TELEPHONE ENCOUNTER
Home care nurse would like official orders to use xeroform on new wound located on patient's thigh and also possible pressure mapping for his wheelchair. She also stated concern that his wounds appear to be larger but do not seem to be infected.

## 2022-01-28 ENCOUNTER — HOSPITAL ENCOUNTER (OUTPATIENT)
Dept: WOUND CARE | Facility: CLINIC | Age: 57
Discharge: HOME | End: 2022-01-28
Attending: PHYSICIAN ASSISTANT | Admitting: PHYSICIAN ASSISTANT
Payer: MEDICARE

## 2022-01-28 VITALS
DIASTOLIC BLOOD PRESSURE: 87 MMHG | RESPIRATION RATE: 18 BRPM | HEART RATE: 93 BPM | TEMPERATURE: 97.3 F | SYSTOLIC BLOOD PRESSURE: 159 MMHG

## 2022-01-28 DIAGNOSIS — S81.802D OPEN WOUND OF BOTH LOWER EXTREMITIES WITH COMPLICATION, SUBSEQUENT ENCOUNTER: ICD-10-CM

## 2022-01-28 DIAGNOSIS — S81.801D OPEN WOUND OF BOTH LOWER EXTREMITIES WITH COMPLICATION, SUBSEQUENT ENCOUNTER: ICD-10-CM

## 2022-01-28 DIAGNOSIS — L89.614 PRESSURE INJURY OF RIGHT HEEL, STAGE 4 (H): ICD-10-CM

## 2022-01-28 DIAGNOSIS — G82.20 PARALYSIS OF BOTH LOWER LIMBS (H): ICD-10-CM

## 2022-01-28 DIAGNOSIS — S31.109S RIGHT GROIN WOUND, SEQUELA: ICD-10-CM

## 2022-01-28 DIAGNOSIS — I73.9 PAD (PERIPHERAL ARTERY DISEASE) (H): Primary | ICD-10-CM

## 2022-01-28 DIAGNOSIS — L89.894 PRESSURE INJURY OF RIGHT FOOT, STAGE 4 (H): ICD-10-CM

## 2022-01-28 PROCEDURE — 11042 DBRDMT SUBQ TIS 1ST 20SQCM/<: CPT | Performed by: PHYSICIAN ASSISTANT

## 2022-01-28 PROCEDURE — 11042 DBRDMT SUBQ TIS 1ST 20SQCM/<: CPT

## 2022-01-28 RX ORDER — OMEGA-3S/DHA/EPA/FISH OIL/D3 300MG-1000
CAPSULE ORAL
COMMUNITY
Start: 2021-03-08 | End: 2023-10-30
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
## 2022-01-30 NOTE — PROGRESS NOTES
ASSESSMENT and PLAN   History Kidney stones.  Bilateral.  Complicated by ileal conduit and spinal cord injury (T7).      8/23/21 right percutaneous nephrolithotomy.  Matrix stone.  10/18/21 left percutaneous nephrolithotomy.  Matrix stone.    Observation for remaining small kidney stones.    Single J removed today.      Plan    CT in 1 year.  Follow-up visit with PA.    ____________________________________________________________________    CHIEF COMPLAINT  It was my pleasure to see Parth Fountain who is a 56 year old male here for evaluation of kidney stones.    HPI  He is here for stent removal.  He feels well today.      10/27/21 CT Abdomen/Pelvis without contrast   I reviewed the radiologic images and report from this radiologic exam.  My independent interpretation is:    Remaining stones are small and we will plan observation.    Radiologist Impression  1.  Bilateral nonobstructing renal calculi. Interval placement of a  left ureteral stent. Previously described right ureteropelvic junction  calculus is no longer seen. No hydronephrosis.  2.  Extensive postsurgical changes in the pelvis. Persistent skin  indentations extending to the acetabulum may indicate decubitus  ulcers.          I reviewed the following labs  I reviewed the following laboratory data and went over findings with patient:  Recent Labs   Lab Test 06/30/21  0742 06/29/21  0556 06/28/21  0854 06/27/21  0646   WBC 11.8* 12.0* 10.4 11.5*   HGB 10.7* 11.1* 10.2* 10.2*   * 505* 392 335     Recent Labs   Lab Test 06/30/21  0742 06/29/21  0556 06/28/21  0854 06/27/21  0645   CR 0.69 0.70 0.59* 0.64*   GFRESTIMATED >90 >90 >90 >90   GFRESTBLACK >90 >90 >90 >90   GLC 86 87 82 88       Procedure:  The ostomy bag was removed.  The end of the single J was cut and the entire tube was easily removed.    CC  Patient Care Team:  Maria Ines Emmanuel as PCP - General (Internal Medicine)  Moose Vides MD as MD (Urology)  Shivani Guillermo, RN as  Registered Nurse (Urology)  Moose Vides MD as Assigned Surgical Provider  Maria Ines Emmanuel as Referring Physician (Internal Medicine)  Daria Fong PA-C as Physician Assistant (Anesthesiology)  SELF, REFERRED    Copy to patient  NAN DUPREE  [address]  [facility] [address]

## 2022-02-01 NOTE — ADDENDUM NOTE
Encounter addended by: Suzan Martinez PA-C on: 2/1/2022 8:17 AM   Actions taken: Order Reconciliation Section accessed, Order list changed, Home Medications modified

## 2022-02-01 NOTE — ADDENDUM NOTE
Encounter addended by: Emily Suazo RN on: 2/1/2022 8:15 AM   Actions taken: Order Reconciliation Section accessed, Home Medications modified

## 2022-02-02 ENCOUNTER — TELEPHONE (OUTPATIENT)
Dept: WOUND CARE | Facility: CLINIC | Age: 57
End: 2022-02-02
Payer: MEDICARE

## 2022-02-02 NOTE — TELEPHONE ENCOUNTER
Jc the nurse called because she needs info to be able to order supplies for Parth.   Please call Jc at .

## 2022-02-09 ENCOUNTER — TELEPHONE (OUTPATIENT)
Dept: WOUND CARE | Facility: CLINIC | Age: 57
End: 2022-02-09
Payer: MEDICARE

## 2022-02-09 PROBLEM — L89.90 DECUBITUS SKIN ULCER: Status: ACTIVE | Noted: 2022-02-09

## 2022-02-09 NOTE — TELEPHONE ENCOUNTER
M [facility] Wound    Who is the name of the provider?:  Michelle      What is the location you see this provider at?: [place]    Reason for call:  Need diagnosis including stage for buttocks wound and NPI for provider in order to order supplies.     Can we leave a detailed message on this number?  YES

## 2022-02-11 ENCOUNTER — MEDICAL CORRESPONDENCE (OUTPATIENT)
Dept: HEALTH INFORMATION MANAGEMENT | Facility: CLINIC | Age: 57
End: 2022-02-11
Payer: MEDICARE

## 2022-02-15 ENCOUNTER — TELEPHONE (OUTPATIENT)
Dept: WOUND CARE | Facility: CLINIC | Age: 57
End: 2022-02-15
Payer: MEDICARE

## 2022-02-15 NOTE — TELEPHONE ENCOUNTER
"Fax received from [facility] \"urgent CMS audit request\" to fax all records from 12/1/18-8/1/19.    Faxed all above to [phone].  "

## 2022-02-25 NOTE — ADDENDUM NOTE
Encounter addended by: Emily Suazo RN on: 2/25/2022 9:07 AM   Actions taken: LDA properties accepted, Flowsheet accepted, Clinical Note Signed

## 2022-02-25 NOTE — ADDENDUM NOTE
Encounter addended by: Suzan Martinez PA-C on: 2/25/2022 10:12 AM   Actions taken: Clinical Note Signed

## 2022-02-25 NOTE — ADDENDUM NOTE
Encounter addended by: Suzan Martinez PA-C on: 2/25/2022 9:07 AM   Actions taken: Clinical Note Signed

## 2022-02-25 NOTE — ADDENDUM NOTE
Encounter addended by: Suzan Martinez PA-C on: 2/25/2022 9:33 AM   Actions taken: Clinical Note Signed

## 2022-02-25 NOTE — ADDENDUM NOTE
Encounter addended by: Emily Suazo RN on: 2/25/2022 9:33 AM   Actions taken: LDA properties accepted, Flowsheet accepted, Clinical Note Signed

## 2022-03-08 NOTE — DISCHARGE INSTRUCTIONS
[facility]  [address] [facility] [address], [place] [address]  Appointment Phone [phone] Nurse Advisors [phone]    Parth Fountain      1965  [facility] Agency Performing Wound Care:[facility] Phone: [phone] Fax: [phone]      Wound Dressing Change:left lower leg  Cleanse wound with: saline or wound cleanser  Cover wound with Santyl (as thick as nickel)  Cover wound with gauze.  Change dressing daily.    Wound Dressing Change:Left IT, Right IT, Right Distal IT, Left Buttocks and Right Scrotal, Left Scrotal  Cleanse wound with saline or wound cleanser  Apply Silvercel rope to wounds with depth. Cover with gauze.  Triad/ Critic Aid Barrier Cream into scrotal wounds (no cover dressings)   Apply Monday, Wednesday, Friday  Wear underwear if worrying about getting it on pants        Suzan Martinez PA-C. January 14, 2019    Call us at [phone] if you have any questions about your wounds, have redness or swelling around your wound, have a fever of 101 or greater or if you have any other problems or concerns. We answer the phone Monday through Friday 8 am to 4 pm, please leave a message as we check the voicemail frequently throughout the day.     Follow up with Provider - 4-6 weeks

## 2022-03-15 ENCOUNTER — HOSPITAL ENCOUNTER (OUTPATIENT)
Dept: WOUND CARE | Facility: CLINIC | Age: 57
Discharge: HOME | End: 2022-03-15
Attending: PHYSICIAN ASSISTANT | Admitting: PHYSICIAN ASSISTANT
Payer: MEDICARE

## 2022-03-15 VITALS
SYSTOLIC BLOOD PRESSURE: 131 MMHG | DIASTOLIC BLOOD PRESSURE: 82 MMHG | TEMPERATURE: 98.1 F | HEART RATE: 63 BPM | RESPIRATION RATE: 18 BRPM

## 2022-03-15 DIAGNOSIS — L89.894 PRESSURE INJURY OF RIGHT FOOT, STAGE 4 (H): ICD-10-CM

## 2022-03-15 PROCEDURE — 11042 DBRDMT SUBQ TIS 1ST 20SQCM/<: CPT | Performed by: PHYSICIAN ASSISTANT

## 2022-03-15 RX ORDER — PHENOL 1.4 %
20-30 AEROSOL, SPRAY (ML) MUCOUS MEMBRANE
COMMUNITY
Start: 2022-02-15 | End: 2022-04-11

## 2022-03-15 RX ORDER — IBUPROFEN 200 MG
500 CAPSULE ORAL
COMMUNITY
Start: 2022-02-15 | End: 2022-04-11
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
## 2022-03-18 ENCOUNTER — TELEPHONE (OUTPATIENT)
Dept: OTHER | Facility: CLINIC | Age: 57
End: 2022-03-18
Payer: MEDICARE

## 2022-03-18 DIAGNOSIS — I73.9 PAD (PERIPHERAL ARTERY DISEASE) (H): Primary | ICD-10-CM

## 2022-03-18 DIAGNOSIS — I70.248 ATHEROSCLEROSIS OF NATIVE ARTERY OF BOTH LOWER EXTREMITIES WITH BILATERAL ULCERATION OF OTHER PART OF LOWER LEGS (H): ICD-10-CM

## 2022-03-18 DIAGNOSIS — I70.238 ATHEROSCLEROSIS OF NATIVE ARTERY OF BOTH LOWER EXTREMITIES WITH BILATERAL ULCERATION OF OTHER PART OF LOWER LEGS (H): ICD-10-CM

## 2022-03-18 NOTE — TELEPHONE ENCOUNTER
Signed surgery order form retrieved from Dr. Chapa's TBD folder.      Returned call to patient and review preop instructions again.  Initial preop instructions provided to patient on 12/23/21 (see documentation).    Pt aware he will receive a call back from surgery scheduler to coordinate.    ADAMS SanchesN, RN-[facility] [place]

## 2022-03-18 NOTE — TELEPHONE ENCOUNTER
[facility]    Who is the name of the provider?:  Dr. Chapa      What is the location you see this provider at?: [place]    Can we leave a detailed message on this number? Yes    Reason for call:  Patient calling to state that he would like to move forward with scheduling Angiogram.     Informed patient that Dr. Chapa would be informed and someone will be reaching out to start the scheduling process.       Marce Gan    [facility]  [facility]   [phone]

## 2022-03-23 ENCOUNTER — TELEPHONE (OUTPATIENT)
Dept: OTHER | Facility: CLINIC | Age: 57
End: 2022-03-23
Payer: MEDICARE

## 2022-03-23 NOTE — TELEPHONE ENCOUNTER
Per note in Permanent comments, called Donna to schedule.  Donna said that Parth has been doing his own scheduling so to call his cell phone and ask him if he would like to schedule.  LM on Parth's cell phone asking him to call me to schedule.

## 2022-03-28 DIAGNOSIS — Z11.59 ENCOUNTER FOR SCREENING FOR OTHER VIRAL DISEASES: Primary | ICD-10-CM

## 2022-04-07 ENCOUNTER — TELEPHONE (OUTPATIENT)
Dept: INTERVENTIONAL RADIOLOGY/VASCULAR | Facility: CLINIC | Age: 57
End: 2022-04-07
Payer: MEDICARE

## 2022-04-07 NOTE — TELEPHONE ENCOUNTER
Pre-call completed on 4/7. NPO at 500. Will arrive at 900 for a 1030 case. COVID test scheduled. Blood Thinners NONE. No further questions or concerns.     CARIDAD Morfin

## 2022-04-08 RX ORDER — HEPARIN SODIUM 200 [USP'U]/100ML
1 INJECTION, SOLUTION INTRAVENOUS CONTINUOUS PRN
Status: CANCELLED | OUTPATIENT
Start: 2022-04-08

## 2022-04-11 ENCOUNTER — OFFICE VISIT (OUTPATIENT)
Dept: FAMILY MEDICINE | Facility: CLINIC | Age: 57
End: 2022-04-11
Payer: MEDICARE

## 2022-04-11 VITALS
SYSTOLIC BLOOD PRESSURE: 146 MMHG | WEIGHT: 145 LBS | HEIGHT: 71 IN | OXYGEN SATURATION: 98 % | TEMPERATURE: 98.1 F | HEART RATE: 94 BPM | BODY MASS INDEX: 20.3 KG/M2 | DIASTOLIC BLOOD PRESSURE: 78 MMHG

## 2022-04-11 DIAGNOSIS — Z01.818 PREOP GENERAL PHYSICAL EXAM: Primary | ICD-10-CM

## 2022-04-11 DIAGNOSIS — Z11.59 ENCOUNTER FOR SCREENING FOR OTHER VIRAL DISEASES: ICD-10-CM

## 2022-04-11 LAB
ALBUMIN SERPL-MCNC: 2.9 G/DL (ref 3.4–5)
ALP SERPL-CCNC: 82 U/L (ref 40–150)
ALT SERPL W P-5'-P-CCNC: 27 U/L (ref 0–70)
ANION GAP SERPL CALCULATED.3IONS-SCNC: 7 MMOL/L (ref 3–14)
AST SERPL W P-5'-P-CCNC: 24 U/L (ref 0–45)
BILIRUB SERPL-MCNC: 0.4 MG/DL (ref 0.2–1.3)
BUN SERPL-MCNC: 22 MG/DL (ref 7–30)
CALCIUM SERPL-MCNC: 9.6 MG/DL (ref 8.5–10.1)
CHLORIDE BLD-SCNC: 108 MMOL/L (ref 94–109)
CO2 SERPL-SCNC: 25 MMOL/L (ref 20–32)
CREAT SERPL-MCNC: 0.72 MG/DL (ref 0.66–1.25)
ERYTHROCYTE [DISTWIDTH] IN BLOOD BY AUTOMATED COUNT: 17.2 % (ref 10–15)
GFR SERPL CREATININE-BSD FRML MDRD: >90 ML/MIN/1.73M2
GLUCOSE BLD-MCNC: 125 MG/DL (ref 70–99)
HBA1C MFR BLD: 5.2 % (ref 0–5.6)
HCT VFR BLD AUTO: 40.2 % (ref 40–53)
HGB BLD-MCNC: 12.2 G/DL (ref 13.3–17.7)
MCH RBC QN AUTO: 25.2 PG (ref 26.5–33)
MCHC RBC AUTO-ENTMCNC: 30.3 G/DL (ref 31.5–36.5)
MCV RBC AUTO: 83 FL (ref 78–100)
PLATELET # BLD AUTO: 468 10E3/UL (ref 150–450)
POTASSIUM BLD-SCNC: 4 MMOL/L (ref 3.4–5.3)
PROT SERPL-MCNC: 7.6 G/DL (ref 6.8–8.8)
RBC # BLD AUTO: 4.85 10E6/UL (ref 4.4–5.9)
SODIUM SERPL-SCNC: 140 MMOL/L (ref 133–144)
WBC # BLD AUTO: 9.9 10E3/UL (ref 4–11)

## 2022-04-11 PROCEDURE — 83036 HEMOGLOBIN GLYCOSYLATED A1C: CPT | Performed by: NURSE PRACTITIONER

## 2022-04-11 PROCEDURE — 80053 COMPREHEN METABOLIC PANEL: CPT | Performed by: NURSE PRACTITIONER

## 2022-04-11 PROCEDURE — 85027 COMPLETE CBC AUTOMATED: CPT | Performed by: NURSE PRACTITIONER

## 2022-04-11 PROCEDURE — 83550 IRON BINDING TEST: CPT | Performed by: NURSE PRACTITIONER

## 2022-04-11 PROCEDURE — U0003 INFECTIOUS AGENT DETECTION BY NUCLEIC ACID (DNA OR RNA); SEVERE ACUTE RESPIRATORY SYNDROME CORONAVIRUS 2 (SARS-COV-2) (CORONAVIRUS DISEASE [COVID-19]), AMPLIFIED PROBE TECHNIQUE, MAKING USE OF HIGH THROUGHPUT TECHNOLOGIES AS DESCRIBED BY CMS-2020-01-R: HCPCS | Performed by: SURGERY

## 2022-04-11 PROCEDURE — 82728 ASSAY OF FERRITIN: CPT | Performed by: NURSE PRACTITIONER
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
## 2022-04-12 ENCOUNTER — HOSPITAL ENCOUNTER (OUTPATIENT)
Dept: WOUND CARE | Facility: CLINIC | Age: 57
Discharge: HOME | End: 2022-04-12
Attending: PHYSICIAN ASSISTANT | Admitting: PHYSICIAN ASSISTANT
Payer: MEDICARE

## 2022-04-12 VITALS
HEART RATE: 86 BPM | SYSTOLIC BLOOD PRESSURE: 155 MMHG | DIASTOLIC BLOOD PRESSURE: 92 MMHG | RESPIRATION RATE: 18 BRPM | TEMPERATURE: 97.4 F

## 2022-04-12 DIAGNOSIS — S81.802A OPEN WOUND OF BOTH LEGS WITH COMPLICATION: ICD-10-CM

## 2022-04-12 DIAGNOSIS — L89.894 PRESSURE INJURY OF RIGHT FOOT, STAGE 4 (H): Primary | ICD-10-CM

## 2022-04-12 DIAGNOSIS — L89.614 PRESSURE INJURY OF RIGHT HEEL, STAGE 4 (H): ICD-10-CM

## 2022-04-12 DIAGNOSIS — S81.801A OPEN WOUND OF BOTH LEGS WITH COMPLICATION: ICD-10-CM

## 2022-04-12 DIAGNOSIS — L89.303 PRESSURE INJURY OF BUTTOCK, STAGE 3, UNSPECIFIED LATERALITY (H): ICD-10-CM

## 2022-04-12 DIAGNOSIS — S31.109S RIGHT GROIN WOUND, SEQUELA: ICD-10-CM

## 2022-04-12 LAB — SARS-COV-2 RNA RESP QL NAA+PROBE: NEGATIVE

## 2022-04-12 PROCEDURE — 97597 DBRDMT OPN WND 1ST 20 CM/<: CPT | Performed by: PHYSICIAN ASSISTANT

## 2022-04-12 RX ORDER — BACLOFEN 20 MG/1
TABLET ORAL
COMMUNITY
Start: 2022-03-24 | End: 2023-08-08

## 2022-04-12 RX ORDER — BISMUTH SUBSALICYLATE 262 MG/15ML
LIQUID ORAL
COMMUNITY
Start: 2022-02-16 | End: 2023-10-31

## 2022-04-12 RX ORDER — DULOXETIN HYDROCHLORIDE 60 MG/1
60 CAPSULE, DELAYED RELEASE ORAL DAILY
COMMUNITY
Start: 2022-02-15 | End: 2024-01-02

## 2022-04-12 RX ORDER — ASPIRIN 325 MG
325 TABLET, DELAYED RELEASE (ENTERIC COATED) ORAL
COMMUNITY
Start: 2022-02-15 | End: 2023-08-08

## 2022-04-12 RX ORDER — ACETAMINOPHEN 500 MG
TABLET ORAL
COMMUNITY
Start: 2021-11-29 | End: 2023-10-31

## 2022-04-12 RX ORDER — CALCIUM CARBONATE 500(1250)
1 TABLET,CHEWABLE ORAL
COMMUNITY
End: 2024-01-02

## 2022-04-12 RX ORDER — VARENICLINE TARTRATE 1 MG/1
1 TABLET, FILM COATED ORAL
COMMUNITY
Start: 2022-02-15 | End: 2023-10-30

## 2022-04-12 RX ORDER — PREGABALIN 300 MG/1
CAPSULE ORAL
COMMUNITY
Start: 2022-03-23 | End: 2023-10-31
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
## 2022-04-13 ENCOUNTER — TELEPHONE (OUTPATIENT)
Dept: FAMILY MEDICINE | Facility: CLINIC | Age: 57
End: 2022-04-13
Payer: MEDICARE

## 2022-04-13 ENCOUNTER — TELEPHONE (OUTPATIENT)
Dept: OTHER | Facility: CLINIC | Age: 57
End: 2022-04-13
Payer: MEDICARE

## 2022-04-13 DIAGNOSIS — D50.9 IRON DEFICIENCY ANEMIA, UNSPECIFIED IRON DEFICIENCY ANEMIA TYPE: Primary | ICD-10-CM

## 2022-04-13 LAB
FERRITIN SERPL-MCNC: 32 NG/ML (ref 26–388)
IRON SATN MFR SERPL: 8 % (ref 15–46)
IRON SERPL-MCNC: 19 UG/DL (ref 35–180)
TIBC SERPL-MCNC: 249 UG/DL (ref 240–430)

## 2022-04-13 RX ORDER — FERROUS SULFATE 325(65) MG
325 TABLET ORAL 2 TIMES DAILY WITH MEALS
Qty: 60 TABLET | Refills: 1 | Status: ON HOLD | OUTPATIENT
Start: 2022-04-13 | End: 2023-11-09

## 2022-04-13 NOTE — TELEPHONE ENCOUNTER
Left voicemail for patient to discuss recent labs.    Spoke with CARIDAD Vela of Dr. Chapa's office re: labs- advised recheck of Hgb tomorrow before procedure.    Spoke with Nurse Donna from [facility] re: labs. Will start on Fe replacement given low Fe numbers (likely combo of SELENA/Anemia of chronic diease.) Advised that Fe may cause cramping/constipation/black stools out of ostomy. Advised recheck in 6 weeks.     All questions/concerns addressed. Patient stated understanding/agreement to plan of care.    LIDYA Alarcon, CNP  [employer]

## 2022-04-13 NOTE — TELEPHONE ENCOUNTER
Returned call to Vicente.  He wanted to update on patient's recent lab draws, specifically Hgb.    Component      Latest Ref Rng & Units 4/11/2022       Hemoglobin      13.3 - 17.7 g/dL 12.2 (L)       Appears creat, hgb and platelet count will be drawn day of angiogram.  Vicente has cleared patient for procedure.    Mirian Porter, BSN, RN-[facility]

## 2022-04-13 NOTE — TELEPHONE ENCOUNTER
[facility]    Who is the name of the provider?:  Dr. Chapa      What is the location you see this provider at?: [place]    Can we leave a detailed message on this number? No    Reason for call:  Received call from Vicente RN, who did patients pre-op.     Vicente asked to speak to nurse regarding results of labs.     Please call Vicente, cell ph. [phone].       Marce Gan    [facility]  [facility]   [phone]

## 2022-04-14 ENCOUNTER — APPOINTMENT (OUTPATIENT)
Dept: INTERVENTIONAL RADIOLOGY/VASCULAR | Facility: CLINIC | Age: 57
End: 2022-04-14
Attending: SURGERY
Payer: MEDICARE

## 2022-04-14 ENCOUNTER — HOSPITAL ENCOUNTER (OUTPATIENT)
Facility: CLINIC | Age: 57
Discharge: HOME | End: 2022-04-14
Admitting: RADIOLOGY
Payer: MEDICARE

## 2022-04-14 VITALS
OXYGEN SATURATION: 99 % | HEART RATE: 77 BPM | SYSTOLIC BLOOD PRESSURE: 141 MMHG | DIASTOLIC BLOOD PRESSURE: 62 MMHG | RESPIRATION RATE: 18 BRPM | TEMPERATURE: 97.7 F

## 2022-04-14 DIAGNOSIS — I70.248 ATHEROSCLEROSIS OF NATIVE ARTERY OF BOTH LOWER EXTREMITIES WITH BILATERAL ULCERATION OF OTHER PART OF LOWER LEGS (H): ICD-10-CM

## 2022-04-14 DIAGNOSIS — I73.9 PAD (PERIPHERAL ARTERY DISEASE) (H): ICD-10-CM

## 2022-04-14 DIAGNOSIS — I70.238 ATHEROSCLEROSIS OF NATIVE ARTERY OF BOTH LOWER EXTREMITIES WITH BILATERAL ULCERATION OF OTHER PART OF LOWER LEGS (H): ICD-10-CM

## 2022-04-14 LAB
CHOLEST SERPL-MCNC: 181 MG/DL
CREAT SERPL-MCNC: 0.69 MG/DL (ref 0.66–1.25)
GFR SERPL CREATININE-BSD FRML MDRD: >90 ML/MIN/1.73M2
HDLC SERPL-MCNC: 46 MG/DL
HGB BLD-MCNC: 13.5 G/DL (ref 13.3–17.7)
LDLC SERPL CALC-MCNC: 116 MG/DL
NONHDLC SERPL-MCNC: 135 MG/DL
PLATELET # BLD AUTO: 390 10E3/UL (ref 150–450)
TRIGL SERPL-MCNC: 97 MG/DL

## 2022-04-14 PROCEDURE — 99152 MOD SED SAME PHYS/QHP 5/>YRS: CPT

## 2022-04-14 PROCEDURE — C1769 GUIDE WIRE: HCPCS

## 2022-04-14 PROCEDURE — 272N000196 HC ACCESSORY CR5

## 2022-04-14 PROCEDURE — 85018 HEMOGLOBIN: CPT | Performed by: PHYSICIAN ASSISTANT

## 2022-04-14 PROCEDURE — 76937 US GUIDE VASCULAR ACCESS: CPT

## 2022-04-14 PROCEDURE — 82565 ASSAY OF CREATININE: CPT | Performed by: PHYSICIAN ASSISTANT

## 2022-04-14 PROCEDURE — 36415 COLL VENOUS BLD VENIPUNCTURE: CPT | Performed by: PHYSICIAN ASSISTANT

## 2022-04-14 PROCEDURE — 36591 DRAW BLOOD OFF VENOUS DEVICE: CPT

## 2022-04-14 PROCEDURE — 250N000011 HC RX IP 250 OP 636: Performed by: PHYSICIAN ASSISTANT

## 2022-04-14 PROCEDURE — 250N000009 HC RX 250: Performed by: PHYSICIAN ASSISTANT

## 2022-04-14 PROCEDURE — 75774 ARTERY X-RAY EACH VESSEL: CPT

## 2022-04-14 PROCEDURE — 999N000128 HC STATISTIC PERIPHERAL IV START W/O US GUIDANCE

## 2022-04-14 PROCEDURE — 85049 AUTOMATED PLATELET COUNT: CPT | Performed by: PHYSICIAN ASSISTANT

## 2022-04-14 PROCEDURE — 272N000116 HC CATH CR1

## 2022-04-14 PROCEDURE — 272N000567 HC SHEATH CR4

## 2022-04-14 PROCEDURE — 80061 LIPID PANEL: CPT | Performed by: PHYSICIAN ASSISTANT

## 2022-04-14 PROCEDURE — 255N000002 HC RX 255 OP 636: Performed by: RADIOLOGY

## 2022-04-14 PROCEDURE — C1887 CATHETER, GUIDING: HCPCS

## 2022-04-14 PROCEDURE — 258N000003 HC RX IP 258 OP 636: Performed by: PHYSICIAN ASSISTANT

## 2022-04-14 PROCEDURE — 999N000012 HC STATISTIC ANGIOGRAM, STENT, VERTEBRO PLASTY

## 2022-04-14 RX ORDER — HEPARIN SODIUM 200 [USP'U]/100ML
1 INJECTION, SOLUTION INTRAVENOUS CONTINUOUS PRN
Status: DISCONTINUED | OUTPATIENT
Start: 2022-04-14 | End: 2022-04-14 | Stop reason: HOSPADM

## 2022-04-14 RX ORDER — LIDOCAINE 40 MG/G
CREAM TOPICAL
Status: DISCONTINUED | OUTPATIENT
Start: 2022-04-14 | End: 2022-04-14 | Stop reason: HOSPADM

## 2022-04-14 RX ORDER — NALOXONE HYDROCHLORIDE 0.4 MG/ML
0.2 INJECTION, SOLUTION INTRAMUSCULAR; INTRAVENOUS; SUBCUTANEOUS
Status: DISCONTINUED | OUTPATIENT
Start: 2022-04-14 | End: 2022-04-14 | Stop reason: HOSPADM

## 2022-04-14 RX ORDER — NALOXONE HYDROCHLORIDE 0.4 MG/ML
0.4 INJECTION, SOLUTION INTRAMUSCULAR; INTRAVENOUS; SUBCUTANEOUS
Status: DISCONTINUED | OUTPATIENT
Start: 2022-04-14 | End: 2022-04-14 | Stop reason: HOSPADM

## 2022-04-14 RX ORDER — SODIUM CHLORIDE 9 MG/ML
INJECTION, SOLUTION INTRAVENOUS CONTINUOUS
Status: DISCONTINUED | OUTPATIENT
Start: 2022-04-14 | End: 2022-04-14 | Stop reason: HOSPADM

## 2022-04-14 RX ORDER — FLUMAZENIL 0.1 MG/ML
0.2 INJECTION, SOLUTION INTRAVENOUS
Status: DISCONTINUED | OUTPATIENT
Start: 2022-04-14 | End: 2022-04-14 | Stop reason: HOSPADM

## 2022-04-14 RX ORDER — FENTANYL CITRATE 50 UG/ML
25-50 INJECTION, SOLUTION INTRAMUSCULAR; INTRAVENOUS EVERY 5 MIN PRN
Status: DISCONTINUED | OUTPATIENT
Start: 2022-04-14 | End: 2022-04-14 | Stop reason: HOSPADM

## 2022-04-14 RX ORDER — IODIXANOL 320 MG/ML
150 INJECTION, SOLUTION INTRAVASCULAR ONCE
Status: COMPLETED | OUTPATIENT
Start: 2022-04-14 | End: 2022-04-14

## 2022-04-14 RX ADMIN — FENTANYL CITRATE 25 MCG: 50 INJECTION, SOLUTION INTRAMUSCULAR; INTRAVENOUS at 11:41

## 2022-04-14 RX ADMIN — LIDOCAINE HYDROCHLORIDE 4 ML: 10 INJECTION, SOLUTION INFILTRATION; PERINEURAL at 11:29

## 2022-04-14 RX ADMIN — SODIUM CHLORIDE: 9 INJECTION, SOLUTION INTRAVENOUS at 10:05

## 2022-04-14 RX ADMIN — FENTANYL CITRATE 25 MCG: 50 INJECTION, SOLUTION INTRAMUSCULAR; INTRAVENOUS at 11:59

## 2022-04-14 RX ADMIN — FENTANYL CITRATE 25 MCG: 50 INJECTION, SOLUTION INTRAMUSCULAR; INTRAVENOUS at 11:22

## 2022-04-14 RX ADMIN — MIDAZOLAM HYDROCHLORIDE 0.5 MG: 1 INJECTION, SOLUTION INTRAMUSCULAR; INTRAVENOUS at 11:55

## 2022-04-14 RX ADMIN — FENTANYL CITRATE 25 MCG: 50 INJECTION, SOLUTION INTRAMUSCULAR; INTRAVENOUS at 11:03

## 2022-04-14 RX ADMIN — IODIXANOL 35 ML: 320 INJECTION, SOLUTION INTRAVASCULAR at 12:04

## 2022-04-14 RX ADMIN — MIDAZOLAM HYDROCHLORIDE 0.5 MG: 1 INJECTION, SOLUTION INTRAMUSCULAR; INTRAVENOUS at 11:38

## 2022-04-14 RX ADMIN — MIDAZOLAM HYDROCHLORIDE 0.5 MG: 1 INJECTION, SOLUTION INTRAMUSCULAR; INTRAVENOUS at 11:23

## 2022-04-14 RX ADMIN — MIDAZOLAM HYDROCHLORIDE 0.5 MG: 1 INJECTION, SOLUTION INTRAMUSCULAR; INTRAVENOUS at 11:11

## 2022-04-14 RX ADMIN — MIDAZOLAM HYDROCHLORIDE 0.5 MG: 1 INJECTION, SOLUTION INTRAMUSCULAR; INTRAVENOUS at 11:03

## 2022-04-14 RX ADMIN — HEPARIN SODIUM 4 BAG: 200 INJECTION, SOLUTION INTRAVENOUS at 11:00

## 2022-04-14 RX ADMIN — FENTANYL CITRATE 50 MCG: 50 INJECTION, SOLUTION INTRAMUSCULAR; INTRAVENOUS at 11:28

## 2022-04-14 RX ADMIN — MIDAZOLAM HYDROCHLORIDE 0.5 MG: 1 INJECTION, SOLUTION INTRAMUSCULAR; INTRAVENOUS at 11:29

## 2022-04-14 NOTE — DISCHARGE INSTRUCTIONS
Peripheral Angiogram Discharge Instructions - Femoral    After you go home:    Have an adult stay with you until tomorrow.  Drink extra fluids for 2 days.  You may resume your normal diet.  No smoking       For 24 hours - due to the sedation you received:  Relax and take it easy.  Do NOT make any important or legal decisions.  Do NOT drive or operate machines at home or at work.  Do NOT drink alcohol.    Care of Groin Puncture Site:    For the first 24 hrs - check the puncture sites every 1-2 hours while awake.  For 2 days, when you cough, sneeze, laugh or move your bowels, hold your hand over the groin puncture site and press firmly on/above the site  Remove the bandaid after 24 hours. If there is minor oozing, apply another bandaid and remove it after 12 hours.  It is normal to have a small bruise or pea size lump at the sites.  You may shower tomorrow.  Do NOT take a bath, or use a hot tub or pool for at least 3 days. Do NOT scrub the site. Do not use lotion or powder near the puncture site.    Activity - For 2 days:    Groin site:      No stooping or squatting  Do NOT do any heavy activity such as exercise, lifting, or straining.   No housework, yard work or any activity that make you sweat  Do NOT lift more than 10 pounds    Bleeding:    If you start bleeding from the site in your groin:    Lie down flat and press firmly on/above the site for 10 minutes.  Once bleeding stops, lay flat for 2 hours.   Call the [facility] as soon as you can.    Call 911 right away if you have heavy bleeding or bleeding that does not stop.    Medicines:    If you are on Metformin (Glucophage) and your GFR (kidney function level) is >30, you may continue taking your Metformin.  If you are on Metformin (Glucophage) and your GFR (kidney function level) is <30, do not restart the Metformin for 48 hours after your procedure. Check with your primary care giver before restarting the Metformin to see if you need to have blood  drawn to recheck your kidney function (GFR).  If you are taking an antiplatelet medication such as Plavix, do not stop taking it until you talk to your provider.     Take your medications, including blood thinners, unless your provider tells you not to.    If you take Coumadin (Warfarin), have your INR checked by your provider in  3-5 days. Call your clinic to schedule this.  If you have stopped any medicines, check with your provider about when to restart them.     Follow Up Appointments:    Follow up with [facility] as directed.    Call the clinic if:    You have increased pain or a large or growing hard lump around the site.  The site is red, swollen, hot or tender.  Blood or fluid is draining from the site.  You have chills or a fever greater than 101 F (38 C).  Your arm or leg feels numb, cool or changes color.  You have hives, a rash or unusual itching.  New pain in the back or belly that you cannot control with Tylenol.  Any questions or concerns.    Other Instructions:    If you received a stent - carry your stent card with you at all times.      If you have questions or your original symptoms do not improve, call:         [facility] @ [phone]

## 2022-04-14 NOTE — PRE-PROCEDURE
GENERAL PRE-PROCEDURE:   Procedure:  Aortogram, bilateral leg run offs, possible angioplasty and/or stent placement with focus on the right with IV moderate sedation   Date/Time:  4/14/2022 9:45 AM    Written consent obtained?: Yes    Risks and benefits: Risks, benefits and alternatives were discussed    Consent given by:  Patient  Patient states understanding of procedure being performed: Yes    Patient's understanding of procedure matches consent: Yes    Procedure consent matches procedure scheduled: Yes    Expected level of sedation:  Moderate  Appropriately NPO:  Yes  ASA Class:  3  Mallampati  :  Grade 2- soft palate, base of uvula, tonsillar pillars, and portion of posterior pharyngeal wall visible  Lungs:  Lungs clear with good breath sounds bilaterally  Heart:  Normal heart sounds and rate  History & Physical reviewed:  History and physical reviewed and no updates needed  Statement of review:  I have reviewed the lab findings, diagnostic data, medications, and the plan for sedation

## 2022-04-14 NOTE — PROGRESS NOTES
Care Suites Admission Nursing Note    Patient Information  Name: Parth Fountain  Age: 57 year old  Reason for admission: L/E angiogram, explained pre procedure and answered questions  Care Suites arrival time: 0915        Patient Admission/Assessment   Pre-procedure assessment complete: Yes  If abnormal assessment/labs, provider notified: N/A  NPO: Yes  Medications held per instructions/orders: N/A  Consent: obtained  If applicable, pregnancy test status: n/a  Patient oriented to room: Yes  Education/questions answered: Yes  Plan/other: Already back in IR    Discharge Planning  Discharge name/phone number: fernando Hernandez [phone]  Overnight post sedation caregiver: group home staff  Discharge location: group home    Kati Shukla RN

## 2022-04-14 NOTE — DISCHARGE SUMMARY
Care Suites Discharge Nursing Note    Patient Information  Name: Parth Fountain  Age: 57 year old    Discharge Education:  Discharge instructions reviewed: Yes  Additional education/resources provided: AVS  Patient/patient representative verbalizes understanding: Yes  Patient discharging on new medications: No  Medication education completed: N/A    Discharge Plans:   Discharge location: home  Discharge ride contacted: Yes  Approximate discharge time: 1745    Discharge Criteria:  Discharge criteria met and vital signs stable: Yes    Patient Belongs:  Patient belongings returned to patient: Yes    Eber Iglesias RN

## 2022-04-14 NOTE — IR NOTE
Pt came to IR suite 1 on a cart.  Pt was moved over to the table and was placed in supine position.  Pt was draped and prepped with chlorhexidine soap.  Pt was given fentanyl and versed for comfort.  Pt had a 5 fr sheath placed in the left femoral artery and was removed at 1205 and manual pressure was applied. Report given to Batsheva MCLEAN in CS.    MEDICATIONS: Last Dose 1155    Fentanyl 150 mcg  Versed 3 mg  Lidocaine 4 ml's    Prakash Greenwood RN

## 2022-04-14 NOTE — PROGRESS NOTES
PATIENT/VISITOR WELLNESS SCREENING    Step 1 Patient Screening    1. In the last month, have you been in contact with someone who was confirmed or suspected to have Coronavirus/COVID-19? No    2. Do you have the following symptoms?  Fever/Chills? No   Cough? No   Shortness of breath? No   New loss of taste or smell? No  Sore throat? No  Muscle or body aches? No  Headaches? No  Fatigue? No  Vomiting or diarrhea? No

## 2022-04-15 ENCOUNTER — TELEPHONE (OUTPATIENT)
Dept: OTHER | Facility: CLINIC | Age: 57
End: 2022-04-15
Payer: MEDICARE

## 2022-04-15 NOTE — TELEPHONE ENCOUNTER
[facility]    Parth Fountain has a history of paraplegia no other medical problems.  He has been followed at the wound clinic for bilateral leg ulcerations.  I last saw him on 12/23/2021 due to his vascular insufficiency.  We recommended angiography.  For multiple reasons this was not performed until yesterday with Dr. Meier.  Unfortunately, I was in the office and could not examine the patient following the procedure.  I did however review the findings with Dr. Meier and also the images today.    Patient was seen in the wound clinic on 4/12/2022.  He is smoking again and using chewing tobacco.  Overall they feel that his health has deteriorated.  Does have pelvic wounds that are slightly worsened and may be related pyoderma granulosum.  It was felt that his leg wounds have remained stable and clean.  The pyoderma granuloma leg wound is almost resolved.  Does have a stage IV pressure ulcer of his right heel and right lateral foot.    Reviewed the angiogram from 4/14/2022.  Smaller arteries diffusely.  Moderate stenosis of the right common iliac artery that was not amenable to angioplasty during the angiogram for technical reasons but still feasible.  Rest of the common iliac and external leg arteries are relatively disease-free.  Severe disease of both common femoral arteries that are extremely tortuous perhaps related to his paraplegia.  Profundus femoral arteries are patent with multiple collaterals.  Superficial femoral and popliteal arteries are occluded throughout their entire length bilaterally.  On the right leg the anterior tibial and posterior tibial arteries reconstitute near their origins separately are patent down to the ankle with the DP being patent also into the foot.  On the left the anterior tibial and posterior tibial arteries reconstitute just beyond the origins and are patent distally with the posterior tibial artery being somewhat dominant.  Peroneal artery is patent  proximally after reconstitution but very diminutive.      Impression: Unfortunately patient is not a candidate for recanalization of the femoral/popliteal arteries bilaterally.  Could angioplasty and stent the common iliac artery to slightly improve the blood flow though percutaneously when he is to go from the right side this would be very difficult due to the extensive common femoral artery disease and tortuosity.  Any surgical treatment will need to be reevaluated with the patient to see if he is a candidate with his paraplegia and secondary deformities (may prevent access to the arteries surgically).  Unsure whether we could bypass to the proximal anterior tibial or posterior tibial arteries but he would need greater saphenous vein conduit though we could potentially use cadaveric SFA arteries.  Common femoral enterectomy or bypass would be required.  If he is felt to be a surgical candidate very likely 1 would work initially on the right side since this is where his ulcers are most significant.     Patient does have a follow-up appointment of the wound clinic and would be evaluated further.  The fact that he is smoking again and not taking care of himself is concerning for very aggressive vascular surgical reconstructive surgery and patient with lower extremity paraplegia.         Felipe Chapa MD

## 2022-05-04 DIAGNOSIS — D50.9 IRON DEFICIENCY ANEMIA, UNSPECIFIED IRON DEFICIENCY ANEMIA TYPE: ICD-10-CM

## 2022-05-06 RX ORDER — FERROUS SULFATE 325(65) MG
TABLET ORAL
Qty: 60 TABLET | Refills: 10 | OUTPATIENT
Start: 2022-05-06

## 2022-05-06 NOTE — TELEPHONE ENCOUNTER
ferrous sulfate (FEROSUL) 325 (65 Fe) MG tablet    4/11/2022  [facility]     Micah Barfield APRN CNP       Routed because: last fill #60. rf requested. #qty, rf?

## 2022-05-17 ENCOUNTER — HOSPITAL ENCOUNTER (OUTPATIENT)
Dept: WOUND CARE | Facility: CLINIC | Age: 57
Discharge: HOME | End: 2022-05-17
Attending: PHYSICIAN ASSISTANT | Admitting: PHYSICIAN ASSISTANT
Payer: MEDICARE

## 2022-05-17 VITALS — TEMPERATURE: 97.1 F

## 2022-05-17 DIAGNOSIS — L89.894 PRESSURE INJURY OF RIGHT FOOT, STAGE 4 (H): Primary | ICD-10-CM

## 2022-05-17 PROCEDURE — 17250 CHEM CAUT OF GRANLTJ TISSUE: CPT | Performed by: PHYSICIAN ASSISTANT

## 2022-05-17 NOTE — ADDENDUM NOTE
Encounter addended by: Suzan Martinez PA-C on: 5/17/2022 4:45 PM   Actions taken: Clinical Note Signed
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
## 2022-05-17 NOTE — PROGRESS NOTES
During patient self transfer from hospital bed to his power wheelchair, patient successfully transferred into the chair, then fell forward at the level of the pelvis.  When he fell forward, he hit his head against the side rail of the bed, which was in an upward position.    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    Two staff present in the room during the incident, one within arms reach, though the supervised event occurred too quickly to intervene.    No apparent injuries.  Parth denied acute pain or injury and had to leave as his transportation was waiting for him and was about to leave, thus, the clinic provider was unable to evaluate him before his departure.

## 2022-05-17 NOTE — DISCHARGE INSTRUCTIONS
"Parth KWAN Александр      1965    [facility] [employer] [facility] Phone: [phone] Fax: [phone]    -Referral for Hyperbaric Medicine consult at [facility]  -Monitor for changes related to hitting head on side rail    Vitamin C 1,000mg daily  Vitamin D 5,000 units daily  Vitamin B 12 Complex daily    Wound Care Orders: Left Lateral Thigh  -Cleanse wound well and pat dry  -Cut a piece of Mesalt and cover with Mepilex 4\"x4\" foam bordered dressing  -Change three times weekly    Wound Care Orders: Left Anterior Lower Leg  -Cleanse wounds with soap and water, saline, or wound cleanser  -Apply Stacie to two open wounds on the left lower leg  -Cover with Mepilex 4\"x4\" foam bordered dressing  -Change three times weekly    Wound Care Orders: All open wounds below the ankles:  I.e.: Right and Left Heel, Right Lateral Foot, Left 2nd Toe, Right First Toe, and Right Third Toe  -Cleanse with wound cleanser, pat dry  -Swab all scabs to lower legs and feet with Betadine, let air dry  -Apply Iodosorb gel to all open areas and cover with guaze or a Band-Aid    COVER DRESSINGS:  -Cover with ABD and secure with Medipore tape 2\"  -Use ABD and roll gauze to right LE with Medipore tape 2\"  -Band-Aid to Left 2nd and 3rd toe to cover  -Change dressing M,W,F and as needed for soilage    Wound care: Scrotal, Right/ Left Ischial (Posterior Thigh)  -Gently cleanse and apply light layer of zinc-based paste to scrotal wound and periwound as needed  -Lay Stacie or other collagen into the open ischial wounds  -Cover with ABD 5\"x9\", secure with 2\" tape  -Change M,W,F and as needed for soilage    -Continue to Keep PRESSURE off of buttocks!!!     Suzan Martinez PA-C May 17, 2022    Call us at [phone] if you have any questions about your wounds, have redness or swelling around your wound, have a fever of 101 or greater or if you have any other problems or concerns. We answer the phone Monday through Friday 8 am to 4 pm, please " leave a message as we check the voicemail frequently throughout the day.     If you had a positive experience please indicate on your patient satisfaction survey form that [employer] will be sending you.    If you have any billing related questions please call the  [employer] Business office at [phone]. The clinic staff does not handle billing related matters.

## 2022-06-02 DIAGNOSIS — D50.9 IRON DEFICIENCY ANEMIA, UNSPECIFIED IRON DEFICIENCY ANEMIA TYPE: ICD-10-CM

## 2022-06-02 RX ORDER — FERROUS SULFATE 325(65) MG
TABLET ORAL
Qty: 60 TABLET | Refills: 10 | OUTPATIENT
Start: 2022-06-02

## 2022-06-14 ENCOUNTER — HOSPITAL ENCOUNTER (OUTPATIENT)
Dept: WOUND CARE | Facility: CLINIC | Age: 57
Discharge: HOME | End: 2022-06-14
Attending: PHYSICIAN ASSISTANT | Admitting: PHYSICIAN ASSISTANT
Payer: MEDICARE

## 2022-06-14 VITALS — HEART RATE: 92 BPM | TEMPERATURE: 97.6 F | RESPIRATION RATE: 20 BRPM

## 2022-06-14 DIAGNOSIS — S31.109S RIGHT GROIN WOUND, SEQUELA: ICD-10-CM

## 2022-06-14 DIAGNOSIS — L89.103 PRESSURE INJURY OF BACK, STAGE 3 (H): ICD-10-CM

## 2022-06-14 DIAGNOSIS — L89.614 PRESSURE INJURY OF RIGHT HEEL, STAGE 4 (H): ICD-10-CM

## 2022-06-14 DIAGNOSIS — S81.802D OPEN WOUND OF BOTH LOWER EXTREMITIES WITH COMPLICATION, SUBSEQUENT ENCOUNTER: ICD-10-CM

## 2022-06-14 DIAGNOSIS — S81.801D OPEN WOUND OF BOTH LOWER EXTREMITIES WITH COMPLICATION, SUBSEQUENT ENCOUNTER: ICD-10-CM

## 2022-06-14 DIAGNOSIS — L89.894 PRESSURE INJURY OF RIGHT FOOT, STAGE 4 (H): Primary | ICD-10-CM

## 2022-06-14 PROCEDURE — 17250 CHEM CAUT OF GRANLTJ TISSUE: CPT | Performed by: PHYSICIAN ASSISTANT

## 2022-06-14 PROCEDURE — 17250 CHEM CAUT OF GRANLTJ TISSUE: CPT

## 2022-06-14 PROCEDURE — 97602 WOUND(S) CARE NON-SELECTIVE: CPT

## 2022-06-14 NOTE — DISCHARGE INSTRUCTIONS
"Parth KWAN Александр      1965    [facility] Phone: [phone] Fax: [phone]    -Referral for Hyperbaric Medicine for consult at [facility] placed on May 17, 2022, please call them at [phone].  -[facility]; new wound - A new referral was placed. Call: [phone]    Medications/supplements to aid in healing:  Vitamin C 1,000mg daily  Vitamin D 5,000 units daily  Vitamin B 12 Complex daily    Wound Care Orders: Thoracic Spine, Left Lateral Thigh  -Cleanse wound well and pat dry  -Apply a Mepilex 4\"x4\" foam bordered dressing  -Change three times weekly    Wound Care Orders: All open wounds below the knees:  -Cleanse with wound cleanser, pat dry  -Swab all scabs to lower legs and feet with Betadine, let air dry  -Apply Iodosorb gel to all open areas and cover with guaze or a Band-Aid    COVER DRESSINGS:  -Cover with ABD and secure with Medipore tape 2\"  -Use ABD and roll gauze to right LE with Medipore tape 2\"  -Band-Aid to Left 2nd and 3rd toe to cover  -Change dressing M,W,F and as needed for soilage    Wound care: Scrotal, Right/ Left Ischial (Posterior Thigh)  -Gently cleanse and apply light layer of zinc-based paste to scrotal wound and periwound as needed  -Lay Stacie or other collagen into the open ischial wounds  -Cover with ABD 5\"x9\", secure with 2\" tape  -Change M,W,F and as needed for soilage     Suzan Martinez PA-C June 14, 2022    Call us at [phone] if you have any questions about your wounds, have redness or swelling around your wound, have a fever of 101 or greater or if you have any other problems or concerns. We answer the phone Monday through Friday 8 am to 4 pm, please leave a message as we check the voicemail frequently throughout the day.     If you had a positive experience please indicate that on your patient satisfaction survey form that [facility] will be sending you.    It was a pleasure " meeting with you today.  Thank you for allowing me and my team the privilege of caring for you today.  YOU are the reason we are here, and I truly hope we provided you with the excellent service you deserve.  Please let us know if there is anything else we can do for you so that we can be sure you are leaving completely satisfied with your care experience.      If you have any billing related questions please call the [facility] Business office at [phone]. The clinic staff does not handle billing related matters.
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
## 2022-07-12 ENCOUNTER — HOSPITAL ENCOUNTER (OUTPATIENT)
Dept: WOUND CARE | Facility: CLINIC | Age: 57
Discharge: HOME | End: 2022-07-12
Attending: PHYSICIAN ASSISTANT | Admitting: PHYSICIAN ASSISTANT
Payer: MEDICARE

## 2022-07-12 VITALS
TEMPERATURE: 98.9 F | DIASTOLIC BLOOD PRESSURE: 59 MMHG | BODY MASS INDEX: 16.91 KG/M2 | WEIGHT: 121.25 LBS | SYSTOLIC BLOOD PRESSURE: 95 MMHG | HEART RATE: 67 BPM

## 2022-07-12 DIAGNOSIS — L89.103 PRESSURE INJURY OF BACK, STAGE 3 (H): Primary | ICD-10-CM

## 2022-07-12 DIAGNOSIS — S31.109S RIGHT GROIN WOUND, SEQUELA: ICD-10-CM

## 2022-07-12 DIAGNOSIS — S81.802D OPEN WOUND OF BOTH LOWER EXTREMITIES WITH COMPLICATION, SUBSEQUENT ENCOUNTER: ICD-10-CM

## 2022-07-12 DIAGNOSIS — L89.894 PRESSURE INJURY OF RIGHT FOOT, STAGE 4 (H): ICD-10-CM

## 2022-07-12 DIAGNOSIS — S81.801D OPEN WOUND OF BOTH LOWER EXTREMITIES WITH COMPLICATION, SUBSEQUENT ENCOUNTER: ICD-10-CM

## 2022-07-12 DIAGNOSIS — L89.614 PRESSURE INJURY OF RIGHT HEEL, STAGE 4 (H): ICD-10-CM

## 2022-07-12 PROCEDURE — 11042 DBRDMT SUBQ TIS 1ST 20SQCM/<: CPT | Performed by: PHYSICIAN ASSISTANT

## 2022-07-12 RX ORDER — CEPHALEXIN 500 MG/1
500 CAPSULE ORAL 3 TIMES DAILY
Qty: 30 CAPSULE | Refills: 0 | Status: SHIPPED | OUTPATIENT
Start: 2022-07-12 | End: 2022-07-22
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
## 2022-07-12 NOTE — DISCHARGE INSTRUCTIONS
"Parth Fountain      1965    [email or web address] Phone: [phone] Fax: [phone]  -Referral for Hyperbaric Medicine for consult at [facility] placed on May 17, 2022, please call them at [phone].  -[facility]; new wound -  Please Call: [phone]    Medications/supplements to aid in healing:  Vitamin C 1,000mg daily  Vitamin D 5,000 units daily  Vitamin B 12 Complex daily    Wound Care Orders:  Left Lateral Thigh  -Cleanse wound well and pat dry and leave open to air    Wound Care Orders: All open wounds below the knees; Left lateral Lower leg:  3 times a week  -Cleanse with wound cleanser, pat dry  -Swab all scabs to lower legs and feet with Betadine, let air dry  -Apply Iodosorb gel to all open areas and cover with guaze or a Band-Aid    COVER DRESSINGS:  -Cover with ABD and secure with Medipore tape 2\"  -Use ABD and roll gauze to right LE with Medipore tape 2\"  -Band-Aid to Left 2nd and 3rd toe to cover  -Change dressing M,W,F and as needed for soilage    Wound care: Scrotal, Right/ Left Ischial (Posterior Thigh)  -Gently cleanse and apply light layer of zinc-based paste to scrotal wound and periwound as needed  -Lay Stacie or other collagen into the open ischial wounds  -Cover with ABD 5\"x9\", secure with 2\" tape  -Change M,W,F and as needed for soilage     Suzan Martinez PA-C July 12, 2022    Call us at [phone] if you have any questions about your wounds, have redness or swelling around your wound, have a fever of 101 or greater or if you have any other problems or concerns. We answer the phone Monday through Friday 8 am to 4 pm, please leave a message as we check the voicemail frequently throughout the day.     If you had a positive experience please indicate that on your patient satisfaction survey form that [facility] will be sending you.    It was a pleasure meeting with you today.  Thank you for allowing me " and my team the privilege of caring for you today.  YOU are the reason we are here, and I truly hope we provided you with the excellent service you deserve.  Please let us know if there is anything else we can do for you so that we can be sure you are leaving completely satisfied with your care experience.      If you have any billing related questions please call the [facility] Business office at [phone]. The clinic staff does not handle billing related matters.

## 2022-08-09 ENCOUNTER — HOSPITAL ENCOUNTER (OUTPATIENT)
Dept: WOUND CARE | Facility: CLINIC | Age: 57
Discharge: HOME | End: 2022-08-09
Attending: PHYSICIAN ASSISTANT | Admitting: PHYSICIAN ASSISTANT
Payer: MEDICARE

## 2022-08-09 VITALS — HEART RATE: 89 BPM | SYSTOLIC BLOOD PRESSURE: 134 MMHG | DIASTOLIC BLOOD PRESSURE: 80 MMHG | TEMPERATURE: 98.7 F

## 2022-08-09 DIAGNOSIS — L89.314 PRESSURE ULCER OF ISCHIUM, RIGHT, STAGE IV (H): ICD-10-CM

## 2022-08-09 DIAGNOSIS — L89.894 PRESSURE INJURY OF RIGHT FOOT, STAGE 4 (H): ICD-10-CM

## 2022-08-09 DIAGNOSIS — L89.614 PRESSURE INJURY OF RIGHT HEEL, STAGE 4 (H): ICD-10-CM

## 2022-08-09 DIAGNOSIS — L89.324 LEFT ISCHIAL PRESSURE SORE, STAGE IV (H): ICD-10-CM

## 2022-08-09 DIAGNOSIS — S31.109S RIGHT GROIN WOUND, SEQUELA: ICD-10-CM

## 2022-08-09 DIAGNOSIS — L89.103 PRESSURE INJURY OF BACK, STAGE 3 (H): Primary | ICD-10-CM

## 2022-08-09 PROCEDURE — 17250 CHEM CAUT OF GRANLTJ TISSUE: CPT | Performed by: PHYSICIAN ASSISTANT

## 2022-08-09 RX ORDER — VARENICLINE TARTRATE 1 MG/1
1 TABLET, FILM COATED ORAL
COMMUNITY
Start: 2022-02-15 | End: 2023-10-30

## 2022-08-09 NOTE — PROGRESS NOTES
ASSESSMENT:    1. Pyoderma gangrenosum of left lower leg - resolved  2. Peripheral arterial disease - non-reconstructible   3. Stage 4 pressure ulcer of right heel and right lateral foot  4. Stage 3 pressure ulcer of left heel  5. Recurrence of stage 4 pressure ulcers of bilateral ITs  6. Stage 3 pressure ulcer of back  7. Full thickness burn of left thigh      PLAN:    1. Pelvic and left leg wounds to be dressed with Stacie  2. Foot wounds to be dressed with Iodosorb o rbetadine  3. Can hold on HBOT at this time as foot wounds healing  4. [facility] with [facility] on [facility]    HISTORY OF PRESENT ILLNESS:   Mr. Parth Fountain is a 57-year-old paraplegic gentleman who returns to us today to follow-up on chronic pelvic pressure ulcers as well as lower leg wounds. PMHx of DVT, chronic UTI, EtOh abuse, and s/p colostomy. He has a long history of pressure ulcers with subsequent corrective surgeries by Dr. Peña. Parth's pelvic wounds have been very difficult to heal due to his surgical history. He has very little tissue overlying his bone and it is mostly made up of scar tissue. His progress waxes and wanes.    10/21/20: Televisit. Thigh wound healed. Scraped left leg and foot during a transfer and has several shallow ulcers on left lower leg and toes. Has an ulcer in his right groin/hip crease. This waxes and wanes. Uses Stacie when open and calmoseptine when more closed, this is working well for him.   11/18/20: Return visit. All of his wounds have improved. Has new traumatic wounds on bilateral shins.   12/16/20: Returns via video visit. Shin wounds are improving but very friable and hypergranular. Hip crease slightly more open today, also appears friable. Has been using Stacie to all wounds.   01/13/21: Returns for in office visit.  Leg wounds are improving with use of calcium alginate.  However, Parth notes that the dressings are sticking to the wounds and causing bleeding when  "removed.  His right groin wound is now quite hypertrophic.  2/26/21: Returns for follow-up. Reports that he continues to reinjure his R lower leg. Hip wound is not really improving. Has new wound on R great toe with exposed bone.   4/9/21: Returns for follow-up. Still has exposed bone on R great toe and has not had the x-ray done we ordered at last visit. His R lower leg has worsened. R hip about the same.   5/19/21: Returns for follow-up via telephone, has been using Xeroform. Has not done vascular studies or xray as recommended. Hip wound is improving. Has a recurrence of his L IT wound. His legs are marginally better, reports they are \"squishy.\"    8/20: Returns for televisit. Had to reschedule from in-clinic visit due to staffing issues. Left toe has new wound, lateral left leg still open. Has been using xeroform. Thinks R toe is closed up now. R hip comes and goes, uses Calmoseptine PRN. Has lithotripsy this upcoming week. Has not had xray or ABIs.   09/24/21: Returns for in person follow-up. His hip/groin wound has completely healed, however this usually comes and goes. Has new minor right heel wound. Continues to have issues with left lower leg, today during the visit it is apparent he has some kind of vessel in this area that easily bleeds causing a hematoma in this area. Fortunately the wound is fairly superficial. He has not had ABIs or x rays as recommended. Fortunately his toe appears stable today without any exposed bone.   10/26/21: Returns for follow-up. Left leg has regressed, tissue now very friable and hypertrophic. Toe continues to be resolved. Hip wound currently resolved. Biopsy performed came back suspicious for pyoderma gangrenosum.    12/2/21: Seen in ED for laceration of foot. Arterial duplex performed was suspicious for arterial disease.   12/07/21: Orders were changed after biopsy results last visit to begin high potency topical steroid ointment, unfortunately there was some confusion on " this and he hasn't started it. Staff has just been cleaning and covering with gauze and the wound is much improved.   01/28/22: Returns for follow-up. Since I saw him he [facility] with [facility] who recommended angio. Unfortunately his right heel and right lateral foot wound are now down to bone. He thinks it's from [facility]. His LLE wounds that we believe are PG have improved and are nearly healed. Has new burn on his left thigh from coffee. Also his pelvic wounds have reopened. He thinks this is due to his wheelchair cushion deflating.   03/15/22: Returns for follow-up. It appears that his health has overall deteriorated. He denies drinking again but he does have both cigarettes and chewing tobacco with him. He says he feels sick today as he didn't sleep last night but denies fevers or change in appetite. He had his chair pressure mapped and said it is appropriate. He has not followed up with vascular as we had suggested. Fortunately his foot wounds appear relatively stable. His pelvic wounds have deteriorated a bit. PG leg wound appears nearly resolved.   04/12/22: Returns for follow-up. Angio is scheduled on Thursday. Foot wounds continue to be stable but unfortunately continues to have bone exposed on R foot. His pelvic wounds have worsened and he cannot really identify a cause. States his bed is working well. Has a Roho that he reports mapped well but is [facility] a new [facility]. He is otherwise feeling well.   4/14/22: Angio revealed multiple occlusions, unable to achieve any recanalization. Per Dr. Chapa he is a poor candidate for revascularization, especially due to smoking and poor health.   05/17/22: Returns for follow-up. Pelvic wounds improved. Foot wounds are stable, no sign of infection. Thigh wound healing. Leg wounds about the same. New left heel ulcer. We had heart to heart about poor blood flow and poor prognosis of this, high  risk of amputation. He must quit smoking and take care of himself. Recommended HBOT if candidate.   06/14/22: Returns for follow-up.  Foot wounds stable with Iodosorb. Lower leg wounds healed. Thigh burn wound nearly healed. Pelvic wounds improving with collagen. New back wound - states he is bottoming out on back rest when he tilts too far.   07/12/22: Returns to clinic. Foot wounds improving. Has new necrotic wound on left lateral leg - he is unaware how or when he got this. Pelvic wounds are stable. [facility], now using an old Roho as a back rest.   08/09/22: Pelvic wounds are improving. Left lateral leg wound now down to tendon but starting to granulate on edges. Still has joint exposed on left third toe. Left heel resolved. Right heel improved. Right lateral foot improving but still with exposed tendon. Has not fixed wheelchair.      OFFLOADING: On a Group 2 mattress. Still utilizing RoHo cushion on his wheelchair - pressure mapped well 2/2022, new chair 4/2022.       SOCIAL HISTORY:  Lives in a group home. He is still receiving home health care through [facility] that comes and does dressing changes daily.      PHYSICAL EXAMINATION   INTEGUMENTARY:  Wound (used by [facility] only) 01/28/22 1056 Right lateral foot (Active)   Thickness/Stage Stage 4 08/09/22 1200   Base scab;exposed structure 08/09/22 1200   Periwound edematous 08/09/22 1200   Periwound Temperature warm 08/09/22 1200   Periwound Skin Turgor soft 08/09/22 1200   Edges rolled/closed 08/09/22 1200   Length (cm) 0.4 08/09/22 1200   Width (cm) 0.4 08/09/22 1200   Depth (cm) 0.6 08/09/22 1200   Wound (cm^2) 0.16 cm^2 08/09/22 1200   Wound Volume (cm^3) 0.1 cm^3 08/09/22 1200   Wound healing % 95.79 08/09/22 1200   Drainage Characteristics/Odor serosanguineous 08/09/22 1200   Drainage Amount moderate 08/09/22 1200   Care, Wound non-select wound debridement performed 08/09/22 1200       Wound (used by [facility] only)  01/28/22 1057 Left anterior thigh (Active)   Base scab 08/09/22 1200   Length (cm) 1.1 08/09/22 1200   Width (cm) 1.1 08/09/22 1200   Depth (cm) 0 08/09/22 1200   Wound (cm^2) 1.21 cm^2 08/09/22 1200   Wound Volume (cm^3) 0 cm^3 08/09/22 1200   Wound healing % 87.59 08/09/22 1200   Drainage Amount none 08/09/22 1200       Wound (used by [facility] only) 01/28/22 1057 Right posterior heel (Active)   Thickness/Stage Stage 3 08/09/22 1200   Base slough;maroon/purple;scab 08/09/22 1200   Periwound intact 08/09/22 1200   Periwound Temperature warm 08/09/22 1200   Periwound Skin Turgor firm 08/09/22 1200   Edges rolled/closed 08/09/22 1200   Length (cm) 1.1 08/09/22 1200   Width (cm) 1 08/09/22 1200   Depth (cm) 0.3 08/09/22 1200   Wound (cm^2) 1.1 cm^2 08/09/22 1200   Wound Volume (cm^3) 0.33 cm^3 08/09/22 1200   Wound healing % 67.65 08/09/22 1200   Drainage Characteristics/Odor serosanguineous 08/09/22 1200   Drainage Amount moderate 08/09/22 1200   Care, Wound non-select wound debridement performed 08/09/22 1200       Wound (used by [facility] only) 01/28/22 1114 scrotum pressure injury (Active)   Thickness/Stage Stage 3 08/09/22 1200   Base slough;granulating 08/09/22 1200   Periwound excoriated 08/09/22 1200   Periwound Temperature warm 08/09/22 1200   Periwound Skin Turgor soft 08/09/22 1200   Edges open 08/09/22 1200   Length (cm) 0.2 08/09/22 1200   Width (cm) 0.5 08/09/22 1200   Depth (cm) 0.3 08/09/22 1200   Wound (cm^2) 0.1 cm^2 08/09/22 1200   Wound Volume (cm^3) 0.03 cm^3 08/09/22 1200   Wound healing % 98.77 08/09/22 1200   Drainage Characteristics/Odor serosanguineous 08/09/22 1200   Drainage Amount moderate 08/09/22 1200   Care, Wound non-select wound debridement performed 08/09/22 1200       Wound (used by [facility] only) 03/15/22 1049 Right ischial tuberosity pressure injury (Active)   Thickness/Stage Stage 4 08/09/22 1200   Base slough;granulating 08/09/22 1200   Periwound intact 08/09/22 1200   Periwound  Temperature warm 08/09/22 1200   Periwound Skin Turgor soft 08/09/22 1200   Edges open 08/09/22 1200   Length (cm) 3.4 08/09/22 1200   Width (cm) 2 08/09/22 1200   Depth (cm) 0.5 08/09/22 1200   Wound (cm^2) 6.8 cm^2 08/09/22 1200   Wound Volume (cm^3) 3.4 cm^3 08/09/22 1200   Wound healing % -81.33 08/09/22 1200   Undermining [Depth (cm)/Location] 3-8o' @ 0.6cm 08/09/22 1200   Drainage Characteristics/Odor serosanguineous 08/09/22 1200   Drainage Amount moderate 08/09/22 1200   Care, Wound chemical cautery applied 08/09/22 1200       [facility] 03/15/22 1050 Left ischial tuberosity (Active)   Thickness/Stage Stage 4 08/09/22 1200   Base slough;granulating 08/09/22 1200   Periwound intact 08/09/22 1200   Periwound Temperature warm 08/09/22 1200   Periwound Skin Turgor soft 08/09/22 1200   Edges open 08/09/22 1200   Length (cm) 2.2 08/09/22 1200   Width (cm) 1.5 08/09/22 1200   Depth (cm) 1 08/09/22 1200   Wound (cm^2) 3.3 cm^2 08/09/22 1200   Wound Volume (cm^3) 3.3 cm^3 08/09/22 1200   Wound healing % -15.38 08/09/22 1200   Undermining [Depth (cm)/Location] 3-7 o'/1.2 cm 08/09/22 1200   Drainage Characteristics/Odor serosanguineous 08/09/22 1200   Drainage Amount moderate 08/09/22 1200   Care, Wound chemical cautery applied 08/09/22 1200       Wound (used by  [facility] only) 04/12/22 1046 Left lateral;lower leg (Active)   Thickness/Stage full thickness 08/09/22 1200   Base granulating;slough;exposed structure 08/09/22 1200   Periwound intact 08/09/22 1200   Periwound Temperature warm 08/09/22 1200   Periwound Skin Turgor soft 08/09/22 1200   Edges open 08/09/22 1200   Length (cm) 5.7 08/09/22 1200   Width (cm) 2.3 08/09/22 1200   Depth (cm) 0.6 08/09/22 1200   Wound (cm^2) 13.11 cm^2 08/09/22 1200   Wound Volume (cm^3) 7.87 cm^3 08/09/22 1200   Wound healing % -1538.75 08/09/22 1200   Drainage Characteristics/Odor serosanguineous 08/09/22 1200   Drainage Amount moderate 08/09/22 1200   Care, Wound  chemical cautery applied 08/09/22 1200       Wound (used by [facility] only) 05/17/22 1200 Left 1 toe (Active)   Thickness/Stage full thickness 08/09/22 1200   Base pink;red 08/09/22 1200   Periwound pink;redness 08/09/22 1200   Periwound Temperature warm 08/09/22 1200   Periwound Skin Turgor soft 08/09/22 1200   Edges open 08/09/22 1200   Length (cm) 0.1 08/09/22 1200   Width (cm) 0.2 08/09/22 1200   Depth (cm) 0.1 08/09/22 1200   Wound (cm^2) 0.02 cm^2 08/09/22 1200   Wound Volume (cm^3) 0 cm^3 08/09/22 1200   Wound healing % 99.24 08/09/22 1200   Drainage Characteristics/Odor serosanguineous 08/09/22 1200   Drainage Amount moderate 08/09/22 1200   Care, Wound non-select wound debridement performed 08/09/22 1200       Wound (used by [facility] only) 05/17/22 1205 Left 3 toe (Active)   Thickness/Stage full thickness 08/09/22 1200   Base exposed structure;pink 08/09/22 1200   Periwound intact 08/09/22 1200   Periwound Temperature warm 08/09/22 1200   Periwound Skin Turgor soft 08/09/22 1200   Edges open 08/09/22 1200   Length (cm) 0.4 08/09/22 1200   Width (cm) 0.4 08/09/22 1200   Depth (cm) 0.3 08/09/22 1200   Wound (cm^2) 0.16 cm^2 08/09/22 1200   Wound Volume (cm^3) 0.05 cm^3 08/09/22 1200   Wound healing % 92 08/09/22 1200   Drainage Characteristics/Odor serosanguineous 08/09/22 1200   Drainage Amount moderate 08/09/22 1200   Care, Wound non-select wound debridement performed 08/09/22 1200                              PROCEDURE (pelvic wounds)After verbal consent was obtained, hypergranulation tissue was treated with silver nitrate. Patient tolerated this well.         Further instructions from your care team       Parth Fountain      1965    [facility] Phone: [phone] Fax: [phone]    -[facility]; new wound -  Please Call: [phone]    -Referral for Hyperbaric Medicine for consult at [facility] placed  on May 17, 2022, please  "call them at [phone].    Medications/supplements to aid in healing:  Vitamin C 1,000mg daily  Vitamin D 5,000 units daily  Vitamin B 12 Complex daily    Wound Care Orders: Left Lateral Thigh  -Cleanse wound well and pat dry and leave open to air    Wound Care Orders: Bilateral toe wounds  -Cleanse with wound cleanser between the toes and the wounds, pat dry  -Swab all scabs to lower legs and feet with Betadine, let air dry  -Apply Iodosorb gel to all open areas and cover with gauze or a Band-Aid  -Change dressing M,W,F and as needed for soilage    Wound Care Orders: Left lateral lower leg  -Cleanse with wound cleanser, pat dry  -Apply Stacie or other collagen   -Then apply ABD and secure with roll gauze and tape  -Change M,W,F and as needed for soilage    Wound care: Scrotal, Right/ Left Ischial (Posterior Thigh)  -Gently cleanse and apply light layer of zinc-based paste to scrotal wound and  periwound as needed  -Lay Stacie or other collagen into the open ischial wounds  -Cover with ABD 5\"x9\", secure with 2\" tape  -Change M,W,F and as needed for soilage     Suzan Martinez PA-C August 9, 2022    Call us at [phone] if you have any questions about your wounds, have redness or swelling around your wound, have a fever of 101 or greater or if you have any other problems or concerns. We answer the phone Monday through Friday 8 am to 4 pm, please leave a message as we check the voicemail frequently throughout the day.     If you had a positive experience please indicate that on your patient satisfaction survey form that [facility] will be sending you.    It was a pleasure meeting with you today.  Thank you for allowing me and my team the privilege of caring for you today.  YOU are the reason we are here, and I truly hope we provided you with the excellent service you deserve.  Please let us know if there is anything else we can do for you so that we can be sure you are leaving completely satisfied with " your care experience.      If you have any billing related questions please call the [facility] Business office at [phone]. The clinic staff does not handle billing related matters.    If you are scheduled have a follow up appointment, you will receive a reminder call the day before your visit. If you are unable to keep that appointment, please call the clinic to cancel or reschedule.

## 2022-08-09 NOTE — DISCHARGE INSTRUCTIONS
"Parth Fountain      1965    [facility] Phone: [phone] Fax: [phone]    -[facility]; new wound -  Please Call: [phone]    -Referral for Hyperbaric Medicine for consult at [facility] placed  on May 17, 2022, please call them at [phone].    Medications/supplements to aid in healing:  Vitamin C 1,000mg daily  Vitamin D 5,000 units daily  Vitamin B 12 Complex daily    Wound Care Orders: Left Lateral Thigh  -Cleanse wound well and pat dry and leave open to air    Wound Care Orders: Bilateral toe wounds  -Cleanse with wound cleanser between the toes and the wounds, pat dry  -Swab all scabs to lower legs and feet with Betadine, let air dry  -Apply Iodosorb gel to all open areas and cover with gauze or a Band-Aid  -Change dressing M,W,F and as needed for soilage    Wound Care Orders: Left lateral lower leg  -Cleanse with wound cleanser, pat dry  -Apply Stacie or other collagen   -Then apply ABD and secure with roll gauze and tape  -Change M,W,F and as needed for soilage    Wound care: Scrotal, Right/ Left Ischial (Posterior Thigh)  -Gently cleanse and apply light layer of zinc-based paste to scrotal wound and  periwound as needed  -Lay Stacie or other collagen into the open ischial wounds  -Cover with ABD 5\"x9\", secure with 2\" tape  -Change M,W,F and as needed for soilage     Suzan Martinez PA-C August 9, 2022    Call us at [phone] if you have any questions about your wounds, have redness or swelling around your wound, have a fever of 101 or greater or if you have any other problems or concerns. We answer the phone Monday through Friday 8 am to 4 pm, please leave a message as we check the voicemail frequently throughout the day.     If you had a positive experience please indicate that on your patient satisfaction survey form that [facility] will be sending you.    It was a pleasure meeting with you today.  " Thank you for allowing me and my team the privilege of caring for you today.  YOU are the reason we are here, and I truly hope we provided you with the excellent service you deserve.  Please let us know if there is anything else we can do for you so that we can be sure you are leaving completely satisfied with your care experience.      If you have any billing related questions please call the [facility] Business office at [phone]. The clinic staff does not handle billing related matters.    If you are scheduled have a follow up appointment, you will receive a reminder call the day before your visit. If you are unable to keep that appointment, please call the clinic to cancel or reschedule.

## 2022-09-01 ENCOUNTER — TELEPHONE (OUTPATIENT)
Dept: WOUND CARE | Facility: CLINIC | Age: 57
End: 2022-09-01

## 2022-09-01 NOTE — TELEPHONE ENCOUNTER
Returned call to patient. Discussed with him that Renea BANGURA would like him to be seen by Dr. Abbott. The patient was concerned his sacral wounds would not be changed. Assured patient we can change all of the dressings at the appointment tomorrow. No further questions or concerns.

## 2022-09-01 NOTE — TELEPHONE ENCOUNTER
Feels coming in on a Friday to see Dr Abbott is too much with all the home care he has.  Wants to know if he really needs to see Dr Abbott

## 2022-09-01 NOTE — ED AVS SNAPSHOT
"Pt called to report symptoms that she is experiencing whole body \"grabs\" (pt then clarified this meant muscle spasms) and they are getting worse. Pt states she grabbed a water bottle and her fingers got stuck, her feet, legs and shoulders were all \"grabbing\" as well. Pt also states she has a rash in her groin are that has been going on for 2-3 days that is getting worse as well. Pt stated she does not know what could be causing this but she is in a lot of pain and needs help. Pt stated that she may be going out so it would be okay to LVM if she doesn't answer.   " ` ` Patient Information     Patient Name Sex     Parth Fountain ([id number]) Male 1965       Room Bed    UU21 ED21      Patient Demographics     Address Phone    [address]   [facility] [address] [phone] (Home)  [phone] (Mobile) *Preferred*      Patient Ethnicity & Race     Ethnic Group Patient Race    American White      PCP and Center    Primary Care Provider  Phone Center     Maria Ines Emmanuel [phone] [facility] [address] [facility] [address]        Emergency Contact(s)     Name Relation Home Work Mobile    Toni Santo Brother [phone]    Jeff Santo Relative [phone]      Documents on File        Status Date Received Description       Documents for the Patient    Privacy Notice - [place] Received 10/06/11 [facility] Privacy Notice    Face Sheet  () 08     Insurance Card Received 10/06/11 Medicare/MA    External Medication Information Consent       Patient ID Received 10/06/11 Patient ID    Consent for Services - Hospital/Clinic Received () 10/06/11 Consent for Services- Clinic and HOD    CMS IM for Patient Signature Received 12     HIM OSBALDO Authorization  12 Patient    HIM OSBALDO Authorization - File Only  06/15/12 AUTHORIZATION FOR RELEASE OF PROTECTED HEALTH INFORMATION    HIM OSBALDO Authorization  12 PATIENT    HIM OSBALDO Authorization - File Only  12 AUTHORIZATION FOR RELEASE OF PROTECTED HEALTH INFORMATION    HIM OSBALDO Authorization - File Only  12 AUTHORIZATION FOR RELEASE OF PROTECTED HEALTH INFORMATION    Consent for Services - Hospital/Clinic Received () 10/25/12 Consent for Services- Clinic and HOD    Consent for EHR Access  13 Copied from existing Consent for services - C/HOD collected on 10/25/2012    [facility] Specified Other       Consent for Services - Hospital/Clinic Received () 13     Consent for Services - Hospital/Clinic  ()  10/29/13 CONSENT FOR SERVICE    Consent for Services - [facility] Received 13 imaging center consent    Consent for Services - [facility]  13 GENERAL CONSENT FORM: SHARED EHR - ENGLISH    Consent for Services - Geriatrics Received 14     Consent for Services - Hospital/Clinic Received () 14     Insurance Card Received 03/24/15     Consent for Services - Hospital/Clinic Received () 12/10/15     Consent for Services/Privacy Notice - Hospital/Clinic Received () 16     HIM OSBALDO Authorization  16 [facility]    Consent for Services/Privacy Notice - Hospital/Clinic Received () 17     HIM OSBALDO Authorization  17 [facility]    HIM OSBALDO Authorization  17 [facility]    Care Everywhere Prospective Auth Received 17     Consent for Services/Privacy Notice - Hospital/Clinic Received 18     Consent for Services - Hospital and Clinic Received 18     HIE Auth Received 18     Consent for Services - Hospital/Clinic  (Deleted)      Insurance Card  (Deleted)      Insurance Card  (Deleted)      Insurance Card  (Deleted)         Documents for the Encounter    CMS IM for Patient Signature Received 18       Admission Information     Attending Provider Admitting Provider Admission Type Admission Date/Time    Mariusz Arce DO  Emergency 18  1102    Discharge Date Hospital Service Auth/Cert Status Service Area     Emergency Medicine Incomplete [facility] SERVICES    Unit Room/Bed Admission Status       [facility] EMERGENCY DEPT UU21/ED21 Admission (Confirmed)       Admission     Complaint    None      Hospital Account     Name Acct ID Class Status Primary Coverage    Parth Fountain [id number] Emergency Open MEDICARE - MEDICARE            Guarantor Account (for Hospital Account #[id number])     Name Relation to Pt Service Area Active? Acct Type    Parth Fountain  FCS Yes Personal/Family    Address Phone          [address]  [place] [address] [phone](H)              Coverage Information (for Hospital Account #[id number])     1. MEDICARE/MEDICARE     F/O Payor/Plan Precert #    MEDICARE/MEDICARE     Subscriber Subscriber #    АлександрParth [id number]    Address Phone    [employer]  [address]  [place] [address] [phone]          2. [place]     [place]     Subscriber Subscriber #    Parth Fountain [id number]    Address Phone    [address] [address]  [place] [address] [phone]

## 2022-09-02 ENCOUNTER — HOSPITAL ENCOUNTER (OUTPATIENT)
Dept: WOUND CARE | Facility: CLINIC | Age: 57
Discharge: HOME | End: 2022-09-02
Attending: PODIATRIST | Admitting: PODIATRIST
Payer: MEDICARE

## 2022-09-02 VITALS — HEART RATE: 81 BPM | DIASTOLIC BLOOD PRESSURE: 75 MMHG | TEMPERATURE: 98.3 F | SYSTOLIC BLOOD PRESSURE: 151 MMHG

## 2022-09-02 DIAGNOSIS — L97.524 SKIN ULCER OF TOE OF LEFT FOOT WITH NECROSIS OF BONE (H): ICD-10-CM

## 2022-09-02 DIAGNOSIS — L89.893 PRESSURE ULCER OF LEFT FOOT, STAGE 3 (H): ICD-10-CM

## 2022-09-02 DIAGNOSIS — G82.20 PARALYSIS OF BOTH LOWER LIMBS (H): ICD-10-CM

## 2022-09-02 DIAGNOSIS — I73.9 PAD (PERIPHERAL ARTERY DISEASE) (H): ICD-10-CM

## 2022-09-02 DIAGNOSIS — L89.893 PRESSURE ULCER OF RIGHT FOOT, STAGE 3 (H): ICD-10-CM

## 2022-09-02 DIAGNOSIS — L89.894 PRESSURE INJURY OF RIGHT FOOT, STAGE 4 (H): Primary | ICD-10-CM

## 2022-09-02 DIAGNOSIS — L97.524 ULCER OF LEFT FOOT, WITH NECROSIS OF BONE (H): ICD-10-CM

## 2022-09-02 PROBLEM — F19.10 POLYSUBSTANCE ABUSE (H): Status: ACTIVE | Noted: 2018-08-17

## 2022-09-02 PROBLEM — N39.0 ACUTE LOWER URINARY TRACT INFECTION: Status: ACTIVE | Noted: 2022-09-02

## 2022-09-02 PROBLEM — G62.9 POLYNEUROPATHY: Status: ACTIVE | Noted: 2017-06-27

## 2022-09-02 PROBLEM — Z73.6 LIMITATION OF ACTIVITIES DUE TO DISABILITY: Status: ACTIVE | Noted: 2022-09-02

## 2022-09-02 PROBLEM — F17.200 TOBACCO DEPENDENCE SYNDROME: Status: ACTIVE | Noted: 2017-06-12

## 2022-09-02 PROBLEM — I82.409 DEEP VEIN THROMBOSIS (DVT) (H): Status: ACTIVE | Noted: 2017-06-12

## 2022-09-02 PROBLEM — S31.30XA WOUND, OPEN, SCROTUM OR TESTES: Status: ACTIVE | Noted: 2022-09-02

## 2022-09-02 PROBLEM — G56.20 ULNAR NEUROPATHY: Status: ACTIVE | Noted: 2022-09-02

## 2022-09-02 PROBLEM — M25.519 SHOULDER PAIN: Status: ACTIVE | Noted: 2022-09-02

## 2022-09-02 PROCEDURE — 28124 PARTIAL REMOVAL OF TOE: CPT | Mod: T2 | Performed by: PODIATRIST

## 2022-09-02 PROCEDURE — 99214 OFFICE O/P EST MOD 30 MIN: CPT | Mod: 25 | Performed by: PODIATRIST

## 2022-09-02 PROCEDURE — 28124 PARTIAL REMOVAL OF TOE: CPT | Performed by: PODIATRIST

## 2022-09-02 PROCEDURE — 11042 DBRDMT SUBQ TIS 1ST 20SQCM/<: CPT | Mod: 51 | Performed by: PODIATRIST

## 2022-09-02 PROCEDURE — 11042 DBRDMT SUBQ TIS 1ST 20SQCM/<: CPT | Performed by: PODIATRIST

## 2022-09-02 PROCEDURE — 88307 TISSUE EXAM BY PATHOLOGIST: CPT | Mod: TC | Performed by: PODIATRIST

## 2022-09-02 RX ORDER — DOXYCYCLINE 100 MG/1
100 CAPSULE ORAL 2 TIMES DAILY
Qty: 28 CAPSULE | Refills: 0 | Status: SHIPPED | OUTPATIENT
Start: 2022-09-02 | End: 2022-09-16
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
## 2022-09-02 NOTE — DISCHARGE INSTRUCTIONS
Parth AQUILES Fountain      1965    [place] Phone: [phone] Fax: [phone]    -[facility]; new wound - Please Call: [phone]    -Referral for Hyperbaric Medicine for consult at [facility] placed  on May 17, 2022, please call them at [phone].    Medications/supplements to aid in healing:  Vitamin C 1,000mg daily  Vitamin D 5,000 units daily  Vitamin B 12 Complex daily    Sacral and left lower leg wounds not assessed by Dr. Abbott at today's visit but dressings changed    Wound Care recommendations: Right heel and Right lateral foot  -Cleanse with wound cleanser between the toes and the wounds, pat dry  -Apply Iodosorb gel to all open areas and cover with gauze or a Band-Aid  -Change dressing M,W,F and as needed for soilage    Wound Care recommendations: Left 3rd toe  -Cleanse with wound cleanser  -Swab sutures with betadine  -Cover with dry gauze and secure with roll gauze and tape  -Change [facility]     Sultana Abbott, D.P.M. September 2, 2022    Call us at [phone] if you have any questions about your wounds, have redness or swelling around your wound, have a fever of 101 or greater or if you have any other problems or concerns. We answer the phone Monday through Friday 8 am to 4 pm, please leave a message as we check the voicemail frequently throughout the day.     If you had a positive experience please indicate that on your patient satisfaction survey form that [facility] will be sending you.    It was a pleasure meeting with you today.  Thank you for allowing me and my team the privilege of caring for you today.  YOU are the reason we are here, and I truly hope we provided you with the excellent service you deserve.  Please let us know if there is anything else we can do for you so that we can be sure you are leaving completely satisfied with your care experience.      If you have any billing related questions please  call the [facility] Business office at [phone]. The clinic staff does not handle billing related matters.    If you are scheduled to have a follow up appointment, you will receive a reminder call the day before your visit. On the appointment day please arrive 15 minutes prior to your appointment time. If you are unable to keep that appointment, please call the clinic to cancel or reschedule. If you are more than 10 minutes late or greater for your appointment, the clinic policy is that you may be asked to reschedule.

## 2022-09-07 LAB
PATH REPORT.COMMENTS IMP SPEC: NORMAL
PATH REPORT.COMMENTS IMP SPEC: NORMAL
PATH REPORT.FINAL DX SPEC: NORMAL
PATH REPORT.GROSS SPEC: NORMAL
PATH REPORT.MICROSCOPIC SPEC OTHER STN: NORMAL
PATH REPORT.RELEVANT HX SPEC: NORMAL
PHOTO IMAGE: NORMAL

## 2022-09-07 PROCEDURE — 88311 DECALCIFY TISSUE: CPT | Mod: 26 | Performed by: PATHOLOGY

## 2022-09-07 PROCEDURE — 88307 TISSUE EXAM BY PATHOLOGIST: CPT | Mod: 26 | Performed by: PATHOLOGY

## 2022-09-12 NOTE — PATIENT INSTRUCTIONS
"Parth Fountain 1965  [place] [facility] Phone: [phone] Fax: [phone]    -[facility];   Please Call: [phone]    -Referral for Hyperbaric Medicine for consult at [facility] placed on May 17, 2022, please call them at [phone].    Wound Care Orders: Left Lateral Thigh  -Cleanse wound well and pat dry and leave open to air    Wound Care Orders: Bilateral toe wounds  -Cleanse with wound cleanser between the toes and the wounds, pat dry  -Swab all scabs to lower legs and feet with Betadine, let air dry  -Apply Iodosorb gel to all open areas and cover with gauze or a Band-Aid  -Change dressing M,W,F and as needed for soilage    Wound Care Orders: Left lateral lower leg  -Cleanse with wound cleanser, pat dry  -Apply Stacie or other collagen  -Then apply ABD and secure with roll gauze and tape  -Change M,W,F and as needed for soilage    Wound care: Scrotal, Right/ Left Ischial (Posterior Thigh)  -Gently cleanse and apply light layer of zinc-based paste to scrotal wound and periwound as needed  -Lay Stacie or other collagen into the open ischial wounds  -Cover with ABD 5\"x9\", secure with 2\" tape  -Change M,W,F and as needed for soilage  Suzan Martinez PA-C     Call us at [phone] if you have any questions about your wounds, have redness or swelling around your wound, have a fever of 101 or greater or if you have any other problems or concerns. We answer the phone Monday through Friday 8 am to 4pm, please leave a message as we check the voicemail frequently throughout the day.      If you have any billing related questions please call the [facility] Business office [phone]. The clinic staff does not handle billing related matters.    If you are scheduled have a follow up appointment, you will receive a reminder call the day before your visit. If you are unable to keep that appointment, please call the clinic to cancel or " reschedule.
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
## 2022-09-13 ENCOUNTER — HOSPITAL ENCOUNTER (OUTPATIENT)
Dept: WOUND CARE | Facility: CLINIC | Age: 57
Discharge: HOME | End: 2022-09-13
Attending: PHYSICIAN ASSISTANT
Payer: MEDICARE

## 2022-09-13 ENCOUNTER — MEDICAL CORRESPONDENCE (OUTPATIENT)
Dept: HEALTH INFORMATION MANAGEMENT | Facility: CLINIC | Age: 57
End: 2022-09-13

## 2022-09-13 DIAGNOSIS — L89.103 PRESSURE INJURY OF BACK, STAGE 3 (H): Primary | ICD-10-CM

## 2022-09-13 PROCEDURE — 99442 PR PHYSICIAN TELEPHONE EVALUATION 11-20 MIN: CPT | Performed by: PHYSICIAN ASSISTANT

## 2022-09-13 NOTE — DISCHARGE INSTRUCTIONS
"Parth Fountain 1965  [facility] Phone: [phone] Fax: [phone]    -We recommend that you get your wheelchair fixed, [facility]. Please Call: [phone] to set this up    Please refer to Dr. Abbott's orders for any foot wounds    Wound Care Orders: Left lateral lower leg (proximal)  -Cleanse with wound cleanser, pat dry  -Apply Stacie or other collagen (ok to moisten with saline if wound bed is dry)  -Then apply ABD and secure with roll gauze and tape  -Change M,W,F and as needed for soilage    Wound Care Orders: Left lateral lower leg (distal)  -Cleanse with wound cleanser, pat dry  -Apply betamethasone ointment to wound bed and wound edges  -Apply Stacie or other collagen  -Then apply ABD and secure with roll gauze and tape  -Change M,W,F and as needed for soilage    Wound care: Scrotal, Right/ Left Ischial (Posterior Thigh)  -Gently cleanse and apply light layer of zinc-based paste to scrotal wound and periwound as needed  -Lay Stacie or other collagen into the open ischial wounds  -Cover with ABD 5\"x9\", secure with 2\" tape  -Change M,W,F and as needed for soilage    Suzan Martinez PA-C September 13, 2022    "

## 2022-10-07 ENCOUNTER — HOSPITAL ENCOUNTER (OUTPATIENT)
Dept: WOUND CARE | Facility: CLINIC | Age: 57
Discharge: HOME | End: 2022-10-07
Attending: PODIATRIST | Admitting: PODIATRIST
Payer: MEDICARE

## 2022-10-07 VITALS — TEMPERATURE: 97.9 F | HEART RATE: 86 BPM | DIASTOLIC BLOOD PRESSURE: 85 MMHG | SYSTOLIC BLOOD PRESSURE: 132 MMHG

## 2022-10-07 DIAGNOSIS — S81.802D OPEN WOUND OF BOTH LOWER EXTREMITIES WITH COMPLICATION, SUBSEQUENT ENCOUNTER: ICD-10-CM

## 2022-10-07 DIAGNOSIS — L89.893 PRESSURE ULCER OF RIGHT FOOT, STAGE 3 (H): ICD-10-CM

## 2022-10-07 DIAGNOSIS — L89.324 LEFT ISCHIAL PRESSURE SORE, STAGE IV (H): Primary | ICD-10-CM

## 2022-10-07 DIAGNOSIS — G62.9 POLYNEUROPATHY: ICD-10-CM

## 2022-10-07 DIAGNOSIS — G82.20 PARALYSIS OF BOTH LOWER LIMBS (H): ICD-10-CM

## 2022-10-07 DIAGNOSIS — L89.614 PRESSURE INJURY OF RIGHT HEEL, STAGE 4 (H): ICD-10-CM

## 2022-10-07 DIAGNOSIS — L89.314 PRESSURE ULCER OF ISCHIUM, RIGHT, STAGE IV (H): ICD-10-CM

## 2022-10-07 DIAGNOSIS — L89.103 PRESSURE INJURY OF BACK, STAGE 3 (H): ICD-10-CM

## 2022-10-07 DIAGNOSIS — S31.109S RIGHT GROIN WOUND, SEQUELA: ICD-10-CM

## 2022-10-07 DIAGNOSIS — I73.9 PAD (PERIPHERAL ARTERY DISEASE) (H): ICD-10-CM

## 2022-10-07 DIAGNOSIS — L89.892 PRESSURE ULCER OF LEFT FOOT, STAGE 2 (H): ICD-10-CM

## 2022-10-07 DIAGNOSIS — L89.894 PRESSURE INJURY OF RIGHT FOOT, STAGE 4 (H): ICD-10-CM

## 2022-10-07 DIAGNOSIS — S81.801D OPEN WOUND OF BOTH LOWER EXTREMITIES WITH COMPLICATION, SUBSEQUENT ENCOUNTER: ICD-10-CM

## 2022-10-07 PROBLEM — M62.838 MUSCLE SPASTICITY: Status: ACTIVE | Noted: 2022-10-07

## 2022-10-07 PROBLEM — N31.9 NEUROGENIC BLADDER: Status: ACTIVE | Noted: 2022-10-07

## 2022-10-07 PROCEDURE — 99213 OFFICE O/P EST LOW 20 MIN: CPT | Mod: 24 | Performed by: PODIATRIST

## 2022-10-07 PROCEDURE — 11042 DBRDMT SUBQ TIS 1ST 20SQCM/<: CPT | Performed by: PODIATRIST

## 2022-10-07 PROCEDURE — 11042 DBRDMT SUBQ TIS 1ST 20SQCM/<: CPT | Mod: 79 | Performed by: PODIATRIST
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
## 2022-10-07 NOTE — DISCHARGE INSTRUCTIONS
Parth Fountain      1965    A DME order was not completed because the supplies are ordered by home care or at a care facility (Patient has Medicare)    [facility] Phone: [phone] Fax: [phone]    Medications/supplements to aid in healing:  Vitamin C 1,000mg daily  Vitamin D 5,000 units daily  Vitamin B 12 Complex daily    Sacral and left lower leg wounds not assessed by Dr. Abbott at today's visit but dressings changed per Renea Martinez's orders.    Wound Care recommendations: Right lateral foot and left 1st toe  -Cleanse with wound cleanser between the toes and the wounds, pat dry  -Apply Iodosorb gel to all open areas and cover with gauze or a Band-Aid  -Change dressing M,W,F and as needed for soilage    Wound Care recommendations: Left 3rd toe incision and ends of all toes:  -Cleanse foot with mild soap and water; moisturize foot (but not between toes)  -Paint with incision/wounds betadine  -Change [facility]       Sultana Abbott, D.P.M. October 7, 2022    Call us at [phone] if you have any questions about your wounds, have redness or swelling around your wound, have a fever of 101 or greater or if you have any other problems or concerns. We answer the phone Monday through Friday 8 am to 4 pm, please leave a message as we check the voicemail frequently throughout the day.     If you had a positive experience please indicate that on your patient satisfaction survey form that [facility] will be sending you.    It was a pleasure meeting with you today.  Thank you for allowing me and my team the privilege of caring for you today.  YOU are the reason we are here, and I truly hope we provided you with the excellent service you deserve.  Please let us know if there is anything else we can do for you so that we can be sure you are leaving completely satisfied with your care experience.      If you have any billing related questions please call the [facility] Business office at [phone]. The clinic  staff does not handle billing related matters.    If you are scheduled to have a follow up appointment, you will receive a reminder call the day before your visit. On the appointment day please arrive 15 minutes prior to your appointment time. If you are unable to keep that appointment, please call the clinic to cancel or reschedule. If you are more than 10 minutes late or greater for your appointment, the clinic policy is that you may be asked to reschedule.

## 2022-10-11 ENCOUNTER — HOSPITAL ENCOUNTER (OUTPATIENT)
Dept: WOUND CARE | Facility: CLINIC | Age: 57
Discharge: HOME | End: 2022-10-11
Attending: PHYSICIAN ASSISTANT | Admitting: PHYSICIAN ASSISTANT
Payer: MEDICARE

## 2022-10-11 VITALS — TEMPERATURE: 97.8 F | SYSTOLIC BLOOD PRESSURE: 165 MMHG | HEART RATE: 64 BPM | DIASTOLIC BLOOD PRESSURE: 85 MMHG

## 2022-10-11 DIAGNOSIS — L89.614 PRESSURE INJURY OF RIGHT HEEL, STAGE 4 (H): ICD-10-CM

## 2022-10-11 DIAGNOSIS — L89.103 PRESSURE INJURY OF BACK, STAGE 3 (H): Primary | ICD-10-CM

## 2022-10-11 DIAGNOSIS — L89.314 PRESSURE ULCER OF ISCHIUM, RIGHT, STAGE IV (H): ICD-10-CM

## 2022-10-11 DIAGNOSIS — S31.109S RIGHT GROIN WOUND, SEQUELA: ICD-10-CM

## 2022-10-11 DIAGNOSIS — L89.324 LEFT ISCHIAL PRESSURE SORE, STAGE IV (H): ICD-10-CM

## 2022-10-11 DIAGNOSIS — L89.894 PRESSURE INJURY OF RIGHT FOOT, STAGE 4 (H): ICD-10-CM

## 2022-10-11 PROCEDURE — 97597 DBRDMT OPN WND 1ST 20 CM/<: CPT | Performed by: PHYSICIAN ASSISTANT
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
## 2022-10-11 NOTE — DISCHARGE INSTRUCTIONS
"Parth AQUILES Fountain      1965    A DME order was not completed because the supplies are ordered by home care or at a care facility    [facility] Phone: [phone] Fax: [phone]    Medications/supplements to aid in healing:  Vitamin C 1,000mg daily  Vitamin D 5,000 units daily  Vitamin B 12 Complex daily    Wound Care Orders: Left lateral lower leg (proximal), Left lateral lower leg (distal), and Left Anterior lower leg  -Cleanse with wound cleanser, pat dry  -Apply wound'res gel to wound bed  -Cover with gauze and secure with medipore tape or mepilex   -Wear spandage for compression  -Change M,W,F and as needed for soilage    Wound care: Right/ Left Ischial tuberosity  -Gently cleanse and apply light layer of zinc-based paste to scrotal wound and periwound as needed  -Lay Stacie or other collagen into the open ischial wounds  -Cover with ABD 5\"x9\", secure with 2\" tape  -Change M,W,F and as needed for soilage    Wound care: Scrotal wounds  -apply Triad paste to open areas    Refer to Dr. Abbott's discharge instructions for wound of the feet.        Suzan Martinez PA-C October 11, 2022    Call us at [phone] if you have any questions about your wounds, have redness or swelling around your wound, have a fever of 101 or greater or if you have any other problems or concerns. We answer the phone Monday through Friday 8 am to 4 pm, please leave a message as we check the voicemail frequently throughout the day.     If you had a positive experience please indicate that on your patient satisfaction survey form that [facility] will be sending you.    It was a pleasure meeting with you today.  Thank you for allowing me and my team the privilege of caring for you today.  YOU are the reason we are here, and I truly hope we provided you with the excellent service you deserve.  Please let us know if there is anything else we can do for you so that we can be sure you are leaving completely satisfied with " your care experience.      If you have any billing related questions please call the [facility] Business office at [phone]. The clinic staff does not handle billing related matters.    If you are scheduled to have a follow up appointment, you will receive a reminder call the day before your visit. On the appointment day please arrive 15 minutes prior to your appointment time. If you are unable to keep that appointment, please call the clinic to cancel or reschedule. If you are more than 10 minutes late or greater for your appointment, the clinic policy is that you may be asked to reschedule.

## 2022-12-26 ENCOUNTER — HEALTH MAINTENANCE LETTER (OUTPATIENT)
Age: 57
End: 2022-12-26

## 2022-12-28 NOTE — PROGRESS NOTES
Care Coordinator - Discharge Planning    Admission Date/Time:  9/9/2020  Attending MD:  Blanco Vasquez MD     Data  Chart reviewed, discussed with interdisciplinary team.   Patient was admitted for:   1. Paralysis of both lower limbs (H)    2. Hematoma of skin    3. Fever and chills    4. Pressure ulcer of right hip, stage 3 (H)    5. Pressure ulcer of upper thigh, left, stage III (H)         Assessment   Full assessment completed in previous note    Coordination of Care and Referrals:   Confirmed with Dr. Vasquez that pt will be discharged back to his  today.  Spoke with TYRONE Coughlin with [facility] regarding plan for discharge today.  Madyson referred writer to SATISH Love.  Madyson took writer contact information and agreed to have kate call writer.      Received f/u call from AMADOU Love.  She is requesting that discharge prescription be filled at [facility] d/t concern that community pharmacy would be unable fill in a timely manner.  Also  requesting COVID result prior to confirming re acceptance.    is able to accept the patient back anytime as they are staffed 24/7.    Kate requests that final discharge orders, summary and COVID result be faxed to [phone].  [facility] DON can be reached at [phone].    Updated Dr. Vasquez.  Prescription will be sent to our discharge pharmacy.  COVID r/o order placed as STAT.  Spoke with Microbiology Lab who confirmed test can be run today with results being available in 2-3 hours but would need specimen as soon as possible.   . Reviewed with Zoila MCLEAN.      [facility] Stretcher transport rescheduled for 1830.  Updated CARIDAD Miller    1523: Noted that COVID test result still pending.  Spoke with Anum, in the lab regarding when the result will be available.  Test is being run now and will be resulted in the next two hours.       Spoke with SATISH Love regarding status of COVID test and stretcher transport.  Per discussion with Kate if test COVID test  is positive  would need one day to get pt room ready for isolation status and update the  staff.       Updated Dr. Vasquez and Zoila. RN.  Anticipate pt will discharge this evening pending COVID test result.      1645; Noted that COVID test result is negative.  Updated SATISH Love.  Updated CARIDAD Cummings who agreed to fax final discharge orders to .       Plan  Anticipated Discharge Date:  9/12/2020  Anticipated Discharge Plan:  Return to     Shae Avilez RN BSN, PHN, ACM-RN  RN Care Coordinator  Internal Medicine      Weekend RNCC - pager [phone]    9/12/2020 11:06 AM     None Known

## 2022-12-29 ENCOUNTER — TELEPHONE (OUTPATIENT)
Dept: WOUND CARE | Facility: CLINIC | Age: 57
End: 2022-12-29

## 2023-01-10 ENCOUNTER — HOSPITAL ENCOUNTER (OUTPATIENT)
Dept: WOUND CARE | Facility: CLINIC | Age: 58
Discharge: HOME | End: 2023-01-10
Attending: PHYSICIAN ASSISTANT | Admitting: PHYSICIAN ASSISTANT
Payer: MEDICARE

## 2023-01-10 VITALS
TEMPERATURE: 97.8 F | DIASTOLIC BLOOD PRESSURE: 84 MMHG | RESPIRATION RATE: 18 BRPM | SYSTOLIC BLOOD PRESSURE: 150 MMHG | HEART RATE: 80 BPM

## 2023-01-10 DIAGNOSIS — L89.324 LEFT ISCHIAL PRESSURE SORE, STAGE IV (H): Primary | ICD-10-CM

## 2023-01-10 DIAGNOSIS — L89.314 PRESSURE ULCER OF ISCHIUM, RIGHT, STAGE IV (H): ICD-10-CM

## 2023-01-10 DIAGNOSIS — S81.801D OPEN WOUND OF BOTH LOWER EXTREMITIES WITH COMPLICATION, SUBSEQUENT ENCOUNTER: ICD-10-CM

## 2023-01-10 DIAGNOSIS — S81.802D OPEN WOUND OF BOTH LOWER EXTREMITIES WITH COMPLICATION, SUBSEQUENT ENCOUNTER: ICD-10-CM

## 2023-01-10 DIAGNOSIS — L89.614 PRESSURE INJURY OF RIGHT HEEL, STAGE 4 (H): ICD-10-CM

## 2023-01-10 DIAGNOSIS — L89.894 PRESSURE INJURY OF RIGHT FOOT, STAGE 4 (H): ICD-10-CM

## 2023-01-10 DIAGNOSIS — S31.109S RIGHT GROIN WOUND, SEQUELA: ICD-10-CM

## 2023-01-10 PROCEDURE — 97597 DBRDMT OPN WND 1ST 20 CM/<: CPT | Performed by: PHYSICIAN ASSISTANT

## 2023-01-10 PROCEDURE — 17250 CHEM CAUT OF GRANLTJ TISSUE: CPT | Mod: XS | Performed by: PHYSICIAN ASSISTANT

## 2023-01-10 PROCEDURE — 99213 OFFICE O/P EST LOW 20 MIN: CPT | Mod: 25 | Performed by: PHYSICIAN ASSISTANT

## 2023-01-10 NOTE — DISCHARGE INSTRUCTIONS
"Parth KWAN Александр      1965  A DME order was not completed because the supplies are ordered by home care or at a care facility  [facility] Phone: [phone] Fax: [phone]    Medications/supplements to aid in healing:  Vitamin C 1,000mg daily  Vitamin D 5,000 units daily  Vitamin B 12 Complex daily    Wound Care Orders: Left lateral Proximal lower leg and Right Lateral Foot  -Cleanse with wound cleanser, pat dry  -Apply Iodosorb gel to wound bed  -Cover with gauze and secure with medipore tape or mepilex 4\"  -Wear spandage for compression from toes to knee  -Change M,W,F and as needed for soilage    Wound care: Right and Left Ischial tuberosity  -Gently cleanse and apply light layer of zinc-based paste to scrotal wound and periwound as needed  -Lay Stacie / Collagen product cut to fit into the open Ischial wounds  -Cover with ABD 5\"x9\", secure with 2\" tape  -Change M,W,F and as needed for soilage    Wound care: Scrotal wounds as needed for open areas  -apply Triad paste to open areas    Wound Care recommendations: Right and Left foot and all toes  -Cleanse with wound cleanser between the toes and the wounds, pat dry  -Swab with Betadine air dry  Left 3rd toe betadine and cover with gauze or a Band-Aid  -Change dressing M,W,F and as needed for soilage     Suzan Martinze PA-C January 10, 2023    Call us at [phone] if you have any questions about your wounds, have redness or swelling around your wound, have a fever of 101 or greater or if you have any other problems or concerns. We answer the phone Monday through Friday 8 am to 4 pm, please leave a message as we check the voicemail frequently throughout the day.     If you had a positive experience please indicate that on your patient satisfaction survey form that [facility] will be sending you.  It was a pleasure meeting with you today.  Thank you for allowing me and my team the privilege of caring for you today.  YOU are the reason we " are here, and I truly hope we provided you with the excellent service you deserve.  Please let us know if there is anything else we can do for you so that we can be sure you are leaving completely satisfied with your care experience.      If you have any billing related questions please call the [facility] Business office at [phone]. The clinic staff does not handle billing related matters.  If you are scheduled to have a follow up appointment, you will receive a reminder call the day before your visit. On the appointment day please arrive 15 minutes prior to your appointment time. If you are unable to keep that appointment, please call the clinic to cancel or reschedule. If you are more than 10 minutes late or greater for your appointment, the clinic policy is that you may be asked to reschedule.
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
## 2023-02-22 ENCOUNTER — HOSPITAL ENCOUNTER (OUTPATIENT)
Dept: WOUND CARE | Facility: CLINIC | Age: 58
Discharge: HOME | End: 2023-02-22
Attending: PHYSICIAN ASSISTANT | Admitting: PHYSICIAN ASSISTANT
Payer: MEDICARE

## 2023-02-22 VITALS — DIASTOLIC BLOOD PRESSURE: 90 MMHG | HEART RATE: 106 BPM | SYSTOLIC BLOOD PRESSURE: 155 MMHG | TEMPERATURE: 96.4 F

## 2023-02-22 DIAGNOSIS — L89.314 PRESSURE ULCER OF ISCHIUM, RIGHT, STAGE IV (H): ICD-10-CM

## 2023-02-22 DIAGNOSIS — L89.614 PRESSURE INJURY OF RIGHT HEEL, STAGE 4 (H): ICD-10-CM

## 2023-02-22 DIAGNOSIS — L89.324 LEFT ISCHIAL PRESSURE SORE, STAGE IV (H): Primary | ICD-10-CM

## 2023-02-22 DIAGNOSIS — L89.894 PRESSURE INJURY OF RIGHT FOOT, STAGE 4 (H): ICD-10-CM

## 2023-02-22 PROCEDURE — 97597 DBRDMT OPN WND 1ST 20 CM/<: CPT | Performed by: PHYSICIAN ASSISTANT

## 2023-02-22 PROCEDURE — 17250 CHEM CAUT OF GRANLTJ TISSUE: CPT | Performed by: PHYSICIAN ASSISTANT

## 2023-02-22 PROCEDURE — 17250 CHEM CAUT OF GRANLTJ TISSUE: CPT | Mod: 59 | Performed by: PHYSICIAN ASSISTANT

## 2023-02-22 PROCEDURE — 99213 OFFICE O/P EST LOW 20 MIN: CPT | Mod: 25 | Performed by: PHYSICIAN ASSISTANT

## 2023-02-22 NOTE — PROGRESS NOTES
ASSESSMENT:    1. ARterial ulcerations of R foot  2. Peripheral arterial disease - non-reconstructible   3. Recurrence of stage 4 pressure ulcers of bilateral ITs      PLAN:    1. Counseled on high risk of losing his R leg if he doesn't change behaviors: ie quit smoking, wear protective boots/shoes and be more cautious  2. iodosorb or iodine and cover dressing to leg and foot wounds  3. Continue using Rooke boot to improve circulation  4. Pelvic wounds to be dressed with Stacie ok to moisten with saline  5. He will pressure map his cushion and adjust to new wait now that he is sp LAKA  6. Follow-up: will need 40 minute appointments every 6 weeks    HISTORY OF PRESENT ILLNESS:   Mr. Parth Fountain is a 57-year-old paraplegic gentleman who returns to us today to follow-up on chronic pelvic pressure ulcers as well as lower leg wounds. PMHx of DVT, chronic UTI, EtOh abuse, and s/p colostomy. He has a long history of pressure ulcers with subsequent corrective surgeries by Dr. Peña. Parth's pelvic wounds have been very difficult to heal due to his surgical history. He has very little tissue overlying his bone and it is mostly made up of scar tissue. His progress waxes and wanes.    10/21/20: Televisit. Thigh wound healed. Scraped left leg and foot during a transfer and has several shallow ulcers on left lower leg and toes. Has an ulcer in his right groin/hip crease. This waxes and wanes. Uses Stacie when open and calmoseptine when more closed, this is working well for him.   11/18/20: Return visit. All of his wounds have improved. Has new traumatic wounds on bilateral shins.   12/16/20: Returns via video visit. Shin wounds are improving but very friable and hypergranular. Hip crease slightly more open today, also appears friable. Has been using Stacie to all wounds.   01/13/21: Returns for in office visit.  Leg wounds are improving with use of calcium alginate.  However, Parth notes that the dressings are sticking to the  "wounds and causing bleeding when removed.  His right groin wound is now quite hypertrophic.  2/26/21: Returns for follow-up. Reports that he continues to reinjure his R lower leg. Hip wound is not really improving. Has new wound on R great toe with exposed bone.   4/9/21: Returns for follow-up. Still has exposed bone on R great toe and has not had the x-ray done we ordered at last visit. His R lower leg has worsened. R hip about the same.   5/19/21: Returns for follow-up via telephone, has been using Xeroform. Has not done vascular studies or xray as recommended. Hip wound is improving. Has a recurrence of his L IT wound. His legs are marginally better, reports they are \"squishy.\"    8/20: Returns for televisit. Had to reschedule from in-clinic visit due to staffing issues. Left toe has new wound, lateral left leg still open. Has been using xeroform. Thinks R toe is closed up now. R hip comes and goes, uses Calmoseptine PRN. Has lithotripsy this upcoming week. Has not had xray or ABIs.   09/24/21: Returns for in person follow-up. His hip/groin wound has completely healed, however this usually comes and goes. Has new minor right heel wound. Continues to have issues with left lower leg, today during the visit it is apparent he has some kind of vessel in this area that easily bleeds causing a hematoma in this area. Fortunately the wound is fairly superficial. He has not had ABIs or x rays as recommended. Fortunately his toe appears stable today without any exposed bone.   10/26/21: Returns for follow-up. Left leg has regressed, tissue now very friable and hypertrophic. Toe continues to be resolved. Hip wound currently resolved. Biopsy performed came back suspicious for pyoderma gangrenosum.    12/2/21: Seen in ED for laceration of foot. Arterial duplex performed was suspicious for arterial disease.   12/07/21: Orders were changed after biopsy results last visit to begin high potency topical steroid ointment, " unfortunately there was some confusion on this and he hasn't started it. Staff has just been cleaning and covering with gauze and the wound is much improved.   01/28/22: Returns for follow-up. Since I saw him he visited with Dr. [facility]a [facility] who recommended angio. Unfortunately his right heel and right lateral foot wound are now down to bone. He thinks it's from [facility]. His LLE wounds that we believe are PG have improved and are nearly healed. Has new burn on his left thigh from coffee. Also his pelvic wounds have reopened. He thinks this is due to his wheelchair cushion deflating.   03/15/22: Returns for follow-up. It appears that his health has overall deteriorated. He denies drinking again but he does have both cigarettes and chewing tobacco with him. He says he feels sick today as he didn't sleep last night but denies fevers or change in appetite. He had his chair pressure mapped and said it is appropriate. He has not followed up with vascular as we had suggested. Fortunately his foot wounds appear relatively stable. His pelvic wounds have deteriorated a bit. PG leg wound appears nearly resolved.   04/12/22: Returns for follow-up. Angio is scheduled on Thursday. Foot wounds continue to be stable but unfortunately continues to have bone exposed on R foot. His pelvic wounds have worsened and he cannot really identify a cause. States his bed is working well. Has a Roho that he reports mapped well but is getting a new [facility] this [facility]. He is otherwise feeling well.   4/14/22: Angio revealed multiple occlusions, unable to achieve any recanalization. Per Dr. Chapa he is a poor candidate for revascularization, especially due to smoking and poor health.   05/17/22: Returns for follow-up. Pelvic wounds improved. Foot wounds are stable, no sign of infection. Thigh wound healing. Leg wounds about the same. New left heel ulcer. We had heart to heart about poor  blood flow and poor prognosis of this, high risk of amputation. He must quit smoking and take care of himself. Recommended HBOT if candidate.   06/14/22: Returns for follow-up.  Foot wounds stable with Iodosorb. Lower leg wounds healed. Thigh burn wound nearly healed. Pelvic wounds improving with collagen. New back wound - states he is bottoming out on back rest when he tilts too far.   07/12/22: Returns to clinic. Foot wounds improving. Has new necrotic wound on left lateral leg - he is unaware how or when he got this. Pelvic wounds are stable. [facility], now using an old Roho as a back rest.   08/09/22: Pelvic wounds are improving. Left lateral leg wound now down to tendon but starting to granulate on edges. Still has joint exposed on left third toe. Left heel resolved. Right heel improved. Right lateral foot improving but still with exposed tendon. Has not fixed wheelchair.   9/2: Visited with [facility] about his left 3rd toe, she performed excision of bone in clinic and closed the area primarily. Started on doxycycline. Bone biopsy came back + for osteomyelitis.  09/13/22: Returns via telemed for follow-up. Pelvic wounds appear to be improving. New distal left lower leg wound in the previous area of PG. Has decided he does not want to fix the back of his wheelchair, prefers to use the Roho.   10/11/22: Returns for follow-up. Buttocks wounds improving overall. There is a little more tearing on the left IT wound causing some undermining. His leg wounds have improved but he does have new superficial abrasion superior to current wounds. Continues to see Dr. Abbott for his foot wounds, see her dictation for report of these.   01/11/23: Returns for follow-up. Pelvic wounds improved. Has worsening in lateral foot wound, appears to have new trauma but he's not sure about when/how. Wound of left toe third arthroplasty has now dehisced.   2/8/23: Sustained traumatic left toe amputation and  ultimately required AKA due to his significant blood flow issues.   02/22/23: Returns for follow-up. Pelvic wounds stable to improved. Right lateral foot wound slightly improved. Traumatic dorsal toe wounds with stable eschar.     OFFLOADING: On a Group 2 mattress. Still utilizing RoHo cushion on his wheelchair - pressure mapped well 2/2022, new chair 4/2022.       SOCIAL HISTORY:  Lives in a group home. He is still receiving home health care through [facility]. that comes and does dressing changes daily.      PHYSICAL EXAMINATION   INTEGUMENTARY:   Wound (used by [facility] only) 02/18/19 1332 Left ischial tuberosity pressure injury (Active)       [facility] 02/18/19 1333 Right ischial tuberosity pressure injury (Active)       Wound (used by OP I only) 02/18/19 1333 Right proximal scrotum pressure injury (Active)       Wound (used by OP I only) 02/18/19 1334 scrotum pressure injury (Active)       Wound (used by OP I only) 01/28/22 1056 Right lateral foot pressure injury (Active)   Thickness/Stage Stage 4 02/22/23 1000   Base slough;necrotic;red 02/22/23 1000   Periwound edematous 02/22/23 1000   Periwound Temperature warm 02/22/23 1000   Periwound Skin Turgor soft 02/22/23 1000   Edges rolled/closed 02/22/23 1000   Length (cm) 2.4 02/22/23 1000   Width (cm) 3.6 02/22/23 1000   Depth (cm) 0.3 02/22/23 1000   Wound (cm^2) 8.64 cm^2 02/22/23 1000   Wound Volume (cm^3) 2.59 cm^3 02/22/23 1000   Wound healing % -127.37 02/22/23 1000   Drainage Characteristics/Odor serosanguineous 02/22/23 1000   Drainage Amount moderate 02/22/23 1000   Care, Wound non-select wound debridement performed 02/22/23 1000       [facility] 01/28/22 1057 Right posterior heel pressure injury (Active)   Thickness/Stage Stage 3 02/22/23 1000   Base scab;dry 02/22/23 1000   Periwound intact 02/22/23 1000   Periwound Temperature warm 02/22/23 1000   Periwound Skin Turgor firm 02/22/23 1000   Edges  rolled/closed 02/22/23 1000   Length (cm) 0.5 10/07/22 1132   Width (cm) 0.4 10/07/22 1132   Depth (cm) 0.1 10/07/22 1132   Wound (cm^2) 0.2 cm^2 10/07/22 1132   Wound Volume (cm^3) 0.02 cm^3 10/07/22 1132   Wound healing % 94.12 10/07/22 1132   Undermining [Depth (cm)/Location] 12-6 O'clock/0.6cm 03/15/22 1000   Drainage Characteristics/Odor serosanguineous 10/11/22 1138   Drainage Amount none 02/22/23 1000   Care, Wound non-select wound debridement performed 02/22/23 1000       Wound (used by [facility] only) 01/28/22 1114 scrotum pressure injury (Active)   Thickness/Stage Stage 3 02/22/23 1000   Base epithelialization 02/22/23 1000   Periwound excoriated 02/22/23 1000   Periwound Temperature warm 02/22/23 1000   Periwound Skin Turgor soft 02/22/23 1000   Edges rolled/closed 02/22/23 1000   Length (cm) 2.9 10/11/22 1138   Width (cm) 1.5 10/11/22 1138   Depth (cm) 0.1 10/11/22 1138   Wound (cm^2) 4.35 cm^2 10/11/22 1138   Wound Volume (cm^3) 0.44 cm^3 10/11/22 1138   Wound healing % 46.3 10/11/22 1138   Drainage Characteristics/Odor serosanguineous 10/11/22 1138   Drainage Amount none 02/22/23 1000   Care, Wound non-select wound debridement performed 02/22/23 1000       Wound (used by [facility] only) 03/15/22 1049 Right ischial tuberosity pressure injury (Active)   Thickness/Stage Stage 4 02/22/23 1000   Base slough;granulating 02/22/23 1000   Periwound intact 02/22/23 1000   Periwound Temperature warm 02/22/23 1000   Periwound Skin Turgor soft 02/22/23 1000   Edges open 02/22/23 1000   Length (cm) 1.9 02/22/23 1000   Width (cm) 0.8 02/22/23 1000   Depth (cm) 0.4 02/22/23 1000   Wound (cm^2) 1.52 cm^2 02/22/23 1000   Wound Volume (cm^3) 0.61 cm^3 02/22/23 1000   Wound healing % 59.47 02/22/23 1000   Undermining [Depth (cm)/Location] 6-9 o'clock/0.3cm 10/11/22 1138   Drainage Characteristics/Odor serosanguineous;yellow;malodorous 02/22/23 1000   Drainage Amount moderate 02/22/23 1000   Care, Wound chemical cautery applied  02/22/23 1000       Wound (used by OP [facility] only) 03/15/22 1050 Left ischial tuberosity pressure injury (Active)   Thickness/Stage Stage 4 02/22/23 1000   Base granulating;white 02/22/23 1000   Periwound intact 02/22/23 1000   Periwound Temperature warm 02/22/23 1000   Periwound Skin Turgor firm 02/22/23 1000   Edges open 02/22/23 1000   Length (cm) 0.5 02/22/23 1000   Width (cm) 0.9 02/22/23 1000   Depth (cm) 0.5 02/22/23 1000   Wound (cm^2) 0.45 cm^2 02/22/23 1000   Wound Volume (cm^3) 0.23 cm^3 02/22/23 1000   Wound healing % 84.27 02/22/23 1000   Undermining [Depth (cm)/Location] 6 o'clock/2.1cm 10/11/22 1138   Drainage Characteristics/Odor serosanguineous;yellow;malodorous 02/22/23 1000   Drainage Amount moderate 02/22/23 1000   Care, Wound chemical cautery applied 02/22/23 1000       Wound (used by OP [facility] only) 02/22/23 1004 Right dorsal 1 toe pressure injury (Active)   Thickness/Stage unstageable 02/22/23 1000   Base black eschar 02/22/23 1000   Length (cm) 1 02/22/23 1000   Width (cm) 1 02/22/23 1000   Depth (cm) 0.1 02/22/23 1000   Wound (cm^2) 1 cm^2 02/22/23 1000   Wound Volume (cm^3) 0.1 cm^3 02/22/23 1000   Drainage Amount none 02/22/23 1000   Care, Wound non-select wound debridement performed 02/22/23 1000       Wound (used by [facility] only) 02/22/23 1005 Right dorsal 2 toe pressure injury (Active)   Thickness/Stage unstageable 02/22/23 1000   Base black eschar 02/22/23 1000   Length (cm) 1 02/22/23 1000   Width (cm) 1 02/22/23 1000   Depth (cm) 0.1 02/22/23 1000   Wound (cm^2) 1 cm^2 02/22/23 1000   Wound Volume (cm^3) 0.1 cm^3 02/22/23 1000   Drainage Amount none 02/22/23 1000   Care, Wound non-select wound debridement performed 02/22/23 1000       Wound (used by [facility] only) 02/22/23 1005 Right dorsal 3 toe pressure injury (Active)   Thickness/Stage unstageable 02/22/23 1000   Base black eschar 02/22/23 1000   Length (cm) 1 02/22/23 1000   Width (cm) 1 02/22/23 1000   Depth (cm) 0.1 02/22/23 1000    Wound (cm^2) 1 cm^2 02/22/23 1000   Wound Volume (cm^3) 0.1 cm^3 02/22/23 1000   Drainage Amount none 02/22/23 1000   Care, Wound non-select wound debridement performed 02/22/23 1000       Wound (used by [facility] only) 02/22/23 1005 Right dorsal 4 toe (Active)   Thickness/Stage unstageable 02/22/23 1000   Base black eschar 02/22/23 1000   Length (cm) 1 02/22/23 1000   Width (cm) 1 02/22/23 1000   Depth (cm) 0.1 02/22/23 1000   Wound (cm^2) 1 cm^2 02/22/23 1000   Wound Volume (cm^3) 0.1 cm^3 02/22/23 1000   Drainage Amount none 02/22/23 1000   Care, Wound non-select wound debridement performed 02/22/23 1000                 MDM: 29 minutes was spent on the day of visit reviewing previous chart notes, assessing the patient and developing the plan of care, this excludes time spent on any procedures.   PROCEDURE (R foot wound): 4% topical lidocaine was applied to the wound by the nursing staff. Patient was determined to be capable of making their own medical decisions and informed consent was obtained. Using a curette a selective debridement of non-viable tissue was performed of <20 cm. Hemostasis was achieved with pressure. The patient tolerated the procedure well.    PROCEDURE (pelvic wounds): After verbal consent was obtained, hypergranulation tissue was treated with silver nitrate. Patient tolerated this well.           Further instructions from your care team       Parth Fountain      1965    A DME order was not completed because the supplies are ordered by home care or at a care facility      [facility] [facility] Phone: [phone] Fax: [phone]    You need to have your wheelchair cushion pressure-mapped again now that you have had your amputation.   If you need assistance to [facility] or other, please contact us and we will assist.    Smoking delays healing; advised to stop smoking to assist healing.     Medications/supplements to aid in healing:  Vitamin C 1,000mg  "daily  Vitamin D 5,000 units daily  Vitamin B 12 Complex daily    Wound Care Orders:Right Lateral Foot  -Cleanse with wound cleanser, pat dry  -Apply Iodosorb gel to wound bed  -Cover with gauze and secure with medipore tape or mepilex 4\"  -Wear spandage for compression from toes to knee  -Change M,W,F and as needed for soilage    Wound care: Right and Left Ischial tuberosity  -Gently cleanse and apply light layer of zinc-based paste to scrotal and periwound, as needed if exposed to drainage  -Lay Stacie (or other Collagen product such as Fibracol) cut to fit into the open Ischial wounds  -Cover with ABD 5\"x9\", or absorbant of choice, secure with 2\" tape  -Change M,W,F and as needed for soilage    Wound care: if resumes Scrotal wounds, if draining  -apply Triad paste if any open/draining areas, daily with cares    Wound Care recommendations: all Right toes  -Cleanse with wound cleanser between the toes and the wounds, pat dry  -Swab with Betadine air dry  -cover with gauze or a Band-Aid  -Change dressing M,W,F and as needed for soilage     Suzan Martinez PA-C February 22, 2023    Call us at [phone] if you have any questions about your wounds, have redness or swelling around your wound, have a fever of 101 or greater or if you have any other problems or concerns. We answer the phone Monday through Friday 8 am to 4 pm, please leave a message as we check the voicemail frequently throughout the day.     If you had a positive experience please indicate that on your patient satisfaction survey form that [facility] will be sending you.    It was a pleasure meeting with you today.  Thank you for allowing me and my team the privilege of caring for you today.  YOU are the reason we are here, and I truly hope we provided you with the excellent service you deserve.  Please let us know if there is anything else we can do for you so that we can be sure you are leaving completely satisfied with your care " experience.      If you have any billing related questions please call the [facility] Business office at [phone]. The clinic staff does not handle billing related matters.    If you are scheduled to have a follow up appointment, you will receive a reminder call the day before your visit. On the appointment day please arrive 15 minutes prior to your appointment time. If you are unable to keep that appointment, please call the clinic to cancel or reschedule. If you are more than 10 minutes late or greater for your appointment, the clinic policy is that you may be asked to reschedule.

## 2023-02-22 NOTE — DISCHARGE INSTRUCTIONS
"Parth Fountain      1965    A DME order was not completed because the supplies are ordered by home care or at a care facility      [facility] Phone: [phone] Fax: [phone]    You need to have your wheelchair cushion pressure-mapped again now that you have had your amputation.   If you need assistance to [facility] the [facility] or other, please contact us and we will assist.    Smoking delays healing; advised to stop smoking to assist healing.     Medications/supplements to aid in healing:  Vitamin C 1,000mg daily  Vitamin D 5,000 units daily  Vitamin B 12 Complex daily    Wound Care Orders:Right Lateral Foot  -Cleanse with wound cleanser, pat dry  -Apply Iodosorb gel to wound bed  -Cover with gauze and secure with medipore tape or mepilex 4\"  -Wear spandage for compression from toes to knee  -Change M,W,F and as needed for soilage    Wound care: Right and Left Ischial tuberosity  -Gently cleanse and apply light layer of zinc-based paste to scrotal and periwound, as needed if exposed to drainage  -Lay Stacie (or other Collagen product such as Fibracol) cut to fit into the open Ischial wounds  -Cover with ABD 5\"x9\", or absorbant of choice, secure with 2\" tape  -Change M,W,F and as needed for soilage    Wound care: if resumes Scrotal wounds, if draining  -apply Triad paste if any open/draining areas, daily with cares    Wound Care recommendations: all Right toes  -Cleanse with wound cleanser between the toes and the wounds, pat dry  -Swab with Betadine air dry  -cover with gauze or a Band-Aid  -Change dressing M,W,F and as needed for soilage     Suzan Martniez PA-C February 22, 2023    Call us at [phone] if you have any questions about your wounds, have redness or swelling around your wound, have a fever of 101 or greater or if you have any other problems or concerns. We answer the phone Monday through Friday 8 am to 4 pm, please leave a message as we check the " voicemail frequently throughout the day.     If you had a positive experience please indicate that on your patient satisfaction survey form that [facility] will be sending you.    It was a pleasure meeting with you today.  Thank you for allowing me and my team the privilege of caring for you today.  YOU are the reason we are here, and I truly hope we provided you with the excellent service you deserve.  Please let us know if there is anything else we can do for you so that we can be sure you are leaving completely satisfied with your care experience.      If you have any billing related questions please call the [facility] Business office at [phone]. The clinic staff does not handle billing related matters.    If you are scheduled to have a follow up appointment, you will receive a reminder call the day before your visit. On the appointment day please arrive 15 minutes prior to your appointment time. If you are unable to keep that appointment, please call the clinic to cancel or reschedule. If you are more than 10 minutes late or greater for your appointment, the clinic policy is that you may be asked to reschedule.

## 2023-06-07 ENCOUNTER — HOSPITAL ENCOUNTER (OUTPATIENT)
Dept: WOUND CARE | Facility: CLINIC | Age: 58
Discharge: HOME | End: 2023-06-07
Attending: PHYSICIAN ASSISTANT | Admitting: PHYSICIAN ASSISTANT
Payer: MEDICARE

## 2023-06-07 VITALS — SYSTOLIC BLOOD PRESSURE: 109 MMHG | DIASTOLIC BLOOD PRESSURE: 70 MMHG | HEART RATE: 87 BPM | TEMPERATURE: 98.9 F

## 2023-06-07 DIAGNOSIS — L89.894 PRESSURE INJURY OF RIGHT FOOT, STAGE 4 (H): ICD-10-CM

## 2023-06-07 DIAGNOSIS — L89.324 LEFT ISCHIAL PRESSURE SORE, STAGE IV (H): ICD-10-CM

## 2023-06-07 DIAGNOSIS — L89.314 PRESSURE ULCER OF ISCHIUM, RIGHT, STAGE IV (H): ICD-10-CM

## 2023-06-07 DIAGNOSIS — L89.103 PRESSURE INJURY OF BACK, STAGE 3 (H): Primary | ICD-10-CM

## 2023-06-07 DIAGNOSIS — L89.614 PRESSURE INJURY OF RIGHT HEEL, STAGE 4 (H): ICD-10-CM

## 2023-06-07 PROCEDURE — 11043 DBRDMT MUSC&/FSCA 1ST 20/<: CPT | Performed by: PHYSICIAN ASSISTANT

## 2023-06-07 PROCEDURE — 99213 OFFICE O/P EST LOW 20 MIN: CPT | Mod: 25 | Performed by: PHYSICIAN ASSISTANT

## 2023-06-07 NOTE — DISCHARGE INSTRUCTIONS
"Parth KWAN Александр      1965    A DME order was not completed because the supplies are ordered by home care or at a care facility     [facility] Phone: [phone] Fax: [phone]     You need to have your wheelchair cushion pressure-mapped again now that you have had your amputation.   If you need assistance to [facility] or other, please contact us and we will assist.     Smoking delays healing; advised to stop smoking to assist healing.      Medications/supplements to aid in healing:  Vitamin C 1,000mg daily  Vitamin D 5,000 units daily  Vitamin B 12 Complex daily     Wound Care Orders:Right Lateral Foot and Right lateral leg  -Cleanse with wound cleanser, pat dry  -Apply Iodosorb gel to wound bed  -Cover with gauze and secure with medipore tape or mepilex 4\"  -Wear spandage for compression from toes to knee  -Change M,W,F and as needed for soilage     Wound care: Right and Left Ischial tuberosity  -Gently cleanse and apply light layer of zinc-based paste to scrotal and periwound, as needed if exposed to drainage  -Lay Stacie (or other Collagen product such as Fibracol) cut to fit into the open Ischial wounds  -Cover with ABD 5\"x9\", or absorbant of choice, secure with 2\" tape  -Change M,W,F and as needed for soilage     Wound care: if resumes Scrotal wounds, if draining  -apply Triad paste if any open/draining areas, daily with kali Martinez PA-C June 7, 2023    Call us at [phone] if you have any questions about your wounds, have redness or swelling around your wound, have a fever of 101 degrees Fahrenheit or greater or if you have any other problems or concerns. We answer the phone Monday through Friday 8 am to 4 pm, please leave a message as we check the voicemail frequently throughout the day.     If you had a positive experience please indicate that on your patient satisfaction survey form that [facility] will be sending you.    It was " a pleasure meeting with you today.  Thank you for allowing me and my team the privilege of caring for you today.  YOU are the reason we are here, and I truly hope we provided you with the excellent service you deserve.  Please let us know if there is anything else we can do for you so that we can be sure you are leaving completely satisfied with your care experience.      If you have any billing related questions please call the [facility] Business office at [phone]. The clinic staff does not handle billing related matters.    If you are scheduled to have a follow up appointment, you will receive a reminder call the day before your visit. On the appointment day please arrive 15 minutes prior to your appointment time. If you are unable to keep that appointment, please call the clinic to cancel or reschedule. If you are more than 10 minutes late or greater for your scheduled appointment time, the clinic policy is that you may be asked to reschedule.

## 2023-06-13 NOTE — PROGRESS NOTES
ASSESSMENT:    1. ARterial ulcerations of R foot  2. Peripheral arterial disease - non-reconstructible   3. Recurrence of stage 4 pressure ulcers of bilateral ITs      PLAN:    1. Referral made to ortho  to discuss amputation  2. iodosorb or iodine and cover dressing to leg and foot wounds  3. Continue using Rooke boot to improve circulation  4. [facility]  5. Pelvic wounds to be dressed with Stacie ok to moisten with saline  6. Follow-up: will need 40 minute appointments every 6 weeks    HISTORY OF PRESENT ILLNESS:   Mr. Parth Fountain is a 57-year-old paraplegic gentleman who returns to us today to follow-up on chronic pelvic pressure ulcers as well as lower leg wounds. PMHx of DVT, chronic UTI, EtOh abuse, and s/p colostomy. He has a long history of pressure ulcers with subsequent corrective surgeries by Dr. Peña. Parth's pelvic wounds have been very difficult to heal due to his surgical history. He has very little tissue overlying his bone and it is mostly made up of scar tissue. His progress waxes and wanes.    10/21/20: Televisit. Thigh wound healed. Scraped left leg and foot during a transfer and has several shallow ulcers on left lower leg and toes. Has an ulcer in his right groin/hip crease. This waxes and wanes. Uses Stacie when open and calmoseptine when more closed, this is working well for him.   11/18/20: Return visit. All of his wounds have improved. Has new traumatic wounds on bilateral shins.   12/16/20: Returns via video visit. Shin wounds are improving but very friable and hypergranular. Hip crease slightly more open today, also appears friable. Has been using Stacie to all wounds.   01/13/21: Returns for in office visit.  Leg wounds are improving with use of calcium alginate.  However, Parth notes that the dressings are sticking to the wounds and causing bleeding when removed.  His right groin wound is now quite hypertrophic.  2/26/21:  "Returns for follow-up. Reports that he continues to reinjure his R lower leg. Hip wound is not really improving. Has new wound on R great toe with exposed bone.   4/9/21: Returns for follow-up. Still has exposed bone on R great toe and has not had the x-ray done we ordered at last visit. His R lower leg has worsened. R hip about the same.   5/19/21: Returns for follow-up via telephone, has been using Xeroform. Has not done vascular studies or xray as recommended. Hip wound is improving. Has a recurrence of his L IT wound. His legs are marginally better, reports they are \"squishy.\"    8/20: Returns for televisit. Had to reschedule from in-clinic visit due to staffing issues. Left toe has new wound, lateral left leg still open. Has been using xeroform. Thinks R toe is closed up now. R hip comes and goes, uses Calmoseptine PRN. Has lithotripsy this upcoming week. Has not had xray or ABIs.   09/24/21: Returns for in person follow-up. His hip/groin wound has completely healed, however this usually comes and goes. Has new minor right heel wound. Continues to have issues with left lower leg, today during the visit it is apparent he has some kind of vessel in this area that easily bleeds causing a hematoma in this area. Fortunately the wound is fairly superficial. He has not had ABIs or x rays as recommended. Fortunately his toe appears stable today without any exposed bone.   10/26/21: Returns for follow-up. Left leg has regressed, tissue now very friable and hypertrophic. Toe continues to be resolved. Hip wound currently resolved. Biopsy performed came back suspicious for pyoderma gangrenosum.    12/2/21: Seen in ED for laceration of foot. Arterial duplex performed was suspicious for arterial disease.   12/07/21: Orders were changed after biopsy results last visit to begin high potency topical steroid ointment, unfortunately there was some confusion on this and he hasn't started it. Staff has just been cleaning and " covering with gauze and the wound is much improved.   01/28/22: Returns for follow-up. Since I saw him he visited with Dr. Chapa [facility] who recommended angio. Unfortunately his right heel and right lateral foot wound are now down to bone. He thinks it's from his [facility]. His LLE wounds that we believe are PG have improved and are nearly healed. Has new burn on his left thigh from coffee. Also his pelvic wounds have reopened. He thinks this is due to his wheelchair cushion deflating.   03/15/22: Returns for follow-up. It appears that his health has overall deteriorated. He denies drinking again but he does have both cigarettes and chewing tobacco with him. He says he feels sick today as he didn't sleep last night but denies fevers or change in appetite. He had his chair pressure mapped and said it is appropriate. He has not followed up with vascular as we had suggested. Fortunately his foot wounds appear relatively stable. His pelvic wounds have deteriorated a bit. PG leg wound appears nearly resolved.   04/12/22: Returns for follow-up. Angio is scheduled on Thursday. Foot wounds continue to be stable but unfortunately continues to have bone exposed on R foot. His pelvic wounds have worsened and he cannot really identify a cause. States his bed is working well. Has a Roho that he reports mapped well but is getting a new chair later this week [facility]. He is otherwise feeling well.   4/14/22: Angio revealed multiple occlusions, unable to achieve any recanalization. Per Dr. Chapa he is a poor candidate for revascularization, especially due to smoking and poor health.   05/17/22: Returns for follow-up. Pelvic wounds improved. Foot wounds are stable, no sign of infection. Thigh wound healing. Leg wounds about the same. New left heel ulcer. We had heart to heart about poor blood flow and poor prognosis of this, high risk of amputation. He must quit smoking and take care of  himself. Recommended HBOT if candidate.   06/14/22: Returns for follow-up.  Foot wounds stable with Iodosorb. Lower leg wounds healed. Thigh burn wound nearly healed. Pelvic wounds improving with collagen. New back wound - states he is bottoming out on back rest when he tilts too far.   07/12/22: Returns to clinic. Foot wounds improving. Has new necrotic wound on left lateral leg - he is unaware how or when he got this. Pelvic wounds are stable. [facility] not [facility], now using an old Roho as a back rest.   08/09/22: Pelvic wounds are improving. Left lateral leg wound now down to tendon but starting to granulate on edges. Still has joint exposed on left third toe. Left heel resolved. Right heel improved. Right lateral foot improving but still with exposed tendon. Has not fixed wheelchair.   9/2: Visited with [facility] about his left 3rd toe, she performed excision of bone in clinic and closed the area primarily. Started on doxycycline. Bone biopsy came back + for osteomyelitis.  09/13/22: Returns via telemed for follow-up. Pelvic wounds appear to be improving. New distal left lower leg wound in the previous area of PG. Has decided he does not want to fix the back of his wheelchair, prefers to use the Roho.   10/11/22: Returns for follow-up. Buttocks wounds improving overall. There is a little more tearing on the left IT wound causing some undermining. His leg wounds have improved but he does have new superficial abrasion superior to current wounds. Continues to see Dr. Abbott for his foot wounds, see her dictation for report of these.   01/11/23: Returns for follow-up. Pelvic wounds improved. Has worsening in lateral foot wound, appears to have new trauma but he's not sure about when/how. Wound of left toe third arthroplasty has now dehisced.   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   02/22/23: Returns for follow-up. Pelvic  wounds stable to improved. Right lateral foot wound slightly improved. Traumatic dorsal toe wounds with stable eschar.  6/7: Returns to clinic: Pelvic wounds improving, using collagen. New RLE ulcer - he is unsure of cause. R foot wound stagnant without sign of infection. He expresses desire for amputation today as he understand wounds likely unhealable and risk of infection. Wheelchair continues to be broken down with makeshift back rest.     OFFLOADING: On a Group 2 mattress. Still utilizing RoHo cushion on his wheelchair - pressure mapped well 2/2022, new chair 4/2022.       SOCIAL HISTORY:  Lives in a group home. He is still receiving home health care through [facility]. that comes and does dressing changes daily.      PHYSICAL EXAMINATION   INTEGUMENTARY:   Wound (used by OP [facility] only) 02/18/19 1332 Left ischial tuberosity pressure injury (Active)       Wound (used by OP [facility] only) 02/18/19 1333 Right ischial tuberosity pressure injury (Active)       Wound (used by OP [facility] only) 02/18/19 1333 Right proximal scrotum pressure injury (Active)       Wound (used by OP [facility] only) 02/18/19 1334 scrotum pressure injury (Active)       Wound (used by OP [facility] only) 01/28/22 1056 Right lateral foot pressure injury (Active)   Thickness/Stage Stage 4 06/07/23 1508   Base slough;necrotic;pink 06/07/23 1508   Periwound edematous 06/07/23 1508   Periwound Temperature warm 06/07/23 1508   Periwound Skin Turgor soft 06/07/23 1508   Edges rolled/closed 06/07/23 1508   Length (cm) 2.7 06/07/23 1508   Width (cm) 3.6 06/07/23 1508   Depth (cm) 0.3 06/07/23 1508   Wound (cm^2) 9.72 cm^2 06/07/23 1508   Wound Volume (cm^3) 2.92 cm^3 06/07/23 1508   Wound healing % -155.79 06/07/23 1508   Drainage Characteristics/Odor serosanguineous 06/07/23 1508   Drainage Amount moderate 06/07/23 1508   Care, Wound non-select wound debridement performed;debrided 06/07/23 1508       Wound (used by OP [facility] only) 03/15/22 1049 Right ischial tuberosity  pressure injury (Active)   Thickness/Stage Stage 4 06/07/23 1508   Base slough;granulating 06/07/23 1508   Periwound intact 06/07/23 1508   Periwound Temperature warm 06/07/23 1508   Periwound Skin Turgor soft 06/07/23 1508   Edges open 06/07/23 1508   Length (cm) 1.4 06/07/23 1508   Width (cm) 1.4 06/07/23 1508   Depth (cm) 0.5 06/07/23 1508   Wound (cm^2) 1.96 cm^2 06/07/23 1508   Wound Volume (cm^3) 0.98 cm^3 06/07/23 1508   Wound healing % 47.73 06/07/23 1508   Undermining [Depth (cm)/Location] 7-11 o'clock / 0.5cm 06/07/23 1508   Drainage Characteristics/Odor serosanguineous 06/07/23 1508   Drainage Amount moderate 06/07/23 1508   Care, Wound non-select wound debridement performed 06/07/23 1508       Wound (used by OP I only) 03/15/22 1050 Left ischial tuberosity pressure injury (Active)   Thickness/Stage Stage 4 06/07/23 1508   Base granulating;white 06/07/23 1508   Periwound intact 06/07/23 1508   Periwound Temperature warm 06/07/23 1508   Periwound Skin Turgor firm 06/07/23 1508   Edges open 06/07/23 1508   Length (cm) 0.7 06/07/23 1508   Width (cm) 1.5 06/07/23 1508   Depth (cm) 0.5 02/22/23 1000   Wound (cm^2) 1.05 cm^2 06/07/23 1508   Wound Volume (cm^3) 0.23 cm^3 02/22/23 1000   Wound healing % 63.29 06/07/23 1508   Undermining [Depth (cm)/Location] 6 o'clock/2.1cm 10/11/22 1138   Drainage Characteristics/Odor serosanguineous 06/07/23 1508   Drainage Amount moderate 06/07/23 1508   Care, Wound non-select wound debridement performed 06/07/23 1508       Wound (used by OP I only) 06/07/23 1541 Right lateral;lower leg (Active)   Thickness/Stage full thickness 06/07/23 1508   Base pink 06/07/23 1508   Periwound Temperature warm 06/07/23 1508   Periwound Skin Turgor soft 06/07/23 1508   Edges open 06/07/23 1508   Length (cm) 1.5 06/07/23 1508   Width (cm) 1.5 06/07/23 1508   Depth (cm) 0.1 06/07/23 1508   Wound (cm^2) 2.25 cm^2 06/07/23 1508   Wound Volume (cm^3) 0.23 cm^3 06/07/23 1508   Drainage  "Characteristics/Odor serosanguineous 06/07/23 1508   Drainage Amount moderate 06/07/23 1508   Care, Wound debrided 06/07/23 1508                           PROCEDURE: Nursing staff performed mechanical debridement with dressing removal and cleansing and then applied 4% lidocaine to the wound bed. Patient was determined to be capable of making their own medical decisions and informed consent was obtained. Using a 15 blade a surgical debridement was performed down to and including  muscle/tendon of <20cm2. Hemostasis was achieved with pressure. The patient tolerated the procedure well.    MDM: 29 minutes was spent on the day of visit reviewing previous chart notes, assessing the patient and developing the plan of care, this excludes time spent on any procedures.         Further instructions from your care team       Parth Fountain      1965    A DME order was not completed because the supplies are ordered by home care or at a care facility     [facility] Phone: [phone] Fax: [phone]     You need to have your wheelchair cushion pressure-mapped again now that you have had your amputation.   If you need assistance to [facility] the [facility] or other, please contact us and we will assist.     Smoking delays healing; advised to stop smoking to assist healing.      Medications/supplements to aid in healing:  Vitamin C 1,000mg daily  Vitamin D 5,000 units daily  Vitamin B 12 Complex daily     Wound Care Orders:Right Lateral Foot and Right lateral leg  -Cleanse with wound cleanser, pat dry  -Apply Iodosorb gel to wound bed  -Cover with gauze and secure with medipore tape or mepilex 4\"  -Wear spandage for compression from toes to knee  -Change M,W,F and as needed for soilage     Wound care: Right and Left Ischial tuberosity  -Gently cleanse and apply light layer of zinc-based paste to scrotal and periwound, as needed if exposed to drainage  -Lay Stacie (or other Collagen product such " "as Fibracol) cut to fit into the open Ischial wounds  -Cover with ABD 5\"x9\", or absorbant of choice, secure with 2\" tape  -Change M,W,F and as needed for soilage     Wound care: if resumes Scrotal wounds, if draining  -apply Triad paste if any open/draining areas, daily with kali Martinez PA-C June 7, 2023    Call us at [phone] if you have any questions about your wounds, have redness or swelling around your wound, have a fever of 101 degrees Fahrenheit or greater or if you have any other problems or concerns. We answer the phone Monday through Friday 8 am to 4 pm, please leave a message as we check the voicemail frequently throughout the day.     If you had a positive experience please indicate that on your patient satisfaction survey form that [facility] will be sending you.    It was a pleasure meeting with you today.  Thank you for allowing me and my team the privilege of caring for you today.  YOU are the reason we are here, and I truly hope we provided you with the excellent service you deserve.  Please let us know if there is anything else we can do for you so that we can be sure you are leaving completely satisfied with your care experience.      If you have any billing related questions please call the [facility] Business office at [phone]. The clinic staff does not handle billing related matters.    If you are scheduled to have a follow up appointment, you will receive a reminder call the day before your visit. On the appointment day please arrive 15 minutes prior to your appointment time. If you are unable to keep that appointment, please call the clinic to cancel or reschedule. If you are more than 10 minutes late or greater for your scheduled appointment time, the clinic policy is that you may be asked to reschedule.         "

## 2023-06-14 ENCOUNTER — MEDICAL CORRESPONDENCE (OUTPATIENT)
Dept: HEALTH INFORMATION MANAGEMENT | Facility: CLINIC | Age: 58
End: 2023-06-14
Payer: MEDICARE

## 2023-06-27 ENCOUNTER — TELEPHONE (OUTPATIENT)
Dept: WOUND CARE | Facility: CLINIC | Age: 58
End: 2023-06-27
Payer: MEDICARE

## 2023-06-27 NOTE — TELEPHONE ENCOUNTER
Left voicemail for patient confirming he is unable to come to appointment today and notifying him that we would like to see him sooner than 8/8/23. Located a few openings available in [facility] and Kathleen Krishnamurthy schedule we would be able to use at this time. Awaiting call back from patient to discuss further.   Jami Longo RN

## 2023-06-27 NOTE — TELEPHONE ENCOUNTER
Patient unable to make appt 6/27/23 due to miscommunication with coordinating ride to appt;    Pt would like a call from a nurse to discuss if he needs to be seen sooner than appt scheduled for 08/08/23

## 2023-07-10 ENCOUNTER — TELEPHONE (OUTPATIENT)
Dept: WOUND CARE | Facility: CLINIC | Age: 58
End: 2023-07-10
Payer: MEDICARE

## 2023-07-10 NOTE — TELEPHONE ENCOUNTER
Called patient about an opening tomorrow (patient on cancellation list). Left message for patient to call back to see if he can make it.

## 2023-08-08 ENCOUNTER — HOSPITAL ENCOUNTER (OUTPATIENT)
Dept: WOUND CARE | Facility: CLINIC | Age: 58
Discharge: HOME | End: 2023-08-08
Attending: PHYSICIAN ASSISTANT | Admitting: PHYSICIAN ASSISTANT
Payer: MEDICARE

## 2023-08-08 VITALS
TEMPERATURE: 98.4 F | RESPIRATION RATE: 20 BRPM | HEART RATE: 80 BPM | DIASTOLIC BLOOD PRESSURE: 83 MMHG | SYSTOLIC BLOOD PRESSURE: 150 MMHG

## 2023-08-08 DIAGNOSIS — S31.109S RIGHT GROIN WOUND, SEQUELA: ICD-10-CM

## 2023-08-08 DIAGNOSIS — S81.801D OPEN WOUND OF BOTH LOWER EXTREMITIES WITH COMPLICATION, SUBSEQUENT ENCOUNTER: ICD-10-CM

## 2023-08-08 DIAGNOSIS — L89.103 PRESSURE INJURY OF BACK, STAGE 3 (H): ICD-10-CM

## 2023-08-08 DIAGNOSIS — L89.314 PRESSURE ULCER OF ISCHIUM, RIGHT, STAGE IV (H): ICD-10-CM

## 2023-08-08 DIAGNOSIS — L89.894 PRESSURE INJURY OF RIGHT FOOT, STAGE 4 (H): ICD-10-CM

## 2023-08-08 DIAGNOSIS — L89.614 PRESSURE INJURY OF RIGHT HEEL, STAGE 4 (H): Primary | ICD-10-CM

## 2023-08-08 DIAGNOSIS — L89.324 LEFT ISCHIAL PRESSURE SORE, STAGE IV (H): ICD-10-CM

## 2023-08-08 DIAGNOSIS — S81.802D OPEN WOUND OF BOTH LOWER EXTREMITIES WITH COMPLICATION, SUBSEQUENT ENCOUNTER: ICD-10-CM

## 2023-08-08 PROCEDURE — 17250 CHEM CAUT OF GRANLTJ TISSUE: CPT | Performed by: PHYSICIAN ASSISTANT

## 2023-08-08 PROCEDURE — 99214 OFFICE O/P EST MOD 30 MIN: CPT | Mod: 25 | Performed by: PHYSICIAN ASSISTANT
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
## 2023-08-08 NOTE — DISCHARGE INSTRUCTIONS
Parth Fountain      1965    A DME order was not completed because the supplies are ordered by home care or at a care facility    We applied Silver Nitrate to the hypergranulation tissue today.  This may lead to temporary staining of the skin with increased drainage and discolored gray/black drainage.  This may be present for a few days and is a normal process.    [facility] Phone: [phone] Fax: [phone]     You need to have your wheelchair cushion pressure-mapped again now that you have had your amputation.   If you need assistance to pursue the [facility] or other, please contact us and we will assist.     A DME order for a Group 2 mattress has been placed to [employer]. If there are any issues with your order including not receiving the order please call [employer] at [phone]. They can also provide a tracking number for you.    Smoking delays healing; advised to stop smoking to assist healing.     Ensure to clean and debride old skin of BLE with cares.     Medications/supplements to aid in healing:  Vitamin C 1,000mg daily  Vitamin D 5,000 units daily  Vitamin B 12 Complex daily     Wound Care Orders: Right Lateral Foot   - Cleanse with wound cleanser, pat dry  - Apply Iodosorb gel to wound bed  - Cover with gauze and secure with medipore tape or 4x4 mepilex  -Wear spandage for compression from toes to knee  Change M,W,F and as needed for soilage     Wound care: Right and Left Ischial tuberosity  -Gently cleanse and apply light layer of zinc-based paste to scrotal and periwound, as needed if exposed to drainage  - Cover open areas with Triad Paste  - Cover with 1/2 of a 5x9 ABD pad and secure with medapore tape  Change M,W,F and as needed for soilage     Wound care: if resumes Scrotal wounds, if draining  - apply Triad paste if any open/draining areas,   Change daily with cares    Dressing changes outside of clinic are being performed by Home Care          Suzan Martinez PA-C August 8, 2023    Call us at [phone] if you have any questions about your wounds, have redness or swelling around your wound, have a fever of 101 degrees Fahrenheit or greater or if you have any other problems or concerns. We answer the phone Monday through Friday 8 am to 4 pm, please leave a message as we check the voicemail frequently throughout the day.     If you had a positive experience please indicate that on your patient satisfaction survey form that [facility] will be sending you.    It was a pleasure meeting with you today.  Thank you for allowing me and my team the privilege of caring for you today.  YOU are the reason we are here, and I truly hope we provided you with the excellent service you deserve.  Please let us know if there is anything else we can do for you so that we can be sure you are leaving completely satisfied with your care experience.      If you have any billing related questions please call the [facility] Business office at [phone]. The clinic staff does not handle billing related matters.    If you are scheduled to have a follow up appointment, you will receive a reminder call the day before your visit. On the appointment day please arrive 15 minutes prior to your appointment time. If you are unable to keep that appointment, please call the clinic to cancel or reschedule. If you are more than 10 minutes late or greater for your scheduled appointment time, the clinic policy is that you may be asked to reschedule.

## 2023-08-10 NOTE — ADDENDUM NOTE
Encounter addended by: Suzan Martinez PA-C on: 8/10/2023 11:37 AM   Actions taken: Clinical Note Signed

## 2023-08-24 ENCOUNTER — TELEPHONE (OUTPATIENT)
Dept: WOUND CARE | Facility: CLINIC | Age: 58
End: 2023-08-24
Payer: MEDICARE

## 2023-08-24 NOTE — TELEPHONE ENCOUNTER
Patient called wanting to talk to Renea in regards to a mattress that was ordered.    Please call patient to discuss if the correct mattress has been ordered.     [phone]

## 2023-08-24 NOTE — TELEPHONE ENCOUNTER
"Reviewed case details and recent order for group 2 mattress;  Returned call to patient;  He was expecting a \"memory foam mattress\" but now is understanding that he is getting an group 2 mattress with \"pump\" which is what he has and his ulcers are \"sitting ulcers\" and he didn't think they needed a group 2 surface;  His current surface is \"custom care\" [facility];  Writer reinforced appropriateness of group 2 surface due to existing ulcers which include leg and foot in addition to ischial wounds;  He says DME provider will deliver on Wednesday and he will \"give it a try\";  Encouraged him to do so;  He continued to request a call back from provider directly;  Will advance message to provider;  No further needs expressed.  "

## 2023-09-15 ENCOUNTER — TELEPHONE (OUTPATIENT)
Dept: WOUND CARE | Facility: CLINIC | Age: 58
End: 2023-09-15
Payer: MEDICARE

## 2023-09-19 ENCOUNTER — HOSPITAL ENCOUNTER (OUTPATIENT)
Dept: WOUND CARE | Facility: CLINIC | Age: 58
Discharge: HOME | End: 2023-09-19
Attending: PHYSICIAN ASSISTANT | Admitting: PHYSICIAN ASSISTANT
Payer: MEDICARE

## 2023-09-19 DIAGNOSIS — L89.324 LEFT ISCHIAL PRESSURE SORE, STAGE IV (H): ICD-10-CM

## 2023-09-19 DIAGNOSIS — L89.894 PRESSURE INJURY OF RIGHT FOOT, STAGE 4 (H): ICD-10-CM

## 2023-09-19 DIAGNOSIS — L89.314 PRESSURE ULCER OF ISCHIUM, RIGHT, STAGE IV (H): ICD-10-CM

## 2023-09-19 DIAGNOSIS — S31.109S RIGHT GROIN WOUND, SEQUELA: ICD-10-CM

## 2023-09-19 DIAGNOSIS — L89.103 PRESSURE INJURY OF BACK, STAGE 3 (H): Primary | ICD-10-CM

## 2023-09-19 DIAGNOSIS — L89.614 PRESSURE INJURY OF RIGHT HEEL, STAGE 4 (H): ICD-10-CM

## 2023-09-19 PROCEDURE — 99214 OFFICE O/P EST MOD 30 MIN: CPT | Mod: 25 | Performed by: PHYSICIAN ASSISTANT

## 2023-09-19 PROCEDURE — 17250 CHEM CAUT OF GRANLTJ TISSUE: CPT | Performed by: PHYSICIAN ASSISTANT

## 2023-09-19 NOTE — PROGRESS NOTES
ASSESSMENT:    Arterial ulcerations of R foot  Peripheral arterial disease - non-reconstructible   Recurrence of stage 4 pressure ulcers of bilateral ITs      PLAN:    Patient is using a Group 2 mattress due to large, stage 4 pressure ulceration on the patient's pelvis that has failed to improve on a normal mattress despite regular wound cares and repositioning. A group 1 mattress is not adequate to offload these severe ulcerations. Patient has impaired sensation and is unable to reposition independently.  iodosorb or iodine and cover dressing to leg and foot wounds, collagen on pelvic wounds  Continue using Rooke boot to improve circulation  Pelvic wounds to be dressed with triad paste and ABD pads  Follow-up: will need 40 minute appointments every 6 weeks    HISTORY OF PRESENT ILLNESS:   Mr. Parth Fountain is a 58-year-old paraplegic gentleman who returns to us today to follow-up on chronic pelvic pressure ulcers as well as lower leg wounds. PMHx of DVT, chronic UTI, EtOh abuse, and s/p colostomy. He has a long history of pressure ulcers with subsequent corrective surgeries by Dr. Peña. Parth's pelvic wounds have been very difficult to heal due to his surgical history. He has very little tissue overlying his bone and it is mostly made up of scar tissue. His progress waxes and wanes.    10/21/20: Televisit. Thigh wound healed. Scraped left leg and foot during a transfer and has several shallow ulcers on left lower leg and toes. Has an ulcer in his right groin/hip crease. This waxes and wanes. Uses Stacie when open and calmoseptine when more closed, this is working well for him.   11/18/20: Return visit. All of his wounds have improved. Has new traumatic wounds on bilateral shins.   12/16/20: Returns via video visit. Shin wounds are improving but very friable and hypergranular. Hip crease slightly more open today, also appears friable. Has been using Stacie to all wounds.   01/13/21: Returns for in office visit.   "Leg wounds are improving with use of calcium alginate.  However, Parth notes that the dressings are sticking to the wounds and causing bleeding when removed.  His right groin wound is now quite hypertrophic.  2/26/21: Returns for follow-up. Reports that he continues to reinjure his R lower leg. Hip wound is not really improving. Has new wound on R great toe with exposed bone.   4/9/21: Returns for follow-up. Still has exposed bone on R great toe and has not had the x-ray done we ordered at last visit. His R lower leg has worsened. R hip about the same.   5/19/21: Returns for follow-up via telephone, has been using Xeroform. Has not done vascular studies or xray as recommended. Hip wound is improving. Has a recurrence of his L IT wound. His legs are marginally better, reports they are \"squishy.\"    8/20: Returns for televisit. Had to reschedule from in-clinic visit due to staffing issues. Left toe has new wound, lateral left leg still open. Has been using xeroform. Thinks R toe is closed up now. R hip comes and goes, uses Calmoseptine PRN. Has lithotripsy this upcoming week. Has not had xray or ABIs.   09/24/21: Returns for in person follow-up. His hip/groin wound has completely healed, however this usually comes and goes. Has new minor right heel wound. Continues to have issues with left lower leg, today during the visit it is apparent he has some kind of vessel in this area that easily bleeds causing a hematoma in this area. Fortunately the wound is fairly superficial. He has not had ABIs or x rays as recommended. Fortunately his toe appears stable today without any exposed bone.   10/26/21: Returns for follow-up. Left leg has regressed, tissue now very friable and hypertrophic. Toe continues to be resolved. Hip wound currently resolved. Biopsy performed came back suspicious for pyoderma gangrenosum.    12/2/21: Seen in ED for laceration of foot. Arterial duplex performed was suspicious for arterial disease. "   12/07/21: Orders were changed after biopsy results last visit to begin high potency topical steroid ointment, unfortunately there was some confusion on this and he hasn't started it. Staff has just been cleaning and covering with gauze and the wound is much improved.   01/28/22: Returns for follow-up. Since I saw him he [facility] with Dr[facility]a [facility] who recommended angio. Unfortunately his right heel and right lateral foot wound are now down to bone. He thinks it's from [facility]. His LLE wounds that we believe are PG have improved and are nearly healed. Has new burn on his left thigh from coffee. Also his pelvic wounds have reopened. He thinks this is due to his wheelchair cushion deflating.   03/15/22: Returns for follow-up. It appears that his health has overall deteriorated. He denies drinking again but he does have both cigarettes and chewing tobacco with him. He says he feels sick today as he didn't sleep last night but denies fevers or change in appetite. He had his chair pressure mapped and said it is appropriate. He has not followed up with vascular as we had suggested. Fortunately his foot wounds appear relatively stable. His pelvic wounds have deteriorated a bit. PG leg wound appears nearly resolved.   04/12/22: Returns for follow-up. Angio is scheduled on Thursday. Foot wounds continue to be stable but unfortunately continues to have bone exposed on R foot. His pelvic wounds have worsened and he cannot really identify a cause. States his bed is working well. Has a Roho that he reports mapped well but is getting a new [facility]. He is otherwise feeling well.   4/14/22: Angio revealed multiple occlusions, unable to achieve any recanalization. Per Dr. Chapa he is a poor candidate for revascularization, especially due to smoking and poor health.   05/17/22: Returns for follow-up. Pelvic wounds improved. Foot wounds are stable, no sign of  infection. Thigh wound healing. Leg wounds about the same. New left heel ulcer. We had heart to heart about poor blood flow and poor prognosis of this, high risk of amputation. He must quit smoking and take care of himself. Recommended HBOT if candidate.   06/14/22: Returns for follow-up.  Foot wounds stable with Iodosorb. Lower leg wounds healed. Thigh burn wound nearly healed. Pelvic wounds improving with collagen. New back wound - states he is bottoming out on back rest when he tilts too far.   07/12/22: Returns to clinic. Foot wounds improving. Has new necrotic wound on left lateral leg - he is unaware how or when he got this. Pelvic wounds are stable. [facility], now using an old Roho as a back rest.   08/09/22: Pelvic wounds are improving. Left lateral leg wound now down to tendon but starting to granulate on edges. Still has joint exposed on left third toe. Left heel resolved. Right heel improved. Right lateral foot improving but still with exposed tendon. Has not fixed wheelchair.   9/2: Visited with Scar about his left 3rd toe, she performed excision of bone in clinic and closed the area primarily. Started on doxycycline. Bone biopsy came back + for osteomyelitis.  09/13/22: Returns via telemed for follow-up. Pelvic wounds appear to be improving. New distal left lower leg wound in the previous area of PG. Has decided he does not want to fix the back of his wheelchair, prefers to use the Roho.   10/11/22: Returns for follow-up. Buttocks wounds improving overall. There is a little more tearing on the left IT wound causing some undermining. His leg wounds have improved but he does have new superficial abrasion superior to current wounds. Continues to see Dr. Abbott for his foot wounds, see her dictation for report of these.   01/11/23: Returns for follow-up. Pelvic wounds improved. Has worsening in lateral foot wound, appears to have new trauma but he's not sure about when/how.  Wound of left toe third arthroplasty has now dehisced.   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   02/22/23: Returns for follow-up. Pelvic wounds stable to improved. Right lateral foot wound slightly improved. Traumatic dorsal toe wounds with stable eschar.  6/7: Returns to clinic: Pelvic wounds improving, using collagen. New RLE ulcer - he is unsure of cause. R foot wound stagnant without sign of infection. He expresses desire for amputation today as he understand wounds likely unhealable and risk of infection. Referral made to [facility]. Wheelchair continues to be broken down with makeshift back rest.  08/08/23: Returns to clinic. Foot wound improving. Leg wound resolved. Bilateral ITs appear to be improving. He has not pursued preventive amputation yet but is still interested. He states his air mattress isn't working well and would like a replacement.   09/19/23: Returns to clinic. Foot wounds improving. New dorsal foot wound - appears minor. Pelvic wounds improving. Had chair mapped and reported it was slightly over inflated. Has new group 2 and likes it.      OFFLOADING: On a Group 2 mattress, new from [employer] 9/2023. Still utilizing RoHo cushion on his wheelchair - pressure mapped well 9/2023, new chair 4/2022.       SOCIAL HISTORY:  Lives in a group home. He is still receiving home health care through [facility]. that comes and does dressing changes daily.      PHYSICAL EXAMINATION   INTEGUMENTARY:   Wound (used by OP I only) 02/18/19 1332 Left ischial tuberosity pressure injury (Active)       Wound (used by OP I only) 02/18/19 1333 Right ischial tuberosity pressure injury (Active)       Wound (used by OP I only) 02/18/19 1333 Right proximal scrotum pressure injury (Active)       [facility] 02/18/19 1334 scrotum pressure injury (Active)       [facility] 01/28/22 1056 Right lateral foot pressure  injury (Active)   Thickness/Stage Stage 4 09/19/23 1034   Base slough;necrotic;red 09/19/23 1034   Periwound edematous 09/19/23 1034   Periwound Temperature warm 09/19/23 1034   Periwound Skin Turgor soft 09/19/23 1034   Edges rolled/closed 09/19/23 1034   Length (cm) 1.8 09/19/23 1034   Width (cm) 1 09/19/23 1034   Depth (cm) 0.3 09/19/23 1034   Wound (cm^2) 1.8 cm^2 09/19/23 1034   Wound Volume (cm^3) 0.54 cm^3 09/19/23 1034   Wound healing % 52.63 09/19/23 1034   Drainage Characteristics/Odor serosanguineous 09/19/23 1034   Drainage Amount moderate 09/19/23 1034   Care, Wound chemical cautery applied 09/19/23 1034       Wound (used by [facility] only) 03/15/22 1049 Right ischial tuberosity pressure injury (Active)   Thickness/Stage Stage 4 09/19/23 1034   Base slough;red;white 09/19/23 1034   Periwound intact;macerated 09/19/23 1034   Periwound Temperature warm 09/19/23 1034   Periwound Skin Turgor soft 09/19/23 1034   Edges open 09/19/23 1034   Length (cm) 0.8 09/19/23 1034   Width (cm) 0.5 09/19/23 1034   Depth (cm) 0.6 09/19/23 1034   Wound (cm^2) 0.4 cm^2 09/19/23 1034   Wound Volume (cm^3) 0.24 cm^3 09/19/23 1034   Wound healing % 89.33 09/19/23 1034   Undermining [Depth (cm)/Location] 12-12 o'clocl @ 0.5cm 09/19/23 1034   Drainage Characteristics/Odor serosanguineous 09/19/23 1034   Drainage Amount moderate 09/19/23 1034   Care, Wound chemical cautery applied 09/19/23 1034       Wound (used by [facility] only) 03/15/22 1050 Left ischial tuberosity pressure injury (Active)   Thickness/Stage Stage 4 09/19/23 1034   Base white;slough 09/19/23 1034   Periwound intact 09/19/23 1034   Periwound Temperature warm 09/19/23 1034   Periwound Skin Turgor firm 09/19/23 1034   Edges open 09/19/23 1034   Length (cm) 0.5 09/19/23 1034   Width (cm) 3.4 09/19/23 1034   Depth (cm) 0.7 09/19/23 1034   Wound (cm^2) 1.7 cm^2 09/19/23 1034   Wound Volume (cm^3) 1.19 cm^3 09/19/23 1034   Wound healing % 40.56 09/19/23 1034   Undermining  [Depth (cm)/Location] 6-8 o'clock @ 2.2cm 09/19/23 1034   Drainage Characteristics/Odor serosanguineous 09/19/23 1034   Drainage Amount moderate 09/19/23 1034   Care, Wound chemical cautery applied 09/19/23 1034       Wound (used by [facility] only) 06/14/22 1127 posterior;midline thoracic spine pressure injury (Active)   Thickness/Stage Stage 3 09/19/23 1034   Base pink;red 09/19/23 1034   Periwound redness 09/19/23 1034   Periwound Temperature warm 09/19/23 1034   Periwound Skin Turgor soft 09/19/23 1034   Edges open 09/19/23 1034   Length (cm) 0.5 09/19/23 1034   Width (cm) 0.5 09/19/23 1034   Depth (cm) 0.1 09/19/23 1034   Wound (cm^2) 0.25 cm^2 09/19/23 1034   Wound Volume (cm^3) 0.03 cm^3 09/19/23 1034   Wound healing % 47.92 09/19/23 1034   Drainage Characteristics/Odor serosanguineous 09/19/23 1034   Drainage Amount moderate 09/19/23 1034   Care, Wound non-select wound debridement performed 09/19/23 1034       Wound (used by OP I only) 06/07/23 1541 Right lateral;lower leg (Active)   Thickness/Stage full thickness 09/19/23 1034   Base scab 09/19/23 1034   Periwound Temperature warm 09/19/23 1034   Periwound Skin Turgor soft 09/19/23 1034   Edges rolled/closed 09/19/23 1034   Length (cm) 0 09/19/23 1034   Width (cm) 0 09/19/23 1034   Depth (cm) 0 09/19/23 1034   Wound (cm^2) 0 cm^2 09/19/23 1034   Wound Volume (cm^3) 0 cm^3 09/19/23 1034   Wound healing % 100 09/19/23 1034   Drainage Characteristics/Odor serosanguineous 06/07/23 1508   Drainage Amount none 09/19/23 1034   Care, Wound non-select wound debridement performed 08/08/23 1409       Wound (used by [facility] only) 09/19/23 1120 Right dorsal foot (Active)   Thickness/Stage full thickness 09/19/23 1034   Base red;pink 09/19/23 1034   Periwound intact 09/19/23 1034   Periwound Temperature warm 09/19/23 1034   Periwound Skin Turgor soft 09/19/23 1034   Edges open 09/19/23 1034   Length (cm) 1.1 09/19/23 1034   Width (cm) 0.9 09/19/23 1034   Depth (cm) 0.1  09/19/23 1034   Wound (cm^2) 0.99 cm^2 09/19/23 1034   Wound Volume (cm^3) 0.1 cm^3 09/19/23 1034   Drainage Characteristics/Odor serosanguineous 09/19/23 1034   Drainage Amount moderate 09/19/23 1034   Care, Wound non-select wound debridement performed 09/19/23 1034       PROCEDURE: After verbal consent was obtained, hypergranulation tissue was treated with silver nitrate. Patient tolerated this well.     MDM: 30 minutes was spent on the day of visit reviewing previous chart notes, assessing the patient and developing the plan of care, this excludes time spent on any procedures.         Further instructions from your care team         Parth Fountain      1965    A DME order was not completed because the supplies are ordered by home care or at a care facility      We applied Silver Nitrate to the hypergranulation tissue today.  This may lead to temporary staining of the skin with increased drainage and discolored gray/black drainage.  This may be present for a few days and is a normal process.    [place] [facility] Phone: [phone] Fax: [phone]    You need to have your wheelchair cushion pressure-mapped again now that you  have had your amputation. (Patient stated completed)   If you need assistance to pursue the [facility] or other,  please contact us and we will assist.    A DME [facility] for a Group 2 mattress [facility]. If there are  any issues with your order including not receiving the order please call [facility]  at [phone]. They can also provide a tracking number for you. (Received group 2 mattress)      Smoking delays healing; advised to stop smoking to assist healing.    Ensure to clean and debride old skin of BLE with cares.  Medications/supplements to aid in healing:  Vitamin C 1,000mg daily  Vitamin D 5,000 units daily  Vitamin B 12 Complex daily      Wound Care Orders:Thoracic spine  -Cleanse with mild unscented soap and water (such as  Cetaphil, Cerave or Dove)   -Cover with 6x6 mepilex   -Change M,W,F     Wound Care Orders: Right Lateral Foot and right dorsal foot   - Cleanse with wound cleanser, pat dry  - Apply Iodosorb gel to wound bed  - Cover with 4x4 mepilex  -Wear spandage for compression from toes to knee  Change M,W,F and as needed for soilage      Wound care: Right and Left Ischial tuberosity  -Gently cleanse and apply light layer of zinc-based paste to scrotal and periwound, as  needed if exposed to drainage  - Cover open areas Stacei promogram  - Cover with 1/2 of a 5x9 ABD pad and secure with medapore tape  Change M,W,F and as needed for soilage      Wound care: if resumes Scrotal wounds, if draining  - apply Triad paste if any open/draining areas,  Change daily with cares    Dressing changes outside of clinic are being performed by Home Care       Suzan Martinez PA-C September 19, 2023    Call us at [phone] if you have any questions about your wounds, have redness or swelling around your wound, have a fever of 101 degrees Fahrenheit or greater or if you have any other problems or concerns. We answer the phone Monday through Friday 8 am to 4 pm, please leave a message as we check the voicemail frequently throughout the day.     If you had a positive experience please indicate that on your patient satisfaction survey form that [facility] will be sending you.    It was a pleasure meeting with you today.  Thank you for allowing me and my team the privilege of caring for you today.  YOU are the reason we are here, and I truly hope we provided you with the excellent service you deserve.  Please let us know if there is anything else we can do for you so that we can be sure you are leaving completely satisfied with your care experience.      If you have any billing related questions please call the [facility] Business office at [phone]. The clinic staff does not handle billing related matters.    If you are scheduled to  have a follow up appointment, you will receive a reminder call the day before your visit. On the appointment day please arrive 15 minutes prior to your appointment time. If you are unable to keep that appointment, please call the clinic to cancel or reschedule. If you are more than 10 minutes late or greater for your scheduled appointment time, the clinic policy is that you may be asked to reschedule.

## 2023-09-19 NOTE — DISCHARGE INSTRUCTIONS
Parth Fountain      1965    A DME order was not completed because the supplies are ordered by home care or at a care facility      We applied Silver Nitrate to the hypergranulation tissue today.  This may lead to temporary staining of the skin with increased drainage and discolored gray/black drainage.  This may be present for a few days and is a normal process.    [employer] Phone: [phone] Fax: [phone]    You need to have your wheelchair cushion pressure-mapped again now that you  have had your amputation. (Patient stated completed)   If you need assistance to pursue the [facility] or other,  please contact us and we will assist.    A DME order for a Group 2 mattress has been placed to [employer]. If there are  any issues with your order including not receiving the order please call [employer]  at [phone]. They can also provide a tracking number for you. (Received group 2 mattress)      Smoking delays healing; advised to stop smoking to assist healing.    Ensure to clean and debride old skin of BLE with cares.  Medications/supplements to aid in healing:  Vitamin C 1,000mg daily  Vitamin D 5,000 units daily  Vitamin B 12 Complex daily      Wound Care Orders:Thoracic spine  -Cleanse with mild unscented soap and water (such as Cetaphil, Cerave or Dove)   -Cover with 6x6 mepilex   -Change M,W,F     Wound Care Orders: Right Lateral Foot and right dorsal foot   - Cleanse with wound cleanser, pat dry  - Apply Iodosorb gel to wound bed  - Cover with 4x4 mepilex  -Wear spandage for compression from toes to knee  Change M,W,F and as needed for soilage      Wound care: Right and Left Ischial tuberosity  -Gently cleanse and apply light layer of zinc-based paste to scrotal and periwound, as  needed if exposed to drainage  - Cover open areas Stacie promogram  - Cover with 1/2 of a 5x9 ABD pad and secure with medapore tape  Change M,W,F and as needed for soilage      Wound  care: if resumes Scrotal wounds, if draining  - apply Triad paste if any open/draining areas,  Change daily with cares    Dressing changes outside of clinic are being performed by Home Care       Suzan Martinez PA-C September 19, 2023    Call us at [phone] if you have any questions about your wounds, have redness or swelling around your wound, have a fever of 101 degrees Fahrenheit or greater or if you have any other problems or concerns. We answer the phone Monday through Friday 8 am to 4 pm, please leave a message as we check the voicemail frequently throughout the day.     If you had a positive experience please indicate that on your patient satisfaction survey form that [facility] will be sending you.    It was a pleasure meeting with you today.  Thank you for allowing me and my team the privilege of caring for you today.  YOU are the reason we are here, and I truly hope we provided you with the excellent service you deserve.  Please let us know if there is anything else we can do for you so that we can be sure you are leaving completely satisfied with your care experience.      If you have any billing related questions please call the [facility] Business office at [phone]. The clinic staff does not handle billing related matters.    If you are scheduled to have a follow up appointment, you will receive a reminder call the day before your visit. On the appointment day please arrive 15 minutes prior to your appointment time. If you are unable to keep that appointment, please call the clinic to cancel or reschedule. If you are more than 10 minutes late or greater for your scheduled appointment time, the clinic policy is that you may be asked to reschedule.

## 2023-10-11 ENCOUNTER — TELEPHONE (OUTPATIENT)
Dept: WOUND CARE | Facility: CLINIC | Age: 58
End: 2023-10-11
Payer: MEDICARE

## 2023-10-11 NOTE — TELEPHONE ENCOUNTER
Home care nurse called to report some new wounds to the right foot and lower leg.  Using the same orders for care as on the left side.  Wanting to make sure that is OK.  Other wounds are looking worse.  Has photos, if needed.   Rafaela

## 2023-10-11 NOTE — TELEPHONE ENCOUNTER
"Spoke with Rafaela. She stated that patient has new wounds on the right foot and big toe and 2nd and 3rd toe and 2 on his calf. She states that she is not concerned about the calf wounds as one is scabbed over and one is superficial.     The big toe has some slough on it and the 2nd and 3rd toes are red base and superficial. States that they look like \"fresh\" wounds put patient unable to explain how they happened. She states that she put iodosrob on them and covered the foot with kerlix.States that she is not concerned of infection. Maceration on edges of wounds. Suggested using barrier cream to macerated areas.     IT's larger in size with red wound bed. Rafaela believes that he is not caring as much when transferring and offloading pressure.     Zoey discussed with Renea Martinez PA-C. Patient placed on cancellation list.   "

## 2023-10-24 ENCOUNTER — HOSPITAL ENCOUNTER (OUTPATIENT)
Dept: WOUND CARE | Facility: CLINIC | Age: 58
Discharge: HOME | End: 2023-10-24
Attending: PHYSICIAN ASSISTANT
Payer: MEDICARE

## 2023-10-24 ENCOUNTER — TELEPHONE (OUTPATIENT)
Dept: OTHER | Facility: CLINIC | Age: 58
End: 2023-10-24

## 2023-10-24 VITALS — DIASTOLIC BLOOD PRESSURE: 75 MMHG | TEMPERATURE: 98.1 F | SYSTOLIC BLOOD PRESSURE: 139 MMHG | HEART RATE: 76 BPM

## 2023-10-24 DIAGNOSIS — I73.9 PAD (PERIPHERAL ARTERY DISEASE) (H): Primary | ICD-10-CM

## 2023-10-24 DIAGNOSIS — I70.248 ATHEROSCLEROSIS OF NATIVE ARTERY OF BOTH LOWER EXTREMITIES WITH BILATERAL ULCERATION OF OTHER PART OF LOWER LEGS (H): ICD-10-CM

## 2023-10-24 DIAGNOSIS — L89.614 PRESSURE INJURY OF RIGHT HEEL, STAGE 4 (H): ICD-10-CM

## 2023-10-24 DIAGNOSIS — I70.238 ATHEROSCLEROSIS OF NATIVE ARTERY OF BOTH LOWER EXTREMITIES WITH BILATERAL ULCERATION OF OTHER PART OF LOWER LEGS (H): ICD-10-CM

## 2023-10-24 DIAGNOSIS — S81.801D OPEN WOUND OF BOTH LOWER EXTREMITIES WITH COMPLICATION, SUBSEQUENT ENCOUNTER: ICD-10-CM

## 2023-10-24 DIAGNOSIS — L89.894 PRESSURE INJURY OF RIGHT FOOT, STAGE 4 (H): ICD-10-CM

## 2023-10-24 DIAGNOSIS — L89.314 PRESSURE ULCER OF ISCHIUM, RIGHT, STAGE IV (H): ICD-10-CM

## 2023-10-24 DIAGNOSIS — L89.103 PRESSURE INJURY OF BACK, STAGE 3 (H): Primary | ICD-10-CM

## 2023-10-24 DIAGNOSIS — S81.802D OPEN WOUND OF BOTH LOWER EXTREMITIES WITH COMPLICATION, SUBSEQUENT ENCOUNTER: ICD-10-CM

## 2023-10-24 DIAGNOSIS — L89.324 LEFT ISCHIAL PRESSURE SORE, STAGE IV (H): ICD-10-CM

## 2023-10-24 PROCEDURE — 99215 OFFICE O/P EST HI 40 MIN: CPT | Mod: 25 | Performed by: PHYSICIAN ASSISTANT

## 2023-10-24 PROCEDURE — 17250 CHEM CAUT OF GRANLTJ TISSUE: CPT | Performed by: PHYSICIAN ASSISTANT

## 2023-10-24 NOTE — DISCHARGE INSTRUCTIONS
Parth Fountain      1965      A DME order was not completed because the supplies are ordered by home care or at a care facility      [facility] Phone: [phone] Fax: [phone]    PLAN:  -Order placed to to vascular surgery to Dr. Cao to discuss amputation for right foot/leg You have been referred to [facility] for Vascular surgery referral. Please call [phone] to schedule.  -Group 2 received  -Wheelchair pressure mapped after amputation (done per patient   -Have [facility] look at wheelchair     Smoking delays healing; advised to stop smoking to assist healing.  Ensure to clean and debride old skin of BLE with cares.  Medications/supplements to aid in healing:  Vitamin C 1,000mg daily  Vitamin D 5,000 units daily  Vitamin B 12 Complex daily      Wound Care Orders:Thoracic spine and posterior lower leg wound  -Cleanse with mild unscented soap and water (such as Cetaphil, Cerave or Dove)  -Cover with 6x6 mepilex  -Change M,W,F    Wound Care Orders: Right ankle, right posterior lower leg, Right Lateral Foot and right dorsal foot,  Right toe 1, right toe 3  - Cleanse with wound cleanser, pat dry  - Apply betadine to to wound bed and let dry  - Cover with 4x4 gauze pad (okay to use adaptic if gauze sticks)  - secure with roll gauze and medipore tape  - Wear spandage for compression from toes to knee  Change M,W,F and as needed for soilage    Wound care: Right and Left Ischial tuberosity  -Gently cleanse and apply light layer of zinc-based paste to scrotal and periwound, as  needed if exposed to drainage  - Cover open areas Stacie promogram or equivalant  - Cover with 1/2 of a 5x9 ABD pad and secure with medapore tape  Change M,W,F and as needed for soilage      Wound care: if resumes left scrotum, if draining  - apply Triad paste if any open/draining areas,  Change daily with cares      Dressing changes outside of clinic are being performed by Home Care       Suzan Martinez,  SVETA October 24, 2023    Call us at [phone] if you have any questions about your wounds, have redness or swelling around your wound, have a fever of 101 degrees Fahrenheit or greater or if you have any other problems or concerns. We answer the phone Monday through Friday 8 am to 4 pm, please leave a message as we check the voicemail frequently throughout the day.     If you had a positive experience please indicate that on your patient satisfaction survey form that [facility] will be sending you.    It was a pleasure meeting with you today.  Thank you for allowing me and my team the privilege of caring for you today.  YOU are the reason we are here, and I truly hope we provided you with the excellent service you deserve.  Please let us know if there is anything else we can do for you so that we can be sure you are leaving completely satisfied with your care experience.      If you have any billing related questions please call the [facility] Business office at [phone]. The clinic staff does not handle billing related matters.    If you are scheduled to have a follow up appointment, you will receive a reminder call the day before your visit. On the appointment day please arrive 15 minutes prior to your appointment time. If you are unable to keep that appointment, please call the clinic to cancel or reschedule. If you are more than 10 minutes late or greater for your scheduled appointment time, the clinic policy is that you may be asked to reschedule.

## 2023-10-24 NOTE — TELEPHONE ENCOUNTER
"Chart review:  AVS shows:  \"    Pt is wheelchair dependent. Alex needed??    Pt referred to [facility] by Suzan Martinez PA-C for discussion of right lower extremity amputation.    Pt needs to be scheduled for JOVAN/TBI and right LE arterial U/S and new pt  in clinic consult with Dr. Cao (per referring provider request).  Will route to scheduling to coordinate an appointment at next soonest available.    Appt note: New pt ref by Suzan Martinez PA-C for discussion of right lower extremity amputation (JOVAN/TBI and right LE arterial U/S to be scheduled prior).       DANICA Pires, RN  [facility]  Office:  [phone] Fax: [phone]         "

## 2023-10-25 NOTE — TELEPHONE ENCOUNTER
Left voicemail with instructions for patient to call back to schedule their appointment(s)    October 25, 2023 , 8:56 AM

## 2023-10-25 NOTE — PROGRESS NOTES
ASSESSMENT:    New traumatic degloving injuries of R 1st and 3rd toes   New stage 3 scrotal pressure ulcer   Arterial ulcerations of R foot  Peripheral arterial disease - non-reconstructible with poor healing capacity  Recurrent stage 4 pressure ulcers of bilateral ITs      PLAN:    Concern about ongoing injuries to R foot and leg and his ability to heal these. He is at high risk of non-healing wounds and serious infection. He has been thinking about amputation of this leg and may be ready to pursue this. As this leg is non-functional and continues to be an issue for the patient and putting him at risk I think this is a reasonable pursuit. I have referred him to vascular surgery to further discuss amputation  Patient is using a Group 2 mattress due to large, stage 4 pressure ulceration on the patient's pelvis that has failed to improve on a normal mattress despite regular wound cares and repositioning. A group 1 mattress is not adequate to offload these severe ulcerations. Patient has impaired sensation and is unable to reposition independently.  iodosorb or iodine and cover dressing to leg and foot wounds, collagen on ischial wounds, Triad to scrotal wound  Continue using Rooke boot to improve circulation  Pelvic wounds to be dressed with triad paste and ABD pads  Follow-up: will need 40 minute appointments every 6 weeks    HISTORY OF PRESENT ILLNESS:   Mr. Parth Fountain is a 58-year-old paraplegic gentleman who returns to us today to follow-up on chronic pelvic pressure ulcers as well as lower leg wounds. PMHx of DVT, chronic UTI, EtOh abuse, and s/p colostomy. He has a long history of pressure ulcers with subsequent corrective surgeries by Dr. Peña. Parth's pelvic wounds have been very difficult to heal due to his surgical history. He has very little tissue overlying his bone and it is mostly made up of scar tissue. His progress waxes and wanes.    10/21/20: Televisit. Thigh wound healed. Scraped left leg and  "foot during a transfer and has several shallow ulcers on left lower leg and toes. Has an ulcer in his right groin/hip crease. This waxes and wanes. Uses Stacie when open and calmoseptine when more closed, this is working well for him.   11/18/20: Return visit. All of his wounds have improved. Has new traumatic wounds on bilateral shins.   12/16/20: Returns via video visit. Shin wounds are improving but very friable and hypergranular. Hip crease slightly more open today, also appears friable. Has been using Stacie to all wounds.   01/13/21: Returns for in office visit.  Leg wounds are improving with use of calcium alginate.  However, Parth notes that the dressings are sticking to the wounds and causing bleeding when removed.  His right groin wound is now quite hypertrophic.  2/26/21: Returns for follow-up. Reports that he continues to reinjure his R lower leg. Hip wound is not really improving. Has new wound on R great toe with exposed bone.   4/9/21: Returns for follow-up. Still has exposed bone on R great toe and has not had the x-ray done we ordered at last visit. His R lower leg has worsened. R hip about the same.   5/19/21: Returns for follow-up via telephone, has been using Xeroform. Has not done vascular studies or xray as recommended. Hip wound is improving. Has a recurrence of his L IT wound. His legs are marginally better, reports they are \"squishy.\"    8/20: Returns for televisit. Had to reschedule from in-clinic visit due to staffing issues. Left toe has new wound, lateral left leg still open. Has been using xeroform. Thinks R toe is closed up now. R hip comes and goes, uses Calmoseptine PRN. Has lithotripsy this upcoming week. Has not had xray or ABIs.   09/24/21: Returns for in person follow-up. His hip/groin wound has completely healed, however this usually comes and goes. Has new minor right heel wound. Continues to have issues with left lower leg, today during the visit it is apparent he has some " kind of vessel in this area that easily bleeds causing a hematoma in this area. Fortunately the wound is fairly superficial. He has not had ABIs or x rays as recommended. Fortunately his toe appears stable today without any exposed bone.   10/26/21: Returns for follow-up. Left leg has regressed, tissue now very friable and hypertrophic. Toe continues to be resolved. Hip wound currently resolved. Biopsy performed came back suspicious for pyoderma gangrenosum.    12/2/21: Seen in ED for laceration of foot. Arterial duplex performed was suspicious for arterial disease.   12/07/21: Orders were changed after biopsy results last visit to begin high potency topical steroid ointment, unfortunately there was some confusion on this and he hasn't started it. Staff has just been cleaning and covering with gauze and the wound is much improved.   01/28/22: Returns for follow-up. Since I saw him he visited with Dr. Chapa [facility] who recommended angio. Unfortunately his right heel and right lateral foot wound are now down to bone. He thinks it's from [facility]. His LLE wounds that we believe are PG have improved and are nearly healed. Has new burn on his left thigh from coffee. Also his pelvic wounds have reopened. He thinks this is due to his wheelchair cushion deflating.   03/15/22: Returns for follow-up. It appears that his health has overall deteriorated. He denies drinking again but he does have both cigarettes and chewing tobacco with him. He says he feels sick today as he didn't sleep last night but denies fevers or change in appetite. He had his chair pressure mapped and said it is appropriate. He has not followed up with vascular as we had suggested. Fortunately his foot wounds appear relatively stable. His pelvic wounds have deteriorated a bit. PG leg wound appears nearly resolved.   04/12/22: Returns for follow-up. Angio is scheduled on Thursday. Foot wounds continue to be stable  but unfortunately continues to have bone exposed on R foot. His pelvic wounds have worsened and he cannot really identify a cause. States his bed is working well. Has a Roho that he reports mapped well but is getting a new [facility]. He is otherwise feeling well.   4/14/22: Angio revealed multiple occlusions, unable to achieve any recanalization. Per Dr. Chapa he is a poor candidate for revascularization, especially due to smoking and poor health.   05/17/22: Returns for follow-up. Pelvic wounds improved. Foot wounds are stable, no sign of infection. Thigh wound healing. Leg wounds about the same. New left heel ulcer. We had heart to heart about poor blood flow and poor prognosis of this, high risk of amputation. He must quit smoking and take care of himself. Recommended HBOT if candidate.   06/14/22: Returns for follow-up.  Foot wounds stable with Iodosorb. Lower leg wounds healed. Thigh burn wound nearly healed. Pelvic wounds improving with collagen. New back wound - states he is bottoming out on back rest when he tilts too far.   07/12/22: Returns to clinic. Foot wounds improving. Has new necrotic wound on left lateral leg - he is unaware how or when he got this. Pelvic wounds are stable. [facility], now using an old Roho as a back rest.   08/09/22: Pelvic wounds are improving. Left lateral leg wound now down to tendon but starting to granulate on edges. Still has joint exposed on left third toe. Left heel resolved. Right heel improved. Right lateral foot improving but still with exposed tendon. Has not fixed wheelchair.   9/2: Visited with [facility] about his left 3rd toe, she performed excision of bone in clinic and closed the area primarily. Started on doxycycline. Bone biopsy came back + for osteomyelitis.  09/13/22: Returns via telemed for follow-up. Pelvic wounds appear to be improving. New distal left lower leg wound in the previous area of PG. Has  decided he does not want to fix the back of his wheelchair, prefers to use the Roho.   10/11/22: Returns for follow-up. Buttocks wounds improving overall. There is a little more tearing on the left IT wound causing some undermining. His leg wounds have improved but he does have new superficial abrasion superior to current wounds. Continues to see Dr. Abbott for his foot wounds, see her dictation for report of these.   01/11/23: Returns for follow-up. Pelvic wounds improved. Has worsening in lateral foot wound, appears to have new trauma but he's not sure about when/how. Wound of left toe third arthroplasty has now dehisced.   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   02/22/23: Returns for follow-up. Pelvic wounds stable to improved. Right lateral foot wound slightly improved. Traumatic dorsal toe wounds with stable eschar.  6/7: Returns to clinic: Pelvic wounds improving, using collagen. New RLE ulcer - he is unsure of cause. R foot wound stagnant without sign of infection. He expresses desire for amputation today as he understand wounds likely unhealable and risk of infection. Referral made to [facility]. Wheelchair continues to be broken down with makeshift back rest.  08/08/23: Returns to clinic. Foot wound improving. Leg wound resolved. Bilateral ITs appear to be improving. He has not pursued preventive amputation yet but is still interested. He states his air mattress isn't working well and would like a replacement.   09/19/23: Returns to clinic. Foot wounds improving. New dorsal foot wound - appears minor. Pelvic wounds improving. Had chair mapped and reported it was slightly over inflated. Has new group 2 and likes it.   10/25/23: Returns to clinic. Reports that his wheelchair is broken and has had issues with all his wounds and new injuries to RLE due to this. [facility]. Unfortunately has some pretty severe  degloving injuries of 1st and 3rd toes with exposed loose distal phalanx in 3rd toe. His R lateral foot wound is worsened as well. New traumatic calf wound. His ischial wounds have regressed as well and he also blames this on his wheelchair. Fortunately he is feeling well, denies f/c/n/v. Denies more drug or alcohol use.     OFFLOADING: On a Group 2 mattress, new from [employer] 9/2023. Still utilizing RoHo cushion on his wheelchair - pressure mapped well 9/2023, new chair 4/2022.       SOCIAL HISTORY:  Lives in a group home. He is still receiving home health care through [facility]. that comes and does dressing changes daily.      PHYSICAL EXAMINATION   INTEGUMENTARY:   Wound (used by [facility] only) 02/18/19 1332 Left ischial tuberosity pressure injury (Active)       Wound (used by [facility] only) 02/18/19 1333 Right ischial tuberosity pressure injury (Active)       Wound (used by [facility] only) 02/18/19 1333 Right proximal scrotum pressure injury (Active)       Wound (used by [facility] only) 02/18/19 1334 scrotum pressure injury (Active)       Wound (used by [facility] only) 01/28/22 1056 Right lateral foot pressure injury (Active)   Thickness/Stage Stage 4 10/24/23 1258   Base slough;necrotic;red 10/24/23 1258   Periwound edematous 10/24/23 1258   Periwound Temperature warm 10/24/23 1258   Periwound Skin Turgor soft 10/24/23 1258   Edges rolled/closed 10/24/23 1258   Length (cm) 1.7 10/24/23 1258   Width (cm) 3.2 10/24/23 1258   Depth (cm) 0.3 10/24/23 1258   Wound (cm^2) 5.44 cm^2 10/24/23 1258   Wound Volume (cm^3) 1.63 cm^3 10/24/23 1258   Wound healing % -43.16 10/24/23 1258   Drainage Characteristics/Odor serosanguineous 10/24/23 1258   Drainage Amount moderate 10/24/23 1258   Care, Wound non-select wound debridement performed. 10/24/23 1258       Wound (used by [facility] only) 03/15/22 1049 Right ischial tuberosity pressure injury (Active)   Thickness/Stage Stage 4 10/24/23 1258   Base slough;red;white 10/24/23 1258    Periwound intact;macerated 10/24/23 1258   Periwound Temperature warm 10/24/23 1258   Periwound Skin Turgor soft 10/24/23 1258   Edges open 10/24/23 1258   Length (cm) 2.2 10/24/23 1258   Width (cm) 3.8 10/24/23 1258   Depth (cm) 1.7 10/24/23 1258   Wound (cm^2) 8.36 cm^2 10/24/23 1258   Wound Volume (cm^3) 14.21 cm^3 10/24/23 1258   Wound healing % -122.93 10/24/23 1258   Undermining [Depth (cm)/Location] 7-1 o'clock @ 1.2cm 10/24/23 1258   Drainage Characteristics/Odor serosanguineous 10/24/23 1258   Drainage Amount moderate 10/24/23 1258   Care, Wound chemical cautery applied 10/24/23 1258       Wound (used by [facility] only) 03/15/22 1050 Left ischial tuberosity pressure injury (Active)   Thickness/Stage Stage 4 10/24/23 1258   Base white;slough 10/24/23 1258   Periwound intact 10/24/23 1258   Periwound Temperature warm 10/24/23 1258   Periwound Skin Turgor firm 10/24/23 1258   Edges open 10/24/23 1258   Length (cm) 4.5 10/24/23 1258   Width (cm) 1.3 10/24/23 1258   Depth (cm) 0.5 10/24/23 1258   Wound (cm^2) 5.85 cm^2 10/24/23 1258   Wound Volume (cm^3) 2.93 cm^3 10/24/23 1258   Wound healing % -104.55 10/24/23 1258   Undermining [Depth (cm)/Location] 6-8 o'clock @ 2.7cm 10/24/23 1258   Drainage Characteristics/Odor serosanguineous 10/24/23 1258   Drainage Amount moderate 10/24/23 1258   Care, Wound chemical cautery applied 10/24/23 1258       Wound (used by [facility] only) 06/14/22 1127 posterior;midline thoracic spine pressure injury (Active)   Thickness/Stage Stage 3 10/24/23 1258   Base pink;red 10/24/23 1258   Periwound redness 10/24/23 1258   Periwound Temperature warm 10/24/23 1258   Periwound Skin Turgor soft 10/24/23 1258   Edges open 10/24/23 1258   Length (cm) 0.8 10/24/23 1258   Width (cm) 1 10/24/23 1258   Depth (cm) 0.1 10/24/23 1258   Wound (cm^2) 0.8 cm^2 10/24/23 1258   Wound Volume (cm^3) 0.08 cm^3 10/24/23 1258   Wound healing % -66.67 10/24/23 1258   Drainage Characteristics/Odor serosanguineous  10/24/23 1258   Drainage Amount moderate 10/24/23 1258   Care, Wound non-select wound debridement performed. 10/24/23 1258       Wound (used by [facility] only) 09/19/23 1120 Right dorsal foot (Active)   Thickness/Stage full thickness 10/24/23 1258   Base red;pink 10/24/23 1258   Periwound intact 10/24/23 1258   Periwound Temperature warm 10/24/23 1258   Periwound Skin Turgor soft 10/24/23 1258   Edges open 10/24/23 1258   Length (cm) 1.1 09/19/23 1034   Width (cm) 0.9 09/19/23 1034   Depth (cm) 0.1 09/19/23 1034   Wound (cm^2) 0.99 cm^2 09/19/23 1034   Wound Volume (cm^3) 0.1 cm^3 09/19/23 1034   Drainage Characteristics/Odor serosanguineous 10/24/23 1258   Drainage Amount moderate 10/24/23 1258   Care, Wound non-select wound debridement performed. 10/24/23 1258       Wound (used by [facility] only) 10/24/23 1311 Right posterior;lower leg (Active)   Thickness/Stage full thickness 10/24/23 1258   Base yellow;pink 10/24/23 1258   Periwound intact 10/24/23 1258   Periwound Temperature warm 10/24/23 1258   Periwound Skin Turgor soft 10/24/23 1258   Edges open 10/24/23 1258   Length (cm) 2.7 10/24/23 1258   Width (cm) 3.5 10/24/23 1258   Depth (cm) 0.2 10/24/23 1258   Wound (cm^2) 9.45 cm^2 10/24/23 1258   Wound Volume (cm^3) 1.89 cm^3 10/24/23 1258   Drainage Characteristics/Odor serosanguineous 10/24/23 1258   Drainage Amount moderate 10/24/23 1258   Care, Wound non-select wound debridement performed. 10/24/23 1258       Wound (used by [facility] only) 10/24/23 1313 Right anterior ankle (Active)   Thickness/Stage full thickness 10/24/23 1258   Base red;yellow 10/24/23 1258   Periwound redness;swelling 10/24/23 1258   Periwound Temperature warm 10/24/23 1258   Periwound Skin Turgor soft 10/24/23 1258   Edges open 10/24/23 1258   Length (cm) 3.5 10/24/23 1258   Width (cm) 3.3 10/24/23 1258   Depth (cm) 0.3 10/24/23 1258   Wound (cm^2) 11.55 cm^2 10/24/23 1258   Wound Volume (cm^3) 3.47 cm^3 10/24/23 1258   Drainage  Characteristics/Odor serosanguineous 10/24/23 1258   Drainage Amount moderate 10/24/23 1258   Care, Wound non-select wound debridement performed. 10/24/23 1258       Wound (used by [facility] only) 10/24/23 1314 Right 1 toe (Active)   Thickness/Stage full thickness 10/24/23 1258   Base red 10/24/23 1258   Periwound swelling;redness 10/24/23 1258   Periwound Temperature warm 10/24/23 1258   Periwound Skin Turgor soft 10/24/23 1258   Edges open 10/24/23 1258   Length (cm) 2.5 10/24/23 1258   Width (cm) 2.5 10/24/23 1258   Depth (cm) 0.2 10/24/23 1258   Wound (cm^2) 6.25 cm^2 10/24/23 1258   Wound Volume (cm^3) 1.25 cm^3 10/24/23 1258   Drainage Characteristics/Odor serosanguineous 10/24/23 1258   Drainage Amount moderate 10/24/23 1258   Care, Wound non-select wound debridement performed.;chemical cautery applied 10/24/23 1258       Wound (used by [facility] only) 10/24/23 1314 Right 3 toe (Active)   Thickness/Stage full thickness 10/24/23 1258   Base red;exposed structure 10/24/23 1258   Periwound redness;swelling 10/24/23 1258   Periwound Temperature warm 10/24/23 1258   Periwound Skin Turgor soft 10/24/23 1258   Edges open 10/24/23 1258   Length (cm) 2.2 10/24/23 1258   Width (cm) 2.2 10/24/23 1258   Depth (cm) 0.2 10/24/23 1258   Wound (cm^2) 4.84 cm^2 10/24/23 1258   Wound Volume (cm^3) 0.97 cm^3 10/24/23 1258   Drainage Characteristics/Odor serosanguineous 10/24/23 1258   Drainage Amount moderate 10/24/23 1258   Care, Wound chemical cautery applied;non-select wound debridement performed. 10/24/23 1258       Wound (used by [facility] only) 10/24/23 1324 Left scrotum (Active)   Thickness/Stage Stage 3 10/24/23 1258   Base pink 10/24/23 1258   Periwound intact 10/24/23 1258   Periwound Temperature warm 10/24/23 1258   Periwound Skin Turgor soft 10/24/23 1258   Edges open 10/24/23 1258   Length (cm) 0.6 10/24/23 1258   Width (cm) 1 10/24/23 1258   Depth (cm) 0.2 10/24/23 1258   Wound (cm^2) 0.6 cm^2 10/24/23 1258   Wound Volume  (cm^3) 0.12 cm^3 10/24/23 1258   Drainage Characteristics/Odor serosanguineous 10/24/23 1258   Drainage Amount moderate 10/24/23 1258   Care, Wound non-select wound debridement performed. 10/24/23 1258                           PROCEDURE: After verbal consent was obtained, bleeding granulation tissue on toes was treated with silver nitrate. The loose distal phalanx was pulled off with minimal effort. Patient tolerated this well.     MDM: high - multiple new injuries with potentially severe consequences, discussion of possible limb amputation            Further instructions from your care team         Parth Fountain      1965      A DME order was not completed because the supplies are ordered by home care or at a care facility      [facility] Phone: [phone] Fax: [phone]    PLAN:  -Order placed to to vascular surgery to Dr. Cao to discuss amputation for right foot/leg You have been referred to [facility] for Vascular surgery referral. Please call [phone] to schedule.  -Group 2 received  -Wheelchair pressure mapped after amputation (done per patient   -[facility]     Smoking delays healing; advised to stop smoking to assist healing.  Ensure to clean and debride old skin of BLE with cares.  Medications/supplements to aid in healing:  Vitamin C 1,000mg daily  Vitamin D 5,000 units daily  Vitamin B 12 Complex daily      Wound Care Orders:Thoracic spine and posterior lower leg wound  -Cleanse with mild unscented soap and water (such as Cetaphil, Cerave or Dove)  -Cover with 6x6 mepilex  -Change M,W,F    Wound Care Orders: Right ankle, right posterior lower leg, Right Lateral Foot and right dorsal foot,  Right toe 1, right toe 3  - Cleanse with wound cleanser, pat dry  - Apply betadine to to wound bed and let dry  - Cover with 4x4 gauze pad (okay to use adaptic if gauze sticks)  - secure with roll gauze and medipore tape  - Wear spandage for compression  from toes to knee  Change M,W,F and as needed for soilage    Wound care: Right and Left Ischial tuberosity  -Gently cleanse and apply light layer of zinc-based paste to scrotal and periwound, as  needed if exposed to drainage  - Cover open areas Stacie promogram or equivalant  - Cover with 1/2 of a 5x9 ABD pad and secure with medapore tape  Change M,W,F and as needed for soilage      Wound care: if resumes left scrotum, if draining  - apply Triad paste if any open/draining areas,  Change daily with cares      Dressing changes outside of clinic are being performed by Home Care       Suzan Martinez PA-C October 24, 2023    Call us at [phone] if you have any questions about your wounds, have redness or swelling around your wound, have a fever of 101 degrees Fahrenheit or greater or if you have any other problems or concerns. We answer the phone Monday through Friday 8 am to 4 pm, please leave a message as we check the voicemail frequently throughout the day.     If you had a positive experience please indicate that on your patient satisfaction survey form that [facility] will be sending you.    It was a pleasure meeting with you today.  Thank you for allowing me and my team the privilege of caring for you today.  YOU are the reason we are here, and I truly hope we provided you with the excellent service you deserve.  Please let us know if there is anything else we can do for you so that we can be sure you are leaving completely satisfied with your care experience.      If you have any billing related questions please call the [facility] Business office at [phone]. The clinic staff does not handle billing related matters.    If you are scheduled to have a follow up appointment, you will receive a reminder call the day before your visit. On the appointment day please arrive 15 minutes prior to your appointment time. If you are unable to keep that appointment, please call the clinic to cancel or  reschedule. If you are more than 10 minutes late or greater for your scheduled appointment time, the clinic policy is that you may be asked to reschedule.

## 2023-10-27 NOTE — TELEPHONE ENCOUNTER
Voicemail left with patient to call clinic when able.  Call placed also to Donna - group home nurse - to see if she can help with getting the patient to call the vascular clinic back. She will talk with patient.

## 2023-10-27 NOTE — TELEPHONE ENCOUNTER
Call 2 of 2. Left voicemail with instructions for patient to call back to schedule their appointment(s)    October 27, 2023 , 2:00 PM      Referral closed routed back to [facility] RN triage based on urgency of request.

## 2023-10-30 ENCOUNTER — APPOINTMENT (OUTPATIENT)
Dept: GENERAL RADIOLOGY | Facility: CLINIC | Age: 58
DRG: 539 | End: 2023-10-30
Attending: EMERGENCY MEDICINE
Payer: MEDICARE

## 2023-10-30 ENCOUNTER — HOSPITAL ENCOUNTER (INPATIENT)
Facility: CLINIC | Age: 58
LOS: 10 days | Discharge: NURSING, REHAB OR LONG-TERM CARE | DRG: 539 | End: 2023-11-09
Attending: EMERGENCY MEDICINE | Admitting: STUDENT IN AN ORGANIZED HEALTH CARE EDUCATION/TRAINING PROGRAM
Payer: MEDICARE

## 2023-10-30 DIAGNOSIS — Z91.199 PERSONAL HISTORY OF NONCOMPLIANCE WITH MEDICAL TREATMENT, PRESENTING HAZARDS TO HEALTH: ICD-10-CM

## 2023-10-30 DIAGNOSIS — S31.30XS: ICD-10-CM

## 2023-10-30 DIAGNOSIS — L89.103 PRESSURE INJURY OF BACK, STAGE 3 (H): ICD-10-CM

## 2023-10-30 DIAGNOSIS — I73.9 PERIPHERAL VASCULAR DISEASE (H): ICD-10-CM

## 2023-10-30 DIAGNOSIS — Z72.0 TOBACCO ABUSE: ICD-10-CM

## 2023-10-30 DIAGNOSIS — L08.9 TOE INFECTION: Primary | ICD-10-CM

## 2023-10-30 DIAGNOSIS — R31.0 GROSS HEMATURIA: ICD-10-CM

## 2023-10-30 DIAGNOSIS — R19.7 DIARRHEA, UNSPECIFIED TYPE: ICD-10-CM

## 2023-10-30 DIAGNOSIS — Z93.3 COLOSTOMY IN PLACE (H): ICD-10-CM

## 2023-10-30 DIAGNOSIS — L89.614 PRESSURE INJURY OF RIGHT HEEL, STAGE 4 (H): ICD-10-CM

## 2023-10-30 DIAGNOSIS — M86.9 OSTEOMYELITIS, UNSPECIFIED SITE, UNSPECIFIED TYPE (H): ICD-10-CM

## 2023-10-30 LAB
ALBUMIN SERPL BCG-MCNC: 3.7 G/DL (ref 3.5–5.2)
ALP SERPL-CCNC: 76 U/L (ref 35–129)
ALT SERPL W P-5'-P-CCNC: 11 U/L (ref 0–70)
ANION GAP SERPL CALCULATED.3IONS-SCNC: 11 MMOL/L (ref 7–15)
AST SERPL W P-5'-P-CCNC: 21 U/L (ref 0–45)
BASOPHILS # BLD AUTO: 0.1 10E3/UL (ref 0–0.2)
BASOPHILS NFR BLD AUTO: 0 %
BILIRUB DIRECT SERPL-MCNC: <0.2 MG/DL (ref 0–0.3)
BILIRUB SERPL-MCNC: <0.2 MG/DL
BUN SERPL-MCNC: 24.6 MG/DL (ref 8–23)
CALCIUM SERPL-MCNC: 9.7 MG/DL (ref 8.8–10.2)
CHLORIDE SERPL-SCNC: 103 MMOL/L (ref 98–107)
CREAT SERPL-MCNC: 0.81 MG/DL (ref 0.51–1.17)
CRP SERPL-MCNC: 53.5 MG/L
DEPRECATED HCO3 PLAS-SCNC: 26 MMOL/L (ref 22–29)
EGFRCR SERPLBLD CKD-EPI 2021: >90 ML/MIN/1.73M2
EOSINOPHIL # BLD AUTO: 0.1 10E3/UL (ref 0–0.7)
EOSINOPHIL NFR BLD AUTO: 0 %
ERYTHROCYTE [DISTWIDTH] IN BLOOD BY AUTOMATED COUNT: 14.2 % (ref 10–15)
GLUCOSE SERPL-MCNC: 118 MG/DL (ref 70–99)
HCT VFR BLD AUTO: 38.9 % (ref 35–53)
HGB BLD-MCNC: 12.6 G/DL (ref 11.7–17.7)
IMM GRANULOCYTES # BLD: 0.1 10E3/UL
IMM GRANULOCYTES NFR BLD: 0 %
LYMPHOCYTES # BLD AUTO: 1.3 10E3/UL (ref 0.8–5.3)
LYMPHOCYTES NFR BLD AUTO: 9 %
MCH RBC QN AUTO: 29.4 PG (ref 26.5–33)
MCHC RBC AUTO-ENTMCNC: 32.4 G/DL (ref 31.5–36.5)
MCV RBC AUTO: 91 FL (ref 78–100)
MONOCYTES # BLD AUTO: 0.9 10E3/UL (ref 0–1.3)
MONOCYTES NFR BLD AUTO: 7 %
NEUTROPHILS # BLD AUTO: 11.3 10E3/UL (ref 1.6–8.3)
NEUTROPHILS NFR BLD AUTO: 84 %
NRBC # BLD AUTO: 0 10E3/UL
NRBC BLD AUTO-RTO: 0 /100
PLATELET # BLD AUTO: 473 10E3/UL (ref 150–450)
POTASSIUM SERPL-SCNC: 4.4 MMOL/L (ref 3.4–5.3)
PROCALCITONIN SERPL IA-MCNC: 0.06 NG/ML
PROT SERPL-MCNC: 7.2 G/DL (ref 6.4–8.3)
RBC # BLD AUTO: 4.28 10E6/UL (ref 3.8–5.9)
SODIUM SERPL-SCNC: 140 MMOL/L (ref 135–145)
WBC # BLD AUTO: 13.7 10E3/UL (ref 4–11)

## 2023-10-30 PROCEDURE — 93010 ELECTROCARDIOGRAM REPORT: CPT | Performed by: EMERGENCY MEDICINE

## 2023-10-30 PROCEDURE — 86140 C-REACTIVE PROTEIN: CPT | Performed by: PHYSICIAN ASSISTANT

## 2023-10-30 PROCEDURE — 99285 EMERGENCY DEPT VISIT HI MDM: CPT | Mod: 25 | Performed by: EMERGENCY MEDICINE

## 2023-10-30 PROCEDURE — 80053 COMPREHEN METABOLIC PANEL: CPT | Performed by: EMERGENCY MEDICINE

## 2023-10-30 PROCEDURE — 250N000011 HC RX IP 250 OP 636: Mod: JZ | Performed by: PHYSICIAN ASSISTANT

## 2023-10-30 PROCEDURE — 36415 COLL VENOUS BLD VENIPUNCTURE: CPT | Performed by: PHYSICIAN ASSISTANT

## 2023-10-30 PROCEDURE — 250N000013 HC RX MED GY IP 250 OP 250 PS 637: Performed by: PHYSICIAN ASSISTANT

## 2023-10-30 PROCEDURE — 85025 COMPLETE CBC W/AUTO DIFF WBC: CPT | Performed by: EMERGENCY MEDICINE

## 2023-10-30 PROCEDURE — 250N000013 HC RX MED GY IP 250 OP 250 PS 637: Performed by: EMERGENCY MEDICINE

## 2023-10-30 PROCEDURE — 84145 PROCALCITONIN (PCT): CPT | Performed by: PHYSICIAN ASSISTANT

## 2023-10-30 PROCEDURE — 73630 X-RAY EXAM OF FOOT: CPT | Mod: 26 | Performed by: STUDENT IN AN ORGANIZED HEALTH CARE EDUCATION/TRAINING PROGRAM

## 2023-10-30 PROCEDURE — 82565 ASSAY OF CREATININE: CPT | Performed by: PHYSICIAN ASSISTANT

## 2023-10-30 PROCEDURE — 120N000002 HC R&B MED SURG/OB UMMC

## 2023-10-30 PROCEDURE — 36415 COLL VENOUS BLD VENIPUNCTURE: CPT | Performed by: EMERGENCY MEDICINE

## 2023-10-30 PROCEDURE — 99223 1ST HOSP IP/OBS HIGH 75: CPT | Mod: FS | Performed by: STUDENT IN AN ORGANIZED HEALTH CARE EDUCATION/TRAINING PROGRAM

## 2023-10-30 PROCEDURE — 73630 X-RAY EXAM OF FOOT: CPT | Mod: RT

## 2023-10-30 PROCEDURE — 87040 BLOOD CULTURE FOR BACTERIA: CPT | Performed by: EMERGENCY MEDICINE

## 2023-10-30 PROCEDURE — 99207 PR APP CREDIT; MD BILLING SHARED VISIT: CPT | Mod: FS | Performed by: PHYSICIAN ASSISTANT

## 2023-10-30 PROCEDURE — 82248 BILIRUBIN DIRECT: CPT | Performed by: PHYSICIAN ASSISTANT

## 2023-10-30 PROCEDURE — 96365 THER/PROPH/DIAG IV INF INIT: CPT | Performed by: EMERGENCY MEDICINE

## 2023-10-30 PROCEDURE — 258N000003 HC RX IP 258 OP 636: Performed by: EMERGENCY MEDICINE

## 2023-10-30 PROCEDURE — 250N000011 HC RX IP 250 OP 636: Mod: JZ | Performed by: EMERGENCY MEDICINE

## 2023-10-30 RX ORDER — PIPERACILLIN SODIUM, TAZOBACTAM SODIUM 3; .375 G/15ML; G/15ML
3.38 INJECTION, POWDER, LYOPHILIZED, FOR SOLUTION INTRAVENOUS EVERY 6 HOURS
Status: DISCONTINUED | OUTPATIENT
Start: 2023-10-31 | End: 2023-11-09 | Stop reason: HOSPADM

## 2023-10-30 RX ORDER — DULOXETIN HYDROCHLORIDE 60 MG/1
60 CAPSULE, DELAYED RELEASE ORAL EVERY MORNING
Status: DISCONTINUED | OUTPATIENT
Start: 2023-10-31 | End: 2023-11-09 | Stop reason: HOSPADM

## 2023-10-30 RX ORDER — ONDANSETRON 4 MG/1
4 TABLET, ORALLY DISINTEGRATING ORAL EVERY 6 HOURS PRN
Status: DISCONTINUED | OUTPATIENT
Start: 2023-10-30 | End: 2023-11-09 | Stop reason: HOSPADM

## 2023-10-30 RX ORDER — VANCOMYCIN HYDROCHLORIDE 1 G/200ML
1000 INJECTION, SOLUTION INTRAVENOUS EVERY 12 HOURS
Status: DISCONTINUED | OUTPATIENT
Start: 2023-10-31 | End: 2023-11-02

## 2023-10-30 RX ORDER — HYDROMORPHONE HYDROCHLORIDE 2 MG/1
4 TABLET ORAL ONCE
Status: COMPLETED | OUTPATIENT
Start: 2023-10-30 | End: 2023-10-30

## 2023-10-30 RX ORDER — OXYCODONE HYDROCHLORIDE 5 MG/1
5 TABLET ORAL ONCE
Status: COMPLETED | OUTPATIENT
Start: 2023-10-30 | End: 2023-10-30

## 2023-10-30 RX ORDER — AMOXICILLIN 250 MG
1 CAPSULE ORAL 2 TIMES DAILY
Status: DISCONTINUED | OUTPATIENT
Start: 2023-10-30 | End: 2023-11-02

## 2023-10-30 RX ORDER — MULTIPLE VITAMINS W/ MINERALS TAB 9MG-400MCG
1 TAB ORAL EVERY MORNING
Status: DISCONTINUED | OUTPATIENT
Start: 2023-10-31 | End: 2023-11-09 | Stop reason: HOSPADM

## 2023-10-30 RX ORDER — HYDROMORPHONE HYDROCHLORIDE 2 MG/1
2-4 TABLET ORAL
Status: DISCONTINUED | OUTPATIENT
Start: 2023-10-30 | End: 2023-11-02

## 2023-10-30 RX ORDER — ENOXAPARIN SODIUM 100 MG/ML
40 INJECTION SUBCUTANEOUS EVERY 24 HOURS
Status: DISCONTINUED | OUTPATIENT
Start: 2023-10-30 | End: 2023-11-09 | Stop reason: HOSPADM

## 2023-10-30 RX ORDER — PREGABALIN 100 MG/1
300 CAPSULE ORAL 2 TIMES DAILY
Status: DISCONTINUED | OUTPATIENT
Start: 2023-10-30 | End: 2023-11-09 | Stop reason: HOSPADM

## 2023-10-30 RX ORDER — TRAZODONE HYDROCHLORIDE 100 MG/1
100 TABLET ORAL AT BEDTIME
Status: DISCONTINUED | OUTPATIENT
Start: 2023-10-30 | End: 2023-11-09 | Stop reason: HOSPADM

## 2023-10-30 RX ORDER — ASCORBIC ACID 500 MG
500 TABLET ORAL DAILY
Status: DISCONTINUED | OUTPATIENT
Start: 2023-10-31 | End: 2023-11-09 | Stop reason: HOSPADM

## 2023-10-30 RX ORDER — ACETAMINOPHEN 500 MG
500-1000 TABLET ORAL EVERY 6 HOURS PRN
Status: DISCONTINUED | OUTPATIENT
Start: 2023-10-30 | End: 2023-11-02

## 2023-10-30 RX ORDER — AMOXICILLIN 250 MG
2 CAPSULE ORAL 2 TIMES DAILY
Status: DISCONTINUED | OUTPATIENT
Start: 2023-10-30 | End: 2023-11-02

## 2023-10-30 RX ORDER — BACLOFEN 5 MG/1
10 TABLET ORAL 4 TIMES DAILY
Status: DISCONTINUED | OUTPATIENT
Start: 2023-10-30 | End: 2023-11-09 | Stop reason: HOSPADM

## 2023-10-30 RX ORDER — ONDANSETRON 2 MG/ML
4 INJECTION INTRAMUSCULAR; INTRAVENOUS EVERY 6 HOURS PRN
Status: DISCONTINUED | OUTPATIENT
Start: 2023-10-30 | End: 2023-11-09 | Stop reason: HOSPADM

## 2023-10-30 RX ORDER — ASPIRIN 325 MG
325 TABLET ORAL EVERY MORNING
Status: DISCONTINUED | OUTPATIENT
Start: 2023-10-31 | End: 2023-11-09 | Stop reason: HOSPADM

## 2023-10-30 RX ORDER — PIPERACILLIN SODIUM, TAZOBACTAM SODIUM 4; .5 G/20ML; G/20ML
4.5 INJECTION, POWDER, LYOPHILIZED, FOR SOLUTION INTRAVENOUS ONCE
Status: COMPLETED | OUTPATIENT
Start: 2023-10-30 | End: 2023-10-30

## 2023-10-30 RX ADMIN — OXYCODONE HYDROCHLORIDE 5 MG: 5 TABLET ORAL at 17:55

## 2023-10-30 RX ADMIN — PIPERACILLIN SODIUM AND TAZOBACTAM SODIUM 4.5 G: 4; .5 INJECTION, POWDER, LYOPHILIZED, FOR SOLUTION INTRAVENOUS at 18:36

## 2023-10-30 RX ADMIN — BACLOFEN 10 MG: 5 TABLET ORAL at 22:37

## 2023-10-30 RX ADMIN — TRAZODONE HYDROCHLORIDE 100 MG: 100 TABLET ORAL at 22:37

## 2023-10-30 RX ADMIN — VANCOMYCIN HYDROCHLORIDE 1250 MG: 10 INJECTION, POWDER, LYOPHILIZED, FOR SOLUTION INTRAVENOUS at 19:25

## 2023-10-30 RX ADMIN — HYDROMORPHONE HYDROCHLORIDE 4 MG: 2 TABLET ORAL at 21:27

## 2023-10-30 RX ADMIN — PREGABALIN 300 MG: 100 CAPSULE ORAL at 22:37

## 2023-10-30 RX ADMIN — ENOXAPARIN SODIUM 40 MG: 40 INJECTION SUBCUTANEOUS at 22:38

## 2023-10-30 ASSESSMENT — ACTIVITIES OF DAILY LIVING (ADL)
ADLS_ACUITY_SCORE: 35
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
## 2023-10-30 NOTE — ED TRIAGE NOTES
[facility]iba for R toe injury  Per pt, Toe slammed into a stone wall and part of the toe fell off  From group home     Triage Assessment (Adult)       Row Name 10/30/23 1608          Triage Assessment    Airway WDL WDL        Respiratory WDL    Respiratory WDL WDL        Skin Circulation/Temperature WDL    Skin Circulation/Temperature WDL WDL        Cardiac WDL    Cardiac WDL WDL        Peripheral/Neurovascular WDL    Peripheral Neurovascular WDL WDL        Cognitive/Neuro/Behavioral WDL    Cognitive/Neuro/Behavioral WDL WDL

## 2023-10-30 NOTE — LETTER
Transition Communication Hand-off for Care Transitions to Next Level of Care Provider    Name: Parth Fountain  : 1965  MRN #: [id number]  Primary Care Provider: GUS TAYLOR     Primary Clinic: [facility] [address] [address]     Reason for Hospitalization:  Toe infection [L08.9]  Admit Date/Time: 10/30/2023  5:20 PM  Discharge Date: 2023  Payor Source: Payor: MEDICARE / Plan: MEDICARE / Product Type: Medicare /     Reason for Communication Hand-off Referral: Other care coordination    Discharge Plan:       Concern for non-adherence with plan of care:   NO  Discharge Needs Assessment:  Needs      Flowsheet Row Most Recent Value   Equipment Currently Used at Home wheelchair, power   # of Referrals Placed by CM Transportation            Already enrolled in Tele-monitoring program and name of program:  NA  Follow-up specialty is recommended: Yes    Follow-up plan:    Future Appointments   Date Time Provider Department Center   2023 12:40 PM Suzan Martinez PA-C [facility]   2023 10:00 AM Mari Neri MD [facility]   2023  1:20 PM Suzan Martinez PA-C [facility]       Any outstanding tests or procedures:        Radiology & Cardiology Orders       Future Labs/Procedures Complete By Expires    US Renal Complete Non-Vascular  2023 (Approximate) 2024          Referrals       Future Labs/Procedures    Adult Infectious Disease  Referral     Comments:    Please be aware that coverage of these services is subject to the terms and limitations of your health insurance plan.  Call member services at your health plan with any benefit or coverage questions.   [employer] will call you to coordinate your care as prescribed by the provider.  If you don t hear from a representative within 2 business days, please call [phone].      Adult Urology  Referral     Process Instructions:    Consider Adult  E-Consult to Urology for the following conditions: asymptomatic hematuria, BPH, erectile dysfunction, recurrent UTI.   E-Consult Cost: </=$10.     Comments:    Please be aware that coverage of these services is subject to the terms and limitations of your health insurance plan.  Call member services at your health plan with any benefit or coverage questions.  [employer] will call you to coordinate care as prescribed your provider. If you don t hear from a representative within 2 business days, please call [phone].      Vascular Surgery Referral     Process Instructions:    Consider Peds E-Consult to Vascular Surgery for the following conditions: aortic disease, carotid artery disease, peripheral artery disease.  E-Consult Cost: </=$10.       Comments:    Please be aware that coverage of these services is subject to the terms and limitations of your health insurance plan.  Call member services at your health plan with any benefit or coverage questions.  Please call to schedule your appointment      Home Care Referral     Comments:    Resume Home care services with [facility]  Ph: [phone]              Key Recommendations:    Discharged to Group Home with [facility]  Rakel Dave RN    AVS/Discharge Summary is the source of truth; this is a helpful guide for improved communication of patient story

## 2023-10-31 LAB
ALBUMIN SERPL BCG-MCNC: 3.3 G/DL (ref 3.5–5.2)
ALP SERPL-CCNC: 61 U/L (ref 35–129)
ALT SERPL W P-5'-P-CCNC: 6 U/L (ref 0–70)
ANION GAP SERPL CALCULATED.3IONS-SCNC: 8 MMOL/L (ref 7–15)
AST SERPL W P-5'-P-CCNC: 17 U/L (ref 0–45)
BILIRUB SERPL-MCNC: 0.2 MG/DL
BUN SERPL-MCNC: 25.9 MG/DL (ref 8–23)
CALCIUM SERPL-MCNC: 9 MG/DL (ref 8.8–10.2)
CHLORIDE SERPL-SCNC: 102 MMOL/L (ref 98–107)
CREAT SERPL-MCNC: 0.72 MG/DL (ref 0.51–1.17)
CREAT SERPL-MCNC: 0.78 MG/DL (ref 0.51–1.17)
CRP SERPL-MCNC: 54.2 MG/L
DEPRECATED HCO3 PLAS-SCNC: 26 MMOL/L (ref 22–29)
EGFRCR SERPLBLD CKD-EPI 2021: >90 ML/MIN/1.73M2
EGFRCR SERPLBLD CKD-EPI 2021: >90 ML/MIN/1.73M2
ERYTHROCYTE [DISTWIDTH] IN BLOOD BY AUTOMATED COUNT: 14.1 % (ref 10–15)
ERYTHROCYTE [DISTWIDTH] IN BLOOD BY AUTOMATED COUNT: 14.1 % (ref 10–15)
ERYTHROCYTE [SEDIMENTATION RATE] IN BLOOD BY WESTERGREN METHOD: 43 MM/HR (ref 0–30)
GLUCOSE SERPL-MCNC: 85 MG/DL (ref 70–99)
HCT VFR BLD AUTO: 34.2 % (ref 35–53)
HCT VFR BLD AUTO: 37.3 % (ref 35–53)
HGB BLD-MCNC: 10.6 G/DL (ref 11.7–17.7)
HGB BLD-MCNC: 11.5 G/DL (ref 11.7–17.7)
MCH RBC QN AUTO: 28.7 PG (ref 26.5–33)
MCH RBC QN AUTO: 29.6 PG (ref 26.5–33)
MCHC RBC AUTO-ENTMCNC: 30.8 G/DL (ref 31.5–36.5)
MCHC RBC AUTO-ENTMCNC: 31 G/DL (ref 31.5–36.5)
MCV RBC AUTO: 93 FL (ref 78–100)
MCV RBC AUTO: 96 FL (ref 78–100)
PLATELET # BLD AUTO: 296 10E3/UL (ref 150–450)
PLATELET # BLD AUTO: 297 10E3/UL (ref 150–450)
POTASSIUM SERPL-SCNC: 3.7 MMOL/L (ref 3.4–5.3)
PROT SERPL-MCNC: 6.4 G/DL (ref 6.4–8.3)
RBC # BLD AUTO: 3.69 10E6/UL (ref 3.8–5.9)
RBC # BLD AUTO: 3.88 10E6/UL (ref 3.8–5.9)
SODIUM SERPL-SCNC: 136 MMOL/L (ref 135–145)
WBC # BLD AUTO: 8 10E3/UL (ref 4–11)
WBC # BLD AUTO: 8.6 10E3/UL (ref 4–11)

## 2023-10-31 PROCEDURE — 250N000011 HC RX IP 250 OP 636: Mod: JZ | Performed by: EMERGENCY MEDICINE

## 2023-10-31 PROCEDURE — 85027 COMPLETE CBC AUTOMATED: CPT | Performed by: PHYSICIAN ASSISTANT

## 2023-10-31 PROCEDURE — 250N000013 HC RX MED GY IP 250 OP 250 PS 637: Performed by: PHYSICIAN ASSISTANT

## 2023-10-31 PROCEDURE — 250N000011 HC RX IP 250 OP 636: Mod: JZ | Performed by: PHYSICIAN ASSISTANT

## 2023-10-31 PROCEDURE — 36415 COLL VENOUS BLD VENIPUNCTURE: CPT

## 2023-10-31 PROCEDURE — 999N000248 HC STATISTIC IV INSERT WITH US BY RN

## 2023-10-31 PROCEDURE — 250N000011 HC RX IP 250 OP 636: Mod: JZ | Performed by: STUDENT IN AN ORGANIZED HEALTH CARE EDUCATION/TRAINING PROGRAM

## 2023-10-31 PROCEDURE — 120N000002 HC R&B MED SURG/OB UMMC

## 2023-10-31 PROCEDURE — 85014 HEMATOCRIT: CPT | Performed by: INTERNAL MEDICINE

## 2023-10-31 PROCEDURE — G0463 HOSPITAL OUTPT CLINIC VISIT: HCPCS

## 2023-10-31 PROCEDURE — 36415 COLL VENOUS BLD VENIPUNCTURE: CPT | Performed by: PHYSICIAN ASSISTANT

## 2023-10-31 PROCEDURE — 80053 COMPREHEN METABOLIC PANEL: CPT | Performed by: PHYSICIAN ASSISTANT

## 2023-10-31 PROCEDURE — 86140 C-REACTIVE PROTEIN: CPT

## 2023-10-31 PROCEDURE — 258N000003 HC RX IP 258 OP 636

## 2023-10-31 PROCEDURE — 99232 SBSQ HOSP IP/OBS MODERATE 35: CPT | Performed by: INTERNAL MEDICINE

## 2023-10-31 PROCEDURE — 85652 RBC SED RATE AUTOMATED: CPT

## 2023-10-31 RX ORDER — NALOXONE HYDROCHLORIDE 0.4 MG/ML
0.2 INJECTION, SOLUTION INTRAMUSCULAR; INTRAVENOUS; SUBCUTANEOUS
Status: DISCONTINUED | OUTPATIENT
Start: 2023-10-31 | End: 2023-11-09 | Stop reason: HOSPADM

## 2023-10-31 RX ORDER — NALOXONE HYDROCHLORIDE 0.4 MG/ML
0.4 INJECTION, SOLUTION INTRAMUSCULAR; INTRAVENOUS; SUBCUTANEOUS
Status: DISCONTINUED | OUTPATIENT
Start: 2023-10-31 | End: 2023-11-09 | Stop reason: HOSPADM

## 2023-10-31 RX ORDER — PREGABALIN 300 MG/1
300 CAPSULE ORAL 2 TIMES DAILY
COMMUNITY
End: 2024-01-02

## 2023-10-31 RX ORDER — VARENICLINE TARTRATE 1 MG/1
1 TABLET, FILM COATED ORAL 2 TIMES DAILY
Status: ON HOLD | COMMUNITY
End: 2023-11-09

## 2023-10-31 RX ORDER — SODIUM CHLORIDE 9 MG/ML
INJECTION, SOLUTION INTRAVENOUS
Status: COMPLETED
Start: 2023-10-31 | End: 2023-10-31

## 2023-10-31 RX ADMIN — ENOXAPARIN SODIUM 40 MG: 40 INJECTION SUBCUTANEOUS at 21:47

## 2023-10-31 RX ADMIN — SENNOSIDES AND DOCUSATE SODIUM 2 TABLET: 50; 8.6 TABLET ORAL at 08:48

## 2023-10-31 RX ADMIN — HYDROMORPHONE HYDROCHLORIDE 4 MG: 2 TABLET ORAL at 00:33

## 2023-10-31 RX ADMIN — BACLOFEN 10 MG: 5 TABLET ORAL at 15:49

## 2023-10-31 RX ADMIN — HYDROMORPHONE HYDROCHLORIDE 4 MG: 2 TABLET ORAL at 05:50

## 2023-10-31 RX ADMIN — BACLOFEN 10 MG: 5 TABLET ORAL at 08:47

## 2023-10-31 RX ADMIN — Medication 1 TABLET: at 08:47

## 2023-10-31 RX ADMIN — PIPERACILLIN AND TAZOBACTAM 3.38 G: 3; .375 INJECTION, POWDER, LYOPHILIZED, FOR SOLUTION INTRAVENOUS at 12:44

## 2023-10-31 RX ADMIN — BACLOFEN 10 MG: 5 TABLET ORAL at 20:46

## 2023-10-31 RX ADMIN — TRAZODONE HYDROCHLORIDE 100 MG: 100 TABLET ORAL at 21:48

## 2023-10-31 RX ADMIN — HYDROMORPHONE HYDROCHLORIDE 4 MG: 2 TABLET ORAL at 17:11

## 2023-10-31 RX ADMIN — ACETAMINOPHEN 1000 MG: 500 TABLET ORAL at 22:02

## 2023-10-31 RX ADMIN — HYDROMORPHONE HYDROCHLORIDE 4 MG: 2 TABLET ORAL at 09:43

## 2023-10-31 RX ADMIN — DULOXETINE HYDROCHLORIDE 60 MG: 60 CAPSULE, DELAYED RELEASE ORAL at 08:47

## 2023-10-31 RX ADMIN — PIPERACILLIN AND TAZOBACTAM 3.38 G: 3; .375 INJECTION, POWDER, LYOPHILIZED, FOR SOLUTION INTRAVENOUS at 20:45

## 2023-10-31 RX ADMIN — VANCOMYCIN HYDROCHLORIDE 1000 MG: 1 INJECTION, SOLUTION INTRAVENOUS at 08:54

## 2023-10-31 RX ADMIN — HYDROMORPHONE HYDROCHLORIDE 4 MG: 2 TABLET ORAL at 13:23

## 2023-10-31 RX ADMIN — BACLOFEN 10 MG: 5 TABLET ORAL at 12:44

## 2023-10-31 RX ADMIN — SENNOSIDES AND DOCUSATE SODIUM 2 TABLET: 50; 8.6 TABLET ORAL at 20:47

## 2023-10-31 RX ADMIN — PREGABALIN 300 MG: 100 CAPSULE ORAL at 08:48

## 2023-10-31 RX ADMIN — ASPIRIN 325 MG ORAL TABLET 325 MG: 325 PILL ORAL at 08:47

## 2023-10-31 RX ADMIN — HYDROMORPHONE HYDROCHLORIDE 4 MG: 2 TABLET ORAL at 21:43

## 2023-10-31 RX ADMIN — VANCOMYCIN HYDROCHLORIDE 1000 MG: 1 INJECTION, SOLUTION INTRAVENOUS at 22:05

## 2023-10-31 RX ADMIN — PREGABALIN 300 MG: 100 CAPSULE ORAL at 20:46

## 2023-10-31 RX ADMIN — PIPERACILLIN AND TAZOBACTAM 3.38 G: 3; .375 INJECTION, POWDER, LYOPHILIZED, FOR SOLUTION INTRAVENOUS at 00:06

## 2023-10-31 RX ADMIN — PIPERACILLIN AND TAZOBACTAM 3.38 G: 3; .375 INJECTION, POWDER, LYOPHILIZED, FOR SOLUTION INTRAVENOUS at 05:50

## 2023-10-31 RX ADMIN — OXYCODONE HYDROCHLORIDE AND ACETAMINOPHEN 500 MG: 500 TABLET ORAL at 08:47

## 2023-10-31 RX ADMIN — SODIUM CHLORIDE 500 ML: 9 INJECTION, SOLUTION INTRAVENOUS at 20:47

## 2023-10-31 RX ADMIN — ACETAMINOPHEN 1000 MG: 500 TABLET ORAL at 13:23

## 2023-10-31 ASSESSMENT — ACTIVITIES OF DAILY LIVING (ADL)
ADLS_ACUITY_SCORE: 35
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
## 2023-10-31 NOTE — PHARMACY-VANCOMYCIN DOSING SERVICE
Pharmacy Vancomycin Initial Note  Date of Service 2023  Patient's  1965  58 year old, adult    Indication: Skin and Soft Tissue Infection    Current estimated CrCl = CrCl cannot be calculated (Unknown ideal weight.).    Creatinine for last 3 days  10/30/2023:  6:04 PM Creatinine 0.81 mg/dL    Recent Vancomycin Level(s) for last 3 days  No results found for requested labs within last 3 days.      Vancomycin IV Administrations (past 72 hours)        No vancomycin orders with administrations in past 72 hours.                    Nephrotoxins and other renal medications (From now, onward)      Start     Dose/Rate Route Frequency Ordered Stop    10/31/23 0730  vancomycin (VANCOCIN) 1,000 mg in 200 mL dextrose intermittent infusion         1,000 mg  200 mL/hr over 1 Hours Intravenous EVERY 12 HOURS 10/30/23 1915      10/30/23 1830  vancomycin (VANCOCIN) 1,250 mg in 0.9% NaCl 250 mL intermittent infusion         1,250 mg  over 90 Minutes Intravenous ONCE 10/30/23 1752              Contrast Orders - past 72 hours (72h ago, onward)      None            [employer] Prediction of Planned Initial Vancomycin Regimen  Loading dose: 1250 mg at 19:30 10/30/2023.  Regimen: 1000 mg IV every 12 hours.  Start time: 19:14 on 10/30/2023  Exposure target: AUC24 (range)400-600 mg/L.hr   AUC24,ss: 537 mg/L.hr  Probability of AUC24 > 400: 79 %  Ctrough,ss: 16.6 mg/L  Probability of Ctrough,ss > 20: 35 %  Probability of nephrotoxicity (Lodise GIBSON ): 12 %          Plan:  Give vancomycin 1250mg IV x1 now, then start vancomycin 1000mg IV q12 hours  Vancomycin monitoring method: AUC  Vancomycin therapeutic monitoring goal: 400-600 mg*h/L  Pharmacy will check vancomycin levels as appropriate in 1-3 Days.    Serum creatinine levels will be ordered daily for the first week of therapy and at least twice weekly for subsequent weeks.      Urbano Leon, PharmD, BCPS
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
## 2023-10-31 NOTE — MEDICATION SCRIBE - ADMISSION MEDICATION HISTORY
Medication Scribe Admission Medication History    Admission medication history is complete. The information provided in this note is only as accurate as the sources available at the time of the update.    Information Source(s): Patient via in-person    Pertinent Information:   Patient has these medications deleted from his PTA list because they are duplicate prescriptions: acetaminophen 500 mg tablet, Bismatrol 262 MG/15 ML suspension, calcium carbonate 500 mg (elemental) 1250 (500 Ca) MG tablet chewable, Cholecalciferol 20 MCG (800 Unit) tablets, duloxetine 60 MG capsule, Lyrica 150 MG capsule, Eucerin cream.    Changes made to PTA medication list:  Added: varenicline 1 MG tablet, Mineral oil-white petrolatum cream,  Deleted: acetaminophen 500 mg tablet, Bismatrol 262 MG/15 ML suspension, calcium carbonate 500 mg (elemental) 1250 (500 Ca) MG tablet chewable, Cholecalciferol 20 MCG (800 Unit) tablets, duloxetine 60 MG capsule, Lyrica 150 MG capsule,Eucerin cream   Changed: from pregabalin 300 mg  capsule to pregabalin 300 mg by mouth 2 times daily,  from duloxetine 60 MG capsule to duloxetine 60 mg capsule by mouth every morning  Medication Affordability:  Not including over the counter (OTC) medications, was there a time in the past 3 months when you did not take your medications as prescribed because of cost?: No    Allergies reviewed with patient and updates made in EHR: yes    Medication History Completed By: Anusha Mcfarlane 10/31/2023 6:27 PM    PTA Med List   Medication Sig Last Dose    ACETAMINOPHEN EXTRA STRENGTH 500 MG tablet 500-1,000 mg every 6 hours as needed for mild pain or fever  Unknown at as needed    aspirin 325 MG tablet Take 325 mg by mouth every morning  10/30/2023 at am    augmented betamethasone dipropionate (DIPROLENE-AF) 0.05 % external ointment Apply topically 2 times daily Please apply as directed by Wound Clinic 10/29/2023 at unknown    baclofen (LIORESAL) 10 MG tablet Take 10 mg by  mouth 4 times daily  10/30/2023 at noon    Bismuth Subsalicylate 525 MG/15ML SUSP Take 30 mLs by mouth daily as needed  10/29/2023 at am    calcium carbonate 500 mg, elemental, 1250 (500 Ca) MG tablet chewable Take 1 tablet by mouth 10/29/2023 at am    DULoxetine (CYMBALTA) 60 MG capsule Take 60 mg by mouth 2 times daily 10/29/2023 at am/pm    ferrous sulfate (FEROSUL) 325 (65 Fe) MG tablet Take 1 tablet (325 mg) by mouth 2 times daily (with meals) Take with vitamin C source. 10/29/2023 at am/Ev    loperamide (IMODIUM) 2 MG capsule Take 4 mg by mouth At Bedtime  10/29/2023 at bedtime    LYRICA 150 MG capsule Take 300 mg by mouth 2 times daily 10/29/2023 at am/pm    multivitamin w/minerals (THERA-VIT-M) tablet Take 1 tablet by mouth every morning 10/29/2023 at am    pregabalin (LYRICA) 300 MG capsule Take 300 mg by mouth 2 times daily 10/29/2023 at am/pm    TRAZodone (DESYREL) 100 MG tablet Take 100 mg by mouth At Bedtime.     varenicline (CHANTIX) 1 MG tablet Take 1 mg by mouth 2 times daily 10/29/2023 at am/pm    vitamin C (ASCORBIC ACID) 500 MG tablet  10/30/2023 at noon    WHITE PETROLATUM-MINERAL OIL OP Apply 0.5 inches topically as needed (Apply 0.5 inches topically as needed for dry skin) Unknown at as needed
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
## 2023-11-01 ENCOUNTER — APPOINTMENT (OUTPATIENT)
Dept: ULTRASOUND IMAGING | Facility: CLINIC | Age: 58
DRG: 539 | End: 2023-11-01
Attending: INTERNAL MEDICINE
Payer: MEDICARE

## 2023-11-01 LAB
ANION GAP SERPL CALCULATED.3IONS-SCNC: 7 MMOL/L (ref 7–15)
BUN SERPL-MCNC: 19.3 MG/DL (ref 8–23)
CALCIUM SERPL-MCNC: 8.8 MG/DL (ref 8.8–10.2)
CHLORIDE SERPL-SCNC: 105 MMOL/L (ref 98–107)
CREAT SERPL-MCNC: 0.58 MG/DL (ref 0.51–1.17)
CRP SERPL-MCNC: 57.3 MG/L
DEPRECATED HCO3 PLAS-SCNC: 27 MMOL/L (ref 22–29)
EGFRCR SERPLBLD CKD-EPI 2021: >90 ML/MIN/1.73M2
ERYTHROCYTE [DISTWIDTH] IN BLOOD BY AUTOMATED COUNT: 14 % (ref 10–15)
GLUCOSE SERPL-MCNC: 90 MG/DL (ref 70–99)
HCT VFR BLD AUTO: 36.2 % (ref 35–53)
HGB BLD-MCNC: 11.4 G/DL (ref 11.7–17.7)
MCH RBC QN AUTO: 29.3 PG (ref 26.5–33)
MCHC RBC AUTO-ENTMCNC: 31.5 G/DL (ref 31.5–36.5)
MCV RBC AUTO: 93 FL (ref 78–100)
MRSA DNA SPEC QL NAA+PROBE: NEGATIVE
PLATELET # BLD AUTO: 300 10E3/UL (ref 150–450)
POTASSIUM SERPL-SCNC: 4.1 MMOL/L (ref 3.4–5.3)
PROCALCITONIN SERPL IA-MCNC: 0.04 NG/ML
RBC # BLD AUTO: 3.89 10E6/UL (ref 3.8–5.9)
SA TARGET DNA: NEGATIVE
SODIUM SERPL-SCNC: 139 MMOL/L (ref 135–145)
VANCOMYCIN SERPL-MCNC: 18.9 UG/ML
WBC # BLD AUTO: 8.6 10E3/UL (ref 4–11)

## 2023-11-01 PROCEDURE — 99223 1ST HOSP IP/OBS HIGH 75: CPT | Mod: GC | Performed by: INTERNAL MEDICINE

## 2023-11-01 PROCEDURE — 36415 COLL VENOUS BLD VENIPUNCTURE: CPT | Performed by: PHYSICIAN ASSISTANT

## 2023-11-01 PROCEDURE — 93922 UPR/L XTREMITY ART 2 LEVELS: CPT

## 2023-11-01 PROCEDURE — 99233 SBSQ HOSP IP/OBS HIGH 50: CPT | Performed by: STUDENT IN AN ORGANIZED HEALTH CARE EDUCATION/TRAINING PROGRAM

## 2023-11-01 PROCEDURE — 120N000002 HC R&B MED SURG/OB UMMC

## 2023-11-01 PROCEDURE — 250N000011 HC RX IP 250 OP 636: Mod: JZ | Performed by: PHYSICIAN ASSISTANT

## 2023-11-01 PROCEDURE — 250N000011 HC RX IP 250 OP 636: Mod: JZ | Performed by: STUDENT IN AN ORGANIZED HEALTH CARE EDUCATION/TRAINING PROGRAM

## 2023-11-01 PROCEDURE — 93922 UPR/L XTREMITY ART 2 LEVELS: CPT | Mod: 26 | Performed by: RADIOLOGY

## 2023-11-01 PROCEDURE — 86140 C-REACTIVE PROTEIN: CPT | Performed by: INTERNAL MEDICINE

## 2023-11-01 PROCEDURE — 84145 PROCALCITONIN (PCT): CPT | Performed by: INTERNAL MEDICINE

## 2023-11-01 PROCEDURE — 80048 BASIC METABOLIC PNL TOTAL CA: CPT | Performed by: INTERNAL MEDICINE

## 2023-11-01 PROCEDURE — 80202 ASSAY OF VANCOMYCIN: CPT | Performed by: STUDENT IN AN ORGANIZED HEALTH CARE EDUCATION/TRAINING PROGRAM

## 2023-11-01 PROCEDURE — 250N000011 HC RX IP 250 OP 636: Mod: JZ | Performed by: EMERGENCY MEDICINE

## 2023-11-01 PROCEDURE — 85027 COMPLETE CBC AUTOMATED: CPT | Performed by: PHYSICIAN ASSISTANT

## 2023-11-01 PROCEDURE — 250N000013 HC RX MED GY IP 250 OP 250 PS 637: Performed by: PHYSICIAN ASSISTANT

## 2023-11-01 PROCEDURE — 87641 MR-STAPH DNA AMP PROBE: CPT | Performed by: STUDENT IN AN ORGANIZED HEALTH CARE EDUCATION/TRAINING PROGRAM

## 2023-11-01 RX ADMIN — PREGABALIN 300 MG: 100 CAPSULE ORAL at 21:18

## 2023-11-01 RX ADMIN — HYDROMORPHONE HYDROCHLORIDE 4 MG: 2 TABLET ORAL at 22:50

## 2023-11-01 RX ADMIN — Medication 1 TABLET: at 08:31

## 2023-11-01 RX ADMIN — SENNOSIDES AND DOCUSATE SODIUM 2 TABLET: 50; 8.6 TABLET ORAL at 08:33

## 2023-11-01 RX ADMIN — PREGABALIN 300 MG: 100 CAPSULE ORAL at 08:31

## 2023-11-01 RX ADMIN — BACLOFEN 10 MG: 5 TABLET ORAL at 08:32

## 2023-11-01 RX ADMIN — BACLOFEN 10 MG: 5 TABLET ORAL at 21:18

## 2023-11-01 RX ADMIN — HYDROMORPHONE HYDROCHLORIDE 4 MG: 2 TABLET ORAL at 14:38

## 2023-11-01 RX ADMIN — HYDROMORPHONE HYDROCHLORIDE 4 MG: 2 TABLET ORAL at 10:55

## 2023-11-01 RX ADMIN — BACLOFEN 10 MG: 5 TABLET ORAL at 13:09

## 2023-11-01 RX ADMIN — DOCUSATE SODIUM 50 MG AND SENNOSIDES 8.6 MG 1 TABLET: 8.6; 5 TABLET, FILM COATED ORAL at 21:18

## 2023-11-01 RX ADMIN — ACETAMINOPHEN 1000 MG: 500 TABLET ORAL at 18:04

## 2023-11-01 RX ADMIN — PIPERACILLIN AND TAZOBACTAM 3.38 G: 3; .375 INJECTION, POWDER, LYOPHILIZED, FOR SOLUTION INTRAVENOUS at 18:05

## 2023-11-01 RX ADMIN — PIPERACILLIN AND TAZOBACTAM 3.38 G: 3; .375 INJECTION, POWDER, LYOPHILIZED, FOR SOLUTION INTRAVENOUS at 02:47

## 2023-11-01 RX ADMIN — BACLOFEN 10 MG: 5 TABLET ORAL at 17:30

## 2023-11-01 RX ADMIN — OXYCODONE HYDROCHLORIDE AND ACETAMINOPHEN 500 MG: 500 TABLET ORAL at 08:34

## 2023-11-01 RX ADMIN — VANCOMYCIN HYDROCHLORIDE 1000 MG: 1 INJECTION, SOLUTION INTRAVENOUS at 09:08

## 2023-11-01 RX ADMIN — DULOXETINE HYDROCHLORIDE 60 MG: 60 CAPSULE, DELAYED RELEASE ORAL at 08:32

## 2023-11-01 RX ADMIN — HYDROMORPHONE HYDROCHLORIDE 4 MG: 2 TABLET ORAL at 18:03

## 2023-11-01 RX ADMIN — PIPERACILLIN AND TAZOBACTAM 3.38 G: 3; .375 INJECTION, POWDER, LYOPHILIZED, FOR SOLUTION INTRAVENOUS at 13:09

## 2023-11-01 RX ADMIN — TRAZODONE HYDROCHLORIDE 100 MG: 100 TABLET ORAL at 21:19

## 2023-11-01 RX ADMIN — PIPERACILLIN AND TAZOBACTAM 3.38 G: 3; .375 INJECTION, POWDER, LYOPHILIZED, FOR SOLUTION INTRAVENOUS at 08:23

## 2023-11-01 RX ADMIN — VANCOMYCIN HYDROCHLORIDE 1000 MG: 1 INJECTION, SOLUTION INTRAVENOUS at 21:18

## 2023-11-01 RX ADMIN — ACETAMINOPHEN 1000 MG: 500 TABLET ORAL at 10:55

## 2023-11-01 RX ADMIN — ASPIRIN 325 MG ORAL TABLET 325 MG: 325 PILL ORAL at 08:34

## 2023-11-01 RX ADMIN — ENOXAPARIN SODIUM 40 MG: 40 INJECTION SUBCUTANEOUS at 21:18

## 2023-11-01 ASSESSMENT — ACTIVITIES OF DAILY LIVING (ADL)
ADLS_ACUITY_SCORE: 32
ADLS_ACUITY_SCORE: 32
ADLS_ACUITY_SCORE: 42
ADLS_ACUITY_SCORE: 32
DEPENDENT_IADLS:: CLEANING;COOKING;LAUNDRY;SHOPPING;MEAL PREPARATION;MEDICATION MANAGEMENT;TRANSPORTATION
ADLS_ACUITY_SCORE: 42
ADLS_ACUITY_SCORE: 32
ADLS_ACUITY_SCORE: 32

## 2023-11-01 NOTE — PHARMACY-VANCOMYCIN DOSING SERVICE
"Pharmacy Vancomycin Note  Date of Service 2023  Patient's  1965   58 year old, adult    Indication: Skin and Soft Tissue Infection  Day of Therapy:  therapy started 10/30 (day 3)  Current vancomycin regimen:  1000 mg IV q12h  Current vancomycin monitoring method: AUC  Current vancomycin therapeutic monitoring goal: 400-600 mg*h/L    [employer]ight[employer] Prediction of Current Vancomycin Regimen  Regimen: 1000 mg IV every 12 hours.  Start time: 21:08 on 2023  Exposure target: AUC24 (range)400-600 mg/L.hr   AUC24,ss: 491 mg/L.hr  Probability of AUC24 > 400: 86 %  Ctrough,ss: 14.4 mg/L  Probability of Ctrough,ss > 20: 11 %  Probability of nephrotoxicity (Lodise GIBOSN ): 10 %      Current estimated CrCl = CrCl cannot be calculated (Unknown ideal weight.).    Creatinine for last 3 days  10/30/2023:  6:04 PM Creatinine 0.81 mg/dL; 11:37 PM Creatinine 0.78 mg/dL  10/31/2023:  6:10 AM Creatinine 0.72 mg/dL  2023:  5:58 AM Creatinine 0.58 mg/dL    Recent Vancomycin Levels (past 3 days)  2023:  5:58 AM Vancomycin 18.9 ug/mL    Vancomycin IV Administrations (past 72 hours)                     vancomycin (VANCOCIN) 1,000 mg in 200 mL dextrose intermittent infusion (mg) 1,000 mg New Bag 23 0908     1,000 mg New Bag 10/31/23 2205     1,000 mg New Bag  0854    vancomycin (VANCOCIN) 1,250 mg in 0.9% NaCl 250 mL intermittent infusion (mg) 1,250 mg New Bag 10/30/23 1925                 Nephrotoxins and other renal medications (From now, onward)      Start     Dose/Rate Route Frequency Ordered Stop    10/31/23 0730  vancomycin (VANCOCIN) 1,000 mg in 200 mL dextrose intermittent infusion         1,000 mg  200 mL/hr over 1 Hours Intravenous EVERY 12 HOURS 10/30/23 1915      10/31/23 0030  piperacillin-tazobactam (ZOSYN) 3.375 g vial to attach to  mL bag        Note to Pharmacy: For [facility], [facility] and [facility]: For Zosyn-naive patients, use the \"Zosyn initial dose + extended infusion\" order panel.    3.375 " g  over 30 Minutes Intravenous EVERY 6 HOURS 10/30/23 1392                 Interpretation of levels and current regimen:  Vancomycin level is reflective of -600    Has serum creatinine changed greater than 50% in last 72 hours: Yes    Urine output:  unable to determine    Renal Function: Stable    Plan:  Continue Current Dose  Vancomycin monitoring method: AUC  Vancomycin therapeutic monitoring goal: 400-600 mg*h/L  Pharmacy will check vancomycin levels as appropriate in 1-3 Days.  Serum creatinine levels will be ordered daily for the first week of therapy and at least twice weekly for subsequent weeks.    Josee Acuna, [facility]

## 2023-11-01 NOTE — DISCHARGE INSTRUCTIONS
You were hospitalized with osteomyelitis and gangrene. You were stabilized on antibiotics while admitted and are being discharged with plan for vascular, infectious disease and primary care follow up.      After discharge when should you call for help?   Call 911 anytime you think you may need emergency care. For example, call if:    You have severe bone pain.   Call your doctor now or seek immediate medical care if:    You continue to have bone pain.     You have worsening signs of infection, such as:  Increased pain, swelling, warmth, or redness.  Red streaks leading from a wound.  Pus draining from a wound.  A fever.   Watch closely for changes in your health, and be sure to contact your doctor if:    You do not get better as expected.         [facility]     Name: Parth Fountain  Date: 10/31/23    Verbal Order for ostomy supplies for 1 Month per: Diya Enriquez, RN, CWOCN                                                                                                     Authorizing MD: Neeraj Seymour    Continue current pouches, add:  Request the following supplies:      Accessories  2  barrier ring #[id number]     Torbot ostobond cement            Change your pouch three times a week, more often if leaking.      . Call the Ostomy Nurse at [facility] to make follow up appointment as needed       [address] [facility] [place] : [phone]   Or    [facility] department  [address] [place] [address]   number- [phone]

## 2023-11-01 NOTE — ADDENDUM NOTE
Encounter addended by: Suzan Martinez PA-C on: 11/1/2023 1:23 PM   Actions taken: Clinical Note Signed

## 2023-11-01 NOTE — PLAN OF CARE
"BP 92/44 (BP Location: Right arm)   Pulse 74   Temp 98.8  F (37.1  C) (Oral)   Resp 18   Ht 1.676 m (5' 5.98\")   SpO2 98%   BMI 19.58 kg/m      Patient alert and oriented. Slept most of this shift. Pain managed with PRN Dilaudid with effective results. 2 RN assessment completed. Significant skin findings include scatted bruising all over patient body, Significant wounds on first, second and fifth digits. Right  great toe and distal toes  are dark in color. Currently wrapped with ace bandage. Pressure ulcer to coccyx. Wound consult ordered. Ostomy with adequate output. Urostomy draining to a Cueto bag with adequate output. Remains on IV ABX with no adverse reaction noted at this time. Resting in bed with no acute distress noted. Utilizing the call light appropriately without concern. Continue with current plan of care.        "
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
## 2023-11-01 NOTE — PROGRESS NOTES
Brief Medicine Cross-Cover Note:    Patient arrived to [facility] from [place] with stable vitals and no new clinical concerns.   - Planning for US JOVAN doppler tomorrow, please consult vascular surgery in the morning.   - Podiatry and ID consulted   - WOC consult pending    - Please continue doppler pulses qshift    Rest of cares and plan as outlined in progress note from today.    Sheldon Miller PA-C on 10/31/2023 at 9:24 PM

## 2023-11-01 NOTE — CONSULTS
WOC asked to assess RLE, colostomy and urostomy. Pt transferred from [facility] ED.     WOC saw yesterday 10/31 (see note by Lyly Enriquez CWOCN) for assessment and plan of care which remains in place.     WOC will reassess weekly.     Evelyn So RN BSN CWOCN
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
## 2023-11-01 NOTE — PROGRESS NOTES
"  VS: /84 (BP Location: Right arm)   Pulse 84   Temp 98.4  F (36.9  C) (Oral)   Resp 20   Ht 1.676 m (5' 5.98\")   Wt 48 kg (105 lb 13.1 oz)   SpO2 98%   BMI 17.09 kg/m      O2: Stable on RA, no complaints of SOB or chest pain.   Output: Patient has Cueto, Urostomy & Colostomy in place. Colostomy bag due to be changed tomorrow per order.   Last BM: 11/1/23   Activity: Wheelchair bound. Can turn independently in bed.     Skin: Great toe & ankle wounds. Sacral wound & redness to R groin.   Pain: PRN Dilaudid & Tylenol   CMS: A&Ox4   Dressing: R foot wrapped with Kerlix. Xeroform gauze placed. See Wound Care order.   Diet: Regular. No complaints of nausea or vomiting.   LDA: R Hand PIV, SL   Equipment: IV Personal & personal items   Plan: Continue POC   Additional Info: Doppler pulse used for RLE. Weight done this shift. Wound care done this shift per order.       "
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
## 2023-11-02 ENCOUNTER — APPOINTMENT (OUTPATIENT)
Dept: CT IMAGING | Facility: CLINIC | Age: 58
DRG: 539 | End: 2023-11-02
Attending: STUDENT IN AN ORGANIZED HEALTH CARE EDUCATION/TRAINING PROGRAM
Payer: MEDICARE

## 2023-11-02 LAB
CREAT SERPL-MCNC: 0.63 MG/DL (ref 0.51–1.17)
EGFRCR SERPLBLD CKD-EPI 2021: >90 ML/MIN/1.73M2
ERYTHROCYTE [DISTWIDTH] IN BLOOD BY AUTOMATED COUNT: 13.9 % (ref 10–15)
HBA1C MFR BLD: 4.9 %
HCT VFR BLD AUTO: 37.3 % (ref 35–53)
HGB BLD-MCNC: 11.6 G/DL (ref 11.7–17.7)
MCH RBC QN AUTO: 28.9 PG (ref 26.5–33)
MCHC RBC AUTO-ENTMCNC: 31.1 G/DL (ref 31.5–36.5)
MCV RBC AUTO: 93 FL (ref 78–100)
PLATELET # BLD AUTO: 317 10E3/UL (ref 150–450)
RBC # BLD AUTO: 4.02 10E6/UL (ref 3.8–5.9)
WBC # BLD AUTO: 8.5 10E3/UL (ref 4–11)

## 2023-11-02 PROCEDURE — 250N000011 HC RX IP 250 OP 636: Mod: JZ | Performed by: PHYSICIAN ASSISTANT

## 2023-11-02 PROCEDURE — G1010 CDSM STANSON: HCPCS | Mod: GC | Performed by: STUDENT IN AN ORGANIZED HEALTH CARE EDUCATION/TRAINING PROGRAM

## 2023-11-02 PROCEDURE — 258N000003 HC RX IP 258 OP 636

## 2023-11-02 PROCEDURE — 250N000011 HC RX IP 250 OP 636: Mod: JZ | Performed by: STUDENT IN AN ORGANIZED HEALTH CARE EDUCATION/TRAINING PROGRAM

## 2023-11-02 PROCEDURE — G0463 HOSPITAL OUTPT CLINIC VISIT: HCPCS

## 2023-11-02 PROCEDURE — 999N000040 HC STATISTIC CONSULT NO CHARGE VASC ACCESS

## 2023-11-02 PROCEDURE — 82565 ASSAY OF CREATININE: CPT | Performed by: STUDENT IN AN ORGANIZED HEALTH CARE EDUCATION/TRAINING PROGRAM

## 2023-11-02 PROCEDURE — 250N000011 HC RX IP 250 OP 636: Mod: JZ | Performed by: EMERGENCY MEDICINE

## 2023-11-02 PROCEDURE — 36415 COLL VENOUS BLD VENIPUNCTURE: CPT | Performed by: STUDENT IN AN ORGANIZED HEALTH CARE EDUCATION/TRAINING PROGRAM

## 2023-11-02 PROCEDURE — 120N000002 HC R&B MED SURG/OB UMMC

## 2023-11-02 PROCEDURE — 999N000127 HC STATISTIC PERIPHERAL IV START W US GUIDANCE

## 2023-11-02 PROCEDURE — 86140 C-REACTIVE PROTEIN: CPT | Performed by: STUDENT IN AN ORGANIZED HEALTH CARE EDUCATION/TRAINING PROGRAM

## 2023-11-02 PROCEDURE — 999N000147 HC STATISTIC PT IP EVAL DEFER

## 2023-11-02 PROCEDURE — 99221 1ST HOSP IP/OBS SF/LOW 40: CPT | Performed by: PODIATRIST

## 2023-11-02 PROCEDURE — 250N000013 HC RX MED GY IP 250 OP 250 PS 637: Performed by: STUDENT IN AN ORGANIZED HEALTH CARE EDUCATION/TRAINING PROGRAM

## 2023-11-02 PROCEDURE — 99233 SBSQ HOSP IP/OBS HIGH 50: CPT | Mod: GC | Performed by: INTERNAL MEDICINE

## 2023-11-02 PROCEDURE — G1010 CDSM STANSON: HCPCS

## 2023-11-02 PROCEDURE — 250N000013 HC RX MED GY IP 250 OP 250 PS 637: Performed by: PHYSICIAN ASSISTANT

## 2023-11-02 PROCEDURE — 250N000009 HC RX 250: Performed by: FAMILY MEDICINE

## 2023-11-02 PROCEDURE — 75635 CT ANGIO ABDOMINAL ARTERIES: CPT | Mod: 26 | Performed by: STUDENT IN AN ORGANIZED HEALTH CARE EDUCATION/TRAINING PROGRAM

## 2023-11-02 PROCEDURE — 85014 HEMATOCRIT: CPT | Performed by: STUDENT IN AN ORGANIZED HEALTH CARE EDUCATION/TRAINING PROGRAM

## 2023-11-02 PROCEDURE — 250N000013 HC RX MED GY IP 250 OP 250 PS 637: Performed by: PEDIATRICS

## 2023-11-02 PROCEDURE — 71275 CT ANGIOGRAPHY CHEST: CPT | Mod: 26 | Performed by: STUDENT IN AN ORGANIZED HEALTH CARE EDUCATION/TRAINING PROGRAM

## 2023-11-02 PROCEDURE — 83036 HEMOGLOBIN GLYCOSYLATED A1C: CPT | Performed by: STUDENT IN AN ORGANIZED HEALTH CARE EDUCATION/TRAINING PROGRAM

## 2023-11-02 PROCEDURE — 250N000011 HC RX IP 250 OP 636: Mod: JZ | Performed by: FAMILY MEDICINE

## 2023-11-02 PROCEDURE — 99233 SBSQ HOSP IP/OBS HIGH 50: CPT | Performed by: STUDENT IN AN ORGANIZED HEALTH CARE EDUCATION/TRAINING PROGRAM

## 2023-11-02 PROCEDURE — 999N000007 HC SITE CHECK

## 2023-11-02 RX ORDER — ACETAMINOPHEN 500 MG
500 TABLET ORAL
Status: DISCONTINUED | OUTPATIENT
Start: 2023-11-02 | End: 2023-11-09 | Stop reason: HOSPADM

## 2023-11-02 RX ORDER — LOPERAMIDE HCL 2 MG
4 CAPSULE ORAL AT BEDTIME
Status: DISCONTINUED | OUTPATIENT
Start: 2023-11-02 | End: 2023-11-05

## 2023-11-02 RX ORDER — CALCIUM CARBONATE 500(1250)
1 TABLET ORAL DAILY
Status: DISCONTINUED | OUTPATIENT
Start: 2023-11-02 | End: 2023-11-09 | Stop reason: HOSPADM

## 2023-11-02 RX ORDER — VARENICLINE TARTRATE 1 MG/1
1 TABLET, FILM COATED ORAL 2 TIMES DAILY
Status: DISCONTINUED | OUTPATIENT
Start: 2023-11-02 | End: 2023-11-02

## 2023-11-02 RX ORDER — SODIUM CHLORIDE 9 MG/ML
INJECTION, SOLUTION INTRAVENOUS
Status: COMPLETED
Start: 2023-11-02 | End: 2023-11-02

## 2023-11-02 RX ORDER — HYDROMORPHONE HYDROCHLORIDE 2 MG/1
4 TABLET ORAL EVERY 4 HOURS PRN
Status: DISCONTINUED | OUTPATIENT
Start: 2023-11-02 | End: 2023-11-04

## 2023-11-02 RX ORDER — FERROUS SULFATE 325(65) MG
325 TABLET ORAL 2 TIMES DAILY WITH MEALS
Status: DISCONTINUED | OUTPATIENT
Start: 2023-11-02 | End: 2023-11-09 | Stop reason: HOSPADM

## 2023-11-02 RX ORDER — IOPAMIDOL 755 MG/ML
100 INJECTION, SOLUTION INTRAVASCULAR ONCE
Status: COMPLETED | OUTPATIENT
Start: 2023-11-02 | End: 2023-11-02

## 2023-11-02 RX ORDER — PREGABALIN 300 MG/1
300 CAPSULE ORAL 2 TIMES DAILY
Status: DISCONTINUED | OUTPATIENT
Start: 2023-11-02 | End: 2023-11-02

## 2023-11-02 RX ADMIN — HYDROMORPHONE HYDROCHLORIDE 4 MG: 2 TABLET ORAL at 13:15

## 2023-11-02 RX ADMIN — DULOXETINE HYDROCHLORIDE 60 MG: 60 CAPSULE, DELAYED RELEASE ORAL at 09:04

## 2023-11-02 RX ADMIN — LOPERAMIDE HYDROCHLORIDE 4 MG: 2 CAPSULE ORAL at 21:44

## 2023-11-02 RX ADMIN — SODIUM CHLORIDE 90 ML: 9 INJECTION, SOLUTION INTRAVENOUS at 17:28

## 2023-11-02 RX ADMIN — PREGABALIN 300 MG: 100 CAPSULE ORAL at 09:04

## 2023-11-02 RX ADMIN — ACETAMINOPHEN 500 MG: 500 TABLET ORAL at 13:15

## 2023-11-02 RX ADMIN — BACLOFEN 10 MG: 5 TABLET ORAL at 16:17

## 2023-11-02 RX ADMIN — PIPERACILLIN AND TAZOBACTAM 3.38 G: 3; .375 INJECTION, POWDER, LYOPHILIZED, FOR SOLUTION INTRAVENOUS at 06:16

## 2023-11-02 RX ADMIN — PIPERACILLIN AND TAZOBACTAM 3.38 G: 3; .375 INJECTION, POWDER, LYOPHILIZED, FOR SOLUTION INTRAVENOUS at 23:30

## 2023-11-02 RX ADMIN — BACLOFEN 10 MG: 5 TABLET ORAL at 20:09

## 2023-11-02 RX ADMIN — ACETAMINOPHEN 500 MG: 500 TABLET ORAL at 20:21

## 2023-11-02 RX ADMIN — NICOTINE POLACRILEX 4 MG: 4 GUM, CHEWING BUCCAL at 12:40

## 2023-11-02 RX ADMIN — ACETAMINOPHEN 500 MG: 500 TABLET ORAL at 06:16

## 2023-11-02 RX ADMIN — NICOTINE POLACRILEX 4 MG: 4 GUM, CHEWING BUCCAL at 22:43

## 2023-11-02 RX ADMIN — Medication 1 TABLET: at 09:06

## 2023-11-02 RX ADMIN — IOPAMIDOL 100 ML: 755 INJECTION, SOLUTION INTRAVENOUS at 17:28

## 2023-11-02 RX ADMIN — PREGABALIN 300 MG: 100 CAPSULE ORAL at 20:09

## 2023-11-02 RX ADMIN — OXYCODONE HYDROCHLORIDE AND ACETAMINOPHEN 500 MG: 500 TABLET ORAL at 09:06

## 2023-11-02 RX ADMIN — SODIUM CHLORIDE 500 ML: 9 INJECTION, SOLUTION INTRAVENOUS at 23:29

## 2023-11-02 RX ADMIN — PIPERACILLIN AND TAZOBACTAM 3.38 G: 3; .375 INJECTION, POWDER, LYOPHILIZED, FOR SOLUTION INTRAVENOUS at 18:24

## 2023-11-02 RX ADMIN — FERROUS SULFATE TAB 325 MG (65 MG ELEMENTAL FE) 325 MG: 325 (65 FE) TAB at 18:24

## 2023-11-02 RX ADMIN — HYDROMORPHONE HYDROCHLORIDE 2 MG: 2 TABLET ORAL at 06:16

## 2023-11-02 RX ADMIN — ACETAMINOPHEN 500 MG: 500 TABLET ORAL at 06:20

## 2023-11-02 RX ADMIN — ENOXAPARIN SODIUM 40 MG: 40 INJECTION SUBCUTANEOUS at 21:44

## 2023-11-02 RX ADMIN — ACETAMINOPHEN 500 MG: 500 TABLET ORAL at 16:17

## 2023-11-02 RX ADMIN — BACLOFEN 10 MG: 5 TABLET ORAL at 09:06

## 2023-11-02 RX ADMIN — HYDROMORPHONE HYDROCHLORIDE 4 MG: 2 TABLET ORAL at 16:17

## 2023-11-02 RX ADMIN — CALCIUM 500 MG: 500 TABLET ORAL at 16:23

## 2023-11-02 RX ADMIN — PIPERACILLIN AND TAZOBACTAM 3.38 G: 3; .375 INJECTION, POWDER, LYOPHILIZED, FOR SOLUTION INTRAVENOUS at 12:33

## 2023-11-02 RX ADMIN — PIPERACILLIN AND TAZOBACTAM 3.38 G: 3; .375 INJECTION, POWDER, LYOPHILIZED, FOR SOLUTION INTRAVENOUS at 02:08

## 2023-11-02 RX ADMIN — HYDROMORPHONE HYDROCHLORIDE 4 MG: 2 TABLET ORAL at 20:21

## 2023-11-02 RX ADMIN — TRAZODONE HYDROCHLORIDE 100 MG: 100 TABLET ORAL at 23:29

## 2023-11-02 RX ADMIN — BACLOFEN 10 MG: 5 TABLET ORAL at 11:56

## 2023-11-02 RX ADMIN — HYDROMORPHONE HYDROCHLORIDE 2 MG: 2 TABLET ORAL at 06:19

## 2023-11-02 RX ADMIN — ASPIRIN 325 MG ORAL TABLET 325 MG: 325 PILL ORAL at 09:04

## 2023-11-02 RX ADMIN — HYDROMORPHONE HYDROCHLORIDE 4 MG: 2 TABLET ORAL at 09:06

## 2023-11-02 ASSESSMENT — ACTIVITIES OF DAILY LIVING (ADL)
ADLS_ACUITY_SCORE: 42
ADLS_ACUITY_SCORE: 43
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
## 2023-11-02 NOTE — PLAN OF CARE
Goal Outcome Evaluation:      Plan of Care Reviewed With: patient    Overall Patient Progress: improving       VS: Temp: 98.4  F (36.9  C) Temp src: Oral BP: 135/84 Pulse: 84   Resp: 20 SpO2: 98 % O2 Device: None (Room air)      O2: VSS on RA. Denies CP and SOB.   Output: Colostomy and urostomy draining to cabrera bag.   Last BM: 11/2/2023   Activity: Wheelchair when OOB.   Skin: R great toe & ankle wound, sacral wound, redness to R groin.   Pain: Slept all shift, no complaints of pain.    Neuro/CMS: A&Ox4.   Dressing: - Dressing to R toe wounds and R ankle CDI - dressing change due 11/2/2023.   Diet: Regular diet, thin liquids, pills whole.   LDA: R PIV - SL   Equipment: Doppler   Plan: Change urostomy and colostomy bag 11/2 per orders. Continue IV ABX.   Additional Info: - needs doppler pulse for RLE each shift.

## 2023-11-02 NOTE — PROGRESS NOTES
Care Management Follow Up    Length of Stay (days): 3    Expected Discharge Date: 11/04/2023     Concerns to be Addressed: Per update from the interdisciplinary team, Mr. Fountain will have orders for a Vascular Surgery Consult to determine if patient may need to have an amputation.    Patient plan of care discussed at interdisciplinary rounds: Yes    Anticipated Discharge Disposition: Group Home in the [facility].     Anticipated Discharge Services:  Received call from patients community home care agency by the name of [employer].  Per the intake RN, Mr. Fountain is already opened to skilled RN services with the following company:  [employer]:  [phone] and [phone]     Anticipated Discharge DME: TBD    Referrals Placed by CM/SW:  Plan will tentatively be for Mr. Fountain to return to his group home setting.  Private pay costs discussed: Not applicable    Additional Information:    Mr. Fountain continues to be followed by multiple interdisciplinary team Including the Wound Ostomy Continence RN Consult Team who has made multiple recommendations for wound care.  Per MD team today in round, a Vascular Surgery Consult will be ordered to determine if patient may need to consider an amputation to R LE.  Inpatient cares continue at this time per MD team plans of care and the Department of Care Management will continue to follow to assist with enhanced transition plans if needed.  Patient is already open as above to [employer] for skilled RN services. Patient is currently on IV antibiotics and tbd if needed at time of discharge.  _________________________________________    MARIA VICTORIA WaltonS.N., R.N., P.H.N.  Administrative Nurse Supervisor/[facility]  Care Coordinator Nurse Float/[facility]-Today only at [phone]  [facility]
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
## 2023-11-02 NOTE — PLAN OF CARE
PT: checked on pt two days in a row. Both times pt politely but adamantly declining any PT services. Expressed PT concerns about transfers, but pt denies concerns. He is WBAT on R LE and L AKA, performs pivot transfer to power chair at baseline. Pt reports he has never had issues with mobility during/post recent hospitalizations. Offered services to prevent high risk deconditioning, up to chair to get OOB but pt states none of the chairs are helpful except his for proper support (pt with hx of paraplegia) so has custom wc that is not present here. Pt politely but adamantly declines services, expresses no concerns with ability to pivot to his wc at discharge. PT will complete orders at this time as pt declined services two days in a row. Given no WBing restrictions for R LE from orthopedics there is low concerns for acute change in transfer ability. If pt does have prolonged stay and appears to getting deconditioned feel free to re-consult if pt would be agreeable. At this time, will complete orders.

## 2023-11-02 NOTE — PLAN OF CARE
"VS: /70 (BP Location: Left arm)   Pulse 69   Temp 99  F (37.2  C) (Oral)   Resp 18   Ht 1.676 m (5' 5.98\")   Wt 48 kg (105 lb 13.1 oz)   SpO2 97%   BMI 17.09 kg/m       O2: Denies SOB   Output: 525 ml, colostomy for bowel, urostomy for bladder   Last BM: 11/02/23   Activity: Pt not oob   Skin: Pt has a sacral wound, right toes and ankles, right posterior heel, right and left IT, scrotum, right groin mass, and right calm wounds.   Pain: Pt reported 8/10 pain and was given 4 mg Dilaudid and 500 mg of Tylenol PO.   Neuro/CMS: A&Ox4   Dressing: Sacral region, right foot,  right and left IT   Diet: Regular diet   LDA: L (20 g))and R (24 g) PIV   Equipment: IV pole   Plan: Continue POC   Additional Info: Wound care q shift, CT scan done today.         "
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
## 2023-11-03 LAB
ANION GAP SERPL CALCULATED.3IONS-SCNC: 6 MMOL/L (ref 7–15)
BUN SERPL-MCNC: 16.5 MG/DL (ref 8–23)
CALCIUM SERPL-MCNC: 9.1 MG/DL (ref 8.8–10.2)
CHLORIDE SERPL-SCNC: 108 MMOL/L (ref 98–107)
CREAT SERPL-MCNC: 0.7 MG/DL (ref 0.51–1.17)
CRP SERPL-MCNC: 64.06 MG/L
DEPRECATED HCO3 PLAS-SCNC: 27 MMOL/L (ref 22–29)
EGFRCR SERPLBLD CKD-EPI 2021: >90 ML/MIN/1.73M2
ERYTHROCYTE [DISTWIDTH] IN BLOOD BY AUTOMATED COUNT: 14.1 % (ref 10–15)
GLUCOSE SERPL-MCNC: 97 MG/DL (ref 70–99)
HCT VFR BLD AUTO: 38.4 % (ref 35–53)
HGB BLD-MCNC: 12.2 G/DL (ref 11.7–17.7)
MCH RBC QN AUTO: 29.3 PG (ref 26.5–33)
MCHC RBC AUTO-ENTMCNC: 31.8 G/DL (ref 31.5–36.5)
MCV RBC AUTO: 92 FL (ref 78–100)
PLATELET # BLD AUTO: 355 10E3/UL (ref 150–450)
POTASSIUM SERPL-SCNC: 4.3 MMOL/L (ref 3.4–5.3)
RBC # BLD AUTO: 4.17 10E6/UL (ref 3.8–5.9)
SODIUM SERPL-SCNC: 141 MMOL/L (ref 135–145)
WBC # BLD AUTO: 8 10E3/UL (ref 4–11)

## 2023-11-03 PROCEDURE — 120N000002 HC R&B MED SURG/OB UMMC

## 2023-11-03 PROCEDURE — 250N000013 HC RX MED GY IP 250 OP 250 PS 637: Performed by: PHYSICIAN ASSISTANT

## 2023-11-03 PROCEDURE — 99207 PR NO BILLABLE SERVICE THIS VISIT: CPT | Performed by: NURSE PRACTITIONER

## 2023-11-03 PROCEDURE — 250N000011 HC RX IP 250 OP 636: Mod: JZ | Performed by: STUDENT IN AN ORGANIZED HEALTH CARE EDUCATION/TRAINING PROGRAM

## 2023-11-03 PROCEDURE — 250N000013 HC RX MED GY IP 250 OP 250 PS 637

## 2023-11-03 PROCEDURE — 250N000013 HC RX MED GY IP 250 OP 250 PS 637: Performed by: PEDIATRICS

## 2023-11-03 PROCEDURE — 250N000013 HC RX MED GY IP 250 OP 250 PS 637: Performed by: STUDENT IN AN ORGANIZED HEALTH CARE EDUCATION/TRAINING PROGRAM

## 2023-11-03 PROCEDURE — 36415 COLL VENOUS BLD VENIPUNCTURE: CPT | Performed by: STUDENT IN AN ORGANIZED HEALTH CARE EDUCATION/TRAINING PROGRAM

## 2023-11-03 PROCEDURE — 85049 AUTOMATED PLATELET COUNT: CPT | Performed by: STUDENT IN AN ORGANIZED HEALTH CARE EDUCATION/TRAINING PROGRAM

## 2023-11-03 PROCEDURE — 250N000011 HC RX IP 250 OP 636: Mod: JZ | Performed by: PHYSICIAN ASSISTANT

## 2023-11-03 PROCEDURE — 99233 SBSQ HOSP IP/OBS HIGH 50: CPT | Mod: GC | Performed by: INTERNAL MEDICINE

## 2023-11-03 PROCEDURE — 80048 BASIC METABOLIC PNL TOTAL CA: CPT | Performed by: STUDENT IN AN ORGANIZED HEALTH CARE EDUCATION/TRAINING PROGRAM

## 2023-11-03 PROCEDURE — 99232 SBSQ HOSP IP/OBS MODERATE 35: CPT | Mod: GC

## 2023-11-03 RX ORDER — VARENICLINE TARTRATE 1 MG/1
1 TABLET, FILM COATED ORAL 2 TIMES DAILY WITH MEALS
Status: DISCONTINUED | OUTPATIENT
Start: 2023-11-10 | End: 2023-11-07

## 2023-11-03 RX ORDER — VARENICLINE TARTRATE 0.5 MG/1
0.5 TABLET, FILM COATED ORAL
Qty: 3 TABLET | Refills: 0 | Status: COMPLETED | OUTPATIENT
Start: 2023-11-03 | End: 2023-11-05

## 2023-11-03 RX ORDER — VARENICLINE TARTRATE 0.5 MG/1
0.5 TABLET, FILM COATED ORAL 2 TIMES DAILY WITH MEALS
Qty: 8 TABLET | Refills: 0 | Status: DISCONTINUED | OUTPATIENT
Start: 2023-11-06 | End: 2023-11-07

## 2023-11-03 RX ADMIN — CALCIUM 500 MG: 500 TABLET ORAL at 08:04

## 2023-11-03 RX ADMIN — HYDROMORPHONE HYDROCHLORIDE 4 MG: 2 TABLET ORAL at 16:25

## 2023-11-03 RX ADMIN — PREGABALIN 300 MG: 100 CAPSULE ORAL at 20:43

## 2023-11-03 RX ADMIN — VARENICLINE 0.5 MG: 0.5 TABLET, FILM COATED ORAL at 16:51

## 2023-11-03 RX ADMIN — DULOXETINE HYDROCHLORIDE 60 MG: 60 CAPSULE, DELAYED RELEASE ORAL at 08:04

## 2023-11-03 RX ADMIN — BACLOFEN 10 MG: 5 TABLET ORAL at 16:20

## 2023-11-03 RX ADMIN — BACLOFEN 10 MG: 5 TABLET ORAL at 20:43

## 2023-11-03 RX ADMIN — BACLOFEN 10 MG: 5 TABLET ORAL at 12:13

## 2023-11-03 RX ADMIN — NICOTINE POLACRILEX 4 MG: 4 GUM, CHEWING BUCCAL at 16:20

## 2023-11-03 RX ADMIN — HYDROMORPHONE HYDROCHLORIDE 4 MG: 2 TABLET ORAL at 20:59

## 2023-11-03 RX ADMIN — HYDROMORPHONE HYDROCHLORIDE 4 MG: 2 TABLET ORAL at 12:13

## 2023-11-03 RX ADMIN — ENOXAPARIN SODIUM 40 MG: 40 INJECTION SUBCUTANEOUS at 21:55

## 2023-11-03 RX ADMIN — FERROUS SULFATE TAB 325 MG (65 MG ELEMENTAL FE) 325 MG: 325 (65 FE) TAB at 08:04

## 2023-11-03 RX ADMIN — ACETAMINOPHEN 500 MG: 500 TABLET ORAL at 16:25

## 2023-11-03 RX ADMIN — ACETAMINOPHEN 500 MG: 500 TABLET ORAL at 08:03

## 2023-11-03 RX ADMIN — NICOTINE POLACRILEX 4 MG: 4 GUM, CHEWING BUCCAL at 22:12

## 2023-11-03 RX ADMIN — PREGABALIN 300 MG: 100 CAPSULE ORAL at 08:03

## 2023-11-03 RX ADMIN — TRAZODONE HYDROCHLORIDE 100 MG: 100 TABLET ORAL at 21:55

## 2023-11-03 RX ADMIN — FERROUS SULFATE TAB 325 MG (65 MG ELEMENTAL FE) 325 MG: 325 (65 FE) TAB at 18:35

## 2023-11-03 RX ADMIN — PIPERACILLIN AND TAZOBACTAM 3.38 G: 3; .375 INJECTION, POWDER, LYOPHILIZED, FOR SOLUTION INTRAVENOUS at 18:35

## 2023-11-03 RX ADMIN — Medication 1 TABLET: at 08:04

## 2023-11-03 RX ADMIN — ACETAMINOPHEN 500 MG: 500 TABLET ORAL at 20:59

## 2023-11-03 RX ADMIN — NICOTINE POLACRILEX 4 MG: 4 GUM, CHEWING BUCCAL at 12:13

## 2023-11-03 RX ADMIN — OXYCODONE HYDROCHLORIDE AND ACETAMINOPHEN 500 MG: 500 TABLET ORAL at 08:04

## 2023-11-03 RX ADMIN — HYDROMORPHONE HYDROCHLORIDE 4 MG: 2 TABLET ORAL at 08:04

## 2023-11-03 RX ADMIN — PIPERACILLIN AND TAZOBACTAM 3.38 G: 3; .375 INJECTION, POWDER, LYOPHILIZED, FOR SOLUTION INTRAVENOUS at 12:13

## 2023-11-03 RX ADMIN — PIPERACILLIN AND TAZOBACTAM 3.38 G: 3; .375 INJECTION, POWDER, LYOPHILIZED, FOR SOLUTION INTRAVENOUS at 06:28

## 2023-11-03 RX ADMIN — ASPIRIN 325 MG ORAL TABLET 325 MG: 325 PILL ORAL at 08:04

## 2023-11-03 RX ADMIN — LOPERAMIDE HYDROCHLORIDE 4 MG: 2 CAPSULE ORAL at 21:55

## 2023-11-03 RX ADMIN — BACLOFEN 10 MG: 5 TABLET ORAL at 08:04

## 2023-11-03 ASSESSMENT — ACTIVITIES OF DAILY LIVING (ADL)
ADLS_ACUITY_SCORE: 43
ADLS_ACUITY_SCORE: 46
ADLS_ACUITY_SCORE: 43
ADLS_ACUITY_SCORE: 46

## 2023-11-03 NOTE — PLAN OF CARE
Goal Outcome Evaluation:      Plan of Care Reviewed With: patient    Overall Patient Progress: improving       VS: Temp: 98.4  F (36.9  C) Temp src: Oral BP: (!) 149/73 Pulse: 68   Resp: 16 SpO2: 99 % O2 Device: None (Room air)      O2: >90% on RA. Denies CP and SOB.   Output: Colostomy and urostomy draining to cabrera bag.   Last BM: 11/3/2023   Activity: Independent in bed. Wheelchair when OOB due to paraplegia.    Skin: R foot, ankle, and calf wounds. R and L IT pressure injury, mass to R groin, sacral wound.   Pain: Pain managed with PRN Tylenol and Dilaudid.   Neuro/CMS: A&Ox4. Denies new numbness and tingling,   Dressing: R foot, ankle, and calf. R and L IT, and sacrum.   Diet: Regular diet, thin liquids, pills whole. Denies N/V.   LDA: R and L PIV - SL   Plan: Continue IV ABX, managed pain, daily wound care.   Additional Info: - R foot and ankle wound care daily see WOC order.  - R and L IT wound care daily see WOC order.

## 2023-11-03 NOTE — PROGRESS NOTES
"Brief Vascular Surgery Update:    CTA imaging from last night reviewed with Dr. De Jesus: Patient has significant disease/stenosis in right common iliac artery and chronic occlusion of right SFA. We are concerned that the patient might not be able to heal even an AKA on right. If the patient would require a hip disarticulation procedure, given poor healing capabilities, which is a major operation, he would also require additional revascularization which is unclear that it would be possible.     Recommend US Segmental Pressures with TCO2 to assess level of thigh, calf, ankle for healing, potential amputation. Unfortunately [facility] does not have the capabilities to perform this. Per discussion with Ana M Huynh from [facility], \"unfortunately there isn't even a TCO2 machine in the hospital\" and this can only be done at [facility]. We will continue to have ongoing discussions on how to best approach this. Ideally, he would have the US Segmental Pressures with TCO2 completed to better understand if he will indeed heal and AKA on right.     I did not examine the patient today, however reviewed chart and conducted extensive discussions with Dr. Reynaga who helped with the decision making process.     Lacy Edward, CNP  Vascular Surgery  Pager: [phone]  [email or web address]  Send message or 10 digit call back number Securely via [employer] with the [employer] Web Console (learn more here)      "
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
## 2023-11-03 NOTE — PLAN OF CARE
"  VS: BP (!) 143/73 (BP Location: Right arm)   Pulse 87   Temp 98.4  F (36.9  C) (Oral)   Resp 16   Ht 1.676 m (5' 5.98\")   Wt 49.2 kg (108 lb 7.5 oz)   SpO2 98%   BMI 17.52 kg/m     Output/Last BM: Urostomy and Colostomy intact   Activity: Bedfast   Skin/Dressing: Dressings changed to bilateral IT, right groin, right calf, and right foot   Pain: Reports ongoing pain, prn dilaudid and tylenol   CMS: A/Ox4, little sensation to BLE   Diet: Regular diet, good appetite   LDA: PIV to RUE and LUE, saline locked between abx   Equipment:    Plan:    Additional Info:      Goal Outcome Evaluation:  Plan of Care Reviewed With: patient  Overall Patient Progress: No Change        "

## 2023-11-04 LAB
ANION GAP SERPL CALCULATED.3IONS-SCNC: 9 MMOL/L (ref 7–15)
BACTERIA BLD CULT: NO GROWTH
BACTERIA BLD CULT: NO GROWTH
BUN SERPL-MCNC: 23.8 MG/DL (ref 8–23)
CALCIUM SERPL-MCNC: 9 MG/DL (ref 8.8–10.2)
CHLORIDE SERPL-SCNC: 103 MMOL/L (ref 98–107)
CREAT SERPL-MCNC: 0.84 MG/DL (ref 0.51–1.17)
CRP SERPL-MCNC: 42.05 MG/L
DEPRECATED HCO3 PLAS-SCNC: 25 MMOL/L (ref 22–29)
EGFRCR SERPLBLD CKD-EPI 2021: >90 ML/MIN/1.73M2
ERYTHROCYTE [DISTWIDTH] IN BLOOD BY AUTOMATED COUNT: 13.9 % (ref 10–15)
GLUCOSE SERPL-MCNC: 188 MG/DL (ref 70–99)
HCT VFR BLD AUTO: 36.1 % (ref 35–53)
HGB BLD-MCNC: 11.3 G/DL (ref 11.7–17.7)
MCH RBC QN AUTO: 29.7 PG (ref 26.5–33)
MCHC RBC AUTO-ENTMCNC: 31.3 G/DL (ref 31.5–36.5)
MCV RBC AUTO: 95 FL (ref 78–100)
PLATELET # BLD AUTO: 286 10E3/UL (ref 150–450)
POTASSIUM SERPL-SCNC: 3.9 MMOL/L (ref 3.4–5.3)
RBC # BLD AUTO: 3.81 10E6/UL (ref 3.8–5.9)
SODIUM SERPL-SCNC: 137 MMOL/L (ref 135–145)
WBC # BLD AUTO: 6.3 10E3/UL (ref 4–11)

## 2023-11-04 PROCEDURE — 250N000013 HC RX MED GY IP 250 OP 250 PS 637: Performed by: PHYSICIAN ASSISTANT

## 2023-11-04 PROCEDURE — 120N000002 HC R&B MED SURG/OB UMMC

## 2023-11-04 PROCEDURE — 250N000013 HC RX MED GY IP 250 OP 250 PS 637: Performed by: STUDENT IN AN ORGANIZED HEALTH CARE EDUCATION/TRAINING PROGRAM

## 2023-11-04 PROCEDURE — 250N000011 HC RX IP 250 OP 636: Mod: JZ | Performed by: STUDENT IN AN ORGANIZED HEALTH CARE EDUCATION/TRAINING PROGRAM

## 2023-11-04 PROCEDURE — 85027 COMPLETE CBC AUTOMATED: CPT

## 2023-11-04 PROCEDURE — 80048 BASIC METABOLIC PNL TOTAL CA: CPT | Performed by: STUDENT IN AN ORGANIZED HEALTH CARE EDUCATION/TRAINING PROGRAM

## 2023-11-04 PROCEDURE — 250N000013 HC RX MED GY IP 250 OP 250 PS 637

## 2023-11-04 PROCEDURE — 86140 C-REACTIVE PROTEIN: CPT | Performed by: STUDENT IN AN ORGANIZED HEALTH CARE EDUCATION/TRAINING PROGRAM

## 2023-11-04 PROCEDURE — 250N000013 HC RX MED GY IP 250 OP 250 PS 637: Performed by: PEDIATRICS

## 2023-11-04 PROCEDURE — 250N000011 HC RX IP 250 OP 636: Mod: JZ | Performed by: PHYSICIAN ASSISTANT

## 2023-11-04 PROCEDURE — 36415 COLL VENOUS BLD VENIPUNCTURE: CPT | Performed by: STUDENT IN AN ORGANIZED HEALTH CARE EDUCATION/TRAINING PROGRAM

## 2023-11-04 PROCEDURE — 99232 SBSQ HOSP IP/OBS MODERATE 35: CPT | Mod: GC

## 2023-11-04 RX ORDER — HYDROMORPHONE HYDROCHLORIDE 2 MG/1
4 TABLET ORAL EVERY 6 HOURS PRN
Status: DISCONTINUED | OUTPATIENT
Start: 2023-11-04 | End: 2023-11-06

## 2023-11-04 RX ORDER — HYDROMORPHONE HYDROCHLORIDE 2 MG/1
4 TABLET ORAL EVERY 6 HOURS
Status: DISCONTINUED | OUTPATIENT
Start: 2023-11-04 | End: 2023-11-04

## 2023-11-04 RX ADMIN — FERROUS SULFATE TAB 325 MG (65 MG ELEMENTAL FE) 325 MG: 325 (65 FE) TAB at 08:45

## 2023-11-04 RX ADMIN — NICOTINE POLACRILEX 4 MG: 4 GUM, CHEWING BUCCAL at 01:24

## 2023-11-04 RX ADMIN — TRAZODONE HYDROCHLORIDE 100 MG: 100 TABLET ORAL at 21:50

## 2023-11-04 RX ADMIN — CALCIUM 500 MG: 500 TABLET ORAL at 08:45

## 2023-11-04 RX ADMIN — PREGABALIN 300 MG: 100 CAPSULE ORAL at 08:45

## 2023-11-04 RX ADMIN — ACETAMINOPHEN 500 MG: 500 TABLET ORAL at 21:50

## 2023-11-04 RX ADMIN — BACLOFEN 10 MG: 5 TABLET ORAL at 08:45

## 2023-11-04 RX ADMIN — ACETAMINOPHEN 500 MG: 500 TABLET ORAL at 01:24

## 2023-11-04 RX ADMIN — PIPERACILLIN AND TAZOBACTAM 3.38 G: 3; .375 INJECTION, POWDER, LYOPHILIZED, FOR SOLUTION INTRAVENOUS at 00:43

## 2023-11-04 RX ADMIN — PIPERACILLIN AND TAZOBACTAM 3.38 G: 3; .375 INJECTION, POWDER, LYOPHILIZED, FOR SOLUTION INTRAVENOUS at 18:31

## 2023-11-04 RX ADMIN — HYDROMORPHONE HYDROCHLORIDE 4 MG: 2 TABLET ORAL at 08:44

## 2023-11-04 RX ADMIN — DULOXETINE HYDROCHLORIDE 60 MG: 60 CAPSULE, DELAYED RELEASE ORAL at 08:45

## 2023-11-04 RX ADMIN — OXYCODONE HYDROCHLORIDE AND ACETAMINOPHEN 500 MG: 500 TABLET ORAL at 08:45

## 2023-11-04 RX ADMIN — PIPERACILLIN AND TAZOBACTAM 3.38 G: 3; .375 INJECTION, POWDER, LYOPHILIZED, FOR SOLUTION INTRAVENOUS at 12:19

## 2023-11-04 RX ADMIN — LOPERAMIDE HYDROCHLORIDE 4 MG: 2 CAPSULE ORAL at 21:50

## 2023-11-04 RX ADMIN — PIPERACILLIN AND TAZOBACTAM 3.38 G: 3; .375 INJECTION, POWDER, LYOPHILIZED, FOR SOLUTION INTRAVENOUS at 06:13

## 2023-11-04 RX ADMIN — ASPIRIN 325 MG ORAL TABLET 325 MG: 325 PILL ORAL at 08:44

## 2023-11-04 RX ADMIN — ACETAMINOPHEN 500 MG: 500 TABLET ORAL at 09:06

## 2023-11-04 RX ADMIN — FERROUS SULFATE TAB 325 MG (65 MG ELEMENTAL FE) 325 MG: 325 (65 FE) TAB at 18:31

## 2023-11-04 RX ADMIN — HYDROMORPHONE HYDROCHLORIDE 4 MG: 2 TABLET ORAL at 01:24

## 2023-11-04 RX ADMIN — NICOTINE POLACRILEX 4 MG: 4 GUM, CHEWING BUCCAL at 12:19

## 2023-11-04 RX ADMIN — BACLOFEN 10 MG: 5 TABLET ORAL at 16:47

## 2023-11-04 RX ADMIN — NICOTINE POLACRILEX 4 MG: 4 GUM, CHEWING BUCCAL at 16:47

## 2023-11-04 RX ADMIN — Medication 1 TABLET: at 08:45

## 2023-11-04 RX ADMIN — NICOTINE POLACRILEX 4 MG: 4 GUM, CHEWING BUCCAL at 20:17

## 2023-11-04 RX ADMIN — BACLOFEN 10 MG: 5 TABLET ORAL at 20:17

## 2023-11-04 RX ADMIN — ACETAMINOPHEN 500 MG: 500 TABLET ORAL at 14:48

## 2023-11-04 RX ADMIN — HYDROMORPHONE HYDROCHLORIDE 4 MG: 2 TABLET ORAL at 14:48

## 2023-11-04 RX ADMIN — BACLOFEN 10 MG: 5 TABLET ORAL at 12:19

## 2023-11-04 RX ADMIN — HYDROMORPHONE HYDROCHLORIDE 4 MG: 2 TABLET ORAL at 21:50

## 2023-11-04 RX ADMIN — PREGABALIN 300 MG: 100 CAPSULE ORAL at 20:17

## 2023-11-04 RX ADMIN — VARENICLINE 0.5 MG: 0.5 TABLET, FILM COATED ORAL at 09:06

## 2023-11-04 RX ADMIN — ENOXAPARIN SODIUM 40 MG: 40 INJECTION SUBCUTANEOUS at 21:50

## 2023-11-04 ASSESSMENT — ACTIVITIES OF DAILY LIVING (ADL)
ADLS_ACUITY_SCORE: 42
ADLS_ACUITY_SCORE: 46
ADLS_ACUITY_SCORE: 46
ADLS_ACUITY_SCORE: 42
ADLS_ACUITY_SCORE: 46
ADLS_ACUITY_SCORE: 46
ADLS_ACUITY_SCORE: 42
ADLS_ACUITY_SCORE: 46
ADLS_ACUITY_SCORE: 46

## 2023-11-04 NOTE — PLAN OF CARE
"Goal Outcome Evaluation:    Plan of Care Reviewed With: patient  Overall Patient Progress: no change  Outcome Evaluation: Pt A&O x4.    Assessment: Pt A&O x4 and VSS (doppler used for pulse on RLE). Pt is paraplegic but able to roll side to side. Pt refused repositioning multiple times throughout shift. Pt has urostomy and colostomy in place, pt able to change/replace bags if given supplies. Pt has above knee amputation with wounds on R big toe, middle toe, medial ankle, and heel. Pt also has wounds including R and L IT, scrotum, R groin mass, and R calf. Pt has R and L PIV both SL. Daily wound cares completed including Bilateral IT, and R foot (done per WOC order instructions). Pt on regular diet, tolerating well. Pt pain 4-8/10 relieved w/ tylenol and PO dilaudid. Maintain contact precautions.    Major Shift Changes: During AM rounds with Micheline team, pt and MD team agreed upon changing dilaudid order from q4h to q6h. Writer gave dilaudid @0844 (per MAR) and board was updated on current orders (q6h). Pt requested dilaudid @1400 and writer told pt dilaudid isn't available for 45 min but can start with tylenol in mean time (MD in room w/ pt when told this). Pt said \"that's not going to help me but okay\". When writer did give dilaudid, pt stated \"you lied about when it (dilaudid) was due\" and \"you were just trying to impress the doctor by agreeing w/ her\". When educating pt on why wan't available, pt stated: \"You came in at 0800 and gave me dilaudid so I should have gotten it earlier.\"  Pt later apologized about situation. Will continue to write medication times on communication board.     Plan:   - Monitor pain levels overnight.   - Continue to put updated times for PRN medications on med board.   "

## 2023-11-04 NOTE — PROGRESS NOTES
"  VS: Blood pressure 132/68, pulse 82, temperature 98.4  F (36.9  C), temperature source Oral, resp. rate 16, height 1.676 m (5' 5.98\"), weight 49.2 kg (108 lb 7.5 oz), SpO2 100%.     O2: Stable on Rm air, denies chest pain, SOB and dizziness   Output: Urostomy and Colostomy   Last BM: 11/04/23   Activity: Not oob   Skin: Bilateral IT, right groin, Right calf and Right foot dressing intact   Pain: Complained pain 7/10, managed with PRN Tylenol and Dilaudid   Neuro: A&Ox4.    Dressing: Wound dressings intact   Diet: Regular diet with good appetite    LDA: RUE and LUE PIV, saline locked   Equipment: Personal items,    Plan: Continue POC.   Additional Info: Colostomy bag changed on shift  Pt on abx treatment     "
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
## 2023-11-04 NOTE — CONFIDENTIAL NOTE
Called to schedule surgery with Dr Vides.  Patient stated he was to have Nephrostomy Tube placed prior to surgery.  Informed patient someone will call to scheduled Tube placement and he will get a return call to schedule surgery.   Assessment & Plan    75y Female with hx HTN, GERD, migraines, found to have trace SAH in left posterior temporal region    NEURO:  #trace SAH in L posterior temporal region  -Q4 neurochecks   -no neurosurgical intervention  -Repeat HCT for any change in mental status  -Keppra x 1 week  #Acute pain  -APAP ATC   #hx anxiety   -c/w home quetiapine 25mg PO daily  -c/w home sertraline 150mg PO daily   #hx cerebral atrophy   -follows with Dr. Greenberg   -intermittent episodes of confusion over the past few months since May  RESP:   #Oxygenation    -saturating well on RA  -encourage IS   #Activity    -increase as tolerated     CARDS:   #syncope  -EKG on admission NSR  -trop neg x 1   -TTE pending  #hx HTN  -c/w home metoprolol succinate 25mg PO daily     GI/NUTR:   #Diet, Clear Liquid  -SLP eval pending due to hx esophageal motility disorder  -aspiration precautions, HOB 30  #hx GERD   -c/w home pepcid 20mg BID   #hx moderate disordered esophageal motility  -found on endoscopy in 2019  -SLP eval pending  #Bowel regimen    -not indicated    /RENAL:   #urine output in critically ill  -LR @ 60/hr  -voiding, Q1 hour I&O    Labs:          BUN/Cr- 18/0.7  -->          Electrolytes-Na 140 // K 3.9 // Mg 2.1 //  Phos -- (11-04 @ 16:00       HEME/ONC:   #DVT ppx  -holdingper Nsx, reassess in AM  #  Labs: Hb/Hct:  12.0/37.2  (11-04 @ 16:00)  -->                      Plts:  125  -->                 PTT/INR:  24.6/1.05  --->         ID:  #afebrile  WBC- 6.09  --->>  Temp trend- 24hrs T(F): 98.8 (11-04 @ 13:18), Max: 98.8 (11-04 @ 13:18)      ENDO:   #blood glucose monitoring  -last   -A1C pending  MSK:  #hx triangular fibrocartilage tear s/p L wrist arthroscopy and debridement in 2021  #activity  -PT consult placed  -increase as tolerated  LINES/DRAINS:  James CHRISTENSEN    ADVANCED DIRECTIVES:  Full Code    HCP/Emergency Contact-Kane () [phone]    INDICATION FOR SICU/SDU: Nontraumatic subarachnoid hemorrhage             DISPO:   Case discussed with attending Dr. Hernandez Assessment & Plan    75y Female with hx HTN, GERD, migraines, found to have trace SAH in left posterior temporal region    NEURO:  #trace SAH in L posterior temporal region  -Q4 neurochecks   -no neurosurgical intervention  -Repeat HCT for any change in mental status  -Keppra x 1 week  #Acute pain  -APAP ATC   #hx anxiety   -c/w home quetiapine 25mg PO daily  -c/w home sertraline 150mg PO daily   #hx cerebral atrophy   -follows with Dr. Greenberg   -intermittent episodes of confusion over the past few months since May  RESP:   #Oxygenation    -saturating well on RA  -encourage IS   #Activity    -increase as tolerated     CARDS:   #syncope  -EKG on admission NSR  -trop neg x 1   -TTE pending  #hx HTN  -c/w home metoprolol succinate 25mg PO daily     GI/NUTR:   #Diet, Clear Liquid  -SLP eval pending due to hx esophageal motility disorder  -aspiration precautions, HOB 30  #hx GERD   -c/w home pepcid 20mg BID   #hx moderate disordered esophageal motility  -found on endoscopy in 2019  -SLP eval pending  #hx gastroparesis  #Bowel regimen    -not indicated    /RENAL:   #urine output in critically ill  -LR @ 60/hr  -voiding, Q1 hour I&O    Labs:          BUN/Cr- 18/0.7  -->          Electrolytes-Na 140 // K 3.9 // Mg 2.1 //  Phos -- (11-04 @ 16:00       HEME/ONC:   #DVT ppx  -holdingper Nsx, reassess in AM  #  Labs: Hb/Hct:  12.0/37.2  (11-04 @ 16:00)  -->                      Plts:  125  -->                 PTT/INR:  24.6/1.05  --->         ID:  #afebrile  WBC- 6.09  --->>  Temp trend- 24hrs T(F): 98.8 (11-04 @ 13:18), Max: 98.8 (11-04 @ 13:18)      ENDO:   #blood glucose monitoring  -last   -A1C pending  MSK:  #hx triangular fibrocartilage tear s/p L wrist arthroscopy and debridement in 2021  #activity  -PT consult placed  -increase as tolerated  LINES/DRAINS:  James CHRISTENSEN    ADVANCED DIRECTIVES:  Full Code    HCP/Emergency Contact-Kane () [phone]    INDICATION FOR SICU/SDU: Nontraumatic subarachnoid hemorrhage             DISPO:   SICU. Case discussed with attending Dr. Hernandez Assessment & Plan    75y Female with hx HTN, GERD, migraines, found to have trace SAH in left posterior temporal region    NEURO:  #trace SAH in L posterior temporal region  -Q4 neurochecks   -no neurosurgical intervention  -Repeat HCT for any change in mental status  -Keppra x 1 week  #Acute pain  -APAP ATC   #hx anxiety   -c/w home quetiapine 25mg PO daily  -c/w home sertraline 150mg PO daily   #hx cerebral atrophy   -follows with Dr. Greenberg   -intermittent episodes of confusion over the past few months since May  RESP:   #Oxygenation    -saturating well on RA  -encourage IS   #Activity    -increase as tolerated     CARDS:   #syncope  -EKG on admission NSR  -trop neg x 1   -TTE pending  #hx HTN  -c/w home metoprolol succinate 25mg PO daily     GI/NUTR:   #Diet, Clear Liquid  -SLP eval pending due to hx esophageal motility disorder  -aspiration precautions, HOB 30  #hx GERD   -c/w home pepcid 20mg BID   #hx moderate disordered esophageal motility  -found on endoscopy in 2019  -SLP eval pending  #hx gastroparesis  #Bowel regimen    -not indicated    /RENAL:   #urine output in critically ill  -NS @ 60/hr  -voiding, Q1 hour I&O    Labs:          BUN/Cr- 18/0.7  -->          Electrolytes-Na 140 // K 3.9 // Mg 2.1 //  Phos -- (11-04 @ 16:00       HEME/ONC:   #DVT ppx  -holdingper Nsx, reassess in AM  #  Labs: Hb/Hct:  12.0/37.2  (11-04 @ 16:00)  -->                      Plts:  125  -->                 PTT/INR:  24.6/1.05  --->         ID:  #afebrile  WBC- 6.09  --->>  Temp trend- 24hrs T(F): 98.8 (11-04 @ 13:18), Max: 98.8 (11-04 @ 13:18)      ENDO:   #blood glucose monitoring  -last   -A1C pending  MSK:  #hx triangular fibrocartilage tear s/p L wrist arthroscopy and debridement in 2021  #activity  -PT consult placed  -increase as tolerated  LINES/DRAINS:  James CHRISTENSEN    ADVANCED DIRECTIVES:  Full Code    HCP/Emergency Contact-Kane () [phone]    INDICATION FOR SICU/SDU: Nontraumatic subarachnoid hemorrhage             DISPO:   SICU. Case discussed with attending Dr. Hernandez Assessment & Plan    75y Female with hx HTN, GERD, migraines, found to have trace SAH in left posterior temporal region    NEURO:  #trace SAH in L posterior temporal region  -Q4 neurochecks   -no neurosurgical intervention  -Repeat HCT for any change in mental status  -Keppra x 1 week  #Acute pain  -APAP ATC   #hx anxiety   -c/w home quetiapine 25mg PO daily  -c/w home sertraline 150mg PO daily   #hx cerebral atrophy   -follows with Dr. Greenberg   -intermittent episodes of confusion over the past few months since May  RESP:   #Oxygenation    -saturating well on RA  -encourage IS   #Activity    -increase as tolerated     CARDS:   #syncope  -EKG on admission NSR  -trop neg x 1   -f/u orthostatics  -TTE pending  #hx HTN  -c/w home metoprolol succinate 25mg PO daily     GI/NUTR:   #Diet, Clear Liquid  -SLP eval pending due to hx esophageal motility disorder  -aspiration precautions, HOB 30  #hx GERD   -c/w home pepcid 20mg BID   #hx moderate disordered esophageal motility  -found on endoscopy in 2019  -SLP eval pending  #hx gastroparesis  #Bowel regimen    -not indicated    /RENAL:   #urine output in critically ill  -NS @ 60/hr  -voiding, Q1 hour I&O    Labs:          BUN/Cr- 18/0.7  -->          Electrolytes-Na 140 // K 3.9 // Mg 2.1 //  Phos -- (11-04 @ 16:00       HEME/ONC:   #DVT ppx  -holdingper Nsx, reassess in AM  #  Labs: Hb/Hct:  12.0/37.2  (11-04 @ 16:00)  -->                      Plts:  125  -->                 PTT/INR:  24.6/1.05  --->         ID:  #afebrile  WBC- 6.09  --->>  Temp trend- 24hrs T(F): 98.8 (11-04 @ 13:18), Max: 98.8 (11-04 @ 13:18)      ENDO:   #blood glucose monitoring  -last   -A1C pending  MSK:  #hx triangular fibrocartilage tear s/p L wrist arthroscopy and debridement in 2021  #activity  -PT consult placed  -increase as tolerated  LINES/DRAINS:  PIV, Ramirez    ADVANCED DIRECTIVES:  Full Code    HCP/Emergency Contact-Kane () [phone]    INDICATION FOR SICU/SDU: Nontraumatic subarachnoid hemorrhage             DISPO:   SICU. Case discussed with attending Dr. Hernandez Assessment & Plan    75y Female with hx HTN, GERD, migraines, found to have trace SAH in left posterior temporal region    NEURO:  #trace SAH in L posterior temporal region  -Q4 neurochecks   -no neurosurgical intervention  -Repeat HCT for any change in mental status  -Keppra x 1 week  #Acute pain  -APAP ATC   #hx anxiety   -c/w home quetiapine 25mg PO daily  -c/w home sertraline 150mg PO daily   #hx cerebral atrophy   -follows with Dr. Greenberg   -intermittent episodes of confusion over the past few months since May  RESP:   #Oxygenation    -saturating well on RA  -encourage IS   #Activity    -bedrest; hypotensive when standing     CARDS:   #syncope  -EKG on admission NSR  -trop neg x 1   -orthostatics: BP 79/52 when standing  -TTE pending  #hx HTN  -c/w home metoprolol succinate 25mg PO daily     GI/NUTR:   #Diet, Clear Liquid  -SLP eval pending due to hx esophageal motility disorder  -aspiration precautions, HOB 30  #hx GERD   -c/w home pepcid 20mg BID   #hx moderate disordered esophageal motility  -found on endoscopy in 2019  -SLP eval pending  #hx gastroparesis  #Bowel regimen    -not indicated    /RENAL:   #urine output in critically ill  -NS @ 60/hr  -voiding, Q1 hour I&O    Labs:          BUN/Cr- 18/0.7  -->          Electrolytes-Na 140 // K 3.9 // Mg 2.1 //  Phos -- (11-04 @ 16:00       HEME/ONC:   #DVT ppx  -holdingper Nsx, reassess in AM  #  Labs: Hb/Hct:  12.0/37.2  (11-04 @ 16:00)  -->                      Plts:  125  -->                 PTT/INR:  24.6/1.05  --->         ID:  #afebrile  WBC- 6.09  --->>  Temp trend- 24hrs T(F): 98.8 (11-04 @ 13:18), Max: 98.8 (11-04 @ 13:18)      ENDO:   #blood glucose monitoring  -last   -A1C pending  MSK:  #hx triangular fibrocartilage tear s/p L wrist arthroscopy and debridement in 2021  #activity  -PT consult placed  -bedrest  LINES/DRAINS:  PIV, Ramirez    ADVANCED DIRECTIVES:  Full Code    HCP/Emergency Contact-Kane () [phone]    INDICATION FOR SICU/SDU: Nontraumatic subarachnoid hemorrhage             DISPO:   SICU. Case discussed with attending Dr. Hernandez Assessment & Plan    75y Female with hx HTN, GERD, migraines, found to have trace SAH in left posterior temporal region    NEURO:  #trace SAH in L posterior temporal region  -Q4 neurochecks   -no neurosurgical intervention  -Repeat HCT for any change in mental status  -Keppra x 1 week  #Acute pain  -APAP ATC   #hx anxiety   -c/w home quetiapine 25mg PO daily  -c/w home sertraline 150mg PO daily   #hx cerebral atrophy   -follows with Dr. Greenberg   -intermittent episodes of confusion over the past few months since May  RESP:   #Oxygenation    -saturating well on RA  -encourage IS   #Activity    -bedrest; hypotensive when standing     CARDS:   #syncope  -EKG on admission NSR  -trop neg x 1   -orthostatics: BP 79/52 when standing  -TTE pending  #hx HTN  -c/w home metoprolol succinate 25mg PO daily     GI/NUTR:   #Diet, Clear Liquid  -SLP eval pending due to hx esophageal motility disorder  -aspiration precautions, HOB 30  #hx GERD   -c/w home pepcid 20mg BID   #hx moderate disordered esophageal motility  -found on endoscopy in 2019  -SLP eval pending  #hx gastroparesis  #Bowel regimen    -not indicated    /RENAL:   #urine output in critically ill  -NS @ 60/hr  -voiding, Q1 hour I&O    Labs:          BUN/Cr- 18/0.7  -->          Electrolytes-Na 140 // K 3.9 // Mg 2.1 //  Phos -- (11-04 @ 16:00       HEME/ONC:   #DVT ppx  -holdingper Nsx, reassess in AM  #  Labs: Hb/Hct:  12.0/37.2  (11-04 @ 16:00)  -->                      Plts:  125  -->                 PTT/INR:  24.6/1.05  --->         ID:  #afebrile  WBC- 6.09  --->>  Temp trend- 24hrs T(F): 98.8 (11-04 @ 13:18), Max: 98.8 (11-04 @ 13:18)      ENDO:   #blood glucose monitoring  -last   -A1C pending  MSK:  #hx triangular fibrocartilage tear s/p L wrist arthroscopy and debridement in 2021  #activity  -PT consult placed  -bedrest  LINES/DRAINS:  PIV    ADVANCED DIRECTIVES:  Full Code    HCP/Emergency Contact-Kane () [phone]    INDICATION FOR SICU/SDU: Nontraumatic subarachnoid hemorrhage             DISPO:   SICU. Case discussed with attending Dr. Hernandez

## 2023-11-05 LAB
ANION GAP SERPL CALCULATED.3IONS-SCNC: 6 MMOL/L (ref 7–15)
BUN SERPL-MCNC: 14.8 MG/DL (ref 8–23)
CALCIUM SERPL-MCNC: 9.3 MG/DL (ref 8.8–10.2)
CHLORIDE SERPL-SCNC: 101 MMOL/L (ref 98–107)
CREAT SERPL-MCNC: 0.66 MG/DL (ref 0.51–1.17)
DEPRECATED HCO3 PLAS-SCNC: 27 MMOL/L (ref 22–29)
EGFRCR SERPLBLD CKD-EPI 2021: >90 ML/MIN/1.73M2
ERYTHROCYTE [DISTWIDTH] IN BLOOD BY AUTOMATED COUNT: 13.8 % (ref 10–15)
GLUCOSE SERPL-MCNC: 107 MG/DL (ref 70–99)
HCT VFR BLD AUTO: 36.8 % (ref 35–53)
HGB BLD-MCNC: 11.7 G/DL (ref 11.7–17.7)
MCH RBC QN AUTO: 29.4 PG (ref 26.5–33)
MCHC RBC AUTO-ENTMCNC: 31.8 G/DL (ref 31.5–36.5)
MCV RBC AUTO: 93 FL (ref 78–100)
PLATELET # BLD AUTO: 317 10E3/UL (ref 150–450)
POTASSIUM SERPL-SCNC: 4.8 MMOL/L (ref 3.4–5.3)
RBC # BLD AUTO: 3.98 10E6/UL (ref 3.8–5.9)
SODIUM SERPL-SCNC: 134 MMOL/L (ref 135–145)
WBC # BLD AUTO: 7.5 10E3/UL (ref 4–11)

## 2023-11-05 PROCEDURE — 82374 ASSAY BLOOD CARBON DIOXIDE: CPT | Performed by: STUDENT IN AN ORGANIZED HEALTH CARE EDUCATION/TRAINING PROGRAM

## 2023-11-05 PROCEDURE — 250N000013 HC RX MED GY IP 250 OP 250 PS 637: Performed by: PHYSICIAN ASSISTANT

## 2023-11-05 PROCEDURE — 250N000013 HC RX MED GY IP 250 OP 250 PS 637

## 2023-11-05 PROCEDURE — 120N000002 HC R&B MED SURG/OB UMMC

## 2023-11-05 PROCEDURE — 250N000011 HC RX IP 250 OP 636: Mod: JZ | Performed by: PHYSICIAN ASSISTANT

## 2023-11-05 PROCEDURE — 85027 COMPLETE CBC AUTOMATED: CPT

## 2023-11-05 PROCEDURE — 99232 SBSQ HOSP IP/OBS MODERATE 35: CPT | Mod: GC | Performed by: STUDENT IN AN ORGANIZED HEALTH CARE EDUCATION/TRAINING PROGRAM

## 2023-11-05 PROCEDURE — 250N000013 HC RX MED GY IP 250 OP 250 PS 637: Performed by: STUDENT IN AN ORGANIZED HEALTH CARE EDUCATION/TRAINING PROGRAM

## 2023-11-05 PROCEDURE — 84132 ASSAY OF SERUM POTASSIUM: CPT | Performed by: STUDENT IN AN ORGANIZED HEALTH CARE EDUCATION/TRAINING PROGRAM

## 2023-11-05 PROCEDURE — 250N000011 HC RX IP 250 OP 636: Mod: JZ | Performed by: STUDENT IN AN ORGANIZED HEALTH CARE EDUCATION/TRAINING PROGRAM

## 2023-11-05 PROCEDURE — 250N000013 HC RX MED GY IP 250 OP 250 PS 637: Performed by: PEDIATRICS

## 2023-11-05 PROCEDURE — 36415 COLL VENOUS BLD VENIPUNCTURE: CPT

## 2023-11-05 RX ORDER — LOPERAMIDE HCL 2 MG
4 CAPSULE ORAL 2 TIMES DAILY
Status: DISCONTINUED | OUTPATIENT
Start: 2023-11-05 | End: 2023-11-08

## 2023-11-05 RX ADMIN — PIPERACILLIN AND TAZOBACTAM 3.38 G: 3; .375 INJECTION, POWDER, LYOPHILIZED, FOR SOLUTION INTRAVENOUS at 06:30

## 2023-11-05 RX ADMIN — NICOTINE POLACRILEX 4 MG: 4 GUM, CHEWING BUCCAL at 16:55

## 2023-11-05 RX ADMIN — ASPIRIN 325 MG ORAL TABLET 325 MG: 325 PILL ORAL at 08:35

## 2023-11-05 RX ADMIN — VARENICLINE 0.5 MG: 0.5 TABLET, FILM COATED ORAL at 08:34

## 2023-11-05 RX ADMIN — PIPERACILLIN AND TAZOBACTAM 3.38 G: 3; .375 INJECTION, POWDER, LYOPHILIZED, FOR SOLUTION INTRAVENOUS at 12:32

## 2023-11-05 RX ADMIN — HYDROMORPHONE HYDROCHLORIDE 4 MG: 2 TABLET ORAL at 12:42

## 2023-11-05 RX ADMIN — CALCIUM 500 MG: 500 TABLET ORAL at 08:35

## 2023-11-05 RX ADMIN — DULOXETINE HYDROCHLORIDE 60 MG: 60 CAPSULE, DELAYED RELEASE ORAL at 08:34

## 2023-11-05 RX ADMIN — TRAZODONE HYDROCHLORIDE 100 MG: 100 TABLET ORAL at 22:55

## 2023-11-05 RX ADMIN — BACLOFEN 10 MG: 5 TABLET ORAL at 19:57

## 2023-11-05 RX ADMIN — HYDROMORPHONE HYDROCHLORIDE 4 MG: 2 TABLET ORAL at 06:40

## 2023-11-05 RX ADMIN — PIPERACILLIN AND TAZOBACTAM 3.38 G: 3; .375 INJECTION, POWDER, LYOPHILIZED, FOR SOLUTION INTRAVENOUS at 00:29

## 2023-11-05 RX ADMIN — LOPERAMIDE HYDROCHLORIDE 4 MG: 2 CAPSULE ORAL at 19:57

## 2023-11-05 RX ADMIN — NICOTINE POLACRILEX 4 MG: 4 GUM, CHEWING BUCCAL at 00:29

## 2023-11-05 RX ADMIN — LOPERAMIDE HYDROCHLORIDE 4 MG: 2 CAPSULE ORAL at 12:27

## 2023-11-05 RX ADMIN — ACETAMINOPHEN 500 MG: 500 TABLET ORAL at 18:05

## 2023-11-05 RX ADMIN — BACLOFEN 10 MG: 5 TABLET ORAL at 08:34

## 2023-11-05 RX ADMIN — PREGABALIN 300 MG: 100 CAPSULE ORAL at 19:57

## 2023-11-05 RX ADMIN — ACETAMINOPHEN 500 MG: 500 TABLET ORAL at 12:42

## 2023-11-05 RX ADMIN — PIPERACILLIN AND TAZOBACTAM 3.38 G: 3; .375 INJECTION, POWDER, LYOPHILIZED, FOR SOLUTION INTRAVENOUS at 18:14

## 2023-11-05 RX ADMIN — FERROUS SULFATE TAB 325 MG (65 MG ELEMENTAL FE) 325 MG: 325 (65 FE) TAB at 18:05

## 2023-11-05 RX ADMIN — ENOXAPARIN SODIUM 40 MG: 40 INJECTION SUBCUTANEOUS at 22:56

## 2023-11-05 RX ADMIN — FERROUS SULFATE TAB 325 MG (65 MG ELEMENTAL FE) 325 MG: 325 (65 FE) TAB at 08:35

## 2023-11-05 RX ADMIN — OXYCODONE HYDROCHLORIDE AND ACETAMINOPHEN 500 MG: 500 TABLET ORAL at 08:35

## 2023-11-05 RX ADMIN — ACETAMINOPHEN 500 MG: 500 TABLET ORAL at 22:55

## 2023-11-05 RX ADMIN — BACLOFEN 10 MG: 5 TABLET ORAL at 12:27

## 2023-11-05 RX ADMIN — PREGABALIN 300 MG: 100 CAPSULE ORAL at 08:33

## 2023-11-05 RX ADMIN — Medication 1 TABLET: at 08:35

## 2023-11-05 RX ADMIN — BACLOFEN 10 MG: 5 TABLET ORAL at 16:50

## 2023-11-05 RX ADMIN — HYDROMORPHONE HYDROCHLORIDE 4 MG: 2 TABLET ORAL at 18:24

## 2023-11-05 RX ADMIN — NICOTINE POLACRILEX 4 MG: 4 GUM, CHEWING BUCCAL at 12:27

## 2023-11-05 ASSESSMENT — ACTIVITIES OF DAILY LIVING (ADL)
ADLS_ACUITY_SCORE: 39
ADLS_ACUITY_SCORE: 42
ADLS_ACUITY_SCORE: 42
ADLS_ACUITY_SCORE: 39
ADLS_ACUITY_SCORE: 42
ADLS_ACUITY_SCORE: 39
ADLS_ACUITY_SCORE: 42
ADLS_ACUITY_SCORE: 39
ADLS_ACUITY_SCORE: 42

## 2023-11-05 NOTE — PLAN OF CARE
"Goal Outcome Evaluation:      Plan of Care Reviewed With: patient    Overall Patient Progress: no change  VS: Blood pressure (!) 147/67, pulse 84, temperature 98.9  F (37.2  C), temperature source Oral, resp. rate 15, height 1.676 m (5' 5.98\"), weight 49.2 kg (108 lb 7.5 oz), SpO2 100%.     O2: Stable on room air, denies chest pain, SOB and dizziness    Output: Has a Urostomy and Colostomy   Last BM: 11/05/23    Activity: Not oob, able to shifted weight in bed   Skin: Pt has AKA with wounds on right great toe, middle toe, medial ankle and heel, R &L IT, scrotum, Rt groin mass and right calf   Pain: Complained pain 7/10, managed with PRN Tylenol and Dilaudid   Neuro: A&Ox4   Dressing: Wound dressings intact   Diet: Regular diet with good appetite    LDA: Right PIV, saline locked   Equipment: Personal items,    Plan: Continue POC.   Additional Info: Colostomy care completed, bag replaced.  Refused HS cares during the shift.  Pt refused Lab draw this morning, reschedule for 0800.              "

## 2023-11-05 NOTE — PROGRESS NOTES
While performing PIV assessment, writer observed infiltration at left PIV site, explained to pt that he will need a new IV access, pt. refused removal and insisted PIV remains.

## 2023-11-05 NOTE — PROGRESS NOTES
"End of shift Summary: See flowsheet for VS and detail assessments.     Assessment: Patient is A&O x4, VSS on room air denied SOB and chest pain.     Output: has urostomy and colostomy. Urostomy out put 750 ml light pink color provider noted during morning round and stated, \"getting better\"., and dark yellow 625cc out put documented at 1900. Large loose stool noted.     Activity:Not out of bed, able to shift weight in be.     Skin: has AKA, wounds on right great toe, middle toe, medial ankle and heel, R &L IT, scrotum, Rt groin mass and right calf.     Pain: C/O right great and middle toe wound site  at 1242 rated 6/10 and around 1820 pain rated 7/10 PRN Tylenol 500mg and 4mg dilaudid given x2 with effective result.     Neuro/CMS: intact.     Dressings/Drains: urostomy, colostomy and wound dressings are intact.        IV: RPIV NS locked.     Additional info: Colostomy bag replaced x1, old bag was full of black very loose stool. Provider noted the loose stool during morning round and placed imodium order. All wound dressing change completed:  light blood drainage  noted on the old dressing of right great toe and middle toe. All other dressing was dry and clean.     Plan: Manage pain, assess wound dressings and  assess for s/s of infection.   "
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
## 2023-11-06 PROCEDURE — 250N000013 HC RX MED GY IP 250 OP 250 PS 637

## 2023-11-06 PROCEDURE — G0463 HOSPITAL OUTPT CLINIC VISIT: HCPCS

## 2023-11-06 PROCEDURE — 99232 SBSQ HOSP IP/OBS MODERATE 35: CPT | Mod: GC

## 2023-11-06 PROCEDURE — 250N000013 HC RX MED GY IP 250 OP 250 PS 637: Performed by: STUDENT IN AN ORGANIZED HEALTH CARE EDUCATION/TRAINING PROGRAM

## 2023-11-06 PROCEDURE — 250N000013 HC RX MED GY IP 250 OP 250 PS 637: Performed by: PEDIATRICS

## 2023-11-06 PROCEDURE — 120N000002 HC R&B MED SURG/OB UMMC

## 2023-11-06 PROCEDURE — 250N000011 HC RX IP 250 OP 636: Mod: JZ | Performed by: PHYSICIAN ASSISTANT

## 2023-11-06 PROCEDURE — 250N000011 HC RX IP 250 OP 636: Mod: JZ | Performed by: STUDENT IN AN ORGANIZED HEALTH CARE EDUCATION/TRAINING PROGRAM

## 2023-11-06 PROCEDURE — 99233 SBSQ HOSP IP/OBS HIGH 50: CPT | Mod: GC | Performed by: STUDENT IN AN ORGANIZED HEALTH CARE EDUCATION/TRAINING PROGRAM

## 2023-11-06 PROCEDURE — 250N000013 HC RX MED GY IP 250 OP 250 PS 637: Performed by: PHYSICIAN ASSISTANT

## 2023-11-06 RX ORDER — HYDROMORPHONE HYDROCHLORIDE 2 MG/1
4 TABLET ORAL EVERY 8 HOURS PRN
Status: DISCONTINUED | OUTPATIENT
Start: 2023-11-06 | End: 2023-11-08

## 2023-11-06 RX ADMIN — VARENICLINE 0.5 MG: 0.5 TABLET, FILM COATED ORAL at 18:55

## 2023-11-06 RX ADMIN — ACETAMINOPHEN 500 MG: 500 TABLET ORAL at 16:47

## 2023-11-06 RX ADMIN — HYDROMORPHONE HYDROCHLORIDE 4 MG: 2 TABLET ORAL at 18:54

## 2023-11-06 RX ADMIN — PIPERACILLIN AND TAZOBACTAM 3.38 G: 3; .375 INJECTION, POWDER, LYOPHILIZED, FOR SOLUTION INTRAVENOUS at 13:50

## 2023-11-06 RX ADMIN — PREGABALIN 300 MG: 100 CAPSULE ORAL at 12:00

## 2023-11-06 RX ADMIN — ACETAMINOPHEN 500 MG: 500 TABLET ORAL at 23:54

## 2023-11-06 RX ADMIN — NICOTINE POLACRILEX 4 MG: 4 GUM, CHEWING BUCCAL at 21:22

## 2023-11-06 RX ADMIN — PIPERACILLIN AND TAZOBACTAM 3.38 G: 3; .375 INJECTION, POWDER, LYOPHILIZED, FOR SOLUTION INTRAVENOUS at 00:09

## 2023-11-06 RX ADMIN — CALCIUM 500 MG: 500 TABLET ORAL at 12:00

## 2023-11-06 RX ADMIN — LOPERAMIDE HYDROCHLORIDE 4 MG: 2 CAPSULE ORAL at 12:46

## 2023-11-06 RX ADMIN — ENOXAPARIN SODIUM 40 MG: 40 INJECTION SUBCUTANEOUS at 22:25

## 2023-11-06 RX ADMIN — LOPERAMIDE HYDROCHLORIDE 4 MG: 2 CAPSULE ORAL at 20:02

## 2023-11-06 RX ADMIN — Medication 1 TABLET: at 11:59

## 2023-11-06 RX ADMIN — ASPIRIN 325 MG ORAL TABLET 325 MG: 325 PILL ORAL at 12:02

## 2023-11-06 RX ADMIN — OXYCODONE HYDROCHLORIDE AND ACETAMINOPHEN 500 MG: 500 TABLET ORAL at 12:01

## 2023-11-06 RX ADMIN — PIPERACILLIN AND TAZOBACTAM 3.38 G: 3; .375 INJECTION, POWDER, LYOPHILIZED, FOR SOLUTION INTRAVENOUS at 05:55

## 2023-11-06 RX ADMIN — BACLOFEN 10 MG: 5 TABLET ORAL at 11:59

## 2023-11-06 RX ADMIN — PIPERACILLIN AND TAZOBACTAM 3.38 G: 3; .375 INJECTION, POWDER, LYOPHILIZED, FOR SOLUTION INTRAVENOUS at 20:02

## 2023-11-06 RX ADMIN — PREGABALIN 300 MG: 100 CAPSULE ORAL at 20:02

## 2023-11-06 RX ADMIN — TRAZODONE HYDROCHLORIDE 100 MG: 100 TABLET ORAL at 23:55

## 2023-11-06 RX ADMIN — VARENICLINE 0.5 MG: 0.5 TABLET, FILM COATED ORAL at 12:00

## 2023-11-06 RX ADMIN — NICOTINE POLACRILEX 4 MG: 4 GUM, CHEWING BUCCAL at 16:47

## 2023-11-06 RX ADMIN — HYDROMORPHONE HYDROCHLORIDE 4 MG: 2 TABLET ORAL at 10:48

## 2023-11-06 RX ADMIN — BACLOFEN 10 MG: 5 TABLET ORAL at 20:02

## 2023-11-06 RX ADMIN — BACLOFEN 10 MG: 5 TABLET ORAL at 16:47

## 2023-11-06 RX ADMIN — HYDROMORPHONE HYDROCHLORIDE 4 MG: 2 TABLET ORAL at 00:16

## 2023-11-06 RX ADMIN — DULOXETINE HYDROCHLORIDE 60 MG: 60 CAPSULE, DELAYED RELEASE ORAL at 11:59

## 2023-11-06 ASSESSMENT — ACTIVITIES OF DAILY LIVING (ADL)
ADLS_ACUITY_SCORE: 39
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
## 2023-11-06 NOTE — PROGRESS NOTES
Shift [id number]:Osteomyelitis of the Right 1st-3rd toe and Left AKA     Summary:Pt currently having IV antibiotics for infection and Surgical intervention might be planned if infection is not resolved  VS:       Pt A/O X 4. Afebrile. VSS. Lungs- Clear bilaterally Denies nausea, shortness of breath, and chest pain.     Output:     Colostomy and urostomy draining to Cueto Bag.      Activity:       Pt up W/C when OOB Pt is independent while in bed Pt is a Paraplegic .  Right leg elevated    Skin:   Right foot,Right ankle and calf wounds Sacral wound uses Mepilex Mass to Right Groin -near right testicle  See WOC note for wound care   Pain:       Has Chronic pain and spasticity and is managed with Dilaudid and Tylenol.      CMS:       CMS and Neuro's are intact. Denies numbness and tingling in all extremities.      Dressing:      Right Foot, Right Ankle, Right Calf and Sacrum  Right groin mass near Right testicle   Diet:       Pt is on a Regular Thin liquids diet Pt was drinking oral overnight.       LDA:       PIV is patent in the Right and Left PIV .      Equipment:        Bilateral heels are elevated off the bed.     Plan:       Pt is able to make needs known and the call light is within the pt's reach. Continue to monitor.   Continue IV antibiotics   Additional Info:        .
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
## 2023-11-06 NOTE — PLAN OF CARE
"[id number]  VS: BP (!) 144/71 (BP Location: Right arm)   Pulse 93   Temp 99.8  F (37.7  C) (Oral)   Resp 18   Ht 1.676 m (5' 5.98\")   Wt 49.2 kg (108 lb 7.5 oz)   SpO2 98%   BMI 17.52 kg/m         O2: 98% on RA. Denies SOB and chest pain.   Output: Urostomy in place. Patent.   Last BM: Colostomy in lace. Patent.   Activity: Ind in bed with shifting weight. Uses wheelchair when OOB    Skin: Wounds to R ankle, R foot, R groin and R calf.   Pain: Managed with PRN medication.   CMS: A&Ox4. Able to make needs known. N/T to BLE.   Dressing: CDI on wounds.   Diet: Regular, tolerating well. Denies N/V.   LDA: R and LPIV.   Equipment: IV pole, Personal Belongings.   Plan: Continue with POC.   Additional Info: Staff entered room to pass AM medications. Pt requested pain medication along with morning medications. Pt educated on hospital policy of spacing out narcotic medications for safety. Pt stated to writer \"I don't care, I don't want to hear it. Stop making up new rules\". Pt reassured that staff was not creating new rules for administering medication and asked if pt would like their morning meds. Pt stated to writer \"I don't care, You can shove them up your ass\". Staff advised to pt that his behavior was unacceptable and staff would return when he is ready to be respectful. Pt told writer \"find me someone worth respecting. Writer left room. [facility] team aware.     "

## 2023-11-06 NOTE — PROGRESS NOTES
"  VS: BP (!) 144/71 (BP Location: Right arm)   Pulse 93   Temp 99.8  F (37.7  C) (Oral)   Resp 18   Ht 1.676 m (5' 5.98\")   Wt 49.2 kg (108 lb 7.5 oz)   SpO2 98%   BMI 17.52 kg/m      O2: Stable on RA, no complaints of SOB or chest pain.   Output: Urostomy in place.   Last BM: 11/6/23, patient has colostomy.   Activity: WC bound. Not OOB this shift. Can reposition independently in bed.    Skin: Wounds to R ankle, R foot, & calf. Wounds on sacrum. Wound on R groin area.    Pain: Managed with Dilaudid & Tylenol   CMS: A&Ox4   Dressing: Wound dressings changed this shift per order.   Diet: Regular. No complaints of nausea or vomiting.   LDA: L PIV, SL   Equipment: IV pole & personal items   Plan: Continue POC   Additional Info: @2859 Writer went in to give Zosyn abx. R PIV was leaking, patient stated he is difficult to get IV in. Flyer placed new IV @9960.       "

## 2023-11-06 NOTE — PLAN OF CARE
Goal Outcome Evaluation:  Temp: 98.2  F (36.8  C) Temp src: Oral BP: 102/55 Pulse: 67   Resp: 18 SpO2: 98 % O2 Device: None (Room air)    Patient is alert and oriented X4, on room air, no cough noted, denies SOB, denies chest pain, clear LS, able to verbalize needs appropriately, pleasant. LDA's urostomy, colostomy all patent, dressing is intact, no changes, denies pain at the moment, bedfast this shift, expresses no concerns. Continue with POC

## 2023-11-07 ENCOUNTER — HOME INFUSION (PRE-WILLOW HOME INFUSION) (OUTPATIENT)
Dept: PHARMACY | Facility: CLINIC | Age: 58
End: 2023-11-07
Payer: MEDICARE

## 2023-11-07 DIAGNOSIS — I73.9 PVD (PERIPHERAL VASCULAR DISEASE) (H): Primary | ICD-10-CM

## 2023-11-07 LAB
ANION GAP SERPL CALCULATED.3IONS-SCNC: 7 MMOL/L (ref 7–15)
BUN SERPL-MCNC: 18.4 MG/DL (ref 8–23)
CALCIUM SERPL-MCNC: 9.5 MG/DL (ref 8.8–10.2)
CHLORIDE SERPL-SCNC: 103 MMOL/L (ref 98–107)
CREAT SERPL-MCNC: 0.69 MG/DL (ref 0.51–1.17)
CRP SERPL-MCNC: 63.17 MG/L
DEPRECATED HCO3 PLAS-SCNC: 28 MMOL/L (ref 22–29)
EGFRCR SERPLBLD CKD-EPI 2021: >90 ML/MIN/1.73M2
ERYTHROCYTE [DISTWIDTH] IN BLOOD BY AUTOMATED COUNT: 13.8 % (ref 10–15)
GLUCOSE SERPL-MCNC: 82 MG/DL (ref 70–99)
HCT VFR BLD AUTO: 39 % (ref 35–53)
HGB BLD-MCNC: 12.2 G/DL (ref 11.7–17.7)
MCH RBC QN AUTO: 28.9 PG (ref 26.5–33)
MCHC RBC AUTO-ENTMCNC: 31.3 G/DL (ref 31.5–36.5)
MCV RBC AUTO: 92 FL (ref 78–100)
PLATELET # BLD AUTO: 307 10E3/UL (ref 150–450)
POTASSIUM SERPL-SCNC: 4 MMOL/L (ref 3.4–5.3)
RBC # BLD AUTO: 4.22 10E6/UL (ref 3.8–5.9)
SODIUM SERPL-SCNC: 138 MMOL/L (ref 135–145)
WBC # BLD AUTO: 5.2 10E3/UL (ref 4–11)

## 2023-11-07 PROCEDURE — 36415 COLL VENOUS BLD VENIPUNCTURE: CPT | Performed by: STUDENT IN AN ORGANIZED HEALTH CARE EDUCATION/TRAINING PROGRAM

## 2023-11-07 PROCEDURE — 250N000013 HC RX MED GY IP 250 OP 250 PS 637

## 2023-11-07 PROCEDURE — 120N000002 HC R&B MED SURG/OB UMMC

## 2023-11-07 PROCEDURE — 99232 SBSQ HOSP IP/OBS MODERATE 35: CPT | Mod: GC

## 2023-11-07 PROCEDURE — 250N000013 HC RX MED GY IP 250 OP 250 PS 637: Performed by: STUDENT IN AN ORGANIZED HEALTH CARE EDUCATION/TRAINING PROGRAM

## 2023-11-07 PROCEDURE — 250N000013 HC RX MED GY IP 250 OP 250 PS 637: Performed by: PHYSICIAN ASSISTANT

## 2023-11-07 PROCEDURE — 85027 COMPLETE CBC AUTOMATED: CPT | Performed by: STUDENT IN AN ORGANIZED HEALTH CARE EDUCATION/TRAINING PROGRAM

## 2023-11-07 PROCEDURE — 86140 C-REACTIVE PROTEIN: CPT | Performed by: STUDENT IN AN ORGANIZED HEALTH CARE EDUCATION/TRAINING PROGRAM

## 2023-11-07 PROCEDURE — 250N000011 HC RX IP 250 OP 636: Mod: JZ | Performed by: STUDENT IN AN ORGANIZED HEALTH CARE EDUCATION/TRAINING PROGRAM

## 2023-11-07 PROCEDURE — 250N000011 HC RX IP 250 OP 636: Mod: JZ | Performed by: PHYSICIAN ASSISTANT

## 2023-11-07 PROCEDURE — 82310 ASSAY OF CALCIUM: CPT | Performed by: STUDENT IN AN ORGANIZED HEALTH CARE EDUCATION/TRAINING PROGRAM

## 2023-11-07 PROCEDURE — 250N000013 HC RX MED GY IP 250 OP 250 PS 637: Performed by: PEDIATRICS

## 2023-11-07 RX ORDER — VARENICLINE TARTRATE 0.5 MG/1
1 TABLET, FILM COATED ORAL 2 TIMES DAILY WITH MEALS
Status: DISCONTINUED | OUTPATIENT
Start: 2023-11-07 | End: 2023-11-09 | Stop reason: HOSPADM

## 2023-11-07 RX ADMIN — PIPERACILLIN AND TAZOBACTAM 3.38 G: 3; .375 INJECTION, POWDER, LYOPHILIZED, FOR SOLUTION INTRAVENOUS at 14:46

## 2023-11-07 RX ADMIN — BACLOFEN 10 MG: 5 TABLET ORAL at 16:49

## 2023-11-07 RX ADMIN — OXYCODONE HYDROCHLORIDE AND ACETAMINOPHEN 500 MG: 500 TABLET ORAL at 08:19

## 2023-11-07 RX ADMIN — VARENICLINE 1 MG: 0.5 TABLET, FILM COATED ORAL at 18:48

## 2023-11-07 RX ADMIN — BACLOFEN 10 MG: 5 TABLET ORAL at 12:08

## 2023-11-07 RX ADMIN — TRAZODONE HYDROCHLORIDE 100 MG: 100 TABLET ORAL at 21:41

## 2023-11-07 RX ADMIN — PIPERACILLIN AND TAZOBACTAM 3.38 G: 3; .375 INJECTION, POWDER, LYOPHILIZED, FOR SOLUTION INTRAVENOUS at 08:11

## 2023-11-07 RX ADMIN — PIPERACILLIN AND TAZOBACTAM 3.38 G: 3; .375 INJECTION, POWDER, LYOPHILIZED, FOR SOLUTION INTRAVENOUS at 02:11

## 2023-11-07 RX ADMIN — PIPERACILLIN AND TAZOBACTAM 3.38 G: 3; .375 INJECTION, POWDER, LYOPHILIZED, FOR SOLUTION INTRAVENOUS at 21:41

## 2023-11-07 RX ADMIN — NICOTINE POLACRILEX 4 MG: 4 GUM, CHEWING BUCCAL at 21:44

## 2023-11-07 RX ADMIN — PREGABALIN 300 MG: 100 CAPSULE ORAL at 21:42

## 2023-11-07 RX ADMIN — VARENICLINE 0.5 MG: 0.5 TABLET, FILM COATED ORAL at 08:18

## 2023-11-07 RX ADMIN — LOPERAMIDE HYDROCHLORIDE 4 MG: 2 CAPSULE ORAL at 21:42

## 2023-11-07 RX ADMIN — DULOXETINE HYDROCHLORIDE 60 MG: 60 CAPSULE, DELAYED RELEASE ORAL at 08:19

## 2023-11-07 RX ADMIN — CALCIUM 500 MG: 500 TABLET ORAL at 08:16

## 2023-11-07 RX ADMIN — HYDROMORPHONE HYDROCHLORIDE 4 MG: 2 TABLET ORAL at 10:09

## 2023-11-07 RX ADMIN — LOPERAMIDE HYDROCHLORIDE 4 MG: 2 CAPSULE ORAL at 08:19

## 2023-11-07 RX ADMIN — Medication 1 TABLET: at 08:16

## 2023-11-07 RX ADMIN — PREGABALIN 300 MG: 100 CAPSULE ORAL at 08:15

## 2023-11-07 RX ADMIN — ACETAMINOPHEN 500 MG: 500 TABLET ORAL at 10:10

## 2023-11-07 RX ADMIN — HYDROMORPHONE HYDROCHLORIDE 4 MG: 2 TABLET ORAL at 18:48

## 2023-11-07 RX ADMIN — BACLOFEN 10 MG: 5 TABLET ORAL at 08:16

## 2023-11-07 RX ADMIN — BACLOFEN 10 MG: 5 TABLET ORAL at 21:41

## 2023-11-07 RX ADMIN — NICOTINE POLACRILEX 4 MG: 4 GUM, CHEWING BUCCAL at 00:18

## 2023-11-07 RX ADMIN — ASPIRIN 325 MG ORAL TABLET 325 MG: 325 PILL ORAL at 08:18

## 2023-11-07 RX ADMIN — ACETAMINOPHEN 500 MG: 500 TABLET ORAL at 16:49

## 2023-11-07 RX ADMIN — ENOXAPARIN SODIUM 40 MG: 40 INJECTION SUBCUTANEOUS at 21:41

## 2023-11-07 ASSESSMENT — ACTIVITIES OF DAILY LIVING (ADL)
ADLS_ACUITY_SCORE: 39

## 2023-11-07 NOTE — PROGRESS NOTES
Therapy: IV abx  [place]: [place]     [place], [place] is 100% with a monthly ded of $3.80. He may also be responsible for a copay per dispense on the drug through his pharmacy plan.     In reference to admission on 10/30/23 to check IV abx coverage    Please contact Intake with any questions, [phone] or In Basket pool,  [facility] ([phone]).

## 2023-11-07 NOTE — PROGRESS NOTES
Care Management Follow Up    Length of Stay (days): 8    Expected Discharge Date: 11/10/2023     Concerns to be Addressed: other (see comments) (Patient is already opened to a [employer] [facility]:  [phone] and [phone])     Patient plan of care discussed at interdisciplinary rounds: Yes    Anticipated Discharge Disposition: Group Home v TCU     Anticipated Discharge Services: None  Anticipated Discharge DME: None    Patient/family educated on Medicare website which has current facility and service quality ratings: no (N/A at this time)  Education Provided on the Discharge Plan: Not by this writer (plan TBD)   Patient/Family in Agreement with the Plan: yes    Referrals Placed by CM/SW:  [facility] (cancelled)  Private pay costs discussed: Not applicable    Additional Information:    [facility]  [address]   [place] [address]   P: [phone]    [email or web address]  Ph: [phone]  _____________________________________________    Case discussed w/ provider this AM.  Provider stated that vascular team input is still needed.  Provider stated that regardless of vascular recs, pt is likely to require IV abx at discharge.  NA TBD.    11:23am - Called pt's group home, left VM on confidential VM requesting call back to discuss pt.  Intend to inquire if pt is able to have home infusion services and assist from staff with IV abx administration, if needed.    Sent a referral to [facility] for an insurance benefit check in the event patient will need IV abx at discharge.  Indicated that wound care is anticipated to be needed at discharge (see WOC note for current care needs).    Note from previous RNCC indicates pt is open to home care for skilled nursing, but does not indicate what specifically the nurse does.  Called [email or web address] at [phone], spoke w/ Shivani in intake.  She confirmed pt is open to their services for  skilled nursing, could not clarify what specifically they see him for.  She stated nursing is in the field so is currently unavailable to discuss what they provide for pt, took this writer's contact info down to have a nurse call when available.    Received update from [facility] liaison that [facility] reached out to [facility] and was informed pt cannot return to  if he requires IV abx at discharge.  Referral w/ I cancelled.    In the event pt requires IV abx at discharge, TCU would be needed.          Care mgmt will continue to follow.    Collin Robb RNCC  [facility]  Units 8M/S & 10 ICU  Pager: [phone]  Phone: [phone]

## 2023-11-07 NOTE — PLAN OF CARE
Goal Outcome Evaluation:      Plan of Care Reviewed With: patient    Overall Patient Progress: no change   Ax4 X 4, Denies SOB, CP.Stable on room air. Paraplegia, no sensation below chest. Pain managed with PRN Tylenol. L PIV Saline locked between antibiotics. All dressings CDI.  Urostomy and colostomy  patent. Call light within reach.  Makes needs known. Count with POC.
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
## 2023-11-07 NOTE — PROGRESS NOTES
A/Ox's 4. Pt denied pain. Dressings CDI. Pt has many wounds with different dressing changing frequencies.  CMS to baseline. IV ABX given per plan of care.  Tolerated regular diet. Denied any nausea, CP, SOB, lightheadedness or dizziness. Pt has a urostomy and a colostomy. Urostomy dressing changed per patient. Up with lift assist, Pt did not get OOB. Resting in bed at this time with call light in reach. Able to make needs known. Continue to monitor.

## 2023-11-08 VITALS
SYSTOLIC BLOOD PRESSURE: 137 MMHG | WEIGHT: 108.47 LBS | DIASTOLIC BLOOD PRESSURE: 69 MMHG | TEMPERATURE: 98.2 F | HEART RATE: 71 BPM | HEIGHT: 66 IN | OXYGEN SATURATION: 98 % | BODY MASS INDEX: 17.43 KG/M2 | RESPIRATION RATE: 16 BRPM

## 2023-11-08 PROCEDURE — 99233 SBSQ HOSP IP/OBS HIGH 50: CPT | Performed by: STUDENT IN AN ORGANIZED HEALTH CARE EDUCATION/TRAINING PROGRAM

## 2023-11-08 PROCEDURE — 250N000013 HC RX MED GY IP 250 OP 250 PS 637

## 2023-11-08 PROCEDURE — 250N000013 HC RX MED GY IP 250 OP 250 PS 637: Performed by: STUDENT IN AN ORGANIZED HEALTH CARE EDUCATION/TRAINING PROGRAM

## 2023-11-08 PROCEDURE — 250N000013 HC RX MED GY IP 250 OP 250 PS 637: Performed by: PHYSICIAN ASSISTANT

## 2023-11-08 PROCEDURE — 250N000011 HC RX IP 250 OP 636: Mod: JZ | Performed by: PHYSICIAN ASSISTANT

## 2023-11-08 PROCEDURE — 99231 SBSQ HOSP IP/OBS SF/LOW 25: CPT | Mod: GC

## 2023-11-08 PROCEDURE — 250N000011 HC RX IP 250 OP 636: Mod: JZ | Performed by: STUDENT IN AN ORGANIZED HEALTH CARE EDUCATION/TRAINING PROGRAM

## 2023-11-08 PROCEDURE — 120N000002 HC R&B MED SURG/OB UMMC

## 2023-11-08 PROCEDURE — 250N000013 HC RX MED GY IP 250 OP 250 PS 637: Performed by: PEDIATRICS

## 2023-11-08 RX ORDER — HYDROMORPHONE HYDROCHLORIDE 2 MG/1
4 TABLET ORAL 2 TIMES DAILY PRN
Status: DISCONTINUED | OUTPATIENT
Start: 2023-11-08 | End: 2023-11-09 | Stop reason: HOSPADM

## 2023-11-08 RX ORDER — LOPERAMIDE HCL 2 MG
4 CAPSULE ORAL 3 TIMES DAILY
Status: DISCONTINUED | OUTPATIENT
Start: 2023-11-08 | End: 2023-11-09 | Stop reason: HOSPADM

## 2023-11-08 RX ORDER — ATORVASTATIN CALCIUM 40 MG/1
40 TABLET, FILM COATED ORAL EVERY EVENING
Status: DISCONTINUED | OUTPATIENT
Start: 2023-11-08 | End: 2023-11-09 | Stop reason: HOSPADM

## 2023-11-08 RX ORDER — SODIUM CHLORIDE 9 MG/ML
INJECTION, SOLUTION INTRAVENOUS
Status: DISPENSED
Start: 2023-11-08 | End: 2023-11-08

## 2023-11-08 RX ADMIN — PREGABALIN 300 MG: 100 CAPSULE ORAL at 08:56

## 2023-11-08 RX ADMIN — CALCIUM 500 MG: 500 TABLET ORAL at 08:49

## 2023-11-08 RX ADMIN — VARENICLINE 1 MG: 0.5 TABLET, FILM COATED ORAL at 17:44

## 2023-11-08 RX ADMIN — PIPERACILLIN AND TAZOBACTAM 3.38 G: 3; .375 INJECTION, POWDER, LYOPHILIZED, FOR SOLUTION INTRAVENOUS at 08:46

## 2023-11-08 RX ADMIN — HYDROMORPHONE HYDROCHLORIDE 4 MG: 2 TABLET ORAL at 16:15

## 2023-11-08 RX ADMIN — LOPERAMIDE HYDROCHLORIDE 4 MG: 2 CAPSULE ORAL at 13:53

## 2023-11-08 RX ADMIN — BACLOFEN 10 MG: 5 TABLET ORAL at 08:56

## 2023-11-08 RX ADMIN — NICOTINE POLACRILEX 4 MG: 4 GUM, CHEWING BUCCAL at 13:38

## 2023-11-08 RX ADMIN — PIPERACILLIN AND TAZOBACTAM 3.38 G: 3; .375 INJECTION, POWDER, LYOPHILIZED, FOR SOLUTION INTRAVENOUS at 03:11

## 2023-11-08 RX ADMIN — PIPERACILLIN AND TAZOBACTAM 3.38 G: 3; .375 INJECTION, POWDER, LYOPHILIZED, FOR SOLUTION INTRAVENOUS at 21:13

## 2023-11-08 RX ADMIN — VARENICLINE 1 MG: 0.5 TABLET, FILM COATED ORAL at 08:56

## 2023-11-08 RX ADMIN — Medication 1 TABLET: at 08:56

## 2023-11-08 RX ADMIN — BACLOFEN 10 MG: 5 TABLET ORAL at 16:14

## 2023-11-08 RX ADMIN — PREGABALIN 300 MG: 100 CAPSULE ORAL at 21:13

## 2023-11-08 RX ADMIN — LOPERAMIDE HYDROCHLORIDE 4 MG: 2 CAPSULE ORAL at 21:12

## 2023-11-08 RX ADMIN — DULOXETINE HYDROCHLORIDE 60 MG: 60 CAPSULE, DELAYED RELEASE ORAL at 08:56

## 2023-11-08 RX ADMIN — OXYCODONE HYDROCHLORIDE AND ACETAMINOPHEN 500 MG: 500 TABLET ORAL at 08:56

## 2023-11-08 RX ADMIN — HYDROMORPHONE HYDROCHLORIDE 4 MG: 2 TABLET ORAL at 09:42

## 2023-11-08 RX ADMIN — ACETAMINOPHEN 500 MG: 500 TABLET ORAL at 16:14

## 2023-11-08 RX ADMIN — ENOXAPARIN SODIUM 40 MG: 40 INJECTION SUBCUTANEOUS at 21:13

## 2023-11-08 RX ADMIN — ATORVASTATIN CALCIUM 40 MG: 40 TABLET, FILM COATED ORAL at 21:13

## 2023-11-08 RX ADMIN — ASPIRIN 325 MG ORAL TABLET 325 MG: 325 PILL ORAL at 08:56

## 2023-11-08 RX ADMIN — ACETAMINOPHEN 500 MG: 500 TABLET ORAL at 09:42

## 2023-11-08 RX ADMIN — BACLOFEN 10 MG: 5 TABLET ORAL at 12:55

## 2023-11-08 RX ADMIN — PIPERACILLIN AND TAZOBACTAM 3.38 G: 3; .375 INJECTION, POWDER, LYOPHILIZED, FOR SOLUTION INTRAVENOUS at 13:53

## 2023-11-08 RX ADMIN — BACLOFEN 10 MG: 5 TABLET ORAL at 21:12

## 2023-11-08 RX ADMIN — LOPERAMIDE HYDROCHLORIDE 4 MG: 2 CAPSULE ORAL at 08:56

## 2023-11-08 RX ADMIN — TRAZODONE HYDROCHLORIDE 100 MG: 100 TABLET ORAL at 23:25

## 2023-11-08 ASSESSMENT — ACTIVITIES OF DAILY LIVING (ADL)
ADLS_ACUITY_SCORE: 39

## 2023-11-08 NOTE — CONSULTS
Brief Care Management Note:    Consult acknowledged - Care mgmt is actively following, and will continue to follow, this patient's case.  See RNCC progress note(s) for details.      Collin Robb, RNBROOKLYN  [facility]  Units 8M/S & 10 ICU  Pager: [phone]  Phone: [phone]

## 2023-11-08 NOTE — PLAN OF CARE
"Goal Outcome Evaluation:       Shift: 1900 - 0730    Overall Patient Progress: no changeOverall Patient Progress: no change  BP (!) 142/65 (BP Location: Left arm)   Pulse 67   Temp 99.6  F (37.6  C) (Oral)   Resp 16   Ht 1.676 m (5' 5.98\")   Wt 49.2 kg (108 lb 7.5 oz)   SpO2 99%   BMI 17.52 kg/m      Outcome Evaluation: Patient is alert and oriented, refused assessment this shift and only wanted his medicatons. Upset about the writer not bring his  nicorettet gum and had to wait, even though writer was in another patient's room passing medication. Pt observed sleeping in between care. Emptied 950 ml of urine with mucous  from urostomy bad. Pt is able to make needs known, call light is in reach, continue with POC.      "

## 2023-11-08 NOTE — PROGRESS NOTES
Care Management Follow Up    Length of Stay (days): 9    Expected Discharge Date: 11/10/2023     Concerns to be Addressed: other (see comments) (Patient is already opened to a community home health company-[employer]:  [phone] and [phone])     Patient plan of care discussed at interdisciplinary rounds: Yes    Anticipated Discharge Disposition: Group Home v TCU     Anticipated Discharge Services: None  Anticipated Discharge DME: None    Patient/family educated on Medicare website which has current facility and service quality ratings: no (N/A at this time)  Education Provided on the Discharge Plan: Not by this writer (plan TBD)   Patient/Family in Agreement with the Plan: TBD - plan pending    Referrals Placed by CM/SW:  [facility] (cancelled)  Private pay costs discussed: Not applicable    Additional Information:    [facility]  [address]   [facility] [address]   P: [phone]    [employer]  Ph: [phone]    DINESH  w/ [facility]  Ph: [phone]  _____________________________________________    Provider updated care mgmt that pt has an appt w/ vascular at the [facility] today at 1:00pm.  NA TBD.    2:33pm - Received call from Jessika pt's CADI  from [employer].  She requested general update, this writer provided one, explained that pt would require TCU in the event he needs IV abx at discharge, as his group home cannot manage home infusion.  She expressed understanding, requested this writer's direct line in the event she needed further updates, this writer provided it.  She had no further questions for this writer and stated she would reach out to the insurance care coordinator to update them.  She provided the following call back number: [phone].    2:47pm - Received call from nurse Shena w/ [employer]. (returned this writer's call from yesterday).  She clarified that pt receives Monday/Wednesday/Friday wound cares  from home care nurse.  She requested update, this writer provided general update, explained that pt would require TCU in the event he needs IV abx at discharge, explained that final abx plan and vascular plan is pending.  She expressed understanding, had no further questions, and stated the home care agency will follow along in the chart.  RNCC to update [facility] once discharge plan is known.          Care mgmt will continue to follow.    FERNANDO Negron  [facility]  Units 8M/S & 10 ICU  Pager: [phone]  Phone: [phone]
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
## 2023-11-08 NOTE — PLAN OF CARE
"Goal Outcome Evaluation:      Plan of Care Reviewed With: patient    Overall Patient Progress: improvingOverall Patient Progress: improving         VS: BP (!) 142/65 (BP Location: Left arm)   Pulse 67   Temp 99.6  F (37.6  C) (Oral)   Resp 16   Ht 1.676 m (5' 5.98\")   Wt 49.2 kg (108 lb 7.5 oz)   SpO2 99%   BMI 17.52 kg/m      O2: Stable on RA, no complaints of SOB or chest pain.   Output: Urostomy in place. Patent.   Last BM: 11/7/23, has colostomy. Patent.   Activity: WC bound. Not OOB this shift. Can reposition independently in bed.    Skin: Wounds to R ankle, R foot, & R calf. Wound on sacrum. Wound on R groin area.   Pain: Managed with PRN Tylenol & Dilaudid.   CMS: A&Ox4   Dressing: Dressings done this shift per dressing order.   Diet: Regular. No complaints of nausea or vomiting.   LDA: L PIV, SL   Equipment: IV pole & personal items   Plan: Continue POC   Additional Info: Paraplegic patient, no sensation below chest.         "
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
## 2023-11-08 NOTE — PROGRESS NOTES
"  VS: /69 (BP Location: Left arm)   Pulse 89   Temp 97.6  F (36.4  C) (Oral)   Resp 16   Ht 1.676 m (5' 5.98\")   Wt 49.2 kg (108 lb 7.5 oz)   SpO2 98%   BMI 17.52 kg/m     Output/Last BM: Urostomy and Colostomy   Activity: Bedfast   Skin/Dressing: See WOC RN note   Pain: Chronic ongoing pain, prn dilaudid and tylenol x2   CMS: A/Ox4   Diet: Regular diet, good appetite   LDA: PIV to LUE, saline locked   Equipment:    Plan: TCU pending   Additional Info:      Goal Outcome Evaluation:  Plan of Care Reviewed With: patient  Overall Patient Progress: No Change    "
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
## 2023-11-09 DIAGNOSIS — I73.9 PAD (PERIPHERAL ARTERY DISEASE) (H): Primary | ICD-10-CM

## 2023-11-09 LAB
ANION GAP SERPL CALCULATED.3IONS-SCNC: 5 MMOL/L (ref 7–15)
BUN SERPL-MCNC: 17.7 MG/DL (ref 8–23)
CALCIUM SERPL-MCNC: 9.5 MG/DL (ref 8.8–10.2)
CHLORIDE SERPL-SCNC: 103 MMOL/L (ref 98–107)
CREAT SERPL-MCNC: 0.68 MG/DL (ref 0.51–1.17)
CRP SERPL-MCNC: 41.47 MG/L
DEPRECATED HCO3 PLAS-SCNC: 29 MMOL/L (ref 22–29)
EGFRCR SERPLBLD CKD-EPI 2021: >90 ML/MIN/1.73M2
ERYTHROCYTE [DISTWIDTH] IN BLOOD BY AUTOMATED COUNT: 13.4 % (ref 10–15)
GLUCOSE SERPL-MCNC: 98 MG/DL (ref 70–99)
HCT VFR BLD AUTO: 37.8 % (ref 35–53)
HGB BLD-MCNC: 12.2 G/DL (ref 11.7–17.7)
MCH RBC QN AUTO: 29 PG (ref 26.5–33)
MCHC RBC AUTO-ENTMCNC: 32.3 G/DL (ref 31.5–36.5)
MCV RBC AUTO: 90 FL (ref 78–100)
PLATELET # BLD AUTO: 290 10E3/UL (ref 150–450)
POTASSIUM SERPL-SCNC: 4.6 MMOL/L (ref 3.4–5.3)
RBC # BLD AUTO: 4.2 10E6/UL (ref 3.8–5.9)
SODIUM SERPL-SCNC: 137 MMOL/L (ref 135–145)
WBC # BLD AUTO: 6.4 10E3/UL (ref 4–11)

## 2023-11-09 PROCEDURE — 250N000013 HC RX MED GY IP 250 OP 250 PS 637: Performed by: STUDENT IN AN ORGANIZED HEALTH CARE EDUCATION/TRAINING PROGRAM

## 2023-11-09 PROCEDURE — 86140 C-REACTIVE PROTEIN: CPT | Performed by: STUDENT IN AN ORGANIZED HEALTH CARE EDUCATION/TRAINING PROGRAM

## 2023-11-09 PROCEDURE — 99239 HOSP IP/OBS DSCHRG MGMT >30: CPT | Mod: GC

## 2023-11-09 PROCEDURE — 36415 COLL VENOUS BLD VENIPUNCTURE: CPT | Performed by: STUDENT IN AN ORGANIZED HEALTH CARE EDUCATION/TRAINING PROGRAM

## 2023-11-09 PROCEDURE — 85027 COMPLETE CBC AUTOMATED: CPT | Performed by: STUDENT IN AN ORGANIZED HEALTH CARE EDUCATION/TRAINING PROGRAM

## 2023-11-09 PROCEDURE — 250N000013 HC RX MED GY IP 250 OP 250 PS 637: Performed by: PHYSICIAN ASSISTANT

## 2023-11-09 PROCEDURE — 80048 BASIC METABOLIC PNL TOTAL CA: CPT | Performed by: STUDENT IN AN ORGANIZED HEALTH CARE EDUCATION/TRAINING PROGRAM

## 2023-11-09 PROCEDURE — 250N000013 HC RX MED GY IP 250 OP 250 PS 637

## 2023-11-09 PROCEDURE — 250N000011 HC RX IP 250 OP 636: Mod: JZ | Performed by: STUDENT IN AN ORGANIZED HEALTH CARE EDUCATION/TRAINING PROGRAM

## 2023-11-09 RX ORDER — HYDROMORPHONE HYDROCHLORIDE 2 MG/1
2 TABLET ORAL EVERY 6 HOURS PRN
Qty: 12 TABLET | Refills: 0 | Status: SHIPPED | OUTPATIENT
Start: 2023-11-09 | End: 2023-11-09

## 2023-11-09 RX ORDER — ATORVASTATIN CALCIUM 40 MG/1
40 TABLET, FILM COATED ORAL EVERY EVENING
Qty: 30 TABLET | Refills: 0 | Status: ON HOLD | OUTPATIENT
Start: 2023-11-09 | End: 2024-06-21

## 2023-11-09 RX ORDER — ONDANSETRON 4 MG/1
4 TABLET, ORALLY DISINTEGRATING ORAL EVERY 6 HOURS PRN
Qty: 30 TABLET | Refills: 0 | Status: SHIPPED | OUTPATIENT
Start: 2023-11-09 | End: 2024-03-06

## 2023-11-09 RX ORDER — LOPERAMIDE HCL 2 MG
4 CAPSULE ORAL 3 TIMES DAILY PRN
Qty: 84 CAPSULE | Refills: 0 | Status: SHIPPED | OUTPATIENT
Start: 2023-11-09 | End: 2024-01-02

## 2023-11-09 RX ORDER — VARENICLINE TARTRATE 1 MG/1
1 TABLET, FILM COATED ORAL 2 TIMES DAILY WITH MEALS
Qty: 60 TABLET | Refills: 0 | Status: SHIPPED | OUTPATIENT
Start: 2023-11-09 | End: 2024-01-02

## 2023-11-09 RX ORDER — ONDANSETRON 4 MG/1
4 TABLET, ORALLY DISINTEGRATING ORAL EVERY 6 HOURS PRN
Qty: 30 TABLET | Refills: 0 | Status: SHIPPED | OUTPATIENT
Start: 2023-11-09 | End: 2023-11-09

## 2023-11-09 RX ORDER — VARENICLINE TARTRATE 1 MG/1
1 TABLET, FILM COATED ORAL 2 TIMES DAILY WITH MEALS
Qty: 60 TABLET | Refills: 0 | Status: SHIPPED | OUTPATIENT
Start: 2023-11-09 | End: 2023-11-09

## 2023-11-09 RX ORDER — LOPERAMIDE HCL 2 MG
4 CAPSULE ORAL 3 TIMES DAILY PRN
Qty: 84 CAPSULE | Refills: 0 | Status: SHIPPED | OUTPATIENT
Start: 2023-11-09 | End: 2023-11-09

## 2023-11-09 RX ORDER — DOXYCYCLINE 100 MG/1
100 CAPSULE ORAL 2 TIMES DAILY
Qty: 60 CAPSULE | Refills: 0 | Status: SHIPPED | OUTPATIENT
Start: 2023-11-09 | End: 2023-11-09

## 2023-11-09 RX ORDER — DOXYCYCLINE 100 MG/1
100 CAPSULE ORAL 2 TIMES DAILY
Qty: 42 CAPSULE | Refills: 0 | Status: SHIPPED | OUTPATIENT
Start: 2023-11-09 | End: 2023-11-09

## 2023-11-09 RX ORDER — HYDROMORPHONE HYDROCHLORIDE 2 MG/1
2 TABLET ORAL EVERY 6 HOURS PRN
Qty: 12 TABLET | Refills: 0 | Status: SHIPPED | OUTPATIENT
Start: 2023-11-09 | End: 2024-03-06

## 2023-11-09 RX ORDER — DOXYCYCLINE 100 MG/1
100 CAPSULE ORAL 2 TIMES DAILY
Qty: 60 CAPSULE | Refills: 0 | Status: ON HOLD | OUTPATIENT
Start: 2023-11-09 | End: 2024-06-21

## 2023-11-09 RX ORDER — HYDROMORPHONE HYDROCHLORIDE 2 MG/1
4 TABLET ORAL
Status: COMPLETED | OUTPATIENT
Start: 2023-11-09 | End: 2023-11-09

## 2023-11-09 RX ADMIN — PREGABALIN 300 MG: 100 CAPSULE ORAL at 08:30

## 2023-11-09 RX ADMIN — PIPERACILLIN AND TAZOBACTAM 3.38 G: 3; .375 INJECTION, POWDER, LYOPHILIZED, FOR SOLUTION INTRAVENOUS at 02:10

## 2023-11-09 RX ADMIN — ACETAMINOPHEN 500 MG: 500 TABLET ORAL at 13:59

## 2023-11-09 RX ADMIN — LOPERAMIDE HYDROCHLORIDE 4 MG: 2 CAPSULE ORAL at 13:59

## 2023-11-09 RX ADMIN — PIPERACILLIN AND TAZOBACTAM 3.38 G: 3; .375 INJECTION, POWDER, LYOPHILIZED, FOR SOLUTION INTRAVENOUS at 08:30

## 2023-11-09 RX ADMIN — LOPERAMIDE HYDROCHLORIDE 4 MG: 2 CAPSULE ORAL at 08:30

## 2023-11-09 RX ADMIN — Medication 1 TABLET: at 08:30

## 2023-11-09 RX ADMIN — HYDROMORPHONE HYDROCHLORIDE 4 MG: 2 TABLET ORAL at 08:38

## 2023-11-09 RX ADMIN — CALCIUM 500 MG: 500 TABLET ORAL at 08:30

## 2023-11-09 RX ADMIN — ASPIRIN 325 MG ORAL TABLET 325 MG: 325 PILL ORAL at 08:30

## 2023-11-09 RX ADMIN — BACLOFEN 10 MG: 5 TABLET ORAL at 12:02

## 2023-11-09 RX ADMIN — DULOXETINE HYDROCHLORIDE 60 MG: 60 CAPSULE, DELAYED RELEASE ORAL at 08:30

## 2023-11-09 RX ADMIN — ACETAMINOPHEN 500 MG: 500 TABLET ORAL at 08:38

## 2023-11-09 RX ADMIN — VARENICLINE 1 MG: 0.5 TABLET, FILM COATED ORAL at 08:30

## 2023-11-09 RX ADMIN — BACLOFEN 10 MG: 5 TABLET ORAL at 08:31

## 2023-11-09 RX ADMIN — PIPERACILLIN AND TAZOBACTAM 3.38 G: 3; .375 INJECTION, POWDER, LYOPHILIZED, FOR SOLUTION INTRAVENOUS at 14:03

## 2023-11-09 RX ADMIN — OXYCODONE HYDROCHLORIDE AND ACETAMINOPHEN 500 MG: 500 TABLET ORAL at 08:30

## 2023-11-09 RX ADMIN — HYDROMORPHONE HYDROCHLORIDE 4 MG: 2 TABLET ORAL at 14:00

## 2023-11-09 ASSESSMENT — ACTIVITIES OF DAILY LIVING (ADL)
ADLS_ACUITY_SCORE: 39
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
## 2023-11-09 NOTE — PLAN OF CARE
[phone]    Patient is A&O x4. Call light within reach. Able to make needs known. Paraplegic, not OOB during the shift, patient able to reposition independently. Urostomy on LLQA draining with reddish-brown urine. Colostomy bag on LUQA managed by the patient.     RA. Regular diet. PIV on L hand infusing with scheduled antibiotics otherwise, SL. Denies pain, SOB, chest pain, n/v and new n/t. Noted shoulder movements at times. L AKA. Wound dressing, CDI.     Slept between cares. Contact precaution maintained. Continue with POC.

## 2023-11-09 NOTE — PROGRESS NOTES
"  VS: /69 (BP Location: Left arm, Patient Position: Semi-Urbano's, Cuff Size: Adult Regular)   Pulse 71   Temp 98.2  F (36.8  C) (Oral)   Resp 16   Ht 1.676 m (5' 5.98\")   Wt 49.2 kg (108 lb 7.5 oz)   SpO2 98%   BMI 17.52 kg/m     Output/Last BM: Urostomy and Colostomy   Activity: Bedfast   Skin/Dressing: See WOC RN note   Pain: Chronic ongoing pain, prn dilaudid and tylenol   CMS: A/Ox4   Diet: Regular diet, good appetite   LDA: PIV to LUE, saline locked - removed at discharge   Equipment:    Plan:    Additional Info:      Goal Outcome Evaluation:  Plan of Care Reviewed With: patient  Overall Patient Progress: Improving    DISCHARGE SUMMARY    Pt Discharging To Group Home   Transportation Stretcher   AVS Given and Discussed Yes   Stoplight Tool Given and Discussed N/A   Medications Given Sent with Patient   Belongings Returned Remains with Patient   Comments Left unit via EMS stretcher 11/9/23 at 1615       "

## 2023-11-09 NOTE — PROGRESS NOTES
Care Management Discharge Note    Discharge Date: 11/09/2023       Discharge Disposition: Group Home    Discharge Services: Transportation Services    Discharge DME: None    Discharge Transportation:  [facility] stretcher ride     Private pay costs discussed: Not applicable    Does the patient's insurance plan have a 3 day qualifying hospital stay waiver?  Yes     Which insurance plan 3 day waiver is available? [employer]    Will the waiver be used for post-acute placement? Undetermined at this time    PAS Confirmation Code:  NA  Patient/family educated on Medicare website which has current facility and service quality ratings: no    Education Provided on the Discharge Plan: Yes  Persons Notified of Discharge Plans: patient, Group Home staff and care team  Patient/Family in Agreement with the Plan: yes    Handoff Referral Completed: Yes    Additional Information:    0840: Micheline's Provider called for update today stating patient will be discharging today on oral antibiotic.  Information passed on during round.   Called and updated group home and updated them on final discharge planning  , patient will be comming to them on oral antibiotic and if they still can provide the ride (saw in chart review). They said they do not provide ride but patient is free to come anytime.     1045 got a face to face final update from provider Ronald confirming patient discharging today and if group can accept patient today. Writer updated him on group home accepted patient to come today. Ronald said ride can be arranged between 1pm  to 3pm.     1050: Called Medicaid transportation  and they said they don't do wheelchair ride  and we need to call [facility] [phone]    Talked to patient and educated on discharge planning. Patient is I agreement with the plan and said he will need a stretcher ride because he is wheelchair bound and his wheelchair is back at the group home.     1112: Called  [facility] transport at [phone].  time is  scheduled around 5310-5433.  1126:PCS formed filled and placed int the chart.    1130 Patient, charge Nurse and bedside Nurse, Home care and group home updated.     Home care order placed.  1144: Delegated IMM to CHW. IMM done.      1325:  Bedside said someone from facility called and said patient  can not come unless she talked to someone.  Called back the number provided Donna at [phone].  Donna said she is the only Nurse and we needed to talk to her. Writer said called them this morning and provided update we were told that patient could come anytime today. Donna inquire about Iv antibiotic and was updated that patient will be discharged on oral antibiotic  Donna said ok patient can come today. Donna agreed to the discharge plan and had no further question.    1533: [facility] transport called and said they are running 30 minutes late. Bedside Nurse updated.    Discharges Resources:  [facility]  [address]   [facility] [address]   P: [phone]     [facility]  Ph: [phone]     [facility]  w/ Cirilo Rosen  Ph: [phone]      External hand off sent.   No further need identified.     Rakel Dave RN, PHN, BSN   Float Nurse Care Coordinator  Covering for Unit   Phone [phone]

## 2023-11-11 ENCOUNTER — MEDICAL CORRESPONDENCE (OUTPATIENT)
Dept: HEALTH INFORMATION MANAGEMENT | Facility: CLINIC | Age: 58
End: 2023-11-11

## 2023-11-13 ENCOUNTER — TELEPHONE (OUTPATIENT)
Dept: CARE COORDINATION | Facility: CLINIC | Age: 58
End: 2023-11-13
Payer: MEDICARE

## 2023-11-13 NOTE — PROGRESS NOTES
Brief Care Management Note:    1:29pm - Received call from Jessika, pt's CADI  from [facility].  She requested update, provided the following call back number: [phone].    2:20pm - Called Jessika, informed her that pt discharged on 11/9/23 to group home.  She expressed understanding, requested discharge summary be faxed to her at the following fax number: [phone].    Subsequently faxed discharge summary, per request.        FERNANDO Negron  [facility]  Units 8M/S & 10 ICU  Pager: [phone]  Phone: [phone]

## 2023-11-14 ENCOUNTER — TELEPHONE (OUTPATIENT)
Dept: VASCULAR SURGERY | Facility: CLINIC | Age: 58
End: 2023-11-14
Payer: MEDICARE

## 2023-11-14 NOTE — TELEPHONE ENCOUNTER
Called pt (1st attempt) for the patient to call back and schedule the following:    Appointment type: [facility]  Provider: VINAYAK  Return date: 11/15/23  Specialty phone number: [phone]  Additional appointment(s) needed: US  Additonal Notes: pt's VM box is full, therefore could not LVM.

## 2023-11-14 NOTE — TELEPHONE ENCOUNTER
Called patient to move up appt with Dr. De Jesus. Pt's phone went straight to VM and the VM box is full, therefore could not LVM.

## 2023-11-22 ENCOUNTER — TELEPHONE (OUTPATIENT)
Dept: CARE COORDINATION | Facility: CLINIC | Age: 58
End: 2023-11-22
Payer: MEDICARE

## 2023-11-22 NOTE — PROGRESS NOTES
Brief Care Management Note:    [facility]  [address]   [place] [address]   P: [phone]      Received call from Donna group home nurse.  She stated pt has upcoming appts on 11/24 and 11/27, stated she needs to know the address.  Provided address info.  She had no further questions.        Collin Robb RNCC  [facility]  Units 8M/S & 10 ICU  Pager: [phone]  Phone: [phone]

## 2023-11-24 ENCOUNTER — ANCILLARY PROCEDURE (OUTPATIENT)
Dept: ULTRASOUND IMAGING | Facility: CLINIC | Age: 58
End: 2023-11-24
Attending: NURSE PRACTITIONER
Payer: MEDICARE

## 2023-11-24 ENCOUNTER — ANCILLARY PROCEDURE (OUTPATIENT)
Dept: ULTRASOUND IMAGING | Facility: CLINIC | Age: 58
End: 2023-11-24
Payer: MEDICARE

## 2023-11-24 DIAGNOSIS — R31.0 GROSS HEMATURIA: ICD-10-CM

## 2023-11-24 DIAGNOSIS — I73.9 PAD (PERIPHERAL ARTERY DISEASE) (H): ICD-10-CM

## 2023-11-24 PROCEDURE — 76770 US EXAM ABDO BACK WALL COMP: CPT | Mod: GC | Performed by: RADIOLOGY

## 2023-11-24 PROCEDURE — 93922 UPR/L XTREMITY ART 2 LEVELS: CPT | Mod: 52 | Performed by: RADIOLOGY

## 2023-11-27 ENCOUNTER — OFFICE VISIT (OUTPATIENT)
Dept: VASCULAR SURGERY | Facility: CLINIC | Age: 58
End: 2023-11-27
Payer: MEDICARE

## 2023-11-27 VITALS — HEART RATE: 100 BPM | SYSTOLIC BLOOD PRESSURE: 107 MMHG | DIASTOLIC BLOOD PRESSURE: 66 MMHG | OXYGEN SATURATION: 98 %

## 2023-11-27 DIAGNOSIS — Z75.8 DOES NOT HAVE PRIMARY CARE PROVIDER: Primary | ICD-10-CM

## 2023-11-27 DIAGNOSIS — M86.9 OSTEOMYELITIS, UNSPECIFIED SITE, UNSPECIFIED TYPE (H): ICD-10-CM

## 2023-11-27 PROCEDURE — 99213 OFFICE O/P EST LOW 20 MIN: CPT | Performed by: SURGERY

## 2023-11-27 NOTE — PATIENT INSTRUCTIONS
Thank you so much for choosing us for your care. It was a pleasure to see you at the vascular clinic today.     Follow-up recommendations: We will see you back in 2-3 weeks. Please do not hesitate to reach out to us in the meantime with any concerns. Our schedulers will get in touch with you to set up your follow-up visit.    Please follow-up with [facility] primary care clinic.    [facility]  [address], [place], [address]  Ph: [phone]    Additional testing/imaging ordered today: None      Our scheduling team will get in touch with you to set up any follow-up testing/imaging and/or appointments. Please be aware that any testing/imaging recommended today will need to completed prior to your next visit with the provider. If testing/imaging is not completed prior to your next visit, your visit may be rescheduled.        If you have any questions, please contact our clinic directly at [phone] and ask for the nurse. We also encourage the use of [employer] to communicate with your HealthCare Provider.      If you have an urgent need after business hours (8:00 am to 4:30 pm) please call [phone], option 4, and ask for the vascular attending on call. For non-urgent after hours needs, please call the vascular clinic at [phone]. For scheduling needs, please call our clinic directly at [phone].
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
## 2023-12-05 ENCOUNTER — HOSPITAL ENCOUNTER (OUTPATIENT)
Dept: WOUND CARE | Facility: CLINIC | Age: 58
Discharge: HOME | End: 2023-12-05
Attending: PHYSICIAN ASSISTANT | Admitting: PHYSICIAN ASSISTANT
Payer: MEDICARE

## 2023-12-05 VITALS
SYSTOLIC BLOOD PRESSURE: 158 MMHG | DIASTOLIC BLOOD PRESSURE: 83 MMHG | RESPIRATION RATE: 20 BRPM | TEMPERATURE: 97.4 F | HEART RATE: 109 BPM

## 2023-12-05 DIAGNOSIS — L98.492 SKIN ULCER WITH FAT LAYER EXPOSED (H): ICD-10-CM

## 2023-12-05 DIAGNOSIS — L98.492: ICD-10-CM

## 2023-12-05 DIAGNOSIS — L97.922 ULCER OF LEFT LOWER EXTREMITY WITH FAT LAYER EXPOSED (H): ICD-10-CM

## 2023-12-05 DIAGNOSIS — L89.314 PRESSURE ULCER OF ISCHIUM, RIGHT, STAGE IV (H): ICD-10-CM

## 2023-12-05 DIAGNOSIS — L97.512 ULCER OF RIGHT FOOT WITH FAT LAYER EXPOSED (H): ICD-10-CM

## 2023-12-05 DIAGNOSIS — L89.894 PRESSURE INJURY OF RIGHT FOOT, STAGE 4 (H): ICD-10-CM

## 2023-12-05 DIAGNOSIS — L89.324 LEFT ISCHIAL PRESSURE SORE, STAGE IV (H): Primary | ICD-10-CM

## 2023-12-05 PROCEDURE — 99214 OFFICE O/P EST MOD 30 MIN: CPT | Mod: 25 | Performed by: PHYSICIAN ASSISTANT

## 2023-12-05 PROCEDURE — 11042 DBRDMT SUBQ TIS 1ST 20SQCM/<: CPT | Performed by: PHYSICIAN ASSISTANT

## 2023-12-05 PROCEDURE — 17250 CHEM CAUT OF GRANLTJ TISSUE: CPT | Mod: XS | Performed by: PHYSICIAN ASSISTANT

## 2023-12-05 PROCEDURE — 97602 WOUND(S) CARE NON-SELECTIVE: CPT

## 2023-12-05 PROCEDURE — 17250 CHEM CAUT OF GRANLTJ TISSUE: CPT | Mod: XU | Performed by: PHYSICIAN ASSISTANT

## 2023-12-05 NOTE — DISCHARGE INSTRUCTIONS
Parth Fountain      1965    A DME order was not completed because the supplies are ordered by home care or at a care facility    Dressing changes outside of clinic are being performed by Home Care    [facility] Phone: [phone] Fax: [phone]       PLAN:  Order placed to to vascular surgery to Dr. Cao to discuss amputation for right foot/leg You have been referred to [facility] for Vascular surgery referral. Please call [phone] to schedule.  Group 2 received  Wheelchair pressure mapped after amputation (done per patient)  Have [facility] look [facility] wheelchair   Smoking delays healing; advised to stop smoking to assist healing  Ensure to clean and debride old skin of BLE with cares.  Medications/supplements to aid in healing:  Vitamin C 1,000mg daily  Vitamin D 5,000 units daily  Vitamin B 12 Complex daily        Wound Care Orders: Right posterior lower leg wound  - Cleanse with mild unscented soap and water (such as Cetaphil, Cerave or Dove)  - Apply small amount of VASHE on gauze, lay into wound bed, let sit for 10 minutes, remove gauze (do not rinse)  - Apply a nickel thick layer of Iodosorb paste  - Cover with 4x4 mepilex with border  Change M,W,F     Wound Care Orders: Right medial ankle, Right Lateral Foot, Right toe 1, right toe 3  - Cleanse with mild unscented soap and water (such as Cetaphil, Cerave or Dove)  - Apply small amount of VASHE on gauze, lay into wound bed, let sit for 10 minutes, remove gauze (do not rinse)  - Apply a nickel thick layer of Iodosorb paste to open wounds  - cover with two 5x9 ABD pads   - secure with Kerlyx roll gauze and medipore tape  - Wear spandage for compression from toes to knee  Change M,W,F and as needed for soilage     Wound care: Right and Left Ischial tuberosity   - Cleanse with mild unscented soap and water (such as Cetaphil, Cerave or Dove)  - Apply small amount of VASHE on gauze, lay into wound bed, let sit for 10 minutes,  remove gauze (do not rinse)  - Cover open areas with Stacie promogram (do not substitute)  - Cover with 1/2 of a 5x9 ABD pad and secure with medapore tape  Change M,W,F and as needed for soilage     Wound care: Right Groin  - Cleanse with mild unscented soap and water (such as Cetaphil, Cerave or Dove)  - Apply small amount of VASHE on gauze, lay into wound bed, let sit for 10 minutes, remove gauze (do not rinse)  - Cover open area with Stacie promogram (do not substitute)  - Cover with 4x4 mepilex with silicone border  Change M,W,F and as needed for soilage          Suzan Martinez PA-C December 5, 2023    Call us at [phone] if you have any questions about your wounds, have redness or swelling around your wound, have a fever of 101 degrees Fahrenheit or greater or if you have any other problems or concerns. We answer the phone Monday through Friday 8 am to 4 pm, please leave a message as we check the voicemail frequently throughout the day.     If you had a positive experience please indicate that on your patient satisfaction survey form that [facility] will be sending you.    It was a pleasure meeting with you today.  Thank you for allowing me and my team the privilege of caring for you today.  YOU are the reason we are here, and I truly hope we provided you with the excellent service you deserve.  Please let us know if there is anything else we can do for you so that we can be sure you are leaving completely satisfied with your care experience.      If you have any billing related questions please call the [facility] office at [phone]. The clinic staff does not handle billing related matters.    If you are scheduled to have a follow up appointment, you will receive a reminder call the day before your visit. On the appointment day please arrive 15 minutes prior to your appointment time. If you are unable to keep that appointment, please call the clinic to cancel or reschedule. If you are  more than 10 minutes late or greater for your scheduled appointment time, the clinic policy is that you may be asked to reschedule.
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
## 2023-12-11 ENCOUNTER — PREP FOR PROCEDURE (OUTPATIENT)
Dept: SURGERY | Facility: CLINIC | Age: 58
End: 2023-12-11

## 2023-12-11 ENCOUNTER — TELEPHONE (OUTPATIENT)
Dept: VASCULAR SURGERY | Facility: CLINIC | Age: 58
End: 2023-12-11

## 2023-12-11 ENCOUNTER — OFFICE VISIT (OUTPATIENT)
Dept: VASCULAR SURGERY | Facility: CLINIC | Age: 58
End: 2023-12-11
Payer: MEDICARE

## 2023-12-11 VITALS — HEART RATE: 81 BPM | SYSTOLIC BLOOD PRESSURE: 153 MMHG | DIASTOLIC BLOOD PRESSURE: 78 MMHG

## 2023-12-11 DIAGNOSIS — I70.269: Primary | ICD-10-CM

## 2023-12-11 DIAGNOSIS — I73.9 PAD (PERIPHERAL ARTERY DISEASE) (H): Primary | ICD-10-CM

## 2023-12-11 DIAGNOSIS — F17.200 TOBACCO DEPENDENCE SYNDROME: ICD-10-CM

## 2023-12-11 DIAGNOSIS — G82.20 PARALYSIS OF BOTH LOWER LIMBS (H): ICD-10-CM

## 2023-12-11 DIAGNOSIS — L89.614 PRESSURE INJURY OF RIGHT HEEL, STAGE 4 (H): ICD-10-CM

## 2023-12-11 PROCEDURE — 99214 OFFICE O/P EST MOD 30 MIN: CPT | Performed by: SURGERY

## 2023-12-11 RX ORDER — HEPARIN SODIUM 10000 [USP'U]/ML
5000 INJECTION, SOLUTION INTRAVENOUS; SUBCUTANEOUS
Status: CANCELLED | OUTPATIENT
Start: 2023-12-11
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
## 2023-12-11 NOTE — TELEPHONE ENCOUNTER
Message left for patient to call back to discuss scheduling surgery with Dr. De Jesus. He needs to establish care with a primary care provider and will need a pre-op physical.    Writer also spoke to Donna nurse at his group home. She will be seeing him on Wednesday and will remind him to schedule with a primary care provider.
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
## 2023-12-13 ENCOUNTER — TELEPHONE (OUTPATIENT)
Dept: FAMILY MEDICINE | Facility: CLINIC | Age: 58
End: 2023-12-13
Payer: MEDICARE

## 2023-12-13 NOTE — ADDENDUM NOTE
Addended by: TONY HERNANDEZ on: 12/13/2023 02:42 PM     Modules accepted: Orders     Normal for race

## 2023-12-13 NOTE — TELEPHONE ENCOUNTER
"Care Coordinator contacted patient to schedule a new patient/establish care appointment. Patient will see Dr.Adam Mosley on 1/2/24 @ 10:00AM. Patient is interested in smoking cessation (pt aware of this appt). Per Gaby Vides RN with Vascular Surgery ([phone]). \"Patient will be having surgery (amputation) in the near future\". No surgery scheduled as of yet.      Mary Rabago  Lead Care Coordinator  [facility]  [phone]    "

## 2023-12-13 NOTE — TELEPHONE ENCOUNTER
Reason for call: Schedule appointment     Attempt to reach: 1st    Outcome:Left voicemail    Detailed message left? Yes    Please return call to [facility]     Clinic phone number [phone]    Called to [facility] visit per Zeny.

## 2023-12-14 NOTE — TELEPHONE ENCOUNTER
"12/14/23 Received message from Gaby RN,    \"Quick question - we are trying to get patient seen sooner than January if possible so that he can be guided on smoking cessation (specifically a Chantix script). I did also place a Quit Plan referral to see if perhaps they can assist with jump starting things in the meantime. We are wanting him to be nicotine-free prior to surgery.     Is there any way he can be waitlisted to be seen sooner than January OR is there a way he could be seen sooner just for a Chantix consult? I figured I would at least ask!\"    Care Coordinator was able to find sooner appointment with  on 12/27/23 @ 10:40AM. Left message for patient, along with direct number. Informed Gaby via staff message.  CC to cancel 1/2/24 appointment after speaking to patient.      Mary Rabago  Lead Care Coordinator  [facility]  [phone]      "

## 2023-12-15 NOTE — TELEPHONE ENCOUNTER
12/15/23 Care Coordinator attempted once again to reach out to patient, left another message, along with direct number.      Mary Rabago  Lead Care Coordinator  [facility]  [phone]

## 2023-12-18 NOTE — TELEPHONE ENCOUNTER
12/18/23 Care Coordinator reached out once again to try and reach patient to see if sooner appointment will work (12/27/23 @ 10:40AM). CC left another message asking patient to return call. If CC does not hear back from the patient by Thursday 12/21, CC will cancel the 12/27 appointment and stay with original appointment on 1/2/24 @ 10:00AM. Patient is aware of this appointment.    Mary Rabago  Lead Care Coordinator  [facility]  [phone]

## 2023-12-20 ENCOUNTER — HOSPITAL ENCOUNTER (OUTPATIENT)
Dept: WOUND CARE | Facility: CLINIC | Age: 58
Discharge: HOME | End: 2023-12-20
Attending: PHYSICIAN ASSISTANT | Admitting: PHYSICIAN ASSISTANT
Payer: MEDICARE

## 2023-12-20 VITALS — HEART RATE: 96 BPM | TEMPERATURE: 98.7 F | DIASTOLIC BLOOD PRESSURE: 64 MMHG | SYSTOLIC BLOOD PRESSURE: 135 MMHG

## 2023-12-20 DIAGNOSIS — L97.922 ULCER OF LEFT LOWER EXTREMITY WITH FAT LAYER EXPOSED (H): ICD-10-CM

## 2023-12-20 DIAGNOSIS — L98.492 SKIN ULCER WITH FAT LAYER EXPOSED (H): ICD-10-CM

## 2023-12-20 DIAGNOSIS — L08.9 TOE INFECTION: ICD-10-CM

## 2023-12-20 DIAGNOSIS — L98.492: ICD-10-CM

## 2023-12-20 DIAGNOSIS — L97.512 ULCER OF RIGHT FOOT WITH FAT LAYER EXPOSED (H): ICD-10-CM

## 2023-12-20 DIAGNOSIS — L89.314 PRESSURE ULCER OF ISCHIUM, RIGHT, STAGE IV (H): Primary | ICD-10-CM

## 2023-12-20 PROCEDURE — 11042 DBRDMT SUBQ TIS 1ST 20SQCM/<: CPT | Performed by: PHYSICIAN ASSISTANT

## 2023-12-20 PROCEDURE — 99214 OFFICE O/P EST MOD 30 MIN: CPT | Mod: 25 | Performed by: PHYSICIAN ASSISTANT

## 2023-12-20 PROCEDURE — 17250 CHEM CAUT OF GRANLTJ TISSUE: CPT | Mod: XS | Performed by: PHYSICIAN ASSISTANT

## 2023-12-20 PROCEDURE — 17250 CHEM CAUT OF GRANLTJ TISSUE: CPT | Mod: XU,XS | Performed by: PHYSICIAN ASSISTANT

## 2023-12-20 NOTE — DISCHARGE INSTRUCTIONS
Parth Fountain      1965      A DME order was not completed because the supplies are ordered by home care      Dressing changes outside of clinic are being performed by Home Care     [facility] Phone: [phone]; Fax: [phone]     PLAN:  Saw Dr. De Jesus to discuss amputation for right leg. Patient is hoping to avoid an AKA/BKA if possible. Therefore, ordered MRI to see if any bone infection in left toe 1. Renea Martinez PA-C will message Dr. De Jesus to see if a toe amputation is possible.  Have [facility] look [facility] ([facility] will do this in January)  Reduce or quit smoking as it severely delays healing (work with PCP on this)  Medications/supplements to aid in healing:  Vitamin C 1,000mg daily  Vitamin D 5,000 units daily  Vitamin B 12 Complex daily       Wound Care Orders: Right medial ankle, Right Lateral Foot, Right toe 1, right toe 3  - Cleanse with mild unscented soap and water (such as Cetaphil, Cerave or Dove)  Ensure to clean and debride old skin of BLE with cares.  - Apply small amount of VASHE on gauze, lay into wound bed, let sit for 10 minutes, remove gauze (do not rinse)  - Apply thick, high-quality moisturizer to intact skin (such as CeraVe, Eucerin, Aquaphor or Vanicream)   - Apply a nickel thick layer of Iodosorb paste to wounds  - cover with two 5x9 ABD pads   - secure with Kerlix roll gauze and medipore tape  - Wear spandage for compression from toes to knee  Change M,W,F and as needed for soilage     Wound care: Right and Left Ischial tuberosity   - Cleanse with mild unscented soap and water (such as Cetaphil, Cerave or Dove)  - Apply small amount of VASHE on gauze, lay into wound bed, let sit for 10 minutes, remove gauze (do not rinse)  - Apply 1/6 Hydrofera Blue Ready-Transfer (cut-to-fit size of wounds)  - Cover with 1/2 of a 5x9 ABD pad and secure with medipore tape  Change M,W,F and as needed for soilage     Wound care: Right Groin & Right posterior lower leg:  -  Cleanse with mild unscented soap and water (such as Cetaphil, Cerave or Dove)  - Apply small amount of VASHE on gauze, lay into wound bed, let sit for 10 minutes, remove gauze (do not rinse)  - Cover open area with Stacie promogram (do not substitute)  - Cover with 4x4 mepilex with silicone border  Change M,W,F and as needed for soilage    Repositioning:  Bed: Reposition MINIMALLY every 1-2 hours in bed to relieve pressure and promote perfusion to tissue.  Continue group 2 mattress  Patient is using a Group 2 mattress due to large, stage 4 pressure ulceration on the patient's pelvis that has failed to improve on a normal mattress despite regular wound cares and repositioning. A group 1 mattress is not adequate to offload these severe ulcerations. Patient has impaired sensation and is unable to reposition independently.   Chair: When up to the chair, do not sit for longer than one hour total before returning to bed for at least 60 minutes to relieve pressure and promote perfusion to the tissue.  Completely recline/tilt for 15 minutes each hour.  Sit on a chair cushion when up to the chair.      Protein:  A diet high in protein is important for wound healing, we recommend getting 90 grams of protein per day. Taking protein shakes or bars are a good way to get extra protein in your diet.        Suzan Martinez PA-C December 20, 2023    Call us at [phone] if you have any questions about your wounds, have redness or swelling around your wound, have a fever of 101 degrees Fahrenheit or greater or if you have any other problems or concerns. We answer the phone Monday through Friday 8 am to 4 pm, please leave a message as we check the voicemail frequently throughout the day.     If you had a positive experience please indicate that on your patient satisfaction survey form that [facility] will be sending you.    It was a pleasure meeting with you today.  Thank you for allowing me and my team the privilege of  caring for you today.  YOU are the reason we are here, and I truly hope we provided you with the excellent service you deserve.  Please let us know if there is anything else we can do for you so that we can be sure you are leaving completely satisfied with your care experience.      If you have any billing related questions please call the [facility] office at [phone]. The clinic staff does not handle billing related matters.    If you are scheduled to have a follow up appointment, you will receive a reminder call the day before your visit. On the appointment day please arrive 15 minutes prior to your appointment time. If you are unable to keep that appointment, please call the clinic to cancel or reschedule. If you are more than 10 minutes late or greater for your scheduled appointment time, the clinic policy is that you may be asked to reschedule.

## 2023-12-21 NOTE — TELEPHONE ENCOUNTER
12/21/23 Care Coordinator has not been successful in reaching patient for possible sooner appointment. CC to cancel 12/27/24 appointment. Patient is still scheduled for original appointment on 1/2/24 @ 10:00a.m as patient is aware of this appointment. Will send FYI to Gaby MCLEAN.    Mary Rabago  Lead Care Coordinator  [facility]  [phone]

## 2023-12-22 NOTE — PROGRESS NOTES
ASSESSMENT:    Full thickness traumatic ulcers of R 1st and 3rd toes with exposed bone   New right groin pressure ulcer stage 3  Peripheral arterial disease - non-reconstructible but with TCO2 suggestive of adequate healing   Recurrent stage 4 pressure ulcers of bilateral ITs      PLAN:    Now that he has more options for possible conservative amputation recommend imaging on R great toe to eval for osteomyelitis, could consider definitive treatment if there are options with which he is agreeable  Patient is using a Group 2 mattress due to large, stage 4 pressure ulceration on the patient's pelvis that has failed to improve on a normal mattress despite regular wound cares and repositioning. A group 1 mattress is not adequate to offload these severe ulcerations. Patient has impaired sensation and is unable to reposition independently.  iodosorb or iodine and cover dressing to leg and foot wounds, collagen on calf and groin wounds, Hydrofera Blue on ischial wounds  Continue using Rooke boot to improve circulation  Follow-up: will need 40 minute appointments every 6 weeks    HISTORY OF PRESENT ILLNESS:   Mr. Parth Fountain is a 58-year-old paraplegic gentleman who returns to us today to follow-up on chronic pelvic pressure ulcers as well as lower leg wounds. PMHx of DVT, chronic UTI, EtOh abuse, and s/p colostomy. He has a long history of pressure ulcers with subsequent corrective surgeries by Dr. Peña. Parth's pelvic wounds have been very difficult to heal due to his surgical history. He has very little tissue overlying his bone and it is mostly made up of scar tissue. His progress waxes and wanes.    10/21/20: Televisit. Thigh wound healed. Scraped left leg and foot during a transfer and has several shallow ulcers on left lower leg and toes. Has an ulcer in his right groin/hip crease. This waxes and wanes. Uses Stacie when open and calmoseptine when more closed, this is working well for him.   11/18/20: Return visit.  "All of his wounds have improved. Has new traumatic wounds on bilateral shins.   12/16/20: Returns via video visit. Shin wounds are improving but very friable and hypergranular. Hip crease slightly more open today, also appears friable. Has been using Stacie to all wounds.   01/13/21: Returns for in office visit.  Leg wounds are improving with use of calcium alginate.  However, Parth notes that the dressings are sticking to the wounds and causing bleeding when removed.  His right groin wound is now quite hypertrophic.  2/26/21: Returns for follow-up. Reports that he continues to reinjure his R lower leg. Hip wound is not really improving. Has new wound on R great toe with exposed bone.   4/9/21: Returns for follow-up. Still has exposed bone on R great toe and has not had the x-ray done we ordered at last visit. His R lower leg has worsened. R hip about the same.   5/19/21: Returns for follow-up via telephone, has been using Xeroform. Has not done vascular studies or xray as recommended. Hip wound is improving. Has a recurrence of his L IT wound. His legs are marginally better, reports they are \"squishy.\"    8/20: Returns for televisit. Had to reschedule from in-clinic visit due to staffing issues. Left toe has new wound, lateral left leg still open. Has been using xeroform. Thinks R toe is closed up now. R hip comes and goes, uses Calmoseptine PRN. Has lithotripsy this upcoming week. Has not had xray or ABIs.   09/24/21: Returns for in person follow-up. His hip/groin wound has completely healed, however this usually comes and goes. Has new minor right heel wound. Continues to have issues with left lower leg, today during the visit it is apparent he has some kind of vessel in this area that easily bleeds causing a hematoma in this area. Fortunately the wound is fairly superficial. He has not had ABIs or x rays as recommended. Fortunately his toe appears stable today without any exposed bone.   10/26/21: Returns for " follow-up. Left leg has regressed, tissue now very friable and hypertrophic. Toe continues to be resolved. Hip wound currently resolved. Biopsy performed came back suspicious for pyoderma gangrenosum.    12/2/21: Seen in ED for laceration of foot. Arterial duplex performed was suspicious for arterial disease.   12/07/21: Orders were changed after biopsy results last visit to begin high potency topical steroid ointment, unfortunately there was some confusion on this and he hasn't started it. Staff has just been cleaning and covering with gauze and the wound is much improved.   01/28/22: Returns for follow-up. Since I saw him he visited with Dr. Chapa [facility] who recommended angio. Unfortunately his right heel and right lateral foot wound are now down to bone. He thinks it's from [facility]. His LLE wounds that we believe are PG have improved and are nearly healed. Has new burn on his left thigh from coffee. Also his pelvic wounds have reopened. He thinks this is due to his wheelchair cushion deflating.   03/15/22: Returns for follow-up. It appears that his health has overall deteriorated. He denies drinking again but he does have both cigarettes and chewing tobacco with him. He says he feels sick today as he didn't sleep last night but denies fevers or change in appetite. He had his chair pressure mapped and said it is appropriate. He has not followed up with vascular as we had suggested. Fortunately his foot wounds appear relatively stable. His pelvic wounds have deteriorated a bit. PG leg wound appears nearly resolved.   04/12/22: Returns for follow-up. Angio is scheduled on Thursday. Foot wounds continue to be stable but unfortunately continues to have bone exposed on R foot. His pelvic wounds have worsened and he cannot really identify a cause. States his bed is working well. Has a Roho that he reports mapped well but is getting a new [facility]. He  is otherwise feeling well.   4/14/22: Angio revealed multiple occlusions, unable to achieve any recanalization. Per Dr. Chapa he is a poor candidate for revascularization, especially due to smoking and poor health.   05/17/22: Returns for follow-up. Pelvic wounds improved. Foot wounds are stable, no sign of infection. Thigh wound healing. Leg wounds about the same. New left heel ulcer. We had heart to heart about poor blood flow and poor prognosis of this, high risk of amputation. He must quit smoking and take care of himself. Recommended HBOT if candidate.   06/14/22: Returns for follow-up.  Foot wounds stable with Iodosorb. Lower leg wounds healed. Thigh burn wound nearly healed. Pelvic wounds improving with collagen. New back wound - states he is bottoming out on back rest when he tilts too far.   07/12/22: Returns to clinic. Foot wounds improving. Has new necrotic wound on left lateral leg - he is unaware how or when he got this. Pelvic wounds are stable. [facility], now using an old Roho as a back rest.   08/09/22: Pelvic wounds are improving. Left lateral leg wound now down to tendon but starting to granulate on edges. Still has joint exposed on left third toe. Left heel resolved. Right heel improved. Right lateral foot improving but still with exposed tendon. Has not fixed wheelchair.   9/2: Visited with [facility] about his left 3rd toe, she performed excision of bone in clinic and closed the area primarily. Started on doxycycline. Bone biopsy came back + for osteomyelitis.  09/13/22: Returns via telemed for follow-up. Pelvic wounds appear to be improving. New distal left lower leg wound in the previous area of PG. Has decided he does not want to fix the back of his wheelchair, prefers to use the Roho.   10/11/22: Returns for follow-up. Buttocks wounds improving overall. There is a little more tearing on the left IT wound causing some undermining. His leg wounds have improved but he  does have new superficial abrasion superior to current wounds. Continues to see Dr. Abbott for his foot wounds, see her dictation for report of these.   01/11/23: Returns for follow-up. Pelvic wounds improved. Has worsening in lateral foot wound, appears to have new trauma but he's not sure about when/how. Wound of left toe third arthroplasty has now dehisced.   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   02/22/23: Returns for follow-up. Pelvic wounds stable to improved. Right lateral foot wound slightly improved. Traumatic dorsal toe wounds with stable eschar.  6/7: Returns to clinic: Pelvic wounds improving, using collagen. New RLE ulcer - he is unsure of cause. R foot wound stagnant without sign of infection. He expresses desire for amputation today as he understand wounds likely unhealable and risk of infection. Referral made to [facility]. Wheelchair continues to be broken down with makeshift back rest.  08/08/23: Returns to clinic. Foot wound improving. Leg wound resolved. Bilateral ITs appear to be improving. He has not pursued preventive amputation yet but is still interested. He states his air mattress isn't working well and would like a replacement.   09/19/23: Returns to clinic. Foot wounds improving. New dorsal foot wound - appears minor. Pelvic wounds improving. Had chair mapped and reported it was slightly over inflated. Has new group 2 and likes it.   10/25/23: Returns to clinic. Reports that his wheelchair is broken and has had issues with all his wounds and new injuries to RLE due to this. [facility]. Unfortunately has some pretty severe degloving injuries of 1st and 3rd toes with exposed loose distal phalanx in 3rd toe. His R lateral foot wound is worsened as well. New traumatic calf wound. His ischial wounds have regressed as well and he also blames this on his wheelchair. Fortunately he is feeling well,  denies f/c/n/v. Denies more drug or alcohol use.  10/30-11/9: Hospitalized at [facility] for gangrene of right 1st and 3rd toe and clinical osteomyelitis. He was started on IV antibiotics and discharged on orals.   11/27: Met with Dr. Maza with the vascular team at [facility]. He preformed TCO2s which were actually adequate for healing   12/05/23: Returns to clinic. 1st and 3rd toe wounds with exposed bone. He is still contemplating whether he should move forward with some kind of amputation or not.  He is feeling well on the oral antibiotics. Has new R groin pressure ulcer that appears to be healing. His ischial wounds are measuring smaller. [facility] but he hasn't been able to pick it up yet.   12/11: Met with Dr. Maza again. His account of the visit and Parth's understanding seem to differ. Parth felt that he was suggested AKA due to Dr. Maza's recommendation but it seemed that Dr. Maza felt that this was Parth's desire. It sounds like there are still options for more distal amputation if necessary.  12/22/23: Returns to clinic. Foot and leg wounds improving. However, R great toe wound probes quite deep and unclear whether this will heal as he likely has joint/bone involvement. His ischium and groin are stable but have slightly more odor today.      OFFLOADING: On a Group 2 mattress, new from [facility] 9/2023. Still utilizing RoHo cushion on his wheelchair - pressure mapped well 9/2023, new chair 4/2022, will be getting new chair in 1/2024      SOCIAL HISTORY:  Lives in a group home. He is still receiving home health care through [email or web address]. that comes and does dressing changes daily.      PHYSICAL EXAMINATION   INTEGUMENTARY:   Wound (used by [facility] only) 01/28/22 1056 Right lateral foot pressure injury (Active)   Thickness/Stage full thickness 12/20/23 1300   Base scab 12/20/23 1300   Periwound intact;dry 12/20/23 1300   Periwound Temperature warm  12/20/23 1300   Periwound Skin Turgor soft 12/20/23 1300   Edges open 12/20/23 1300   Length (cm) 1.4 12/20/23 1300   Width (cm) 1.1 12/20/23 1300   Depth (cm) 0.3 12/20/23 1300   Wound (cm^2) 1.54 cm^2 12/20/23 1300   Wound Volume (cm^3) 0.46 cm^3 12/20/23 1300   Wound healing % 59.47 12/20/23 1300   Drainage Characteristics/Odor serosanguineous 12/20/23 1300   Drainage Amount moderate 12/20/23 1300   Care, Wound chemical cautery applied;non-select wound debridement performed. 12/20/23 1300       [facility] ([facility] by [facility]) 03/15/22 1049 Right ischial tuberosity pressure injury (Active)   Thickness/Stage Stage 4 12/20/23 1300   Base pink 12/20/23 1300   Periwound macerated;intact 12/20/23 1300   Periwound Temperature warm 12/20/23 1300   Periwound Skin Turgor soft 12/20/23 1300   Edges open 12/20/23 1300   Length (cm) 3 12/20/23 1300   Width (cm) 1.9 12/20/23 1300   Depth (cm) 0.7 12/20/23 1300   Wound (cm^2) 5.7 cm^2 12/20/23 1300   Wound Volume (cm^3) 3.99 cm^3 12/20/23 1300   Wound healing % -52 12/20/23 1300   Drainage Characteristics/Odor serosanguineous;tan;creamy 12/20/23 1300   Drainage Amount large 12/20/23 1300   Care, Wound chemical cautery applied;non-select wound debridement performed. 12/20/23 1300       [facility] ([facility]) 03/15/22 1050 Left ischial tuberosity pressure injury (Active)   Thickness/Stage Stage 4 12/20/23 1300   Base pink 12/20/23 1300   Periwound intact;macerated 12/20/23 1300   Periwound Temperature warm 12/20/23 1300   Periwound Skin Turgor soft 12/20/23 1300   Edges open 12/20/23 1300   Length (cm) 4.4 12/20/23 1300   Width (cm) 1.4 12/20/23 1300   Depth (cm) 0.6 12/20/23 1300   Wound (cm^2) 6.16 cm^2 12/20/23 1300   Wound Volume (cm^3) 3.7 cm^3 12/20/23 1300   Wound healing % -115.38 12/20/23 1300   Drainage Characteristics/Odor serosanguineous;tan;creamy 12/20/23 1300   Drainage Amount moderate 12/20/23 1300   Care, Wound chemical cautery applied;non-select wound  debridement performed. 12/20/23 1300       Wound (used by [facility] only) 10/24/23 1311 Right posterior;lower leg (Active)   Thickness/Stage full thickness 12/20/23 1300   Base red;scab 12/20/23 1300   Periwound intact 12/20/23 1300   Periwound Temperature warm 12/20/23 1300   Periwound Skin Turgor soft 12/20/23 1300   Edges open 12/20/23 1300   Length (cm) 0.7 12/20/23 1300   Width (cm) 1.2 12/20/23 1300   Depth (cm) 0.1 12/20/23 1300   Wound (cm^2) 0.84 cm^2 12/20/23 1300   Wound Volume (cm^3) 0.08 cm^3 12/20/23 1300   Wound healing % 91.11 12/20/23 1300   Drainage Characteristics/Odor serosanguineous 12/20/23 1300   Drainage Amount moderate 12/20/23 1300   Care, Wound non-select wound debridement performed. 12/20/23 1300       [facility] 10/24/23 1313 Right medial ankle unspecified (Active)   Thickness/Stage full thickness 12/20/23 1300   Base scab 12/20/23 1300   Periwound dry;intact 12/20/23 1300   Periwound Temperature warm 12/20/23 1300   Periwound Skin Turgor soft 12/20/23 1300   Edges open 12/20/23 1300   Length (cm) 2.2 12/20/23 1300   Width (cm) 1.1 12/20/23 1300   Depth (cm) 0 12/20/23 1300   Wound (cm^2) 2.42 cm^2 12/20/23 1300   Wound Volume (cm^3) 0 cm^3 12/20/23 1300   Wound healing % 79.05 12/20/23 1300   Drainage Characteristics/Odor serosanguineous 12/20/23 1300   Drainage Amount scant 12/20/23 1300   Care, Wound chemical cautery applied;non-select wound debridement performed. 12/20/23 1300       [facility]) 10/24/23 1314 Right dorsal 1 toe ulceration, arterial (Active)   Thickness/Stage full thickness 12/20/23 1300   Base slough;red;exposed structure 12/20/23 1300   Periwound pink;edematous 12/20/23 1300   Periwound Temperature cool 12/20/23 1300   Periwound Skin Turgor soft 12/20/23 1300   Edges callused 12/20/23 1300   Length (cm) 2.1 12/20/23 1300   Width (cm) 1.5 12/20/23 1300   Depth (cm) 0.2 12/20/23 1300   Wound (cm^2) 3.15 cm^2 12/20/23 1300   Wound Volume  (cm^3) 0.63 cm^3 12/20/23 1300   Wound healing % 49.6 12/20/23 1300   Drainage Characteristics/Odor creamy 12/20/23 1300   Drainage Amount moderate 12/20/23 1300   Care, Wound non-select wound debridement performed.;debrided;chemical cautery applied 12/20/23 1300       Wound (used by [facility] only) 10/24/23 1314 Right 3 toe (Active)   Thickness/Stage full thickness 12/20/23 1300   Base scab;eschar 12/20/23 1300   Periwound dry;intact 12/20/23 1300   Periwound Temperature cool 12/20/23 1300   Periwound Skin Turgor soft 12/20/23 1300   Edges callused 12/20/23 1300   Length (cm) 1.2 12/20/23 1300   Width (cm) 0.8 12/20/23 1300   Depth (cm) 0.2 12/20/23 1300   Wound (cm^2) 0.96 cm^2 12/20/23 1300   Wound Volume (cm^3) 0.19 cm^3 12/20/23 1300   Wound healing % 80.17 12/20/23 1300   Drainage Characteristics/Odor serosanguineous 12/20/23 1300   Drainage Amount scant 12/20/23 1300   Care, Wound non-select wound debridement performed. 12/20/23 1300       Wound (used by [facility] only) 12/05/23 1447 Right groin pressure injury (Active)   Thickness/Stage Stage 3 12/20/23 1300   Base red;hypergranulation 12/20/23 1300   Periwound intact 12/20/23 1300   Periwound Temperature warm 12/20/23 1300   Periwound Skin Turgor soft 12/20/23 1300   Edges open 12/20/23 1300   Length (cm) 1.3 12/20/23 1300   Width (cm) 0.6 12/20/23 1300   Depth (cm) 0 12/20/23 1300   Wound (cm^2) 0.78 cm^2 12/20/23 1300   Wound Volume (cm^3) 0 cm^3 12/20/23 1300   Wound healing % 74 12/20/23 1300   Drainage Characteristics/Odor serosanguineous 12/20/23 1300   Drainage Amount moderate 12/20/23 1300   Care, Wound chemical cautery applied;non-select wound debridement performed. 12/20/23 1300                       PROCEDURE (foot wounds): Nursing staff performed mechanical debridement with dressing removal and cleansing and then applied 4% lidocaine to the wound bed. Patient was determined to be capable of making their own medical decisions and informed consent was  obtained. Using a 15 blade a surgical debridement was performed down to and including  subcutaneous tissue of <20cm2. Hemostasis was achieved with pressure and silver nitrate. The patient tolerated the procedure well.    PROCEDURE: After verbal consent was obtained, granulation tissue of IT wounds was treated with silver nitrate. Patient tolerated this well.   MDM: 30 minutes were spent on the date of the visit reviewing previous chart notes, evaluating patient and developing the treatment plan, this excludes any time spent on procedures.         Further instructions from your care team         Parth Fountain      1965      A DME order was not completed because the supplies are ordered by home care      Dressing changes outside of clinic are being performed by Home Care     [facility] Phone: [phone]; Fax: [phone]     PLAN:  Saw Dr. De Jesus to discuss amputation for right leg. Patient is hoping to avoid an AKA/BKA if possible. Therefore, ordered MRI to see if any bone infection in left toe 1. Renea Martinez PA-C will message Dr. De Jesus to see if a toe amputation is possible.  [facility] ([facility] in January)  Reduce or quit smoking as it severely delays healing (work with PCP on this)  Medications/supplements to aid in healing:  Vitamin C 1,000mg daily  Vitamin D 5,000 units daily  Vitamin B 12 Complex daily       Wound Care Orders: Right medial ankle, Right Lateral Foot, Right toe 1, right toe 3  - Cleanse with mild unscented soap and water (such as Cetaphil, Cerave or Dove)  Ensure to clean and debride old skin of BLE with cares.  - Apply small amount of VASHE on gauze, lay into wound bed, let sit for 10 minutes, remove gauze (do not rinse)  - Apply thick, high-quality moisturizer to intact skin (such as CeraVe, Eucerin, Aquaphor or Vanicream)   - Apply a nickel thick layer of Iodosorb paste to wounds  - cover with two 5x9 ABD pads   - secure with Kerlix  roll gauze and medipore tape  - Wear spandage for compression from toes to knee  Change M,W,F and as needed for soilage     Wound care: Right and Left Ischial tuberosity   - Cleanse with mild unscented soap and water (such as Cetaphil, Cerave or Dove)  - Apply small amount of VASHE on gauze, lay into wound bed, let sit for 10 minutes, remove gauze (do not rinse)  - Apply 1/6 Hydrofera Blue Ready-Transfer (cut-to-fit size of wounds)  - Cover with 1/2 of a 5x9 ABD pad and secure with medipore tape  Change M,W,F and as needed for soilage     Wound care: Right Groin & Right posterior lower leg:  - Cleanse with mild unscented soap and water (such as Cetaphil, Cerave or Dove)  - Apply small amount of VASHE on gauze, lay into wound bed, let sit for 10 minutes, remove gauze (do not rinse)  - Cover open area with Stacie promogram (do not substitute)  - Cover with 4x4 mepilex with silicone border  Change M,W,F and as needed for soilage    Repositioning:  Bed: Reposition MINIMALLY every 1-2 hours in bed to relieve pressure and promote perfusion to tissue.  Continue group 2 mattress  Patient is using a Group 2 mattress due to large, stage 4 pressure ulceration on the patient's pelvis that has failed to improve on a normal mattress despite regular wound cares and repositioning. A group 1 mattress is not adequate to offload these severe ulcerations. Patient has impaired sensation and is unable to reposition independently.   Chair: When up to the chair, do not sit for longer than one hour total before returning to bed for at least 60 minutes to relieve pressure and promote perfusion to the tissue.  Completely recline/tilt for 15 minutes each hour.  Sit on a chair cushion when up to the chair.      Protein:  A diet high in protein is important for wound healing, we recommend getting 90 grams of protein per day. Taking protein shakes or bars are a good way to get extra protein in your diet.        SVETA Shah  20, 2023    Call us at [phone] if you have any questions about your wounds, have redness or swelling around your wound, have a fever of 101 degrees Fahrenheit or greater or if you have any other problems or concerns. We answer the phone Monday through Friday 8 am to 4 pm, please leave a message as we check the voicemail frequently throughout the day.     If you had a positive experience please indicate that on your patient satisfaction survey form that [facility] will be sending you.    It was a pleasure meeting with you today.  Thank you for allowing me and my team the privilege of caring for you today.  YOU are the reason we are here, and I truly hope we provided you with the excellent service you deserve.  Please let us know if there is anything else we can do for you so that we can be sure you are leaving completely satisfied with your care experience.      If you have any billing related questions please call the [facility] Business office at [phone]. The clinic staff does not handle billing related matters.    If you are scheduled to have a follow up appointment, you will receive a reminder call the day before your visit. On the appointment day please arrive 15 minutes prior to your appointment time. If you are unable to keep that appointment, please call the clinic to cancel or reschedule. If you are more than 10 minutes late or greater for your scheduled appointment time, the clinic policy is that you may be asked to reschedule.

## 2024-01-02 ENCOUNTER — OFFICE VISIT (OUTPATIENT)
Dept: FAMILY MEDICINE | Facility: CLINIC | Age: 59
End: 2024-01-02
Payer: MEDICARE

## 2024-01-02 VITALS
SYSTOLIC BLOOD PRESSURE: 126 MMHG | RESPIRATION RATE: 16 BRPM | TEMPERATURE: 98.2 F | HEART RATE: 83 BPM | DIASTOLIC BLOOD PRESSURE: 72 MMHG | OXYGEN SATURATION: 99 %

## 2024-01-02 DIAGNOSIS — M62.838 MUSCLE SPASTICITY: ICD-10-CM

## 2024-01-02 DIAGNOSIS — Z11.59 NEED FOR HEPATITIS C SCREENING TEST: ICD-10-CM

## 2024-01-02 DIAGNOSIS — R11.0 NAUSEA: ICD-10-CM

## 2024-01-02 DIAGNOSIS — Z72.0 TOBACCO ABUSE: Primary | ICD-10-CM

## 2024-01-02 DIAGNOSIS — M86.60 CHRONIC OSTEOMYELITIS (H): ICD-10-CM

## 2024-01-02 DIAGNOSIS — G47.00 INSOMNIA, UNSPECIFIED TYPE: ICD-10-CM

## 2024-01-02 DIAGNOSIS — T14.8XXA OPEN WOUND: ICD-10-CM

## 2024-01-02 DIAGNOSIS — Z76.89 ENCOUNTER TO ESTABLISH CARE WITH NEW DOCTOR: ICD-10-CM

## 2024-01-02 DIAGNOSIS — R19.7 DIARRHEA, UNSPECIFIED TYPE: ICD-10-CM

## 2024-01-02 DIAGNOSIS — Z00.00 PREVENTATIVE HEALTH CARE: ICD-10-CM

## 2024-01-02 DIAGNOSIS — M79.2 NEUROPATHIC PAIN: ICD-10-CM

## 2024-01-02 DIAGNOSIS — Z86.718 HISTORY OF DEEP VENOUS THROMBOSIS: ICD-10-CM

## 2024-01-02 DIAGNOSIS — Z11.4 SCREENING FOR HIV (HUMAN IMMUNODEFICIENCY VIRUS): ICD-10-CM

## 2024-01-02 DIAGNOSIS — F11.11 NARCOTIC ABUSE IN REMISSION (H): ICD-10-CM

## 2024-01-02 DIAGNOSIS — D50.9 IRON DEFICIENCY ANEMIA, UNSPECIFIED IRON DEFICIENCY ANEMIA TYPE: ICD-10-CM

## 2024-01-02 LAB
CREAT UR-MCNC: 36 MG/DL
HCV AB SERPL QL IA: NONREACTIVE
HIV 1+2 AB+HIV1 P24 AG SERPL QL IA: NONREACTIVE

## 2024-01-02 PROCEDURE — 87389 HIV-1 AG W/HIV-1&-2 AB AG IA: CPT

## 2024-01-02 PROCEDURE — 90480 ADMN SARSCOV2 VAC 1/ONLY CMP: CPT

## 2024-01-02 PROCEDURE — G0480 DRUG TEST DEF 1-7 CLASSES: HCPCS

## 2024-01-02 PROCEDURE — 91320 SARSCV2 VAC 30MCG TRS-SUC IM: CPT

## 2024-01-02 PROCEDURE — 36415 COLL VENOUS BLD VENIPUNCTURE: CPT

## 2024-01-02 PROCEDURE — 99204 OFFICE O/P NEW MOD 45 MIN: CPT | Mod: 25

## 2024-01-02 PROCEDURE — 86803 HEPATITIS C AB TEST: CPT

## 2024-01-02 RX ORDER — DULOXETIN HYDROCHLORIDE 60 MG/1
60 CAPSULE, DELAYED RELEASE ORAL DAILY
Qty: 60 CAPSULE | Refills: 2 | Status: SHIPPED | OUTPATIENT
Start: 2024-01-02 | End: 2024-07-22

## 2024-01-02 RX ORDER — FERROUS SULFATE 325(65) MG
325 TABLET ORAL DAILY
Qty: 60 TABLET | Refills: 2 | Status: SHIPPED | OUTPATIENT
Start: 2024-01-02 | End: 2024-01-08

## 2024-01-02 RX ORDER — BACLOFEN 10 MG/1
10 TABLET ORAL 4 TIMES DAILY
Qty: 100 TABLET | Refills: 2 | Status: SHIPPED | OUTPATIENT
Start: 2024-01-02 | End: 2024-02-27

## 2024-01-02 RX ORDER — TRAZODONE HYDROCHLORIDE 100 MG/1
100 TABLET ORAL AT BEDTIME
Qty: 60 TABLET | Refills: 2 | Status: SHIPPED | OUTPATIENT
Start: 2024-01-02 | End: 2024-07-22

## 2024-01-02 RX ORDER — PREGABALIN 300 MG/1
300 CAPSULE ORAL 2 TIMES DAILY
Qty: 62 CAPSULE | Refills: 2 | Status: SHIPPED | OUTPATIENT
Start: 2024-01-02 | End: 2024-04-26

## 2024-01-02 RX ORDER — PREGABALIN 300 MG/1
300 CAPSULE ORAL 2 TIMES DAILY
Qty: 60 CAPSULE | Refills: 2 | Status: SHIPPED | OUTPATIENT
Start: 2024-01-02 | End: 2024-01-02

## 2024-01-02 RX ORDER — LOPERAMIDE HCL 2 MG
4 CAPSULE ORAL 3 TIMES DAILY PRN
Qty: 84 CAPSULE | Refills: 0 | Status: SHIPPED | OUTPATIENT
Start: 2024-01-02 | End: 2024-03-06

## 2024-01-02 RX ORDER — VARENICLINE TARTRATE 1 MG/1
1 TABLET, FILM COATED ORAL 2 TIMES DAILY WITH MEALS
Qty: 60 TABLET | Refills: 3 | Status: SHIPPED | OUTPATIENT
Start: 2024-01-02 | End: 2024-03-08

## 2024-01-02 RX ORDER — FERROUS SULFATE 325(65) MG
TABLET ORAL
COMMUNITY
Start: 2023-12-04 | End: 2024-01-02

## 2024-01-02 RX ORDER — ASPIRIN 325 MG
325 TABLET ORAL EVERY MORNING
Qty: 60 TABLET | Refills: 0 | Status: SHIPPED | OUTPATIENT
Start: 2024-01-02 | End: 2024-04-25

## 2024-01-02 RX ORDER — MULTIPLE VITAMINS W/ MINERALS TAB 9MG-400MCG
1 TAB ORAL EVERY MORNING
Qty: 60 TABLET | Refills: 2 | Status: ON HOLD | OUTPATIENT
Start: 2024-01-02 | End: 2024-06-21

## 2024-01-02 RX ORDER — LOPERAMIDE HCL 2 MG
CAPSULE ORAL
Qty: 200 CAPSULE | Refills: 1 | Status: SHIPPED | OUTPATIENT
Start: 2024-01-02 | End: 2024-01-03

## 2024-01-02 RX ORDER — CALCIUM CARBONATE 500(1250)
1 TABLET,CHEWABLE ORAL DAILY
Qty: 60 TABLET | Refills: 2 | Status: SHIPPED | OUTPATIENT
Start: 2024-01-02 | End: 2024-01-08

## 2024-01-02 RX ORDER — ASCORBIC ACID 500 MG
500 TABLET ORAL 4 TIMES DAILY
Qty: 120 TABLET | Refills: 3 | Status: SHIPPED | OUTPATIENT
Start: 2024-01-02 | End: 2024-05-28

## 2024-01-02 ASSESSMENT — PATIENT HEALTH QUESTIONNAIRE - PHQ9: SUM OF ALL RESPONSES TO PHQ QUESTIONS 1-9: 0

## 2024-01-02 NOTE — PATIENT INSTRUCTIONS
Patient Education   Here is the plan from today's visit    1. Tobacco abuse  Please do things to keep yourself occupied and discard all tobacco from home or wean as you can. Chantrix can be used with Tobacco for a short time but it is better if you quit. We can offer a referral to Tobacco Cessation couch and supplements.   - varenicline (CHANTIX) 1 MG tablet; Take 1 tablet (1 mg) by mouth 2 times daily (with meals)  Dispense: 60 tablet; Refill: 3    2. Diarrhea, unspecified type  I am refilling your medication as you currently take. Please do not take more loperamide than recommended as it can cause side effects such as respiratory depression.   - loperamide (IMODIUM) 2 MG capsule; Take 2 capsules (4 mg) by mouth at bedtime. May also take 1-2 capsules (2-4 mg) daily as needed for diarrhea. Do all this for 100 days.  Dispense: 200 capsule; Refill: 1  - loperamide (IMODIUM) 2 MG capsule; Take 2 capsules (4 mg) by mouth 3 times daily as needed for diarrhea  Dispense: 84 capsule; Refill: 0    3. Screen for colon cancer  Please follow up with us to screen for colon cancer when you are ready. It is important at your age to find precancerous polyps and remove to prevent cancer.     4. Screening for HIV (human immunodeficiency virus)  5. Need for hepatitis C screening test  We will tell you the results on Mychart.   - Hepatitis C Screen Reflex to HCV RNA Quant and Genotype; Future  - HIV Screening; Future    6. Narcotic abuse in remission (H)  We will screen your urine for substances.   - [id number] - Urine Drug Confirmation Panel (Comprehensive); Future    7. Preventative health care  We are refilling your medications you take for peripheral artery disease and calcium carbonate given your risk for losing bone strength being immobilized.   - aspirin (ASA) 325 MG tablet; Take 1 tablet (325 mg) by mouth every morning  Dispense: 60 tablet; Refill: 0  - calcium carbonate 500 mg, elemental, 1250 (500 Ca) MG tablet chewable; Take 1  tablet (500 mg) by mouth daily  Dispense: 60 tablet; Refill: 2    8. Neuropathic pain  You have significant pain needs which needs coordination of care. Please follow up in 2-4 weeks with our clinic. We need you to see our pharmacist and make a dedicated visit for neuropathic pain. I will refill your medications today.   - baclofen (LIORESAL) 10 MG tablet; Take 1 tablet (10 mg) by mouth 4 times daily  Dispense: 100 tablet; Refill: 2  - DULoxetine (CYMBALTA) 60 MG capsule; Take 1 capsule (60 mg) by mouth daily  Dispense: 60 capsule; Refill: 2  - FEROSUL 325 (65 Fe) MG tablet; Take 1 tablet (325 mg) by mouth daily  Dispense: 60 tablet; Refill: 2    9. Insomnia, unspecified type  I will refill your medication. Please note, if you develop confusion and fevers, or erection that lasts more than 4 hours, please stop taking this medication and call the hospital or [place] clinic immediately.   - traZODone (DESYREL) 100 MG tablet; Take 1 tablet (100 mg) by mouth at bedtime  Dispense: 60 tablet; Refill: 2    10. Iron deficiency anemia, unspecified iron deficiency anemia type  Please take as described.   - FEROSUL 325 (65 Fe) MG tablet; Take 1 tablet (325 mg) by mouth daily  Dispense: 60 tablet; Refill: 2  - vitamin C (ASCORBIC ACID) 500 MG tablet; Take 1 tablet (500 mg) by mouth 4 times daily  Dispense: 120 tablet; Refill: 3  - multivitamin w/minerals (THERA-VIT-M) tablet; Take 1 tablet by mouth every morning  Dispense: 60 tablet; Refill: 2    11. Open wound  Please take these medications for wound healing and follow up with your wound clinic.   - cholecalciferol (VITAMIN D3) 10 mcg (400 units) TABS tablet; Take 1 tablet (10 mcg) by mouth daily  Dispense: 60 tablet; Refill: 2  - vitamin C (ASCORBIC ACID) 500 MG tablet; Take 1 tablet (500 mg) by mouth 4 times daily  Dispense: 120 tablet; Refill: 3    Please call or return to clinic if your symptoms don't go away.    Follow up plan  Return in about 1 month (around 2/2/2024),  or if symptoms worsen or fail to improve, for Follow up.    Thank you for coming to [facility] today.  Lab Testing:  **If you had lab testing today and your results are reassuring or normal they will be mailed to you or sent through [facility] within 7 days.   **If the lab tests need quick action we will call you with the results.  **If you are having labs done on a different day, please call [phone] to schedule at [facility] or [phone] for other [facility] Outpatient Lab locations. Labs do not offer walk-in appointments.  The phone number we will call with results is # [phone] (home) . If this is not the best number please call our clinic and change the number.  Medication Refills:  If you need any refills please call your pharmacy and they will contact us.   If you need to  your refill at a new pharmacy, please contact the new pharmacy directly. The new pharmacy will help you get your medications transferred faster.   Scheduling:  If you have any concerns about today's visit or wish to schedule another appointment please call our office during normal business hours [phone] (8-5:00 M-F). If you can no longer make a scheduled visit, please cancel via [facility] or call us to cancel.   If a referral was made to an [facility] specialty provider and you do not get a call from central scheduling, please refer to directions on your visit summary or call our office during normal business hours for assistance.   If a Mammogram was ordered for you at the [facility] call [phone] to schedule or change your appointment.  If you had an XRay/CT/Ultrasound/MRI ordered the number is [phone] to schedule or change your radiology appointment.   [facility] has limited ultrasound appointments available on Wednesdays, if you would like your ultrasound at [facility], please call [phone] to schedule.   Medical Concerns:  If you have urgent medical concerns please call  [phone] at any time of the day.    Boaz Mosley MD

## 2024-01-02 NOTE — PROGRESS NOTES
"  Assessment & Plan       Establish Care  Complex care needs  The patient presents to transfer his primary care from Dr. Maria Ines Emmanuel at [facility]. Currently lives in a group home with cares managed by [facility] which include daily wound care. Was admitted to our in patient service in November, 2023.    The patient has many preventative care needs as focus of cares has been predominantly his many chronic conditions and their complications. Smoking cessation is priority today given his PVD and probably need for further RLE amputation  - Care Coordinator to assist with determining what additional services Parth is getting now and what gaps, if any, we might be able to work on with him.    Tobacco abuse  The patient severe tobacco use disorder with long standing history reaching over 25 pack years and currently smoking. Would be candidate for Chantrix therapy which will be granted. Unfortunately, the patient declines new referral for tobacco cessation . Concerned for possible sequela of COPD vs lung cancer vs abdominal aortic aneurysm. Consider PFTs, Low dose CT lung scan, and abdomen ultrasound with subsequent follow up.   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     Open RLE wound  Chronic osteomyelitis  Hx pressure ulcers  Right lower extremity wound with chronic osteomyelitis complicated by peripheral artery disease. Following with plastic surgery on daily regimen of Vitamins C, D and B to promote wound healing. Refilling requested medications. Of note, reported that he is no longer taking Doxy or Augmentin and \"just wants to get on with the amputation\".   - cholecalciferol (VITAMIN D3) 10 mcg (400 units) TABS tablet; Take 1 tablet (10 mcg) by mouth daily  - vitamin C (ASCORBIC ACID) 500 MG tablet; Take 1 tablet (500 mg) by " mouth 4 times daily  - multivitamin w/minerals (THERA-VIT-M) tablet; Take 1 tablet by mouth every morning    Neuropathic pain  Chronic history of neuropathic pain managed with Cymbalta and Pregabalin. Refill  - DULoxetine (CYMBALTA) 60 MG capsule; Take 1 capsule (60 mg) by mouth daily  - Pregabalin 300 mg PO BID    Spasticity  Chronically managed on baclofen. Hx of benzo's as well? Needs further investigation prior to any prescribing here. Good candidate for Pain Clinic vs Palliative Care   - baclofen (LIORESAL) 10 MG tablet; Take 1 tablet (10 mg) by mouth 4 times daily    Narcotic abuse in remission (H)  History of chronic pain on Hydromorphone 4mg five times per day which evolved into snorting medication per chart note in 2016 s/p detox in 2017. Currently on Pregabalin 300 mg BID with sustained remission, denying continued substance use. Will screen urine today.   - [id number] - Urine Drug Confirmation Panel (Comprehensive)    Diarrhea, unspecified type  S/p Colostomy. History of chronic diarrhea managed on Loperamide. Advised patient to not overuse medication as it can cause opioid like effect. Refilling medication.   - loperamide (IMODIUM) 2 MG capsule; Take 2 capsules (4 mg) by mouth 3 times daily as needed for diarrhea    Nausea  On chronic regimen of calcium carbonate for nausea. Calcium 9.0 on 10/31/23. ROS negative for nausea so potentially therapeutic. Possibly related to polypharmacy or secondary to GERD. Consider optimization with PPI to decrease risk of nephro/Ureterolithiasis. Will refill limited script with plans to follow up for optimal therapy.   - calcium carbonate 500 mg, elemental, 1250 (500 Ca) MG tablet chewable; Take 1 tablet (500 mg) by mouth daily    PVD  History of DVTs  Taking Aspirin 325 mg. Refill  Reports not taking statin on his med list. Need more info  - aspirin (ASA) 325 MG tablet; Take 1 tablet (325 mg) by mouth every morning    Insomnia, unspecified type  Noted on 11/9/2023 discharge  summary: diagnosis of insomnia on trazodone 100 mg QHS. Current dose is therapeutic. Refilling medication.   - traZODone (DESYREL) 100 MG tablet; Take 1 tablet (100 mg) by mouth at bedtime    Iron deficiency anemia, unspecified iron deficiency anemia type  History of mixed iron deficiency anemia and anemia of chronic disease. Patient has been managed long term for iron deficiency anemia with supplementation. 11/9/23 Hgb 12.2, MCV 90, with baseline Hgb in the low 11s. No iron panel in system. Will offer limited refill as patient is currently being optimized for surgery.   - FEROSUL 325 (65 Fe) MG tablet; Take 1 tablet (325 mg) by mouth daily  - CBC reflex to iron studies    Screening for HIV (human immunodeficiency virus)  Need for hepatitis C screening test  No obvious signs of hepatic dysfunction or opportunistic infections. Due for one time screening for HIV and Hep C which are not in record.  - HIV Screening  - Hepatitis C Screen Reflex to HCV RNA Quant and Genotype    Vaccine counseling  -COVID-19 12+ (2023-24) (PFIZER)     Extensive time spent on chart review and clarifying acute and chronic needs  RTC 4 weeks for continuing care    Boaz Mosley MD  [facility] KEYSHA Milian is a 58 year old, presenting for the following health issues:  Establish Care      1/2/2024     9:47 AM   Additional Questions   Roomed by Kalyan   Accompanied by Self       HPI     Establishing care. Needs Primary care. Been following up with Dr. Maria Ines [facility] and [facility]     Has not followed up with Infection Disease for Osteomyelitis. Was on antibiotics for a month after getting out of hospital. Not currently on antibiotics.     Tobacco cessation is major goal. Preparing for possible surgery -- right toe or foot amputation.     Smoking history: started at 13 years old -- 45 years total. 1/2 -1 pack per day. Started cessation 3 years ago on Chantrix, relapsed 2 more times because Chantrix dose was interrupted.  Recently ran out of Chantrix. Last cigarette was today.  Reports that Chantrix 1mg daily works quite well for him and he needs a long term dose. Does not want Nicotine Replacement therapy.   Wellbutrin did not help in past.   Never followed up with the Smoking Cessation Couch -- reports does not need    Reports generalized back spasms.   -Now off all opiates.   -Diazepam helped. Previously given 5mg PRN max 20 mg per day. For spasms.   -Tizanidine given for spasms but made him nod off all the time, made his blood pressure low, did not work.    -No cravings for opioids.     Nueropathic pain: Lyrica is helping.   Duloxetine: must be helping because not depressed and improved nerve pain.   Baclofen: been on for 33 years, unsure why  Iron Deficiency Anemia history, on Iron chronically  PreOp: told to take Vitamin D and Vitamin C for wound healing  Takes loperamide for chronic diarrhea. Able to maintain bowels to consistency with current PRN regimen  -Has loop ostomy bag in place, left abdomen. Urostomy on right abdomen.   Trazodone for insomnia  Declines colonoscopy today.   Would like Covid vaccine today -- no recent influenza like illness or covid contacts    Denies fevers, chills, unexplained weight loss, headaches, dizziness, lightheadedness at rest (has getting up to quick, chornic), sore throat, dysphagia, chest pain, shortness of breath at rest (has getting up and exerting), abdominal pain, nausea, vomiting, cough, hemotypsis, bloody or black stools, no thoughts hurting self or others, no suicidal ideaton, no hallucinations    Review of Systems   Constitutional, HEENT, cardiovascular, pulmonary, GI, , musculoskeletal, neuro, skin, endocrine and psych systems are negative, except as otherwise noted.      Objective    /72   Pulse 83   Temp 98.2  F (36.8  C) (Oral)   Resp 16   SpO2 99%   There is no height or weight on file to calculate BMI.  Physical Exam   GENERAL: wheel chair bound, alert and no  distress  EYES: Eyes grossly normal to inspection, clear sclera  NECK: supple  RESP: Non labored breathing, slightly prolonged expiratory phase, lungs clear to auscultation without no rales, rhonchi or wheezes  CV: regular rate and rhythm, no murmur, no peripheral edema and peripheral pulses strong  ABDOMEN: soft, nontender, right sided urostomy, left sided ostomy, bowel sounds normal  MS: Healed Left AKA. Right lower extremity with intact bandage up to middle shin without drainage.   SKIN: no suspicious lesions or rashes  NEURO: alert and oriented, spontaneous movements of upper extremities, mild spasticity, mentation intact and speech normal  PSYCH: mentation appears normal, affect normal/bright    Results for orders placed or performed in visit on 01/02/24   HIV Screening     Status: Normal   Result Value Ref Range    HIV Antigen Antibody Combo Nonreactive Nonreactive   Hepatitis C Screen Reflex to HCV RNA Quant and Genotype     Status: Normal   Result Value Ref Range    Hepatitis C Antibody Nonreactive Nonreactive   Urine Drug Confirmation Panel     Status: Abnormal   Result Value Ref Range    Pregabalin Present (A) Absent    Narrative    This test was developed and its performance characteristics determined by the [facility],  Special Chemistry Laboratory. It has not been cleared or approved by the FDA. The laboratory is regulated under CLIA as qualified to perform high-complexity testing. This test is used for clinical purposes. It should not be regarded as investigational or for research.    Drugs with concentrations less than the cutoff will not be reported.  The drugs with applicable detection cutoff limits that are included within the Drug Confirmation Panel are:    The following drugs are detected with a cutoff of 3 ng/ml: FENTANYL    The following drugs are detected with a cutoff of 5 ng/mL: 6-ACETYLMORPHINE, BUPRENORPHINE, NALOXONE, NORBUPRENORPHINE, NORFENTANYL    The  following drugs are detected with a cutoff of 10 ng/mL: SUFENTANIL    The following drugs are detected with a cutoff of 20 ng/mL: PCP (PHENCYCLIDINE)    The following drugs are detected with a cutoff of 50 ng/mL: 7-AMINOCLONAZEPAM, 7-AMINOFLUNITRAZEPAM, ALPRAZOLAM, AMPHETAMINE, A-OH-ALPRAZOLAM, A-OH-MIDAZOLAM, A-OH-TRIAZOLAM, BENZOYLECGONINE (Cocaine Metabolite), CLONAZEPAM, COCAETHYLENE (Cocaine Metabolite), CODEINE, DIAZEPAM, DIHYDROCODEINE, EDDP (Methadone Metabolite), HYDROCODONE, HYDROMORPHONE, LORAZEPAM, MDA (3,4-Methylenedioxyamphetamine), MDEA (3,2-Nudijueiiwoicc-K-ethylcathinone), MDMA (Methylenedioxyamphetamine,Ecstasy), MEPERIDINE, METHADONE, METHAMPHETAMINE, METHYLPHENIDATE (Ritalin), MORPHINE, NALTREXONE, N-DESMETH-TAPENTADOL, NORCODEINE, NORDIAZEPAM, NORMEPERIDINE, O-RENETTA-TRAMADOL, OXAZEPAM, OXYCODONE, OXYMORPHONE, PROPOXYPHENE, RITALINIC ACID, TAPENTADOL, TEMAZEPAM, THEBAINE, TRAMADOL    The following drugs are detected with a cutoff of 100 ng/mL: GABAPENTIN, KETAMINE    The following drugs are detected with a cutoff of 200 ng/mL: PREGABALIN, XYLAZINE     Urine Creatinine for Drug Screen Panel     Status: None   Result Value Ref Range    Creatinine Urine for Drug Screen 36 mg/dL   [id number] - Urine Drug Confirmation Panel (Comprehensive)     Status: Abnormal    Narrative    The following orders were created for panel order [id number] - Urine Drug Confirmation Panel (Comprehensive).  Procedure                               Abnormality         Status                     ---------                               -----------         ------                     Urine Drug Confirmation ...[[id number]]  Abnormal            Final result               Urine Creatinine for Abran...[[id number]]                      Final result                 Please view results for these tests on the individual orders.       ----- Service Performed and Documented by Resident or Fellow ------

## 2024-01-02 NOTE — Clinical Note
Very complex patient, great job navigating this challenging visit.  - I added establish care and complex care needs as diagnoses  - I also amended the order of your A/P by priority of needs. It takes more time to do this, but it's good practice. And it makes your thinking and plans clearer to the next reader. Extra important when there are a lot of providers involved.  - I changed your LOS from established level 4 to new level 4 as he has not been seen in clinic before. He was on our inpatient service in November. I don't know if you reviewed that discharge summary? I found it very helpful.  - Last thing, do we know anything about the meds he reported that he was NOT taking? Is he supposed to be taking the antibiotics?   Let me know if questions - kp

## 2024-01-04 LAB — PREGABALIN UR QL CFM: PRESENT

## 2024-01-08 RX ORDER — FERROUS SULFATE 325(65) MG
325 TABLET ORAL DAILY
Qty: 60 TABLET | Refills: 0 | Status: SHIPPED | OUTPATIENT
Start: 2024-01-08 | End: 2024-03-06

## 2024-01-08 RX ORDER — CALCIUM CARBONATE 500(1250)
1 TABLET,CHEWABLE ORAL DAILY
Qty: 60 TABLET | Refills: 0 | Status: SHIPPED | OUTPATIENT
Start: 2024-01-08 | End: 2024-02-27

## 2024-01-23 ENCOUNTER — HOSPITAL ENCOUNTER (OUTPATIENT)
Dept: WOUND CARE | Facility: CLINIC | Age: 59
Discharge: HOME | End: 2024-01-23
Attending: PHYSICIAN ASSISTANT | Admitting: PHYSICIAN ASSISTANT
Payer: MEDICARE

## 2024-01-23 VITALS — SYSTOLIC BLOOD PRESSURE: 147 MMHG | DIASTOLIC BLOOD PRESSURE: 77 MMHG | TEMPERATURE: 97.4 F | HEART RATE: 74 BPM

## 2024-01-23 DIAGNOSIS — L97.512 ULCER OF RIGHT FOOT WITH FAT LAYER EXPOSED (H): ICD-10-CM

## 2024-01-23 DIAGNOSIS — L89.314 PRESSURE ULCER OF ISCHIUM, RIGHT, STAGE IV (H): Primary | ICD-10-CM

## 2024-01-23 DIAGNOSIS — L97.922 ULCER OF LEFT LOWER EXTREMITY WITH FAT LAYER EXPOSED (H): ICD-10-CM

## 2024-01-23 DIAGNOSIS — L98.492: ICD-10-CM

## 2024-01-23 DIAGNOSIS — L98.492 SKIN ULCER WITH FAT LAYER EXPOSED (H): ICD-10-CM

## 2024-01-23 PROCEDURE — 11043 DBRDMT MUSC&/FSCA 1ST 20/<: CPT | Performed by: PHYSICIAN ASSISTANT

## 2024-01-23 PROCEDURE — 17250 CHEM CAUT OF GRANLTJ TISSUE: CPT | Mod: XS | Performed by: PHYSICIAN ASSISTANT

## 2024-01-23 PROCEDURE — 99214 OFFICE O/P EST MOD 30 MIN: CPT | Mod: 25 | Performed by: PHYSICIAN ASSISTANT

## 2024-01-23 PROCEDURE — 17250 CHEM CAUT OF GRANLTJ TISSUE: CPT | Mod: 59 | Performed by: PHYSICIAN ASSISTANT

## 2024-01-23 NOTE — DISCHARGE INSTRUCTIONS
Parth Fountain      1965    A DME order was not completed because the supplies are ordered by home care   Dressing changes outside of clinic are being performed by Home Care  [facility] Phone: [phone]; Fax: [phone]     PLAN:  Please call the scheduling  to [facility] [place] [facility] [phone]   Have [facility] look at wheelchair this month!     Wound Care Orders: Right medial ankle, Right Lateral Foot, Right toe 1, right toe 3  - Cleanse with mild unscented soap and water (such as Cetaphil, Cerave or Dove)  Ensure to clean and debride old skin of BLE with cares.  - Apply small amount of VASHE on gauze, lay into wound bed, let sit for 10 minutes, remove gauze (do not rinse)  - Apply thick, high-quality moisturizer to intact skin (such as CeraVe, Eucerin, Aquaphor or Vanicream)   - Apply a nickel thick layer of Iodosorb paste to wounds  - cover with two 5x9 ABD pads   - secure with Kerlix roll gauze and medipore tape  - Wear spandage for compression from toes to knee  Change M,W,F and as needed for soilage     Wound care: Right and Left Ischial tuberosity   - Cleanse with mild unscented soap and water (such as Cetaphil, Cerave or Dove)  - Apply small amount of VASHE on gauze, lay into wound bed, let sit for 10 minutes, remove gauze (do not rinse)  - Apply 1/6 Hydrofera Blue Ready-Transfer (cut-to-fit size of wounds)  - Cover with 1/2 of a 5x9 ABD pad and secure with medipore tape  Change M,W,F and as needed for soilage     Repositioning:  Bed: Reposition MINIMALLY every 1-2 hours in bed to relieve pressure and promote perfusion to tissue.  Continue group 2 mattress  Patient is using a Group 2 mattress due to large, stage 4 pressure ulceration on the patient's pelvis that has failed to improve on a normal mattress despite regular wound cares and repositioning. A group 1 mattress is not adequate to offload these severe ulcerations. Patient has impaired  sensation and is unable to reposition independently.   Chair: When up to the chair, do not sit for longer than one hour total before returning to bed for at least 60 minutes to relieve pressure and promote perfusion to the tissue.  Completely recline/tilt for 15 minutes each hour.  Sit on a chair cushion when up to the chair.       Protein:  A diet high in protein is important for wound healing, we recommend getting 90 grams of protein per day.   Taking protein shakes or bars are a good way to get extra protein in your diet.       Main Provider: Suzan Martinez PA-C January 23, 2024    Call us at [phone] if you have any questions about your wounds, have redness or swelling around your wound, have a fever of 101 degrees Fahrenheit or greater or if you have any other problems or concerns. We answer the phone Monday through Friday 8 am to 4 pm, please leave a message as we check the voicemail frequently throughout the day.     If you had a positive experience please indicate that on your patient satisfaction survey form that [facility] will be sending you.    It was a pleasure meeting with you today.  Thank you for allowing me and my team the privilege of caring for you today.  YOU are the reason we are here, and I truly hope we provided you with the excellent service you deserve.  Please let us know if there is anything else we can do for you so that we can be sure you are leaving completely satisfied with your care experience.      If you have any billing related questions please call the [facility] Business office at [phone]. The clinic staff does not handle billing related matters.    If you are scheduled to have a follow up appointment, you will receive a reminder call the day before your visit. On the appointment day please arrive 15 minutes prior to your appointment time. If you are unable to keep that appointment, please call the clinic to cancel or reschedule. If you are more than 10 minutes  late or greater for your scheduled appointment time, the clinic policy is that you may be asked to reschedule.

## 2024-01-23 NOTE — PROGRESS NOTES
ASSESSMENT:    Full thickness traumatic ulcers of R 1st and 3rd toes with exposed tendon and bone   New right groin pressure ulcer stage 3  Peripheral arterial disease - non-reconstructible but with TCO2 suggestive of adequate healing   Recurrent stage 4 pressure ulcers of bilateral ITs      PLAN:    We once again discussed his goals regarding his RLE, he would like to try to save this leg and avoid proximal amputation if possible. I will pass this information on to his vascular team. Also strongly encouraged him to get the foot MRI to help determine status of toes and if needs amputation of toes.   [facility]  Patient is using a Group 2 mattress due to large, stage 4 pressure ulceration on the patient's pelvis that has failed to improve on a normal mattress despite regular wound cares and repositioning. A group 1 mattress is not adequate to offload these severe ulcerations. Patient has impaired sensation and is unable to reposition independently.  iodosorb or iodine and cover dressing to leg and foot wounds, Hydrofera Blue on ischial wounds  Continue using Rooke boot to improve circulation  Follow-up: will need 40 minute appointments every 6 weeks    HISTORY OF PRESENT ILLNESS:   Mr. Parth Fountain is a 58-year-old paraplegic gentleman who returns to us today to follow-up on chronic pelvic pressure ulcers as well as lower leg wounds. PMHx of DVT, chronic UTI, EtOh abuse, and s/p colostomy. He has a long history of pressure ulcers with subsequent corrective surgeries by Dr. Peña. Parth's pelvic wounds have been very difficult to heal due to his surgical history. He has very little tissue overlying his bone and it is mostly made up of scar tissue. His progress waxes and wanes.    10/21/20: Televisit. Thigh wound healed. Scraped left leg and foot during a transfer and has several shallow ulcers on left lower leg and toes. Has an ulcer in his right groin/hip crease. This waxes and wanes.  "Uses Stacie when open and calmoseptine when more closed, this is working well for him.   11/18/20: Return visit. All of his wounds have improved. Has new traumatic wounds on bilateral shins.   12/16/20: Returns via video visit. Shin wounds are improving but very friable and hypergranular. Hip crease slightly more open today, also appears friable. Has been using Stacie to all wounds.   01/13/21: Returns for in office visit.  Leg wounds are improving with use of calcium alginate.  However, Parth notes that the dressings are sticking to the wounds and causing bleeding when removed.  His right groin wound is now quite hypertrophic.  2/26/21: Returns for follow-up. Reports that he continues to reinjure his R lower leg. Hip wound is not really improving. Has new wound on R great toe with exposed bone.   4/9/21: Returns for follow-up. Still has exposed bone on R great toe and has not had the x-ray done we ordered at last visit. His R lower leg has worsened. R hip about the same.   5/19/21: Returns for follow-up via telephone, has been using Xeroform. Has not done vascular studies or xray as recommended. Hip wound is improving. Has a recurrence of his L IT wound. His legs are marginally better, reports they are \"squishy.\"    8/20: Returns for televisit. Had to reschedule from in-clinic visit due to staffing issues. Left toe has new wound, lateral left leg still open. Has been using xeroform. Thinks R toe is closed up now. R hip comes and goes, uses Calmoseptine PRN. Has lithotripsy this upcoming week. Has not had xray or ABIs.   09/24/21: Returns for in person follow-up. His hip/groin wound has completely healed, however this usually comes and goes. Has new minor right heel wound. Continues to have issues with left lower leg, today during the visit it is apparent he has some kind of vessel in this area that easily bleeds causing a hematoma in this area. Fortunately the wound is fairly superficial. He has not had ABIs or x " rays as recommended. Fortunately his toe appears stable today without any exposed bone.   10/26/21: Returns for follow-up. Left leg has regressed, tissue now very friable and hypertrophic. Toe continues to be resolved. Hip wound currently resolved. Biopsy performed came back suspicious for pyoderma gangrenosum.    12/2/21: Seen in ED for laceration of foot. Arterial duplex performed was suspicious for arterial disease.   12/07/21: Orders were changed after biopsy results last visit to begin high potency topical steroid ointment, unfortunately there was some confusion on this and he hasn't started it. Staff has just been cleaning and covering with gauze and the wound is much improved.   01/28/22: Returns for follow-up. Since I saw him he visited with Dr. Chapa [facility] who recommended angio. Unfortunately his right heel and right lateral foot wound are now down to bone. He thinks it's from his [facility] that [facility]. His LLE wounds that we believe are PG have improved and are nearly healed. Has new burn on his left thigh from coffee. Also his pelvic wounds have reopened. He thinks this is due to his wheelchair cushion deflating.   03/15/22: Returns for follow-up. It appears that his health has overall deteriorated. He denies drinking again but he does have both cigarettes and chewing tobacco with him. He says he feels sick today as he didn't sleep last night but denies fevers or change in appetite. He had his chair pressure mapped and said it is appropriate. He has not followed up with vascular as we had suggested. Fortunately his foot wounds appear relatively stable. His pelvic wounds have deteriorated a bit. PG leg wound appears nearly resolved.   04/12/22: Returns for follow-up. Angio is scheduled on Thursday. Foot wounds continue to be stable but unfortunately continues to have bone exposed on R foot. His pelvic wounds have worsened and he cannot really identify a cause. States his bed is  working well. Has a Roho that he reports mapped well but is getting a new chair later this week [facility] the [facility]. He is otherwise feeling well.   4/14/22: Angio revealed multiple occlusions, unable to achieve any recanalization. Per Dr. Chapa he is a poor candidate for revascularization, especially due to smoking and poor health.   05/17/22: Returns for follow-up. Pelvic wounds improved. Foot wounds are stable, no sign of infection. Thigh wound healing. Leg wounds about the same. New left heel ulcer. We had heart to heart about poor blood flow and poor prognosis of this, high risk of amputation. He must quit smoking and take care of himself. Recommended HBOT if candidate.   06/14/22: Returns for follow-up.  Foot wounds stable with Iodosorb. Lower leg wounds healed. Thigh burn wound nearly healed. Pelvic wounds improving with collagen. New back wound - states he is bottoming out on back rest when he tilts too far.   07/12/22: Returns to clinic. Foot wounds improving. Has new necrotic wound on left lateral leg - he is unaware how or when he got this. Pelvic wounds are stable. [facility] not [facility], now using an old Roho as a back rest.   08/09/22: Pelvic wounds are improving. Left lateral leg wound now down to tendon but starting to granulate on edges. Still has joint exposed on left third toe. Left heel resolved. Right heel improved. Right lateral foot improving but still with exposed tendon. Has not fixed wheelchair.   9/2: Visited with [facility] about his left 3rd toe, she performed excision of bone in clinic and closed the area primarily. Started on doxycycline. Bone biopsy came back + for osteomyelitis.  09/13/22: Returns via telemed for follow-up. Pelvic wounds appear to be improving. New distal left lower leg wound in the previous area of PG. Has decided he does not want to fix the back of his wheelchair, prefers to use the Roho.   10/11/22: Returns for follow-up. Buttocks wounds improving  overall. There is a little more tearing on the left IT wound causing some undermining. His leg wounds have improved but he does have new superficial abrasion superior to current wounds. Continues to see Dr. Abbott for his foot wounds, see her dictation for report of these.   01/11/23: Returns for follow-up. Pelvic wounds improved. Has worsening in lateral foot wound, appears to have new trauma but he's not sure about when/how. Wound of left toe third arthroplasty has now dehisced.   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   02/22/23: Returns for follow-up. Pelvic wounds stable to improved. Right lateral foot wound slightly improved. Traumatic dorsal toe wounds with stable eschar.  6/7: Returns to clinic: Pelvic wounds improving, using collagen. New RLE ulcer - he is unsure of cause. R foot wound stagnant without sign of infection. He expresses desire for amputation today as he understand wounds likely unhealable and risk of infection. Referral made to [facility]. Wheelchair continues to be broken down with makeshift back rest.  08/08/23: Returns to clinic. Foot wound improving. Leg wound resolved. Bilateral ITs appear to be improving. He has not pursued preventive amputation yet but is still interested. He states his air mattress isn't working well and would like a replacement.   09/19/23: Returns to clinic. Foot wounds improving. New dorsal foot wound - appears minor. Pelvic wounds improving. Had chair mapped and reported it was slightly over inflated. Has new group 2 and likes it.   10/25/23: Returns to clinic. Reports that his wheelchair is broken and has had issues with all his wounds and new injuries to RLE due to this. [facility]. Unfortunately has some pretty severe degloving injuries of 1st and 3rd toes with exposed loose distal phalanx in 3rd toe. His R lateral foot wound is worsened as well. New traumatic calf  wound. His ischial wounds have regressed as well and he also blames this on his wheelchair. Fortunately he is feeling well, denies f/c/n/v. Denies more drug or alcohol use.  10/30-11/9: Hospitalized at [facility] for gangrene of right 1st and 3rd toe and clinical osteomyelitis. He was started on IV antibiotics and discharged on orals.   11/27: Met with Dr. Maza with the vascular team at [facility]. He preformed TCO2s which were actually adequate for healing   12/05/23: Returns to clinic. 1st and 3rd toe wounds with exposed bone. He is still contemplating whether he should move forward with some kind of amputation or not.  He is feeling well on the oral antibiotics. Has new R groin pressure ulcer that appears to be healing. His ischial wounds are measuring smaller. [facility] but he hasn't been able to pick it up yet.   12/11: Met with Dr. Maza again. His account of the visit and Parth's understanding seem to differ. Parth felt that he was suggested AKA due to Dr. Maza's recommendation but it seemed that Dr. Maza felt that this was Parth's desire. It sounds like there are still options for more distal amputation if necessary.  12/22/23: Returns to clinic. Foot and leg wounds improving. However, R great toe wound probes quite deep and unclear whether this will heal as he likely has joint/bone involvement. His ischium and groin are stable but have slightly more odor today.   01/23/24: Returns to clinic. Still hoping to avoid proximal amputation of RLE. Has healed leg and foot wounds, only two wounds remain. These still have tendon and bone exposed. Has been using iodine on these. His IT wounds appear improved with use of Hydrofera Blue. Has not been able to get new wc yet.      OFFLOADING: On a Group 2 mattress, new from [employer] 9/2023. Still utilizing RoHo cushion on his wheelchair - pressure mapped well 9/2023, new chair 4/2022, will be getting new chair in  1/2024      SOCIAL HISTORY:  Lives in a group home. He is still receiving home health care through [facility]. that comes and does dressing changes daily.      PHYSICAL EXAMINATION   INTEGUMENTARY:   Wound (used by OP [facility] only) 01/28/22 1056 Right lateral foot pressure injury (Active)   Thickness/Stage full thickness 01/23/24 1047   Base scab 01/23/24 1047   Periwound intact;dry 01/23/24 1047   Periwound Temperature warm 01/23/24 1047   Periwound Skin Turgor soft 01/23/24 1047   Edges open 01/23/24 1047   Length (cm) 1.2 01/23/24 1047   Width (cm) 1 01/23/24 1047   Depth (cm) 0.1 01/23/24 1047   Wound (cm^2) 1.2 cm^2 01/23/24 1047   Wound Volume (cm^3) 0.12 cm^3 01/23/24 1047   Wound healing % 68.42 01/23/24 1047   Drainage Characteristics/Odor serosanguineous 01/23/24 1047   Drainage Amount moderate 01/23/24 1047   Care, Wound chemical cautery applied;non-select wound debridement performed. 12/20/23 1300       Wound (used by OP [facility] only) 03/15/22 1049 Right ischial tuberosity pressure injury (Active)   Thickness/Stage Stage 4 01/23/24 1047   Base pink 01/23/24 1047   Periwound macerated;intact 01/23/24 1047   Periwound Temperature warm 01/23/24 1047   Periwound Skin Turgor soft 01/23/24 1047   Edges open 01/23/24 1047   Length (cm) 3 01/23/24 1047   Width (cm) 2 01/23/24 1047   Depth (cm) 0.5 01/23/24 1047   Wound (cm^2) 6 cm^2 01/23/24 1047   Wound Volume (cm^3) 3 cm^3 01/23/24 1047   Wound healing % -60 01/23/24 1047   Undermining [Depth (cm)/Location] 6-9 o'clock / 0.5cm 01/23/24 1047   Drainage Characteristics/Odor serosanguineous 01/23/24 1047   Drainage Amount large 01/23/24 1047   Care, Wound non-select wound debridement performed. 01/23/24 1047       Wound (used by OP [facility] only) 03/15/22 1050 Left ischial tuberosity pressure injury (Active)   Thickness/Stage Stage 4 01/23/24 1047   Base pink 01/23/24 1047   Periwound intact;macerated 01/23/24 1047   Periwound Temperature warm 01/23/24 1047   Periwound  Skin Turgor soft 01/23/24 1047   Edges open 01/23/24 1047   Length (cm) 4 01/23/24 1047   Width (cm) 1.5 01/23/24 1047   Depth (cm) 0.9 01/23/24 1047   Wound (cm^2) 6 cm^2 01/23/24 1047   Wound Volume (cm^3) 5.4 cm^3 01/23/24 1047   Wound healing % -109.79 01/23/24 1047   Drainage Characteristics/Odor serosanguineous;tan;creamy 01/23/24 1047   Drainage Amount moderate 01/23/24 1047   Care, Wound non-select wound debridement performed. 01/23/24 1047       Wound (used by OP [facility] only) 10/24/23 1313 Right medial ankle unspecified (Active)   Thickness/Stage full thickness 01/23/24 1047   Base scab 01/23/24 1047   Periwound dry;intact 01/23/24 1047   Periwound Temperature warm 01/23/24 1047   Periwound Skin Turgor soft 01/23/24 1047   Edges open 01/23/24 1047   Length (cm) 0.3 01/23/24 1047   Width (cm) 0.3 01/23/24 1047   Depth (cm) 0.1 01/23/24 1047   Wound (cm^2) 0.09 cm^2 01/23/24 1047   Wound Volume (cm^3) 0.01 cm^3 01/23/24 1047   Wound healing % 99.22 01/23/24 1047   Drainage Characteristics/Odor serosanguineous 01/23/24 1047   Drainage Amount scant 01/23/24 1047   Care, Wound non-select wound debridement performed. 01/23/24 1047       Wound (used by OP [facility] only) 10/24/23 1314 Right dorsal 1 toe ulceration, arterial (Active)   Thickness/Stage full thickness 01/23/24 1047   Base slough;red;exposed structure 01/23/24 1047   Periwound pink;edematous 01/23/24 1047   Periwound Temperature cool 01/23/24 1047   Periwound Skin Turgor soft 01/23/24 1047   Edges callused 01/23/24 1047   Length (cm) 2 01/23/24 1047   Width (cm) 1.4 01/23/24 1047   Depth (cm) 0.1 01/23/24 1047   Wound (cm^2) 2.8 cm^2 01/23/24 1047   Wound Volume (cm^3) 0.28 cm^3 01/23/24 1047   Wound healing % 55.2 01/23/24 1047   Drainage Characteristics/Odor creamy 01/23/24 1047   Drainage Amount moderate 01/23/24 1047   Care, Wound non-select wound debridement performed. 01/23/24 1047       Wound (used by OP [facility] only) 10/24/23 1314 Right 3 toe (Active)    Thickness/Stage full thickness 01/23/24 1047   Base scab;eschar 01/23/24 1047   Periwound dry;intact 01/23/24 1047   Periwound Temperature cool 01/23/24 1047   Periwound Skin Turgor soft 01/23/24 1047   Edges callused 01/23/24 1047   Length (cm) 1 01/23/24 1047   Width (cm) 1.1 01/23/24 1047   Depth (cm) 0.1 01/23/24 1047   Wound (cm^2) 1.1 cm^2 01/23/24 1047   Wound Volume (cm^3) 0.11 cm^3 01/23/24 1047   Wound healing % 77.27 01/23/24 1047   Drainage Characteristics/Odor serosanguineous 01/23/24 1047   Drainage Amount scant 01/23/24 1047   Care, Wound non-select wound debridement performed. 01/23/24 1047       Wound (used by OP [facility] only) 12/05/23 1447 Right groin pressure injury (Active)   Thickness/Stage Stage 3 01/23/24 1047   Base scab 01/23/24 1047   Periwound intact 01/23/24 1047   Periwound Temperature warm 01/23/24 1047   Periwound Skin Turgor soft 01/23/24 1047   Edges open 12/20/23 1300   Length (cm) 0 01/23/24 1047   Width (cm) 0 01/23/24 1047   Depth (cm) 0 01/23/24 1047   Wound (cm^2) 0 cm^2 01/23/24 1047   Wound Volume (cm^3) 0 cm^3 01/23/24 1047   Wound healing % 100 01/23/24 1047   Drainage Characteristics/Odor serosanguineous 12/20/23 1300   Drainage Amount none 01/23/24 1047   Care, Wound non-select wound debridement performed. 01/23/24 1047           PROCEDURE (foot wounds): Nursing staff performed mechanical debridement with dressing removal and cleansing and then applied 4% lidocaine to the wound bed. Patient was determined to be capable of making their own medical decisions and informed consent was obtained. Using a 15 blade a surgical debridement was performed down to and including  muscle/tendon of <20cm2. Hemostasis was achieved with pressure and silver nitrate. The patient tolerated the procedure well.    PROCEDURE: After verbal consent was obtained, granulation tissue of IT wounds was treated with silver nitrate. Patient tolerated this well.   MDM: 30 minutes were spent on the date of  the visit reviewing previous chart notes, evaluating patient and developing the treatment plan, this excludes any time spent on procedures.         Further instructions from your care team         Parth Fountain      1965    A DME order was not completed because the supplies are ordered by home care   Dressing changes outside of clinic are being performed by Home Care  [facility] Phone: [phone]; Fax: [phone]     PLAN:  Please call the scheduling  to schedule your MRI appointment at [facility]: [phone]   Have [facility] look at wheelchair this month!     Wound Care Orders: Right medial ankle, Right Lateral Foot, Right toe 1, right toe 3  - Cleanse with mild unscented soap and water (such as Cetaphil, Cerave or Dove)  Ensure to clean and debride old skin of BLE with cares.  - Apply small amount of VASHE on gauze, lay into wound bed, let sit for 10 minutes, remove gauze (do not rinse)  - Apply thick, high-quality moisturizer to intact skin (such as CeraVe, Eucerin, Aquaphor or Vanicream)   - Apply a nickel thick layer of Iodosorb paste to wounds  - cover with two 5x9 ABD pads   - secure with Kerlix roll gauze and medipore tape  - Wear spandage for compression from toes to knee  Change M,W,F and as needed for soilage     Wound care: Right and Left Ischial tuberosity   - Cleanse with mild unscented soap and water (such as Cetaphil, Cerave or Dove)  - Apply small amount of VASHE on gauze, lay into wound bed, let sit for 10 minutes, remove gauze (do not rinse)  - Apply 1/6 Hydrofera Blue Ready-Transfer (cut-to-fit size of wounds)  - Cover with 1/2 of a 5x9 ABD pad and secure with medipore tape  Change M,W,F and as needed for soilage     Repositioning:  Bed: Reposition MINIMALLY every 1-2 hours in bed to relieve pressure and promote perfusion to tissue.  Continue group 2 mattress  Patient is using a Group 2 mattress due to large, stage 4 pressure ulceration on the patient's  pelvis that has failed to improve on a normal mattress despite regular wound cares and repositioning. A group 1 mattress is not adequate to offload these severe ulcerations. Patient has impaired sensation and is unable to reposition independently.   Chair: When up to the chair, do not sit for longer than one hour total before returning to bed for at least 60 minutes to relieve pressure and promote perfusion to the tissue.  Completely recline/tilt for 15 minutes each hour.  Sit on a chair cushion when up to the chair.       Protein:  A diet high in protein is important for wound healing, we recommend getting 90 grams of protein per day.   Taking protein shakes or bars are a good way to get extra protein in your diet.       Main Provider: Suzan Martinez PA-C January 23, 2024    Call us at [phone] if you have any questions about your wounds, have redness or swelling around your wound, have a fever of 101 degrees Fahrenheit or greater or if you have any other problems or concerns. We answer the phone Monday through Friday 8 am to 4 pm, please leave a message as we check the voicemail frequently throughout the day.     If you had a positive experience please indicate that on your patient satisfaction survey form that [facility] will be sending you.    It was a pleasure meeting with you today.  Thank you for allowing me and my team the privilege of caring for you today.  YOU are the reason we are here, and I truly hope we provided you with the excellent service you deserve.  Please let us know if there is anything else we can do for you so that we can be sure you are leaving completely satisfied with your care experience.      If you have any billing related questions please call the [facility] Business office at [phone]. The clinic staff does not handle billing related matters.    If you are scheduled to have a follow up appointment, you will receive a reminder call the day before your visit. On the  appointment day please arrive 15 minutes prior to your appointment time. If you are unable to keep that appointment, please call the clinic to cancel or reschedule. If you are more than 10 minutes late or greater for your scheduled appointment time, the clinic policy is that you may be asked to reschedule.

## 2024-01-25 ENCOUNTER — DOCUMENTATION ONLY (OUTPATIENT)
Dept: FAMILY MEDICINE | Facility: CLINIC | Age: 59
End: 2024-01-25
Payer: MEDICARE

## 2024-01-25 NOTE — PROGRESS NOTES
"When opening a documentation only encounter, be sure to enter in \"Chief Complaint\" Forms and in \" Comments\" Title of form, description if needed.    Bear is a 58 year old  adult  Form received via: Fax  Form now resides in: Provider Kia SY                "

## 2024-02-01 NOTE — TELEPHONE ENCOUNTER
See 01/23/2024 wound care note. Patient is hoping to avoid amputation. Wound provider encouraged an MRI.

## 2024-02-02 DIAGNOSIS — Z53.9 DIAGNOSIS NOT YET DEFINED: Primary | ICD-10-CM

## 2024-02-02 PROCEDURE — G0179 MD RECERTIFICATION HHA PT: HCPCS | Performed by: PHYSICIAN ASSISTANT

## 2024-02-04 ENCOUNTER — HEALTH MAINTENANCE LETTER (OUTPATIENT)
Age: 59
End: 2024-02-04

## 2024-02-09 ENCOUNTER — MEDICAL CORRESPONDENCE (OUTPATIENT)
Dept: HEALTH INFORMATION MANAGEMENT | Facility: CLINIC | Age: 59
End: 2024-02-09
Payer: MEDICARE

## 2024-02-12 NOTE — PROGRESS NOTES
Form has been completed by provider.     Form sent out via: Fax to [employer] at Fax Number: [phone]  Patient informed: N/A. Received confirmation fax went through. Forms placed in scan basket.  Output date: February 12, 2024    Estrellita Silverio RN      **Please close the encounter**

## 2024-02-21 ENCOUNTER — HOSPITAL ENCOUNTER (OUTPATIENT)
Dept: WOUND CARE | Facility: CLINIC | Age: 59
Discharge: HOME | End: 2024-02-21
Attending: PHYSICIAN ASSISTANT | Admitting: PHYSICIAN ASSISTANT
Payer: MEDICARE

## 2024-02-21 DIAGNOSIS — L98.492 SKIN ULCER WITH FAT LAYER EXPOSED (H): ICD-10-CM

## 2024-02-21 DIAGNOSIS — L98.492: ICD-10-CM

## 2024-02-21 DIAGNOSIS — L97.922 ULCER OF LEFT LOWER EXTREMITY WITH FAT LAYER EXPOSED (H): ICD-10-CM

## 2024-02-21 DIAGNOSIS — L97.512 ULCER OF RIGHT FOOT WITH FAT LAYER EXPOSED (H): ICD-10-CM

## 2024-02-21 DIAGNOSIS — M62.838 MUSCLE SPASTICITY: ICD-10-CM

## 2024-02-21 DIAGNOSIS — L89.314 PRESSURE ULCER OF ISCHIUM, RIGHT, STAGE IV (H): Primary | ICD-10-CM

## 2024-02-21 PROCEDURE — 11043 DBRDMT MUSC&/FSCA 1ST 20/<: CPT | Performed by: PHYSICIAN ASSISTANT

## 2024-02-21 PROCEDURE — 17250 CHEM CAUT OF GRANLTJ TISSUE: CPT | Performed by: PHYSICIAN ASSISTANT

## 2024-02-21 PROCEDURE — 17250 CHEM CAUT OF GRANLTJ TISSUE: CPT | Mod: 59 | Performed by: PHYSICIAN ASSISTANT

## 2024-02-21 NOTE — DISCHARGE INSTRUCTIONS
Parth Fountain      1965  A DME order was not completed because the supplies are ordered by home care or at a care facility  [facility] Phone: [phone]; Fax: [phone]     PLAN: continue dressing care by Home care  Right toes 1 and 3 debrided today; bleeding controlled with Surgicel and Surgifoam  MRI appointment at [facility]: [phone]   Have [facility] look [facility] this month!     Wound Care Orders: Right medial ankle, Lateral Foot, Right toe 1 and toe 3  - Cleanse with mild unscented soap and water (such as Cetaphil, Cerave or Dove)  Ensure to clean and debride old skin of BLE with cares.  - Apply small amount of VASHE on gauze, lay into wound bed, let sit for 10 minutes, remove gauze (do not rinse)  - Apply thick, high-quality moisturizer to intact skin (such as CeraVe, Eucerin, Aquaphor or Vanicream)   - Apply a nickel thick layer of Iodosorb paste to wounds  - cover with two 5x9 ABD pads   - secure with Kerlix roll gauze and medipore tape  - Wear spandage for compression from toes to knee  Change M,W,F and as needed for soilage     Wound care: Right and Left Ischial tuberosity   - Cleanse with mild unscented soap and water (such as Cetaphil, Cerave or Dove)  - Apply small amount of VASHE on gauze, lay into wound bed, let sit for 10 minutes, remove gauze (do not rinse)  - Apply 1/6 Hydrofera Blue Ready-Transfer (cut-to-fit size of wounds)  - Cover with 1/2 of a 5x9 ABD pad and secure with medipore tape  Change M,W,F and as needed for soilage     Repositioning:  Bed: Reposition MINIMALLY every 1-2 hours in bed to relieve pressure and promote perfusion to tissue.  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 A group 1 mattress is not adequate to offload these severe ulcerations. Patient has impaired sensation and is unable to  reposition independently.   Chair: When up to the chair, do not sit for longer than one hour total before returning to bed for at least 60 minutes to relieve pressure and promote perfusion to the tissue.  Completely recline/tilt for 15 minutes each hour.  Sit on a chair cushion when up to the chair.       Protein:  A diet high in protein is important for wound healing, we recommend getting 90 grams of protein per day.   Taking protein shakes or bars are a good way to get extra protein in your diet.      Main Provider: Suzan Martinez PA-C February 21, 2024    Call us at [phone] if you have any questions about your wounds, have redness or swelling around your wound, have a fever of 101 degrees Fahrenheit or greater or if you have any other problems or concerns. We answer the phone Monday through Friday 8 am to 4 pm, please leave a message as we check the voicemail frequently throughout the day.     If you had a positive experience please indicate that on your patient satisfaction survey form that [facility] will be sending you.  It was a pleasure meeting with you today.  Thank you for allowing me and my team the privilege of caring for you today.  YOU are the reason we are here, and I truly hope we provided you with the excellent service you deserve.  Please let us know if there is anything else we can do for you so that we can be sure you are leaving completely satisfied with your care experience.      If you have any billing related questions please call the [facility] office at [phone]. The clinic staff does not handle billing related matters.  If you are scheduled to have a follow up appointment, you will receive a reminder call the day before your visit. On the appointment day please arrive 15 minutes prior to your appointment time. If you are unable to keep that appointment, please call the clinic to cancel or reschedule. If you are more than 10 minutes late or greater for your  scheduled appointment time, the clinic policy is that you may be asked to reschedule.

## 2024-02-25 NOTE — PROGRESS NOTES
ASSESSMENT:    Full thickness traumatic ulcers of R 1st and 3rd toes with exposed tendon and bone - highly suspicious for osteomyelitis based on clinical exam  Full thickness traumatic ulcers of right foot with fat-layer exposed  Peripheral arterial disease - non-reconstructible but with TCO2 suggestive of adequate healing   Recurrent stage 4 pressure ulcers of bilateral ITs      PLAN:    We once again discussed his goals regarding his RLE, he would like to try to save this leg and avoid proximal amputation if possible. Also strongly encouraged him to follow through with the foot MRI to help determine status of toes and if needs amputation of toes.   [facility]  Patient is using a Group 2 mattress due to large, stage 4 pressure ulceration on the patient's pelvis that has failed to improve on a normal mattress despite regular wound cares and repositioning. A group 1 mattress is not adequate to offload these severe ulcerations. Patient has impaired sensation and is unable to reposition independently.  iodosorb or iodine and cover dressing to leg and foot wounds, Hydrofera Blue on ischial wounds  Continue using Rooke boot to improve circulation  Follow-up: will need 40 minute appointments every 6 weeks    HISTORY OF PRESENT ILLNESS:   Mr. Parth Fountain is a 58-year-old paraplegic gentleman who returns to us today to follow-up on chronic pelvic pressure ulcers as well as lower leg wounds. PMHx of DVT, chronic UTI, EtOh abuse, and s/p colostomy. He has a long history of pressure ulcers with subsequent corrective surgeries by Dr. Peña. No longer has any surgical options for closure. Parth's pelvic wounds have been very difficult to heal due to his surgical history. He has very little tissue overlying his bone and it is mostly made up of scar tissue. His progress waxes and wanes.    10/21/20: Televisit. Thigh wound healed. Scraped left leg and foot during a transfer and has several shallow  ulcers on left lower leg and toes. Has an ulcer in his right groin/hip crease. This waxes and wanes. Uses Stacie when open and calmoseptine when more closed, this is working well for him.   11/18/20: Return visit. All of his wounds have improved. Has new traumatic wounds on bilateral shins.   2/26/21: Returns for follow-up. Reports that he continues to reinjure his R lower leg. Hip wound is not really improving. Has new wound on R great toe with exposed bone.   8/20: Left toe has new wound.  10/26/21: Returns for follow-up. Left leg has regressed, tissue now very friable and hypertrophic. Biopsy performed came back suspicious for pyoderma gangrenosum.    12/2/21: Seen in ED for laceration of foot. Arterial duplex performed was suspicious for arterial disease.   01/28/22: Returns for follow-up. Since I saw him he visited with Dr. Chapa at the [facility]ascular center who recommended angio. Unfortunately his right heel and right lateral foot wound are now down to bone. He thinks it's from his [facility] that [facility]. His LLE wounds that we believe are PG have improved and are nearly healed. Has new burn on his left thigh from coffee. Also his pelvic wounds have reopened. He thinks this is due to his wheelchair cushion deflating.   03/15/22: Returns for follow-up. It appears that his health has overall deteriorated. He had his chair pressure mapped and said it is appropriate. He has not followed up with vascular as we had suggested. Fortunately his foot wounds appear relatively stable. His pelvic wounds have deteriorated a bit. PG leg wound appears nearly resolved.   04/12/22: Returns for follow-up. Angio is scheduled on Thursday. Foot wounds continue to be stable but unfortunately continues to have bone exposed on R foot. His pelvic wounds have worsened and he cannot really identify a cause. States his bed is working well. Has a Roho that he reports mapped well but is getting a new chair later this week [facility] the [facility].  He is otherwise feeling well.   4/14/22: Angio revealed multiple occlusions, unable to achieve any recanalization. Per Dr. Chapa he is a poor candidate for revascularization, especially due to smoking and poor health.   05/17/22: Returns for follow-up. Pelvic wounds improved. Foot wounds are stable, no sign of infection. Thigh wound healing. Leg wounds about the same. New left heel ulcer. We had heart to heart about poor blood flow and poor prognosis of this, high risk of amputation. He must quit smoking and take care of himself. Recommended HBOT if candidate.   07/12/22: Returns to clinic. Foot wounds improving. Has new necrotic wound on left lateral leg - he is unaware how or when he got this. Pelvic wounds are stable. [facility] not [facility] to [facility], now using an old Roho as a back rest.   08/09/22: Pelvic wounds are improving. Left lateral leg wound now down to tendon but starting to granulate on edges. Still has joint exposed on left third toe. Left heel resolved. Right heel improved. Right lateral foot improving but still with exposed tendon. Has not fixed wheelchair.   9/2: Visited with Scar about his left 3rd toe, she performed excision of bone in clinic and closed the area primarily. Started on doxycycline. Bone biopsy came back + for osteomyelitis.  09/13/22: Returns via telemed for follow-up. Pelvic wounds appear to be improving. New distal left lower leg wound in the previous area of PG. Has decided he does not want to fix the back of his wheelchair, prefers to use the Roho.   01/11/23: Returns for follow-up. Pelvic wounds improved. Has worsening in lateral foot wound, appears to have new trauma but he's not sure about when/how. Wound of left toe third arthroplasty has now dehisced.   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   6/7: Returns to clinic: Pelvic wounds improving, using collagen. New RLE ulcer - he is  unsure of cause. R foot wound stagnant without sign of infection. He expresses desire for amputation today as he understand wounds likely unhealable and risk of infection. Referral made to [facility]. Wheelchair continues to be broken down with makeshift back rest.  08/08/23: Returns to clinic. Foot wound improving. Leg wound resolved. Bilateral ITs appear to be improving. He has not pursued preventive amputation yet but is still interested. He states his air mattress isn't working well and would like a replacement.   09/19/23: Returns to clinic. Foot wounds improving. New dorsal foot wound - appears minor. Pelvic wounds improving. Had chair mapped and reported it was slightly over inflated. Has new group 2 and likes it.   10/25/23: Returns to clinic. Reports that his wheelchair is broken and has had issues with all his wounds and new injuries to RLE due to this. [facility]. Unfortunately has some pretty severe degloving injuries of 1st and 3rd toes with exposed loose distal phalanx in 3rd toe. His R lateral foot wound is worsened as well. New traumatic calf wound. His ischial wounds have regressed as well and he also blames this on his wheelchair. Fortunately he is feeling well, denies f/c/n/v. Denies more drug or alcohol use.  10/30-11/9: Hospitalized at [facility] for gangrene of right 1st and 3rd toe and clinical osteomyelitis. He was started on IV antibiotics and discharged on orals.   11/27: Met with Dr. Maza with the vascular team at [facility]. He preformed TCO2s which were actually adequate for healing   12/05/23: Returns to clinic. 1st and 3rd toe wounds with exposed bone. He is still contemplating whether he should move forward with some kind of amputation or not.  He is feeling well on the oral antibiotics. Has new R groin pressure ulcer that appears to be healing. His ischial wounds are measuring smaller. [facility] but he hasn't been able to pick it up  yet.   12/11: Met with Dr. Maza again. His account of the visit and Parth's understanding seem to differ. Parth felt that he was suggested AKA due to Dr. Maza's recommendation but it seemed that Dr. Maza felt that this was Parth's desire. It sounds like there are still options for more distal amputation if necessary.  12/22/23: Returns to clinic. Foot and leg wounds improving. However, R great toe wound probes quite deep and unclear whether this will heal as he likely has joint/bone involvement. Ischial wounds change to Hydrofera Blue due to concern of bioburden with collagen.  01/23/24: Returns to clinic. All wounds improving, but unlikely toe wounds will heal due to involved bone/joint. Has not been able to get new wc yet.    2/21: Rets to clinic. Ischial wounds improving with Hydrofera Blue. Toe wounds slightly worsened today, both with exposed joint. He is unsure if he traumatized these. [facility] not [facility] yet [facility].     OFFLOADING: On a Group 2 mattress, new from [employer] 9/2023. Still utilizing RoHo cushion on his wheelchair - pressure mapped well 9/2023, new chair 4/2022 this is now in state of disrepair, will be getting new chair in 2024 [facility]      SOCIAL HISTORY:  Lives in a group home. He is still receiving home health care through [facility]. that comes and does dressing changes daily.      PHYSICAL EXAMINATION   INTEGUMENTARY:   Wound (used by [facility] only) 01/28/22 1056 Right lateral foot pressure injury (Active)   Thickness/Stage full thickness 02/21/24 1300   Base scab 02/21/24 1300   Periwound intact;dry 02/21/24 1300   Periwound Temperature warm 02/21/24 1300   Periwound Skin Turgor soft 02/21/24 1300   Edges rolled/closed 02/21/24 1300   Length (cm) 3 02/21/24 1300   Width (cm) 2 02/21/24 1300   Depth (cm) 0.2 02/21/24 1300   Wound (cm^2) 6 cm^2 02/21/24 1300   Wound Volume (cm^3) 1.2 cm^3 02/21/24 1300   Wound healing % -57.89 02/21/24 1300    Drainage Characteristics/Odor serosanguineous 01/23/24 1047   Drainage Amount none 02/21/24 1300   Care, Wound non-select wound debridement performed. 02/21/24 1300       Wound (used by [facility] only) 03/15/22 1049 Right ischial tuberosity pressure injury (Active)   Thickness/Stage Stage 4 02/21/24 1300   Base granulating 02/21/24 1300   Periwound macerated;intact 02/21/24 1300   Periwound Temperature warm 02/21/24 1300   Periwound Skin Turgor soft 02/21/24 1300   Edges open 02/21/24 1300   Length (cm) 2.9 02/21/24 1300   Width (cm) 1.3 02/21/24 1300   Depth (cm) 0.4 02/21/24 1300   Wound (cm^2) 3.77 cm^2 02/21/24 1300   Wound Volume (cm^3) 1.51 cm^3 02/21/24 1300   Wound healing % -0.53 02/21/24 1300   Undermining [Depth (cm)/Location] 6-7o'/ 0.5cm 02/21/24 1300   Drainage Characteristics/Odor serosanguineous 02/21/24 1300   Drainage Amount moderate 02/21/24 1300   Care, Wound chemical cautery applied;non-select wound debridement performed. 02/21/24 1300       Wound (used by [facility] only) 03/15/22 1050 Left ischial tuberosity pressure injury (Active)   Thickness/Stage Stage 4 02/21/24 1300   Base granulating 02/21/24 1300   Periwound intact;macerated 02/21/24 1300   Periwound Temperature warm 02/21/24 1300   Periwound Skin Turgor soft 02/21/24 1300   Edges open 02/21/24 1300   Length (cm) 4.5 02/21/24 1300   Width (cm) 1.4 02/21/24 1300   Depth (cm) 0.5 02/21/24 1300   Wound (cm^2) 6.3 cm^2 02/21/24 1300   Wound Volume (cm^3) 3.15 cm^3 02/21/24 1300   Wound healing % -120.28 02/21/24 1300   Undermining [Depth (cm)/Location] 4.6o'/ 1.5cm 02/21/24 1300   Drainage Characteristics/Odor serosanguineous;tan;creamy 02/21/24 1300   Drainage Amount moderate 02/21/24 1300   Care, Wound chemical cautery applied;non-select wound debridement performed. 02/21/24 1300       Wound (used by [facility] only) 10/24/23 1313 Right medial ankle unspecified (Active)   Thickness/Stage full thickness 02/21/24 1300   Base scab 02/21/24 1300    Periwound dry;intact 02/21/24 1300   Periwound Temperature warm 02/21/24 1300   Periwound Skin Turgor soft 02/21/24 1300   Edges rolled/closed 02/21/24 1300   Length (cm) 3 02/21/24 1300   Width (cm) 2 02/21/24 1300   Depth (cm) 0.1 02/21/24 1300   Wound (cm^2) 6 cm^2 02/21/24 1300   Wound Volume (cm^3) 0.6 cm^3 02/21/24 1300   Wound healing % 48.05 02/21/24 1300   Drainage Characteristics/Odor serosanguineous 01/23/24 1047   Drainage Amount none 02/21/24 1300   Care, Wound non-select wound debridement performed. 02/21/24 1300       Wound (used by [facility] only) 10/24/23 1314 Right dorsal 1 toe ulceration, arterial (Active)   Thickness/Stage full thickness 02/21/24 1300   Base slough;exposed structure;granulating 02/21/24 1300   Periwound pink;edematous 02/21/24 1300   Periwound Temperature cool 02/21/24 1300   Periwound Skin Turgor soft 02/21/24 1300   Edges callused 02/21/24 1300   Length (cm) 5 02/21/24 1300   Width (cm) 1.6 02/21/24 1300   Depth (cm) 0.5 02/21/24 1300   Wound (cm^2) 8 cm^2 02/21/24 1300   Wound Volume (cm^3) 4 cm^3 02/21/24 1300   Wound healing % -28 02/21/24 1300   Drainage Characteristics/Odor serosanguineous 02/21/24 1300   Drainage Amount moderate 02/21/24 1300   Care, Wound debrided;non-select wound debridement performed. 02/21/24 1300       Wound (used by [facility] only) 10/24/23 1314 Right 3 toe (Active)   Thickness/Stage full thickness 02/21/24 1300   Base granulating;exposed structure 02/21/24 1300   Periwound dry;intact 02/21/24 1300   Periwound Temperature cool 02/21/24 1300   Periwound Skin Turgor soft 02/21/24 1300   Edges callused 02/21/24 1300   Length (cm) 1.2 02/21/24 1300   Width (cm) 0.9 02/21/24 1300   Depth (cm) 0.5 02/21/24 1300   Wound (cm^2) 1.08 cm^2 02/21/24 1300   Wound Volume (cm^3) 0.54 cm^3 02/21/24 1300   Wound healing % 77.69 02/21/24 1300   Drainage Characteristics/Odor serosanguineous;bleeding controlled 02/21/24 1300   Drainage Amount large 02/21/24 1300   Care,  Wound debrided;non-select wound debridement performed. 02/21/24 1300           PROCEDURE (foot wounds): Nursing staff performed mechanical debridement with dressing removal and cleansing and then applied 4% lidocaine to the wound bed. Patient was determined to be capable of making their own medical decisions and informed consent was obtained. Using a 15 blade a surgical debridement was performed down to and including  muscle/tendon of <20cm2. Hemostasis was achieved with pressure and silver nitrate. The patient tolerated the procedure well.    PROCEDURE: After verbal consent was obtained, granulation tissue of IT wounds were treated with silver nitrate. Patient tolerated this well.         Further instructions from your care team         Parth Fountain      1965  A DME order was not completed because the supplies are ordered by home care or at a care facility  [facility] [employer] [facility] Phone: [phone]; Fax: [phone]     PLAN: continue dressing care by Home care  Right toes 1 and 3 debrided today; bleeding controlled with Surgicel and Surgifoam  MRI appointment at [facility]: [phone]   Have [facility] look [facility] this [facility]!     Wound Care Orders: Right medial ankle, Lateral Foot, Right toe 1 and toe 3  - Cleanse with mild unscented soap and water (such as Cetaphil, Cerave or Dove)  Ensure to clean and debride old skin of BLE with cares.  - Apply small amount of VASHE on gauze, lay into wound bed, let sit for 10 minutes, remove gauze (do not rinse)  - Apply thick, high-quality moisturizer to intact skin (such as CeraVe, Eucerin, Aquaphor or Vanicream)   - Apply a nickel thick layer of Iodosorb paste to wounds  - cover with two 5x9 ABD pads   - secure with Kerlix roll gauze and medipore tape  - Wear spandage for compression from toes to knee  Change M,W,F and as needed for soilage     Wound care: Right and Left Ischial tuberosity   - Cleanse with mild unscented soap and water (such as  Cetaphil, Cerave or Dove)  - Apply small amount of VASHE on gauze, lay into wound bed, let sit for 10 minutes, remove gauze (do not rinse)  - Apply 1/6 Hydrofera Blue Ready-Transfer (cut-to-fit size of wounds)  - Cover with 1/2 of a 5x9 ABD pad and secure with medipore tape  Change M,W,F and as needed for soilage     Repositioning:  Bed: Reposition MINIMALLY every 1-2 hours in bed to relieve pressure and promote perfusion to tissue.  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 A group 1 mattress is not adequate to offload these severe ulcerations. Patient has impaired sensation and is unable to reposition independently.   Chair: When up to the chair, do not sit for longer than one hour total before returning to bed for at least 60 minutes to relieve pressure and promote perfusion to the tissue.  Completely recline/tilt for 15 minutes each hour.  Sit on a chair cushion when up to the chair.       Protein:  A diet high in protein is important for wound healing, we recommend getting 90 grams of protein per day.   Taking protein shakes or bars are a good way to get extra protein in your diet.      Main Provider: Suzan Martinez PA-C February 21, 2024    Call us at [phone] if you have any questions about your wounds, have redness or swelling around your wound, have a fever of 101 degrees Fahrenheit or greater or if you have any other problems or concerns. We answer the phone Monday through Friday 8 am to 4 pm, please leave a message as we check the voicemail frequently throughout the day.     If you had a positive experience please indicate that on your patient satisfaction survey form that [facility] will be sending you.  It was a pleasure meeting with you today.  Thank you for allowing me and my team the privilege of caring for you today.  YOU are the reason we  are here, and I truly hope we provided you with the excellent service you deserve.  Please let us know if there is anything else we can do for you so that we can be sure you are leaving completely satisfied with your care experience.      If you have any billing related questions please call the [facility] Business office at [phone]. The clinic staff does not handle billing related matters.  If you are scheduled to have a follow up appointment, you will receive a reminder call the day before your visit. On the appointment day please arrive 15 minutes prior to your appointment time. If you are unable to keep that appointment, please call the clinic to cancel or reschedule. If you are more than 10 minutes late or greater for your scheduled appointment time, the clinic policy is that you may be asked to reschedule.

## 2024-02-27 DIAGNOSIS — R11.0 NAUSEA: ICD-10-CM

## 2024-02-27 DIAGNOSIS — D50.9 IRON DEFICIENCY ANEMIA, UNSPECIFIED IRON DEFICIENCY ANEMIA TYPE: ICD-10-CM

## 2024-02-27 RX ORDER — FERROUS SULFATE 325(65) MG
325 TABLET ORAL DAILY
Qty: 31 TABLET | Refills: 11 | OUTPATIENT
Start: 2024-02-27

## 2024-02-27 RX ORDER — BACLOFEN 10 MG/1
10 TABLET ORAL 4 TIMES DAILY
Qty: 82 TABLET | Refills: 2 | Status: SHIPPED | OUTPATIENT
Start: 2024-02-27 | End: 2024-03-28

## 2024-02-27 RX ORDER — CALCIUM CARBONATE 500 MG/1
TABLET, CHEWABLE ORAL DAILY
Qty: 45 TABLET | Refills: 1 | Status: SHIPPED | OUTPATIENT
Start: 2024-02-27 | End: 2024-03-06

## 2024-02-27 NOTE — TELEPHONE ENCOUNTER

## 2024-02-28 ENCOUNTER — TELEPHONE (OUTPATIENT)
Dept: FAMILY MEDICINE | Facility: CLINIC | Age: 59
End: 2024-02-28
Payer: MEDICARE

## 2024-02-28 NOTE — TELEPHONE ENCOUNTER
The patient is due for cbc with reflex to iron studies to receive more iron supplements. He missed his last follow up appointment after primary visit to establish care.

## 2024-02-28 NOTE — TELEPHONE ENCOUNTER
Patient received partial fill as they are due in clinic for follow up. Will send new script after patient has been seen in clinic. Spoke with Karel, pharmacist at [facility], notified him of partial fill until appt on March 6th. [facility] They will wait for new script to be sent on 3/6 appointment to mail out meds.   Estrellita Silverio RN

## 2024-02-28 NOTE — CONFIDENTIAL NOTE
[facility] phone call message- medication clarification/question:    Full Medication Name: baclofen (LIORESAL) 10 MG tablet    Dose: TAKE 1 TABLET BY MOUTH FOUR TIMES DAILY - Oral     Question: [facility]  Pharmacy asking for rx above to be filled for a month. Sent with only 82 tablets, but patient takes medication 4 times daily. If call back is need please call [phone] and ask to speak with a pharmacist.     Pharmacy confirmed as    [facility] - [place], [place] - [address] S.: Yes    OK to leave a message on voice mail? Yes    Primary language: English      needed? No    Call taken on February 28, 2024 at 9:15 AM by Sultana Davenport

## 2024-02-29 ENCOUNTER — ANCILLARY PROCEDURE (OUTPATIENT)
Dept: MRI IMAGING | Facility: CLINIC | Age: 59
End: 2024-02-29
Attending: PHYSICIAN ASSISTANT
Payer: MEDICARE

## 2024-02-29 DIAGNOSIS — L08.9 TOE INFECTION: ICD-10-CM

## 2024-02-29 PROCEDURE — G1010 CDSM STANSON: HCPCS | Performed by: RADIOLOGY

## 2024-02-29 PROCEDURE — A9585 GADOBUTROL INJECTION: HCPCS | Performed by: RADIOLOGY

## 2024-02-29 PROCEDURE — 73720 MRI LWR EXTREMITY W/O&W/DYE: CPT | Mod: RT | Performed by: RADIOLOGY

## 2024-02-29 RX ORDER — GADOBUTROL 604.72 MG/ML
7.5 INJECTION INTRAVENOUS ONCE
Status: COMPLETED | OUTPATIENT
Start: 2024-02-29 | End: 2024-02-29

## 2024-02-29 RX ADMIN — GADOBUTROL 5 ML: 604.72 INJECTION INTRAVENOUS at 11:59

## 2024-03-06 ENCOUNTER — OFFICE VISIT (OUTPATIENT)
Dept: FAMILY MEDICINE | Facility: CLINIC | Age: 59
End: 2024-03-06
Payer: MEDICARE

## 2024-03-06 ENCOUNTER — MEDICAL CORRESPONDENCE (OUTPATIENT)
Dept: HEALTH INFORMATION MANAGEMENT | Facility: CLINIC | Age: 59
End: 2024-03-06

## 2024-03-06 VITALS
DIASTOLIC BLOOD PRESSURE: 78 MMHG | TEMPERATURE: 98.7 F | RESPIRATION RATE: 14 BRPM | HEART RATE: 70 BPM | OXYGEN SATURATION: 97 % | SYSTOLIC BLOOD PRESSURE: 156 MMHG

## 2024-03-06 DIAGNOSIS — M86.60 CHRONIC OSTEOMYELITIS (H): ICD-10-CM

## 2024-03-06 DIAGNOSIS — Z98.890 HISTORY OF UROSTOMY: ICD-10-CM

## 2024-03-06 DIAGNOSIS — Z71.6 ENCOUNTER FOR SMOKING CESSATION COUNSELING: ICD-10-CM

## 2024-03-06 DIAGNOSIS — Z93.9 HISTORY OF CREATION OF OSTOMY (H): ICD-10-CM

## 2024-03-06 DIAGNOSIS — F17.200 TOBACCO DEPENDENCE SYNDROME: ICD-10-CM

## 2024-03-06 DIAGNOSIS — S81.801A OPEN WOUND OF LOWER LIMB, RIGHT, INITIAL ENCOUNTER: Primary | ICD-10-CM

## 2024-03-06 DIAGNOSIS — Z86.2 HISTORY OF ANEMIA: ICD-10-CM

## 2024-03-06 DIAGNOSIS — K21.9 GASTROESOPHAGEAL REFLUX DISEASE WITHOUT ESOPHAGITIS: ICD-10-CM

## 2024-03-06 DIAGNOSIS — R19.7 DIARRHEA, UNSPECIFIED TYPE: ICD-10-CM

## 2024-03-06 DIAGNOSIS — I73.9 PAD (PERIPHERAL ARTERY DISEASE) (H): ICD-10-CM

## 2024-03-06 DIAGNOSIS — E78.5 HYPERLIPIDEMIA, UNSPECIFIED HYPERLIPIDEMIA TYPE: ICD-10-CM

## 2024-03-06 LAB
BASOPHILS # BLD AUTO: 0.1 10E3/UL (ref 0–0.2)
BASOPHILS NFR BLD AUTO: 1 %
EOSINOPHIL # BLD AUTO: 0.2 10E3/UL (ref 0–0.7)
EOSINOPHIL NFR BLD AUTO: 2 %
ERYTHROCYTE [DISTWIDTH] IN BLOOD BY AUTOMATED COUNT: 13.7 % (ref 10–15)
ERYTHROCYTE [SEDIMENTATION RATE] IN BLOOD BY WESTERGREN METHOD: 10 MM/HR (ref 0–30)
HCT VFR BLD AUTO: 39 % (ref 35–53)
HGB BLD-MCNC: 12.5 G/DL (ref 11.7–17.7)
IMM GRANULOCYTES # BLD: 0 10E3/UL
IMM GRANULOCYTES NFR BLD: 0 %
LYMPHOCYTES # BLD AUTO: 1.6 10E3/UL (ref 0.8–5.3)
LYMPHOCYTES NFR BLD AUTO: 20 %
MCH RBC QN AUTO: 28.2 PG (ref 26.5–33)
MCHC RBC AUTO-ENTMCNC: 32.1 G/DL (ref 31.5–36.5)
MCV RBC AUTO: 88 FL (ref 78–100)
MONOCYTES # BLD AUTO: 0.5 10E3/UL (ref 0–1.3)
MONOCYTES NFR BLD AUTO: 7 %
NEUTROPHILS # BLD AUTO: 5.6 10E3/UL (ref 1.6–8.3)
NEUTROPHILS NFR BLD AUTO: 71 %
PLATELET # BLD AUTO: 257 10E3/UL (ref 150–450)
RBC # BLD AUTO: 4.43 10E6/UL (ref 3.8–5.9)
WBC # BLD AUTO: 8 10E3/UL (ref 4–11)

## 2024-03-06 PROCEDURE — 86140 C-REACTIVE PROTEIN: CPT

## 2024-03-06 PROCEDURE — 99214 OFFICE O/P EST MOD 30 MIN: CPT | Mod: GC

## 2024-03-06 PROCEDURE — 80053 COMPREHEN METABOLIC PANEL: CPT

## 2024-03-06 PROCEDURE — 85652 RBC SED RATE AUTOMATED: CPT

## 2024-03-06 PROCEDURE — 85025 COMPLETE CBC W/AUTO DIFF WBC: CPT

## 2024-03-06 PROCEDURE — 36415 COLL VENOUS BLD VENIPUNCTURE: CPT

## 2024-03-06 PROCEDURE — 83540 ASSAY OF IRON: CPT

## 2024-03-06 PROCEDURE — 82728 ASSAY OF FERRITIN: CPT

## 2024-03-06 PROCEDURE — 80061 LIPID PANEL: CPT

## 2024-03-06 PROCEDURE — 84466 ASSAY OF TRANSFERRIN: CPT

## 2024-03-06 RX ORDER — VARENICLINE TARTRATE 1 MG/1
1 TABLET, FILM COATED ORAL 2 TIMES DAILY
Qty: 180 TABLET | Refills: 1 | Status: SHIPPED | OUTPATIENT
Start: 2024-03-06 | End: 2024-07-23

## 2024-03-06 RX ORDER — RESPIRATORY SYNCYTIAL VIRUS VACCINE 120MCG/0.5
0.5 KIT INTRAMUSCULAR ONCE
Qty: 1 EACH | Refills: 0 | Status: SHIPPED | OUTPATIENT
Start: 2024-03-06 | End: 2024-03-06

## 2024-03-06 RX ORDER — LOPERAMIDE HCL 2 MG
CAPSULE ORAL
Qty: 200 CAPSULE | Refills: 0 | Status: SHIPPED | OUTPATIENT
Start: 2024-03-06 | End: 2024-07-24

## 2024-03-06 NOTE — PATIENT INSTRUCTIONS
Patient Education   Here is the plan from today's visit    Please follow up promptly for your leg wound. I'm concerned this could become a dangerous infection. Please seek medical attention immedialy for worsening redness, swelling, pain or fevers and low blood pressure.     1. Diarrhea, unspecified type  - loperamide (IMODIUM) 2 MG capsule; Take 2 capsules (4 mg) by mouth at bedtime. May also take 2 capsules (4 mg) 3 times daily as needed for diarrhea. Do all this for 360 days.  Dispense: 200 capsule; Refill: 0    2. Open wound of lower limb, right, initial encounter  - CBC with platelets differential; Future  - Comprehensive metabolic panel; Future  - CRP inflammation; Future  - Erythrocyte sedimentation rate auto; Future  - Adult Infectious Disease  Referral; Future    3. History of anemia  - IRON AND IRON BINDING CAPACITY; Future  - Ferritin; Future  - Transferrin; Future    4. Encounter for ostomy care education  - Miscellaneous Order for DME - ONLY FOR DME  - Miscellaneous Order for DME - ONLY FOR DME  - Miscellaneous Order for DME - ONLY FOR DME    5. History of urostomy  - Miscellaneous Order for DME - ONLY FOR DME  - Miscellaneous Order for DME - ONLY FOR DME  - Miscellaneous Order for DME - ONLY FOR DME    6. Gastroesophageal reflux disease without esophagitis  - calcium carbonate-vitamin D (OSCAL) 500-5 MG-MCG tablet; Take 1 tablet by mouth 3 times daily (with meals)  Dispense: 90 tablet; Refill: 0    7. Preventative health care  - Lipid panel reflex to direct LDL Non-fasting; Future    8. Encounter for smoking cessation counseling  - varenicline (CHANTIX) 1 MG tablet; Take 1 tablet (1 mg) by mouth 2 times daily  Dispense: 180 tablet; Refill: 1        Please call or return to clinic if your symptoms don't go away.    Follow up plan  Return in about 4 weeks (around 4/3/2024), or if symptoms worsen or fail to improve, for Follow up.    Thank you for coming to [facility] today.  Lab  Testing:  **If you had lab testing today and your results are reassuring or normal they will be mailed to you or sent through [facility] within 7 days.   **If the lab tests need quick action we will call you with the results.  **If you are having labs done on a different day, please call [phone] to schedule at [facility] or [phone] for other [facility] locations. Labs do not offer walk-in appointments.  The phone number we will call with results is # [phone] (home) . If this is not the best number please call our clinic and change the number.  Medication Refills:  If you need any refills please call your pharmacy and they will contact us.   If you need to  your refill at a new pharmacy, please contact the new pharmacy directly. The new pharmacy will help you get your medications transferred faster.   Scheduling:  If you have any concerns about today's visit or wish to schedule another appointment please call our office during normal business hours [phone] (8-5:00 M-F). If you can no longer make a scheduled visit, please cancel via [facility] or call us to cancel.   If a referral was made to an [facility] specialty provider and you do not get a call from central scheduling, please refer to directions on your visit summary or call our office during normal business hours for assistance.   If a Mammogram was ordered for you at the [facility] Center call [phone] to schedule or change your appointment.  If you had an XRay/CT/Ultrasound/MRI ordered the number is [phone] to schedule or change your radiology appointment.   [facility] has limited ultrasound appointments available on Wednesdays, if you would like your ultrasound at [facility], please call [phone] to schedule.   Medical Concerns:  If you have urgent medical concerns please call [phone] at any time of the day.    Boaz Mosley MD    All DME orders written today have been successfully  faxed to [facility] ([phone])

## 2024-03-06 NOTE — PROGRESS NOTES
Preceptor Attestation:    I discussed the patient with the resident and evaluated the patient in person. I have verified the content of the note, which accurately reflects my assessment of the patient and the plan of care.   Supervising Physician:  Thomas Yañez MD.

## 2024-03-06 NOTE — PROGRESS NOTES
Assessment & Plan     Chronic Osteomyelitis  Open wound of lower limb, right, initial encounter  Previously treated for osteomyelitis 10/30-11/9/23 admission, given 30 day supply of Augmentin and Doxycyline with intention to follow up with infectious disease that was never done. He has been planning amputation of the leg above knee 12/11. 02/29: MR Right Foot is concerning for first and third digits suspicious for osteomyelitis consistent with significant open wound on examination today. Patient is not febrile or toxic appearing, and frankly the right foot does not appear to have a rapidly evolving infection with only chronic inflammatory changes adjacent to wound. Will obtain lab work to determine if patient is more ill than he appears, rationalizing more prompt hospitalization, or he can follow up with current wound nurse and vascular surgeon, and ID referral as outpatient.   - CBC with platelets differential; Future  - Comprehensive metabolic panel; Future  - CRP inflammation; Future  - Erythrocyte sedimentation rate auto; Future  - Adult Infectious Disease  Referral; Future  - CBC with platelets differential  - Comprehensive metabolic panel  - CRP inflammation  - Erythrocyte sedimentation rate auto    Tobacco Dependence Syndrome  Encounter for smoking cessation counseling  PAD (peripheral artery disease) (H24)  Hyperlipidemia, unspecified hyperlipidemia type   History of hypelipidemia on Atorvastatin 40 mg and daily baby aspirin. 10/30: ABIs show mild-moderate PAD. 11/02: CT Angio: multifocal atherosclerotic disease with multifocal areas of occlusion.  Vascular surgery had significant concerns regarding patient's ability to heal s/p amputation and recommended obtaining TCO2 study which was reassuring 11/23. -- and now the tentative plan is to proceed with surgery following optimization with primary care and infectious disease. The patient is to cease smoking for a period of time before surgery so  wounds will heal. Plan on offering dobutamine stress echocardiogram at Pre-Op. Providing longer term refill for Chantrix so patient does not run out. Despite being vasculopathy, concern for possible bleeding at wound so will not anticoagulate.   - varenicline (CHANTIX) 1 MG tablet; Take 1 tablet (1 mg) by mouth 2 times daily  - Lipid panel reflex to direct LDL Non-fasting; Future  - Lipid panel reflex to direct LDL Non-fasting    Diarrhea, unspecified type  History of the creation of a ostomy  Reports needing changed medication timing for resident nurse compliance to ensure regular bowels. Reasonable to offer with requested PRNs given potential for diarrhea with antibiotics I presume is coming up with ID consult. Providing refill clamps, tubing materials with preferred part number.   - Miscellaneous Order for DME - ONLY FOR DME  - Miscellaneous Order for DME - ONLY FOR DME  - Miscellaneous Order for DME - ONLY FOR DME  - loperamide (IMODIUM) 2 MG capsule; Take 2 capsules (4 mg) by mouth at bedtime. May also take 2 capsules (4 mg) 3 times daily as needed for diarrhea. Do all this for 360 days.    History of anemia  History of SELENA and normocytic anemia back in 10/30, presumably anemia of chronic disease, which has improved to normal limit 11/9/23 iso iron tablets. Obtained iron studies to determine if anemia is recurring. Iron panels are reassuring -- suggest normal iron levels and not iron overloaded. Further iron tablet supplementation should be discontinued unless need is demonstrated.   - IRON AND IRON BINDING CAPACITY; Future  - Ferritin; Future  - Transferrin; Future  - IRON AND IRON BINDING CAPACITY  - Ferritin  - Transferrin    History of urostomy  Providing refill clamps, tubing materials with preferred part number.   - Miscellaneous Order for DME - ONLY FOR DME  - Miscellaneous Order for DME - ONLY FOR DME  - Miscellaneous Order for DME - ONLY FOR DME    Gastroesophageal reflux disease without  "esophagitis  Significant reported dysphagia with Tums  -- should be discontinued to avoid choking incident. Calcium levels are normal with current supplementation of Calcium and combined 1000 international unit(s) Vitamin D.   - calcium carbonate-vitamin D (OSCAL) 500-5 MG-MCG tablet; Take 1 tablet by mouth 3 times daily (with meals)      Return in about 4 weeks (around 4/3/2024), or if symptoms worsen or fail to improve, for Follow up.    Roney Milian is a 58 year old, presenting for the following health issues:  No chief complaint on file.      3/6/2024     1:09 PM   Additional Questions   Roomed by cyndi   Accompanied by everardo         3/6/2024    Information    services provided? No     HPI     [place] today.     Questions why he is getting a Tums with Calcium supplement on top.   Does not have acid reflux symptoms. Sometimes has heartburn attributed to spicy food.   Would like to have [facility] only, Same dose, TID as it stands currently. .   Tums get lodged in throat so he would like to avoid this med --  the 500 mg tablet.     Discussed his amputation surgery planning. Still has his right leg. Reports his Right big toe isn't looking good. It has pus. His wound doctor is following up with this.   Did not follow up with ID if he needed to follow up with antibiotics.   Denies fevers, chills, night sweats, chest pain, shortnes of breath, abdominal pain, nsuaea, dysuria. Every since urostomy, no UTI anymore. Overall, feels \"healthy as a horse\".     Loperamide. PRN right now but before it was 2 tablets x 4 mg at bedtime, scheduled and rest as PRN. PRN needs vary, on Abx will go up. He is not getting his nighttime doses because nurse is not good on giving PRNs.   Prefers to have these scheduled.     [facility]. Needs ostomy stupplies.   Urostomy:  part number [id number]. 1 piece urostomy pouch, transparent, tape.  Colostomy: part number [id number]. 1 piece drainable ostomy pouch with 1 " clamp  Dispsoable glove 3 boxes per month (200 month)  Chucks as needed.   Clamps. Extra adapters for urostomy, extra clamps for ostomy      Still taking Chantrix for smoking cessation, only having a smoke once a month if that. Needs a refill.         Review of Systems  Constitutional, HEENT, cardiovascular, pulmonary, gi and gu systems are negative, except as otherwise noted.      Objective    BP (!) 156/78   Pulse 70   Temp 98.7  F (37.1  C)   Resp 14   SpO2 97%   There is no height or weight on file to calculate BMI.  Physical Exam   GENERAL: alert, well appearing and no distress  EYES: Eyes grossly normal to inspection, conjunctivae and sclerae normal  HENT: atraumatic, normocephalic. MMM.  RESP: lungs clear to auscultation - no rales, rhonchi or wheezes  CV: regular rate and rhythm, normal S1 S2, no murmur, no peripheral edema  ABDOMEN: soft, nontender, no hepatosplenomegaly, no masses and bowel sounds normal  Extremities: Left leg amputated at knee. Right leg atrophy, flaccid: Big toe with bandage in place. Woody erythematous skin to distal leg, not warm or swollen, big toe exhibits bleeding with pus.   NEURO: Alert and oriented x 4. Normal upper extremity strength and tone, mentation intact and speech normal  PSYCH: mentation appears normal, affect normal/bright    Results for orders placed or performed in visit on 03/06/24   Lipid panel reflex to direct LDL Non-fasting     Status: Abnormal   Result Value Ref Range    Cholesterol 181 <200 mg/dL    Triglycerides 84 <150 mg/dL    Direct Measure HDL 59 >=40 mg/dL    LDL Cholesterol Calculated 105 (H) <=100 mg/dL    Non HDL Cholesterol 122 <130 mg/dL    Patient Fasting > 8hrs? Yes     Narrative    Cholesterol  Desirable:  <200 mg/dL    Triglycerides  Normal:  Less than 150 mg/dL  Borderline High:  150-199 mg/dL  High:  200-499 mg/dL  Very High:  Greater than or equal to 500 mg/dL    Direct Measure HDL  Female:  Greater than or equal to 50 mg/dL   Male:   "Greater than or equal to 40 mg/dL    LDL Cholesterol  Desirable:  <100mg/dL  Above Desirable:  100-129 mg/dL   Borderline High:  130-159 mg/dL   High:  160-189 mg/dL   Very High:  >= 190 mg/dL    Non HDL Cholesterol  Desirable:  130 mg/dL  Above Desirable:  130-159 mg/dL  Borderline High:  160-189 mg/dL  High:  190-219 mg/dL  Very High:  Greater than or equal to 220 mg/dL   IRON AND IRON BINDING CAPACITY     Status: Normal   Result Value Ref Range    Iron 67 37 - 157 ug/dL    Iron Binding Capacity 246 240 - 430 ug/dL    Iron Sat Index 27 15 - 46 %   Ferritin     Status: Normal   Result Value Ref Range    Ferritin 168 11 - 409 ng/mL    Narrative    The generation of reference intervals for this test is currently based on binary male or female sex. If the electronic health record information indicates another gender identity or if Legal Sex is recorded as \"Unknown\", both male and female reference intervals are provided where applicable, and should be considered according to the individual's appropriate clinical context.   Transferrin     Status: Abnormal   Result Value Ref Range    Transferrin 193.0 (L) 200.0 - 360.0 mg/dL   Comprehensive metabolic panel     Status: Abnormal   Result Value Ref Range    Sodium 135 135 - 145 mmol/L    Potassium 4.9 3.4 - 5.3 mmol/L    Carbon Dioxide (CO2) 27 22 - 29 mmol/L    Anion Gap 9 7 - 15 mmol/L    Urea Nitrogen 18.8 8.0 - 23.0 mg/dL    Creatinine 0.64 0.51 - 1.17 mg/dL    GFR Estimate >90 >60 mL/min/1.73m2    Calcium 9.7 8.8 - 10.2 mg/dL    Chloride 99 98 - 107 mmol/L    Glucose 80 70 - 99 mg/dL    Alkaline Phosphatase 66 40 - 150 U/L    AST 18 0 - 45 U/L    ALT 13 0 - 70 U/L    Protein Total 6.2 (L) 6.4 - 8.3 g/dL    Albumin 4.0 3.5 - 5.2 g/dL    Bilirubin Total <0.2 <=1.2 mg/dL    Narrative    The generation of reference intervals for this test is currently based on binary male or female sex. If the electronic health record information indicates another gender identity or if " "Legal Sex is recorded as \"Unknown\", both male and female reference intervals are provided where applicable, and should be considered according to the individual's appropriate clinical context.   CRP inflammation     Status: Normal   Result Value Ref Range    CRP Inflammation 4.42 <5.00 mg/L   Erythrocyte sedimentation rate auto     Status: Normal   Result Value Ref Range    Erythrocyte Sedimentation Rate 10 0 - 30 mm/hr    Narrative    The generation of reference intervals for this test is currently based on binary male or female sex. If the electronic health record information indicates another gender identity or if Legal Sex is recorded as \"Unknown\", both male and female reference intervals are provided where applicable, and should be considered according to the individual's appropriate clinical context.   CBC with platelets and differential     Status: None   Result Value Ref Range    WBC Count 8.0 4.0 - 11.0 10e3/uL    RBC Count 4.43 3.80 - 5.90 10e6/uL    Hemoglobin 12.5 11.7 - 17.7 g/dL    Hematocrit 39.0 35.0 - 53.0 %    MCV 88 78 - 100 fL    MCH 28.2 26.5 - 33.0 pg    MCHC 32.1 31.5 - 36.5 g/dL    RDW 13.7 10.0 - 15.0 %    Platelet Count 257 150 - 450 10e3/uL    % Neutrophils 71 %    % Lymphocytes 20 %    % Monocytes 7 %    % Eosinophils 2 %    % Basophils 1 %    % Immature Granulocytes 0 %    Absolute Neutrophils 5.6 1.6 - 8.3 10e3/uL    Absolute Lymphocytes 1.6 0.8 - 5.3 10e3/uL    Absolute Monocytes 0.5 0.0 - 1.3 10e3/uL    Absolute Eosinophils 0.2 0.0 - 0.7 10e3/uL    Absolute Basophils 0.1 0.0 - 0.2 10e3/uL    Absolute Immature Granulocytes 0.0 <=0.4 10e3/uL    Narrative    The generation of reference intervals for this test is currently based on binary male or female sex. If the electronic health record information indicates another gender identity or if Legal Sex is recorded as \"Unknown\", both male and female reference intervals are provided where applicable, and should be considered according to the " individual's appropriate clinical context.   CBC with platelets differential     Status: None    Narrative    The following orders were created for panel order CBC with platelets differential.  Procedure                               Abnormality         Status                     ---------                               -----------         ------                     CBC with platelets and d...[[id number]]                      Final result                 Please view results for these tests on the individual orders.           Signed Electronically by: Boaz Mosley MD

## 2024-03-07 ENCOUNTER — TELEPHONE (OUTPATIENT)
Dept: FAMILY MEDICINE | Facility: CLINIC | Age: 59
End: 2024-03-07
Payer: MEDICARE

## 2024-03-07 LAB
ALBUMIN SERPL BCG-MCNC: 4 G/DL (ref 3.5–5.2)
ALP SERPL-CCNC: 66 U/L (ref 40–150)
ALT SERPL W P-5'-P-CCNC: 13 U/L (ref 0–70)
ANION GAP SERPL CALCULATED.3IONS-SCNC: 9 MMOL/L (ref 7–15)
AST SERPL W P-5'-P-CCNC: 18 U/L (ref 0–45)
BILIRUB SERPL-MCNC: <0.2 MG/DL
BUN SERPL-MCNC: 18.8 MG/DL (ref 8–23)
CALCIUM SERPL-MCNC: 9.7 MG/DL (ref 8.8–10.2)
CHLORIDE SERPL-SCNC: 99 MMOL/L (ref 98–107)
CHOLEST SERPL-MCNC: 181 MG/DL
CREAT SERPL-MCNC: 0.64 MG/DL (ref 0.51–1.17)
CRP SERPL-MCNC: 4.42 MG/L
DEPRECATED HCO3 PLAS-SCNC: 27 MMOL/L (ref 22–29)
EGFRCR SERPLBLD CKD-EPI 2021: >90 ML/MIN/1.73M2
FASTING STATUS PATIENT QL REPORTED: YES
FERRITIN SERPL-MCNC: 168 NG/ML (ref 11–409)
GLUCOSE SERPL-MCNC: 80 MG/DL (ref 70–99)
HDLC SERPL-MCNC: 59 MG/DL
IRON BINDING CAPACITY (ROCHE): 246 UG/DL (ref 240–430)
IRON SATN MFR SERPL: 27 % (ref 15–46)
IRON SERPL-MCNC: 67 UG/DL (ref 37–157)
LDLC SERPL CALC-MCNC: 105 MG/DL
NONHDLC SERPL-MCNC: 122 MG/DL
POTASSIUM SERPL-SCNC: 4.9 MMOL/L (ref 3.4–5.3)
PROT SERPL-MCNC: 6.2 G/DL (ref 6.4–8.3)
SODIUM SERPL-SCNC: 135 MMOL/L (ref 135–145)
TRANSFERRIN SERPL-MCNC: 193 MG/DL (ref 200–360)
TRIGL SERPL-MCNC: 84 MG/DL

## 2024-03-07 NOTE — TELEPHONE ENCOUNTER
[facility] phone call message- medication clarification/question:      Question: Pharmacy claims abnormal amount of vitamin D is being prescribed, seeking clarification.     Pharmacy confirmed as    [facility] - [place], [place] - [address].: Yes    OK to leave a message on voice mail? Yes    Primary language: English      needed? No    Call taken on March 7, 2024 at 4:56 PM by Art Love

## 2024-03-07 NOTE — TELEPHONE ENCOUNTER
RECORDS RECEIVED FROM   Appt Date: 3/29/2024     Open wound of lower limb, right, initial encounter     Action    Action Taken 3/7/2024 1:08pm HAROLDO     I called [facility]'s IMG Dept Ph: [phone]- they will push over soon    4:30pm KEDELIA   I resolved scans in PACS      NOTES (Gather within 2 years) STATUS DETAILS   OFFICE NOTE from referring provider   Internal Thomas Yañez MD    OFFICE NOTE from other specialist Internal    LABS (any labs) Internal    MEDICATION LIST Internal    IMAGING  (NEED IMAGES AND REPORTS)     Other (anything related to diagnoses Internal  IN PACS- [facility]  MRI Foot Right 2/29/2024     Xray Foot Right 10/30/2023     [facility]   Xray Foot 2/8/2023     US Extremity 2/9/2023

## 2024-03-11 ENCOUNTER — DOCUMENTATION ONLY (OUTPATIENT)
Dept: FAMILY MEDICINE | Facility: CLINIC | Age: 59
End: 2024-03-11
Payer: MEDICARE

## 2024-03-11 NOTE — PROGRESS NOTES
"When opening a documentation only encounter, be sure to enter in \"Chief Complaint\" Forms and in \" Comments\" Title of form, description if needed.    Maicol is a 58 year old  adult  Form received via: Fax  Form now resides in: Provider Ready    Mera Cramer    Form has been completed by provider.     Form sent out via: Fax to [facility] at Fax Number: [phone]  Patient informed: No, Reason:  Output date: March 11, 2024    Mera Cramer      **Please close the encounter**                "

## 2024-03-13 DIAGNOSIS — L08.9 TOE INFECTION: ICD-10-CM

## 2024-03-13 DIAGNOSIS — M86.9 OSTEOMYELITIS, UNSPECIFIED SITE, UNSPECIFIED TYPE (H): ICD-10-CM

## 2024-03-13 NOTE — TELEPHONE ENCOUNTER
"Patient sees wound clinic on 3/19/24 and ID 3/29/24    Request for medication refill:    amoxicillin-clavulanate (AUGMENTIN) 875-125 MG tablet     Providers if patient needs an appointment and you are willing to give a one month supply please refill for one month and  send a letter/MyChart using \".SMILLIMITEDREFILL\" .smillimited and route chart to \"[facility] \" (Giving one month refill in non controlled medications is strongly recommended before denial)    If refill has been denied, meaning absolutely no refills without visit, please complete the smart phrase \".smirxrefuse\" and route it to the \"P SMI MED REFILLS\"  pool to inform the patient and the pharmacy.    Estrellita Silverio RN     "

## 2024-03-13 NOTE — TELEPHONE ENCOUNTER
[facility] phone call message- patient requesting a refill:    Full Medication Name: amoxicillin-clavulanate (AUGMENTIN) 875-125 MG tablet     Dose: Route: Take 1 tablet by mouth 2 times daily - Oral     Pharmacy confirmed as   [facility] - [place], [place] - [address]  [address] [facility] [address]  Phone: [phone] Fax: [phone]  : Yes    Additional Comments: CC from [facility] requests a refill of this medication     OK to leave a message on voice mail? Yes    Primary language: English      needed? No    Call taken on March 13, 2024 at 9:58 AM by Felipe Carrera

## 2024-03-19 ENCOUNTER — HOSPITAL ENCOUNTER (OUTPATIENT)
Dept: WOUND CARE | Facility: CLINIC | Age: 59
Discharge: HOME | End: 2024-03-19
Attending: PHYSICIAN ASSISTANT | Admitting: PHYSICIAN ASSISTANT
Payer: MEDICARE

## 2024-03-19 VITALS — SYSTOLIC BLOOD PRESSURE: 157 MMHG | TEMPERATURE: 98 F | HEART RATE: 57 BPM | DIASTOLIC BLOOD PRESSURE: 81 MMHG

## 2024-03-19 DIAGNOSIS — L89.324 LEFT ISCHIAL PRESSURE SORE, STAGE IV (H): ICD-10-CM

## 2024-03-19 DIAGNOSIS — L98.492 SKIN ULCER WITH FAT LAYER EXPOSED (H): ICD-10-CM

## 2024-03-19 DIAGNOSIS — L97.512 ULCER OF RIGHT FOOT WITH FAT LAYER EXPOSED (H): Primary | ICD-10-CM

## 2024-03-19 DIAGNOSIS — L89.314 PRESSURE ULCER OF ISCHIUM, RIGHT, STAGE IV (H): ICD-10-CM

## 2024-03-19 PROCEDURE — 99213 OFFICE O/P EST LOW 20 MIN: CPT | Mod: 25 | Performed by: PHYSICIAN ASSISTANT

## 2024-03-19 PROCEDURE — 97602 WOUND(S) CARE NON-SELECTIVE: CPT | Mod: XS | Performed by: PHYSICIAN ASSISTANT

## 2024-03-19 PROCEDURE — 17250 CHEM CAUT OF GRANLTJ TISSUE: CPT | Performed by: PHYSICIAN ASSISTANT

## 2024-03-19 NOTE — PROGRESS NOTES
ASSESSMENT:    Osteomyelitis of R 1st and 3rd toe per MRI and clinical exam  Full thickness traumatic ulcers of R 1st and 3rd toes with exposed tendon and bone   Full thickness traumatic ulcers of right lateral foot with fat-layer exposed  Peripheral arterial disease - non-reconstructible but with TCO2 suggestive of adequate healing   Recurrent stage 4 pressure ulcers of bilateral Its  Hx of PG on L lower leg wound      PLAN:    Referral to surgical podiatry for 1st and 3rd toe osteomyelitis management, per vascular he has enough blood flow to heal   [facility]  Patient is using a Group 2 mattress due to large, stage 4 pressure ulceration on the patient's pelvis that has failed to improve on a normal mattress despite regular wound cares and repositioning. A group 1 mattress is not adequate to offload these severe ulcerations. Patient has impaired sensation and is unable to reposition independently.  iodosorb or iodine and cover dressing to leg and foot wounds, Hydrofera Blue on ischial wounds  Continue using Rooke boot to improve circulation  Follow-up: will need 40 minute appointments every 4-6 weeks, recommending he follow with Ramiro while I'm out    INTERVAL Hx:  03/19/24: Returns to clinic. IT wounds nearly healed with Hydrofera Blue. 1st and 3rd toe wounds look healthy and granular with any concerns for acute infection. We went over MR from 2/29 that showed osteomyelitis of both toes as we suspected. His lateral foot wound looks to be stable as well. He has not [facility] his [facility] yet.     HISTORY OF PRESENT ILLNESS:   Mr. Parth Fountain is a 58-year-old paraplegic gentleman who returns to us today to follow-up on chronic pelvic pressure ulcers as well as right foot wounds. PMHx of DVT, chronic UTI, EtOh abuse, pyoderma gangrenosum (LLE) and s/p colostomy. He has a long history of pressure ulcers with subsequent corrective surgeries by Dr. Peña. No longer  has any surgical options for closure. Parth's pelvic wounds have been very difficult to heal due to his surgical history. He has very little tissue overlying his bone and it is mostly made up of scar tissue. His progress waxes and wanes.    2/26/21: new wound on R great toe with exposed bone.   01/28/22: Since I saw him he visited with Dr. Chapa at the [facility] who recommended angio. Unfortunately his right heel and right lateral foot wound are now down to bone. He thinks it's from his [facility] that [facility] the [facility]. Pelvic wounds have reopened. He thinks this is due to his wheelchair cushion deflating.   03/15/22: Appears that his health has overall deteriorated. He had his chair pressure mapped and said it is appropriate. Has not followed up with vascular as we had suggested. Fortunately his foot wounds appear relatively stable. His pelvic wounds have deteriorated a bit.   04/12/22: Foot wounds continue to be stable but unfortunately continues to have bone exposed on R foot. His pelvic wounds have worsened and he cannot really identify a cause. States his bed is working well. Has a Roho that he reports mapped well but is getting a new chair later this week through the [facility].   4/14/22: Angio revealed multiple occlusions, unable to achieve any recanalization. Per Dr. Chapa he is a poor candidate for revascularization, especially due to smoking and poor health.   05/17/22: Pelvic wounds improved. Foot wounds are stable, no sign of infection. We had heart to heart about poor blood flow and poor prognosis of this, high risk of amputation. He must quit smoking and take care of himself. Recommended HBOT if candidate.   07/12/22: Foot wounds improving. Pelvic wounds are stable. Has not [facility] it to [facility] to [facility] his [facility], now using an old Roho as a back rest.   08/09/22: Pelvic wounds are improving. Right heel improved. Right lateral foot improving but still with exposed tendon. Has not fixed wheelchair.   09/13/22:  Has decided he does not want to fix the back of his wheelchair, prefers to use the Roho.   01/11/23: Pelvic wounds improved. Has worsening in lateral foot wound, appears to have new trauma but he's not sure about when/how.   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   6/7: Pelvic wounds improving, using collagen. New RLE ulcer - he is unsure of cause. R foot wound stagnant without sign of infection. He expresses desire for amputation today as he understand wounds likely unhealable and risk of infection. Referral made to [facility]. Wheelchair continues to be broken down with makeshift back rest.  08/08/23: Foot wound improving. Leg wound resolved. Bilateral ITs appear to be improving. He has not pursued preventive amputation yet but is still interested. He states his air mattress isn't working well and would like a replacement.   09/19/23: Foot wounds improving. New dorsal foot wound - appears minor. Pelvic wounds improving. Had chair mapped and reported it was slightly over inflated. Has new group 2 and likes it.   10/25/23: Reports that his wheelchair is broken and has had issues with all his wounds and new injuries to RLE due to this. [facility]. Unfortunately has some pretty severe degloving injuries of 1st and 3rd toes with exposed loose distal phalanx in 3rd toe. His R lateral foot wound is worsened as well. New traumatic calf wound. His ischial wounds have regressed as well and he also blames this on his wheelchair. Fortunately he is feeling well, denies f/c/n/v. Denies more drug or alcohol use.  10/30-11/9: Hospitalized at [facility] for gangrene of right 1st and 3rd toe and clinical osteomyelitis. He was started on IV antibiotics and discharged on orals.   11/27: Met with Dr. Maza with the vascular team at [facility]. He performed TCO2s which were actually adequate for healing   12/05/23: 1st and 3rd toe  wounds with exposed bone. He is still contemplating whether he should move forward with some kind of amputation or not.  [facility] but he hasn't been able to pick it up yet.   12/11: Met with Dr. aMza again. His account of the visit and Parth's understanding seem to differ. Parth felt that he was suggested AKA due to Dr. Maza's recommendation but it seemed that Dr. Maza felt that this was Parth's desire. It sounds like there are still options for more distal amputation if necessary.  12/22/23: Foot and leg wounds improving. However, R great toe wound probes quite deep and unclear whether this will heal as he likely has joint/bone involvement. Ischial wounds change to Hydrofera Blue due to concern of bioburden with collagen.  01/23/24: All wounds improving, but unlikely toe wounds will heal due to involved bone/joint. Has not been able to get new wc yet.    2/21: Ischial wounds improving with Hydrofera Blue. Toe wounds slightly worsened today, both with exposed joint. He is unsure if he traumatized these. [facility] not [facility] yet [facility].     OFFLOADING: On a Group 2 mattress, new from [employer] 9/2023. Still utilizing RoHo cushion on his wheelchair - pressure mapped well 9/2023, new chair 4/2022 this is now in state of disrepair, will be getting new chair in 2024 [facility]      SOCIAL HISTORY:  Lives in a group home. He is still receiving home health care through [facility]. that comes and does dressing changes daily.      PHYSICAL EXAMINATION   INTEGUMENTARY:   Wound (used by OP [facility] only) 01/28/22 1056 Right lateral foot pressure injury (Active)   Thickness/Stage full thickness 03/19/24 1308   Base scab 03/19/24 1308   Periwound intact;dry 03/19/24 1308   Periwound Temperature warm 03/19/24 1308   Periwound Skin Turgor soft 03/19/24 1308   Edges rolled/closed 03/19/24 1308   Length (cm) 1.5 03/19/24 1308   Width (cm) 1.3 03/19/24 1308   Depth  (cm) 0.1 03/19/24 1308   Wound (cm^2) 1.95 cm^2 03/19/24 1308   Wound Volume (cm^3) 0.2 cm^3 03/19/24 1308   Wound healing % 48.68 03/19/24 1308   Drainage Characteristics/Odor serosanguineous 01/23/24 1047   Drainage Amount none 03/19/24 1308   Care, Wound non-select wound debridement performed. 02/21/24 1300       Wound (used by OP I only) 03/15/22 1049 Right ischial tuberosity pressure injury (Active)   Thickness/Stage Stage 4 03/19/24 1308   Base granulating 03/19/24 1308   Periwound intact 03/19/24 1308   Periwound Temperature warm 03/19/24 1308   Periwound Skin Turgor soft 03/19/24 1308   Edges open 03/19/24 1308   Length (cm) 2 03/19/24 1308   Width (cm) 1 03/19/24 1308   Depth (cm) 0.2 03/19/24 1308   Wound (cm^2) 2 cm^2 03/19/24 1308   Wound Volume (cm^3) 0.4 cm^3 03/19/24 1308   Wound healing % 46.67 03/19/24 1308   Undermining [Depth (cm)/Location] 6-7o'/ 0.5cm 02/21/24 1300   Drainage Characteristics/Odor serosanguineous 03/19/24 1308   Drainage Amount moderate 03/19/24 1308   Care, Wound chemical cautery applied;non-select wound debridement performed. 03/19/24 1308       Wound (used by OP I only) 03/15/22 1050 Left ischial tuberosity pressure injury (Active)   Thickness/Stage Stage 4 03/19/24 1308   Base granulating 03/19/24 1308   Periwound intact 03/19/24 1308   Periwound Temperature warm 03/19/24 1308   Periwound Skin Turgor soft 03/19/24 1308   Edges open 03/19/24 1308   Length (cm) 4 03/19/24 1308   Width (cm) 1.7 03/19/24 1308   Depth (cm) 0.5 03/19/24 1308   Wound (cm^2) 6.8 cm^2 03/19/24 1308   Wound Volume (cm^3) 3.4 cm^3 03/19/24 1308   Wound healing % -137.76 03/19/24 1308   Undermining [Depth (cm)/Location] 6- 4 oclock/ 0.5 cm 03/19/24 1308   Drainage Characteristics/Odor serosanguineous 03/19/24 1308   Drainage Amount moderate 03/19/24 1308   Care, Wound chemical cautery applied;non-select wound debridement performed. 03/19/24 1308       Wound (used by OP [facility] only) 10/24/23 1313 Right  medial ankle unspecified (Active)   Thickness/Stage full thickness 03/19/24 1308   Base scab 03/19/24 1308   Periwound dry;intact 03/19/24 1308   Periwound Temperature warm 03/19/24 1308   Periwound Skin Turgor soft 03/19/24 1308   Edges rolled/closed 03/19/24 1308   Length (cm) 0.7 03/19/24 1308   Width (cm) 0.3 03/19/24 1308   Depth (cm) 0 03/19/24 1308   Wound (cm^2) 0.21 cm^2 03/19/24 1308   Wound Volume (cm^3) 0 cm^3 03/19/24 1308   Wound healing % 98.18 03/19/24 1308   Drainage Characteristics/Odor serosanguineous 01/23/24 1047   Drainage Amount none 03/19/24 1308   Care, Wound non-select wound debridement performed. 02/21/24 1300       Wound (used by OP [facility] only) 10/24/23 1314 Right dorsal 1 toe ulceration, arterial (Active)   Thickness/Stage full thickness 03/19/24 1308   Base granulating 03/19/24 1308   Periwound pink;edematous 03/19/24 1308   Periwound Temperature cool 03/19/24 1308   Periwound Skin Turgor soft 03/19/24 1308   Edges callused 03/19/24 1308   Length (cm) 2.4 03/19/24 1308   Width (cm) 1.8 03/19/24 1308   Depth (cm) 0.5 03/19/24 1308   Wound (cm^2) 4.32 cm^2 03/19/24 1308   Wound Volume (cm^3) 2.16 cm^3 03/19/24 1308   Wound healing % 30.88 03/19/24 1308   Drainage Characteristics/Odor serosanguineous 03/19/24 1308   Drainage Amount moderate 03/19/24 1308   Care, Wound non-select wound debridement performed. 03/19/24 1308       Wound (used by OP [facility] only) 10/24/23 1314 Right 3 toe (Active)   Thickness/Stage full thickness 03/19/24 1308   Base granulating;exposed structure 03/19/24 1308   Periwound dry;intact 03/19/24 1308   Periwound Temperature cool 03/19/24 1308   Periwound Skin Turgor soft 03/19/24 1308   Edges callused 03/19/24 1308   Length (cm) 0.5 03/19/24 1308   Width (cm) 0.6 03/19/24 1308   Depth (cm) 0.4 03/19/24 1308   Wound (cm^2) 0.3 cm^2 03/19/24 1308   Wound Volume (cm^3) 0.12 cm^3 03/19/24 1308   Wound healing % 93.8 03/19/24 1308   Drainage Characteristics/Odor  serosanguineous 03/19/24 1308   Drainage Amount moderate 03/19/24 1308   Care, Wound non-select wound debridement performed. 03/19/24 1308                     MDM: 20 minutes were spent on the day of visit reviewing previous chart notes, assessing the patient and developing the plan of care, this excludes time spent on any procedures.     PROCEDURE: After verbal consent was obtained, granulation tissue of IT wounds were treated with silver nitrate. Patient tolerated this well.       PATIENT INSTRUCTIONS:      Further instructions from your care team         Maicol Fountain   1965    A DME order was not completed because the supplies are ordered by home care or at a care facility    [facility] Phone: [phone]; Fax: [phone]     PLAN 03/19/24:   -continue same dressing care by Home care  - We applied Silver Nitrate to the hypergranulation tissue today on ischial wounds only.  This may lead to temporary staining of the skin with increased drainage and discolored gray/black drainage.  This may be present for a few days and is a normal process.   Call [facility] at [phone] to schedule with surgical podiatry to discuss osteomyelitis, toe surgery plan   Have [facility] look [facility] this month!     Wound Care Orders: Right toe 1 and Right toe 3  - Cleanse with mild unscented soap and water (such as Cetaphil, Cerave or Dove)  Ensure to clean and debride old skin of BLE with cares.  - Apply small amount of VASHE on gauze, lay into wound bed, let sit for 10 minutes, remove gauze (do not rinse)  - Apply thick, high-quality moisturizer to intact skin (such as CeraVe, Eucerin, Aquaphor or Vanicream)   - Apply a nickel thick layer of Iodosorb paste to wounds  - cover with two 5x9 ABD pads   - secure with Kerlix roll gauze and medipore tape  - Wear spandage for compression from toes to knee  Change M,W,F and as needed for soilage     Wound care: Right Ischial Tuberosity and Left Ischial tuberosity   -  Cleanse with mild unscented soap and water (such as Cetaphil, Cerave or Dove)  - Apply small amount of VASHE on gauze, lay into wound bed, let sit for 10 minutes, remove gauze (do not rinse)  - Apply 1/6 Hydrofera Blue Ready-Transfer (cut-to-fit size of wounds)  - Cover with 1/2 of a 5x9 ABD pad and secure with medipore tape  Change M,W,F and as needed for soilage     Repositioning:  Bed: Reposition MINIMALLY every 1-2 hours in bed to relieve pressure and promote perfusion to tissue.  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 A group 1 mattress is not adequate to offload these severe ulcerations. Patient has impaired sensation and is unable to reposition independently.   Chair: When up to the chair, do not sit for longer than one hour total before returning to bed for at least 60 minutes to relieve pressure and promote perfusion to the tissue.  Completely recline/tilt for 15 minutes each hour.  Sit on a chair cushion when up to the chair.       Protein:  A diet high in protein is important for wound healing, we recommend getting 90 grams of protein per day.   Taking protein shakes or bars are a good way to get extra protein in your diet.          Main Provider: Suzan Martinez PA-C March 19, 2024    Call us at [phone] if you have any questions about your wounds, have redness or swelling around your wound, have a fever of 101 degrees Fahrenheit or greater or if you have any other problems or concerns. We answer the phone Monday through Friday 8 am to 4 pm, please leave a message as we check the voicemail frequently throughout the day.     If you had a positive experience please indicate that on your patient satisfaction survey form that [facility] will be sending you.    It was a pleasure meeting with you today.  Thank you for allowing me and my team the  privilege of caring for you today.  YOU are the reason we are here, and I truly hope we provided you with the excellent service you deserve.  Please let us know if there is anything else we can do for you so that we can be sure you are leaving completely satisfied with your care experience.      If you have any billing related questions please call the [facility] Business office at [phone]. The clinic staff does not handle billing related matters.    If you are scheduled to have a follow up appointment, you will receive a reminder call the day before your visit. On the appointment day please arrive 15 minutes prior to your appointment time. If you are unable to keep that appointment, please call the clinic to cancel or reschedule. If you are more than 10 minutes late or greater for your scheduled appointment time, the clinic policy is that you may be asked to reschedule.

## 2024-03-19 NOTE — DISCHARGE INSTRUCTIONS
Maicol Fountain   1965    A DME order was not completed because the supplies are ordered by home care or at a care facility    [facility] Phone: [phone]; Fax: [phone]     PLAN 03/19/24:   -continue same dressing care by Home care  - We applied Silver Nitrate to the hypergranulation tissue today on ischial wounds only.  This may lead to temporary staining of the skin with increased drainage and discolored gray/black drainage.  This may be present for a few days and is a normal process.   Call [facility] at [phone] to schedule with surgical podiatry to discuss osteomyelitis, toe surgery plan   Have [facility] look at wheelchair this month!     Wound Care Orders: Right toe 1 and Right toe 3  - Cleanse with mild unscented soap and water (such as Cetaphil, Cerave or Dove)  Ensure to clean and debride old skin of BLE with cares.  - Apply small amount of VASHE on gauze, lay into wound bed, let sit for 10 minutes, remove gauze (do not rinse)  - Apply thick, high-quality moisturizer to intact skin (such as CeraVe, Eucerin, Aquaphor or Vanicream)   - Apply a nickel thick layer of Iodosorb paste to wounds  - cover with two 5x9 ABD pads   - secure with Kerlix roll gauze and medipore tape  - Wear spandage for compression from toes to knee  Change M,W,F and as needed for soilage     Wound care: Right Ischial Tuberosity and Left Ischial tuberosity   - Cleanse with mild unscented soap and water (such as Cetaphil, Cerave or Dove)  - Apply small amount of VASHE on gauze, lay into wound bed, let sit for 10 minutes, remove gauze (do not rinse)  - Apply 1/6 Hydrofera Blue Ready-Transfer (cut-to-fit size of wounds)  - Cover with 1/2 of a 5x9 ABD pad and secure with medipore tape  Change M,W,F and as needed for soilage     Repositioning:  Bed: Reposition MINIMALLY every 1-2 hours in bed to relieve pressure and promote perfusion to tissue.  Continue group 2 mattress due to large, stage 4 pressure ulceration on the  patient's pelvis that has failed to improve on a normal mattress despite regular wound cares and repositioning. A group 1 mattress is not adequate to offload these severe ulcerations. Patient has impaired sensation and is unable to reposition independently.   Chair: When up to the chair, do not sit for longer than one hour total before returning to bed for at least 60 minutes to relieve pressure and promote perfusion to the tissue.  Completely recline/tilt for 15 minutes each hour.  Sit on a chair cushion when up to the chair.       Protein:  A diet high in protein is important for wound healing, we recommend getting 90 grams of protein per day.   Taking protein shakes or bars are a good way to get extra protein in your diet.          Main Provider: Suzan Martinez PA-C March 19, 2024    Call us at [phone] if you have any questions about your wounds, have redness or swelling around your wound, have a fever of 101 degrees Fahrenheit or greater or if you have any other problems or concerns. We answer the phone Monday through Friday 8 am to 4 pm, please leave a message as we check the voicemail frequently throughout the day.     If you had a positive experience please indicate that on your patient satisfaction survey form that [facility] will be sending you.    It was a pleasure meeting with you today.  Thank you for allowing me and my team the privilege of caring for you today.  YOU are the reason we are here, and I truly hope we provided you with the excellent service you deserve.  Please let us know if there is anything else we can do for you so that we can be sure you are leaving completely satisfied with your care experience.      If you have any billing related questions please call the [facility] Business office at [phone]. The clinic staff does not handle billing related matters.    If you are scheduled to have a follow up appointment, you will receive a reminder call the day before your  visit. On the appointment day please arrive 15 minutes prior to your appointment time. If you are unable to keep that appointment, please call the clinic to cancel or reschedule. If you are more than 10 minutes late or greater for your scheduled appointment time, the clinic policy is that you may be asked to reschedule.

## 2024-03-21 ENCOUNTER — TELEPHONE (OUTPATIENT)
Dept: FAMILY MEDICINE | Facility: CLINIC | Age: 59
End: 2024-03-21
Payer: MEDICARE

## 2024-03-21 NOTE — TELEPHONE ENCOUNTER
[facility] phone call message- medication clarification/question:    Full Medication Name: ferrous sulfate    Question: Debo feels the patient should be on this medication.     Pharmacy confirmed as      [facility] - [place], [place] - [address]    : Yes    OK to leave a message on voice mail? Yes    Primary language: English      needed? No    Call taken on March 21, 2024 at 12:49 PM by Caitlin Gómez

## 2024-03-21 NOTE — TELEPHONE ENCOUNTER
Attempted to call back and relay message below. No answer, there are no identifiers on voicemail and unclear if it is secure. Left generic message requesting a call back.     CARIDAD Prado Adam A, MD   to Me   [place]    3/7/24  5:11 PM  Ashley Haro,  I discontinued Iron tabs until we got iron studies back. His iron levels are fine now. He should not take more iron as it will be eventually toxic to his liver and heart and other organs.  Jose Luis,  -Boaz

## 2024-03-27 DIAGNOSIS — K21.9 GASTROESOPHAGEAL REFLUX DISEASE WITHOUT ESOPHAGITIS: ICD-10-CM

## 2024-03-29 ENCOUNTER — MEDICAL CORRESPONDENCE (OUTPATIENT)
Dept: HEALTH INFORMATION MANAGEMENT | Facility: CLINIC | Age: 59
End: 2024-03-29
Payer: MEDICARE

## 2024-03-29 ENCOUNTER — PRE VISIT (OUTPATIENT)
Dept: INFECTIOUS DISEASES | Facility: CLINIC | Age: 59
End: 2024-03-29
Payer: MEDICARE

## 2024-03-29 NOTE — TELEPHONE ENCOUNTER
"Request for medication refill:  calcium carbonate-vitamin D (OSCAL) 500-5 MG-MCG tablet     Providers if patient needs an appointment and you are willing to give a one month supply please refill for one month and  send a letter/MyChart using \".SMILLIMITEDREFILL\" .smillimited and route chart to \"P SMI \" (Giving one month refill in non controlled medications is strongly recommended before denial)    If refill has been denied, meaning absolutely no refills without visit, please complete the smart phrase \".smirxrefuse\" and route it to the \"P SMI MED REFILLS\"  pool to inform the patient and the pharmacy.    Ivana Currie CMA      "

## 2024-04-01 ENCOUNTER — DOCUMENTATION ONLY (OUTPATIENT)
Dept: FAMILY MEDICINE | Facility: CLINIC | Age: 59
End: 2024-04-01
Payer: MEDICARE

## 2024-04-01 NOTE — PROGRESS NOTES
"When opening a documentation only encounter, be sure to enter in \"Chief Complaint\" Forms and in \" Comments\" Title of form, description if needed.    Maicol is a 58 year old  adult  Form received via: Fax  Form now resides in: Provider Ready    Mera Cramer    Form has been completed by provider.     Form sent out via: Fax to [facility] at Fax Number: [phone]  Patient informed: No, Reason:  Output date: April 1, 2024    Mera Cramer      **Please close the encounter**                "

## 2024-04-09 ENCOUNTER — MEDICAL CORRESPONDENCE (OUTPATIENT)
Dept: HEALTH INFORMATION MANAGEMENT | Facility: CLINIC | Age: 59
End: 2024-04-09
Payer: MEDICARE

## 2024-04-09 DIAGNOSIS — Z53.9 DIAGNOSIS NOT YET DEFINED: Primary | ICD-10-CM

## 2024-04-09 PROCEDURE — G0179 MD RECERTIFICATION HHA PT: HCPCS | Performed by: PHYSICIAN ASSISTANT

## 2024-04-16 ENCOUNTER — HOSPITAL ENCOUNTER (OUTPATIENT)
Dept: WOUND CARE | Facility: CLINIC | Age: 59
Discharge: HOME | End: 2024-04-16
Attending: PHYSICIAN ASSISTANT | Admitting: PHYSICIAN ASSISTANT
Payer: MEDICARE

## 2024-04-16 VITALS — SYSTOLIC BLOOD PRESSURE: 171 MMHG | HEART RATE: 67 BPM | TEMPERATURE: 97.2 F | DIASTOLIC BLOOD PRESSURE: 62 MMHG

## 2024-04-16 DIAGNOSIS — L89.314 PRESSURE ULCER OF ISCHIUM, RIGHT, STAGE IV (H): Primary | ICD-10-CM

## 2024-04-16 DIAGNOSIS — L98.492: ICD-10-CM

## 2024-04-16 DIAGNOSIS — L97.512 ULCER OF RIGHT FOOT WITH FAT LAYER EXPOSED (H): ICD-10-CM

## 2024-04-16 DIAGNOSIS — L97.922 ULCER OF LEFT LOWER EXTREMITY WITH FAT LAYER EXPOSED (H): ICD-10-CM

## 2024-04-16 DIAGNOSIS — L98.492 SKIN ULCER WITH FAT LAYER EXPOSED (H): ICD-10-CM

## 2024-04-16 PROCEDURE — 17250 CHEM CAUT OF GRANLTJ TISSUE: CPT | Performed by: PHYSICIAN ASSISTANT

## 2024-04-16 PROCEDURE — 97602 WOUND(S) CARE NON-SELECTIVE: CPT

## 2024-04-16 PROCEDURE — 99213 OFFICE O/P EST LOW 20 MIN: CPT | Mod: 25 | Performed by: PHYSICIAN ASSISTANT

## 2024-04-16 NOTE — PROGRESS NOTES
ASSESSMENT:    Osteomyelitis of R 1st and 3rd toe per MRI and clinical exam  Full thickness traumatic ulcers of R 1st and 3rd toes with exposed tendon and bone   Full thickness traumatic ulcers of right lateral foot with fat-layer exposed  Peripheral arterial disease - non-reconstructible but with TCO2 suggestive of adequate healing   Recurrent stage 4 pressure ulcers of bilateral Its  Hx of PG on L lower leg wound      PLAN:    Referral to surgical podiatry for 1st and 3rd toe osteomyelitis management, per vascular he has enough blood flow to heal   [facility]  Patient is using a Group 2 mattress due to large, stage 4 pressure ulceration on the patient's pelvis that has failed to improve on a normal mattress despite regular wound cares and repositioning. A group 1 mattress is not adequate to offload these severe ulcerations. Patient has impaired sensation and is unable to reposition independently.  iodosorb or iodine and cover dressing to leg and foot wounds, Hydrofera Blue on ischial wounds  Continue using Rooke boot to improve circulation  Follow-up: will need 40 minute appointments every 4-6 weeks, recommending he follow with Ramiro while I'm out    INTERVAL Hx:  04/16/24: Returns to clinic. LIT healed. RIT improved. Foot wounds improved in size, no exposed structures. Has not made an appointment with podiatry yet to discuss amputations.     HISTORY OF PRESENT ILLNESS:   Mr. Parth Fountain is a 58-year-old paraplegic gentleman who returns to us today to follow-up on chronic pelvic pressure ulcers as well as right foot wounds. PMHx of DVT, chronic UTI, EtOh abuse, pyoderma gangrenosum (LLE) and s/p colostomy. He has a long history of pressure ulcers with subsequent corrective surgeries by Dr. Peña. No longer has any surgical options for closure. Parth's pelvic wounds have been very difficult to heal due to his surgical history. He has very little tissue overlying his bone and it is  mostly made up of scar tissue. His progress waxes and wanes.    2/26/21: new wound on R great toe with exposed bone.   01/28/22: Since I saw him he visited with Dr. Chapa at the [facility] center who recommended angio. Unfortunately his right heel and right lateral foot wound are now down to bone. He thinks it's from his [facility] that [facility]. Pelvic wounds have reopened. He thinks this is due to his wheelchair cushion deflating.   03/15/22: Appears that his health has overall deteriorated. He had his chair pressure mapped and said it is appropriate. Has not followed up with vascular as we had suggested. Fortunately his foot wounds appear relatively stable. His pelvic wounds have deteriorated a bit.   04/12/22: Foot wounds continue to be stable but unfortunately continues to have bone exposed on R foot. His pelvic wounds have worsened and he cannot really identify a cause. States his bed is working well. Has a Roho that he reports mapped well but is getting a new chair later this week through the [facility].   4/14/22: Angio revealed multiple occlusions, unable to achieve any recanalization. Per Dr. Chapa he is a poor candidate for revascularization, especially due to smoking and poor health.   05/17/22: Pelvic wounds improved. Foot wounds are stable, no sign of infection. We had heart to heart about poor blood flow and poor prognosis of this, high risk of amputation. He must quit smoking and take care of himself. Recommended HBOT if candidate.   07/12/22: Foot wounds improving. Pelvic wounds are stable. Has not [facility] it to [facility] to [facility] his [facility], now using an old Roho as a back rest.   08/09/22: Pelvic wounds are improving. Right heel improved. Right lateral foot improving but still with exposed tendon. Has not fixed wheelchair.   09/13/22: Has decided he does not want to fix the back of his wheelchair, prefers to use the Roho.   01/11/23: Pelvic wounds improved. Has worsening in lateral foot wound, appears to have  new trauma but he's not sure about when/how.   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   6/7: Pelvic wounds improving, using collagen. New RLE ulcer - he is unsure of cause. R foot wound stagnant without sign of infection. He expresses desire for amputation today as he understand wounds likely unhealable and risk of infection. Referral made to [facility]. Wheelchair continues to be broken down with makeshift back rest.  08/08/23: Foot wound improving. Leg wound resolved. Bilateral ITs appear to be improving. He has not pursued preventive amputation yet but is still interested. He states his air mattress isn't working well and would like a replacement.   09/19/23: Foot wounds improving. New dorsal foot wound - appears minor. Pelvic wounds improving. Had chair mapped and reported it was slightly over inflated. Has new group 2 and likes it.   10/25/23: Reports that his wheelchair is broken and has had issues with all his wounds and new injuries to RLE due to this. [facility]. Unfortunately has some pretty severe degloving injuries of 1st and 3rd toes with exposed loose distal phalanx in 3rd toe. His R lateral foot wound is worsened as well. New traumatic calf wound. His ischial wounds have regressed as well and he also blames this on his wheelchair. Fortunately he is feeling well, denies f/c/n/v. Denies more drug or alcohol use.  10/30-11/9: Hospitalized at [facility] for gangrene of right 1st and 3rd toe and clinical osteomyelitis. He was started on IV antibiotics and discharged on orals.   11/27: Met with Dr. Maza with the vascular team at [facility]. He performed TCO2s which were actually adequate for healing   12/05/23: 1st and 3rd toe wounds with exposed bone. He is still contemplating whether he should move forward with some kind of amputation or not.  [facility] but he hasn't been able  to pick it up yet.   12/11: Met with Dr. Maza again. His account of the visit and Parth's understanding seem to differ. Parth felt that he was suggested AKA due to Dr. Maza's recommendation but it seemed that Dr. Maza felt that this was Parth's desire. It sounds like there are still options for more distal amputation if necessary.  12/22/23: Foot and leg wounds improving. However, R great toe wound probes quite deep and unclear whether this will heal as he likely has joint/bone involvement. Ischial wounds change to Hydrofera Blue due to concern of bioburden with collagen.  01/23/24: All wounds improving, but unlikely toe wounds will heal due to involved bone/joint. Has not been able to get new wc yet.    2/21: Ischial wounds improving with Hydrofera Blue. Toe wounds slightly worsened today, both with exposed joint. He is unsure if he traumatized these. [facility] not [facility] to [facility] yet [facility].   03/19/24: IT wounds nearly healed with Hydrofera Blue. Went over MR from 2/29 that showed osteomyelitis of both toes as we suspected - referred to surgical podiatry. Has not received his new seating system from the [facility] yet.     OFFLOADING: On a Group 2 mattress, new from [facility] 9/2023. Still utilizing RoHo cushion on his wheelchair - pressure mapped well 9/2023, new chair 4/2022 this is now in state of disrepair, will be getting new chair in 2024 through the [facility]      SOCIAL HISTORY:  Lives in a group home. He is still receiving home health care through [facility] that comes and does dressing changes daily.      PHYSICAL EXAMINATION   INTEGUMENTARY:   Wound (used by [facility] only) 01/28/22 1056 Right lateral foot pressure injury (Active)   Thickness/Stage full thickness 04/16/24 1238   Base scab 04/16/24 1238   Periwound intact;dry 04/16/24 1238   Periwound Temperature warm 04/16/24 1238   Periwound Skin Turgor soft 04/16/24 1238   Edges rolled/closed 04/16/24 1238   Length (cm) 1.5 03/19/24 1308    Width (cm) 1.3 03/19/24 1308   Depth (cm) 0.1 03/19/24 1308   Wound (cm^2) 1.95 cm^2 03/19/24 1308   Wound Volume (cm^3) 0.2 cm^3 03/19/24 1308   Wound healing % 48.68 03/19/24 1308   Drainage Characteristics/Odor serosanguineous 01/23/24 1047   Drainage Amount none 04/16/24 1238   Care, Wound non-select wound debridement performed. 02/21/24 1300       Wound (used by [facility] only) 03/15/22 1049 Right ischial tuberosity pressure injury (Active)   Thickness/Stage Stage 4 04/16/24 1238   Base granulating 04/16/24 1238   Periwound intact 04/16/24 1238   Periwound Temperature warm 04/16/24 1238   Periwound Skin Turgor soft 04/16/24 1238   Edges open 04/16/24 1238   Length (cm) 2 03/19/24 1308   Width (cm) 1 03/19/24 1308   Depth (cm) 0.2 03/19/24 1308   Wound (cm^2) 2 cm^2 03/19/24 1308   Wound Volume (cm^3) 0.4 cm^3 03/19/24 1308   Wound healing % 46.67 03/19/24 1308   Undermining [Depth (cm)/Location] 6-7o'/ 0.5cm 02/21/24 1300   Drainage Characteristics/Odor serosanguineous 04/16/24 1238   Drainage Amount moderate 04/16/24 1238   Care, Wound chemical cautery applied;non-select wound debridement performed. 03/19/24 1308       Wound (used by [facility] only) 03/15/22 1050 Left ischial tuberosity pressure injury (Active)   Thickness/Stage Stage 4 04/16/24 1238   Base granulating 04/16/24 1238   Periwound intact 04/16/24 1238   Periwound Temperature warm 04/16/24 1238   Periwound Skin Turgor soft 04/16/24 1238   Edges open 04/16/24 1238   Length (cm) 4 03/19/24 1308   Width (cm) 1.7 03/19/24 1308   Depth (cm) 0.5 03/19/24 1308   Wound (cm^2) 6.8 cm^2 03/19/24 1308   Wound Volume (cm^3) 3.4 cm^3 03/19/24 1308   Wound healing % -137.76 03/19/24 1308   Undermining [Depth (cm)/Location] 6- 4 oclock/ 0.5 cm 03/19/24 1308   Drainage Characteristics/Odor serosanguineous 04/16/24 1238   Drainage Amount moderate 04/16/24 1238   Care, Wound chemical cautery applied;non-select wound debridement performed. 03/19/24 1308       Wound  (used by OP [facility] only) 10/24/23 1313 Right medial ankle unspecified (Active)   Thickness/Stage full thickness 04/16/24 1238   Base scab 04/16/24 1238   Periwound dry;intact 04/16/24 1238   Periwound Temperature warm 04/16/24 1238   Periwound Skin Turgor soft 04/16/24 1238   Edges rolled/closed 04/16/24 1238   Length (cm) 1.1 04/16/24 1238   Width (cm) 0.5 04/16/24 1238   Depth (cm) 0.1 04/16/24 1238   Wound (cm^2) 0.55 cm^2 04/16/24 1238   Wound Volume (cm^3) 0.06 cm^3 04/16/24 1238   Wound healing % 95.24 04/16/24 1238   Drainage Characteristics/Odor serosanguineous 04/16/24 1238   Drainage Amount moderate 04/16/24 1238   Care, Wound non-select wound debridement performed. 02/21/24 1300       Wound (used by OP I only) 10/24/23 1314 Right dorsal 1 toe ulceration, arterial (Active)   Thickness/Stage full thickness 04/16/24 1238   Base granulating 04/16/24 1238   Periwound pink;edematous 04/16/24 1238   Periwound Temperature cool 04/16/24 1238   Periwound Skin Turgor soft 04/16/24 1238   Edges callused 04/16/24 1238   Length (cm) 1 04/16/24 1238   Width (cm) 0.4 04/16/24 1238   Depth (cm) 0.3 04/16/24 1238   Wound (cm^2) 0.4 cm^2 04/16/24 1238   Wound Volume (cm^3) 0.12 cm^3 04/16/24 1238   Wound healing % 93.6 04/16/24 1238   Drainage Characteristics/Odor serosanguineous 04/16/24 1238   Drainage Amount moderate 04/16/24 1238   Care, Wound non-select wound debridement performed. 03/19/24 1308       [facility] 10/24/23 1314 Right 3 toe (Active)   Thickness/Stage full thickness 04/16/24 1238   Base granulating;exposed structure 04/16/24 1238   Periwound dry;intact 04/16/24 1238   Periwound Temperature cool 04/16/24 1238   Periwound Skin Turgor soft 04/16/24 1238   Edges callused 04/16/24 1238   Length (cm) 0.5 04/16/24 1238   Width (cm) 0.7 04/16/24 1238   Depth (cm) 0.1 04/16/24 1238   Wound (cm^2) 0.35 cm^2 04/16/24 1238   Wound Volume (cm^3) 0.04 cm^3 04/16/24 1238   Wound healing % 92.77 04/16/24  1238   Drainage Characteristics/Odor serosanguineous 04/16/24 1238   Drainage Amount moderate 04/16/24 1238   Care, Wound non-select wound debridement performed. 03/19/24 1308   No images are attached to the encounter.      MDM: 20 minutes were spent on the day of visit reviewing previous chart notes, assessing the patient and developing the plan of care, this excludes time spent on any procedures.     PROCEDURE: After verbal consent was obtained, granulation tissue of IT wounds were treated with silver nitrate. Patient tolerated this well.       PATIENT INSTRUCTIONS:      Further instructions from your care team         04/16/2024   Maicol Fountain   1965    A DME order was not completed because the supplies are ordered by home care or at a care facility    [facility] Phone: [phone]; Fax: [phone]    PLAN 03/19/24:  -continue same dressing care by Home care  -We applied Silver Nitrate to the hypergranulation tissue today on ischial wounds only.  This may lead to temporary staining of the skin with increased drainage and discolored  gray/black drainage. This may be present for a few days and is a normal process.  Call [facility] at [phone] to schedule with surgical podiatry to  discuss osteomyelitis, toe surgery plan  Have [facility] look [facility] this [facility]!    Wound Care Orders: Right toe 1 and Right toe 3  - Cleanse with mild unscented soap and water (such as Cetaphil, Cerave or Dove) Ensure to clean and debride old skin of BLE with cares.  - Apply small amount of VASHE on gauze, lay into wound bed, let sit for 10 minutes, remove gauze (do not rinse)  - Apply thick, high-quality moisturizer to intact skin (such as CeraVe, Eucerin, Aquaphor or Vanicream)  - Apply a nickel thick layer of Iodosorb paste to wounds  - cover with two 5x9 ABD pads  - secure with Kerlix roll gauze and medipore tape  - Wear spandage for compression from toes to knee  Change M,W,F and as needed for  soilage    Wound care: Right Ischial Tuberosity and Left Ischial tuberosity  - Cleanse with mild unscented soap and water (such as Cetaphil, Cerave or Dove)  - Apply small amount of VASHE on gauze, lay into wound bed, let sit for 10 minutes,  remove gauze (do not rinse)  - Apply 1/6 Hydrofera Blue Ready-Transfer (cut-to-fit size of wounds)  - Cover with 1/2 of a 5x9 ABD pad and secure with medipore tape  Change M,W,F and as needed for soilage      Repositioning:  Bed: Reposition MINIMALLY every 1-2 hours in bed to relieve pressure and promote  perfusion to tissue.  Continue group 2 mattress due to large, stage 4 pressure ulceration on the  patient's pelvis that has failed to improve on a normal mattress despite regular  wound cares and repositioning. A group 1 mattress is not adequate to offload  these severe ulcerations. Patient has impaired sensation and is unable to reposition  independently.  Chair: When up to the chair, do not sit for longer than one hour total before  returning to bed for at least 60 minutes to relieve pressure and promote perfusion  to the tissue. Completely recline/tilt for 15 minutes each hour.  Sit on a chair cushion when up to the chair.    Protein:  A diet high in protein is important for wound healing, we recommend getting 90  grams of protein per day.  Taking protein shakes or bars are a good way to get extra protein in your diet.     Main Provider: Suzan Martinez PA-C April 16, 2024    Call us at [phone] if you have any questions about your wounds, if you have redness or swelling around your wound, have a fever of 101 degrees Fahrenheit or greater or if you have any other problems or concerns. We answer the phone Monday through Friday 8 am to 4 pm, please leave a message as we check the voicemail frequently throughout the day. If you have a concern over the weekend, please leave a message and we will return your call Monday. If the need is urgent, go to the ER or urgent  care.    If you had a positive experience please indicate that on your patient satisfaction survey form that [facility] will be sending you.    It was a pleasure meeting with you today.  Thank you for allowing me and my team the privilege of caring for you today.  YOU are the reason we are here, and I truly hope we provided you with the excellent service you deserve.  Please let us know if there is anything else we can do for you so that we can be sure you are leaving completely satisfied with your care experience.      If you have any billing related questions please call the [facility] Business office at [phone]. The clinic staff does not handle billing related matters.    If you are scheduled to have a follow up appointment, you will receive a reminder call the day before your visit. On the appointment day please arrive 15 minutes prior to your appointment time. If you are unable to keep that appointment, please call the clinic to cancel or reschedule. If you are more than 10 minutes late or greater for your scheduled appointment time, the clinic policy is that you may be asked to reschedule.

## 2024-04-16 NOTE — DISCHARGE INSTRUCTIONS
04/16/2024   Maicol Fountain   1965    A DME order was not completed because the supplies are ordered by home care or at a care facility    [facility] Phone: [phone]; Fax: [phone]    PLAN:  -continue same dressing care by Home care  -We applied Silver Nitrate to the hypergranulation tissue today on ischial wounds only.  This may lead to temporary staining of the skin with increased drainage and discolored  gray/black drainage. This may be present for a few days and is a normal process.  Call [facility] at [phone] to schedule with surgical podiatry to discuss osteomyelitis, toe surgery plan  Have [facility] look at wheelchair this month!    Wound Care Orders: Right toe 1 and Right toe 3  - Cleanse with mild unscented soap and water (such as Cetaphil, Cerave or Dove) Ensure to clean and debride old skin of BLE with cares.  - Apply small amount of VASHE on gauze, lay into wound bed, let sit for 10 minutes, remove gauze (do not rinse)  - Apply thick, high-quality moisturizer to intact skin (such as CeraVe, Eucerin, Aquaphor or Vanicream)  - Apply a nickel thick layer of Iodosorb paste to wounds  - cover with two 5x9 ABD pads  - secure with Kerlix roll gauze and medipore tape  - Wear spandage for compression from toes to knee  Change M,W,F and as needed for soilage    Wound care: Right Ischial Tuberosity   - Cleanse with mild unscented soap and water (such as Cetaphil, Cerave or Dove)  - Apply small amount of VASHE on gauze, lay into wound bed, let sit for 10 minutes, remove gauze (do not rinse)  - Apply 1/6 Hydrofera Blue Ready-Transfer (cut-to-fit size of wounds)  - Cover with 1/2 of a 5x9 ABD pad and secure with medipore tape  Change M,W,F and as needed for soilage      Repositioning:  Bed: Reposition MINIMALLY every 1-2 hours in bed to relieve pressure and promote  perfusion to tissue.  Continue group 2 mattress due to large, stage 4 pressure ulceration on the  patient's pelvis that has failed  to improve on a normal mattress despite regular  wound cares and repositioning. A group 1 mattress is not adequate to offload  these severe ulcerations. Patient has impaired sensation and is unable to reposition  independently.  Chair: When up to the chair, do not sit for longer than one hour total before  returning to bed for at least 60 minutes to relieve pressure and promote perfusion  to the tissue. Completely recline/tilt for 15 minutes each hour.  Sit on a chair cushion when up to the chair.    Protein:  A diet high in protein is important for wound healing, we recommend getting 90  grams of protein per day.  Taking protein shakes or bars are a good way to get extra protein in your diet.     Main Provider: Suzan Martinez PA-C April 16, 2024    Call us at [phone] if you have any questions about your wounds, if you have redness or swelling around your wound, have a fever of 101 degrees Fahrenheit or greater or if you have any other problems or concerns. We answer the phone Monday through Friday 8 am to 4 pm, please leave a message as we check the voicemail frequently throughout the day. If you have a concern over the weekend, please leave a message and we will return your call Monday. If the need is urgent, go to the ER or urgent care.    If you had a positive experience please indicate that on your patient satisfaction survey form that [facility] will be sending you.    It was a pleasure meeting with you today.  Thank you for allowing me and my team the privilege of caring for you today.  YOU are the reason we are here, and I truly hope we provided you with the excellent service you deserve.  Please let us know if there is anything else we can do for you so that we can be sure you are leaving completely satisfied with your care experience.      If you have any billing related questions please call the [facility] Business office at [phone]. The clinic staff does not handle billing related  matters.    If you are scheduled to have a follow up appointment, you will receive a reminder call the day before your visit. On the appointment day please arrive 15 minutes prior to your appointment time. If you are unable to keep that appointment, please call the clinic to cancel or reschedule. If you are more than 10 minutes late or greater for your scheduled appointment time, the clinic policy is that you may be asked to reschedule.

## 2024-04-19 DIAGNOSIS — Z53.9 DIAGNOSIS NOT YET DEFINED: Primary | ICD-10-CM

## 2024-04-19 PROCEDURE — G0179 MD RECERTIFICATION HHA PT: HCPCS | Performed by: PHYSICIAN ASSISTANT

## 2024-04-25 DIAGNOSIS — Z86.718 HISTORY OF DEEP VENOUS THROMBOSIS: ICD-10-CM

## 2024-04-25 RX ORDER — ASPIRIN 325 MG
325 TABLET ORAL EVERY MORNING
Qty: 30 TABLET | Refills: 11 | Status: SHIPPED | OUTPATIENT
Start: 2024-04-25

## 2024-04-25 NOTE — TELEPHONE ENCOUNTER
"Request for medication refill:    aspirin (ASA) 325 MG tablet       Providers if patient needs an appointment and you are willing to give a one month supply please refill for one month and  send a letter/MyChart using \".SMILLIMITEDREFILL\" .smillimited and route chart to \"P SMI \" (Giving one month refill in non controlled medications is strongly recommended before denial)    If refill has been denied, meaning absolutely no refills without visit, please complete the smart phrase \".smirxrefuse\" and route it to the \"P SMI MED REFILLS\"  pool to inform the patient and the pharmacy.    David Doll MA     "

## 2024-04-29 ENCOUNTER — DOCUMENTATION ONLY (OUTPATIENT)
Dept: FAMILY MEDICINE | Facility: CLINIC | Age: 59
End: 2024-04-29
Payer: MEDICARE

## 2024-04-29 NOTE — PROGRESS NOTES
"When opening a documentation only encounter, be sure to enter in \"Chief Complaint\" Forms and in \" Comments\" Title of form, description if needed.    Bear is a 59 year old  adult  Form received via: Fax  Form now resides in: Provider Kia Naik MA               "

## 2024-05-06 ENCOUNTER — MEDICAL CORRESPONDENCE (OUTPATIENT)
Dept: HEALTH INFORMATION MANAGEMENT | Facility: CLINIC | Age: 59
End: 2024-05-06
Payer: MEDICARE

## 2024-05-08 ENCOUNTER — MEDICAL CORRESPONDENCE (OUTPATIENT)
Dept: HEALTH INFORMATION MANAGEMENT | Facility: CLINIC | Age: 59
End: 2024-05-08
Payer: MEDICARE

## 2024-05-22 ENCOUNTER — MEDICAL CORRESPONDENCE (OUTPATIENT)
Dept: HEALTH INFORMATION MANAGEMENT | Facility: CLINIC | Age: 59
End: 2024-05-22

## 2024-05-28 DIAGNOSIS — T14.8XXA OPEN WOUND: ICD-10-CM

## 2024-05-28 DIAGNOSIS — D50.9 IRON DEFICIENCY ANEMIA, UNSPECIFIED IRON DEFICIENCY ANEMIA TYPE: ICD-10-CM

## 2024-05-28 RX ORDER — ASCORBIC ACID 500 MG
500 TABLET ORAL 4 TIMES DAILY
Qty: 124 TABLET | Refills: 11 | Status: SHIPPED | OUTPATIENT
Start: 2024-05-28

## 2024-06-05 ENCOUNTER — MEDICAL CORRESPONDENCE (OUTPATIENT)
Dept: HEALTH INFORMATION MANAGEMENT | Facility: CLINIC | Age: 59
End: 2024-06-05
Payer: MEDICARE

## 2024-06-05 DIAGNOSIS — Z53.9 DIAGNOSIS NOT YET DEFINED: Primary | ICD-10-CM

## 2024-06-05 PROCEDURE — G0179 MD RECERTIFICATION HHA PT: HCPCS | Performed by: REGISTERED NURSE

## 2024-06-10 ENCOUNTER — TELEPHONE (OUTPATIENT)
Dept: WOUND CARE | Facility: CLINIC | Age: 59
End: 2024-06-10
Payer: MEDICARE
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
## 2024-06-19 ENCOUNTER — HOSPITAL ENCOUNTER (INPATIENT)
Facility: CLINIC | Age: 59
LOS: 3 days | Discharge: HOME HEALTH | DRG: 935 | End: 2024-06-22
Attending: EMERGENCY MEDICINE | Admitting: STUDENT IN AN ORGANIZED HEALTH CARE EDUCATION/TRAINING PROGRAM
Payer: MEDICARE

## 2024-06-19 ENCOUNTER — MEDICAL CORRESPONDENCE (OUTPATIENT)
Dept: HEALTH INFORMATION MANAGEMENT | Facility: CLINIC | Age: 59
End: 2024-06-19

## 2024-06-19 ENCOUNTER — HOSPITAL ENCOUNTER (OUTPATIENT)
Dept: WOUND CARE | Facility: CLINIC | Age: 59
Discharge: HOME | DRG: 935 | End: 2024-06-19
Attending: REGISTERED NURSE | Admitting: REGISTERED NURSE
Payer: MEDICARE

## 2024-06-19 ENCOUNTER — APPOINTMENT (OUTPATIENT)
Dept: GENERAL RADIOLOGY | Facility: CLINIC | Age: 59
DRG: 935 | End: 2024-06-19
Attending: STUDENT IN AN ORGANIZED HEALTH CARE EDUCATION/TRAINING PROGRAM
Payer: MEDICARE

## 2024-06-19 VITALS — TEMPERATURE: 97.7 F | DIASTOLIC BLOOD PRESSURE: 76 MMHG | HEART RATE: 75 BPM | SYSTOLIC BLOOD PRESSURE: 144 MMHG
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
## 2024-06-19 DIAGNOSIS — L98.492: ICD-10-CM

## 2024-06-19 DIAGNOSIS — D50.9 IRON DEFICIENCY ANEMIA, UNSPECIFIED IRON DEFICIENCY ANEMIA TYPE: ICD-10-CM

## 2024-06-19 DIAGNOSIS — G89.18 ACUTE POST-OPERATIVE PAIN: ICD-10-CM

## 2024-06-19 DIAGNOSIS — Z89.611 S/P AKA (ABOVE KNEE AMPUTATION), RIGHT (H): ICD-10-CM

## 2024-06-19 DIAGNOSIS — Z93.3 COLOSTOMY IN PLACE (H): Chronic | ICD-10-CM

## 2024-06-19 DIAGNOSIS — L97.913 ULCER OF RIGHT LOWER EXTREMITY WITH NECROSIS OF MUSCLE (H): Primary | ICD-10-CM

## 2024-06-19 DIAGNOSIS — Z91.199 PERSONAL HISTORY OF NONCOMPLIANCE WITH MEDICAL TREATMENT, PRESENTING HAZARDS TO HEALTH: ICD-10-CM

## 2024-06-19 DIAGNOSIS — S71.101A OPEN WOUND OF RIGHT THIGH, INITIAL ENCOUNTER: ICD-10-CM

## 2024-06-19 DIAGNOSIS — M86.69 OTHER CHRONIC OSTEOMYELITIS, MULTIPLE SITES (H): ICD-10-CM

## 2024-06-19 DIAGNOSIS — S78.112A UNILATERAL AKA, LEFT (H): Chronic | ICD-10-CM

## 2024-06-19 DIAGNOSIS — Z99.3: Chronic | ICD-10-CM

## 2024-06-19 DIAGNOSIS — I73.9 PERIPHERAL VASCULAR DISEASE (H): ICD-10-CM

## 2024-06-19 DIAGNOSIS — S81.802A WOUND OF LEFT LOWER EXTREMITY, INITIAL ENCOUNTER: ICD-10-CM

## 2024-06-19 DIAGNOSIS — I73.9 PAD (PERIPHERAL ARTERY DISEASE) (H): Chronic | ICD-10-CM

## 2024-06-19 DIAGNOSIS — L97.512 ULCER OF RIGHT FOOT WITH FAT LAYER EXPOSED (H): ICD-10-CM

## 2024-06-19 DIAGNOSIS — N36.0 URETHRAL FISTULA: ICD-10-CM

## 2024-06-19 DIAGNOSIS — L89.324 PRESSURE INJURY OF LEFT ISCHIUM, STAGE 4 (H): ICD-10-CM

## 2024-06-19 DIAGNOSIS — L89.314 PRESSURE INJURY OF RIGHT ISCHIUM, STAGE 4 (H): ICD-10-CM

## 2024-06-19 PROBLEM — M62.50 MUSCULAR WASTING AND DISUSE ATROPHY: Chronic | Status: ACTIVE | Noted: 2021-06-20

## 2024-06-19 PROBLEM — F19.10 POLYSUBSTANCE ABUSE (H): Status: RESOLVED | Noted: 2018-08-17 | Resolved: 2024-06-19

## 2024-06-19 PROBLEM — L89.103: Status: RESOLVED | Noted: 2022-06-14 | Resolved: 2024-06-19

## 2024-06-19 PROBLEM — I82.409 DEEP VEIN THROMBOSIS (DVT) (H): Status: RESOLVED | Noted: 2017-06-12 | Resolved: 2024-06-19

## 2024-06-19 PROBLEM — N12 PYELONEPHRITIS: Status: RESOLVED | Noted: 2021-06-28 | Resolved: 2024-06-19

## 2024-06-19 PROBLEM — F11.11 NARCOTIC ABUSE IN REMISSION (H): Status: ACTIVE | Noted: 2017-06-12

## 2024-06-19 PROBLEM — N31.9 NEUROGENIC BLADDER: Chronic | Status: ACTIVE | Noted: 2022-10-07

## 2024-06-19 PROBLEM — Z98.890 HISTORY OF ILEAL CONDUIT: Chronic | Status: ACTIVE | Noted: 2021-06-19

## 2024-06-19 PROBLEM — F11.11 NARCOTIC ABUSE IN REMISSION (H): Status: RESOLVED | Noted: 2017-06-12 | Resolved: 2024-06-19

## 2024-06-19 PROBLEM — L89.614 PRESSURE INJURY OF RIGHT HEEL, STAGE 4 (H): Status: RESOLVED | Noted: 2022-01-28 | Resolved: 2024-06-19

## 2024-06-19 PROBLEM — M85.89 OSTEOPENIA OF MULTIPLE SITES: Chronic | Status: ACTIVE | Noted: 2017-06-26

## 2024-06-19 PROBLEM — L89.894: Status: RESOLVED | Noted: 2022-01-28 | Resolved: 2024-06-19

## 2024-06-19 PROBLEM — M62.838 MUSCLE SPASTICITY: Chronic | Status: ACTIVE | Noted: 2022-10-07

## 2024-06-19 PROBLEM — N13.2 HYDRONEPHROSIS WITH URINARY OBSTRUCTION DUE TO URETERAL CALCULUS: Status: RESOLVED | Noted: 2021-06-19 | Resolved: 2024-06-19

## 2024-06-19 PROBLEM — M79.2 NEUROPATHIC PAIN: Chronic | Status: ACTIVE | Noted: 2017-09-14

## 2024-06-19 PROBLEM — Z73.6 LIMITATION OF ACTIVITIES DUE TO DISABILITY: Chronic | Status: ACTIVE | Noted: 2022-09-02

## 2024-06-19 LAB
ABO/RH(D): ABNORMAL
ANION GAP SERPL CALCULATED.3IONS-SCNC: 9 MMOL/L (ref 7–15)
ANTIBODY SCREEN: POSITIVE
BASOPHILS # BLD AUTO: 0.1 10E3/UL (ref 0–0.2)
BASOPHILS NFR BLD AUTO: 0 %
BUN SERPL-MCNC: 31.2 MG/DL (ref 8–23)
CALCIUM SERPL-MCNC: 10.4 MG/DL (ref 8.8–10.2)
CHLORIDE SERPL-SCNC: 101 MMOL/L (ref 98–107)
CREAT SERPL-MCNC: 0.85 MG/DL (ref 0.51–1.17)
CREAT SERPL-MCNC: 0.85 MG/DL (ref 0.51–1.17)
CRP SERPL-MCNC: 24.6 MG/L
DEPRECATED HCO3 PLAS-SCNC: 27 MMOL/L (ref 22–29)
EGFRCR SERPLBLD CKD-EPI 2021: >90 ML/MIN/1.73M2
EGFRCR SERPLBLD CKD-EPI 2021: >90 ML/MIN/1.73M2
EOSINOPHIL # BLD AUTO: 0.2 10E3/UL (ref 0–0.7)
EOSINOPHIL NFR BLD AUTO: 1 %
ERYTHROCYTE [DISTWIDTH] IN BLOOD BY AUTOMATED COUNT: 12.4 % (ref 10–15)
ERYTHROCYTE [SEDIMENTATION RATE] IN BLOOD BY WESTERGREN METHOD: 36 MM/HR (ref 0–30)
GLUCOSE SERPL-MCNC: 90 MG/DL (ref 70–99)
HCT VFR BLD AUTO: 36.5 % (ref 35–53)
HGB BLD-MCNC: 12.1 G/DL (ref 13.3–17.7)
IMM GRANULOCYTES # BLD: 0.1 10E3/UL
IMM GRANULOCYTES NFR BLD: 1 %
LYMPHOCYTES # BLD AUTO: 1.3 10E3/UL (ref 0.8–5.3)
LYMPHOCYTES NFR BLD AUTO: 9 %
MCH RBC QN AUTO: 29.1 PG (ref 26.5–33)
MCHC RBC AUTO-ENTMCNC: 33.2 G/DL (ref 31.5–36.5)
MCV RBC AUTO: 88 FL (ref 78–100)
MONOCYTES # BLD AUTO: 0.9 10E3/UL (ref 0–1.3)
MONOCYTES NFR BLD AUTO: 6 %
NEUTROPHILS # BLD AUTO: 10.9 10E3/UL (ref 1.6–8.3)
NEUTROPHILS NFR BLD AUTO: 83 %
NRBC # BLD AUTO: 0 10E3/UL
NRBC BLD AUTO-RTO: 0 /100
PLATELET # BLD AUTO: 398 10E3/UL (ref 150–450)
POTASSIUM SERPL-SCNC: 4.5 MMOL/L (ref 3.4–5.3)
RBC # BLD AUTO: 4.16 10E6/UL (ref 3.8–5.9)
SODIUM SERPL-SCNC: 137 MMOL/L (ref 135–145)
SPECIMEN EXPIRATION DATE: ABNORMAL
WBC # BLD AUTO: 13.4 10E3/UL (ref 4–11)

## 2024-06-19 PROCEDURE — 97597 DBRDMT OPN WND 1ST 20 CM/<: CPT | Performed by: REGISTERED NURSE

## 2024-06-19 PROCEDURE — 85004 AUTOMATED DIFF WBC COUNT: CPT | Performed by: EMERGENCY MEDICINE

## 2024-06-19 PROCEDURE — 85652 RBC SED RATE AUTOMATED: CPT | Performed by: EMERGENCY MEDICINE

## 2024-06-19 PROCEDURE — 73552 X-RAY EXAM OF FEMUR 2/>: CPT | Mod: 26 | Performed by: RADIOLOGY

## 2024-06-19 PROCEDURE — 86870 RBC ANTIBODY IDENTIFICATION: CPT | Performed by: STUDENT IN AN ORGANIZED HEALTH CARE EDUCATION/TRAINING PROGRAM

## 2024-06-19 PROCEDURE — 86900 BLOOD TYPING SEROLOGIC ABO: CPT | Performed by: STUDENT IN AN ORGANIZED HEALTH CARE EDUCATION/TRAINING PROGRAM

## 2024-06-19 PROCEDURE — 36415 COLL VENOUS BLD VENIPUNCTURE: CPT | Performed by: STUDENT IN AN ORGANIZED HEALTH CARE EDUCATION/TRAINING PROGRAM

## 2024-06-19 PROCEDURE — 73552 X-RAY EXAM OF FEMUR 2/>: CPT | Mod: RT

## 2024-06-19 PROCEDURE — 250N000011 HC RX IP 250 OP 636: Performed by: STUDENT IN AN ORGANIZED HEALTH CARE EDUCATION/TRAINING PROGRAM

## 2024-06-19 PROCEDURE — 250N000013 HC RX MED GY IP 250 OP 250 PS 637: Performed by: PHYSICIAN ASSISTANT

## 2024-06-19 PROCEDURE — 99222 1ST HOSP IP/OBS MODERATE 55: CPT | Mod: FS | Performed by: STUDENT IN AN ORGANIZED HEALTH CARE EDUCATION/TRAINING PROGRAM

## 2024-06-19 PROCEDURE — 93005 ELECTROCARDIOGRAM TRACING: CPT

## 2024-06-19 PROCEDURE — 80048 BASIC METABOLIC PNL TOTAL CA: CPT | Performed by: EMERGENCY MEDICINE

## 2024-06-19 PROCEDURE — 250N000013 HC RX MED GY IP 250 OP 250 PS 637: Performed by: EMERGENCY MEDICINE

## 2024-06-19 PROCEDURE — 97598 DBRDMT OPN WND ADDL 20CM/<: CPT | Performed by: REGISTERED NURSE

## 2024-06-19 PROCEDURE — 36415 COLL VENOUS BLD VENIPUNCTURE: CPT | Performed by: EMERGENCY MEDICINE

## 2024-06-19 PROCEDURE — 258N000003 HC RX IP 258 OP 636: Mod: JZ | Performed by: STUDENT IN AN ORGANIZED HEALTH CARE EDUCATION/TRAINING PROGRAM

## 2024-06-19 PROCEDURE — 99207 PR APP CREDIT; MD BILLING SHARED VISIT: CPT | Performed by: PHYSICIAN ASSISTANT

## 2024-06-19 PROCEDURE — 99285 EMERGENCY DEPT VISIT HI MDM: CPT | Performed by: EMERGENCY MEDICINE

## 2024-06-19 PROCEDURE — 99417 PROLNG OP E/M EACH 15 MIN: CPT | Performed by: REGISTERED NURSE

## 2024-06-19 PROCEDURE — 86901 BLOOD TYPING SEROLOGIC RH(D): CPT | Performed by: STUDENT IN AN ORGANIZED HEALTH CARE EDUCATION/TRAINING PROGRAM

## 2024-06-19 PROCEDURE — 86880 COOMBS TEST DIRECT: CPT | Performed by: STUDENT IN AN ORGANIZED HEALTH CARE EDUCATION/TRAINING PROGRAM

## 2024-06-19 PROCEDURE — 120N000002 HC R&B MED SURG/OB UMMC

## 2024-06-19 PROCEDURE — 86886 COOMBS TEST INDIRECT TITER: CPT | Performed by: STUDENT IN AN ORGANIZED HEALTH CARE EDUCATION/TRAINING PROGRAM

## 2024-06-19 PROCEDURE — 250N000011 HC RX IP 250 OP 636: Mod: JZ | Performed by: PHYSICIAN ASSISTANT

## 2024-06-19 PROCEDURE — 999N000248 HC STATISTIC IV INSERT WITH US BY RN

## 2024-06-19 PROCEDURE — 86140 C-REACTIVE PROTEIN: CPT | Performed by: EMERGENCY MEDICINE

## 2024-06-19 PROCEDURE — 99215 OFFICE O/P EST HI 40 MIN: CPT | Mod: 25 | Performed by: REGISTERED NURSE

## 2024-06-19 RX ORDER — DULOXETIN HYDROCHLORIDE 60 MG/1
60 CAPSULE, DELAYED RELEASE ORAL DAILY
Status: DISCONTINUED | OUTPATIENT
Start: 2024-06-19 | End: 2024-06-22 | Stop reason: HOSPADM

## 2024-06-19 RX ORDER — ENOXAPARIN SODIUM 100 MG/ML
30 INJECTION SUBCUTANEOUS EVERY 24 HOURS
Status: DISCONTINUED | OUTPATIENT
Start: 2024-06-19 | End: 2024-06-20

## 2024-06-19 RX ORDER — OXYCODONE HYDROCHLORIDE 5 MG/1
5 TABLET ORAL EVERY 4 HOURS PRN
Status: DISCONTINUED | OUTPATIENT
Start: 2024-06-19 | End: 2024-06-21

## 2024-06-19 RX ORDER — LIDOCAINE 40 MG/G
CREAM TOPICAL
Status: DISCONTINUED | OUTPATIENT
Start: 2024-06-19 | End: 2024-06-22 | Stop reason: HOSPADM

## 2024-06-19 RX ORDER — CALCIUM CARBONATE 500 MG/1
1000 TABLET, CHEWABLE ORAL 4 TIMES DAILY PRN
Status: DISCONTINUED | OUTPATIENT
Start: 2024-06-19 | End: 2024-06-22 | Stop reason: HOSPADM

## 2024-06-19 RX ORDER — AMOXICILLIN 250 MG
1 CAPSULE ORAL 2 TIMES DAILY PRN
Status: DISCONTINUED | OUTPATIENT
Start: 2024-06-19 | End: 2024-06-22 | Stop reason: HOSPADM

## 2024-06-19 RX ORDER — ATORVASTATIN CALCIUM 40 MG/1
40 TABLET, FILM COATED ORAL EVERY EVENING
Status: DISCONTINUED | OUTPATIENT
Start: 2024-06-20 | End: 2024-06-22 | Stop reason: HOSPADM

## 2024-06-19 RX ORDER — MULTIPLE VITAMINS W/ MINERALS TAB 9MG-400MCG
1 TAB ORAL EVERY MORNING
Status: DISCONTINUED | OUTPATIENT
Start: 2024-06-20 | End: 2024-06-22 | Stop reason: HOSPADM

## 2024-06-19 RX ORDER — PREGABALIN 75 MG/1
300 CAPSULE ORAL 2 TIMES DAILY
Status: DISCONTINUED | OUTPATIENT
Start: 2024-06-19 | End: 2024-06-22 | Stop reason: HOSPADM

## 2024-06-19 RX ORDER — TRAZODONE HYDROCHLORIDE 100 MG/1
100 TABLET ORAL AT BEDTIME
Status: DISCONTINUED | OUTPATIENT
Start: 2024-06-19 | End: 2024-06-22 | Stop reason: HOSPADM

## 2024-06-19 RX ORDER — LOPERAMIDE HCL 2 MG
4 CAPSULE ORAL AT BEDTIME
Status: DISCONTINUED | OUTPATIENT
Start: 2024-06-19 | End: 2024-06-22 | Stop reason: HOSPADM

## 2024-06-19 RX ORDER — ASPIRIN 325 MG
325 TABLET ORAL EVERY MORNING
Status: DISCONTINUED | OUTPATIENT
Start: 2024-06-20 | End: 2024-06-21

## 2024-06-19 RX ORDER — ACETAMINOPHEN 500 MG
500-1000 TABLET ORAL EVERY 6 HOURS PRN
Status: DISCONTINUED | OUTPATIENT
Start: 2024-06-19 | End: 2024-06-22 | Stop reason: HOSPADM

## 2024-06-19 RX ORDER — VARENICLINE TARTRATE 0.5 MG/1
1 TABLET, FILM COATED ORAL 2 TIMES DAILY
Status: DISCONTINUED | OUTPATIENT
Start: 2024-06-20 | End: 2024-06-22 | Stop reason: HOSPADM

## 2024-06-19 RX ORDER — AMOXICILLIN 250 MG
2 CAPSULE ORAL 2 TIMES DAILY PRN
Status: DISCONTINUED | OUTPATIENT
Start: 2024-06-19 | End: 2024-06-22 | Stop reason: HOSPADM

## 2024-06-19 RX ORDER — LOPERAMIDE HCL 2 MG
4 CAPSULE ORAL 4 TIMES DAILY PRN
Status: DISCONTINUED | OUTPATIENT
Start: 2024-06-19 | End: 2024-06-22 | Stop reason: HOSPADM

## 2024-06-19 RX ORDER — CEFTRIAXONE 1 G/1
1 INJECTION, POWDER, FOR SOLUTION INTRAMUSCULAR; INTRAVENOUS EVERY 24 HOURS
Status: DISCONTINUED | OUTPATIENT
Start: 2024-06-19 | End: 2024-06-21

## 2024-06-19 RX ORDER — OXYCODONE HYDROCHLORIDE 5 MG/1
5 TABLET ORAL ONCE
Status: COMPLETED | OUTPATIENT
Start: 2024-06-19 | End: 2024-06-19

## 2024-06-19 RX ORDER — BACLOFEN 10 MG/1
10 TABLET ORAL 4 TIMES DAILY
Status: DISCONTINUED | OUTPATIENT
Start: 2024-06-20 | End: 2024-06-22 | Stop reason: HOSPADM

## 2024-06-19 RX ORDER — ASCORBIC ACID 250 MG
500 TABLET,CHEWABLE ORAL 4 TIMES DAILY
Status: DISCONTINUED | OUTPATIENT
Start: 2024-06-19 | End: 2024-06-22 | Stop reason: HOSPADM

## 2024-06-19 RX ADMIN — VANCOMYCIN HYDROCHLORIDE 750 MG: 10 INJECTION, POWDER, LYOPHILIZED, FOR SOLUTION INTRAVENOUS at 21:21

## 2024-06-19 RX ADMIN — TRAZODONE HYDROCHLORIDE 100 MG: 100 TABLET ORAL at 23:27

## 2024-06-19 RX ADMIN — DULOXETINE HYDROCHLORIDE 60 MG: 60 CAPSULE, DELAYED RELEASE ORAL at 23:27

## 2024-06-19 RX ADMIN — ACETAMINOPHEN 1000 MG: 500 TABLET ORAL at 20:13

## 2024-06-19 RX ADMIN — PREGABALIN 300 MG: 75 CAPSULE ORAL at 23:26

## 2024-06-19 RX ADMIN — LOPERAMIDE HYDROCHLORIDE 4 MG: 2 CAPSULE ORAL at 23:27

## 2024-06-19 RX ADMIN — CEFTRIAXONE SODIUM 1 G: 1 INJECTION, POWDER, FOR SOLUTION INTRAMUSCULAR; INTRAVENOUS at 20:13

## 2024-06-19 RX ADMIN — OXYCODONE HYDROCHLORIDE 5 MG: 5 TABLET ORAL at 20:13

## 2024-06-19 RX ADMIN — OXYCODONE HYDROCHLORIDE 5 MG: 5 TABLET ORAL at 16:14

## 2024-06-19 RX ADMIN — Medication 500 MG: at 23:26

## 2024-06-19 ASSESSMENT — ACTIVITIES OF DAILY LIVING (ADL)
ADLS_ACUITY_SCORE: 39
ADLS_ACUITY_SCORE: 30
ADLS_ACUITY_SCORE: 39
ADLS_ACUITY_SCORE: 39
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
## 2024-06-19 NOTE — DISCHARGE INSTRUCTIONS
06/19/2024   Maicol Fountain   1965  A DME order was not completed because the supplies are ordered by home care or at a care facility  [facility] Phone: [phone]; Fax: [phone]     PLAN: 6/19/24  -continue dressing care by Home care nurses  -Recommend to go to [facility] Emergency room ASAP to treat Right Lower leg wound    Done: [employer] check wheelchair: new battery     Wound Care Orders: Right Toes, Lateral foot and leg wounds   Wash with mild unscented soap and water  Swab all open wounds with Betadine or apply Betadine moist gauze to cover  Cover with 5x9 ABD pads  Secure with Kerlix roll gauze and medipore tape  Change Daily    Wound Care Orders: Right inner upper thigh   Wash with mild unscented soap and water, pat dry  Apply Medihoney (any type) to cover open area  Cover with Xeroform dressing  Cover with Silicone absorbant 4x4 dressing with border  Change 3 times a week M-W-F     Wound care: Right and Left Ischial Tuberosity   - Cleanse with mild unscented soap and water (such as Cetaphil, Cerave or Dove)  - Apply VASHE on gauze, to wound bed, for 10 minutes, remove gauze (do not rinse)  - Apply 1/6 Hydrofera Blue Ready-Transfer (cut-to-fit size of wounds)  - Cover with 1/2 of a 5x9 ABD pad and secure with medipore tape  Change M,W,F and as needed for soilage     Repositioning:  Bed: Reposition MINIMALLY every 1-2 hours in bed to relieve pressure and promote  perfusion to tissue.  Continue group 2 mattress due to large, stage 4 pressure ulceration on the  patient's pelvis that has failed to improve on a normal mattress despite regular  wound cares and repositioning. A group 1 mattress is not adequate to offload  these severe ulcerations. Patient has impaired sensation and is unable to reposition  independently.  Chair: When up to the chair, do not sit for longer than one hour total before  returning to bed for at least 60 minutes to relieve pressure and promote perfusion  to the tissue. Completely  recline/tilt for 15 minutes each hour.  Sit on a chair cushion when up to the chair.     Protein:  A diet high in protein is important for wound healing, we recommend getting 90  grams of protein per day.  Taking protein shakes or bars are a good way to get extra protein in your diet.     Main Provider: Ramiro Balbuena CNP June 19, 2024    Call us at [phone] if you have any questions about your wounds, if you have redness or swelling around your wound, have a fever of 101 degrees Fahrenheit or greater or if you have any other problems or concerns. We answer the phone Monday through Friday 8 am to 4 pm, please leave a message as we check the voicemail frequently throughout the day. If you have a concern over the weekend, please leave a message and we will return your call Monday. If the need is urgent, go to the ER or urgent care.    If you had a positive experience please indicate that on your patient satisfaction survey form that [facility] will be sending you.    It was a pleasure meeting with you today.  Thank you for allowing me and my team the privilege of caring for you today.  YOU are the reason we are here, and I truly hope we provided you with the excellent service you deserve.  Please let us know if there is anything else we can do for you so that we can be sure you are leaving completely satisfied with your care experience.      If you have any billing related questions please call the [facility] Business office at [phone]. The clinic staff does not handle billing related matters.    If you are scheduled to have a follow up appointment, you will receive a reminder call the day before your visit. On the appointment day please arrive 15 minutes prior to your appointment time. If you are unable to keep that appointment, please call the clinic to cancel or reschedule. If you are more than 10 minutes late or greater for your scheduled appointment time, the clinic policy is that you may be asked  to reschedule.
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
## 2024-06-20 ENCOUNTER — ANESTHESIA EVENT (OUTPATIENT)
Dept: SURGERY | Facility: CLINIC | Age: 59
DRG: 935 | End: 2024-06-20
Payer: MEDICARE

## 2024-06-20 ENCOUNTER — ANESTHESIA (OUTPATIENT)
Dept: SURGERY | Facility: CLINIC | Age: 59
DRG: 935 | End: 2024-06-20
Payer: MEDICARE

## 2024-06-20 LAB
ANION GAP SERPL CALCULATED.3IONS-SCNC: 8 MMOL/L (ref 7–15)
ATRIAL RATE - MUSE: 58 BPM
ATRIAL RATE - MUSE: 71 BPM
BUN SERPL-MCNC: 30.2 MG/DL (ref 8–23)
CALCIUM SERPL-MCNC: 9.3 MG/DL (ref 8.8–10.2)
CHLORIDE SERPL-SCNC: 105 MMOL/L (ref 98–107)
CREAT SERPL-MCNC: 0.72 MG/DL (ref 0.51–1.17)
CRP SERPL-MCNC: 29.71 MG/L
DEPRECATED HCO3 PLAS-SCNC: 28 MMOL/L (ref 22–29)
DIASTOLIC BLOOD PRESSURE - MUSE: NORMAL MMHG
DIASTOLIC BLOOD PRESSURE - MUSE: NORMAL MMHG
EGFRCR SERPLBLD CKD-EPI 2021: >90 ML/MIN/1.73M2
ERYTHROCYTE [DISTWIDTH] IN BLOOD BY AUTOMATED COUNT: 12.2 % (ref 10–15)
GLUCOSE SERPL-MCNC: 82 MG/DL (ref 70–99)
HCT VFR BLD AUTO: 35.2 % (ref 35–53)
HGB BLD-MCNC: 11.5 G/DL (ref 13.3–17.7)
INTERPRETATION ECG - MUSE: NORMAL
INTERPRETATION ECG - MUSE: NORMAL
MCH RBC QN AUTO: 29 PG (ref 26.5–33)
MCHC RBC AUTO-ENTMCNC: 32.7 G/DL (ref 31.5–36.5)
MCV RBC AUTO: 89 FL (ref 78–100)
P AXIS - MUSE: 70 DEGREES
P AXIS - MUSE: 76 DEGREES
PLATELET # BLD AUTO: 330 10E3/UL (ref 150–450)
POTASSIUM SERPL-SCNC: 4.1 MMOL/L (ref 3.4–5.3)
PR INTERVAL - MUSE: 134 MS
PR INTERVAL - MUSE: 140 MS
QRS DURATION - MUSE: 108 MS
QRS DURATION - MUSE: 108 MS
QT - MUSE: 418 MS
QT - MUSE: 458 MS
QTC - MUSE: 449 MS
QTC - MUSE: 454 MS
R AXIS - MUSE: -44 DEGREES
R AXIS - MUSE: -59 DEGREES
RBC # BLD AUTO: 3.97 10E6/UL (ref 3.8–5.9)
SODIUM SERPL-SCNC: 141 MMOL/L (ref 135–145)
SYSTOLIC BLOOD PRESSURE - MUSE: NORMAL MMHG
SYSTOLIC BLOOD PRESSURE - MUSE: NORMAL MMHG
T AXIS - MUSE: 73 DEGREES
T AXIS - MUSE: 86 DEGREES
VENTRICULAR RATE- MUSE: 58 BPM
VENTRICULAR RATE- MUSE: 71 BPM
WBC # BLD AUTO: 9.7 10E3/UL (ref 4–11)

## 2024-06-20 PROCEDURE — 250N000009 HC RX 250: Performed by: NURSE ANESTHETIST, CERTIFIED REGISTERED

## 2024-06-20 PROCEDURE — P9045 ALBUMIN (HUMAN), 5%, 250 ML: HCPCS | Mod: JZ | Performed by: NURSE ANESTHETIST, CERTIFIED REGISTERED

## 2024-06-20 PROCEDURE — 250N000013 HC RX MED GY IP 250 OP 250 PS 637: Performed by: PHYSICIAN ASSISTANT

## 2024-06-20 PROCEDURE — 88307 TISSUE EXAM BY PATHOLOGIST: CPT | Mod: 26 | Performed by: PATHOLOGY

## 2024-06-20 PROCEDURE — 88307 TISSUE EXAM BY PATHOLOGIST: CPT | Mod: TC | Performed by: ORTHOPAEDIC SURGERY

## 2024-06-20 PROCEDURE — 250N000011 HC RX IP 250 OP 636: Performed by: STUDENT IN AN ORGANIZED HEALTH CARE EDUCATION/TRAINING PROGRAM

## 2024-06-20 PROCEDURE — 250N000011 HC RX IP 250 OP 636: Mod: JZ | Performed by: NURSE ANESTHETIST, CERTIFIED REGISTERED

## 2024-06-20 PROCEDURE — 999N000141 HC STATISTIC PRE-PROCEDURE NURSING ASSESSMENT: Performed by: ORTHOPAEDIC SURGERY

## 2024-06-20 PROCEDURE — 27590 AMPUTATE LEG AT THIGH: CPT | Mod: RT | Performed by: ORTHOPAEDIC SURGERY

## 2024-06-20 PROCEDURE — 120N000002 HC R&B MED SURG/OB UMMC

## 2024-06-20 PROCEDURE — 36415 COLL VENOUS BLD VENIPUNCTURE: CPT

## 2024-06-20 PROCEDURE — 250N000011 HC RX IP 250 OP 636: Mod: JZ | Performed by: ANESTHESIOLOGY

## 2024-06-20 PROCEDURE — 710N000010 HC RECOVERY PHASE 1, LEVEL 2, PER MIN: Performed by: ORTHOPAEDIC SURGERY

## 2024-06-20 PROCEDURE — 250N000025 HC SEVOFLURANE, PER MIN: Performed by: ORTHOPAEDIC SURGERY

## 2024-06-20 PROCEDURE — 86140 C-REACTIVE PROTEIN: CPT

## 2024-06-20 PROCEDURE — 250N000011 HC RX IP 250 OP 636: Performed by: NURSE ANESTHETIST, CERTIFIED REGISTERED

## 2024-06-20 PROCEDURE — 250N000011 HC RX IP 250 OP 636: Mod: JZ | Performed by: PHYSICIAN ASSISTANT

## 2024-06-20 PROCEDURE — 370N000017 HC ANESTHESIA TECHNICAL FEE, PER MIN: Performed by: ORTHOPAEDIC SURGERY

## 2024-06-20 PROCEDURE — 360N000076 HC SURGERY LEVEL 3, PER MIN: Performed by: ORTHOPAEDIC SURGERY

## 2024-06-20 PROCEDURE — 99231 SBSQ HOSP IP/OBS SF/LOW 25: CPT | Mod: GC | Performed by: STUDENT IN AN ORGANIZED HEALTH CARE EDUCATION/TRAINING PROGRAM

## 2024-06-20 PROCEDURE — 0Y6C0Z3 DETACHMENT AT RIGHT UPPER LEG, LOW, OPEN APPROACH: ICD-10-PCS | Performed by: ORTHOPAEDIC SURGERY

## 2024-06-20 PROCEDURE — 250N000013 HC RX MED GY IP 250 OP 250 PS 637: Performed by: ANESTHESIOLOGY

## 2024-06-20 PROCEDURE — 250N000013 HC RX MED GY IP 250 OP 250 PS 637: Performed by: STUDENT IN AN ORGANIZED HEALTH CARE EDUCATION/TRAINING PROGRAM

## 2024-06-20 PROCEDURE — 27590 AMPUTATE LEG AT THIGH: CPT | Performed by: ANESTHESIOLOGY

## 2024-06-20 PROCEDURE — 80048 BASIC METABOLIC PNL TOTAL CA: CPT

## 2024-06-20 PROCEDURE — 258N000003 HC RX IP 258 OP 636: Mod: JZ | Performed by: STUDENT IN AN ORGANIZED HEALTH CARE EDUCATION/TRAINING PROGRAM

## 2024-06-20 PROCEDURE — 250N000011 HC RX IP 250 OP 636: Performed by: ORTHOPAEDIC SURGERY

## 2024-06-20 PROCEDURE — 85027 COMPLETE CBC AUTOMATED: CPT

## 2024-06-20 PROCEDURE — 27590 AMPUTATE LEG AT THIGH: CPT | Performed by: NURSE ANESTHETIST, CERTIFIED REGISTERED

## 2024-06-20 PROCEDURE — 258N000003 HC RX IP 258 OP 636: Mod: JZ | Performed by: NURSE ANESTHETIST, CERTIFIED REGISTERED

## 2024-06-20 PROCEDURE — 272N000001 HC OR GENERAL SUPPLY STERILE: Performed by: ORTHOPAEDIC SURGERY

## 2024-06-20 PROCEDURE — 97602 WOUND(S) CARE NON-SELECTIVE: CPT

## 2024-06-20 PROCEDURE — G0463 HOSPITAL OUTPT CLINIC VISIT: HCPCS | Mod: 25

## 2024-06-20 RX ORDER — FENTANYL CITRATE 50 UG/ML
25 INJECTION, SOLUTION INTRAMUSCULAR; INTRAVENOUS
Status: DISCONTINUED | OUTPATIENT
Start: 2024-06-20 | End: 2024-06-21 | Stop reason: HOSPADM

## 2024-06-20 RX ORDER — ONDANSETRON 2 MG/ML
INJECTION INTRAMUSCULAR; INTRAVENOUS PRN
Status: DISCONTINUED | OUTPATIENT
Start: 2024-06-20 | End: 2024-06-20

## 2024-06-20 RX ORDER — DEXAMETHASONE SODIUM PHOSPHATE 4 MG/ML
4 INJECTION, SOLUTION INTRA-ARTICULAR; INTRALESIONAL; INTRAMUSCULAR; INTRAVENOUS; SOFT TISSUE
Status: DISCONTINUED | OUTPATIENT
Start: 2024-06-20 | End: 2024-06-21

## 2024-06-20 RX ORDER — OXYCODONE HYDROCHLORIDE 5 MG/1
5 TABLET ORAL
Status: DISCONTINUED | OUTPATIENT
Start: 2024-06-20 | End: 2024-06-21 | Stop reason: HOSPADM

## 2024-06-20 RX ORDER — PROPOFOL 10 MG/ML
INJECTION, EMULSION INTRAVENOUS PRN
Status: DISCONTINUED | OUTPATIENT
Start: 2024-06-20 | End: 2024-06-20

## 2024-06-20 RX ORDER — HYDROMORPHONE HYDROCHLORIDE 1 MG/ML
0.5 INJECTION, SOLUTION INTRAMUSCULAR; INTRAVENOUS; SUBCUTANEOUS ONCE
Status: COMPLETED | OUTPATIENT
Start: 2024-06-20 | End: 2024-06-20

## 2024-06-20 RX ORDER — ONDANSETRON 4 MG/1
4 TABLET, ORALLY DISINTEGRATING ORAL EVERY 30 MIN PRN
Status: DISCONTINUED | OUTPATIENT
Start: 2024-06-20 | End: 2024-06-21 | Stop reason: HOSPADM

## 2024-06-20 RX ORDER — NALOXONE HYDROCHLORIDE 0.4 MG/ML
0.1 INJECTION, SOLUTION INTRAMUSCULAR; INTRAVENOUS; SUBCUTANEOUS
Status: DISCONTINUED | OUTPATIENT
Start: 2024-06-20 | End: 2024-06-21 | Stop reason: HOSPADM

## 2024-06-20 RX ORDER — HYDROMORPHONE HYDROCHLORIDE 1 MG/ML
0.2 INJECTION, SOLUTION INTRAMUSCULAR; INTRAVENOUS; SUBCUTANEOUS EVERY 5 MIN PRN
Status: DISCONTINUED | OUTPATIENT
Start: 2024-06-20 | End: 2024-06-21

## 2024-06-20 RX ORDER — VANCOMYCIN HYDROCHLORIDE 1 G/20ML
INJECTION, POWDER, LYOPHILIZED, FOR SOLUTION INTRAVENOUS PRN
Status: DISCONTINUED | OUTPATIENT
Start: 2024-06-20 | End: 2024-06-21 | Stop reason: HOSPADM

## 2024-06-20 RX ORDER — LIDOCAINE HYDROCHLORIDE 20 MG/ML
INJECTION, SOLUTION INFILTRATION; PERINEURAL PRN
Status: DISCONTINUED | OUTPATIENT
Start: 2024-06-20 | End: 2024-06-20

## 2024-06-20 RX ORDER — DEXAMETHASONE SODIUM PHOSPHATE 4 MG/ML
4 INJECTION, SOLUTION INTRA-ARTICULAR; INTRALESIONAL; INTRAMUSCULAR; INTRAVENOUS; SOFT TISSUE
Status: DISCONTINUED | OUTPATIENT
Start: 2024-06-20 | End: 2024-06-21 | Stop reason: HOSPADM

## 2024-06-20 RX ORDER — BUPIVACAINE HYDROCHLORIDE 5 MG/ML
INJECTION, SOLUTION PERINEURAL PRN
Status: DISCONTINUED | OUTPATIENT
Start: 2024-06-20 | End: 2024-06-21 | Stop reason: HOSPADM

## 2024-06-20 RX ORDER — FENTANYL CITRATE 50 UG/ML
25 INJECTION, SOLUTION INTRAMUSCULAR; INTRAVENOUS EVERY 5 MIN PRN
Status: DISCONTINUED | OUTPATIENT
Start: 2024-06-20 | End: 2024-06-21

## 2024-06-20 RX ORDER — SODIUM CHLORIDE, SODIUM LACTATE, POTASSIUM CHLORIDE, CALCIUM CHLORIDE 600; 310; 30; 20 MG/100ML; MG/100ML; MG/100ML; MG/100ML
INJECTION, SOLUTION INTRAVENOUS CONTINUOUS
Status: DISCONTINUED | OUTPATIENT
Start: 2024-06-20 | End: 2024-06-21

## 2024-06-20 RX ORDER — EPHEDRINE SULFATE 50 MG/ML
INJECTION, SOLUTION INTRAMUSCULAR; INTRAVENOUS; SUBCUTANEOUS PRN
Status: DISCONTINUED | OUTPATIENT
Start: 2024-06-20 | End: 2024-06-20

## 2024-06-20 RX ORDER — ONDANSETRON 2 MG/ML
4 INJECTION INTRAMUSCULAR; INTRAVENOUS EVERY 30 MIN PRN
Status: DISCONTINUED | OUTPATIENT
Start: 2024-06-20 | End: 2024-06-21

## 2024-06-20 RX ORDER — ONDANSETRON 2 MG/ML
4 INJECTION INTRAMUSCULAR; INTRAVENOUS EVERY 30 MIN PRN
Status: DISCONTINUED | OUTPATIENT
Start: 2024-06-20 | End: 2024-06-21 | Stop reason: HOSPADM

## 2024-06-20 RX ORDER — SODIUM CHLORIDE, SODIUM LACTATE, POTASSIUM CHLORIDE, CALCIUM CHLORIDE 600; 310; 30; 20 MG/100ML; MG/100ML; MG/100ML; MG/100ML
INJECTION, SOLUTION INTRAVENOUS CONTINUOUS PRN
Status: DISCONTINUED | OUTPATIENT
Start: 2024-06-20 | End: 2024-06-20

## 2024-06-20 RX ORDER — NALOXONE HYDROCHLORIDE 0.4 MG/ML
0.1 INJECTION, SOLUTION INTRAMUSCULAR; INTRAVENOUS; SUBCUTANEOUS
Status: DISCONTINUED | OUTPATIENT
Start: 2024-06-20 | End: 2024-06-21

## 2024-06-20 RX ORDER — ONDANSETRON 4 MG/1
4 TABLET, ORALLY DISINTEGRATING ORAL EVERY 30 MIN PRN
Status: DISCONTINUED | OUTPATIENT
Start: 2024-06-20 | End: 2024-06-21

## 2024-06-20 RX ORDER — CEFAZOLIN SODIUM/WATER 2 G/20 ML
2 SYRINGE (ML) INTRAVENOUS SEE ADMIN INSTRUCTIONS
Status: DISCONTINUED | OUTPATIENT
Start: 2024-06-20 | End: 2024-06-21 | Stop reason: HOSPADM

## 2024-06-20 RX ORDER — HYDROMORPHONE HYDROCHLORIDE 1 MG/ML
0.4 INJECTION, SOLUTION INTRAMUSCULAR; INTRAVENOUS; SUBCUTANEOUS EVERY 5 MIN PRN
Status: DISCONTINUED | OUTPATIENT
Start: 2024-06-20 | End: 2024-06-21

## 2024-06-20 RX ORDER — TRANEXAMIC ACID 10 MG/ML
1 INJECTION, SOLUTION INTRAVENOUS ONCE
Status: DISCONTINUED | OUTPATIENT
Start: 2024-06-20 | End: 2024-06-21 | Stop reason: HOSPADM

## 2024-06-20 RX ORDER — FENTANYL CITRATE 50 UG/ML
50 INJECTION, SOLUTION INTRAMUSCULAR; INTRAVENOUS EVERY 5 MIN PRN
Status: DISCONTINUED | OUTPATIENT
Start: 2024-06-20 | End: 2024-06-21

## 2024-06-20 RX ORDER — CEFAZOLIN SODIUM/WATER 2 G/20 ML
2 SYRINGE (ML) INTRAVENOUS
Status: DISCONTINUED | OUTPATIENT
Start: 2024-06-20 | End: 2024-06-21 | Stop reason: HOSPADM

## 2024-06-20 RX ORDER — TRANEXAMIC ACID 650 MG/1
1950 TABLET ORAL ONCE
Status: COMPLETED | OUTPATIENT
Start: 2024-06-20 | End: 2024-06-20

## 2024-06-20 RX ORDER — OXYCODONE HYDROCHLORIDE 10 MG/1
10 TABLET ORAL
Status: COMPLETED | OUTPATIENT
Start: 2024-06-20 | End: 2024-06-20

## 2024-06-20 RX ORDER — FENTANYL CITRATE 50 UG/ML
INJECTION, SOLUTION INTRAMUSCULAR; INTRAVENOUS PRN
Status: DISCONTINUED | OUTPATIENT
Start: 2024-06-20 | End: 2024-06-20

## 2024-06-20 RX ADMIN — HYDROMORPHONE HYDROCHLORIDE 0.4 MG: 1 INJECTION, SOLUTION INTRAMUSCULAR; INTRAVENOUS; SUBCUTANEOUS at 20:56

## 2024-06-20 RX ADMIN — VANCOMYCIN HYDROCHLORIDE 750 MG: 10 INJECTION, POWDER, LYOPHILIZED, FOR SOLUTION INTRAVENOUS at 09:25

## 2024-06-20 RX ADMIN — ACETAMINOPHEN 1000 MG: 500 TABLET ORAL at 22:55

## 2024-06-20 RX ADMIN — SODIUM CHLORIDE 1000 ML: 9 INJECTION, SOLUTION INTRAVENOUS at 15:11

## 2024-06-20 RX ADMIN — EPHEDRINE SULFATE 5 MG: 5 INJECTION INTRAVENOUS at 18:05

## 2024-06-20 RX ADMIN — EPHEDRINE SULFATE 5 MG: 5 INJECTION INTRAVENOUS at 18:18

## 2024-06-20 RX ADMIN — FENTANYL CITRATE 50 MCG: 50 INJECTION, SOLUTION INTRAMUSCULAR; INTRAVENOUS at 20:42

## 2024-06-20 RX ADMIN — TRANEXAMIC ACID 1950 MG: 650 TABLET ORAL at 16:04

## 2024-06-20 RX ADMIN — DULOXETINE HYDROCHLORIDE 60 MG: 60 CAPSULE, DELAYED RELEASE ORAL at 09:26

## 2024-06-20 RX ADMIN — BACLOFEN 10 MG: 10 TABLET ORAL at 13:11

## 2024-06-20 RX ADMIN — OXYCODONE HYDROCHLORIDE 10 MG: 10 TABLET ORAL at 22:55

## 2024-06-20 RX ADMIN — Medication 2 G: at 17:52

## 2024-06-20 RX ADMIN — OXYCODONE HYDROCHLORIDE 5 MG: 5 TABLET ORAL at 06:07

## 2024-06-20 RX ADMIN — ONDANSETRON 4 MG: 2 INJECTION INTRAMUSCULAR; INTRAVENOUS at 19:54

## 2024-06-20 RX ADMIN — HYDROMORPHONE HYDROCHLORIDE 0.25 MG: 1 INJECTION, SOLUTION INTRAMUSCULAR; INTRAVENOUS; SUBCUTANEOUS at 18:38

## 2024-06-20 RX ADMIN — HYDROMORPHONE HYDROCHLORIDE 0.4 MG: 1 INJECTION, SOLUTION INTRAMUSCULAR; INTRAVENOUS; SUBCUTANEOUS at 21:35

## 2024-06-20 RX ADMIN — ACETAMINOPHEN 1000 MG: 500 TABLET ORAL at 13:11

## 2024-06-20 RX ADMIN — EPHEDRINE SULFATE 5 MG: 5 INJECTION INTRAVENOUS at 17:53

## 2024-06-20 RX ADMIN — EPHEDRINE SULFATE 5 MG: 5 INJECTION INTRAVENOUS at 17:57

## 2024-06-20 RX ADMIN — SUGAMMADEX 150 MG: 100 INJECTION, SOLUTION INTRAVENOUS at 20:19

## 2024-06-20 RX ADMIN — HYDROMORPHONE HYDROCHLORIDE 0.4 MG: 1 INJECTION, SOLUTION INTRAMUSCULAR; INTRAVENOUS; SUBCUTANEOUS at 21:23

## 2024-06-20 RX ADMIN — ATORVASTATIN CALCIUM 40 MG: 40 TABLET, FILM COATED ORAL at 00:14

## 2024-06-20 RX ADMIN — Medication 500 MG: at 13:11

## 2024-06-20 RX ADMIN — PHENYLEPHRINE HYDROCHLORIDE 100 MCG: 10 INJECTION INTRAVENOUS at 17:52

## 2024-06-20 RX ADMIN — Medication 1 TABLET: at 09:26

## 2024-06-20 RX ADMIN — PROPOFOL 150 MG: 10 INJECTION, EMULSION INTRAVENOUS at 17:46

## 2024-06-20 RX ADMIN — PHENYLEPHRINE HYDROCHLORIDE 100 MCG: 10 INJECTION INTRAVENOUS at 17:58

## 2024-06-20 RX ADMIN — HYDROMORPHONE HYDROCHLORIDE 0.4 MG: 1 INJECTION, SOLUTION INTRAMUSCULAR; INTRAVENOUS; SUBCUTANEOUS at 21:12

## 2024-06-20 RX ADMIN — OXYCODONE HYDROCHLORIDE 5 MG: 5 TABLET ORAL at 10:22

## 2024-06-20 RX ADMIN — ALBUMIN HUMAN: 0.05 INJECTION, SOLUTION INTRAVENOUS at 19:20

## 2024-06-20 RX ADMIN — HYDROMORPHONE HYDROCHLORIDE 0.25 MG: 1 INJECTION, SOLUTION INTRAMUSCULAR; INTRAVENOUS; SUBCUTANEOUS at 19:54

## 2024-06-20 RX ADMIN — FENTANYL CITRATE 50 MCG: 50 INJECTION, SOLUTION INTRAMUSCULAR; INTRAVENOUS at 20:49

## 2024-06-20 RX ADMIN — BACLOFEN 10 MG: 10 TABLET ORAL at 00:14

## 2024-06-20 RX ADMIN — Medication 500 MG: at 09:26

## 2024-06-20 RX ADMIN — LIDOCAINE HYDROCHLORIDE 100 MG: 20 INJECTION, SOLUTION INFILTRATION; PERINEURAL at 17:44

## 2024-06-20 RX ADMIN — Medication 50 MG: at 17:47

## 2024-06-20 RX ADMIN — SODIUM CHLORIDE, POTASSIUM CHLORIDE, SODIUM LACTATE AND CALCIUM CHLORIDE: 600; 310; 30; 20 INJECTION, SOLUTION INTRAVENOUS at 17:44

## 2024-06-20 RX ADMIN — FENTANYL CITRATE 100 MCG: 50 INJECTION INTRAMUSCULAR; INTRAVENOUS at 17:44

## 2024-06-20 RX ADMIN — ACETAMINOPHEN 1000 MG: 500 TABLET ORAL at 06:07

## 2024-06-20 RX ADMIN — MIDAZOLAM 2 MG: 1 INJECTION INTRAMUSCULAR; INTRAVENOUS at 17:36

## 2024-06-20 RX ADMIN — PREGABALIN 300 MG: 75 CAPSULE ORAL at 09:39

## 2024-06-20 RX ADMIN — PHENYLEPHRINE HYDROCHLORIDE 100 MCG: 10 INJECTION INTRAVENOUS at 19:36

## 2024-06-20 RX ADMIN — HYDROMORPHONE HYDROCHLORIDE 0.4 MG: 1 INJECTION, SOLUTION INTRAMUSCULAR; INTRAVENOUS; SUBCUTANEOUS at 21:49

## 2024-06-20 RX ADMIN — PHENYLEPHRINE HYDROCHLORIDE 0.3 MCG/KG/MIN: 10 INJECTION INTRAVENOUS at 18:20

## 2024-06-20 RX ADMIN — CEFTRIAXONE SODIUM 1 G: 1 INJECTION, POWDER, FOR SOLUTION INTRAMUSCULAR; INTRAVENOUS at 20:10

## 2024-06-20 RX ADMIN — SODIUM CHLORIDE, SODIUM LACTATE, POTASSIUM CHLORIDE, CALCIUM CHLORIDE: 600; 310; 30; 20 INJECTION, SOLUTION INTRAVENOUS at 18:20

## 2024-06-20 RX ADMIN — BACLOFEN 10 MG: 10 TABLET ORAL at 09:26

## 2024-06-20 RX ADMIN — EPHEDRINE SULFATE 5 MG: 5 INJECTION INTRAVENOUS at 18:12

## 2024-06-20 RX ADMIN — HYDROMORPHONE HYDROCHLORIDE 0.5 MG: 1 INJECTION, SOLUTION INTRAMUSCULAR; INTRAVENOUS; SUBCUTANEOUS at 22:27

## 2024-06-20 RX ADMIN — OXYCODONE HYDROCHLORIDE 5 MG: 5 TABLET ORAL at 15:11

## 2024-06-20 ASSESSMENT — ACTIVITIES OF DAILY LIVING (ADL)
NUMBER_OF_TIMES_PATIENT_HAS_FALLEN_WITHIN_LAST_SIX_MONTHS: 1
DOING_ERRANDS_INDEPENDENTLY_DIFFICULTY: YES
ADLS_ACUITY_SCORE: 39
TOILETING: 2-->COMPLETELY DEPENDENT (NOT DEVELOPMENTALLY APPROPRIATE)
TOILETING_ASSISTANCE: TOILETING DIFFICULTY, REQUIRES EQUIPMENT
ADLS_ACUITY_SCORE: 39
ADLS_ACUITY_SCORE: 39
HEARING_DIFFICULTY_OR_DEAF: NO
ADLS_ACUITY_SCORE: 39
ADLS_ACUITY_SCORE: 33
ADLS_ACUITY_SCORE: 39
DRESSING/BATHING_DIFFICULTY: YES
ADLS_ACUITY_SCORE: 39
ADLS_ACUITY_SCORE: 39
ADLS_ACUITY_SCORE: 33
ADLS_ACUITY_SCORE: 39
DIFFICULTY_COMMUNICATING: NO
TOILETING_MANAGEMENT: COLOSTOMY
CHANGE_IN_FUNCTIONAL_STATUS_SINCE_ONSET_OF_CURRENT_ILLNESS/INJURY: NO
ADLS_ACUITY_SCORE: 39
WALKING_OR_CLIMBING_STAIRS: OTHER (SEE COMMENTS)
ADLS_ACUITY_SCORE: 44
EQUIPMENT_CURRENTLY_USED_AT_HOME: WHEELCHAIR, POWER
ADLS_ACUITY_SCORE: 44
ADLS_ACUITY_SCORE: 39
DRESSING/BATHING: BATHING DIFFICULTY, ASSISTANCE 1 PERSON;DRESSING DIFFICULTY, ASSISTANCE 1 PERSON
TOILETING_ISSUES: YES
ADLS_ACUITY_SCORE: 33
CONCENTRATING,_REMEMBERING_OR_MAKING_DECISIONS_DIFFICULTY: NO
WEAR_GLASSES_OR_BLIND: NO
TOILETING: 2-->COMPLETELY DEPENDENT
WALKING_OR_CLIMBING_STAIRS_DIFFICULTY: YES
ADLS_ACUITY_SCORE: 39
ADLS_ACUITY_SCORE: 33
ADLS_ACUITY_SCORE: 39
ADLS_ACUITY_SCORE: 33
DIFFICULTY_EATING/SWALLOWING: NO
FALL_HISTORY_WITHIN_LAST_SIX_MONTHS: YES

## 2024-06-20 NOTE — PLAN OF CARE
Goal Outcome Evaluation:      Plan of Care Reviewed With: patient    Overall Patient Progress: no changeOverall Patient Progress: no change    Outcome Evaluation: Arrived  at 6 med-surg at 2230. AxO  X 4. VSS. CHG bath completed for a AKA surgery .Colostomy and urostomy bags in place.  NPO after midnight

## 2024-06-20 NOTE — ANESTHESIA PROCEDURE NOTES
Airway       Patient location during procedure: OR       Procedure Start/Stop Times: 6/20/2024 5:49 PM  Staff -        CRNA: Dc Vargas APRN CRNA       Performed By: CRNA  Consent for Airway        Urgency: elective  Indications and Patient Condition       Indications for airway management: sandra-procedural       Induction type:intravenous       Mask difficulty assessment: 2 - vent by mask + OA or adjuvant +/- NMBA    Final Airway Details       Final airway type: endotracheal airway       Successful airway: ETT - single and Oral  Endotracheal Airway Details        ETT size (mm): 7.5       Cuffed: yes       Successful intubation technique: direct laryngoscopy       DL Blade Type: MAC 3       Grade View of Cords: 1       Adjucts: stylet       Position: Right       Measured from: gums/teeth       Secured at (cm): 24       Bite block used: None    Post intubation assessment        Placement verified by: capnometry, equal breath sounds and chest rise        Number of attempts at approach: 1       Secured with: tape       Ease of procedure: easy       Dentition: Intact and Unchanged    Medication(s) Administered   Medication Administration Time: 6/20/2024 5:49 PM
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
## 2024-06-20 NOTE — PROGRESS NOTES
I saw and examined this pleasant 59-year-old gentleman preoperatively today in the preop area at [facility]. Diagnosis, treatment plan, risks, benefits, and alternatives to surgery, and postop plan were all discussed in layman's terms.  The correct surgical site was marked with patient participation.  All questions were answered.

## 2024-06-20 NOTE — OR NURSING
Patient takes aspirin 325 mg as a home medication. His last dose was yesterday (6/19/24) at 0800. Dr Dey and Dr Jimenez notified.

## 2024-06-20 NOTE — PLAN OF CARE
Goal Outcome Evaluation:         Neuro: Pt is alert and oriented x4   Cardiac: bp- normotensive  Respiratory: room air   GI/: has a colostomy and urostomy  Diet/appetite: npo at midnight  Pain: c/o pain but refuses pain meds  Skin: chronic sacrum ulcer and rt lower extremity wound, chg bath done x2,  LDA's: LFA piv  Activities: bed-bound         Plan: For possible surgery, RT Aka today

## 2024-06-20 NOTE — PHARMACY-VANCOMYCIN DOSING SERVICE
Pharmacy Vancomycin Initial Note  Date of Service 2024  Patient's  1965  59 year old, adult    Indication: Skin and Soft Tissue Infection    Current estimated CrCl = Estimated Creatinine Clearance (based on SCr of 0.85 mg/dL)  Female: 52.7 mL/min  Male: 62 mL/min   (Paraplegic)    Creatinine for last 3 days  2024:  5:41 PM Creatinine 0.85 mg/dL;  5:41 PM Creatinine 0.85 mg/dL    Recent Vancomycin Level(s) for last 3 days  No results found for requested labs within last 3 days.      Vancomycin IV Administrations (past 72 hours)        No vancomycin orders with administrations in past 72 hours.                    Nephrotoxins and other renal medications (From now, onward)      Start     Dose/Rate Route Frequency Ordered Stop    24  vancomycin (VANCOCIN) 750 mg in sodium chloride 0.9 % 250 mL intermittent infusion         750 mg  over 90 Minutes Intravenous EVERY 12 HOURS 24              Contrast Orders - past 72 hours (72h ago, onward)      None            InsightRX Prediction of Planned Initial Vancomycin Regimen  Loading dose: N/A  Regimen: 750 mg IV every 12 hours.  Start time: 19:44 on 2024  Exposure target: AUC24 (range)400-600 mg/L.hr   AUC24,ss: 539 mg/L.hr  Probability of AUC24 > 400: 81 %  Ctrough,ss: 17 mg/L  Probability of Ctrough,ss > 20: 35 %  Probability of nephrotoxicity (Lodise GIBSON ): 13 %        Plan:  Start vancomycin 750 mg IV q12h.   Vancomycin monitoring method: AUC  Vancomycin therapeutic monitoring goal: 400-600 mg*h/L  Pharmacy will check vancomycin levels as appropriate in 1-3 Days.    Serum creatinine levels will be ordered daily for the first week of therapy and at least twice weekly for subsequent weeks.      Vamshi Romero [facility]

## 2024-06-20 NOTE — PLAN OF CARE
Shift 0700 to 1602 with pt:    Goal Outcome Evaluation:      Plan of Care Reviewed With: patient    Overall Patient Progress: no change    Outcome Evaluation: Plan is for pt to have R AKA today. Pt left unit for preop at 1602 via transport in bed.    Pt A&Ox4, denies SOB, CP, nausea, vomiting, diarrhea, constipation, numbness, and tingling. Pt endorses pain, managed with PRN Tylenol x1 and PRN Oxycodone x2 this shift. Ax1 for cares, W/C bound, good bed mobility. NPO since midnight for R AKA surgery. Skin intact except for L AKA, healed; RLE wound on toe, calf, and ischial tuberosity; chronic sacral wound. Colostomy and urostomy bags intact, patient manages both. Pre op checklist performed this shift, CHG bath done x2 by previous shift.    Pt currently off unit for surgery.

## 2024-06-20 NOTE — PROGRESS NOTES
"[facility]   BRIEF PROGRESS NOTE    SUBJECTIVE  Transferred to from  following admit.   Pt notes he has quit smoking for the last 3 months! He is looking forward to surgery tomorrow, though notes that he still has time to back out. He thinks removing his leg will be a good thing given poor healing and it gets in the way of his mobility. He is glad to be on the [facility] service. Notes that he likes his new primary care doctor. Says that oxycodone doesn't work very well for him, and he is hoping the team will switch him to hydromorphone, which has worked well for him in the past.     OBJECTIVE:  BP (!) 140/69 (BP Location: Left arm, Patient Position: Supine)   Pulse 99   Temp 98.6  F (37  C) (Oral)   Resp 16   Ht 1.803 m (5' 11\")   Wt 48 kg (105 lb 13.1 oz)   SpO2 98%   BMI 14.76 kg/m      Exam:  General: Alert and oriented, in no acute distress.  Skin: Wounds bandaged on atrophic right leg.   Respiratory: No respiratory distress, no accessory muscle use.  Cardiac: RRR  Psychiatric: Mood and affect appear normal.         ASSESSMENT/PLAN:  NPO at midnight for orthopedic surgery procedure tomorrow. Day team to assess for post-surgical pain regimen needs.     Please see daily rounding note for full A/P.  A. Brandy Juarez MD  6:19 AM    "

## 2024-06-21 ENCOUNTER — APPOINTMENT (OUTPATIENT)
Dept: PHYSICAL THERAPY | Facility: CLINIC | Age: 59
DRG: 935 | End: 2024-06-21
Payer: MEDICARE

## 2024-06-21 LAB
ANION GAP SERPL CALCULATED.3IONS-SCNC: 10 MMOL/L (ref 7–15)
ANTIBODY ID: NORMAL
BUN SERPL-MCNC: 25.4 MG/DL (ref 8–23)
CALCIUM SERPL-MCNC: 9.3 MG/DL (ref 8.8–10.2)
CHLORIDE SERPL-SCNC: 104 MMOL/L (ref 98–107)
CREAT SERPL-MCNC: 0.84 MG/DL (ref 0.51–1.17)
CREAT SERPL-MCNC: 0.84 MG/DL (ref 0.51–1.17)
DEPRECATED HCO3 PLAS-SCNC: 26 MMOL/L (ref 22–29)
EGFRCR SERPLBLD CKD-EPI 2021: >90 ML/MIN/1.73M2
EGFRCR SERPLBLD CKD-EPI 2021: >90 ML/MIN/1.73M2
ERYTHROCYTE [DISTWIDTH] IN BLOOD BY AUTOMATED COUNT: 12.3 % (ref 10–15)
GLUCOSE SERPL-MCNC: 107 MG/DL (ref 70–99)
HCT VFR BLD AUTO: 32.8 % (ref 35–53)
HGB BLD-MCNC: 10.3 G/DL (ref 13.3–17.7)
MCH RBC QN AUTO: 28.5 PG (ref 26.5–33)
MCHC RBC AUTO-ENTMCNC: 31.4 G/DL (ref 31.5–36.5)
MCV RBC AUTO: 91 FL (ref 78–100)
PLATELET # BLD AUTO: 266 10E3/UL (ref 150–450)
POTASSIUM SERPL-SCNC: 4.3 MMOL/L (ref 3.4–5.3)
RBC # BLD AUTO: 3.62 10E6/UL (ref 3.8–5.9)
SODIUM SERPL-SCNC: 140 MMOL/L (ref 135–145)
VANCOMYCIN SERPL-MCNC: 6.9 UG/ML
WBC # BLD AUTO: 9.3 10E3/UL (ref 4–11)

## 2024-06-21 PROCEDURE — 250N000011 HC RX IP 250 OP 636: Mod: JZ | Performed by: ANESTHESIOLOGY

## 2024-06-21 PROCEDURE — 999N000127 HC STATISTIC PERIPHERAL IV START W US GUIDANCE

## 2024-06-21 PROCEDURE — 258N000003 HC RX IP 258 OP 636: Mod: JZ | Performed by: ANESTHESIOLOGY

## 2024-06-21 PROCEDURE — 250N000011 HC RX IP 250 OP 636: Mod: JZ

## 2024-06-21 PROCEDURE — 80048 BASIC METABOLIC PNL TOTAL CA: CPT

## 2024-06-21 PROCEDURE — 80202 ASSAY OF VANCOMYCIN: CPT | Performed by: STUDENT IN AN ORGANIZED HEALTH CARE EDUCATION/TRAINING PROGRAM

## 2024-06-21 PROCEDURE — 999N000007 HC SITE CHECK

## 2024-06-21 PROCEDURE — 99231 SBSQ HOSP IP/OBS SF/LOW 25: CPT | Mod: GC | Performed by: STUDENT IN AN ORGANIZED HEALTH CARE EDUCATION/TRAINING PROGRAM

## 2024-06-21 PROCEDURE — 250N000011 HC RX IP 250 OP 636: Mod: JZ | Performed by: STUDENT IN AN ORGANIZED HEALTH CARE EDUCATION/TRAINING PROGRAM

## 2024-06-21 PROCEDURE — 250N000013 HC RX MED GY IP 250 OP 250 PS 637: Performed by: PHYSICIAN ASSISTANT

## 2024-06-21 PROCEDURE — 250N000013 HC RX MED GY IP 250 OP 250 PS 637

## 2024-06-21 PROCEDURE — 97530 THERAPEUTIC ACTIVITIES: CPT | Mod: GP

## 2024-06-21 PROCEDURE — 97161 PT EVAL LOW COMPLEX 20 MIN: CPT | Mod: GP

## 2024-06-21 PROCEDURE — 85027 COMPLETE CBC AUTOMATED: CPT

## 2024-06-21 PROCEDURE — 999N000040 HC STATISTIC CONSULT NO CHARGE VASC ACCESS

## 2024-06-21 PROCEDURE — 36415 COLL VENOUS BLD VENIPUNCTURE: CPT | Performed by: STUDENT IN AN ORGANIZED HEALTH CARE EDUCATION/TRAINING PROGRAM

## 2024-06-21 PROCEDURE — 120N000002 HC R&B MED SURG/OB UMMC

## 2024-06-21 PROCEDURE — 250N000013 HC RX MED GY IP 250 OP 250 PS 637: Performed by: STUDENT IN AN ORGANIZED HEALTH CARE EDUCATION/TRAINING PROGRAM

## 2024-06-21 RX ORDER — NALOXONE HYDROCHLORIDE 0.4 MG/ML
0.4 INJECTION, SOLUTION INTRAMUSCULAR; INTRAVENOUS; SUBCUTANEOUS
Status: DISCONTINUED | OUTPATIENT
Start: 2024-06-21 | End: 2024-06-22 | Stop reason: HOSPADM

## 2024-06-21 RX ORDER — HYDROMORPHONE HCL IN WATER/PF 6 MG/30 ML
0.4 PATIENT CONTROLLED ANALGESIA SYRINGE INTRAVENOUS 2 TIMES DAILY PRN
Status: DISCONTINUED | OUTPATIENT
Start: 2024-06-21 | End: 2024-06-22 | Stop reason: HOSPADM

## 2024-06-21 RX ORDER — NALOXONE HYDROCHLORIDE 0.4 MG/ML
0.2 INJECTION, SOLUTION INTRAMUSCULAR; INTRAVENOUS; SUBCUTANEOUS
Status: DISCONTINUED | OUTPATIENT
Start: 2024-06-21 | End: 2024-06-22 | Stop reason: HOSPADM

## 2024-06-21 RX ORDER — CEFAZOLIN SODIUM 2 G/100ML
2 INJECTION, SOLUTION INTRAVENOUS EVERY 8 HOURS
Status: DISCONTINUED | OUTPATIENT
Start: 2024-06-22 | End: 2024-06-22

## 2024-06-21 RX ORDER — TIZANIDINE 2 MG/1
4 TABLET ORAL EVERY 8 HOURS PRN
COMMUNITY

## 2024-06-21 RX ORDER — LOPERAMIDE HCL 2 MG
2 CAPSULE ORAL 3 TIMES DAILY PRN
COMMUNITY
End: 2024-07-23

## 2024-06-21 RX ORDER — OXYCODONE HYDROCHLORIDE 5 MG/1
10 TABLET ORAL EVERY 4 HOURS PRN
Status: DISCONTINUED | OUTPATIENT
Start: 2024-06-21 | End: 2024-06-22 | Stop reason: HOSPADM

## 2024-06-21 RX ORDER — ENOXAPARIN SODIUM 100 MG/ML
30 INJECTION SUBCUTANEOUS EVERY 24 HOURS
Status: DISCONTINUED | OUTPATIENT
Start: 2024-06-21 | End: 2024-06-22 | Stop reason: HOSPADM

## 2024-06-21 RX ADMIN — OXYCODONE HYDROCHLORIDE 10 MG: 5 TABLET ORAL at 08:35

## 2024-06-21 RX ADMIN — Medication 1 TABLET: at 08:35

## 2024-06-21 RX ADMIN — Medication 500 MG: at 17:38

## 2024-06-21 RX ADMIN — ACETAMINOPHEN 1000 MG: 500 TABLET ORAL at 17:44

## 2024-06-21 RX ADMIN — ASPIRIN 325 MG ORAL TABLET 325 MG: 325 PILL ORAL at 08:36

## 2024-06-21 RX ADMIN — Medication 500 MG: at 13:27

## 2024-06-21 RX ADMIN — BACLOFEN 10 MG: 10 TABLET ORAL at 08:32

## 2024-06-21 RX ADMIN — DULOXETINE HYDROCHLORIDE 60 MG: 60 CAPSULE, DELAYED RELEASE ORAL at 08:32

## 2024-06-21 RX ADMIN — ACETAMINOPHEN 1000 MG: 500 TABLET ORAL at 03:25

## 2024-06-21 RX ADMIN — TRAZODONE HYDROCHLORIDE 100 MG: 100 TABLET ORAL at 22:43

## 2024-06-21 RX ADMIN — ACETAMINOPHEN 1000 MG: 500 TABLET ORAL at 10:11

## 2024-06-21 RX ADMIN — PREGABALIN 300 MG: 75 CAPSULE ORAL at 19:46

## 2024-06-21 RX ADMIN — OXYCODONE HYDROCHLORIDE 10 MG: 5 TABLET ORAL at 13:27

## 2024-06-21 RX ADMIN — HYDROMORPHONE HYDROCHLORIDE 0.4 MG: 0.2 INJECTION, SOLUTION INTRAMUSCULAR; INTRAVENOUS; SUBCUTANEOUS at 10:11

## 2024-06-21 RX ADMIN — ATORVASTATIN CALCIUM 40 MG: 40 TABLET, FILM COATED ORAL at 19:46

## 2024-06-21 RX ADMIN — Medication 500 MG: at 19:46

## 2024-06-21 RX ADMIN — BACLOFEN 10 MG: 10 TABLET ORAL at 19:46

## 2024-06-21 RX ADMIN — PREGABALIN 300 MG: 75 CAPSULE ORAL at 08:34

## 2024-06-21 RX ADMIN — VARENICLINE 1 MG: 0.5 TABLET, FILM COATED ORAL at 08:36

## 2024-06-21 RX ADMIN — BACLOFEN 10 MG: 10 TABLET ORAL at 13:27

## 2024-06-21 RX ADMIN — ENOXAPARIN SODIUM 30 MG: 30 INJECTION SUBCUTANEOUS at 08:36

## 2024-06-21 RX ADMIN — HYDROMORPHONE HYDROCHLORIDE 0.4 MG: 0.2 INJECTION, SOLUTION INTRAMUSCULAR; INTRAVENOUS; SUBCUTANEOUS at 22:43

## 2024-06-21 RX ADMIN — OXYCODONE HYDROCHLORIDE 10 MG: 5 TABLET ORAL at 17:38

## 2024-06-21 RX ADMIN — Medication 500 MG: at 08:34

## 2024-06-21 RX ADMIN — VARENICLINE 1 MG: 0.5 TABLET, FILM COATED ORAL at 19:46

## 2024-06-21 RX ADMIN — OXYCODONE HYDROCHLORIDE 10 MG: 5 TABLET ORAL at 03:24

## 2024-06-21 RX ADMIN — OXYCODONE HYDROCHLORIDE 10 MG: 5 TABLET ORAL at 21:33

## 2024-06-21 RX ADMIN — TRAZODONE HYDROCHLORIDE 100 MG: 100 TABLET ORAL at 03:23

## 2024-06-21 RX ADMIN — BACLOFEN 10 MG: 10 TABLET ORAL at 17:38

## 2024-06-21 RX ADMIN — SODIUM CHLORIDE, POTASSIUM CHLORIDE, SODIUM LACTATE AND CALCIUM CHLORIDE: 600; 310; 30; 20 INJECTION, SOLUTION INTRAVENOUS at 03:28

## 2024-06-21 RX ADMIN — SODIUM CHLORIDE, POTASSIUM CHLORIDE, SODIUM LACTATE AND CALCIUM CHLORIDE: 600; 310; 30; 20 INJECTION, SOLUTION INTRAVENOUS at 03:56

## 2024-06-21 RX ADMIN — HYDROMORPHONE HYDROCHLORIDE 0.4 MG: 1 INJECTION, SOLUTION INTRAMUSCULAR; INTRAVENOUS; SUBCUTANEOUS at 04:39

## 2024-06-21 ASSESSMENT — ACTIVITIES OF DAILY LIVING (ADL)
ADLS_ACUITY_SCORE: 43
ADLS_ACUITY_SCORE: 40
ADLS_ACUITY_SCORE: 44
ADLS_ACUITY_SCORE: 40
ADLS_ACUITY_SCORE: 43
ADLS_ACUITY_SCORE: 44
ADLS_ACUITY_SCORE: 43
ADLS_ACUITY_SCORE: 44
ADLS_ACUITY_SCORE: 44
ADLS_ACUITY_SCORE: 43
ADLS_ACUITY_SCORE: 43
ADLS_ACUITY_SCORE: 44
ADLS_ACUITY_SCORE: 43
ADLS_ACUITY_SCORE: 40
ADLS_ACUITY_SCORE: 40
ADLS_ACUITY_SCORE: 43
ADLS_ACUITY_SCORE: 43
ADLS_ACUITY_SCORE: 44
ADLS_ACUITY_SCORE: 43
ADLS_ACUITY_SCORE: 43
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
## 2024-06-21 NOTE — PHARMACY-ADMISSION MEDICATION HISTORY
Pharmacist Admission Medication History    Admission medication history is complete. The information provided in this note is only as accurate as the sources available at the time of the update.    Information Source(s): Facility ([facility] medication list/MAR via phone    Pertinent Information:   Medication list received from [facility] home did not include last doses    Changes made to PTA medication list:  Added: loperamide prn, tizanidine   Deleted: Augmentin, atorvastatin, betamethsone ointment, doxycycline, multivitamin, white petrolatum   Changed: None    Allergies reviewed with patient and updates made in EHR: unable to assess    Medication History Completed By: Kaitlin Hayes [facility] 6/21/2024 4:30 PM    PTA Med List   Medication Sig Last Dose    ACETAMINOPHEN EXTRA STRENGTH 500 MG tablet Take 500 mg by mouth every 6 hours as needed for mild pain Unknown    aspirin (ASA) 325 MG tablet TAKE 1 TABLET BY MOUTH EVERY MORNING Unknown    baclofen (LIORESAL) 10 MG tablet TAKE 1 TABLET BY MOUTH FOUR TIMES DAILY  *1 TOTAL FILL* Unknown    Bismuth Subsalicylate 525 MG/15ML SUSP Take 30 mLs by mouth every 4 hours as needed (GI upset) Unknown    calcium carbonate-vitamin D (OSCAL) 500-5 MG-MCG tablet TAKE 1 TABLET BY MOUTH THREE TIMES DAILY WITH MEALS *1 TOTAL FILL* Unknown    cholecalciferol (VITAMIN D3) 10 mcg (400 units) TABS tablet Take 1 tablet (10 mcg) by mouth daily Unknown    DULoxetine (CYMBALTA) 60 MG capsule Take 1 capsule (60 mg) by mouth daily Unknown    loperamide (IMODIUM) 2 MG capsule Take 2 mg by mouth 3 times daily as needed for diarrhea Unknown    loperamide (IMODIUM) 2 MG capsule Take 2 capsules (4 mg) by mouth at bedtime. May also take 2 capsules (4 mg) 3 times daily as needed for diarrhea. Do all this for 360 days. Unknown    pregabalin (LYRICA) 300 MG capsule TAKE 1 CAPSULE BY MOUTH TWICE DAILY  *3 TOTAL FILLS* Unknown    tiZANidine (ZANAFLEX) 2 MG tablet Take 4 mg by mouth every 8 hours as needed  for muscle spasms Unknown    traZODone (DESYREL) 100 MG tablet Take 1 tablet (100 mg) by mouth at bedtime Unknown    varenicline (CHANTIX) 1 MG tablet Take 1 tablet (1 mg) by mouth 2 times daily Unknown    vitamin C (ASCORBIC ACID) 500 MG tablet TAKE 1 TABLET BY MOUTH FOUR TIMES DAILY Unknown

## 2024-06-21 NOTE — PLAN OF CARE
Physical Therapy Discharge Summary    Reason for therapy discharge:    All goals and outcomes met, no further needs identified.    Progress towards therapy goal(s). See goals on Care Plan in Epic electronic health record for goal details.  Goals met    Therapy recommendation(s):    No further therapy is recommended. Pt ind w/ home proning program already, declines the need for further therapies as well, transferring well.
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
## 2024-06-21 NOTE — PHARMACY-VANCOMYCIN DOSING SERVICE
Pharmacy Vancomycin Note  Date of Service 2024  Patient's  1965   59 year old, adult    Indication: Skin and Soft Tissue Infection  Day of Therapy: 3  Current vancomycin regimen:  750 mg IV q12h  Current vancomycin monitoring method: AUC  Current vancomycin therapeutic monitoring goal: 400-600 mg*h/L    InsightRX Prediction of Current Vancomycin Regimen  Loading dose: N/A  Regimen: 750 mg IV every 12 hours.  Exposure target: AUC24 (range)400-600 mg/L.hr   AUC24,ss: 536 mg/L.hr  Probability of AUC24 > 400: 97 %  Ctrough,ss: 16.8 mg/L  Probability of Ctrough,ss > 20: 19 %  Probability of nephrotoxicity (Lodise GIBSON ): 12 %    Current estimated CrCl = Estimated Creatinine Clearance (based on SCr of 0.84 mg/dL)  Female: 53.3 mL/min  Male: 62.7 mL/min    Creatinine for last 3 days  2024:  5:41 PM Creatinine 0.85 mg/dL;  5:41 PM Creatinine 0.85 mg/dL  2024:  8:33 AM Creatinine 0.72 mg/dL  2024:  6:25 AM Creatinine 0.84 mg/dL;  6:25 AM Creatinine 0.84 mg/dL    Recent Vancomycin Levels (past 3 days)  2024:  6:25 AM Vancomycin 6.9 ug/mL    Vancomycin IV Administrations (past 72 hours)                     vancomycin (VANCOCIN) 750 mg in sodium chloride 0.9 % 250 mL intermittent infusion (mg) 750 mg New Bag 24 0925     750 mg New Bag 24                    Nephrotoxins and other renal medications (From now, onward)      None               Contrast Orders - past 72 hours (72h ago, onward)      None            Interpretation of levels and current regimen:  Vancomycin level is reflective of -600    Has serum creatinine changed greater than 50% in last 72 hours: No    Urine output:  good urine output    Renal Function: Stable    Plan:  Continue Current Dose, however vancomycin is no longer needed this morning & order was discontinued. Wrote this note to reference if patient ever goes on vanco again in the future. Of note, the patient did miss a dose of vanco last night  due to being in PACU? So the level is a 21 hour level & not ideal for accuracy.     Yancy Capellan, TristenD, BCPS

## 2024-06-21 NOTE — ANESTHESIA POSTPROCEDURE EVALUATION
Patient: Parth Fountain    Procedure: Procedure(s):  AMPUTATION, ABOVE KNEE       Anesthesia Type:  General    Note:  Disposition: Inpatient   Postop Pain Control: Uneventful            Sign Out: Well controlled pain   PONV: No   Neuro/Psych: Uneventful            Sign Out: Acceptable/Baseline neuro status   Airway/Respiratory: Uneventful            Sign Out: Acceptable/Baseline resp. status   CV/Hemodynamics: Uneventful            Sign Out: Acceptable CV status; No obvious hypovolemia; No obvious fluid overload   Other NRE: NONE   DID A NON-ROUTINE EVENT OCCUR?            Last vitals:  Vitals Value Taken Time   /81 06/20/24 2115   Temp 36.9  C (98.4  F) 06/20/24 2255   Pulse 98 06/20/24 2141   Resp 15 06/20/24 2142   SpO2 91 % 06/20/24 2346   Vitals shown include unfiled device data.    Electronically Signed By: Geovanny Dey MD  June 20, 2024  11:47 PM

## 2024-06-21 NOTE — PROGRESS NOTES
Orthopaedic Surgery Progress Note 06/21/2024    E: No acute events overnight.    S: Pain well controlled. Plan of care reviewed and questions answered    O:  Temp: 98.8  F (37.1  C) Temp src: Oral BP: (!) 146/71 Pulse: 78   Resp: 18 SpO2: 98 % O2 Device: None (Room air) Oxygen Delivery: 6 LPM    Exam:  Gen: No acute distress, resting comfortably in bed.  Resp: Non-labored breathing    MSK:    RLE  Dressing c/d/I      Recent Labs   Lab 06/21/24  0625 06/20/24  0833 06/19/24  1741   WBC 9.3 9.7 13.4*   HGB 10.3* 11.5* 12.1*    330 398       Assessment: Parth Fountain is a 59 year old s/p RLE AKA on 6/20 with Dr. Jimenez. Doing well.      Plan:  Medicine Primary  Activity: NWB RLE  Antibiotics: Ancef x 24 hours.  Diet: Begin with clear fluids and progress diet as tolerated.  DVT prophylaxis: per primary   Wound Care: keep dressing c/d/i  Pain management: transition from IV to orals as tolerated.   Physical Therapy:  ROM, ADL's.  Occupational Therapy: ADL's.  Labs: Trend Hgb on POD #1, 2, 3  Follow-up: Dr. Jimenez in 2 weeks for wound check     Disposition: Pending progress with therapies, pain control on orals, and medical stability      Galo Vera MD 06/21/2024  Orthopaedic Surgery Resident, PGY4

## 2024-06-21 NOTE — PROGRESS NOTES
"  CLINICAL NUTRITION SERVICES - BRIEF NOTE     Nutrition Prescription      Malnutrition Status:    Does not qualify for malnutrition dx    Recommendations already ordered by Registered Dietitian (RD):  - Requested pt be weighed with zeroed bed to confirm accurate wt.  - No intervention needed at present aside from brief verbal ed below.     Future/Additional Recommendations:  - Follow po intake, check updated weight when available.   - Will need to update estimated needs with new weight if pt still here Monday.       NUTRITION HISTORY   Pt reports he's lived at his current  for about 5 years and says they feed him well and he really likes the food there.      EVALUATION OF THE PROGRESS TOWARD GOALS   Diet: regular  Intake: Pt feels he's eating pretty well although he's feeling a little bloated and hopes he doesn't have a bowel obstruction.  He is passing stooling and gas.  Did have chili for lunch with beans.  He may have started eating solid foods too quickly.         NEW FINDINGS   Bear confirms that he was originally 5'11\" although based on his arm length he would have to be extremely long in the torso for this to be accurate.  He has a muscular upper body--arms, upper torso from using a manual w/c in past years.  He    Pt doesn't believe the admission wt of 48 kg (105 lb 13.1 oz).     MALNUTRITION  % Intake: none noted  % Weight Loss: per discussion w/ pt, not likely  Subcutaneous Fat Loss: none noted  Muscle Loss: none noted  Fluid Accumulation/Edema:none noted  Malnutrition Diagnosis: Does not qualify for malnutrition dx     INTERVENTIONS  Education--discussed variable weights and heights.  Encouraged po intake but to chew foods well and stick with lower fiber foods with potential ileus.   Requested updated weight.     Monitoring/Evaluation  Progress toward goals will be monitored and evaluated per protocol.     Kandace Coelho RD, LD   6 & 8 Med/Surg RD  Mon-Fri [facility] contact: By name, or \"6 [OR] 8 Med " "Surg Clinical Dietitian\"  Weekend RD Vocera: \"Weekend Holiday Clinical Dietitian\"        "

## 2024-06-21 NOTE — CONSULTS
"Consult received for Vascular access care.  See LDA for details.  For additional needs place \"Nursing to Consult for Vascular Access\" [id number] order in EPIC.   "
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
## 2024-06-21 NOTE — PLAN OF CARE
Goal Outcome Evaluation:      Overall Patient Progress: no change    Pt been sleeping on/off since back to the unit from PACU at around 0015. Getting prn pain meds. Right AKA ace-wrapped. LR running at 100 ml/hr. Pt has colostomy and urostomy. Pleasant/cooperative/able to make needs known. Nursing is monitoring and assisting as needed    Emma Cosme RN

## 2024-06-21 NOTE — ANESTHESIA CARE TRANSFER NOTE
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    Anesthesia Type:   General     Note:    Oropharynx: oropharynx clear of all foreign objects and spontaneously breathing  Level of Consciousness: awake  Oxygen Supplementation: face mask  Level of Supplemental Oxygen (L/min / FiO2): 6  Independent Airway: airway patency satisfactory and stable  Dentition: dentition unchanged  Vital Signs Stable: post-procedure vital signs reviewed and stable  Report to RN Given: handoff report given  Patient transferred to: PACU    Handoff Report: Identifed the Patient, Identified the Reponsible Provider, Reviewed the pertinent medical history, Discussed the surgical course, Reviewed Intra-OP anesthesia mangement and issues during anesthesia, Set expectations for post-procedure period and Allowed opportunity for questions and acknowledgement of understanding      Vitals:  Vitals Value Taken Time   /81 06/20/24 2037   Temp 37.1    Pulse 88 06/20/24 2044   Resp 25 06/20/24 2044   SpO2 100 % 06/20/24 2044   Vitals shown include unfiled device data.    Electronically Signed By: LIDYA Galaviz CRNA  June 20, 2024  8:45 PM

## 2024-06-21 NOTE — PLAN OF CARE
"Goal Outcome Evaluation:      Plan of Care Reviewed With: patient    Overall Patient Progress: no change    Outcome Evaluation: Patient A&O x4. Able to make needs known and uses call light appropriately. Patient has not gotten outof bed this shift. Colostomy and urostomy in place. Asked patient if writer could change colostomy bag multiple times and he refused, states \"if he wants to change it he will do it himself\". Left supplies within reach if he chooses to do so. Wounds to sacral area changed. R & L AKA. Pain managed by prn oxycodone, dilaudid and tylenol. No acute events this shift. Continue with plan of care.      "

## 2024-06-21 NOTE — BRIEF OP NOTE
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  Complications: None.  Implants: * No implants in log *    Pain: Per Primary  Activity: Non weight bearing RLE.   Wound care: Dressing change POD#3   Abx: Ancef/clindamycin x 24 hours post op for surgical ppx  Diet: Clear liquids and ADAT  DVT ppx: Per Primary  Disposition: Medicine primary    Rduy Jarrett MD   PGY2 - Orthopaedic Surgery

## 2024-06-22 VITALS
DIASTOLIC BLOOD PRESSURE: 73 MMHG | BODY MASS INDEX: 14.81 KG/M2 | TEMPERATURE: 99.2 F | RESPIRATION RATE: 16 BRPM | HEIGHT: 71 IN | OXYGEN SATURATION: 95 % | WEIGHT: 105.82 LBS | HEART RATE: 77 BPM | SYSTOLIC BLOOD PRESSURE: 142 MMHG

## 2024-06-22 LAB
ANION GAP SERPL CALCULATED.3IONS-SCNC: 6 MMOL/L (ref 7–15)
BUN SERPL-MCNC: 20.9 MG/DL (ref 8–23)
CALCIUM SERPL-MCNC: 8.9 MG/DL (ref 8.8–10.2)
CHLORIDE SERPL-SCNC: 107 MMOL/L (ref 98–107)
CREAT SERPL-MCNC: 0.7 MG/DL (ref 0.51–1.17)
CRP SERPL-MCNC: 83.11 MG/L
DEPRECATED HCO3 PLAS-SCNC: 26 MMOL/L (ref 22–29)
EGFRCR SERPLBLD CKD-EPI 2021: >90 ML/MIN/1.73M2
ERYTHROCYTE [DISTWIDTH] IN BLOOD BY AUTOMATED COUNT: 12.4 % (ref 10–15)
GLUCOSE SERPL-MCNC: 88 MG/DL (ref 70–99)
HCT VFR BLD AUTO: 33.1 % (ref 35–53)
HGB BLD-MCNC: 10.8 G/DL (ref 13.3–17.7)
MCH RBC QN AUTO: 29.4 PG (ref 26.5–33)
MCHC RBC AUTO-ENTMCNC: 32.6 G/DL (ref 31.5–36.5)
MCV RBC AUTO: 90 FL (ref 78–100)
PLATELET # BLD AUTO: 291 10E3/UL (ref 150–450)
POTASSIUM SERPL-SCNC: 4.2 MMOL/L (ref 3.4–5.3)
RBC # BLD AUTO: 3.67 10E6/UL (ref 3.8–5.9)
SODIUM SERPL-SCNC: 139 MMOL/L (ref 135–145)
WBC # BLD AUTO: 9.5 10E3/UL (ref 4–11)

## 2024-06-22 PROCEDURE — 99238 HOSP IP/OBS DSCHRG MGMT 30/<: CPT | Mod: GC | Performed by: STUDENT IN AN ORGANIZED HEALTH CARE EDUCATION/TRAINING PROGRAM

## 2024-06-22 PROCEDURE — 36415 COLL VENOUS BLD VENIPUNCTURE: CPT

## 2024-06-22 PROCEDURE — 85027 COMPLETE CBC AUTOMATED: CPT

## 2024-06-22 PROCEDURE — 250N000013 HC RX MED GY IP 250 OP 250 PS 637

## 2024-06-22 PROCEDURE — 80048 BASIC METABOLIC PNL TOTAL CA: CPT

## 2024-06-22 PROCEDURE — 86140 C-REACTIVE PROTEIN: CPT

## 2024-06-22 PROCEDURE — 250N000011 HC RX IP 250 OP 636: Mod: JZ

## 2024-06-22 PROCEDURE — 999N000111 HC STATISTIC OT IP EVAL DEFER

## 2024-06-22 RX ORDER — MULTIPLE VITAMINS W/ MINERALS TAB 9MG-400MCG
1 TAB ORAL EVERY MORNING
Qty: 60 TABLET | Refills: 2 | Status: SHIPPED | OUTPATIENT
Start: 2024-06-22

## 2024-06-22 RX ORDER — ATORVASTATIN CALCIUM 40 MG/1
40 TABLET, FILM COATED ORAL EVERY EVENING
Qty: 30 TABLET | Refills: 3 | Status: SHIPPED | OUTPATIENT
Start: 2024-06-22

## 2024-06-22 RX ORDER — OXYCODONE HYDROCHLORIDE 5 MG/1
5-10 TABLET ORAL 3 TIMES DAILY PRN
Qty: 15 TABLET | Refills: 0 | Status: SHIPPED | OUTPATIENT
Start: 2024-06-22

## 2024-06-22 RX ADMIN — VARENICLINE 1 MG: 0.5 TABLET, FILM COATED ORAL at 09:05

## 2024-06-22 RX ADMIN — Medication 500 MG: at 09:06

## 2024-06-22 RX ADMIN — ACETAMINOPHEN 1000 MG: 500 TABLET ORAL at 00:10

## 2024-06-22 RX ADMIN — ENOXAPARIN SODIUM 30 MG: 30 INJECTION SUBCUTANEOUS at 09:06

## 2024-06-22 RX ADMIN — ACETAMINOPHEN 1000 MG: 500 TABLET ORAL at 09:06

## 2024-06-22 RX ADMIN — OXYCODONE HYDROCHLORIDE 10 MG: 5 TABLET ORAL at 09:07

## 2024-06-22 RX ADMIN — Medication 1 TABLET: at 09:06

## 2024-06-22 RX ADMIN — DULOXETINE HYDROCHLORIDE 60 MG: 60 CAPSULE, DELAYED RELEASE ORAL at 09:06

## 2024-06-22 RX ADMIN — BACLOFEN 10 MG: 10 TABLET ORAL at 09:05

## 2024-06-22 RX ADMIN — PREGABALIN 300 MG: 75 CAPSULE ORAL at 09:06

## 2024-06-22 RX ADMIN — Medication 1 TABLET: at 09:05

## 2024-06-22 ASSESSMENT — ACTIVITIES OF DAILY LIVING (ADL)
ADLS_ACUITY_SCORE: 40

## 2024-06-22 NOTE — CONSULTS
Care Management Initial Consult    General Information  Assessment completed with: Patient, Other,    Type of CM/SW Visit: Initial Assessment    Primary Care Provider verified and updated as needed: Yes   Readmission within the last 30 days:        Reason for Consult: discharge planning  Advance Care Planning: Advance Care Planning Reviewed: no concerns identified          Communication Assessment  Patient's communication style: spoken language (English or Bilingual)    Hearing Difficulty or Deaf: no   Wear Glasses or Blind: no    Cognitive  Cognitive/Neuro/Behavioral: .WDL except, mood/behavior           Mood/Behavior: hyperactive (agitated, impulsive) (tells stories)          Living Environment:   People in home: facility resident     Current living Arrangements:        Able to return to prior arrangements:         Family/Social Support:  Care provided by:    Provides care for:    Marital Status: Single             Description of Support System:           Current Resources:   Patient receiving home care services:       Community Resources:    Equipment currently used at home: wheelchair, power, wheelchair, manual (urinary and bowel ostomy bag so no BR or commode needs)  Supplies currently used at home:      Employment/Financial:  Employment Status: disabled        Financial Concerns:             Does the patient's insurance plan have a 3 day qualifying hospital stay waiver?  No    Lifestyle & Psychosocial Needs:  Social Determinants of Health     Food Insecurity: Not on file   Depression: Not at risk (1/2/2024)    PHQ-2     PHQ-2 Score: 0   Housing Stability: Not on file   Tobacco Use: Medium Risk (6/19/2024)    Patient History     Smoking Tobacco Use: Former     Smokeless Tobacco Use: Never     Passive Exposure: Not on file   Financial Resource Strain: High Risk (12/23/2021)    Received from [facility], [facility]    Financial Resource Strain      Difficulty of Paying Living Expenses: Not on file     Difficulty of Paying Living Expenses: Not on file   Alcohol Use: Not on file   Transportation Needs: Not on file   Physical Activity: Not on file   Interpersonal Safety: Low Risk  (1/2/2024)    Interpersonal Safety     Do you feel physically and emotionally safe where you currently live?: Yes     Within the past 12 months, have you been hit, slapped, kicked or otherwise physically hurt by someone?: No     Within the past 12 months, have you been humiliated or emotionally abused in other ways by your partner or ex-partner?: No   Stress: Not on file   Social Connections: Unknown (12/23/2021)    Received from [facility], [facility]    Social Connections     Frequency of Communication with Friends and Family: Not on file   Health Literacy: Not on file       Functional Status:  Prior to admission patient needed assistance:              Mental Health Status:          Chemical Dependency Status:                Values/Beliefs:  Spiritual, Cultural Beliefs, Muslim Practices, Values that affect care:                 Additional Information:  CM met with patient, introduced myself and role as a .  CM verified demographics, PCP, and emergency contacts.     Patient lives at [facility] #[phone]. There are no stairs to enter the home and no additional stairs once inside. The bedroom and bathroom are on the same floor. Patient describes themself as independent- A1 for ADLs/ IDLs + DME WC power and manual PTA. PT rec return home and patient agreeable with disposition. CM discussed adding on PT/OT services to his [facility] RN services (wounds) patient agreeable. Patient doesn't have his WC at bedside, so is agreeable to stretcher ride home.     JASWANT called  and spoke with coordinate who agreed for patient to return back home today before 3:00 pm. JASWANT informed  that [facility] will be back in place with the addition of PT/OT. CM verified with  liaison that patient is IND-A1 PTA and that they can support patient with mobility needs.  liasion also asked all new medications be filled at hospital and sent with patient along with AVS.   CM faxed discharge orders to  #[phone]    CM called  Transport to coordinate BLS ride between 12-2pm. PCS form completed.     Anticipated Discharge: [facility]  + [facility] PT/OT/RN-wound    Barriers to Discharge : None    Care Coordinate Next Steps:     Community Resources:     Transport: BLS stretcher       Dalia Bautista RN BSN  Lisa RN Care Coordinator   Covering Unit [phone]  Phone [phone]      SEARCHABLE in [facility] - search CARE COORDINATOR       [place] ([phone]) Saturday & Sunday; [phone])  Recognized Holidays     Units: 5A Onc 5201 - 5219 RNCC,  5A Onc 5220 thru 5240 RNCC, 5C OFFSERVICE [phone] RNCC & 5C OFF SERVICE [phone] RNCC     Units: 6B Vocera, 6C Card 6401 thru 6420 RNCC, 6C Card 6502 thru 6514 RNCC & 6C Card 6515 thru 6519 RNCC     Units: 7A SOT RNCC Vocera, 7B Med Surg Vocera, 7C Med Surg 7401 thru 7418 RNCC & 7C Med Surg 7502 thru 7521 RNCC     Units: 6A Vocera & 4A CVICU Vocera, 4C MICU Vocera, and 4E SICU Vocera       Units: 5 Ortho Vocera & 5 Med Surg Vocera      Units: 6 Med Surg Vocera & 8 Med Surg Vocera

## 2024-06-22 NOTE — PLAN OF CARE
Occupational Therapy: Orders received. Chart reviewed and discussed with care team.? Occupational Therapy not indicated due to pt near baseline with ADL/mobility and has 24/7 assist at his group home. Met with pt bedside, he endorses no concern with ADL. Pt does not use toilet, uses manual wc for roll-in shower. Pt reports staff can assist with ADL/IADL as needed at home. Per PT, pt at or even above baseline for transfers and mobility in power wc. Plan for HH PT/OT for home safety eval and ongoing strength/endurance concerns.? Defer discharge recommendations to physical therapy, medical.? Will complete orders.

## 2024-06-22 NOTE — PROGRESS NOTES
6MS DISCHARGE    D: Patient discharged to group home at 12:50 PM. Patient accompanied by EMS transport.    I: Discharge prescriptions given to patient. All discharge medications and instructions reviewed with patient. Patient instructed to seek care if experiencing worsening symptoms. Other phone numbers to call with questions or concerns after discharge reviewed. Education completed.    A: Patient verbalized understanding of discharge medications and instructions. Prescribed home medications given to patient.  Belongings returned to patient.    P: Patient to follow-up within 7 days of discharge with primary provider.
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
## 2024-06-22 NOTE — PROGRESS NOTES
Orthopaedic Surgery Progress Note 06/22/2024    E: No acute events overnight per nursing notes     S: resting comfortably this morning, did not awaken    O:  Temp: 99.7  F (37.6  C) Temp src: Oral BP: 123/65 Pulse: 78   Resp: 17 SpO2: 98 % O2 Device: None (Room air)      Exam:  Gen: No acute distress, resting comfortably in bed.  Resp: Non-labored breathing    MSK:    RLE  Dressing c/d/I      Recent Labs   Lab 06/22/24  0611 06/21/24  0625 06/20/24  0833   WBC 9.5 9.3 9.7   HGB 10.8* 10.3* 11.5*    266 330       Assessment: Parth Fountain is a 59 year old s/p RLE AKA on 6/20 with Dr. Jimenez. Doing well.      Plan:  Medicine Primary  Activity: NWB RLE  Antibiotics: Ancef x 24 hours.  Diet: Begin with clear fluids and progress diet as tolerated.  DVT prophylaxis: per primary   Wound Care: keep dressing c/d/i  Pain management: transition from IV to orals as tolerated.   Physical Therapy:  ROM, ADL's.  Occupational Therapy: ADL's.  Labs: Trend Hgb on POD #1, 2, 3  Follow-up: Dr. Jimenez in 2 weeks for wound check     Disposition: Pending progress with therapies, pain control on orals, and medical stability anticipate discharge to home pod 2-4    Orthopedics will continue to follow this patient peripherally, please do not hesitate to reach out with any questions or concerns.       Galo Vera MD 06/22/2024  Orthopaedic Surgery Resident, PGY4

## 2024-06-22 NOTE — PLAN OF CARE
"Goal Outcome Evaluation:  /65 (BP Location: Right arm)   Pulse 78   Temp 99.7  F (37.6  C) (Oral)   Resp 17   Ht 1.803 m (5' 11\")   Wt 48 kg (105 lb 13.1 oz)   SpO2 98%   BMI 14.76 kg/m        Plan of Care Reviewed With: patient    Overall Patient Progress: no changeOverall Patient Progress: no change    Outcome Evaluation: Pt is A&Ox4, able to make needs known, on room air, pleasant and cooperative.  Urostomy and colostomy intact, offered to assist pt with emptying colostomy, pt stated he manages himself. Dressing to sacrum intact, ace wrap to RLE amputation site intact, no drainage noted. Med for Right amputation site 7-8/10 with prn dilaudid and oxycodone, pt stated he was hungry brought turkey sandwich, walter crackers, ginger ale and OJ. Right arm PIV sluggish, paged flyer for new IV. Disposition to home TBD, continue plan of care     "

## 2024-06-22 NOTE — PLAN OF CARE
"  Problem: Infection  Goal: Absence of Infection Signs and Symptoms  Intervention: Prevent or Manage Infection  Recent Flowsheet Documentation  Taken 6/22/2024 0224 by Corey Calderon, RN  Isolation Precautions: contact precautions maintained     Problem: Surgery Nonspecified  Goal: Absence of Bleeding  Outcome: Progressing  Goal: Effective Bowel Elimination  Outcome: Progressing  Goal: Absence of Infection Signs and Symptoms  Outcome: Progressing  Intervention: Prevent or Manage Infection  Recent Flowsheet Documentation  Taken 6/22/2024 0224 by Corey Calderon, RN  Isolation Precautions: contact precautions maintained  Goal: Optimal Pain Control and Function  Outcome: Progressing  Goal: Effective Urinary Elimination  Outcome: Progressing  Goal: Effective Oxygenation and Ventilation  Outcome: Progressing    VS:  Temp: 99.7  F (37.6  C) Temp src: Oral BP: 123/65 Pulse: 78   Resp: 17 SpO2: 98 % O2 Device: None (Room air)     O2: SpO2 > 98 and stable on RA. LS clear and equal bilaterally. Denies chest pain and SOB.    Output: urostomy   Last BM: colostomy   Activity: On bed   bilateral AMPUTEE   Skin: Wound on the bottom   Pain: Pain managed with acetaminophen, oxycodone, dilaudid   CMS: AOx4.  Hyperactive - tells stories   Dressing:  Mepilex on the bottom  Ace wrap on R stump   Diet: Combination Regular diet.    LDA: No PIV access.   Equipment: IV pole, personal belongings, monitor   Plan: Contact precautions maintained / Continue with plan of care. Call light within reach, pt able to make needs known.   Plan: post op cares   Additional Info: POD2 R AKA   Patient self manages colostomy and urostomy    0000 offered helped in colostomy care, but patient refused     0130 offered helped in colostomy care - \"I told you already Corey\"     0224 updated the [place] doctors about not having an IV access - will decide in AM if needs an IV or will switch to oral                                "

## 2024-06-24 ENCOUNTER — TELEPHONE (OUTPATIENT)
Dept: ORTHOPEDICS | Facility: CLINIC | Age: 59
End: 2024-06-24
Payer: MEDICARE

## 2024-06-24 NOTE — TELEPHONE ENCOUNTER
----- Message from Eduardo JONES sent at 6/24/2024 11:19 AM CDT -----  Regarding: RE: f/u  July 5th with Barbara, in either of those JOANIE slots on hold by Carol.  Make it a 40 minute new, please.    Toni  ----- Message -----  From: Kim Anne  Sent: 6/24/2024  10:03 AM CDT  To: Marleen Woo RN; Eduardo Hills, ATC  Subject: FW: f/u                                          Jak velázquez,    Where is a good place to schedule this patient?    Thank you,    Kim  ----- Message -----  From: Galo Vera MD  Sent: 6/21/2024   7:18 AM CDT  To: Clinic Coordinators-Ortho-Sports-  Subject: f/u                                              Please schedule follow up for this patient with Dr. Jimenez in 2 weeks for wound check

## 2024-06-24 NOTE — TELEPHONE ENCOUNTER
Patient confirmed scheduled appointment:  Date: 7/5  Time: 10:40 ok roverto Muñoz  Visit type: New  Provider: Dr. Jimenez

## 2024-06-25 LAB
Lab: NORMAL
PERFORMING LABORATORY: NORMAL
SCANNED LAB RESULT: NORMAL
TEST NAME: NORMAL

## 2024-06-26 ENCOUNTER — MEDICAL CORRESPONDENCE (OUTPATIENT)
Dept: HEALTH INFORMATION MANAGEMENT | Facility: CLINIC | Age: 59
End: 2024-06-26
Payer: MEDICARE

## 2024-07-03 ENCOUNTER — MEDICAL CORRESPONDENCE (OUTPATIENT)
Dept: HEALTH INFORMATION MANAGEMENT | Facility: CLINIC | Age: 59
End: 2024-07-03
Payer: MEDICARE

## 2024-07-03 NOTE — TELEPHONE ENCOUNTER
Action July 11, 2024 12:48 PM MT   Action Taken Sent a request for imaging from [facility].       DIAGNOSIS: 2 week wound check - S/P RIGHT ABOVE KNEE AMPUTATION   APPOINTMENT DATE: 07/05/2024   NOTES STATUS DETAILS   OFFICE NOTE from other specialist Internal    DISCHARGE SUMMARY from hospital Internal 6/19/2024 - 6/22/2024 (3 days)  [facility]   OPERATIVE REPORT Internal 06/20/2024 - (RIGHT) AMPUTATION, ABOVE KNEE   PHOTOGRAPHS Internal    MRI PACS Internal   CT SCAN PACS [facility]:  04/27/2024 - Abd/Pel    [facility]:  11/02/2023 - Abd/Pel    [facility]:  02/09/2023 - Angio Abd/Pel/Bilateral Leg Runoff   XRAYS (IMAGES & REPORTS) PACS Internal

## 2024-07-05 ENCOUNTER — TELEPHONE (OUTPATIENT)
Dept: ORTHOPEDICS | Facility: CLINIC | Age: 59
End: 2024-07-05

## 2024-07-05 ENCOUNTER — PRE VISIT (OUTPATIENT)
Dept: ORTHOPEDICS | Facility: CLINIC | Age: 59
End: 2024-07-05

## 2024-07-05 NOTE — TELEPHONE ENCOUNTER
ATC called patient due to missed visit on 7/5/24, left detailed VM requesting c/b to clinic to reschedule post op visit to 7/12/24 at 3:40pm where we have a 20 minute return/post op slot on JOANIE hold.    Central scheduling, if patient calls back, please reschedule 7/5/24 visit to 7/12/24 at 3:40p, this is approved by clinic staff.      Eduardo Hills MS, ATC, LAT, MANDI  [facility]  Dr. Jose F Jimenez

## 2024-07-10 ENCOUNTER — MEDICAL CORRESPONDENCE (OUTPATIENT)
Dept: HEALTH INFORMATION MANAGEMENT | Facility: CLINIC | Age: 59
End: 2024-07-10
Payer: MEDICARE

## 2024-07-12 ENCOUNTER — OFFICE VISIT (OUTPATIENT)
Dept: ORTHOPEDICS | Facility: CLINIC | Age: 59
End: 2024-07-12
Payer: MEDICARE

## 2024-07-12 DIAGNOSIS — Z89.611 S/P AKA (ABOVE KNEE AMPUTATION) BILATERAL (H): Primary | ICD-10-CM

## 2024-07-12 DIAGNOSIS — Z89.612 S/P AKA (ABOVE KNEE AMPUTATION) BILATERAL (H): Primary | ICD-10-CM

## 2024-07-12 PROCEDURE — 99024 POSTOP FOLLOW-UP VISIT: CPT | Performed by: ORTHOPAEDIC SURGERY
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
## 2024-07-12 NOTE — NURSING NOTE
Reason For Visit:   Chief Complaint   Patient presents with    RECHECK     Patient returns to clinic 3w 1d s/p right AKA.  No fevers/chills/sweats since surgery.       There were no vitals taken for this visit.    Pain Assessment  Patient Currently in Pain: Yes  0-10 Pain Scale: 6  Primary Pain Location: Back    Eduardo Hills, ESTEVAN
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
## 2024-07-22 DIAGNOSIS — T14.8XXA OPEN WOUND: ICD-10-CM

## 2024-07-22 DIAGNOSIS — G47.00 INSOMNIA, UNSPECIFIED TYPE: ICD-10-CM

## 2024-07-22 DIAGNOSIS — M79.2 NEUROPATHIC PAIN: ICD-10-CM

## 2024-07-22 RX ORDER — OMEGA-3S/DHA/EPA/FISH OIL/D3 300MG-1000
1 CAPSULE ORAL DAILY
Qty: 30 TABLET | Refills: 4 | Status: SHIPPED | OUTPATIENT
Start: 2024-07-22

## 2024-07-22 RX ORDER — TRAZODONE HYDROCHLORIDE 100 MG/1
100 TABLET ORAL AT BEDTIME
Qty: 30 TABLET | Refills: 4 | Status: SHIPPED | OUTPATIENT
Start: 2024-07-22

## 2024-07-22 RX ORDER — DULOXETIN HYDROCHLORIDE 60 MG/1
60 CAPSULE, DELAYED RELEASE ORAL DAILY
Qty: 30 CAPSULE | Refills: 4 | Status: SHIPPED | OUTPATIENT
Start: 2024-07-22

## 2024-07-22 NOTE — TELEPHONE ENCOUNTER
"Request for medication refill:    cholecalciferol (VITAMIN D3) 10 mcg (400 units) TABS tablet     DULoxetine (CYMBALTA) 60 MG capsule     traZODone (DESYREL) 100 MG tablet    Providers if patient needs an appointment and you are willing to give a one month supply please refill for one month and  send a letter/MyChart using \".SMILLIMITEDREFILL\" .smillimited and route chart to \"P [facility] \" (Giving one month refill in non controlled medications is strongly recommended before denial)    If refill has been denied, meaning absolutely no refills without visit, please complete the smart phrase \".smirxrefuse\" and route it to the \"P SMI MED REFILLS\"  pool to inform the patient and the pharmacy.    Laine Hammond MA      "

## 2024-07-23 ENCOUNTER — OFFICE VISIT (OUTPATIENT)
Dept: FAMILY MEDICINE | Facility: CLINIC | Age: 59
End: 2024-07-23
Payer: MEDICARE

## 2024-07-23 VITALS
DIASTOLIC BLOOD PRESSURE: 84 MMHG | SYSTOLIC BLOOD PRESSURE: 155 MMHG | OXYGEN SATURATION: 99 % | TEMPERATURE: 98.4 F | RESPIRATION RATE: 14 BRPM | HEART RATE: 87 BPM

## 2024-07-23 DIAGNOSIS — Z91.89 AT RISK OF UTI: ICD-10-CM

## 2024-07-23 DIAGNOSIS — L97.919 ULCER OF RIGHT LOWER EXTREMITY, UNSPECIFIED ULCER STAGE (H): ICD-10-CM

## 2024-07-23 DIAGNOSIS — Z89.612 S/P AKA (ABOVE KNEE AMPUTATION) BILATERAL (H): ICD-10-CM

## 2024-07-23 DIAGNOSIS — Z89.611 S/P AKA (ABOVE KNEE AMPUTATION) BILATERAL (H): ICD-10-CM

## 2024-07-23 DIAGNOSIS — Z23 NEED FOR PNEUMOCOCCAL VACCINATION: ICD-10-CM

## 2024-07-23 DIAGNOSIS — D50.9 IRON DEFICIENCY ANEMIA, UNSPECIFIED IRON DEFICIENCY ANEMIA TYPE: ICD-10-CM

## 2024-07-23 DIAGNOSIS — Z23 NEED FOR SHINGLES VACCINE: ICD-10-CM

## 2024-07-23 DIAGNOSIS — Z71.6 ENCOUNTER FOR SMOKING CESSATION COUNSELING: ICD-10-CM

## 2024-07-23 DIAGNOSIS — Z23 COVID-19 VACCINE ADMINISTERED: ICD-10-CM

## 2024-07-23 DIAGNOSIS — Z92.89 HOSPITALIZATION OR HEALTH CARE FACILITY ADMISSION WITHIN LAST 6 MONTHS: Primary | ICD-10-CM

## 2024-07-23 LAB
ALBUMIN UR-MCNC: NEGATIVE MG/DL
APPEARANCE UR: ABNORMAL
BACTERIA #/AREA URNS HPF: ABNORMAL /HPF
BILIRUB UR QL STRIP: NEGATIVE
COLOR UR AUTO: YELLOW
ERYTHROCYTE [DISTWIDTH] IN BLOOD BY AUTOMATED COUNT: 13.5 % (ref 10–15)
GLUCOSE UR STRIP-MCNC: NEGATIVE MG/DL
HCT VFR BLD AUTO: 40.1 % (ref 35–53)
HGB BLD-MCNC: 12.6 G/DL (ref 11.7–17.7)
HGB UR QL STRIP: NEGATIVE
HOLD SPECIMEN: NORMAL
KETONES UR STRIP-MCNC: NEGATIVE MG/DL
LEUKOCYTE ESTERASE UR QL STRIP: ABNORMAL
MCH RBC QN AUTO: 28.2 PG (ref 26.5–33)
MCHC RBC AUTO-ENTMCNC: 31.4 G/DL (ref 31.5–36.5)
MCV RBC AUTO: 90 FL (ref 78–100)
NITRATE UR QL: POSITIVE
PH UR STRIP: 7 [PH] (ref 5–8)
PLATELET # BLD AUTO: 307 10E3/UL (ref 150–450)
RBC # BLD AUTO: 4.47 10E6/UL (ref 3.8–5.9)
RBC #/AREA URNS AUTO: ABNORMAL /HPF
SP GR UR STRIP: 1.02 (ref 1–1.03)
SQUAMOUS #/AREA URNS AUTO: ABNORMAL /LPF
UROBILINOGEN UR STRIP-ACNC: 0.2 E.U./DL
WBC # BLD AUTO: 7.8 10E3/UL (ref 4–11)
WBC #/AREA URNS AUTO: ABNORMAL /HPF

## 2024-07-23 PROCEDURE — 90480 ADMN SARSCOV2 VAC 1/ONLY CMP: CPT

## 2024-07-23 PROCEDURE — G0009 ADMIN PNEUMOCOCCAL VACCINE: HCPCS

## 2024-07-23 PROCEDURE — 99214 OFFICE O/P EST MOD 30 MIN: CPT | Mod: 25

## 2024-07-23 PROCEDURE — 87086 URINE CULTURE/COLONY COUNT: CPT

## 2024-07-23 PROCEDURE — 36415 COLL VENOUS BLD VENIPUNCTURE: CPT

## 2024-07-23 PROCEDURE — 90677 PCV20 VACCINE IM: CPT

## 2024-07-23 PROCEDURE — 86140 C-REACTIVE PROTEIN: CPT

## 2024-07-23 PROCEDURE — 81001 URINALYSIS AUTO W/SCOPE: CPT

## 2024-07-23 PROCEDURE — 82728 ASSAY OF FERRITIN: CPT

## 2024-07-23 PROCEDURE — 85027 COMPLETE CBC AUTOMATED: CPT

## 2024-07-23 PROCEDURE — 83540 ASSAY OF IRON: CPT

## 2024-07-23 PROCEDURE — 91320 SARSCV2 VAC 30MCG TRS-SUC IM: CPT

## 2024-07-23 PROCEDURE — 83550 IRON BINDING TEST: CPT

## 2024-07-23 RX ORDER — CALCIUM CARBONATE 500(1250)
500 TABLET ORAL 3 TIMES DAILY
COMMUNITY
Start: 2024-01-29 | End: 2024-07-23

## 2024-07-23 RX ORDER — OXYCODONE HYDROCHLORIDE 5 MG/1
5 TABLET ORAL EVERY 6 HOURS PRN
Qty: 10 TABLET | Refills: 0 | Status: SHIPPED | OUTPATIENT
Start: 2024-07-23

## 2024-07-23 RX ORDER — VARENICLINE TARTRATE 1 MG/1
1 TABLET, FILM COATED ORAL 2 TIMES DAILY
Qty: 180 TABLET | Refills: 1 | Status: SHIPPED | OUTPATIENT
Start: 2024-07-23

## 2024-07-23 RX ORDER — CALCIUM CARBONATE 500 MG/1
1-2 TABLET, CHEWABLE ORAL 3 TIMES DAILY PRN
COMMUNITY
Start: 2024-04-29

## 2024-07-23 ASSESSMENT — PATIENT HEALTH QUESTIONNAIRE - PHQ9
SUM OF ALL RESPONSES TO PHQ QUESTIONS 1-9: 4
10. IF YOU CHECKED OFF ANY PROBLEMS, HOW DIFFICULT HAVE THESE PROBLEMS MADE IT FOR YOU TO DO YOUR WORK, TAKE CARE OF THINGS AT HOME, OR GET ALONG WITH OTHER PEOPLE: SOMEWHAT DIFFICULT
SUM OF ALL RESPONSES TO PHQ QUESTIONS 1-9: 4
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
## 2024-07-23 NOTE — PATIENT INSTRUCTIONS
Patient Education   Here is the plan from today's visit    1. Hospitalization or health care facility admission within last 6 months  2. Iron deficiency anemia, unspecified iron deficiency anemia type  3. S/P AKA (above knee amputation) bilateral (H)  We are going to follow up lab work from your hospital visit to make you are getting better. If hemoglobin is low and lab test say you can use some iron, we will start that again  - CBC with Platelets and Reflex to Iron Studies; Future  - CRP inflammation; Future    4. Encounter for smoking cessation counseling  Refill today.   - varenicline (CHANTIX) 1 MG tablet; Take 1 tablet (1 mg) by mouth 2 times daily  Dispense: 180 tablet; Refill: 1    6. Need for shingles vaccine  - zoster vaccine recombinant adjuvanted (SHINGRIX) injection; Inject 0.5 mLs into the muscle once for 1 dose Pharmacist administered  Dispense: 0.5 mL; Refill: 0    7. Ulcer of right lower extremity, unspecified ulcer stage (H)  Please use sparingly for dressing changes  - oxyCODONE (ROXICODONE) 5 MG tablet; Take 1 tablet (5 mg) by mouth every 6 hours as needed for severe pain  Dispense: 10 tablet; Refill: 0    8. At risk of UTI  We are going to check your urine and your white count and inflammatory markers to see if you have a true infection. If this comes up, I will discuss with you the next steps for close follow up on Mychart.   - CRP inflammation; Future  - UA Macroscopic with reflex to Microscopic and Culture; Future    9. Need for pneumococcal vaccination  - PNEUMOCOCCAL 20 VALENT CONJUGATE (PREVNAR 20)    10. COVID-19 vaccine administered  - COVID-19 12+ (2023-24) (PFIZER)    Please call or return to clinic if your symptoms don't go away.    Follow up plan  Return in about 4 weeks (around 8/20/2024), or if symptoms worsen or fail to improve, for Follow up.    Thank you for coming to [facility] today.  Lab Testing:  **If you had lab testing today and your results are reassuring or normal  they will be mailed to you or sent through [employer] within 7 days.   **If the lab tests need quick action we will call you with the results.  **If you are having labs done on a different day, please call [phone] to schedule at [facility] or [phone] for other [facility] locations. Labs do not offer walk-in appointments.  The phone number we will call with results is # [phone] (home) . If this is not the best number please call our clinic and change the number.  Medication Refills:  If you need any refills please call your pharmacy and they will contact us.   If you need to  your refill at a new pharmacy, please contact the new pharmacy directly. The new pharmacy will help you get your medications transferred faster.   Scheduling:  If you have any concerns about today's visit or wish to schedule another appointment please call our office during normal business hours [phone] (8-5:00 M-F). If you can no longer make a scheduled visit, please cancel via [employer] or call us to cancel.   If a referral was made to an [facility] specialty provider and you do not get a call from central scheduling, please refer to directions on your visit summary or call our office during normal business hours for assistance.   If a Mammogram was ordered for you at the [facility] call [phone] to schedule or change your appointment.  If you had an XRay/CT/Ultrasound/MRI ordered the number is [phone] to schedule or change your radiology appointment.   [facility] has limited ultrasound appointments available on Wednesdays, if you would like your ultrasound at [facility], please call [phone] to schedule.   Medical Concerns:  If you have urgent medical concerns please call [phone] at any time of the day.    Boaz Mosley MD

## 2024-07-24 ENCOUNTER — HOSPITAL ENCOUNTER (OUTPATIENT)
Dept: WOUND CARE | Facility: CLINIC | Age: 59
Discharge: HOME | End: 2024-07-24
Attending: REGISTERED NURSE | Admitting: REGISTERED NURSE
Payer: MEDICARE

## 2024-07-24 ENCOUNTER — MEDICAL CORRESPONDENCE (OUTPATIENT)
Dept: HEALTH INFORMATION MANAGEMENT | Facility: CLINIC | Age: 59
End: 2024-07-24

## 2024-07-24 VITALS — HEART RATE: 89 BPM | SYSTOLIC BLOOD PRESSURE: 148 MMHG | DIASTOLIC BLOOD PRESSURE: 76 MMHG | TEMPERATURE: 98.9 F

## 2024-07-24 DIAGNOSIS — L89.324 PRESSURE INJURY OF LEFT ISCHIUM, STAGE 4 (H): Chronic | ICD-10-CM

## 2024-07-24 DIAGNOSIS — R19.7 DIARRHEA, UNSPECIFIED TYPE: ICD-10-CM

## 2024-07-24 DIAGNOSIS — L98.492: Primary | ICD-10-CM

## 2024-07-24 DIAGNOSIS — L89.314 PRESSURE INJURY OF RIGHT ISCHIUM, STAGE 4 (H): Chronic | ICD-10-CM

## 2024-07-24 PROBLEM — M86.69: Status: RESOLVED | Noted: 2024-06-19 | Resolved: 2024-07-24

## 2024-07-24 PROBLEM — L97.913: Status: RESOLVED | Noted: 2024-06-19 | Resolved: 2024-07-24

## 2024-07-24 PROBLEM — L97.512 ULCER OF RIGHT FOOT WITH FAT LAYER EXPOSED (H): Status: RESOLVED | Noted: 2023-12-05 | Resolved: 2024-07-24

## 2024-07-24 PROBLEM — L97.512 ULCER OF RIGHT FOOT WITH FAT LAYER EXPOSED (H): Status: ACTIVE | Noted: 2023-12-05

## 2024-07-24 LAB
BACTERIA UR CULT: NORMAL
CRP SERPL-MCNC: 6.9 MG/L
FERRITIN SERPL-MCNC: 141 NG/ML (ref 11–409)
IRON BINDING CAPACITY (ROCHE): 265 UG/DL (ref 240–430)
IRON SATN MFR SERPL: 20 % (ref 15–46)
IRON SERPL-MCNC: 54 UG/DL (ref 37–157)

## 2024-07-24 PROCEDURE — 11042 DBRDMT SUBQ TIS 1ST 20SQCM/<: CPT | Performed by: REGISTERED NURSE

## 2024-07-24 PROCEDURE — 17250 CHEM CAUT OF GRANLTJ TISSUE: CPT | Performed by: REGISTERED NURSE

## 2024-07-24 PROCEDURE — 17250 CHEM CAUT OF GRANLTJ TISSUE: CPT | Mod: XS | Performed by: REGISTERED NURSE

## 2024-07-24 RX ORDER — LOPERAMIDE HCL 2 MG
CAPSULE ORAL
Qty: 200 CAPSULE | Refills: 2 | Status: SHIPPED | OUTPATIENT
Start: 2024-07-24 | End: 2025-07-19

## 2024-07-24 NOTE — DISCHARGE INSTRUCTIONS
07/24/2024   Maicol Fountain   1965  A DME order was not completed because the supplies are ordered by home care or at a care facility  [facility] Phone: [phone]; Fax: [phone]     PLAN: 7/24/24  Continue dressing care by Home care nurses  Done: VA check wheelchair: new battery  Continue to monitor temp for UTI     Wound Care Orders: Right inner upper thigh   Wash with mild unscented soap and water, pat dry  Apply Iodosorb gel to open area  Cut and apply Alginate to open area  Cover with ABD pad and Medipore tape   Change 3 times a week M-W-F     Wound care: Right and Left Ischial Tuberosity   - Cleanse with mild unscented soap and water (such as Cetaphil, Cerave or Dove)  - Apply VASHE on gauze, to wound bed, for 10 minutes, remove gauze (do not rinse)  -Cut and apply 1/6 Hydrofera Blue Ready-Transfer to wound  - Cover with 1/2 of a 5x9 ABD pad and secure with Medipore tape  Change M/W/F and as needed for soilage     Repositioning:  Bed: Reposition MINIMALLY every 1-2 hours in bed to relieve pressure and promote perfusion to tissue.  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 A group 1 mattress is not adequate to offload these severe ulcerations. Patient has impaired sensation and is unable to reposition independently.  Chair: Use cushion when up to the chair, do not sit for longer than one hour total before returning to bed for at least 60 minutes to relieve pressure and promote perfusion to the tissue. Completely recline/tilt for 15 minutes each hour.     Protein: A diet high in protein for wound healing, we recommend getting 90 grams of protein per day.  Taking protein shakes or bars are a good way to get extra protein in your diet.     Main Provider: Ramiro Balbuena CNP July 24, 2024    Call us at [phone] if you have any questions about your  wounds, if you have redness or swelling around your wound, have a fever of 101 degrees Fahrenheit or greater or if you have any other problems or concerns. We answer the phone Monday through Friday 8 am to 4 pm, please leave a message as we check the voicemail frequently throughout the day. If you have a concern over the weekend, please leave a message and we will return your call Monday. If the need is urgent, go to the ER or urgent care.    If you had a positive experience please indicate that on your patient satisfaction survey form that [facility] will be sending you.  It was a pleasure meeting with you today.  Thank you for allowing me and my team the privilege of caring for you today.  YOU are the reason we are here, and I truly hope we provided you with the excellent service you deserve.  Please let us know if there is anything else we can do for you so that we can be sure you are leaving completely satisfied with your care experience.      If you have any billing related questions please call the [facility] Business office at [phone]. The clinic staff does not handle billing related matters.  If you are scheduled to have a follow up appointment, you will receive a reminder call the day before your visit. On the appointment day please arrive 15 minutes prior to your appointment time. If you are unable to keep that appointment, please call the clinic to cancel or reschedule. If you are more than 10 minutes late or greater for your scheduled appointment time, the clinic policy is that you may be asked to reschedule.
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
## 2024-07-24 NOTE — TELEPHONE ENCOUNTER
[facility] phone call message- patient requesting a refill:    Full Medication Name: loperamide (IMODIUM) 2 MG capsule         Pharmacy confirmed as     [facility] - [place], [place] - [address]  [address]  [facility] [address]  Phone: [phone] Fax: [phone]    : Yes    Additional Comments: [facility] is submitting a medication's request for the patient.     OK to leave a message on voice mail? Yes    Primary language: English      needed? No    Call taken on July 24, 2024 at 4:11 PM by Caitlin Gómez

## 2024-08-06 ENCOUNTER — MEDICAL CORRESPONDENCE (OUTPATIENT)
Dept: HEALTH INFORMATION MANAGEMENT | Facility: CLINIC | Age: 59
End: 2024-08-06
Payer: MEDICARE

## 2024-08-07 ENCOUNTER — MEDICAL CORRESPONDENCE (OUTPATIENT)
Dept: HEALTH INFORMATION MANAGEMENT | Facility: CLINIC | Age: 59
End: 2024-08-07
Payer: MEDICARE

## 2024-08-07 DIAGNOSIS — Z53.9 DIAGNOSIS NOT YET DEFINED: Primary | ICD-10-CM

## 2024-08-07 PROCEDURE — G0179 MD RECERTIFICATION HHA PT: HCPCS | Performed by: REGISTERED NURSE

## 2024-08-14 ENCOUNTER — MEDICAL CORRESPONDENCE (OUTPATIENT)
Dept: HEALTH INFORMATION MANAGEMENT | Facility: CLINIC | Age: 59
End: 2024-08-14
Payer: MEDICARE

## 2024-08-21 ENCOUNTER — MEDICAL CORRESPONDENCE (OUTPATIENT)
Dept: HEALTH INFORMATION MANAGEMENT | Facility: CLINIC | Age: 59
End: 2024-08-21
Payer: MEDICARE

## 2024-08-27 ENCOUNTER — HOSPITAL ENCOUNTER (OUTPATIENT)
Dept: WOUND CARE | Facility: CLINIC | Age: 59
Discharge: HOME | End: 2024-08-27
Attending: REGISTERED NURSE | Admitting: REGISTERED NURSE
Payer: MEDICARE

## 2024-08-27 VITALS — TEMPERATURE: 97 F | DIASTOLIC BLOOD PRESSURE: 75 MMHG | SYSTOLIC BLOOD PRESSURE: 138 MMHG | HEART RATE: 82 BPM

## 2024-08-27 DIAGNOSIS — L89.324 PRESSURE INJURY OF LEFT ISCHIUM, STAGE 4 (H): Primary | Chronic | ICD-10-CM

## 2024-08-27 DIAGNOSIS — L89.314 PRESSURE INJURY OF RIGHT ISCHIUM, STAGE 4 (H): Chronic | ICD-10-CM

## 2024-08-27 DIAGNOSIS — L98.492: ICD-10-CM

## 2024-08-27 PROCEDURE — 99214 OFFICE O/P EST MOD 30 MIN: CPT | Mod: 25 | Performed by: PHYSICIAN ASSISTANT

## 2024-08-27 PROCEDURE — 17250 CHEM CAUT OF GRANLTJ TISSUE: CPT | Performed by: PHYSICIAN ASSISTANT

## 2024-08-27 NOTE — DISCHARGE INSTRUCTIONS
08/27/2024   Maicol Fountain   1965  A DME order was not completed because the supplies are ordered by home care or at a care facility  [facility] Phone: [phone]; Fax: [phone]     PLAN: 8/27/24  Continue dressing care by Home care nurses  ROHO cushion should be evaluated due to weight loss     Wound Care Orders: Right inner upper thigh   Wash with mild unscented soap and water, pat dry  Cut and apply Alginate to open area  Cover with Medipore Plus Pad   Change 3 times a week M-W-F     Wound care: Right and Left Ischial Tuberosity   - Cleanse with mild unscented soap and water (such as Cetaphil, Cerave or Dove)  - Apply VASHE on gauze, to wound bed, for 10 minutes, remove gauze (do not rinse)  -Cut and apply Alginate to open areas  - Cover with 1/2 of a 5x9 ABD pad and secure with Medipore tape  Change M/W/F and as needed for soilage     Repositioning:  Bed: Reposition MINIMALLY every 1-2 hours in bed to relieve pressure and promote perfusion to tissue.  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 A group 1 mattress is not adequate to offload these severe ulcerations. Patient has impaired sensation and is unable to reposition independently.  Chair: Use cushion when up to the chair, do not sit for longer than one hour total before returning to bed for at least 60 minutes to relieve pressure and promote perfusion to the tissue. Completely recline/tilt for 15 minutes each hour.     Protein: A diet high in protein for wound healing, we recommend getting 90 grams of protein per day.  Taking protein shakes or bars are a good way to get extra protein in your diet.     Main Provider: Suzan Martinez PA-C August 27, 2024    Call us at [phone] if you have any questions about your wounds, if you have redness or swelling around your wound, have a fever of 101  degrees Fahrenheit or greater or if you have any other problems or concerns. We answer the phone Monday through Friday 8 am to 4 pm, please leave a message as we check the voicemail frequently throughout the day. If you have a concern over the weekend, please leave a message and we will return your call Monday. If the need is urgent, go to the ER or urgent care.    If you had a positive experience please indicate that on your patient satisfaction survey form that [facility] will be sending you.  It was a pleasure meeting with you today.  Thank you for allowing me and my team the privilege of caring for you today.  YOU are the reason we are here, and I truly hope we provided you with the excellent service you deserve.  Please let us know if there is anything else we can do for you so that we can be sure you are leaving completely satisfied with your care experience.      If you have any billing related questions please call the [facility] Business office at [phone]. The clinic staff does not handle billing related matters.  If you are scheduled to have a follow up appointment, you will receive a reminder call the day before your visit. On the appointment day please arrive 15 minutes prior to your appointment time. If you are unable to keep that appointment, please call the clinic to cancel or reschedule. If you are more than 10 minutes late or greater for your scheduled appointment time, the clinic policy is that you may be asked to reschedule.
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
## 2024-09-04 ENCOUNTER — MEDICAL CORRESPONDENCE (OUTPATIENT)
Dept: HEALTH INFORMATION MANAGEMENT | Facility: CLINIC | Age: 59
End: 2024-09-04
Payer: MEDICARE

## 2024-09-25 ENCOUNTER — HOSPITAL ENCOUNTER (OUTPATIENT)
Dept: WOUND CARE | Facility: CLINIC | Age: 59
Discharge: HOME | End: 2024-09-25
Attending: PHYSICIAN ASSISTANT | Admitting: PHYSICIAN ASSISTANT
Payer: MEDICARE

## 2024-09-25 VITALS
TEMPERATURE: 96.4 F | SYSTOLIC BLOOD PRESSURE: 129 MMHG | HEART RATE: 71 BPM | RESPIRATION RATE: 14 BRPM | DIASTOLIC BLOOD PRESSURE: 73 MMHG

## 2024-09-25 DIAGNOSIS — L89.314 PRESSURE INJURY OF RIGHT ISCHIUM, STAGE 4 (H): Chronic | ICD-10-CM

## 2024-09-25 DIAGNOSIS — L89.324 PRESSURE INJURY OF LEFT ISCHIUM, STAGE 4 (H): Primary | Chronic | ICD-10-CM

## 2024-09-25 DIAGNOSIS — L97.112 SKIN ULCER OF RIGHT THIGH WITH FAT LAYER EXPOSED (H): ICD-10-CM

## 2024-09-25 PROCEDURE — 17250 CHEM CAUT OF GRANLTJ TISSUE: CPT | Performed by: PHYSICIAN ASSISTANT

## 2024-09-25 PROCEDURE — 99213 OFFICE O/P EST LOW 20 MIN: CPT | Mod: 25 | Performed by: PHYSICIAN ASSISTANT

## 2024-09-25 PROCEDURE — 97602 WOUND(S) CARE NON-SELECTIVE: CPT

## 2024-09-25 NOTE — DISCHARGE INSTRUCTIONS
09/25/2024   Maicol Fountain   1965    A DME order was not completed because the supplies are ordered by home care or at a care facility    Dressing changes outside of clinic are being performed by [facility] Phone: [phone]; Fax: [phone]    Plan 09/25/2024   Continue dressing care by Home care nurses  ROHO cushion should be evaluated due to weight loss  We applied Silver Nitrate to the hypergranulation tissue today.  This may lead to temporary staining of the skin with increased drainage and discolored gray/black drainage.  This may be present for a few days and is a normal process.       Wound Care Orders: Right posterior upper thigh   - Wash with mild unscented soap and water, pat dry  - Cut and apply Alginate to open area  - Cover with Medipore Plus Pad   Change 3 times a week M-W-F     Wound care: Right and Left Ischial Tuberosity   - Cleanse with mild unscented soap and water (such as Cetaphil, Cerave or Dove)  - Apply VASHE on gauze, to wound bed, for 10 minutes, remove gauze (do not rinse)  - Apply a thin layer of zinc barrier cream to the white/macerated skin around the wound bed  - Cut and apply Alginate to open areas, ensure to tuck into the undermined areas  - Cover with 1/2 of a 5x9 ABD pad and secure with Medipore tape  Change M/W/F and as needed for soilage     Repositioning:  Bed: Reposition MINIMALLY every 1-2 hours in bed to relieve pressure and promote perfusion to tissue.  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 A group 1 mattress is not adequate to offload these severe ulcerations. Patient has impaired sensation and is unable to reposition independently.  Chair: Use cushion when up to the chair, do not sit for longer than one hour total before returning to bed for at least 60 minutes to relieve pressure and promote  perfusion to the tissue. Completely recline/tilt for 15 minutes each hour.     Protein: A diet high in protein for wound healing, we recommend getting 90 grams of protein per day.  Taking protein shakes or bars are a good way to get extra protein in your diet.       Main Provider: Suzan Martinez PA-C September 25, 2024    Call us at [phone] if you have any questions about your wounds, if you have redness or swelling around your wound, have a fever of 101 degrees Fahrenheit or greater or if you have any other problems or concerns. We answer the phone Monday through Friday 8 am to 4 pm, please leave a message as we check the voicemail frequently throughout the day. If you have a concern over the weekend, please leave a message and we will return your call Monday. If the need is urgent, go to the ER or urgent care.    If you had a positive experience please indicate that on your patient satisfaction survey form that [facility] will be sending you.    It was a pleasure meeting with you today.  Thank you for allowing me and my team the privilege of caring for you today.  YOU are the reason we are here, and I truly hope we provided you with the excellent service you deserve.  Please let us know if there is anything else we can do for you so that we can be sure you are leaving completely satisfied with your care experience.      If you have any billing related questions please call the [facility] Business office at [phone]. The clinic staff does not handle billing related matters.    If you are scheduled to have a follow up appointment, you will receive a reminder call the day before your visit. On the appointment day please arrive 15 minutes prior to your appointment time. If you are unable to keep that appointment, please call the clinic to cancel or reschedule. If you are more than 10 minutes late or greater for your scheduled appointment time, the clinic policy is that you may be asked to  reschedule.
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
## 2024-10-01 ENCOUNTER — MEDICAL CORRESPONDENCE (OUTPATIENT)
Dept: HEALTH INFORMATION MANAGEMENT | Facility: CLINIC | Age: 59
End: 2024-10-01
Payer: MEDICARE

## 2024-10-01 DIAGNOSIS — Z53.9 DIAGNOSIS NOT YET DEFINED: Primary | ICD-10-CM

## 2024-10-01 PROCEDURE — G0179 MD RECERTIFICATION HHA PT: HCPCS | Performed by: REGISTERED NURSE

## 2024-10-04 DIAGNOSIS — Z53.9 DIAGNOSIS NOT YET DEFINED: Primary | ICD-10-CM

## 2024-10-04 PROCEDURE — G0179 MD RECERTIFICATION HHA PT: HCPCS | Performed by: PHYSICIAN ASSISTANT

## 2024-10-07 DIAGNOSIS — Z91.199 PERSONAL HISTORY OF NONCOMPLIANCE WITH MEDICAL TREATMENT, PRESENTING HAZARDS TO HEALTH: ICD-10-CM

## 2024-10-07 DIAGNOSIS — I73.9 PERIPHERAL VASCULAR DISEASE (H): ICD-10-CM

## 2024-10-07 NOTE — TELEPHONE ENCOUNTER
"Request for medication refill:    atorvastatin (LIPITOR) 40 MG tablet     Providers if patient needs an appointment and you are willing to give a one month supply please refill for one month and  send a letter/MyChart using \".SMILLIMITEDREFILL\" .smillimited and route chart to \"P SMI \" (Giving one month refill in non controlled medications is strongly recommended before denial)    If refill has been denied, meaning absolutely no refills without visit, please complete the smart phrase \".smirxrefuse\" and route it to the \"P SMI MED REFILLS\"  pool to inform the patient and the pharmacy.    Laine Hammond MA      "

## 2024-10-08 RX ORDER — ATORVASTATIN CALCIUM 40 MG/1
40 TABLET, FILM COATED ORAL EVERY EVENING
Qty: 30 TABLET | Refills: 11 | Status: SHIPPED | OUTPATIENT
Start: 2024-10-08

## 2024-10-21 DIAGNOSIS — M79.2 NEUROPATHIC PAIN: ICD-10-CM

## 2024-10-22 RX ORDER — PREGABALIN 300 MG/1
CAPSULE ORAL
Qty: 62 CAPSULE | Refills: 2 | Status: SHIPPED | OUTPATIENT
Start: 2024-10-22

## 2024-10-22 NOTE — TELEPHONE ENCOUNTER
31 day course filled 09/30/2024 per PDMP. Requesting medication slightly early. No other providers to suggest provider shopping. Cannot provide 11 refills of controlled substance. Will offer limited refill.

## 2024-10-25 ENCOUNTER — MEDICAL CORRESPONDENCE (OUTPATIENT)
Dept: HEALTH INFORMATION MANAGEMENT | Facility: CLINIC | Age: 59
End: 2024-10-25
Payer: MEDICARE

## 2024-11-08 ENCOUNTER — MEDICAL CORRESPONDENCE (OUTPATIENT)
Dept: HEALTH INFORMATION MANAGEMENT | Facility: CLINIC | Age: 59
End: 2024-11-08

## 2024-11-26 ASSESSMENT — PATIENT HEALTH QUESTIONNAIRE - PHQ9: SUM OF ALL RESPONSES TO PHQ QUESTIONS 1-9: 4

## 2024-11-27 ENCOUNTER — MEDICAL CORRESPONDENCE (OUTPATIENT)
Dept: HEALTH INFORMATION MANAGEMENT | Facility: CLINIC | Age: 59
End: 2024-11-27
Payer: MEDICARE

## 2024-12-03 DIAGNOSIS — Z53.9 DIAGNOSIS NOT YET DEFINED: Primary | ICD-10-CM

## 2024-12-03 PROCEDURE — G0179 MD RECERTIFICATION HHA PT: HCPCS | Performed by: PHYSICIAN ASSISTANT

## 2024-12-17 ENCOUNTER — DOCUMENTATION ONLY (OUTPATIENT)
Dept: FAMILY MEDICINE | Facility: CLINIC | Age: 59
End: 2024-12-17
Payer: MEDICARE

## 2024-12-17 NOTE — PROGRESS NOTES
"When opening a documentation only encounter, be sure to enter in \"Chief Complaint\" Forms and in \" Comments\" Title of form, description if needed.    Bear is a 59 year old  adult  Form received via: Fax  Form now resides in: Provider Ready    Mera Cramer, [facility]               "

## 2024-12-18 DIAGNOSIS — G89.4 CHRONIC PAIN SYNDROME: Primary | Chronic | ICD-10-CM

## 2024-12-18 NOTE — TELEPHONE ENCOUNTER
"Last seen 7/23/24, requesting refill on patient reported medication.     Request for medication refill:    ACETAMINOPHEN EXTRA STRENGTH 500 MG tablet     Providers if patient needs an appointment and you are willing to give a one month supply please refill for one month and  send a letter/MyChart using \".SMILLIMITEDREFILL\" .smillimited and route chart to \"P SMI \" (Giving one month refill in non controlled medications is strongly recommended before denial)    If refill has been denied, meaning absolutely no refills without visit, please complete the smart phrase \".smirxrefuse\" and route it to the \"P SMI MED REFILLS\"  pool to inform the patient and the pharmacy.    Estrellita Silverio RN     "

## 2024-12-18 NOTE — TELEPHONE ENCOUNTER
[facility] phone call message- patient requesting a refill:    Full Medication Name: ACETAMINOPHEN EXTRA STRENGTH 500 MG tablet         Pharmacy confirmed as     [facility] - [place], [place] - [address]  [address]  [facility] [address]  Phone: [phone] Fax: [phone]    : Yes    Additional Comments: Gold states the patient would like the PCP to manage this medication.     OK to leave a message on voice mail? Yes    Primary language: English      needed? No    Call taken on December 18, 2024 at 11:24 AM by Caitlin Gómez

## 2024-12-19 RX ORDER — PSEUDOEPHED/ACETAMINOPH/DIPHEN 30MG-500MG
500 TABLET ORAL EVERY 6 HOURS PRN
Qty: 240 TABLET | Refills: 2 | Status: SHIPPED | OUTPATIENT
Start: 2024-12-19

## 2024-12-23 DIAGNOSIS — G47.00 INSOMNIA, UNSPECIFIED TYPE: ICD-10-CM

## 2024-12-23 DIAGNOSIS — T14.8XXA OPEN WOUND: ICD-10-CM

## 2024-12-23 DIAGNOSIS — M79.2 NEUROPATHIC PAIN: ICD-10-CM

## 2024-12-23 RX ORDER — TRAZODONE HYDROCHLORIDE 100 MG/1
100 TABLET ORAL AT BEDTIME
Qty: 30 TABLET | Refills: 11 | Status: SHIPPED | OUTPATIENT
Start: 2024-12-23

## 2024-12-23 RX ORDER — OMEGA-3S/DHA/EPA/FISH OIL/D3 300MG-1000
1 CAPSULE ORAL DAILY
Qty: 30 TABLET | Refills: 11 | Status: SHIPPED | OUTPATIENT
Start: 2024-12-23

## 2024-12-23 RX ORDER — DULOXETIN HYDROCHLORIDE 60 MG/1
60 CAPSULE, DELAYED RELEASE ORAL DAILY
Qty: 30 CAPSULE | Refills: 11 | Status: SHIPPED | OUTPATIENT
Start: 2024-12-23

## 2024-12-23 NOTE — TELEPHONE ENCOUNTER
"Request for medication refill:    DULoxetine (CYMBALTA) 60 MG capsule   traZODone (DESYREL) 100 MG tablet   Vitamin D3 (VITAMIN D) 10 MCG (400 UNIT) tablet   Providers if patient needs an appointment and you are willing to give a one month supply please refill for one month and  send a letter/MyChart using \".SMILLIMITEDREFILL\" .smillimited and route chart to \"P [facility] \" (Giving one month refill in non controlled medications is strongly recommended before denial)    If refill has been denied, meaning absolutely no refills without visit, please complete the smart phrase \".smirxrefuse\" and route it to the \"P SMI MED REFILLS\"  pool to inform the patient and the pharmacy.    David Doll MA     "

## 2025-01-13 ENCOUNTER — TELEPHONE (OUTPATIENT)
Dept: WOUND CARE | Facility: CLINIC | Age: 60
End: 2025-01-13
Payer: MEDICARE

## 2025-01-13 NOTE — TELEPHONE ENCOUNTER
Returned call to the patient. Left a vm to call the clinic for scheduling with Dr. Laird or Kathleen Krishnamurthy NP.

## 2025-01-13 NOTE — TELEPHONE ENCOUNTER
Previous Renea patient requesting an appointment. Last seen September 2024. Please advise on which provider is most appropriate.

## 2025-01-15 ENCOUNTER — OFFICE VISIT (OUTPATIENT)
Dept: FAMILY MEDICINE | Facility: CLINIC | Age: 60
End: 2025-01-15
Payer: MEDICARE

## 2025-01-15 VITALS
OXYGEN SATURATION: 99 % | HEART RATE: 79 BPM | SYSTOLIC BLOOD PRESSURE: 158 MMHG | DIASTOLIC BLOOD PRESSURE: 78 MMHG | TEMPERATURE: 98.4 F

## 2025-01-15 DIAGNOSIS — F17.200 TOBACCO DEPENDENCE SYNDROME: ICD-10-CM

## 2025-01-15 DIAGNOSIS — M62.838 MUSCLE SPASTICITY: ICD-10-CM

## 2025-01-15 DIAGNOSIS — Z93.3 COLOSTOMY IN PLACE (H): Chronic | ICD-10-CM

## 2025-01-15 DIAGNOSIS — L97.919 ULCER OF RIGHT LOWER EXTREMITY, UNSPECIFIED ULCER STAGE (H): ICD-10-CM

## 2025-01-15 DIAGNOSIS — L89.324 PRESSURE INJURY OF LEFT ISCHIUM, STAGE 4 (H): Chronic | ICD-10-CM

## 2025-01-15 DIAGNOSIS — I73.9 PERIPHERAL VASCULAR DISEASE: ICD-10-CM

## 2025-01-15 DIAGNOSIS — Z29.11 NEED FOR VACCINATION AGAINST RESPIRATORY SYNCYTIAL VIRUS: ICD-10-CM

## 2025-01-15 DIAGNOSIS — R19.7 DIARRHEA, UNSPECIFIED TYPE: ICD-10-CM

## 2025-01-15 DIAGNOSIS — N31.9 NEUROGENIC BLADDER: Chronic | ICD-10-CM

## 2025-01-15 DIAGNOSIS — Z98.890 HISTORY OF UROSTOMY: ICD-10-CM

## 2025-01-15 DIAGNOSIS — I10 HYPERTENSION, UNSPECIFIED TYPE: ICD-10-CM

## 2025-01-15 DIAGNOSIS — D50.9 IRON DEFICIENCY ANEMIA, UNSPECIFIED IRON DEFICIENCY ANEMIA TYPE: ICD-10-CM

## 2025-01-15 DIAGNOSIS — Z23 NEED FOR TDAP VACCINATION: ICD-10-CM

## 2025-01-15 DIAGNOSIS — L89.314 PRESSURE INJURY OF RIGHT ISCHIUM, STAGE 4 (H): Chronic | ICD-10-CM

## 2025-01-15 DIAGNOSIS — M79.2 NEUROPATHIC PAIN: ICD-10-CM

## 2025-01-15 DIAGNOSIS — N63.42 SUBAREOLAR MASS OF LEFT BREAST: ICD-10-CM

## 2025-01-15 DIAGNOSIS — Z12.11 SCREEN FOR COLON CANCER: ICD-10-CM

## 2025-01-15 DIAGNOSIS — T14.8XXA OPEN WOUND: ICD-10-CM

## 2025-01-15 DIAGNOSIS — K21.9 GASTROESOPHAGEAL REFLUX DISEASE WITHOUT ESOPHAGITIS: ICD-10-CM

## 2025-01-15 DIAGNOSIS — Z71.6 ENCOUNTER FOR SMOKING CESSATION COUNSELING: ICD-10-CM

## 2025-01-15 DIAGNOSIS — G47.00 INSOMNIA, UNSPECIFIED TYPE: ICD-10-CM

## 2025-01-15 DIAGNOSIS — G89.4 CHRONIC PAIN SYNDROME: Primary | ICD-10-CM

## 2025-01-15 DIAGNOSIS — Z76.0 ENCOUNTER FOR MEDICATION REFILL: ICD-10-CM

## 2025-01-15 LAB
ALBUMIN SERPL BCG-MCNC: 4 G/DL (ref 3.5–5.2)
ALP SERPL-CCNC: 75 U/L (ref 40–150)
ALT SERPL W P-5'-P-CCNC: 12 U/L (ref 0–70)
AMPHETAMINES UR QL: NOT DETECTED
ANION GAP SERPL CALCULATED.3IONS-SCNC: 9 MMOL/L (ref 7–15)
AST SERPL W P-5'-P-CCNC: 17 U/L (ref 0–45)
BARBITURATES UR QL SCN: NOT DETECTED
BENZODIAZ UR QL SCN: NOT DETECTED
BILIRUB SERPL-MCNC: 0.2 MG/DL
BUN SERPL-MCNC: 28.4 MG/DL (ref 8–23)
BUPRENORPHINE UR QL: NOT DETECTED
CALCIUM SERPL-MCNC: 10.7 MG/DL (ref 8.8–10.4)
CANNABINOIDS UR QL: NOT DETECTED
CHLORIDE SERPL-SCNC: 100 MMOL/L (ref 98–107)
CHOLEST SERPL-MCNC: 145 MG/DL
COCAINE UR QL SCN: DETECTED
CREAT SERPL-MCNC: 0.67 MG/DL (ref 0.51–1.17)
D-METHAMPHET UR QL: NOT DETECTED
EGFRCR SERPLBLD CKD-EPI 2021: >90 ML/MIN/1.73M2
FASTING STATUS PATIENT QL REPORTED: ABNORMAL
FASTING STATUS PATIENT QL REPORTED: NORMAL
GLUCOSE SERPL-MCNC: 120 MG/DL (ref 70–99)
HCO3 SERPL-SCNC: 29 MMOL/L (ref 22–29)
HDLC SERPL-MCNC: 64 MG/DL
LDLC SERPL CALC-MCNC: 60 MG/DL
METHADONE UR QL SCN: NOT DETECTED
NONHDLC SERPL-MCNC: 81 MG/DL
OPIATES UR QL SCN: NOT DETECTED
OXYCODONE UR QL SCN: NOT DETECTED
PCP UR QL SCN: NOT DETECTED
POTASSIUM SERPL-SCNC: 4.7 MMOL/L (ref 3.4–5.3)
PROT SERPL-MCNC: 7 G/DL (ref 6.4–8.3)
SODIUM SERPL-SCNC: 138 MMOL/L (ref 135–145)
TRICYCLICS UR QL SCN: NOT DETECTED
TRIGL SERPL-MCNC: 106 MG/DL

## 2025-01-15 PROCEDURE — G2211 COMPLEX E/M VISIT ADD ON: HCPCS

## 2025-01-15 PROCEDURE — 99214 OFFICE O/P EST MOD 30 MIN: CPT | Mod: GC

## 2025-01-15 PROCEDURE — 80053 COMPREHEN METABOLIC PANEL: CPT

## 2025-01-15 PROCEDURE — 36415 COLL VENOUS BLD VENIPUNCTURE: CPT

## 2025-01-15 PROCEDURE — 80306 DRUG TEST PRSMV INSTRMNT: CPT

## 2025-01-15 PROCEDURE — 80061 LIPID PANEL: CPT

## 2025-01-15 RX ORDER — BACLOFEN 10 MG/1
10 TABLET ORAL 3 TIMES DAILY
Qty: 124 TABLET | Refills: 11 | Status: SHIPPED | OUTPATIENT
Start: 2025-01-15

## 2025-01-15 RX ORDER — TIZANIDINE 2 MG/1
2-4 TABLET ORAL EVERY 8 HOURS PRN
Qty: 90 TABLET | Refills: 2 | Status: SHIPPED | OUTPATIENT
Start: 2025-01-15

## 2025-01-15 RX ORDER — VARENICLINE TARTRATE 1 MG/1
1 TABLET, FILM COATED ORAL 2 TIMES DAILY
Qty: 180 TABLET | Refills: 1 | Status: SHIPPED | OUTPATIENT
Start: 2025-01-15

## 2025-01-15 RX ORDER — DIPHENHYD/PHENYLEPH/ACETAMINOP 12.5-5-325
1 TABLET ORAL PRN
Qty: 360 EACH | Refills: 2 | Status: SHIPPED | OUTPATIENT
Start: 2025-01-15

## 2025-01-15 RX ORDER — ASCORBIC ACID 500 MG
500 TABLET ORAL 4 TIMES DAILY
Qty: 124 TABLET | Refills: 11 | Status: SHIPPED | OUTPATIENT
Start: 2025-01-15

## 2025-01-15 RX ORDER — LOPERAMIDE HYDROCHLORIDE 2 MG/1
CAPSULE ORAL
Qty: 200 CAPSULE | Refills: 2 | Status: SHIPPED | OUTPATIENT
Start: 2025-01-15 | End: 2026-01-10

## 2025-01-15 NOTE — PATIENT INSTRUCTIONS
Patient Education   Here is the plan from today's visit    1. Tobacco dependence syndrome  I'm very happy to hear you have stayed away from tobacco.     2. Muscle spasticity  I will add tizanidine and decrease baclofen to TID as requested.   - tiZANidine (ZANAFLEX) 2 MG tablet; Take 1-2 tablets (2-4 mg) by mouth every 8 hours as needed for muscle spasms.  Dispense: 90 tablet; Refill: 2  - baclofen (LIORESAL) 10 MG tablet; Take 1 tablet (10 mg) by mouth 3 times daily.  Dispense: 124 tablet; Refill: 11    3. Peripheral vascular disease  I want to check your cholesterol to see how the medication is working.   - Lipid panel; Future  - Lipid panel    4. Neuropathic pain  Refill  - pregabalin (LYRICA) 300 MG capsule; Take 1 capsule (300 mg) by mouth 2 times daily.  Dispense: 62 capsule; Refill: 4    5. Chronic pain syndrome (Primary)  - Urine Drug Screen Clinic; Future  - Urine Drug Screen Clinic    6. Pressure injury of left ischium, stage 4 (H)  7. Pressure injury of right ischium, stage 4 (H)  Please see your wound nurse for your sacral injuries    8. Neurogenic bladder  9. Colostomy in place (H)  We will give you supplies. Please request on [employer].     10. Need for Tdap vaccination  11. Need for vaccination against respiratory syncytial virus  Next time, we can talk about these vaccines    12. Screen for colon cancer  If you want to have a colonoscopy, we can offer this.     13. Encounter for smoking cessation counseling  Refill  - varenicline (CHANTIX) 1 MG tablet; Take 1 tablet (1 mg) by mouth 2 times daily.  Dispense: 180 tablet; Refill: 1    14. Insomnia, unspecified type  Please see the video about sleep problems    15. Iron deficiency anemia, unspecified iron deficiency anemia type  Your iron studies were normal in July. So you do not need to check again unless you have bleeding in stool or urine or feeling more fatigued than usual.     16. Open wound  Refill  - vitamin C (ASCORBIC ACID) 500 MG tablet; Take 1  tablet (500 mg) by mouth 4 times daily.  Dispense: 124 tablet; Refill: 11  - Disposable Gloves (VINYL GLOVES ONE SIZE) MISC; Externally apply 1 Units topically as needed (ostomy and urostomy).  Dispense: 360 each; Refill: 2    17. Ulcer of right lower extremity, unspecified ulcer stage (H)  Refill  - oxyCODONE (ROXICODONE) 5 MG tablet; Take 1 tablet (5 mg) by mouth every 6 hours as needed for severe pain.  Dispense: 10 tablet; Refill: 0    18. Diarrhea, unspecified type  Refill  - loperamide (IMODIUM) 2 MG capsule; Take 2 capsules (4 mg) by mouth at bedtime. May also take 2 capsules (4 mg) 3 times daily as needed for diarrhea.  Dispense: 200 capsule; Refill: 2    19. Gastroesophageal reflux disease without esophagitis  Refill  - calcium carbonate-vitamin D (OSCAL) 500-5 MG-MCG tablet; Take 1 tablet by mouth daily.  Dispense: 90 tablet; Refill: 3    21. Hypertension, unspecified type  I want to check your kidney health because that can cause high blood pressure. I want to see you in 4 weeks and take daily measurements of your blood pressure and bring that back at your next visit on a ledger.   - Comprehensive metabolic panel; Future  - Comprehensive metabolic panel    22. Subareolar mass of left breast  I think it is a lump of glands called gynecomastia or fat like a lipoma. I want you to get the ultrasound so we know for sure what kind of growth it is.   - US Breast Left Limited 1-3 Quadrants; Future          Please call or return to clinic if your symptoms don't go away.    Follow up plan  Return in about 4 weeks (around 2/12/2025), or if symptoms worsen or fail to improve, for Follow up.    Thank you for coming to [facility] today.  Lab Testing:  **If you had lab testing today and your results are reassuring or normal they will be mailed to you or sent through [employer] within 7 days.   **If the lab tests need quick action we will call you with the results.  **If you are having labs done on a different day,  please call [phone] to schedule at [facility] or [phone] for other [facility] locations. Labs do not offer walk-in appointments.  The phone number we will call with results is # There are no phone numbers on file.. If this is not the best number please call our clinic and change the number.  Medication Refills:  If you need any refills please call your pharmacy and they will contact us.   If you need to  your refill at a new pharmacy, please contact the new pharmacy directly. The new pharmacy will help you get your medications transferred faster.   Scheduling:  If you have any concerns about today's visit or wish to schedule another appointment please call our office during normal business hours [phone] (8-5:00 M-F). If you can no longer make a scheduled visit, please cancel via [employer] or call us to cancel.   If a referral was made to an [facility] specialty provider and you do not get a call from central scheduling, please refer to directions on your visit summary or call our office during normal business hours for assistance.   If a Mammogram was ordered for you at the Breast Center call [phone] to schedule or change your appointment.  If you had an XRay/CT/Ultrasound/MRI ordered the number is [phone] to schedule or change your radiology appointment.   [facility] has limited ultrasound appointments available on Wednesdays, if you would like your ultrasound at [facility], please call [phone] to schedule.   Medical Concerns:  If you have urgent medical concerns please call [phone] at any time of the day.    Boaz Mosley MD

## 2025-01-15 NOTE — PROGRESS NOTES
{PROVIDER CHARTING PREFERENCE:161662}    Roney Milian is a 59 year old, presenting for the following health issues:  RECHECK (medication) and Mass (On chest)      1/15/2025    11:17 AM   Additional Questions   Roomed by Doua   Accompanied by Self         1/15/2025    11:17 AM   Patient Reported Additional Medications   Patient reports taking the following new medications n/a         1/15/2025    Information    services provided? No     HPI     The patient is following up for refills of medications.    Concern:   Left breast mass.   No drainage or bleeding from nipple, no pain or itchiness.   History of breast cancer in maternal Gma, age 28  No fevers, chills, weight loss, nightsweats, fatigue, adenopathy.   Some nipple tenderness at left nipple.     Pain is still a big issue. Ongoing spasms in back.   Did not ever get the tizanidine would like  2-4 mg Q8hr PRN. Would like to descalate baclofen.   Needs something for additional pain control such as oxyocode PRN 5 mg or hydromorphone    {ROS Picklists (Optional):478359}      Objective    BP (!) 158/78   Pulse 79   Temp 98.4  F (36.9  C) (Oral)   SpO2 99%   There is no height or weight on file to calculate BMI.  Physical Exam   {Exam List (Optional):492476}    {Diagnostic Test Results (Optional):195110}        Signed Electronically by: Boaz Mosley MD  {Email feedback regarding this note to [email or web address]   :[id number]}   type  Blood pressure goal <125/<80 if tolerated due to severe PAD history. Not currently on antihypertensive and normotensive last visit 9/2024, unclear if related to pain or worsening renal function. On atorvastatin 40 mg and daily ASA for secondary prevention. Expanded labs obtained for statin drug monitoring which show LDL 60, normal creatinine, normal LFTs. ASCVD 8.5%. Current dose of statin is appropriate. Will recommend following up soon to discuss hypertension management.   - Comprehensive metabolic panel; Future  - Comprehensive metabolic panel  - Lipid panel; Future  - Lipid panel    Tobacco dependence syndrome  Encounter for smoking cessation counseling  Stable cessation on long term Chantrix, no new concerns.  - varenicline (CHANTIX) 1 MG tablet; Take 1 tablet (1 mg) by mouth 2 times daily.    Neurogenic bladder  History of urostomy   Colostomy in place (H)  Screen for colon cancer  Open wound  History of partial colectomy. Stable ostomy and urostomy management without signs of infection or complication. Refilling supplies for daily management. He is due for colonoscopy but this would be technically difficult for patient to manage diarrhea with bowel prep so declines screening today.   - Disposable Gloves (VINYL GLOVES ONE SIZE) MISC; Externally apply 1 Units topically as needed (ostomy and urostomy).    Need for Tdap vaccination  Need for vaccination against respiratory syncytial virus  Counseled on and deferred vaccines to follow up visit.     Insomnia, unspecified type  Poor sleep hygiene is likely contributing vs issues with chronic pain vs substance use vs mood vs other. Recommended lifestyle changes to improve sleep regimen for now.     Iron deficiency anemia, unspecified iron deficiency anemia type  Iron studies and Hgb were normal in July. Recommended deferring iron supplementation until signs of bleeding or anemia are known.     Diarrhea, unspecified type  Stable on loperamide, no new concerns.  Refill  - loperamide (IMODIUM) 2 MG capsule; Take 2 capsules (4 mg) by mouth at bedtime. May also take 2 capsules (4 mg) 3 times daily as needed for diarrhea.    Gastroesophageal reflux disease without esophagitis  Symptoms well controlled. No new concerns or red flags. Requested refill. Per lab work, found to have elevated calcium which is likely from alimentation so will request temporary hold and following up soon for this issue.   - calcium carbonate-vitamin D (OSCAL) 500-5 MG-MCG tablet; Take 1 tablet by mouth daily.    Encounter for medication refill   Helped patient access Mychart for remaining refills not needed right away.     The longitudinal plan of care for the diagnosis(es)/condition(s) as documented were addressed during this visit. Due to the added complexity in care, I will continue to support Maicol in the subsequent management and with ongoing continuity of care.      Return in about 4 weeks (around 2/12/2025), or if symptoms worsen or fail to improve, for Follow up.    Subjective   Maicol is a 59 year old, presenting for the following health issues:  RECHECK (medication) and Mass (On chest)      1/15/2025    11:17 AM   Additional Questions   Roomed by Doua   Accompanied by Self         1/15/2025    11:17 AM   Patient Reported Additional Medications   Patient reports taking the following new medications n/a         1/15/2025    Information    services provided? No     HPI     The patient is following up for refills of medications as he has difficulties with Mychart.  Needs Oscal which helps his GERD.   Lyrica manages his neuropathy pain well.   Diarrhea is well controlled on current scheduled and PRN dosing of Imodium.  Chantrix helps him stay away from tobacco use or he feels like he will smoke again.      Concern:   Left breast mass.   No drainage or bleeding from nipple, no pain or itchiness.   History of breast cancer in maternal Gma, age 28  Some nipple tenderness at left  nipple.     Pain is still a big issue. Ongoing spasms in back.   Did not ever get the tizanidine he was previously prescribed and helped him in the past.   -Would like  2-4 mg Q8hr PRN. Would like to descalate baclofen.   Needs something for additional pain control such as oxyocode PRN 5 mg or hydromorphone for breakthrough pain during sacral wound management.   -Has wound care nurse that helps him at his current facility. Follow up appointment with WOC.   -Sacral wounds are getting daily routine cares, not so painful unless bandages are changed.     Review of Systems  No dysphagia, odynophagia, hematemesis, blood or black stool or abdominal pain.   No fevers, chills, weight loss, nightsweats, fatigue, adenopathy.     Constitutional, HEENT, cardiovascular, pulmonary, gi and gu systems are negative, except as otherwise noted.      Objective    BP (!) 158/78   Pulse 79   Temp 98.4  F (36.9  C) (Oral)   SpO2 99%   There is no height or weight on file to calculate BMI.  Physical Exam   GENERAL: chronically ill appearing, comfortable and no distress  EYES: Eyes grossly normal to inspection, and conjunctivae and sclerae normal  HENT: NCAT, MMM, nose and mouth without ulcers or lesions  NECK: normal thyroid, no asymmetry, masses, or scars  RESP: non labored breathing, lungs clear to auscultation - no rales, rhonchi or wheezes  CV: regular rate and rhythm, normal S1 S2, no murmur, no peripheral edema  ABDOMEN: soft, nontender, no hepatosplenomegaly, no masses and bowel sounds normal  Breast: 2 In round soft subareolar mass, non tender to palpation, not fluctuant, edematous or increased warm.   MS: S/p bilateral AKA with well healing stumps. Bilateral ischial tuberosity wounds (Left greater than right) with mild tenderness to palpation, probes deep past subcutaneous tissue and no dimas purulence, no increased warmth, mild well marginated erythema surrounding wounds   SKIN:  No suspicious lesions or rashes on left breast  or remaining skin of body (besides sacrum)  NEURO: Mentation intact and speech normal. No focal deficits.   PSYCH: mentation appears normal, affect normal/bright  LYMPH: no clavicular or axillary adenopathy    Results for orders placed or performed in visit on 01/15/25   Lipid panel     Status: None   Result Value Ref Range    Cholesterol 145 <200 mg/dL    Triglycerides 106 <150 mg/dL    Direct Measure HDL 64 >=40 mg/dL    LDL Cholesterol Calculated 60 <100 mg/dL    Non HDL Cholesterol 81 <130 mg/dL    Patient Fasting > 8hrs? Unknown     Narrative    Cholesterol  Desirable: < 200 mg/dL  Borderline High: 200 - 239 mg/dL  High: >= 240 mg/dL    Triglycerides  Normal: < 150 mg/dL  Borderline High: 150 - 199 mg/dL  High: 200-499 mg/dL  Very High: >= 500 mg/dL    Direct Measure HDL  Female: >= 50 mg/dL   Male: >= 40 mg/dL    LDL Cholesterol  Desirable: < 100 mg/dL  Above Desirable: 100 - 129 mg/dL   Borderline High: 130 - 159 mg/dL   High:  160 - 189 mg/dL   Very High: >= 190 mg/dL    Non HDL Cholesterol  Desirable: < 130 mg/dL  Above Desirable: 130 - 159 mg/dL  Borderline High: 160 - 189 mg/dL  High: 190 - 219 mg/dL  Very High: >= 220 mg/dL   Comprehensive metabolic panel     Status: Abnormal   Result Value Ref Range    Sodium 138 135 - 145 mmol/L    Potassium 4.7 3.4 - 5.3 mmol/L    Carbon Dioxide (CO2) 29 22 - 29 mmol/L    Anion Gap 9 7 - 15 mmol/L    Urea Nitrogen 28.4 (H) 8.0 - 23.0 mg/dL    Creatinine 0.67 0.51 - 1.17 mg/dL    GFR Estimate >90 >60 mL/min/1.73m2    Calcium 10.7 (H) 8.8 - 10.4 mg/dL    Chloride 100 98 - 107 mmol/L    Glucose 120 (H) 70 - 99 mg/dL    Alkaline Phosphatase 75 40 - 150 U/L    AST 17 0 - 45 U/L    ALT 12 0 - 70 U/L    Protein Total 7.0 6.4 - 8.3 g/dL    Albumin 4.0 3.5 - 5.2 g/dL    Bilirubin Total 0.2 <=1.2 mg/dL    Patient Fasting > 8hrs? Unknown     Narrative    The generation of reference intervals for this test is currently based on binary male or female sex. If the electronic health  "record information indicates another gender identity or if Legal Sex is recorded as \"Unknown\", both male and female reference intervals are provided where applicable, and should be considered according to the individual's appropriate clinical context.   Urine Drug Screen Clinic     Status: Abnormal   Result Value Ref Range    Cannabinoids (05-ize-8-carboxy-9-THC) Not Detected Not Detected, Indeterminate    Phencyclidine Not Detected Not Detected, Indeterminate    Cocaine (Benzoylecgonine) Detected (A) Not Detected, Indeterminate    Methamphetamine (d-Methamphetamine) Not Detected Not Detected, Indeterminate    Opiates (Morphine) Not Detected Not Detected, Indeterminate    Amphetamine (d-Amphetamine) Not Detected Not Detected, Indeterminate    Benzodiazepines (Nordiazepam) Not Detected Not Detected, Indeterminate    Tricyclic Antidepressants (Desipramine) Not Detected Not Detected, Indeterminate    Methadone Not Detected Not Detected, Indeterminate    Barbiturates (Butalbital) Not Detected Not Detected, Indeterminate    Oxycodone Not Detected Not Detected, Indeterminate    Buprenorphine Not Detected Not Detected, Indeterminate           Signed Electronically by: Boaz Mosley MD    "

## 2025-01-15 NOTE — PROGRESS NOTES
Preceptor Attestation:   Patient seen, evaluated and discussed with the resident. I have verified the content of the note, which accurately reflects my assessment of the patient and the plan of care.   Supervising Physician:  Doug Claudio MD

## 2025-01-16 RX ORDER — PREGABALIN 300 MG/1
300 CAPSULE ORAL 2 TIMES DAILY
Qty: 62 CAPSULE | Refills: 4 | Status: SHIPPED | OUTPATIENT
Start: 2025-01-16

## 2025-01-16 RX ORDER — OXYCODONE HYDROCHLORIDE 5 MG/1
5 TABLET ORAL EVERY 6 HOURS PRN
Qty: 10 TABLET | Refills: 0 | Status: SHIPPED | OUTPATIENT
Start: 2025-01-16

## 2025-01-30 ENCOUNTER — HOSPITAL ENCOUNTER (OUTPATIENT)
Dept: WOUND CARE | Facility: CLINIC | Age: 60
End: 2025-01-30
Attending: FAMILY MEDICINE
Payer: MEDICARE

## 2025-01-30 VITALS — TEMPERATURE: 98.7 F

## 2025-01-30 DIAGNOSIS — L89.324 PRESSURE INJURY OF LEFT ISCHIUM, STAGE 4 (H): Primary | Chronic | ICD-10-CM

## 2025-01-30 DIAGNOSIS — S31.20XD OPEN WOUND OF PENIS, SUBSEQUENT ENCOUNTER: ICD-10-CM

## 2025-01-30 DIAGNOSIS — L89.314 PRESSURE INJURY OF RIGHT ISCHIUM, STAGE 4 (H): Chronic | ICD-10-CM

## 2025-01-30 DIAGNOSIS — L97.112 SKIN ULCER OF RIGHT THIGH WITH FAT LAYER EXPOSED (H): ICD-10-CM

## 2025-01-30 PROBLEM — S31.20XA OPEN WOUND OF PENIS: Status: ACTIVE | Noted: 2025-01-30

## 2025-01-30 PROCEDURE — 97602 WOUND(S) CARE NON-SELECTIVE: CPT
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
## 2025-01-30 NOTE — DISCHARGE INSTRUCTIONS
01/30/2025   Maicol Fountain   1965    A DME order was not completed because the supplies are ordered by home care or at a care facility    Dressing changes outside of clinic are being performed by Home Care  [facility] Phone: [phone]; Fax: [phone] (3x/week)    Plan 01/30/2025   We will place a referral to Urology to assess the wound on your penis - they will call you to schedule an appointment  Continue dressing care by Home care nurses  ROHO cushion should be evaluated due to weight loss  Continue to sit up in chair as little as possible      Wound Care Orders: Left Penis  - Wash with mild unscented soap and water, pat dry  - Cut and apply Stacie collagen to wound bed  - Cover with Medipore Plus Pad   Change 3 times a week M/W/F    Wound care: Right Ischial Tuberosity   - Cleanse with mild unscented soap and water (such as Cetaphil, Cerave or Dove)  - Apply VASHE on gauze, to wound bed, for 10 minutes, remove gauze (do not rinse)  - Apply a thin layer of zinc barrier cream to the white/macerated skin around the wound bed  - Cut and apply Stacie collagen to wound bed  - Cover with 1/2 of a 5x9 ABD pad and secure with Medipore tape  Change M/W/F and as needed for soilage     Wound care: Left Ischial Tuberosity   - Cleanse with mild unscented soap and water (such as Cetaphil, Cerave or Dove)  - Apply VASHE on gauze, to wound bed, for 10 minutes, remove gauze (do not rinse)  - Apply a thin layer of zinc barrier cream to the white/macerated skin around the wound bed  - Cut and apply Alginate to wound bed  - Cover with 1/2 of a 5x9 ABD pad and secure with Medipore tape  Change M/W/F and as needed for soilage       Repositioning:  Bed: Reposition MINIMALLY every 1-2 hours in bed to relieve pressure and promote perfusion to tissue.  Continue group 2 mattress due to large, stage 4 pressure ulceration on the patient's pelvis that has failed to improve on a normal mattress despite regular wound cares and  repositioning. A group 1 mattress is not adequate to offload these severe ulcerations. Patient has impaired sensation and is unable to reposition independently.  Chair: Use cushion when up to the chair, do not sit for longer than one hour total before returning to bed for at least 60 minutes to relieve pressure and promote perfusion to the tissue. Completely recline/tilt for 15 minutes each hour.     Protein: A diet high in protein for wound healing, we recommend getting 90 grams of protein per day.  Taking protein shakes or bars are a good way to get extra protein in your diet.    You do not need to change the dressing on the days you are being seen at the wound clinic     Main Provider: Maikol Laird M.D. January 30, 2025    Call us at [phone] if you have any questions about your wounds, if you have redness or swelling around your wound, have a fever of 101 degrees Fahrenheit or greater or if you have any other problems or concerns. We answer the phone Monday through Friday 8 am to 4 pm, please leave a message as we check the voicemail frequently throughout the day. If you have a concern over the weekend, please leave a message and we will return your call Monday. If the need is urgent, go to the ER or urgent care.    If you had a positive experience please indicate that on your patient satisfaction survey form that [facility] will be sending you.    It was a pleasure meeting with you today.  Thank you for allowing me and my team the privilege of caring for you today.  YOU are the reason we are here, and I truly hope we provided you with the excellent service you deserve.  Please let us know if there is anything else we can do for you so that we can be sure you are leaving completely satisfied with your care experience.      If you have any billing related questions please call the [facility] Business office at [phone]. The clinic staff does not handle billing related matters.    If you are  scheduled to have a follow up appointment, you will receive a reminder call the day before your visit. On the appointment day please arrive 15 minutes prior to your appointment time. If you are unable to keep that appointment, please call the clinic to cancel or reschedule. If you are more than 10 minutes late or greater for your scheduled appointment time, the clinic policy is that you may be asked to reschedule.

## 2025-02-03 ENCOUNTER — DOCUMENTATION ONLY (OUTPATIENT)
Dept: FAMILY MEDICINE | Facility: CLINIC | Age: 60
End: 2025-02-03

## 2025-02-03 NOTE — PROGRESS NOTES
"When opening a documentation only encounter, be sure to enter in \"Chief Complaint\" Forms and in \" Comments\" Title of form, description if needed.    Bear is a 59 year old  adult  Form received via: Fax  Form now resides in: Provider Ready    David Doll MA               "

## 2025-02-04 ENCOUNTER — MEDICAL CORRESPONDENCE (OUTPATIENT)
Dept: HEALTH INFORMATION MANAGEMENT | Facility: CLINIC | Age: 60
End: 2025-02-04
Payer: MEDICARE

## 2025-02-18 ENCOUNTER — OFFICE VISIT (OUTPATIENT)
Dept: FAMILY MEDICINE | Facility: CLINIC | Age: 60
End: 2025-02-18
Payer: MEDICARE

## 2025-02-18 VITALS
SYSTOLIC BLOOD PRESSURE: 171 MMHG | DIASTOLIC BLOOD PRESSURE: 74 MMHG | TEMPERATURE: 98.7 F | OXYGEN SATURATION: 100 % | RESPIRATION RATE: 14 BRPM | HEART RATE: 69 BPM

## 2025-02-18 DIAGNOSIS — L89.90 PRESSURE ULCERS OF SKIN OF MULTIPLE TOPOGRAPHIC SITES: Primary | ICD-10-CM

## 2025-02-18 DIAGNOSIS — G89.4 CHRONIC PAIN SYNDROME: Chronic | ICD-10-CM

## 2025-02-18 DIAGNOSIS — S31.20XA OPEN WOUND OF PENIS, INITIAL ENCOUNTER: ICD-10-CM

## 2025-02-18 DIAGNOSIS — I10 BENIGN ESSENTIAL HYPERTENSION: ICD-10-CM

## 2025-02-18 DIAGNOSIS — K21.9 GASTROESOPHAGEAL REFLUX DISEASE WITHOUT ESOPHAGITIS: ICD-10-CM

## 2025-02-18 LAB
ALBUMIN SERPL BCG-MCNC: 4.3 G/DL (ref 3.5–5.2)
ALP SERPL-CCNC: 76 U/L (ref 40–150)
ALT SERPL W P-5'-P-CCNC: 14 U/L (ref 0–70)
ANION GAP SERPL CALCULATED.3IONS-SCNC: 10 MMOL/L (ref 7–15)
AST SERPL W P-5'-P-CCNC: 19 U/L (ref 0–45)
BILIRUB SERPL-MCNC: 0.3 MG/DL
BUN SERPL-MCNC: 18.8 MG/DL (ref 8–23)
CALCIUM SERPL-MCNC: 10.9 MG/DL (ref 8.8–10.4)
CHLORIDE SERPL-SCNC: 97 MMOL/L (ref 98–107)
CREAT SERPL-MCNC: 0.62 MG/DL (ref 0.51–1.17)
EGFRCR SERPLBLD CKD-EPI 2021: >90 ML/MIN/1.73M2
GLUCOSE SERPL-MCNC: 88 MG/DL (ref 70–99)
HCO3 SERPL-SCNC: 27 MMOL/L (ref 22–29)
POTASSIUM SERPL-SCNC: 5 MMOL/L (ref 3.4–5.3)
PROT SERPL-MCNC: 7 G/DL (ref 6.4–8.3)
SODIUM SERPL-SCNC: 134 MMOL/L (ref 135–145)

## 2025-02-18 PROCEDURE — 99214 OFFICE O/P EST MOD 30 MIN: CPT | Mod: GC

## 2025-02-18 PROCEDURE — 36415 COLL VENOUS BLD VENIPUNCTURE: CPT

## 2025-02-18 PROCEDURE — 3077F SYST BP >= 140 MM HG: CPT

## 2025-02-18 PROCEDURE — 80053 COMPREHEN METABOLIC PANEL: CPT

## 2025-02-18 PROCEDURE — 3078F DIAST BP <80 MM HG: CPT

## 2025-02-18 RX ORDER — LOSARTAN POTASSIUM 25 MG/1
25 TABLET ORAL DAILY
Qty: 90 TABLET | Refills: 2 | Status: SHIPPED | OUTPATIENT
Start: 2025-02-18

## 2025-02-18 RX ORDER — SULFAMETHOXAZOLE AND TRIMETHOPRIM 800; 160 MG/1; MG/1
1 TABLET ORAL 2 TIMES DAILY
Qty: 28 TABLET | Refills: 0 | Status: SHIPPED | OUTPATIENT
Start: 2025-02-18 | End: 2025-03-04

## 2025-02-18 RX ORDER — PSEUDOEPHED/ACETAMINOPH/DIPHEN 30MG-500MG
500-1000 TABLET ORAL EVERY 6 HOURS PRN
Qty: 240 TABLET | Refills: 2 | Status: SHIPPED | OUTPATIENT
Start: 2025-02-18

## 2025-02-18 RX ORDER — OXYCODONE HYDROCHLORIDE 5 MG/1
5 TABLET ORAL EVERY 6 HOURS PRN
Qty: 10 TABLET | Refills: 0 | Status: SHIPPED | OUTPATIENT
Start: 2025-02-18

## 2025-02-18 RX ORDER — OMEPRAZOLE 20 MG/1
20 CAPSULE, DELAYED RELEASE ORAL DAILY
Qty: 90 CAPSULE | Refills: 2 | Status: SHIPPED | OUTPATIENT
Start: 2025-02-18

## 2025-02-18 RX ORDER — MULTIVITAMIN WITH IRON
500 TABLET ORAL DAILY
Qty: 90 TABLET | Refills: 2 | Status: SHIPPED | OUTPATIENT
Start: 2025-02-18

## 2025-02-18 NOTE — PATIENT INSTRUCTIONS
Patient Education   Here is the plan from today's visit    1. Chronic pain syndrome  5. Pressure ulcers of skin of multiple topographic sites (Primary)  I will give you a refill of your oxycodone so you can have this available for dressing changes. You tylenol was increased. You will have a roho seat ordered.   - oxyCODONE (ROXICODONE) 5 MG tablet; Take 1 tablet (5 mg) by mouth every 6 hours as needed for severe pain.  Dispense: 10 tablet; Refill: 0  - acetaminophen (TYLENOL) 500 MG tablet; Take 1-2 tablets (500-1,000 mg) by mouth every 6 hours as needed for mild pain.  Dispense: 240 tablet; Refill: 2  - Miscellaneous DME Supply Order (Use only if a more specific DME order does not already exist)      2. Open wound of penis, initial encounter  I'm concerned about your penis wound. I think we should cover for a minimum of 10 days of antibiotics, but you are given 14 days just in case you are not completely healed. I want you to see Urology right away scheduled on 03/04/2025   Red flags for coming back to clinic or to the ED right away:   1) fevers, chills  2) Worsening redness, swelling, heat and odor with discoloration of penile skin  3) Worsening lightheadedness, dizziness or low blood pressure  4) Bleeding that does not stop well  5) New worsening back pain or urine symptoms change  6) rash, mouth swelling, chest pain and shortness of breath  - sulfamethoxazole-trimethoprim (BACTRIM DS) 800-160 MG tablet; Take 1 tablet by mouth 2 times daily for 14 days.  Dispense: 28 tablet; Refill: 0    3. Benign essential hypertension  We will start losartan today for blood pressure. I want to see you back in clinic in 4 weeks to follow up. We will get labs today for your kidneys and electrolytes because this medication needs monitoring.   - Comprehensive metabolic panel; Future  - losartan (COZAAR) 25 MG tablet; Take 1 tablet (25 mg) by mouth daily.  Dispense: 90 tablet; Refill: 2    4. Gastroesophageal reflux disease without  esophagitis  For acid reflux, I adjusted your medications. You should take only one to two tums per day as PRN. I recommend using Omeprazole 20 mg daily scheduled instead. You will also have a vitamin b12 for helping make sure you get enough vitamins.   - omeprazole (PRILOSEC) 20 MG DR capsule; Take 1 capsule (20 mg) by mouth daily.  Dispense: 90 capsule; Refill: 2  - cyanocobalamin (VITAMIN B-12) 500 MCG tablet; Take 1 tablet (500 mcg) by mouth daily.  Dispense: 90 tablet; Refill: 2      Please call or return to clinic if your symptoms don't go away.    Follow up plan  Return in about 10 days (around 2/28/2025), or if symptoms worsen or fail to improve, for Follow up.    Thank you for coming to [facility] today.  Lab Testing:  **If you had lab testing today and your results are reassuring or normal they will be mailed to you or sent through [employer] within 7 days.   **If the lab tests need quick action we will call you with the results.  **If you are having labs done on a different day, please call [phone] to schedule at [facility] or [phone] for other [facility] locations. Labs do not offer walk-in appointments.  The phone number we will call with results is # [phone] (home) . If this is not the best number please call our clinic and change the number.  Medication Refills:  If you need any refills please call your pharmacy and they will contact us.   If you need to  your refill at a new pharmacy, please contact the new pharmacy directly. The new pharmacy will help you get your medications transferred faster.   Scheduling:  If you have any concerns about today's visit or wish to schedule another appointment please call our office during normal business hours [phone] (8-5:00 M-F). If you can no longer make a scheduled visit, please cancel via [employer] or call us to cancel.   If a referral was made to an [facility] specialty provider and you do not get a call  from central scheduling, please refer to directions on your visit summary or call our office during normal business hours for assistance.   If a Mammogram was ordered for you at the [facility] call [phone] to schedule or change your appointment.  If you had an XRay/CT/Ultrasound/MRI ordered the number is [phone] to schedule or change your radiology appointment.   [facility] has limited ultrasound appointments available on Wednesdays, if you would like your ultrasound at [facility], please call [phone] to schedule.   Medical Concerns:  If you have urgent medical concerns please call [phone] at any time of the day.    Boaz Mosley MD

## 2025-02-18 NOTE — PROGRESS NOTES
{PROVIDER CHARTING PREFERENCE:818823}    Roney Milian is a 59 year old, presenting for the following health issues:  Follow Up and Orders (Roho Wheelchair cushion )      2/18/2025    10:13 AM   Additional Questions   Roomed by Fer         2/18/2025    Information    services provided? No     HPI     Maicol is coming in to follow up about multiple issues.   1) Wound cares are going well, still following with [facility].   2) Pain is manageable for most of the time but does have flares with wound dressings and weather and different days (shoulder pain)  3) Needs a refill or the oxycodone PRN    Blood pressure.   Tizanidine helps with blood pressure and forgot today.     Did accidentally drop solder iron on penis and caused an injury. Has Urology follow up.     Requests tums as PRN. Has ongoing acid reflux symptoms sometimes.         Review of Systems  Constitutional, HEENT, cardiovascular, pulmonary, gi and gu systems are negative, except as otherwise noted.      Objective    BP (!) 171/74   Pulse 69   Temp 98.7  F (37.1  C) (Temporal)   Resp 14   SpO2 100%   There is no height or weight on file to calculate BMI.  Physical Exam   {Exam List (Optional):656014}    {Diagnostic Test Results (Optional):020199}        Signed Electronically by: Boaz Mosley MD  {Email feedback regarding this note to [email or web address]   :[id number]}DME (Durable Medical Equipment) Orders and Documentation  No orders of the defined types were placed in this encounter.     The patient was assessed and it was determined the patient is in need of the following listed DME Supplies/Equipment. Please complete supporting documentation below to demonstrate medical necessity.      DME All Other Item(s) Documentation    List reason for need and supporting documentation for medical necessity below for each DME item.     1. Bilateral Ischial Tuberosity Pressure Ulcers  2. Bilateral lower extremity  ISRAEL Milian needs a Roho cushion for his wheel chair to improve pressure related injuries and worsening wounds.         "otherwise noted.      Objective    BP (!) 171/74   Pulse 69   Temp 98.7  F (37.1  C) (Temporal)   Resp 14   SpO2 100%   There is no height or weight on file to calculate BMI.  Physical Exam   GENERAL: alert, chronically ill appearing and no distress  EYES: Eyes grossly normal to inspection, and conjunctivae and sclerae normal  HENT: NCAT, mmm, nose and mouth without ulcers or lesions  NECK: no adenopathy, no asymmetry, masses, or scars  RESP: lungs clear to auscultation - no rales, rhonchi or wheezes  CV: regular rate and rhythm, normal S1 S2, no murmur, click or rub, no peripheral edema  ABDOMEN: soft, nontender, no hepatosplenomegaly, no masses and bowel sounds normal  MS: s/p bilateral AKJ. No gross musculoskeletal defects noted  SKIN: no suspicious lesions or rashes  NEURO: Normal strength and tone, mentation intact and speech normal  PSYCH: mentation appears normal, affect normal/bright  LYMPH: no cervical or clavicular adenopathy    Results for orders placed or performed in visit on 02/18/25   Comprehensive metabolic panel     Status: Abnormal   Result Value Ref Range    Sodium 134 (L) 135 - 145 mmol/L    Potassium 5.0 3.4 - 5.3 mmol/L    Carbon Dioxide (CO2) 27 22 - 29 mmol/L    Anion Gap 10 7 - 15 mmol/L    Urea Nitrogen 18.8 8.0 - 23.0 mg/dL    Creatinine 0.62 0.51 - 1.17 mg/dL    GFR Estimate >90 >60 mL/min/1.73m2    Calcium 10.9 (H) 8.8 - 10.4 mg/dL    Chloride 97 (L) 98 - 107 mmol/L    Glucose 88 70 - 99 mg/dL    Alkaline Phosphatase 76 40 - 150 U/L    AST 19 0 - 45 U/L    ALT 14 0 - 70 U/L    Protein Total 7.0 6.4 - 8.3 g/dL    Albumin 4.3 3.5 - 5.2 g/dL    Bilirubin Total 0.3 <=1.2 mg/dL    Narrative    The generation of reference intervals for this test is currently based on binary male or female sex. If the electronic health record information indicates another gender identity or if Legal Sex is recorded as \"Unknown\", both male and female reference intervals are provided where applicable, and " should be considered according to the individual's appropriate clinical context.           Signed Electronically by: Boaz Mosley MD  DME (Durable Medical Equipment) Orders and Documentation  No orders of the defined types were placed in this encounter.     The patient was assessed and it was determined the patient is in need of the following listed DME Supplies/Equipment. Please complete supporting documentation below to demonstrate medical necessity.      DME All Other Item(s) Documentation    List reason for need and supporting documentation for medical necessity below for each DME item.     1. Bilateral Ischial Tuberosity Pressure Ulcers  2. Bilateral lower extremity AKBLAZE Milian needs a Roho cushion for his wheel chair to improve pressure related injuries and worsening wounds.

## 2025-02-26 ENCOUNTER — HOSPITAL ENCOUNTER (OUTPATIENT)
Dept: WOUND CARE | Facility: CLINIC | Age: 60
Discharge: HOME | End: 2025-02-26
Attending: FAMILY MEDICINE | Admitting: FAMILY MEDICINE
Payer: MEDICARE

## 2025-02-26 VITALS — TEMPERATURE: 98.6 F

## 2025-02-26 DIAGNOSIS — L89.314 PRESSURE INJURY OF RIGHT ISCHIUM, STAGE 4 (H): Chronic | ICD-10-CM

## 2025-02-26 DIAGNOSIS — S31.20XD OPEN WOUND OF PENIS, SUBSEQUENT ENCOUNTER: ICD-10-CM

## 2025-02-26 DIAGNOSIS — L89.324 PRESSURE INJURY OF LEFT ISCHIUM, STAGE 4 (H): Primary | Chronic | ICD-10-CM

## 2025-02-26 PROCEDURE — 97602 WOUND(S) CARE NON-SELECTIVE: CPT
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
## 2025-02-26 NOTE — DISCHARGE INSTRUCTIONS
02/26/2025   Maicol Fountain   1965    A DME order was not completed because the supplies are ordered by home care or at a care facility     Dressing changes outside of clinic are being performed by Home Care  [facility] Phone: [phone]; Fax: [phone] (3x/week)     Plan 02/26/2025   Scheduled 3/4/25- referral to Urology to assess the wound on your penis - they will call you to schedule an appointment  Place bigger piece of alginate into Left ischial please  Apply zinc barrier paste to sacral abrasions PRN  Continue dressing care by Home care nurses  Marycarmeni has the order via PCP pending delivery: ROHO cushion should be evaluated due to weight loss  Patient is using a Group 2 mattress due to large, stage 4 pressure ulceration on the patient's pelvis that has failed to improve on a normal mattress despite regular wound cares and repositioning. A group 1 mattress is not adequate to offload these severe ulcerations. Patient has impaired sensation and is unable to reposition independently. Pillows and wedges have been used and is not adequate to offload the ulcerations.   Continue to sit up in chair as little as possible      Wound Care Orders: Left Penis  - Wash with mild unscented soap and water, pat dry  - Cut and apply Stacie collagen to wound bed (placed Fibracol today, we don't have Stacie in clinic)  - Cover with Medipore Plus Pad   Change 3 times a week M/W/F     Wound care: Right Ischial Tuberosity   - Cleanse with mild unscented soap and water (such as Cetaphil, Cerave or Dove)  - Apply VASHE on gauze, to wound bed, for 10 minutes, remove gauze (do not rinse)  - Apply a thin layer of zinc barrier cream to the white/macerated skin around the wound bed  - Cut and apply Stacie collagen to wound bed (placed Fibracol today, we don't have Stacie in clinic)  - Cover with 1/2 of a 5x9 ABD pad and secure with Medipore tape  Change M/W/F and as needed for soilage     Wound care: Left Ischial Tuberosity    - Cleanse with mild unscented soap and water (such as Cetaphil, Cerave or Dove)  - Apply VASHE on gauze, to wound bed, for 10 minutes, remove gauze (do not rinse)  - Apply a thin layer of zinc barrier cream to the white/macerated skin around the wound bed  - Cut and apply Stacie collagen to wound bed (placed Fibracol today, we don't have Stacie in clinic)  - Cut and apply Alginate to wound bed over collagen  - Cover with 1/2 of a 5x9 ABD pad and secure with Medipore tape  Change M/W/F and as needed for soilage       Repositioning:  Bed: Reposition MINIMALLY every 1-2 hours in bed to relieve pressure and promote perfusion to tissue.   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 A group 1 mattress is not adequate to offload these severe ulcerations. Patient has impaired sensation and is unable to reposition independently.  Chair: Use ROHO cushion when up to the chair, do not sit for longer than one hour total before returning to bed for at least 60 minutes to relieve pressure and promote perfusion to the tissue. Completely recline/tilt for 15 minutes each hour.     Protein: A diet high in protein for wound healing, we recommend getting 90 grams of protein per day.  Taking protein shakes or bars are a good way to get extra protein in your diet.     You do not need to change the dressing on the days you are being seen at the wound clinic     Main Provider: Maikol Laird M.D. February 26, 2025    Call us at [phone] if you have any questions about your wounds, if you have redness or swelling around your wound, have a fever of 101 degrees Fahrenheit or greater or if you have any other problems or concerns. We answer the phone Monday through Friday 8 am to 4 pm, please leave a message as we check the voicemail frequently throughout the day. If you have a concern over the  weekend, please leave a message and we will return your call Monday. If the need is urgent, go to the ER or urgent care.    If you had a positive experience please indicate that on your patient satisfaction survey form that [facility] will be sending you.    It was a pleasure meeting with you today.  Thank you for allowing me and my team the privilege of caring for you today.  YOU are the reason we are here, and I truly hope we provided you with the excellent service you deserve.  Please let us know if there is anything else we can do for you so that we can be sure you are leaving completely satisfied with your care experience.      If you have any billing related questions please call the [facility] Business office at [phone]. The clinic staff does not handle billing related matters.    If you are scheduled to have a follow up appointment, you will receive a reminder call the day before your visit. On the appointment day please arrive 15 minutes prior to your appointment time. If you are unable to keep that appointment, please call the clinic to cancel or reschedule. If you are more than 10 minutes late or greater for your scheduled appointment time, the clinic policy is that you may be asked to reschedule.

## 2025-03-02 ENCOUNTER — HEALTH MAINTENANCE LETTER (OUTPATIENT)
Age: 60
End: 2025-03-02

## 2025-04-01 ENCOUNTER — APPOINTMENT (OUTPATIENT)
Dept: GENERAL RADIOLOGY | Facility: CLINIC | Age: 60
End: 2025-04-01
Attending: EMERGENCY MEDICINE
Payer: MEDICARE

## 2025-04-01 ENCOUNTER — APPOINTMENT (OUTPATIENT)
Dept: CT IMAGING | Facility: CLINIC | Age: 60
End: 2025-04-01
Attending: EMERGENCY MEDICINE
Payer: MEDICARE

## 2025-04-01 ENCOUNTER — HOSPITAL ENCOUNTER (INPATIENT)
Facility: CLINIC | Age: 60
End: 2025-04-01
Attending: EMERGENCY MEDICINE | Admitting: INTERNAL MEDICINE
Payer: MEDICARE

## 2025-04-01 DIAGNOSIS — L89.324 PRESSURE INJURY OF LEFT ISCHIUM, STAGE 4 (H): Chronic | ICD-10-CM

## 2025-04-01 DIAGNOSIS — R11.0 NAUSEA: ICD-10-CM

## 2025-04-01 DIAGNOSIS — R23.1 PALLOR: ICD-10-CM

## 2025-04-01 DIAGNOSIS — S31.20XD OPEN WOUND OF PENIS, SUBSEQUENT ENCOUNTER: Primary | ICD-10-CM

## 2025-04-01 DIAGNOSIS — R10.84 GENERALIZED ABDOMINAL PAIN: ICD-10-CM

## 2025-04-01 LAB
ALBUMIN SERPL BCG-MCNC: 3.8 G/DL (ref 3.5–5.2)
ALBUMIN UR-MCNC: 30 MG/DL
ALP SERPL-CCNC: 76 U/L (ref 40–150)
ALT SERPL W P-5'-P-CCNC: 8 U/L (ref 0–70)
ANION GAP SERPL CALCULATED.3IONS-SCNC: 11 MMOL/L (ref 7–15)
APPEARANCE UR: ABNORMAL
AST SERPL W P-5'-P-CCNC: 14 U/L (ref 0–45)
BASOPHILS # BLD AUTO: 0.1 10E3/UL (ref 0–0.2)
BASOPHILS NFR BLD AUTO: 1 %
BILIRUB SERPL-MCNC: 0.4 MG/DL
BILIRUB UR QL STRIP: NEGATIVE
BUN SERPL-MCNC: 19.7 MG/DL (ref 8–23)
CALCIUM SERPL-MCNC: 10.2 MG/DL (ref 8.8–10.4)
CHLORIDE SERPL-SCNC: 94 MMOL/L (ref 98–107)
COLOR UR AUTO: ABNORMAL
CREAT SERPL-MCNC: 0.59 MG/DL (ref 0.51–1.17)
EGFRCR SERPLBLD CKD-EPI 2021: >90 ML/MIN/1.73M2
EOSINOPHIL # BLD AUTO: 0.1 10E3/UL (ref 0–0.7)
EOSINOPHIL NFR BLD AUTO: 1 %
ERYTHROCYTE [DISTWIDTH] IN BLOOD BY AUTOMATED COUNT: 12.7 % (ref 10–15)
FLUAV RNA SPEC QL NAA+PROBE: NEGATIVE
FLUBV RNA RESP QL NAA+PROBE: NEGATIVE
GLUCOSE SERPL-MCNC: 93 MG/DL (ref 70–99)
GLUCOSE UR STRIP-MCNC: NEGATIVE MG/DL
HCO3 SERPL-SCNC: 29 MMOL/L (ref 22–29)
HCT VFR BLD AUTO: 36.8 % (ref 35–53)
HGB BLD-MCNC: 11.9 G/DL (ref 11.7–17.7)
HGB UR QL STRIP: ABNORMAL
IMM GRANULOCYTES # BLD: 0.1 10E3/UL
IMM GRANULOCYTES NFR BLD: 1 %
KETONES UR STRIP-MCNC: ABNORMAL MG/DL
LACTATE SERPL-SCNC: 0.6 MMOL/L (ref 0.7–2)
LEUKOCYTE ESTERASE UR QL STRIP: ABNORMAL
LIPASE SERPL-CCNC: 15 U/L (ref 13–60)
LYMPHOCYTES # BLD AUTO: 1.1 10E3/UL (ref 0.8–5.3)
LYMPHOCYTES NFR BLD AUTO: 12 %
MCH RBC QN AUTO: 28.1 PG (ref 26.5–33)
MCHC RBC AUTO-ENTMCNC: 32.3 G/DL (ref 31.5–36.5)
MCV RBC AUTO: 87 FL (ref 78–100)
MONOCYTES # BLD AUTO: 0.4 10E3/UL (ref 0–1.3)
MONOCYTES NFR BLD AUTO: 4 %
NEUTROPHILS # BLD AUTO: 7.6 10E3/UL (ref 1.6–8.3)
NEUTROPHILS NFR BLD AUTO: 82 %
NITRATE UR QL: NEGATIVE
NRBC # BLD AUTO: 0 10E3/UL
NRBC BLD AUTO-RTO: 0 /100
PH UR STRIP: 8.5 [PH] (ref 5–7)
PLATELET # BLD AUTO: 334 10E3/UL (ref 150–450)
POTASSIUM SERPL-SCNC: 4 MMOL/L (ref 3.4–5.3)
PROT SERPL-MCNC: 6.4 G/DL (ref 6.4–8.3)
RBC # BLD AUTO: 4.23 10E6/UL (ref 3.8–5.9)
RBC URINE: >182 /HPF
RSV RNA SPEC NAA+PROBE: NEGATIVE
SARS-COV-2 RNA RESP QL NAA+PROBE: NEGATIVE
SODIUM SERPL-SCNC: 134 MMOL/L (ref 135–145)
SP GR UR STRIP: 1.04 (ref 1–1.03)
SQUAMOUS EPITHELIAL: <1 /HPF
TROPONIN T SERPL HS-MCNC: 12 NG/L
TROPONIN T SERPL HS-MCNC: 13 NG/L
UROBILINOGEN UR STRIP-MCNC: NORMAL MG/DL
WBC # BLD AUTO: 9.3 10E3/UL (ref 4–11)
WBC URINE: 72 /HPF

## 2025-04-01 PROCEDURE — 87637 SARSCOV2&INF A&B&RSV AMP PRB: CPT | Performed by: EMERGENCY MEDICINE

## 2025-04-01 PROCEDURE — G0378 HOSPITAL OBSERVATION PER HR: HCPCS

## 2025-04-01 PROCEDURE — 96376 TX/PRO/DX INJ SAME DRUG ADON: CPT

## 2025-04-01 PROCEDURE — 258N000003 HC RX IP 258 OP 636: Performed by: EMERGENCY MEDICINE

## 2025-04-01 PROCEDURE — 74177 CT ABD & PELVIS W/CONTRAST: CPT | Mod: 26 | Performed by: RADIOLOGY

## 2025-04-01 PROCEDURE — 82310 ASSAY OF CALCIUM: CPT | Performed by: EMERGENCY MEDICINE

## 2025-04-01 PROCEDURE — 74177 CT ABD & PELVIS W/CONTRAST: CPT

## 2025-04-01 PROCEDURE — 99207 PR APP CREDIT; MD BILLING SHARED VISIT: CPT | Mod: FS

## 2025-04-01 PROCEDURE — 96372 THER/PROPH/DIAG INJ SC/IM: CPT

## 2025-04-01 PROCEDURE — 81003 URINALYSIS AUTO W/O SCOPE: CPT | Performed by: EMERGENCY MEDICINE

## 2025-04-01 PROCEDURE — 84484 ASSAY OF TROPONIN QUANT: CPT | Performed by: EMERGENCY MEDICINE

## 2025-04-01 PROCEDURE — 96374 THER/PROPH/DIAG INJ IV PUSH: CPT | Mod: 59 | Performed by: EMERGENCY MEDICINE

## 2025-04-01 PROCEDURE — 999N000248 HC STATISTIC IV INSERT WITH US BY RN

## 2025-04-01 PROCEDURE — 999N000285 HC STATISTIC VASC ACCESS LAB DRAW WITH PIV START

## 2025-04-01 PROCEDURE — 82565 ASSAY OF CREATININE: CPT

## 2025-04-01 PROCEDURE — 258N000003 HC RX IP 258 OP 636

## 2025-04-01 PROCEDURE — 87086 URINE CULTURE/COLONY COUNT: CPT | Performed by: EMERGENCY MEDICINE

## 2025-04-01 PROCEDURE — 96361 HYDRATE IV INFUSION ADD-ON: CPT | Performed by: EMERGENCY MEDICINE

## 2025-04-01 PROCEDURE — 99285 EMERGENCY DEPT VISIT HI MDM: CPT | Performed by: EMERGENCY MEDICINE

## 2025-04-01 PROCEDURE — 99232 SBSQ HOSP IP/OBS MODERATE 35: CPT | Mod: FS | Performed by: INTERNAL MEDICINE

## 2025-04-01 PROCEDURE — 250N000011 HC RX IP 250 OP 636

## 2025-04-01 PROCEDURE — 82247 BILIRUBIN TOTAL: CPT | Performed by: EMERGENCY MEDICINE

## 2025-04-01 PROCEDURE — 250N000011 HC RX IP 250 OP 636: Performed by: EMERGENCY MEDICINE

## 2025-04-01 PROCEDURE — 83690 ASSAY OF LIPASE: CPT | Performed by: EMERGENCY MEDICINE

## 2025-04-01 PROCEDURE — 99285 EMERGENCY DEPT VISIT HI MDM: CPT | Mod: 25 | Performed by: EMERGENCY MEDICINE

## 2025-04-01 PROCEDURE — G0463 HOSPITAL OUTPT CLINIC VISIT: HCPCS

## 2025-04-01 PROCEDURE — 36415 COLL VENOUS BLD VENIPUNCTURE: CPT | Performed by: EMERGENCY MEDICINE

## 2025-04-01 PROCEDURE — 96361 HYDRATE IV INFUSION ADD-ON: CPT

## 2025-04-01 PROCEDURE — 85025 COMPLETE CBC W/AUTO DIFF WBC: CPT | Performed by: EMERGENCY MEDICINE

## 2025-04-01 PROCEDURE — 71045 X-RAY EXAM CHEST 1 VIEW: CPT

## 2025-04-01 PROCEDURE — 83605 ASSAY OF LACTIC ACID: CPT | Performed by: EMERGENCY MEDICINE

## 2025-04-01 PROCEDURE — 71045 X-RAY EXAM CHEST 1 VIEW: CPT | Mod: 26 | Performed by: RADIOLOGY

## 2025-04-01 PROCEDURE — 96375 TX/PRO/DX INJ NEW DRUG ADDON: CPT

## 2025-04-01 PROCEDURE — 250N000013 HC RX MED GY IP 250 OP 250 PS 637

## 2025-04-01 RX ORDER — SODIUM CHLORIDE 9 MG/ML
INJECTION, SOLUTION INTRAVENOUS CONTINUOUS
Status: DISCONTINUED | OUTPATIENT
Start: 2025-04-01 | End: 2025-04-02

## 2025-04-01 RX ORDER — ASCORBIC ACID 500 MG
500 TABLET ORAL 4 TIMES DAILY
Status: DISPENSED | OUTPATIENT
Start: 2025-04-01

## 2025-04-01 RX ORDER — BACLOFEN 10 MG/1
10 TABLET ORAL 3 TIMES DAILY
Status: DISPENSED | OUTPATIENT
Start: 2025-04-01

## 2025-04-01 RX ORDER — VARENICLINE TARTRATE 1 MG/1
1 TABLET, FILM COATED ORAL 2 TIMES DAILY
Status: DISPENSED | OUTPATIENT
Start: 2025-04-01

## 2025-04-01 RX ORDER — TRAZODONE HYDROCHLORIDE 50 MG/1
100 TABLET ORAL AT BEDTIME
Status: DISPENSED | OUTPATIENT
Start: 2025-04-01

## 2025-04-01 RX ORDER — ATORVASTATIN CALCIUM 40 MG/1
40 TABLET, FILM COATED ORAL EVERY EVENING
Status: DISPENSED | OUTPATIENT
Start: 2025-04-01

## 2025-04-01 RX ORDER — PROCHLORPERAZINE MALEATE 10 MG
10 TABLET ORAL EVERY 6 HOURS PRN
Status: DISPENSED | OUTPATIENT
Start: 2025-04-01

## 2025-04-01 RX ORDER — HYDROMORPHONE HYDROCHLORIDE 1 MG/ML
0.5 INJECTION, SOLUTION INTRAMUSCULAR; INTRAVENOUS; SUBCUTANEOUS EVERY 30 MIN PRN
Status: DISCONTINUED | OUTPATIENT
Start: 2025-04-01 | End: 2025-04-01

## 2025-04-01 RX ORDER — ACETAMINOPHEN 650 MG/1
650 SUPPOSITORY RECTAL EVERY 4 HOURS PRN
Status: ACTIVE | OUTPATIENT
Start: 2025-04-01

## 2025-04-01 RX ORDER — IOPAMIDOL 755 MG/ML
82 INJECTION, SOLUTION INTRAVASCULAR ONCE
Status: COMPLETED | OUTPATIENT
Start: 2025-04-01 | End: 2025-04-01

## 2025-04-01 RX ORDER — ONDANSETRON 4 MG/1
4 TABLET, ORALLY DISINTEGRATING ORAL ONCE
Status: COMPLETED | OUTPATIENT
Start: 2025-04-01 | End: 2025-04-01

## 2025-04-01 RX ORDER — PREGABALIN 100 MG/1
300 CAPSULE ORAL 2 TIMES DAILY
Status: DISPENSED | OUTPATIENT
Start: 2025-04-01

## 2025-04-01 RX ORDER — ENOXAPARIN SODIUM 100 MG/ML
30 INJECTION SUBCUTANEOUS EVERY 24 HOURS
Status: DISPENSED | OUTPATIENT
Start: 2025-04-01

## 2025-04-01 RX ORDER — POLYETHYLENE GLYCOL 3350 17 G/17G
17 POWDER, FOR SOLUTION ORAL DAILY
Status: DISCONTINUED | OUTPATIENT
Start: 2025-04-01 | End: 2025-04-02

## 2025-04-01 RX ORDER — ACETAMINOPHEN 325 MG/1
650 TABLET ORAL EVERY 4 HOURS PRN
Status: ACTIVE | OUTPATIENT
Start: 2025-04-01

## 2025-04-01 RX ORDER — AMOXICILLIN 250 MG
1 CAPSULE ORAL 2 TIMES DAILY PRN
Status: ACTIVE | OUTPATIENT
Start: 2025-04-01

## 2025-04-01 RX ORDER — OXYCODONE HYDROCHLORIDE 10 MG/1
10 TABLET ORAL EVERY 4 HOURS PRN
Status: DISPENSED | OUTPATIENT
Start: 2025-04-01

## 2025-04-01 RX ORDER — DULOXETIN HYDROCHLORIDE 60 MG/1
60 CAPSULE, DELAYED RELEASE ORAL DAILY
Status: DISPENSED | OUTPATIENT
Start: 2025-04-01

## 2025-04-01 RX ORDER — HYDROMORPHONE HYDROCHLORIDE 1 MG/ML
0.5 INJECTION, SOLUTION INTRAMUSCULAR; INTRAVENOUS; SUBCUTANEOUS
Status: DISCONTINUED | OUTPATIENT
Start: 2025-04-01 | End: 2025-04-03

## 2025-04-01 RX ORDER — ONDANSETRON 2 MG/ML
4 INJECTION INTRAMUSCULAR; INTRAVENOUS EVERY 6 HOURS PRN
Status: DISPENSED | OUTPATIENT
Start: 2025-04-01

## 2025-04-01 RX ORDER — ASPIRIN 325 MG
325 TABLET ORAL EVERY MORNING
Status: DISPENSED | OUTPATIENT
Start: 2025-04-01

## 2025-04-01 RX ORDER — ONDANSETRON 4 MG/1
4 TABLET, ORALLY DISINTEGRATING ORAL EVERY 6 HOURS PRN
Status: ACTIVE | OUTPATIENT
Start: 2025-04-01

## 2025-04-01 RX ORDER — AMOXICILLIN 250 MG
2 CAPSULE ORAL 2 TIMES DAILY PRN
Status: ACTIVE | OUTPATIENT
Start: 2025-04-01

## 2025-04-01 RX ORDER — SODIUM PHOSPHATE,MONO-DIBASIC 19G-7G/118
2 ENEMA (ML) RECTAL ONCE
Status: COMPLETED | OUTPATIENT
Start: 2025-04-01 | End: 2025-04-01

## 2025-04-01 RX ORDER — ONDANSETRON 2 MG/ML
4 INJECTION INTRAMUSCULAR; INTRAVENOUS EVERY 30 MIN PRN
Status: DISCONTINUED | OUTPATIENT
Start: 2025-04-01 | End: 2025-04-01

## 2025-04-01 RX ORDER — TIZANIDINE 2 MG/1
2-4 TABLET ORAL EVERY 8 HOURS PRN
Status: ACTIVE | OUTPATIENT
Start: 2025-04-01

## 2025-04-01 RX ORDER — OXYCODONE HYDROCHLORIDE 5 MG/1
5 TABLET ORAL EVERY 4 HOURS PRN
Status: ACTIVE | OUTPATIENT
Start: 2025-04-01

## 2025-04-01 RX ADMIN — SODIUM CHLORIDE: 9 INJECTION, SOLUTION INTRAVENOUS at 20:13

## 2025-04-01 RX ADMIN — OXYCODONE HYDROCHLORIDE 10 MG: 10 TABLET ORAL at 13:00

## 2025-04-01 RX ADMIN — HYDROMORPHONE HYDROCHLORIDE 0.5 MG: 1 INJECTION, SOLUTION INTRAMUSCULAR; INTRAVENOUS; SUBCUTANEOUS at 03:24

## 2025-04-01 RX ADMIN — POLYETHYLENE GLYCOL 3350 17 G: 17 POWDER, FOR SOLUTION ORAL at 10:29

## 2025-04-01 RX ADMIN — ENOXAPARIN SODIUM 30 MG: 30 INJECTION SUBCUTANEOUS at 10:43

## 2025-04-01 RX ADMIN — CHOLECALCIFEROL (VITAMIN D3) 10 MCG (400 UNIT) TABLET 10 MCG: at 09:25

## 2025-04-01 RX ADMIN — PROCHLORPERAZINE MALEATE 10 MG: 10 TABLET ORAL at 12:58

## 2025-04-01 RX ADMIN — PREGABALIN 300 MG: 100 CAPSULE ORAL at 09:25

## 2025-04-01 RX ADMIN — ASPIRIN 325 MG ORAL TABLET 325 MG: 325 PILL ORAL at 09:25

## 2025-04-01 RX ADMIN — BACLOFEN 10 MG: 10 TABLET ORAL at 13:00

## 2025-04-01 RX ADMIN — HYDROMORPHONE HYDROCHLORIDE 0.5 MG: 1 INJECTION, SOLUTION INTRAMUSCULAR; INTRAVENOUS; SUBCUTANEOUS at 18:15

## 2025-04-01 RX ADMIN — HYDROMORPHONE HYDROCHLORIDE 0.5 MG: 1 INJECTION, SOLUTION INTRAMUSCULAR; INTRAVENOUS; SUBCUTANEOUS at 08:18

## 2025-04-01 RX ADMIN — OXYCODONE HYDROCHLORIDE AND ACETAMINOPHEN 500 MG: 500 TABLET ORAL at 16:59

## 2025-04-01 RX ADMIN — PREGABALIN 300 MG: 100 CAPSULE ORAL at 20:13

## 2025-04-01 RX ADMIN — VARENICLINE TARTRATE 1 MG: 1 TABLET, FILM COATED ORAL at 09:25

## 2025-04-01 RX ADMIN — BACLOFEN 10 MG: 10 TABLET ORAL at 09:25

## 2025-04-01 RX ADMIN — ONDANSETRON 4 MG: 4 TABLET, ORALLY DISINTEGRATING ORAL at 02:44

## 2025-04-01 RX ADMIN — SODIUM CHLORIDE 1000 ML: 9 INJECTION, SOLUTION INTRAVENOUS at 03:18

## 2025-04-01 RX ADMIN — ATORVASTATIN CALCIUM 40 MG: 40 TABLET, FILM COATED ORAL at 20:13

## 2025-04-01 RX ADMIN — OXYCODONE HYDROCHLORIDE AND ACETAMINOPHEN 500 MG: 500 TABLET ORAL at 09:25

## 2025-04-01 RX ADMIN — Medication 1 TABLET: at 09:25

## 2025-04-01 RX ADMIN — IOPAMIDOL 82 ML: 755 INJECTION, SOLUTION INTRAVENOUS at 04:36

## 2025-04-01 RX ADMIN — OXYCODONE HYDROCHLORIDE AND ACETAMINOPHEN 500 MG: 500 TABLET ORAL at 20:14

## 2025-04-01 RX ADMIN — SODIUM CHLORIDE: 9 INJECTION, SOLUTION INTRAVENOUS at 10:29

## 2025-04-01 RX ADMIN — PROCHLORPERAZINE EDISYLATE 10 MG: 5 INJECTION INTRAMUSCULAR; INTRAVENOUS at 20:09

## 2025-04-01 RX ADMIN — DULOXETINE HYDROCHLORIDE 60 MG: 60 CAPSULE, DELAYED RELEASE ORAL at 09:26

## 2025-04-01 RX ADMIN — TRAZODONE HYDROCHLORIDE 100 MG: 50 TABLET ORAL at 22:18

## 2025-04-01 RX ADMIN — BACLOFEN 10 MG: 10 TABLET ORAL at 20:13

## 2025-04-01 RX ADMIN — ONDANSETRON 4 MG: 2 INJECTION, SOLUTION INTRAMUSCULAR; INTRAVENOUS at 08:18

## 2025-04-01 RX ADMIN — OXYCODONE HYDROCHLORIDE 10 MG: 10 TABLET ORAL at 20:20

## 2025-04-01 RX ADMIN — VARENICLINE TARTRATE 1 MG: 1 TABLET, FILM COATED ORAL at 20:14

## 2025-04-01 RX ADMIN — HYDROMORPHONE HYDROCHLORIDE 0.5 MG: 1 INJECTION, SOLUTION INTRAMUSCULAR; INTRAVENOUS; SUBCUTANEOUS at 13:45

## 2025-04-01 ASSESSMENT — ACTIVITIES OF DAILY LIVING (ADL)
ADLS_ACUITY_SCORE: 56
ADLS_ACUITY_SCORE: 60
ADLS_ACUITY_SCORE: 60
ADLS_ACUITY_SCORE: 56
ADLS_ACUITY_SCORE: 60
ADLS_ACUITY_SCORE: 56
ADLS_ACUITY_SCORE: 60
ADLS_ACUITY_SCORE: 62
ADLS_ACUITY_SCORE: 56
ADLS_ACUITY_SCORE: 62
ADLS_ACUITY_SCORE: 56
ADLS_ACUITY_SCORE: 60
ADLS_ACUITY_SCORE: 56
ADLS_ACUITY_SCORE: 60
ADLS_ACUITY_SCORE: 56
ADLS_ACUITY_SCORE: 60

## 2025-04-01 ASSESSMENT — COLUMBIA-SUICIDE SEVERITY RATING SCALE - C-SSRS
2. HAVE YOU ACTUALLY HAD ANY THOUGHTS OF KILLING YOURSELF IN THE PAST MONTH?: NO
6. HAVE YOU EVER DONE ANYTHING, STARTED TO DO ANYTHING, OR PREPARED TO DO ANYTHING TO END YOUR LIFE?: NO
1. IN THE PAST MONTH, HAVE YOU WISHED YOU WERE DEAD OR WISHED YOU COULD GO TO SLEEP AND NOT WAKE UP?: NO
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
## 2025-04-01 NOTE — DISCHARGE INSTRUCTIONS
[facility]     Name: Parth Fountain  Date: 4/1/25    To order your ostomy supplies    The ostomy Supplier needs this supply list  to process your order. You will need to fax/deliver this list, along with your Insurance information. Your home care nurse can assist with this process.    List of Ostomy Distributors      [facility]  Ph. [phone] Fax # [phone]  [employer]   Ph. [phone]  [facility]   Ph. [phone]  [facility]   Ph. [phone] [phone]  Or Call your insurance provider for their preferred supplier    Your Medical Supplier will need your surgeon's name, phone and fax number    Clinic:                     Phone                            Fax  General Surgery:   [phone]  Verbal Order for ostomy supplies for 1 Month per:         Ami Lopez RN, CWOCN                                                                                                    Authorizing MD: Dr. Mark    Quantity of pouches:     20/mo.    Request the following supplies:      Raymond    1 piece flat fecal with filter #8331 or 1 piece flat fecal with filter. Pre sized 1in #8025               1piece flat urine # [id number]                             Accessories  2  barrier ring #[id number]              Change your pouch 2 times a week, more often if leaking.   If you are cutting a hole in the wafer of your pouch, recheck stoma size and adjust pouch opening as needed every week    . Call the Ostomy Nurse at [facility]       [address] [place] [address], [place] : [phone]   Schedule a follow-up visit in 2 to 4 weeks after your surgery, sooner if having problems Bring a complete set of pouch-changing supplies to this visit    [facility] outpatient [facility] department  [address] [place] [address]   number- [phone]     Problems you should Report  - The stoma turns blue or darker in  color.  - Cuts or sores around the stoma.  - Red, raw or painful skin around the stoma.  - Any bulging of the skin around the stoma.  - A pouch that leaks every day.  - Problems making the right size hole in the pouch wafer.   Please call with any questions or concerns.
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
## 2025-04-01 NOTE — MEDICATION SCRIBE - ADMISSION MEDICATION HISTORY
Medication Scribe Admission Medication History    Admission medication history is complete. The information provided in this note is only as accurate as the sources available at the time of the update.    Information Source(s): Patient, Facility (TCU/NH/) medication list/MAR, and Medication list  via in-person    Pertinent Information: per pt, [facility]+Medication list, pt reported taking medications on PTA medication list as directed, stated he do remembered all the medications he took yesterday, but the few he reported taken yesterday, however, he stated medication list is current and up to date. Pt from , no time stamped on med list.    Changes made to PTA medication list:  Added: None  Deleted: None  Changed: None    Allergies reviewed with patient and updates made in EHR: yes    Medication History Completed By: Anna Malik 4/1/2025 7:31 AM    PTA Med List   Medication Sig Last Dose/Taking    acetaminophen (TYLENOL) 500 MG tablet Take 1-2 tablets (500-1,000 mg) by mouth every 6 hours as needed for mild pain. 3/31/2025    aspirin (ASA) 325 MG tablet TAKE 1 TABLET BY MOUTH EVERY MORNING 3/31/2025    atorvastatin (LIPITOR) 40 MG tablet TAKE 1 TABLET BY MOUTH EVERY EVENING 3/31/2025 Bedtime    baclofen (LIORESAL) 10 MG tablet Take 1 tablet (10 mg) by mouth 3 times daily. 3/31/2025    Bismuth Subsalicylate 525 MG/15ML SUSP Take 30 mLs by mouth every 4 hours as needed (GI upset) Taking As Needed    CALCIUM ANTACID 500 MG chewable tablet Take 1-2 chew tab by mouth daily as needed for heartburn. Taking As Needed    calcium carbonate-vitamin D (OSCAL) 500-5 MG-MCG tablet Take 1 tablet by mouth daily. Taking    DULoxetine (CYMBALTA) 60 MG capsule TAKE 1 CAPSULE BY MOUTH ONCE DAILY Taking    loperamide (IMODIUM) 2 MG capsule Take 2 capsules (4 mg) by mouth at bedtime. May also take 2 capsules (4 mg) 3 times daily as needed for diarrhea. Taking As Needed    pregabalin (LYRICA) 300 MG capsule Take 1 capsule (300 mg) by  mouth 2 times daily. Taking    tiZANidine (ZANAFLEX) 2 MG tablet Take 1-2 tablets (2-4 mg) by mouth every 8 hours as needed for muscle spasms. Taking As Needed    traZODone (DESYREL) 100 MG tablet TAKE 1 TABLET BY MOUTH AT BEDTIME 3/31/2025 Bedtime    varenicline (CHANTIX) 1 MG tablet Take 1 tablet (1 mg) by mouth 2 times daily. 3/31/2025 Bedtime    vitamin C (ASCORBIC ACID) 500 MG tablet Take 1 tablet (500 mg) by mouth 4 times daily. Taking    Vitamin D3 (VITAMIN D) 10 MCG (400 UNIT) tablet TAKE 1 TABLET BY MOUTH ONCE DAILY Taking
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
## 2025-04-01 NOTE — PLAN OF CARE
Goal Outcome Evaluation:      Plan of Care Reviewed With: patient    Overall Patient Progress: no changeOverall Patient Progress: no change       Pt A&O X4, VSS ex hypertensive on RA. Denies chest pain or SOB. Tolerating clears, denies nausea. Iv dilaudid x1 given for abd pain. Colostomy with no output, urostomy with adequate urine output. Above the knee amputation,  Pressure wounds and penis wound dressing done by WO. Pt had fleet Enema to be given through Ostomy, Tried to give it but came right back out. Provider aware, will talk to surgery team to reassess tomorrow. Monitor and cont with POC.        Observation goals:   -diagnostic tests and consults completed and resulted: In process  -vital signs normal or at patient baseline:  -tolerating oral intake to maintain hydration: In Process  -adequate pain control on oral analgesics:  -returns to baseline functional status: Not met  -safe disposition plan has been identified: In process

## 2025-04-01 NOTE — ED TRIAGE NOTES
BIBA from group home complaining of no output in colostomy bag x 14 hrs, since changing after lunch. C/O nausea, dry heaves, no vomiting. Hx of bowel obstruction.    BP (!) 151/78   Pulse 55   Temp 98  F (36.7  C) (Oral)   Resp 18   Wt 61.2 kg (135 lb)   BMI 18.83 kg/m         Triage Assessment (Adult)       Row Name 04/01/25 0138          Triage Assessment    Airway WDL WDL        Respiratory WDL    Respiratory WDL WDL        Cardiac WDL    Cardiac WDL WDL        Cognitive/Neuro/Behavioral WDL    Cognitive/Neuro/Behavioral WDL WDL

## 2025-04-01 NOTE — CONSULTS
[facility] Nurse Inpatient Assessment     Consulted for: Colostomy; Location of Injury: Buttocks and penis    [facility] nurse follow-up plan: weekly    Patient History (according to provider note(s):      Parth Fountain is a 59 year old adult admitted on 4/1/2025. He has a PMHx notable for spinal cord injury, polysubstance abuse, DVT, neurogenic bladder with ileal conduit, peripheral arterial disease, history of bowel obstructions with colostomy in place. Presented to the ED from group home with no ostomy output x14 hours, abdominal pain, and nausea.     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  Wound base: 100 % Granulation tissue,      Palpation of the wound bed: normal      Drainage: small     Description of drainage: bloody     Measurements (length x width x depth, in cm): 1  x 0.7  x  0.2 cm      Tunneling: N/A     Undermining: N/A  Periwound skin: Intact      Color: normal and consistent with surrounding tissue      Temperature: normal   Odor: none  Pain: mild, tender  Pain interventions prior to dressing change: patient tolerated well and no significant pain present   Treatment goal: Heal   STATUS: initial assessment  Supplies ordered: gathered and at bedside      Pressure Injury Location: Bilateral IT    Right IT    Left IT  Last photo: 4/1  Wound type: Pressure Injury     Pressure Injury Stage: 4, present on admission   Wound history/plan of care:   Chronic Pressure injury for several years. Per Pt wound usually have copious amount of discharge. This was not noted today. Dressed wound with mextra but will write orders for mepilex   Wound base: Right  % Granulation tissue, Left IT 90 % Epidermis 10% dermis.      Palpation of the  wound bed: normal      Drainage: none     Description of drainage: none     Measurements (length x width x depth, in cm) Right IT 0.5  x 0.5  x  0.3 cm Left IT 4.5 x 0.8 x 0.5     Tunneling N/A     Undermining N/A  Periwound skin: Scar tissue      Color: pink      Temperature: normal   Odor: mild  Pain: denies , none  Pain intervention prior to dressing change: N/A  Treatment goal: Heal  and Protection  STATUS: initial assessment  Supplies ordered: gathered and at bedside    Assessment of Established end Ileal Conduit:  How long has patient had ostomy: 20+ years  Stoma: pink and round  Mucutaneous junction: not visualized (barrier in place)  Peristomal skin: none   Output: thin and yellow    Is patient independent with ostomy care?: Yes  Home pouching system: Raymond 1 piece precut   Pouching issues and/or educational needs: none  Interventions completed today: Infection prevention/hygiene   Pouching system in use while hospitalized:  [place] one piece  Supplies location: gathered and at bedside Pt has own supplies    Assessment of Established end Colostomy:  How long has patient had ostomy: 20+ years  Stoma: oval and flat  Mucutaneous junction: intact  Peristomal skin: none   Output: bowel sweat     Is patient independent with ostomy care?: Yes  Home pouching system: 1 piece per-cut 1.5 inches. Will use a two piece while in the hospital for better assessment of stoma.  Pouching issues and/or educational needs:Evaluate leakage issue  Interventions completed today: Initial fitting, Ostomy accessory product use , and different pouching options and role of outpatient woc  Pouching system in use while hospitalized:  Raymond two piece  Supplies location: gathered        Treatment Plan:     Penis wound(s): Daily  and PRN  Cleanse with Vashe (#085094)  Cut small piece of cassandra (#153849) to fit over wound bed.  Hydrate with saline solution and apply to wound bed.   Cover with 2x2 guaze and metapore tape  It is not  "necessary to remove any residual cassandra during dressing changes    Left IT wound: Daily and PRN  Cleanse with Vashe (#397610)  Cut small piece of Aquacel AG (#954460) to fit in wound bed.  Cover with 4 x 4 mepilex    Right IT wound(s): Daily  and PRN  Cleanse with Vashe (#491709)  Cut small piece of cassandra (#454199) to fit over wound bed.  Hydrate with saline solution and apply to wound bed.   Cover with 4 x 4 mepilex  It is not necessary to remove any residual cassandra during dressing changes    RLQ Ileal Conduit pouching plan:   Pouching system: ostomy supplies pouches: Raymond Flat URINE (785949)   Accessories used: WOC ostomy accessories: 2\" Cera Barrier Ring (547123)   Frequency of pouch changes: Twice weekly and PRN leakage  WOC follow up plan: As needed   Bedside RN interventions: Notify WOC for ongoing pouch leakage and Patient independent with cares     LLQ Colostomy pouching plan:   Pouching system: ostomy supplies pouches: [place] 57 FECAL (722352) ostomy supplies barrier: [place] 57mm FLAT (224703)  Accessories used: WOC ostomy accessories: 2\" Cera Barrier Ring (937914)   Frequency of pouch changes: Twice weekly and PRN leakage  WOC follow up plan: As needed  and Notify WOC if leakage occurs  Bedside RN interventions: Change pouch PRN if leaking using the supplies above, Notify WOC for ongoing pouch leakage, and Patient independent with cares     Orders: Written    RECOMMEND PRIMARY TEAM ORDER: None, at this time  Education provided: plan of care  Discussed plan of care with: Patient and Nurse  Notify WOC if wound(s) deteriorate.  Nursing to notify the Provider(s) and re-consult the WOC Nurse if new skin concern.    DATA:     Current support surface: Standard  Standard gel mattress (Isoflex)  Containment of urine/stool: Colostomy pouch and Urostomy pouch  BMI: Body mass index is 14.79 kg/m .   Active diet order: Orders Placed This Encounter      Clear Liquid Diet     Output: I/O last 3 completed " shifts:  In: 420 [P.O.:420]  Out: 550 [Urine:550]     Labs:   Recent Labs   Lab 04/01/25  0309   ALBUMIN 3.8   HGB 11.9   WBC 9.3     Pressure injury risk assessment:   Sensory Perception: 2-->very limited  Moisture: 4-->rarely moist  Activity: 1-->bedfast  Mobility: 2-->very limited  Nutrition: 3-->adequate  Friction and Shear: 2-->potential problem  Anthony Score: 14    Ami Lopez RN CWOCN  Please contact through [employer] group: [facility] Nurse [place]  Dept. Office Number: [phone]       [facility]     Name: Parth Fountain  Date: 4/1/25    To order your ostomy supplies    The ostomy Supplier needs this supply list  to process your order. You will need to fax/deliver this list, along with your Insurance information. Your home care nurse can assist with this process.    List of Ostomy Distributors      [facility]  Ph. [phone] Fax # [phone]  [facility]   Ph. [phone]  [employer] [facility]   Ph. [phone]  [facility]   Ph. [phone]  Or Call your insurance provider for their preferred supplier    Your Medical Supplier will need your surgeon's name, phone and fax number    Clinic:                     Phone                            Fax  General Surgery:   [phone]  Verbal Order for ostomy supplies for 1 Month per:         Ami Lopez RN, CWOCN                                                                                                    Authorizing MD: Dr. Mark    Quantity of pouches:     20/mo.    Request the following supplies:      [place]    1 piece flat fecal with filter #8331 or 1 piece flat fecal with filter. Pre sized 1in #8025               1piece flat urine # [id number]                             Accessories  2  barrier ring #[id number]              Change your pouch 2 times a week, more often if leaking.   If you are cutting a hole in the wafer of your pouch, recheck  stoma size and adjust pouch opening as needed every week    . Call the Ostomy Nurse at [facility]       [address] [place] [address] MICHAEL ALBRIGHT : [phone]   Schedule a follow-up visit in 2 to 4 weeks after your surgery, sooner if having problems Bring a complete set of pouch-changing supplies to this visit    [facility] department  [address] [place] [address]   number- [phone]     Problems you should Report  - The stoma turns blue or darker in color.  - Cuts or sores around the stoma.  - Red, raw or painful skin around the stoma.  - Any bulging of the skin around the stoma.  - A pouch that leaks every day.  - Problems making the right size hole in the pouch wafer.   Please call with any questions or concerns.

## 2025-04-02 PROBLEM — R23.1 PALLOR: Status: ACTIVE | Noted: 2025-04-02

## 2025-04-02 PROBLEM — S31.20XD: Status: ACTIVE | Noted: 2025-04-02

## 2025-04-02 PROBLEM — L89.324 PRESSURE INJURY OF LEFT ISCHIUM, STAGE 4 (H): Chronic | Status: ACTIVE | Noted: 2022-08-09

## 2025-04-02 LAB
ALBUMIN SERPL BCG-MCNC: 3.8 G/DL (ref 3.5–5.2)
ALP SERPL-CCNC: 75 U/L (ref 40–150)
ALT SERPL W P-5'-P-CCNC: 9 U/L (ref 0–70)
ANION GAP SERPL CALCULATED.3IONS-SCNC: 15 MMOL/L (ref 7–15)
AST SERPL W P-5'-P-CCNC: 17 U/L (ref 0–45)
BACTERIA UR CULT: NORMAL
BILIRUB SERPL-MCNC: 0.3 MG/DL
BUN SERPL-MCNC: 16.7 MG/DL (ref 8–23)
CALCIUM SERPL-MCNC: 9.9 MG/DL (ref 8.8–10.4)
CHLORIDE SERPL-SCNC: 100 MMOL/L (ref 98–107)
CREAT SERPL-MCNC: 0.63 MG/DL (ref 0.51–1.17)
EGFRCR SERPLBLD CKD-EPI 2021: >90 ML/MIN/1.73M2
GLUCOSE BLDC GLUCOMTR-MCNC: 146 MG/DL (ref 70–99)
GLUCOSE BLDC GLUCOMTR-MCNC: 39 MG/DL (ref 70–99)
GLUCOSE BLDC GLUCOMTR-MCNC: 80 MG/DL (ref 70–99)
GLUCOSE SERPL-MCNC: 42 MG/DL (ref 70–99)
HCO3 SERPL-SCNC: 22 MMOL/L (ref 22–29)
POTASSIUM SERPL-SCNC: 4.2 MMOL/L (ref 3.4–5.3)
PROT SERPL-MCNC: 6.2 G/DL (ref 6.4–8.3)
SODIUM SERPL-SCNC: 137 MMOL/L (ref 135–145)

## 2025-04-02 PROCEDURE — 99232 SBSQ HOSP IP/OBS MODERATE 35: CPT | Mod: FS

## 2025-04-02 PROCEDURE — 250N000013 HC RX MED GY IP 250 OP 250 PS 637

## 2025-04-02 PROCEDURE — 250N000011 HC RX IP 250 OP 636

## 2025-04-02 PROCEDURE — 96361 HYDRATE IV INFUSION ADD-ON: CPT

## 2025-04-02 PROCEDURE — 258N000003 HC RX IP 258 OP 636

## 2025-04-02 PROCEDURE — 82962 GLUCOSE BLOOD TEST: CPT

## 2025-04-02 PROCEDURE — 84155 ASSAY OF PROTEIN SERUM: CPT

## 2025-04-02 PROCEDURE — G0378 HOSPITAL OBSERVATION PER HR: HCPCS

## 2025-04-02 PROCEDURE — 82435 ASSAY OF BLOOD CHLORIDE: CPT

## 2025-04-02 PROCEDURE — 96376 TX/PRO/DX INJ SAME DRUG ADON: CPT

## 2025-04-02 PROCEDURE — 120N000002 HC R&B MED SURG/OB UMMC

## 2025-04-02 PROCEDURE — 99207 PR APP CREDIT; MD BILLING SHARED VISIT: CPT | Mod: FS | Performed by: INTERNAL MEDICINE

## 2025-04-02 PROCEDURE — 36415 COLL VENOUS BLD VENIPUNCTURE: CPT

## 2025-04-02 RX ORDER — LOSARTAN POTASSIUM 25 MG/1
25 TABLET ORAL DAILY
Status: DISPENSED | OUTPATIENT
Start: 2025-04-02

## 2025-04-02 RX ORDER — AMOXICILLIN 250 MG
1 CAPSULE ORAL DAILY
Status: DISCONTINUED | OUTPATIENT
Start: 2025-04-02 | End: 2025-04-03

## 2025-04-02 RX ORDER — POLYETHYLENE GLYCOL 3350 17 G/17G
17 POWDER, FOR SOLUTION ORAL DAILY
Status: DISCONTINUED | OUTPATIENT
Start: 2025-04-02 | End: 2025-04-03

## 2025-04-02 RX ORDER — DEXTROSE MONOHYDRATE AND SODIUM CHLORIDE 5; .9 G/100ML; G/100ML
INJECTION, SOLUTION INTRAVENOUS CONTINUOUS
Status: ACTIVE | OUTPATIENT
Start: 2025-04-02

## 2025-04-02 RX ADMIN — CHOLECALCIFEROL (VITAMIN D3) 10 MCG (400 UNIT) TABLET 10 MCG: at 10:40

## 2025-04-02 RX ADMIN — HYDROMORPHONE HYDROCHLORIDE 0.5 MG: 1 INJECTION, SOLUTION INTRAMUSCULAR; INTRAVENOUS; SUBCUTANEOUS at 21:35

## 2025-04-02 RX ADMIN — DEXTROSE AND SODIUM CHLORIDE: 5; .9 INJECTION, SOLUTION INTRAVENOUS at 07:43

## 2025-04-02 RX ADMIN — HYDROMORPHONE HYDROCHLORIDE 0.5 MG: 1 INJECTION, SOLUTION INTRAMUSCULAR; INTRAVENOUS; SUBCUTANEOUS at 10:53

## 2025-04-02 RX ADMIN — ENOXAPARIN SODIUM 30 MG: 30 INJECTION SUBCUTANEOUS at 10:41

## 2025-04-02 RX ADMIN — OXYCODONE HYDROCHLORIDE AND ACETAMINOPHEN 500 MG: 500 TABLET ORAL at 21:41

## 2025-04-02 RX ADMIN — ONDANSETRON 4 MG: 2 INJECTION, SOLUTION INTRAMUSCULAR; INTRAVENOUS at 09:39

## 2025-04-02 RX ADMIN — LOSARTAN POTASSIUM 25 MG: 25 TABLET, FILM COATED ORAL at 10:41

## 2025-04-02 RX ADMIN — BACLOFEN 10 MG: 10 TABLET ORAL at 10:40

## 2025-04-02 RX ADMIN — BACLOFEN 10 MG: 10 TABLET ORAL at 21:40

## 2025-04-02 RX ADMIN — ATORVASTATIN CALCIUM 40 MG: 40 TABLET, FILM COATED ORAL at 21:40

## 2025-04-02 RX ADMIN — DULOXETINE HYDROCHLORIDE 60 MG: 60 CAPSULE, DELAYED RELEASE ORAL at 10:40

## 2025-04-02 RX ADMIN — PREGABALIN 300 MG: 100 CAPSULE ORAL at 10:41

## 2025-04-02 RX ADMIN — VARENICLINE TARTRATE 1 MG: 1 TABLET, FILM COATED ORAL at 10:41

## 2025-04-02 RX ADMIN — MAGNESIUM HYDROXIDE 30 ML: 400 SUSPENSION ORAL at 15:10

## 2025-04-02 RX ADMIN — BACLOFEN 10 MG: 10 TABLET ORAL at 14:22

## 2025-04-02 RX ADMIN — POLYETHYLENE GLYCOL 3350 17 G: 17 POWDER, FOR SOLUTION ORAL at 10:37

## 2025-04-02 RX ADMIN — VARENICLINE TARTRATE 1 MG: 1 TABLET, FILM COATED ORAL at 21:44

## 2025-04-02 RX ADMIN — PREGABALIN 300 MG: 100 CAPSULE ORAL at 21:40

## 2025-04-02 RX ADMIN — DEXTROSE AND SODIUM CHLORIDE: 5; .9 INJECTION, SOLUTION INTRAVENOUS at 17:57

## 2025-04-02 RX ADMIN — HYDROMORPHONE HYDROCHLORIDE 0.5 MG: 1 INJECTION, SOLUTION INTRAMUSCULAR; INTRAVENOUS; SUBCUTANEOUS at 17:41

## 2025-04-02 RX ADMIN — OXYCODONE HYDROCHLORIDE 10 MG: 10 TABLET ORAL at 09:39

## 2025-04-02 RX ADMIN — SODIUM CHLORIDE: 9 INJECTION, SOLUTION INTRAVENOUS at 06:35

## 2025-04-02 RX ADMIN — PROCHLORPERAZINE MALEATE 10 MG: 10 TABLET ORAL at 14:27

## 2025-04-02 RX ADMIN — OXYCODONE HYDROCHLORIDE AND ACETAMINOPHEN 500 MG: 500 TABLET ORAL at 16:11

## 2025-04-02 RX ADMIN — Medication 1 TABLET: at 10:40

## 2025-04-02 RX ADMIN — OXYCODONE HYDROCHLORIDE 10 MG: 10 TABLET ORAL at 15:09

## 2025-04-02 RX ADMIN — MINERAL OIL 1 ENEMA: 100 ENEMA RECTAL at 11:45

## 2025-04-02 RX ADMIN — ONDANSETRON 4 MG: 2 INJECTION, SOLUTION INTRAMUSCULAR; INTRAVENOUS at 21:44

## 2025-04-02 RX ADMIN — MINERAL OIL 1 ENEMA: 100 ENEMA RECTAL at 17:57

## 2025-04-02 RX ADMIN — SENNOSIDES AND DOCUSATE SODIUM 1 TABLET: 50; 8.6 TABLET ORAL at 10:41

## 2025-04-02 RX ADMIN — OXYCODONE HYDROCHLORIDE AND ACETAMINOPHEN 500 MG: 500 TABLET ORAL at 10:46

## 2025-04-02 RX ADMIN — ASPIRIN 325 MG ORAL TABLET 325 MG: 325 PILL ORAL at 10:40

## 2025-04-02 ASSESSMENT — ACTIVITIES OF DAILY LIVING (ADL)
WEAR_GLASSES_OR_BLIND: NO
ADLS_ACUITY_SCORE: 60
TOILETING: 2-->COMPLETELY DEPENDENT
ADLS_ACUITY_SCORE: 58
ADLS_ACUITY_SCORE: 58
DRESSING/BATHING: BATHING DIFFICULTY, ASSISTANCE 1 PERSON
ADLS_ACUITY_SCORE: 58
ADLS_ACUITY_SCORE: 58
ADLS_ACUITY_SCORE: 60
ADLS_ACUITY_SCORE: 60
WALKING_OR_CLIMBING_STAIRS_DIFFICULTY: YES
ADLS_ACUITY_SCORE: 60
ADLS_ACUITY_SCORE: 58
ADLS_ACUITY_SCORE: 58
DRESSING/BATHING_DIFFICULTY: YES
ADLS_ACUITY_SCORE: 58
TOILETING_ISSUES: YES
TOILETING_ASSISTANCE: TOILETING DIFFICULTY, REQUIRES EQUIPMENT
ADLS_ACUITY_SCORE: 58
CONCENTRATING,_REMEMBERING_OR_MAKING_DECISIONS_DIFFICULTY: NO
ADLS_ACUITY_SCORE: 56
ADLS_ACUITY_SCORE: 60
ADLS_ACUITY_SCORE: 58
ADLS_ACUITY_SCORE: 58
FALL_HISTORY_WITHIN_LAST_SIX_MONTHS: NO
WALKING_OR_CLIMBING_STAIRS: STAIR CLIMBING DIFFICULTY, DEPENDENT
DIFFICULTY_EATING/SWALLOWING: NO
ADLS_ACUITY_SCORE: 60
DEPENDENT_IADLS:: CLEANING;COOKING;LAUNDRY;SHOPPING;MEAL PREPARATION;MEDICATION MANAGEMENT;TRANSPORTATION
TOILETING: 2-->COMPLETELY DEPENDENT (NOT DEVELOPMENTALLY APPROPRIATE)
ADLS_ACUITY_SCORE: 60
DOING_ERRANDS_INDEPENDENTLY_DIFFICULTY: YES
CHANGE_IN_FUNCTIONAL_STATUS_SINCE_ONSET_OF_CURRENT_ILLNESS/INJURY: NO

## 2025-04-02 NOTE — PLAN OF CARE
Goal Outcome Evaluation:     diagnostic tests and consults completed and resulted: in progress  -vital signs normal or at patient baseline: met  -tolerating oral intake to maintain hydration: met  -adequate pain control on oral analgesics: met  -returns to baseline functional status: not met  -safe disposition plan has been identified: not met

## 2025-04-02 NOTE — PLAN OF CARE
Goal Outcome Evaluation:      diagnostic tests and consults completed and resulted: in progress  -vital signs normal or at patient baseline: met  -tolerating oral intake to maintain hydration: met  -adequate pain control on oral analgesics: met  -returns to baseline functional status: not met  -safe disposition plan has been identified: not met          No output in colostomy, passing gas. Urostomy good output.

## 2025-04-02 NOTE — PLAN OF CARE
Goal Outcome Evaluation:    BP (!) 166/86 (BP Location: Right arm)   Pulse 93   Temp 98.6  F (37  C) (Oral)   Resp 16   Wt 47.4 kg (104 lb 8 oz)   SpO2 98%   BMI 14.57 kg/m        -diagnostic tests and consults completed and resulted - MET  -vital signs normal or at patient baseline - HTN  -tolerating oral intake to maintain hydration - Nausea  -adequate pain control on oral analgesics - Tolerating oral  -returns to baseline functional status - MET  -safe disposition plan has been identified - Plan to discharge back to  when medically ready.

## 2025-04-02 NOTE — CONSULTS
Care Management Initial Consult    General Information  Assessment completed with: PatientParth  Type of CM/SW Visit: Initial Assessment    Primary Care Provider verified and updated as needed: Yes (Boaz [phone])   Readmission within the last 30 days: no previous admission in last 30 days      Reason for Consult: discharge planning  Advance Care Planning: Advance Care Planning Reviewed: no concerns identified        Communication Assessment  Patient's communication style: spoken language (English or Bilingual)    Hearing Difficulty or Deaf: no   Wear Glasses or Blind: no    Cognitive  Cognitive/Neuro/Behavioral: WDL                      Living Environment:   People in home: facility resident     Current living Arrangements: group home      Able to return to prior arrangements: yes     Family/Social Support:  Care provided by: other (see comments) (Group home staff.)  Provides care for: no one, unable/limited ability to care for self  Marital Status: Single  Support system:            Description of Support System:         Current Resources:   Patient receiving home care services: No     Community Resources: Group Home, Transportation Services, [facility]  Equipment currently used at home: wheelchair, power, wheelchair, manual, colostomy/ostomy supplies  Supplies currently used at home: Other, Wound Care Supplies (ileostomy, urostomy)    Employment/Financial:  Employment Status:          Financial Concerns:       Does the patient's insurance plan have a 3 day qualifying hospital stay waiver?  No    Lifestyle & Psychosocial Needs:  Social Drivers of Health     Food Insecurity: Low Risk  (4/1/2025)    Food Insecurity     Within the past 12 months, did you worry that your food would run out before you got money to buy more?: No     Within the past 12 months, did the food you bought just not last and you didn t have money to get more?: No   Depression: Not at risk (1/15/2025)    PHQ-2     PHQ-2 Score:  Incomplete   Housing Stability: Low Risk  (4/1/2025)    Housing Stability     Do you have housing? : Yes     Are you worried about losing your housing?: No   Tobacco Use: Medium Risk (2/18/2025)    Patient History     Smoking Tobacco Use: Former     Smokeless Tobacco Use: Never     Passive Exposure: Not on file   Financial Resource Strain: Low Risk  (4/1/2025)    Financial Resource Strain     Within the past 12 months, have you or your family members you live with been unable to get utilities (heat, electricity) when it was really needed?: No   Alcohol Use: Not on file   Transportation Needs: Low Risk  (4/1/2025)    Transportation Needs     Within the past 12 months, has lack of transportation kept you from medical appointments, getting your medicines, non-medical meetings or appointments, work, or from getting things that you need?: No   Physical Activity: Not on file   Interpersonal Safety: Low Risk  (4/2/2025)    Interpersonal Safety     Do you feel physically and emotionally safe where you currently live?: Yes     Within the past 12 months, have you been hit, slapped, kicked or otherwise physically hurt by someone?: No     Within the past 12 months, have you been humiliated or emotionally abused in other ways by your partner or ex-partner?: No   Stress: Not on file   Social Connections: Unknown (12/23/2021)    Received from [facility], [facility]    Social Connections     Frequency of Communication with Friends and Family: Not on file   Health Literacy: Not on file     Functional Status:  Prior to admission patient needed assistance:   Dependent ADLs:: Wheelchair-independent  Dependent IADLs:: Cleaning, Cooking, Laundry, Shopping, Meal Preparation, Medication Management, Transportation     Mental Health Status:No current concerns.       Chemical Dependency Status: No current concerns.        Values/Beliefs:  Spiritual, Cultural Beliefs,  Alevism Practices, Values that affect care:               Discussed  Partnership in Safe Discharge Planning  document with patient/family: No    Additional Information:  SW received consult for discharge planning. KEARA met with pt at bedside and introduced self, role, and explained purpose of assessment. SW verified PCP, address, and insurance. Pt reported he has MA which is not listed on his face sheet and reported someone at his group home helped him with the application. SW informed pt she will check to see if his MA is currently active. Pt reported he lives at [facility] and they assist with his I/ADLs needs. Pt reported he has a wheelchair , hospital bed, and ostomy supplies from [facility] [employer]. Pt shared his incomes comes from social security and he has no mental health, CD, and financial concerns. Pt shared he has wound care nurse through [facility] and will need at stretcher ride. SW explained to pt that they will send the bill to his insurance first but he might get it if his insurance does not cover it. Pt shared he will check if group home staff or someone else can bring his wheelchair for him.     KEARA asked the CHW to check pt's MA and it shows his MA is inactive. KEAAR updated pt and asked if Jessika is his still his Cadi  and pt reported he has a new Cadi  but does not the know name yet.     Discharge contacts:   [employer] [facility] (RN for wound care)   Phone: [phone]   Fax: [phone]     [facility]  w/ Cirilo Rosen ( previous CM)  Ph: [phone]  Next Steps:   -Follow up with the [facility] for pt's current Cadi CM information.   -Coordinate discharge back to [facility] and send JUSTIN order to [facility].     JEYSON Hernandez, LGSW  OBS/ED   Phone: [phone]  Fax: [phone]  Vocera: 1A Obs KEARA

## 2025-04-02 NOTE — PROGRESS NOTES
[facility]    Medicine Progress Note - Hospitalist Service    Date of Admission:  4/1/2025    Assessment & Plan    Parth Fountain is a 59 year old adult admitted on 4/1/2025. He has a PMHx notable for spinal cord injury, polysubstance abuse, DVT, neurogenic bladder with ileal conduit, peripheral arterial disease, history of bowel obstructions with colostomy in place. Presented to the ED from group home with no ostomy output x14 hours, abdominal pain, and nausea.     Updates Today:  - hypoglycemia event early this AM, provided apple juice with appropriate response -> start D5NS mIVF   - unable to give fleet enema though colostomy yesterday   - follow any updated general surgery recs -> mineral oil enemas, increase PO bowel regimen   - on exam this AM, ostomy bag empty of stool + flatus      Decreased ostomy output   Nausea and abdominal pain   Hx of bowel obstructions s/p colostomy   Presented to ED from group home complaining of no/minimal ostomy output for multiple days, nausea, retching, and abdominal pain. Has a history of bowel obstruction. Abdominal pain is diffuse and intermittent but not distended like has been in the past when he's had bowel obstructions. Able to keep fluids down. Previously, texture and consistence of stool at his baseline, without blood; passing flatus. Labs on admission largely unremarkable. CT A/P w/ contrast on admission with stable chronic/incidental findings and post-surgical changes, no acute process including bowel obstruction. COVID, flu A/B, and RSV negative. UA with large blood, moderate leukocytes; negative nitrite. Urine culture   >100,000 CFU/mL Mixture of Urogenital Marleni however growth consistent with probable contamination.   - mIVF   - general surgery consulted appreciate assistance    - mineral oil enema q6h   - increase PO bowel regimen, consider adding milk of mag   - WOCN consulted, appreciate assistance   - symptom control      Hypoglycemia  Glucose 42 on BMP, 39 on POCT glucose check; symptomatic with fatigue. Given apple juice with appropriate response.   - mIVF switched to D5NS @ 100 ml/hr     Spinal cord injury  Neurogenic bladder with ileal conduit  Urostomy with clear yellow urine urine.   - noted, continue to monitor   - continue baclofen 10 mg TID, lyrica 300 mg BID, duloxetine 60 mg daily, and tizanidine 2-4 mg q8h PRN      Polysubstance use disorder  UDS on admission positive for cocaine.   - continue PTA chantix 100 mg at bedtime     Hx DVT  Peripheral artery disease   - continue PTA aspirin 325 mg daily   - continue PTA atorvastatin 40 mg daily      Wounds to buttocks and penis   Present on admission.  - WOCN consulted, appreciate assistance        Observation Goals: -diagnostic tests and consults completed and resulted, -vital signs normal or at patient baseline, -tolerating oral intake to maintain hydration, -adequate pain control on oral analgesics, -returns to baseline functional status, -safe disposition plan has been identified, Nurse to notify provider when observation goals have been met and patient is ready for discharge.  Diet: Clear Liquid Diet    DVT Prophylaxis: Enoxaparin (Lovenox) SQ  Cueto Catheter: Not present  Lines: None     Cardiac Monitoring: None  Code Status: Full Code      Clinically Significant Risk Factors Present on Admission         # Hyponatremia: Lowest Na = 134 mmol/L in last 2 days, will monitor as appropriate  # Hypochloremia: Lowest Cl = 94 mmol/L in last 2 days, will monitor as appropriate        # Drug Induced Platelet Defect: home medication list includes an antiplatelet medication   # Hypertension: Home medication list includes antihypertensive(s)                      Social Drivers of Health    Tobacco Use: Medium Risk (2/18/2025)    Patient History     Smoking Tobacco Use: Former     Smokeless Tobacco Use: Never    Received from [email or web address]    Social Connections          Disposition Plan     Medically Ready for Discharge: Anticipated Tomorrow           The patient's care was discussed with the Attending Physician, Dr. Mark, Bedside Nurse, Patient, and General Surgery Consultant(s).    Brandy Thurston NP  Hospitalist Service  [facility]  Securely message with [employer] (more info)  Text page via [facility] Paging/Directory   ______________________________________________________________________    Interval History   Bear was seen resting in bed. No acute events overnight. Unable to complete enema yesterday, enema contents unable to stay within ostomy. Feels like nausea and retching worse yesterday, no events overnight. No output from ostomy, just flatus. Discuss plan for continued bowel meds, re-attempt enema. All questions and concerns addressed at this time.     Physical Exam   Vital Signs: Temp: 98.6  F (37  C) Temp src: Oral BP: (!) 166/86 Pulse: 93   Resp: 16 SpO2: 98 % O2 Device: None (Room air)    Weight: 104 lbs 7.97 oz    GENERAL: Alert and awake. Answering questions appropriately. Oriented x 3. NAD. Pleasant and conversational   HEENT: Anicteric sclera. EOMI. Mucous membranes moist   CARDIOVASCULAR: RRR. S1, S2. No murmurs, rubs, or gallops.   RESPIRATORY: Effort normal on RA. Clear to auscultation bilaterally, no rales, rhonchi or wheezes  GI: Abdomen soft, mildly tender abdomen without rebound or guarding, normoactive bowel sounds present. Ostomy bag empty of stool + flatus   MUSCULOSKELETAL: BLE amputations   EXTREMITIES: No peripheral edema.   NEUROLOGICAL: No focal deficits. CN II-XII grossly intact.  SKIN: Intact. Warm and dry. No jaundice. No rashes on exposed skin   PSYCH: Affect appropriate     Medical Decision Making       45 MINUTES SPENT BY ME on the date of service doing chart review, history, exam, documentation & further activities per the note.       Data   Imaging results reviewed over the past 24 hrs:   No results found for this or any previous visit (from the past 24 hours).  Recent Labs   Lab 04/02/25  0645 04/02/25  0628 04/02/25  0537 04/01/25  0309   WBC  --   --   --  9.3   HGB  --   --   --  11.9   MCV  --   --   --  87   PLT  --   --   --  334   NA  --   --  137 134*   POTASSIUM  --   --  4.2 4.0   CHLORIDE  --   --  100 94*   CO2  --   --  22 29   BUN  --   --  16.7 19.7   CR  --   --  0.63 0.59   ANIONGAP  --   --  15 11   RIGOBERTO  --   --  9.9 10.2   GLC 80 39* 42* 93   ALBUMIN  --   --  3.8 3.8   PROTTOTAL  --   --  6.2* 6.4   BILITOTAL  --   --  0.3 0.4   ALKPHOS  --   --  75 76   ALT  --   --  9 8   AST  --   --  17 14   LIPASE  --   --   --  15

## 2025-04-02 NOTE — PROVIDER NOTIFICATION
[facility] notified    Patient needing a WOC consultfor buttocks wounds and penis.  
[facility] paged      Patient is asking for IV dilaudid. The order that is in is a ED order.   
Vik Mark MD Paged    Not able to get fleet enema through Ostomy it keeps coming back out,charge nurse tried and wasn't able either. Any suggestions?    Response:  Leave it be for tonight, will ask surgery to reassess tomorrow.      
EENMT

## 2025-04-02 NOTE — PLAN OF CARE
Goal Outcome Evaluation:     BP (!) 155/97 (BP Location: Right arm)   Pulse 74   Temp 97.5  F (36.4  C) (Oral)   Resp 16   Wt 47.4 kg (104 lb 8 oz)   SpO2 100%   BMI 14.57 kg/m      Reason for admission: Constipation  Activity: bedbound due to bilateral AKA, rolls in bed independently.   Pain: Moderately well controlled with po and IV pain meds  Neuro: WNL  Cardiac: WNL  Respiratory: WNL  GI/: Bowel regimen continued, enema changed to every 6 hours, stool progressing from watery to small amounts of liquid stool. Good urine output  Diet: Tolerating clears, needing po antinausea  Lines: PIV SL  Wounds/Incisions: Dressing changes completed per order.   Labs/imaging/Consults: NA  Discharge Plan: Awaiting medical readiness.

## 2025-04-02 NOTE — PROGRESS NOTES
Critical result @ 0622- glucose 42. Pt asymptomatic, just tired per pt. Provider aware, pt given 8 oz of apple juice with sugar added. Glucose rechecked at @ 0640, 80. Provider notified.

## 2025-04-03 VITALS
HEART RATE: 69 BPM | TEMPERATURE: 98.3 F | RESPIRATION RATE: 16 BRPM | BODY MASS INDEX: 14.57 KG/M2 | DIASTOLIC BLOOD PRESSURE: 90 MMHG | SYSTOLIC BLOOD PRESSURE: 147 MMHG | WEIGHT: 104.5 LBS | OXYGEN SATURATION: 99 %

## 2025-04-03 LAB
ALBUMIN SERPL BCG-MCNC: 3.6 G/DL (ref 3.5–5.2)
ALP SERPL-CCNC: 74 U/L (ref 40–150)
ALT SERPL W P-5'-P-CCNC: 9 U/L (ref 0–70)
ANION GAP SERPL CALCULATED.3IONS-SCNC: 8 MMOL/L (ref 7–15)
AST SERPL W P-5'-P-CCNC: 16 U/L (ref 0–45)
BILIRUB SERPL-MCNC: 0.4 MG/DL
BUN SERPL-MCNC: 6.7 MG/DL (ref 8–23)
CALCIUM SERPL-MCNC: 9.3 MG/DL (ref 8.8–10.4)
CHLORIDE SERPL-SCNC: 97 MMOL/L (ref 98–107)
CREAT SERPL-MCNC: 0.6 MG/DL (ref 0.51–1.17)
CREAT SERPL-MCNC: 0.63 MG/DL (ref 0.51–1.17)
EGFRCR SERPLBLD CKD-EPI 2021: >90 ML/MIN/1.73M2
EGFRCR SERPLBLD CKD-EPI 2021: >90 ML/MIN/1.73M2
GLUCOSE SERPL-MCNC: 108 MG/DL (ref 70–99)
HCO3 SERPL-SCNC: 27 MMOL/L (ref 22–29)
POTASSIUM SERPL-SCNC: 4 MMOL/L (ref 3.4–5.3)
PROT SERPL-MCNC: 6.1 G/DL (ref 6.4–8.3)
SODIUM SERPL-SCNC: 132 MMOL/L (ref 135–145)

## 2025-04-03 PROCEDURE — 120N000002 HC R&B MED SURG/OB UMMC

## 2025-04-03 PROCEDURE — 250N000011 HC RX IP 250 OP 636: Mod: JZ

## 2025-04-03 PROCEDURE — 999N000248 HC STATISTIC IV INSERT WITH US BY RN

## 2025-04-03 PROCEDURE — 99232 SBSQ HOSP IP/OBS MODERATE 35: CPT | Mod: FS

## 2025-04-03 PROCEDURE — 36415 COLL VENOUS BLD VENIPUNCTURE: CPT

## 2025-04-03 PROCEDURE — 99233 SBSQ HOSP IP/OBS HIGH 50: CPT | Mod: FS

## 2025-04-03 PROCEDURE — 82040 ASSAY OF SERUM ALBUMIN: CPT

## 2025-04-03 PROCEDURE — 99207 PR APP CREDIT; MD BILLING SHARED VISIT: CPT | Mod: FS | Performed by: INTERNAL MEDICINE

## 2025-04-03 PROCEDURE — 80053 COMPREHEN METABOLIC PANEL: CPT

## 2025-04-03 PROCEDURE — 250N000011 HC RX IP 250 OP 636

## 2025-04-03 PROCEDURE — 250N000013 HC RX MED GY IP 250 OP 250 PS 637

## 2025-04-03 PROCEDURE — 258N000003 HC RX IP 258 OP 636

## 2025-04-03 RX ORDER — POLYETHYLENE GLYCOL 3350 17 G/17G
17 POWDER, FOR SOLUTION ORAL 2 TIMES DAILY
Status: DISPENSED | OUTPATIENT
Start: 2025-04-03

## 2025-04-03 RX ORDER — OXYCODONE HYDROCHLORIDE 10 MG/1
10 TABLET ORAL EVERY 6 HOURS PRN
Qty: 5 TABLET | Refills: 0 | Status: SHIPPED | OUTPATIENT
Start: 2025-04-03

## 2025-04-03 RX ORDER — HYDROMORPHONE HYDROCHLORIDE 1 MG/ML
0.5 INJECTION, SOLUTION INTRAMUSCULAR; INTRAVENOUS; SUBCUTANEOUS EVERY 4 HOURS PRN
Status: DISCONTINUED | OUTPATIENT
Start: 2025-04-03 | End: 2025-04-03

## 2025-04-03 RX ORDER — NALOXONE HYDROCHLORIDE 0.4 MG/ML
0.2 INJECTION, SOLUTION INTRAMUSCULAR; INTRAVENOUS; SUBCUTANEOUS
Status: ACTIVE | OUTPATIENT
Start: 2025-04-03

## 2025-04-03 RX ORDER — NALOXONE HYDROCHLORIDE 0.4 MG/ML
0.4 INJECTION, SOLUTION INTRAMUSCULAR; INTRAVENOUS; SUBCUTANEOUS
Status: ACTIVE | OUTPATIENT
Start: 2025-04-03

## 2025-04-03 RX ORDER — POLYETHYLENE GLYCOL 3350 17 G/17G
17 POWDER, FOR SOLUTION ORAL DAILY
Qty: 510 G | Refills: 0 | Status: SHIPPED | OUTPATIENT
Start: 2025-04-03

## 2025-04-03 RX ORDER — LABETALOL HYDROCHLORIDE 5 MG/ML
20 INJECTION, SOLUTION INTRAVENOUS EVERY 4 HOURS PRN
Status: ACTIVE | OUTPATIENT
Start: 2025-04-03

## 2025-04-03 RX ORDER — AMOXICILLIN 250 MG
1 CAPSULE ORAL 2 TIMES DAILY
Status: DISPENSED | OUTPATIENT
Start: 2025-04-03

## 2025-04-03 RX ORDER — AMOXICILLIN 250 MG
1 CAPSULE ORAL 2 TIMES DAILY
Qty: 20 TABLET | Refills: 0 | Status: SHIPPED | OUTPATIENT
Start: 2025-04-03

## 2025-04-03 RX ORDER — LABETALOL 100 MG/1
100-200 TABLET, FILM COATED ORAL EVERY 6 HOURS PRN
Status: ACTIVE | OUTPATIENT
Start: 2025-04-03

## 2025-04-03 RX ORDER — HYDROMORPHONE HYDROCHLORIDE 1 MG/ML
0.5 INJECTION, SOLUTION INTRAMUSCULAR; INTRAVENOUS; SUBCUTANEOUS EVERY 6 HOURS PRN
Status: DISPENSED | OUTPATIENT
Start: 2025-04-03

## 2025-04-03 RX ADMIN — OXYCODONE HYDROCHLORIDE AND ACETAMINOPHEN 500 MG: 500 TABLET ORAL at 11:23

## 2025-04-03 RX ADMIN — MAGNESIUM HYDROXIDE 30 ML: 400 SUSPENSION ORAL at 06:35

## 2025-04-03 RX ADMIN — SENNOSIDES AND DOCUSATE SODIUM 1 TABLET: 50; 8.6 TABLET ORAL at 21:14

## 2025-04-03 RX ADMIN — PREGABALIN 300 MG: 100 CAPSULE ORAL at 21:14

## 2025-04-03 RX ADMIN — TRAZODONE HYDROCHLORIDE 100 MG: 50 TABLET ORAL at 23:47

## 2025-04-03 RX ADMIN — BACLOFEN 10 MG: 10 TABLET ORAL at 21:14

## 2025-04-03 RX ADMIN — MINERAL OIL 1 ENEMA: 100 ENEMA RECTAL at 01:04

## 2025-04-03 RX ADMIN — VARENICLINE TARTRATE 1 MG: 1 TABLET, FILM COATED ORAL at 09:15

## 2025-04-03 RX ADMIN — ASPIRIN 325 MG ORAL TABLET 325 MG: 325 PILL ORAL at 09:15

## 2025-04-03 RX ADMIN — CHOLECALCIFEROL (VITAMIN D3) 10 MCG (400 UNIT) TABLET 10 MCG: at 09:14

## 2025-04-03 RX ADMIN — SENNOSIDES AND DOCUSATE SODIUM 1 TABLET: 50; 8.6 TABLET ORAL at 09:14

## 2025-04-03 RX ADMIN — HYDROMORPHONE HYDROCHLORIDE 0.5 MG: 1 INJECTION, SOLUTION INTRAMUSCULAR; INTRAVENOUS; SUBCUTANEOUS at 21:21

## 2025-04-03 RX ADMIN — DEXTROSE AND SODIUM CHLORIDE: 5; .9 INJECTION, SOLUTION INTRAVENOUS at 23:51

## 2025-04-03 RX ADMIN — OXYCODONE HYDROCHLORIDE AND ACETAMINOPHEN 500 MG: 500 TABLET ORAL at 16:15

## 2025-04-03 RX ADMIN — BACLOFEN 10 MG: 10 TABLET ORAL at 09:14

## 2025-04-03 RX ADMIN — DEXTROSE AND SODIUM CHLORIDE: 5; .9 INJECTION, SOLUTION INTRAVENOUS at 16:16

## 2025-04-03 RX ADMIN — TRAZODONE HYDROCHLORIDE 100 MG: 50 TABLET ORAL at 02:30

## 2025-04-03 RX ADMIN — OXYCODONE HYDROCHLORIDE AND ACETAMINOPHEN 500 MG: 500 TABLET ORAL at 21:14

## 2025-04-03 RX ADMIN — PREGABALIN 300 MG: 100 CAPSULE ORAL at 09:14

## 2025-04-03 RX ADMIN — ATORVASTATIN CALCIUM 40 MG: 40 TABLET, FILM COATED ORAL at 21:14

## 2025-04-03 RX ADMIN — LOSARTAN POTASSIUM 25 MG: 25 TABLET, FILM COATED ORAL at 09:14

## 2025-04-03 RX ADMIN — ENOXAPARIN SODIUM 30 MG: 30 INJECTION SUBCUTANEOUS at 11:23

## 2025-04-03 RX ADMIN — Medication 1 TABLET: at 09:14

## 2025-04-03 RX ADMIN — HYDROMORPHONE HYDROCHLORIDE 0.5 MG: 1 INJECTION, SOLUTION INTRAMUSCULAR; INTRAVENOUS; SUBCUTANEOUS at 01:03

## 2025-04-03 RX ADMIN — HYDROMORPHONE HYDROCHLORIDE 0.5 MG: 1 INJECTION, SOLUTION INTRAMUSCULAR; INTRAVENOUS; SUBCUTANEOUS at 11:28

## 2025-04-03 RX ADMIN — OXYCODONE HYDROCHLORIDE AND ACETAMINOPHEN 500 MG: 500 TABLET ORAL at 09:15

## 2025-04-03 RX ADMIN — VARENICLINE TARTRATE 1 MG: 1 TABLET, FILM COATED ORAL at 21:14

## 2025-04-03 RX ADMIN — BACLOFEN 10 MG: 10 TABLET ORAL at 14:54

## 2025-04-03 RX ADMIN — POLYETHYLENE GLYCOL 3350 17 G: 17 POWDER, FOR SOLUTION ORAL at 09:14

## 2025-04-03 RX ADMIN — DEXTROSE AND SODIUM CHLORIDE: 5; .9 INJECTION, SOLUTION INTRAVENOUS at 03:48

## 2025-04-03 RX ADMIN — MINERAL OIL 1 ENEMA: 100 ENEMA RECTAL at 06:35

## 2025-04-03 RX ADMIN — POLYETHYLENE GLYCOL 3350 17 G: 17 POWDER, FOR SOLUTION ORAL at 21:14

## 2025-04-03 RX ADMIN — HYDROMORPHONE HYDROCHLORIDE 0.5 MG: 1 INJECTION, SOLUTION INTRAMUSCULAR; INTRAVENOUS; SUBCUTANEOUS at 06:35

## 2025-04-03 RX ADMIN — DULOXETINE HYDROCHLORIDE 60 MG: 60 CAPSULE, DELAYED RELEASE ORAL at 09:14

## 2025-04-03 ASSESSMENT — ACTIVITIES OF DAILY LIVING (ADL)
ADLS_ACUITY_SCORE: 58
ADLS_ACUITY_SCORE: 58
ADLS_ACUITY_SCORE: 59
ADLS_ACUITY_SCORE: 57
ADLS_ACUITY_SCORE: 59
ADLS_ACUITY_SCORE: 58
ADLS_ACUITY_SCORE: 59
ADLS_ACUITY_SCORE: 58
ADLS_ACUITY_SCORE: 59
ADLS_ACUITY_SCORE: 58
ADLS_ACUITY_SCORE: 58
ADLS_ACUITY_SCORE: 59
ADLS_ACUITY_SCORE: 58
ADLS_ACUITY_SCORE: 58
ADLS_ACUITY_SCORE: 59
ADLS_ACUITY_SCORE: 58
ADLS_ACUITY_SCORE: 57
ADLS_ACUITY_SCORE: 58
ADLS_ACUITY_SCORE: 59

## 2025-04-03 NOTE — DISCHARGE SUMMARY
[facility]  Hospitalist Discharge Summary      Date of Admission:  4/1/2025  Date of Discharge:  {DISCHARGE DATE:253063}  Discharging Provider: Brandy Thurston NP  Discharge Service: Hospitalist Service, GOLD TEAM 6    Discharge Diagnoses   Decreased ostomy output   Nausea and abdominal pain   Hx of bowel obstructions s/p colostomy   Hypoglycemia  Spinal cord injury  Neurogenic bladder with ileal conduit  Polysubstance use disorder  Hx DVT  Peripheral artery disease   Wounds to buttocks and penis     Clinically Significant Risk Factors          Follow-ups Needed After Discharge   Follow-up Appointments       Hospital Follow-up with Existing Primary Care Provider (PCP)          Schedule Primary Care visit within: 30 Days               Unresulted Labs Ordered in the Past 30 Days of this Admission       No orders found from 3/2/2025 to 4/2/2025.        These results will be followed up by PCP    Discharge Disposition   Discharged to Group Home  Condition at discharge: Stable    Hospital Course   Parth Fountain is a 59 year old adult admitted on 4/1/2025. He has a PMHx notable for spinal cord injury, polysubstance abuse, DVT, neurogenic bladder with ileal conduit, peripheral arterial disease, history of bowel obstructions with colostomy in place. Presented to the ED from group home with no ostomy output x14 hours, abdominal pain, and nausea.      Decreased ostomy output   Nausea and abdominal pain   Hx of bowel obstructions s/p colostomy   Presented to ED from group home complaining of no/minimal ostomy output for multiple days, nausea, retching, and abdominal pain. Has a history of bowel obstruction. Abdominal pain is diffuse and intermittent but not distended like has been in the past when he's had bowel obstructions. Able to keep fluids down. Previously, texture and consistence of stool at his baseline, without blood; passing flatus. Labs on admission largely  unremarkable. CT A/P w/ contrast on admission with stable chronic/incidental findings and post-surgical changes, no acute process including bowel obstruction. COVID, flu A/B, and RSV negative. UA with large blood, moderate leukocytes; negative nitrite. Urine culture   >100,000 CFU/mL Mixture of Urogenital Marleni however growth consistent with probable contamination. General surgery was consulted to who recommended aggressive bowel regimen with enemas and oral medications. Started to have stool output on evening of 4/3 into 4/4.      Hypoglycemia - resolved   Glucose 42 on BMP, 39 on POCT glucose check; symptomatic with fatigue. Given apple juice with appropriate response.      Spinal cord injury  Neurogenic bladder with ileal conduit  Urostomy with clear yellow urine urine. Continue baclofen 10 mg TID, lyrica 300 mg BID, duloxetine 60 mg daily, and tizanidine 2-4 mg q8h PRN.      Polysubstance use disorder  UDS on admission positive for cocaine. Continue PTA chantix 100 mg at bedtime.     Hx DVT  Peripheral artery disease   Continue PTA aspirin 325 mg daily and atorvastatin 40 mg daily.       Wounds to buttocks and penis   Present on admission. WOCN consulted, appreciate assistance.     Consultations This Hospital Stay   NURSING TO CONSULT FOR VASCULAR ACCESS CARE IP CONSULT  CARE MANAGEMENT / SOCIAL WORK IP CONSULT  SURGERY GENERAL ADULT IP CONSULT  WOUND OSTOMY CONTINENCE NURSE  IP CONSULT  NURSING TO CONSULT FOR VASCULAR ACCESS CARE IP CONSULT    Code Status   Full Code    Time Spent on this Encounter   {How much time did you spend on discharge?:85803029}       Brandy Thurston NP  [facility] [address] OBSERVATION  [address] [facility] [address]  Phone: [phone]  Fax: [phone]  ______________________________________________________________________    Physical Exam   Vital Signs: Temp: 98.1  F (36.7  C) Temp src: Oral BP: 117/71 Pulse: 71   Resp: 18 SpO2: 95 % O2 Device: None  (Room air)    Weight: 104 lbs 7.97 oz  {Recommend personal SmartPhrase or Notewriter for exam (OPTIONAL)   :809211}       Primary Care Physician   Boaz Mosley    Discharge Orders      Reason for your hospital stay    You presented to the hospital on 4/1/25 with abdominal pain, nausea, retching, and no ostomy output. Workup in the ED was negative for a bowel obstruction. The general surgery team was consulted who recommended aggressive bowel regimen with enemas and oral options. On 4/3, stool was starting to pass through your ostomy, your abdominal pain resolved, and you were eager to eat.     Activity    Your activity upon discharge: activity as tolerated     Tubes and Drains    Current Tubes and Drains:     Drain  Duration           Colostomy -- days    Ostomy Urostomy LLQ -- days              Diet    Follow this diet upon discharge: Current Diet:Orders Placed This Encounter      Regular Diet Adult     Hospital Follow-up with Existing Primary Care Provider (PCP)            Significant Results and Procedures   {Data for Discharge Summary:102695}    Discharge Medications   Current Discharge Medication List        START taking these medications    Details   magnesium hydroxide (MILK OF MAGNESIA) 400 MG/5ML suspension Take 30 mLs by mouth daily as needed for constipation.  Qty: 118 mL, Refills: 0    Associated Diagnoses: Generalized abdominal pain      polyethylene glycol (MIRALAX) 17 GM/Dose powder Take 17 g by mouth daily.  Qty: 510 g, Refills: 0    Associated Diagnoses: Generalized abdominal pain      senna-docusate (SENOKOT-S/PERICOLACE) 8.6-50 MG tablet Take 1 tablet by mouth 2 times daily.  Qty: 20 tablet, Refills: 0    Associated Diagnoses: Generalized abdominal pain           CONTINUE these medications which have CHANGED    Details   oxyCODONE IR (ROXICODONE) 10 MG tablet Take 1 tablet (10 mg) by mouth every 6 hours as needed for severe pain or breakthrough pain (breakthrough pain).  Qty: 5 tablet, Refills: 0     Associated Diagnoses: Generalized abdominal pain           CONTINUE these medications which have NOT CHANGED    Details   acetaminophen (TYLENOL) 500 MG tablet Take 1-2 tablets (500-1,000 mg) by mouth every 6 hours as needed for mild pain.  Qty: 240 tablet, Refills: 2    Associated Diagnoses: Pressure ulcers of skin of multiple topographic sites      aspirin (ASA) 325 MG tablet TAKE 1 TABLET BY MOUTH EVERY MORNING  Qty: 30 tablet, Refills: 11    Comments: SD23  Associated Diagnoses: History of deep venous thrombosis      atorvastatin (LIPITOR) 40 MG tablet TAKE 1 TABLET BY MOUTH EVERY EVENING  Qty: 30 tablet, Refills: 11    Comments: RX HAS TABS REMAINING HOWEVER NOT ENOUGH TO SEND 30 DAY SUPPLY. PLEASE REFILL. RX AUDIT  Associated Diagnoses: Personal history of noncompliance with medical treatment, presenting hazards to health; Peripheral vascular disease      baclofen (LIORESAL) 10 MG tablet Take 1 tablet (10 mg) by mouth 3 times daily.  Qty: 124 tablet, Refills: 11    Associated Diagnoses: Muscle spasticity      Bismuth Subsalicylate 525 MG/15ML SUSP Take 30 mLs by mouth every 4 hours as needed (GI upset)      CALCIUM ANTACID 500 MG chewable tablet Take 1-2 chew tab by mouth daily as needed for heartburn.      calcium carbonate-vitamin D (OSCAL) 500-5 MG-MCG tablet Take 1 tablet by mouth daily.  Qty: 90 tablet, Refills: 3    Associated Diagnoses: Gastroesophageal reflux disease without esophagitis      DULoxetine (CYMBALTA) 60 MG capsule TAKE 1 CAPSULE BY MOUTH ONCE DAILY  Qty: 30 capsule, Refills: 11    Comments: RX AUDIT  Associated Diagnoses: Neuropathic pain      loperamide (IMODIUM) 2 MG capsule Take 2 capsules (4 mg) by mouth at bedtime. May also take 2 capsules (4 mg) 3 times daily as needed for diarrhea.  Qty: 200 capsule, Refills: 2    Associated Diagnoses: Diarrhea, unspecified type      pregabalin (LYRICA) 300 MG capsule Take 1 capsule (300 mg) by mouth 2 times daily.  Qty: 62 capsule, Refills: 4     Associated Diagnoses: Neuropathic pain      tiZANidine (ZANAFLEX) 2 MG tablet Take 1-2 tablets (2-4 mg) by mouth every 8 hours as needed for muscle spasms.  Qty: 90 tablet, Refills: 2    Associated Diagnoses: Muscle spasticity      traZODone (DESYREL) 100 MG tablet TAKE 1 TABLET BY MOUTH AT BEDTIME  Qty: 30 tablet, Refills: 11    Comments: RX AUDIT  Associated Diagnoses: Insomnia, unspecified type      varenicline (CHANTIX) 1 MG tablet Take 1 tablet (1 mg) by mouth 2 times daily.  Qty: 180 tablet, Refills: 1    Associated Diagnoses: Encounter for smoking cessation counseling      vitamin C (ASCORBIC ACID) 500 MG tablet Take 1 tablet (500 mg) by mouth 4 times daily.  Qty: 124 tablet, Refills: 11    Associated Diagnoses: Open wound      Vitamin D3 (VITAMIN D) 10 MCG (400 UNIT) tablet TAKE 1 TABLET BY MOUTH ONCE DAILY  Qty: 30 tablet, Refills: 11    Comments: RX AUDIT  Associated Diagnoses: Open wound      cyanocobalamin (VITAMIN B-12) 500 MCG tablet Take 1 tablet (500 mcg) by mouth daily.  Qty: 90 tablet, Refills: 2    Associated Diagnoses: Gastroesophageal reflux disease without esophagitis      Disposable Gloves (VINYL GLOVES ONE SIZE) MISC Externally apply 1 Units topically as needed (ostomy and urostomy).  Qty: 360 each, Refills: 2    Associated Diagnoses: Open wound      losartan (COZAAR) 25 MG tablet Take 1 tablet (25 mg) by mouth daily.  Qty: 90 tablet, Refills: 2    Associated Diagnoses: Benign essential hypertension      multivitamin w/minerals (CERTAVITE/ANTIOXIDANTS) tablet TAKE 1 TABLET BY MOUTH EVERY MORNING **NON-COVERED MED** *1 TOTAL FILL*  Qty: 130 tablet, Refills: 1    Associated Diagnoses: Iron deficiency anemia, unspecified iron deficiency anemia type; Vitamin deficiency      omeprazole (PRILOSEC) 20 MG DR capsule Take 1 capsule (20 mg) by mouth daily.  Qty: 90 capsule, Refills: 2    Associated Diagnoses: Gastroesophageal reflux disease without esophagitis           Allergies   Allergies   Allergen  Reactions    Blood Transfusion Related (Informational Only) Other (See Comments)     Patient has a history of a clinically significant antibody against RBC antigens.  A delay in compatible RBCs may occur.     Red Blood Cells      Patient has a history of a clinically significant antibody against RBC antigens.  A delay in compatible RBCs may occur      Diagnoses: Neuropathic pain      loperamide (IMODIUM) 2 MG capsule Take 2 capsules (4 mg) by mouth at bedtime. May also take 2 capsules (4 mg) 3 times daily as needed for diarrhea.  Qty: 200 capsule, Refills: 2    Associated Diagnoses: Diarrhea, unspecified type      pregabalin (LYRICA) 300 MG capsule Take 1 capsule (300 mg) by mouth 2 times daily.  Qty: 62 capsule, Refills: 4    Associated Diagnoses: Neuropathic pain      tiZANidine (ZANAFLEX) 2 MG tablet Take 1-2 tablets (2-4 mg) by mouth every 8 hours as needed for muscle spasms.  Qty: 90 tablet, Refills: 2    Associated Diagnoses: Muscle spasticity      traZODone (DESYREL) 100 MG tablet TAKE 1 TABLET BY MOUTH AT BEDTIME  Qty: 30 tablet, Refills: 11    Comments: RX AUDIT  Associated Diagnoses: Insomnia, unspecified type      varenicline (CHANTIX) 1 MG tablet Take 1 tablet (1 mg) by mouth 2 times daily.  Qty: 180 tablet, Refills: 1    Associated Diagnoses: Encounter for smoking cessation counseling      vitamin C (ASCORBIC ACID) 500 MG tablet Take 1 tablet (500 mg) by mouth 4 times daily.  Qty: 124 tablet, Refills: 11    Associated Diagnoses: Open wound      Vitamin D3 (VITAMIN D) 10 MCG (400 UNIT) tablet TAKE 1 TABLET BY MOUTH ONCE DAILY  Qty: 30 tablet, Refills: 11    Comments: RX AUDIT  Associated Diagnoses: Open wound      cyanocobalamin (VITAMIN B-12) 500 MCG tablet Take 1 tablet (500 mcg) by mouth daily.  Qty: 90 tablet, Refills: 2    Associated Diagnoses: Gastroesophageal reflux disease without esophagitis      Disposable Gloves (VINYL GLOVES ONE SIZE) MISC Externally apply 1 Units topically as needed (ostomy and urostomy).  Qty: 360 each, Refills: 2    Associated Diagnoses: Open wound      losartan (COZAAR) 25 MG tablet Take 1 tablet (25 mg) by mouth daily.  Qty: 90 tablet, Refills: 2    Associated Diagnoses: Benign essential hypertension      multivitamin w/minerals (CERTAVITE/ANTIOXIDANTS) tablet TAKE 1 TABLET BY MOUTH EVERY MORNING **NON-COVERED MED**  *1 TOTAL FILL*  Qty: 130 tablet, Refills: 1    Associated Diagnoses: Iron deficiency anemia, unspecified iron deficiency anemia type; Vitamin deficiency      omeprazole (PRILOSEC) 20 MG DR capsule Take 1 capsule (20 mg) by mouth daily.  Qty: 90 capsule, Refills: 2    Associated Diagnoses: Gastroesophageal reflux disease without esophagitis           Allergies   Allergies   Allergen Reactions    Blood Transfusion Related (Informational Only) Other (See Comments)     Patient has a history of a clinically significant antibody against RBC antigens.  A delay in compatible RBCs may occur.     Red Blood Cells      Patient has a history of a clinically significant antibody against RBC antigens.  A delay in compatible RBCs may occur

## 2025-04-03 NOTE — PLAN OF CARE
[id number]    Reason for admission: constipation - abdominal pian; no output in colostomy   Vitals:   /64 (BP Location: Right arm)   Pulse 72   Temp 99.6  F (37.6  C) (Oral)   Resp 18   Wt 47.4 kg (104 lb 8 oz)   SpO2 94%   BMI 14.57 kg/m    Activity: assist x1. BLE amputee  Pain: 5/10 pain. Chronic back and shoulder pain. Abdominal pain d/t constipation. Managed w/ IV dilaudid.  Neuro: WDL. Calls appropriately. A&Ox4  Cardiac: WDL  Respiratory: WDL. Denies SOB  GI/: urostomy hooked to cabrera bag w/ adequate output. Urostomy is clean, dry, intact. Colostomy clean, dry, intact. Small brown formed stool 4/3/25 AM  Diet: regular  Lines: R PIV infusing D5 @ 100 ml/hr  Wounds/Incisions: penile wound; 2 wounds on bottom  Labs/imaging/Consults: WOC consulted; general surgery consulted  Discharge Plan: continue PO bowel regimen; pain management     Urostomy and colostomy changed 4/3/25.     Problem: Skin Injury Risk Increased  Goal: Skin Health and Integrity  Intervention: Optimize Skin Protection  Recent Flowsheet Documentation  Taken 4/3/2025 0900 by Kati Bonds, CARIDAD  Pressure Reduction Techniques:   frequent weight shift encouraged   positioned off wounds  Pressure Reduction Devices: positioning supports utilized  Skin Protection:   adhesive use limited   tubing/devices free from skin contact   transparent dressing maintained  Activity Management: activity adjusted per tolerance  Head of Bed (HOB) Positioning: HOB at 20-30 degrees

## 2025-04-03 NOTE — PLAN OF CARE
Goal Outcome Evaluation:      Plan of Care Reviewed With: patient    Overall Patient Progress: no changeOverall Patient Progress: no change    Outcome Evaluation: enema not effective    No significant output after fleet enemas in colostomy

## 2025-04-03 NOTE — PROGRESS NOTES
[facility]    Medicine Progress Note - Hospitalist Service, GOLD TEAM 6    Date of Admission:  4/1/2025    Assessment & Plan   Parth Fountain is a 59 year old adult admitted on 4/1/2025. He has a PMHx notable for spinal cord injury, polysubstance abuse, DVT, neurogenic bladder with ileal conduit, peripheral arterial disease, history of bowel obstructions with colostomy in place. Presented to the ED from group home with no ostomy output x14 hours, abdominal pain, and nausea.     Updates Today:  - no further episodes of hypoglycemia   - ostomy with minimal stool output, abdominal pain much better, wanting to eat   - follow any updated general surgery recs -> increase PO bowel regimen, regular diet      Decreased ostomy output   Nausea and abdominal pain   Hx of bowel obstructions s/p colostomy   Presented to ED from group home complaining of no/minimal ostomy output for multiple days, nausea, retching, and abdominal pain. Has a history of bowel obstruction. Abdominal pain is diffuse and intermittent but not distended like has been in the past when he's had bowel obstructions. Able to keep fluids down. Previously, texture and consistence of stool at his baseline, without blood; passing flatus. Labs on admission largely unremarkable. CT A/P w/ contrast on admission with stable chronic/incidental findings and post-surgical changes, no acute process including bowel obstruction. COVID, flu A/B, and RSV negative. UA with large blood, moderate leukocytes; negative nitrite. Urine culture   >100,000 CFU/mL Mixture of Urogenital Marleni however growth consistent with probable contamination.   - mIVF   - general surgery consulted appreciate assistance    - mineral oil enema q6h   - increase PO bowel regimen, consider adding milk of mag   - WOCN consulted, appreciate assistance   - symptom control     Hypoglycemia  Glucose 42 on BMP, 39 on POCT glucose check; symptomatic with fatigue.  Given apple juice with appropriate response.   - mIVF switched to D5NS @ 100 ml/hr     Spinal cord injury  Neurogenic bladder with ileal conduit  Urostomy with clear yellow urine urine.   - noted, continue to monitor   - continue baclofen 10 mg TID, lyrica 300 mg BID, duloxetine 60 mg daily, and tizanidine 2-4 mg q8h PRN      Polysubstance use disorder  UDS on admission positive for cocaine.   - continue PTA chantix 100 mg at bedtime     Hx DVT  Peripheral artery disease   - continue PTA aspirin 325 mg daily   - continue PTA atorvastatin 40 mg daily      Wounds to buttocks and penis   Present on admission.  - WOCN consulted, appreciate assistance           Diet: Clear Liquid Diet    DVT Prophylaxis: Enoxaparin (Lovenox) SQ  Cueto Catheter: Not present  Lines: None     Cardiac Monitoring: None  Code Status: Full Code      Clinically Significant Risk Factors Present on Admission         # Hyponatremia: Lowest Na = 132 mmol/L in last 2 days, will monitor as appropriate  # Hypochloremia: Lowest Cl = 97 mmol/L in last 2 days, will monitor as appropriate        # Drug Induced Platelet Defect: home medication list includes an antiplatelet medication   # Hypertension: Home medication list includes antihypertensive(s)                      Social Drivers of Health    Tobacco Use: Medium Risk (2/18/2025)    Patient History     Smoking Tobacco Use: Former     Smokeless Tobacco Use: Never    Received from [facility]    Social Connections          Disposition Plan     Medically Ready for Discharge: Anticipated Tomorrow           The patient's care was discussed with the Attending Physician, Dr. Mark, Patient, and General Surgery Consultant(s).    Brandy Thurston NP  Hospitalist Service, [address]  Securely message with [employer] (more info)  Text page via [facility] Paging/Directory   See  signed in provider for up to date coverage information  ______________________________________________________________________    Interval History   Bear was seen resting in bed. No acute events overnight. Ostomy able to produce stool. Abdominal pain resolved and wanting to eat. Discuss plan to advance diet and monitor. All questions and concerns addressed at this time.     Physical Exam   Vital Signs: Temp: 99.6  F (37.6  C) Temp src: Oral BP: 119/64 Pulse: 72   Resp: 18 SpO2: 94 % O2 Device: None (Room air)    Weight: 104 lbs 7.97 oz    GENERAL: Alert and awake. Answering questions appropriately. Oriented x 3. NAD. Pleasant and conversational   HEENT: Anicteric sclera. EOMI. Mucous membranes moist   CARDIOVASCULAR: RRR. S1, S2. No murmurs, rubs, or gallops.   RESPIRATORY: Effort normal on RA. Clear to auscultation bilaterally, no rales, rhonchi or wheezes  GI: Abdomen soft, mildly tender abdomen without rebound or guarding, normoactive bowel sounds present. Ostomy bag with orange tinged liquid, likely resulting from prior enemas   MUSCULOSKELETAL: BLE amputations   EXTREMITIES: No peripheral edema.   NEUROLOGICAL: No focal deficits. CN II-XII grossly intact.  SKIN: Intact. Warm and dry. No jaundice. No rashes on exposed skin   PSYCH: Affect appropriate     Medical Decision Making       40 MINUTES SPENT BY ME on the date of service doing chart review, history, exam, documentation & further activities per the note.      Data   Imaging results reviewed over the past 24 hrs:   No results found for this or any previous visit (from the past 24 hours).  Recent Labs   Lab 04/03/25  0617 04/02/25  0712 04/02/25  0645 04/02/25  0628 04/02/25  0537 04/01/25  0309   WBC  --   --   --   --   --  9.3   HGB  --   --   --   --   --  11.9   MCV  --   --   --   --   --  87   PLT  --   --   --   --   --  334   *  --   --   --  137 134*   POTASSIUM 4.0  --   --   --  4.2 4.0   CHLORIDE 97*  --   --   --  100 94*   CO2 27  --    --   --  22 29   BUN 6.7*  --   --   --  16.7 19.7   CR 0.63  --   --   --  0.63 0.59   ANIONGAP 8  --   --   --  15 11   RIGOBERTO 9.3  --   --   --  9.9 10.2   * 146* 80   < > 42* 93   ALBUMIN 3.6  --   --   --  3.8 3.8   PROTTOTAL 6.1*  --   --   --  6.2* 6.4   BILITOTAL 0.4  --   --   --  0.3 0.4   ALKPHOS 74  --   --   --  75 76   ALT 9  --   --   --  9 8   AST 16  --   --   --  17 14   LIPASE  --   --   --   --   --  15    < > = values in this interval not displayed.

## 2025-04-03 NOTE — PLAN OF CARE
Goal Outcome Evaluation:     BP (!) 137/92   Pulse 63   Temp 98.5  F (36.9  C) (Oral)   Resp 18   Wt 47.4 kg (104 lb 8 oz)   SpO2 100%   BMI 14.57 kg/m      Reason for admission: Constipation through colostomy  Activity: bedbound due to bilateral AKA, rolls in bed independently.   Pain: Moderately well controlled with po and IV pain meds  Neuro: WNL  Cardiac: WNL  Respiratory: WNL  GI/: Bowel regimen continued, enema every 6 hours, no stool output yet with enema. Suspect enema is not being successfully administered due to spillage from ostomy. Good urine output  Diet: Tolerating clears, needing iv Zofran and dilaudid for breakthrough   Lines: PIV infusing.  Wounds/Incisions: Dressing changes completed per order.   Labs/imaging/Consults: NA  Discharge Plan: Awaiting medical readiness.

## 2025-04-03 NOTE — PROGRESS NOTES
"Surgery Progress Note  04/03/2025       Subjective:  NAEON. Per nursing, mineral enemas not effective with minimal ostomy output, unable to get around stool burden. Patient tolerating PO regimen (MoM, Senna) without nausea or vomiting. Patient reports stomach feels much better.    Objective:  Temp:  [97.5  F (36.4  C)-99.6  F (37.6  C)] 99.6  F (37.6  C)  Pulse:  [51-75] 72  Resp:  [16-18] 18  BP: (119-177)/(64-97) 119/64  SpO2:  [94 %-100 %] 94 %    I/O last 3 completed shifts:  In: 1528.33 [P.O.:500; I.V.:1028.33]  Out: 8800 [Urine:8550; Stool:250]      Gen: Awake, alert, NAD  Resp: NLB on RA  Abd: soft, nondistended, nontender, ostomy with gas and minimal stool   Ext: WWP, no edema     Labs:  Recent Labs   Lab 04/01/25  0309   WBC 9.3   HGB 11.9          Recent Labs   Lab 04/03/25  0617 04/02/25  0712 04/02/25  0645 04/02/25  0628 04/02/25  0537 04/01/25  0309   *  --   --   --  137 134*   POTASSIUM 4.0  --   --   --  4.2 4.0   CHLORIDE 97*  --   --   --  100 94*   CO2 27  --   --   --  22 29   BUN 6.7*  --   --   --  16.7 19.7   CR 0.63  --   --   --  0.63 0.59   * 146* 80   < > 42* 93   RIGOBERTO 9.3  --   --   --  9.9 10.2    < > = values in this interval not displayed.       Imaging:  Reviewed    Assessment/Plan:   Parth KWAN \"Maicol\" Александр is a 59 year old adult with a PMH of a T8 spinal cord injury, neurogenic bladder S/P ileal conduit, bowel obstructions S/P partial colectomy with Díaz's pouch and left abdominal colostomy admitted on 4/1/2025 reporting minimal to stool output over 24 hours, retching, and umbilical abdominal pain.    Patient feeling better and tolerating oral bowel regimen, however enemas unable to get around stool burden with continued minimal ostomy output.  Has appetite for food.     -Increase PO bowel regimen as tolerated: MOM, Senna, Miralax  -Mineral oil enema Q6H PRN   -Multimodal pain regimen, recommend minimizing opioids given constipation and minimal output   -PTA " losartan   -Advanced to regular diet this am tolerating well.  -Surgery will sign off please reach out with questions, concerns or updates.      Seen, examined, and discussed with chief resident , who will discuss with staff .  - - - - - - - - - - - - - - - - - -  Kandace Hernandez  MS3, [employer]    Resident/Fellow Attestation   I, Bob Phelps MD, was present with the medical/CHARLES student who participated in the service and in the documentation of the note.  I have verified the history and personally performed the physical exam and medical decision making.  I agree with the assessment and plan of care as documented in the note.      {Key findings; feeling significantly better, PE reassuring, has increased appetite for food tolerated CLD.    Bob Phelps MD  Urology PGY1  General Surgery Service    Date of Service (when I saw the patient): 04/03/25

## 2025-04-03 NOTE — PROGRESS NOTES
Care Management Follow Up    Length of Stay (days): 1    Expected Discharge Date: 2025     Concerns to be Addressed: discharge planning     Patient plan of care discussed at interdisciplinary rounds: Yes    Anticipated Discharge Disposition: Group Home     Anticipated Discharge Services:  Wound care nurse  Anticipated Discharge DME:  None    Patient/family educated on Medicare website which has current facility and service quality ratings:  No  Education Provided on the Discharge Plan:  Yes  Patient/Family in Agreement with the Plan:  Yes    Referrals Placed by CM/SW:  None  Private pay costs discussed: transportation costs KEARA discussed stretcher ride cost due to pt's medical assistance being inactive and him needing stretcher ride.     Discussed  Partnership in Safe Discharge Planning  document with patient/family: No     Handoff Completed: No, handoff not indicated or clinically appropriate    Additional Information:  KEARA called pt's previous Cadi  (Jessika) and there was no answer. KEARA called [facility] regarding pt's new Cadi  and was informed that Diamante Pringle ( [phone]) pt's new Cadi CM. [facility] reported pt's medical assistance  on . KEARA called Diamante Pringle ([phone]) and informed her pt is at the hospital and is medical assistance is now inactive. Diamante shared she will follow up pt's group home to check if they faxed his application but reported she does not help with the application. KEARA met with pt at bedside and gave him Diamante's contact information.  Pt reported the group home boss (Tao) helped him with the application and he will check with him.     Next Steps:   -Send resumption of care for wound care nurse to [facility].  -Coordinate discharge back to group home.     JEYSON Hernandez, TATIANA  OBS/ED   Phone: [phone]  Fax: [phone]  Vocera: 1A Obs KEARA

## 2025-04-04 VITALS
BODY MASS INDEX: 14.57 KG/M2 | OXYGEN SATURATION: 96 % | DIASTOLIC BLOOD PRESSURE: 122 MMHG | RESPIRATION RATE: 16 BRPM | TEMPERATURE: 99.3 F | SYSTOLIC BLOOD PRESSURE: 158 MMHG | WEIGHT: 104.5 LBS | HEART RATE: 85 BPM

## 2025-04-04 LAB — PLATELET # BLD AUTO: 225 10E3/UL (ref 150–450)

## 2025-04-04 PROCEDURE — 250N000013 HC RX MED GY IP 250 OP 250 PS 637

## 2025-04-04 PROCEDURE — 85049 AUTOMATED PLATELET COUNT: CPT

## 2025-04-04 PROCEDURE — 99239 HOSP IP/OBS DSCHRG MGMT >30: CPT

## 2025-04-04 PROCEDURE — 250N000011 HC RX IP 250 OP 636: Mod: JZ

## 2025-04-04 PROCEDURE — 36415 COLL VENOUS BLD VENIPUNCTURE: CPT

## 2025-04-04 RX ORDER — HYDROMORPHONE HYDROCHLORIDE 1 MG/ML
0.5 INJECTION, SOLUTION INTRAMUSCULAR; INTRAVENOUS; SUBCUTANEOUS DAILY PRN
Status: DISCONTINUED | OUTPATIENT
Start: 2025-04-04 | End: 2025-04-04 | Stop reason: HOSPADM

## 2025-04-04 RX ADMIN — DULOXETINE HYDROCHLORIDE 60 MG: 60 CAPSULE, DELAYED RELEASE ORAL at 09:06

## 2025-04-04 RX ADMIN — VARENICLINE TARTRATE 1 MG: 1 TABLET, FILM COATED ORAL at 09:06

## 2025-04-04 RX ADMIN — CHOLECALCIFEROL (VITAMIN D3) 10 MCG (400 UNIT) TABLET 10 MCG: at 09:06

## 2025-04-04 RX ADMIN — PREGABALIN 300 MG: 100 CAPSULE ORAL at 09:06

## 2025-04-04 RX ADMIN — LOSARTAN POTASSIUM 25 MG: 25 TABLET, FILM COATED ORAL at 09:06

## 2025-04-04 RX ADMIN — Medication 1 TABLET: at 09:06

## 2025-04-04 RX ADMIN — POLYETHYLENE GLYCOL 3350 17 G: 17 POWDER, FOR SOLUTION ORAL at 09:04

## 2025-04-04 RX ADMIN — ASPIRIN 325 MG ORAL TABLET 325 MG: 325 PILL ORAL at 09:06

## 2025-04-04 RX ADMIN — BACLOFEN 10 MG: 10 TABLET ORAL at 09:06

## 2025-04-04 RX ADMIN — SENNOSIDES AND DOCUSATE SODIUM 1 TABLET: 50; 8.6 TABLET ORAL at 09:07

## 2025-04-04 RX ADMIN — HYDROMORPHONE HYDROCHLORIDE 0.5 MG: 1 INJECTION, SOLUTION INTRAMUSCULAR; INTRAVENOUS; SUBCUTANEOUS at 10:55

## 2025-04-04 RX ADMIN — OXYCODONE HYDROCHLORIDE 5 MG: 5 TABLET ORAL at 09:08

## 2025-04-04 RX ADMIN — OXYCODONE HYDROCHLORIDE AND ACETAMINOPHEN 500 MG: 500 TABLET ORAL at 09:07

## 2025-04-04 ASSESSMENT — ACTIVITIES OF DAILY LIVING (ADL)
ADLS_ACUITY_SCORE: 57

## 2025-04-04 NOTE — PLAN OF CARE
[id number]    Reason for admission: constipation - abdominal pian; no output in colostomy   Vitals:   /65   Pulse 69   Temp 98.3  F (36.8  C) (Oral)   Resp 16   Wt 47.4 kg (104 lb 8 oz)   SpO2 99%   BMI 14.57 kg/m    Activity: assist x1. BLE amputee  Pain: 5/10 pain. Chronic back and shoulder pain. Abdominal pain d/t constipation. Managed w/ IV dilaudid and PO PRN 5 mg oxy  Neuro: WDL. Calls appropriately. A&Ox4  Cardiac: WDL  Respiratory: WDL. Denies SOB  GI/: urostomy hooked to cabrera bag w/ adequate output. Urostomy is clean, dry, intact. Colostomy clean, dry, intact. Small brown formed stool 4/3/25 AM  Diet: regular  Lines: R PIV removed prior to discharge  Wounds/Incisions: penile wound; 2 wounds on bottom  Labs/imaging/Consults: WOC consulted; general surgery consulted  Discharge Plan: pt to discharge 4/4.     Urostomy and colostomy changed 4/3/25.     Pt received bed bath prior to discharge.     Problem: Skin Injury Risk Increased  Goal: Skin Health and Integrity  Intervention: Optimize Skin Protection  Recent Flowsheet Documentation  Taken 4/4/2025 0410 by Kati Bonds, CARIDAD  Pressure Reduction Techniques:   frequent weight shift encouraged   positioned off wounds  Pressure Reduction Devices: positioning supports utilized  Skin Protection:   adhesive use limited   tubing/devices free from skin contact   transparent dressing maintained  Activity Management: activity adjusted per tolerance  Head of Bed (HOB) Positioning: HOB at 20-30 degrees

## 2025-04-04 NOTE — PROGRESS NOTES
DISCHARGE     4/4/25 @ 1150  ---------------------------------------------------------------------------  Discharged to: Group Home  Via: Stretcher   Accompanied by: EMS   Discharge Instructions: diet, activity, medications, follow up appointments, when to call the MD, aftercare instructions, and what to watchout for (i.e. s/s of infection, increasing SOB, palpitations, chest pain,)  Prescriptions: To be filled by  pharmacy per pt's request; medication list reviewed & sent with pt  Follow Up Appointments: arranged; information given  Belongings: All sent with pt  IV: out  Telemetry: off  Pt exhibits understanding of above discharge instructions; all questions answered.    Discharge Paperwork: Signed, copied, and sent home with patient.

## 2025-04-04 NOTE — PROGRESS NOTES
Care Management Discharge Note    Discharge Date: 04/04/2025       Discharge Disposition: Group Home, Home Care    Discharge Services: Home Care    Discharge DME: None    Discharge Transportation: agency    Private pay costs discussed: Not applicable    Does the patient's insurance plan have a 3 day qualifying hospital stay waiver?  No    PAS Confirmation Code:  Not applicable   Patient/family educated on Medicare website which has current facility and service quality ratings: no    Education Provided on the Discharge Plan:  Yes  Persons Notified of Discharge Plans: Pt, and Tao at pt's group home.   Patient/Family in Agreement with the Plan:  Yes    Handoff Referral Completed: Yes, non-[facility] PCP: External handoff communication completed    Additional Information:    KEARA received a Vocera from pt's [facility] provider Brandy Thurston that pt is medically ready for discharge and needs transportation. KEARA called pt's    Tao ([phone]) and informed him that pt is discharging to the group home today. Tao asked discharge orders to him faxed to him ([phone]) and [facility] ([phone]). KEARA messaged pt's [facility] provider and asked to place an resumption of care orders for wound care nurse. KEARA called [facility] and spoke with Alice who confirmed pt needs resumption of care order faxed to ([phone]). KEARA faxed discharge orders to [facility], [facility] pharmacy, and [facility] KEARA called [employer] and arranged a stretcher ride for services window of 11:12AM to 11:57AM. KEARA updated pt, pt's bedside nurse, and charge nurse of ride time. KEARA completed PCS form for stretcher ride and KEARA called Tao and informed him pt is being picked up between 11:12AM to 11:57AM.     JEYSON Hernandez, LGKEARA  OBS/ED   Phone: [phone]  Fax: [phone]  Vocera: 1A Obs KEARA

## 2025-04-07 ENCOUNTER — DOCUMENTATION ONLY (OUTPATIENT)
Dept: FAMILY MEDICINE | Facility: CLINIC | Age: 60
End: 2025-04-07
Payer: MEDICARE

## 2025-04-07 NOTE — PROGRESS NOTES
"When opening a documentation only encounter, be sure to enter in \"Chief Complaint\" Forms and in \" Comments\" Title of form, description if needed.    Bear is a 60 year old  adult  Form received via: Fax  Form now resides in: Provider Ready    David Doll MA               "

## 2025-04-16 ENCOUNTER — DOCUMENTATION ONLY (OUTPATIENT)
Dept: FAMILY MEDICINE | Facility: CLINIC | Age: 60
End: 2025-04-16
Payer: MEDICARE

## 2025-04-16 NOTE — PROGRESS NOTES
"When opening a documentation only encounter, be sure to enter in \"Chief Complaint\" Forms and in \" Comments\" Title of form, description if needed.    Maicol is a 60 year old  adult  Form received via: Fax  Form now resides in: Provider Ready    David Doll MA             Form has been completed by provider.     Form sent out via: Fax to [facility] at Fax Number: [phone]  Patient informed: No, Reason:  Output date: April 16, 2025    David Doll MA      **Please close the encounter**   "

## 2025-05-15 DIAGNOSIS — R10.84 GENERALIZED ABDOMINAL PAIN: ICD-10-CM

## 2025-05-15 RX ORDER — DOCUSATE SODIUM 50MG AND SENNOSIDES 8.6MG 8.6; 5 MG/1; MG/1
TABLET, FILM COATED ORAL
Qty: 20 TABLET | Refills: 11 | Status: SHIPPED | OUTPATIENT
Start: 2025-05-15

## 2025-05-15 NOTE — TELEPHONE ENCOUNTER
"Request for medication refill:    Medication Name: senna-docusate (SENOKOT-S/PERICOLACE) 8.6-50 MG tablet     Providers if patient needs an appointment and you are willing to give a one month supply please refill for one month and  send a MyChart using \".SMILLIMITEDREFILL\" .Or route chart to \"P SMI \" . To call patient and inform of limited refill and providers request to make an appointment. (Giving one month refill in non controlled medications is strongly recommended before denial)    If refill has been denied, meaning absolutely no refills without visit, please complete the smart phrase \".SMIRXREFUSE\" and route it to the \"P SMI MED REFILLS\"  pool to inform the patient and the pharmacy.    Ivana Currie, CMA      "

## 2025-06-05 ENCOUNTER — MEDICAL CORRESPONDENCE (OUTPATIENT)
Dept: HEALTH INFORMATION MANAGEMENT | Facility: CLINIC | Age: 60
End: 2025-06-05
Payer: MEDICARE

## 2025-06-12 ENCOUNTER — DOCUMENTATION ONLY (OUTPATIENT)
Dept: FAMILY MEDICINE | Facility: CLINIC | Age: 60
End: 2025-06-12
Payer: MEDICARE

## 2025-06-12 NOTE — PROGRESS NOTES
"When opening a documentation only encounter, be sure to enter in \"Chief Complaint\" Forms and in \" Comments\" Title of form, description if needed.    Parth is a 60 year old  adult  Form received via: Fax  Form now resides in: Provider Ready    Shravan Christianson MA               "

## 2025-06-12 NOTE — PROGRESS NOTES
"When opening a documentation only encounter, be sure to enter in \"Chief Complaint\" Forms and in \" Comments\" Title of form, description if needed.    Parth is a 60 year old  adult  Form received via: Fax  Form now resides in: Provider Ready    Swapna Myers MA      Form has been completed by provider.     Form sent out via: Fax to [facility] at Fax Number: [phone]  Patient informed: No, Reason:  Output date: June 19, 2025    Guerrero Linares MA      **Please close the encounter**           "

## 2025-06-12 NOTE — PROGRESS NOTES
"When opening a documentation only encounter, be sure to enter in \"Chief Complaint\" Forms and in \" Comments\" Title of form, description if needed.    Parth is a 60 year old  adult  Form received via: Fax  Form now resides in: Provider Ready    Swapna Myers MA      Form has been completed by provider.     Form sent out via: Fax to [facility] at Fax Number: [phone]  Patient informed: No, Reason:  Output date: June 19, 2025    Guerrero Linares MA      **Please close the encounter**               "

## 2025-06-16 ENCOUNTER — TELEPHONE (OUTPATIENT)
Dept: FAMILY MEDICINE | Facility: CLINIC | Age: 60
End: 2025-06-16

## 2025-06-16 PROBLEM — M86.9 OSTEOMYELITIS OF FOOT (H): Status: ACTIVE | Noted: 2017-06-12

## 2025-06-16 PROBLEM — F11.11 NARCOTIC ABUSE IN REMISSION (H): Status: ACTIVE | Noted: 2017-06-12

## 2025-06-16 PROBLEM — F11.20 CONTINUOUS OPIOID DEPENDENCE (H): Status: ACTIVE | Noted: 2025-06-16

## 2025-06-16 PROBLEM — S98.132A: Status: ACTIVE | Noted: 2023-02-08

## 2025-06-16 NOTE — TELEPHONE ENCOUNTER
[facility] phone call message- medication clarification/question:    Full Medication Name RSV vaccine, bivalent, ABRYSVO, injection     Question/Clarification needed: [facility] pharmacy they don't do injection  just  ELMO        [facility] - [place], [place] - [address]  [address] [facility] [address]  Phone: [phone] Fax: [phone]  : Yes    Please leave ONLY preferred pharmacy    OK to leave a message on voice mail? Yes    Advised patient that RN would call back within 3 hours, unless emergent.    Primary language: English      needed? No    Call taken on June 16, 2025 at 1:55 PM by MIMI Peter    Route to [facility]

## 2025-06-16 NOTE — TELEPHONE ENCOUNTER
RN discussed with PP and called pharmacy to give VO to change to 31 day script.     Zeny Pineda RN

## 2025-06-16 NOTE — TELEPHONE ENCOUNTER
Script was sent in error, RN spoke with pharmacy regarding same pt different medication and let them know this was sent in error.      Zeny Pineda RN

## 2025-06-16 NOTE — TELEPHONE ENCOUNTER
[facility] phone call message- medication clarification/question:    Full Medication Name: pregabalin (LYRICA) 300 MG capsule        Question: Pharmacist states prescription is 60  pills fir 30 days,.  Could the prescription be rewritten for 62 pills for 31 day supply.     Pharmacy confirmed as [facility] - [place], [place] - [address] Yes    OK to leave a message on voice mail? Yes    Primary language: English      needed? No    Call taken on June 16, 2025 at 1:38 PM by Yolanda Butt

## 2025-06-17 DIAGNOSIS — Z53.9 DIAGNOSIS NOT YET DEFINED: Primary | ICD-10-CM

## 2025-07-07 ENCOUNTER — MEDICAL CORRESPONDENCE (OUTPATIENT)
Dept: HEALTH INFORMATION MANAGEMENT | Facility: CLINIC | Age: 60
End: 2025-07-07
Payer: MEDICARE

## 2025-07-09 ENCOUNTER — DOCUMENTATION ONLY (OUTPATIENT)
Dept: FAMILY MEDICINE | Facility: CLINIC | Age: 60
End: 2025-07-09
Payer: MEDICARE

## 2025-07-09 NOTE — PROGRESS NOTES
"When opening a documentation only encounter, be sure to enter in \"Chief Complaint\" Forms and in \" Comments\" Title of form, description if needed.    Parth is a 60 year old  adult  Form received via: Fax  Form now resides in: Provider Ready    Guerrero Linares MA               "

## 2025-07-23 ENCOUNTER — DOCUMENTATION ONLY (OUTPATIENT)
Dept: FAMILY MEDICINE | Facility: CLINIC | Age: 60
End: 2025-07-23
Payer: MEDICARE

## 2025-07-24 ENCOUNTER — TELEPHONE (OUTPATIENT)
Dept: FAMILY MEDICINE | Facility: CLINIC | Age: 60
End: 2025-07-24
Payer: MEDICARE

## 2025-07-24 NOTE — TELEPHONE ENCOUNTER
Duplicate encounter. Refill request already sent to provider in separate encounter today.   Estrellita Silverio RN

## 2025-07-24 NOTE — TELEPHONE ENCOUNTER
[facility] phone call message- patient requesting a refill:    Full Medication Name: oxyCODONE IR (ROXICODONE) 5 MG tablet         Pharmacy confirmed as   [facility] - [place], [place] - [address]  [address] [facility] [address]  Phone: [phone] Fax: [phone]    : Yes    Additional Comments: The pharmacy is requesting a refill.    OK to leave a message on voice mail? Yes    Primary language: English      needed? No    Call taken on July 24, 2025 at 10:44 AM by Caitlin Gómez

## 2025-07-31 ENCOUNTER — DOCUMENTATION ONLY (OUTPATIENT)
Dept: FAMILY MEDICINE | Facility: CLINIC | Age: 60
End: 2025-07-31
Payer: MEDICARE

## 2025-08-05 ENCOUNTER — DOCUMENTATION ONLY (OUTPATIENT)
Dept: FAMILY MEDICINE | Facility: CLINIC | Age: 60
End: 2025-08-05
Payer: MEDICARE

## 2025-08-07 ENCOUNTER — DOCUMENTATION ONLY (OUTPATIENT)
Dept: FAMILY MEDICINE | Facility: CLINIC | Age: 60
End: 2025-08-07
Payer: MEDICARE

## 2025-08-21 DIAGNOSIS — Z53.9 DIAGNOSIS NOT YET DEFINED: Primary | ICD-10-CM

## 2025-08-21 PROCEDURE — G0179 MD RECERTIFICATION HHA PT: HCPCS | Performed by: FAMILY MEDICINE

## (undated) DEVICE — LINEN TOWEL PACK X5 5464

## (undated) DEVICE — ADAPTER CATH CHECK-FLO 9FR FLL 050885 G15476

## (undated) DEVICE — RAD KNIFE HANDLE W/11 BLADE DISPOSABLE 371611

## (undated) DEVICE — DRSG PRIMAPORE 02X3" 7133

## (undated) DEVICE — TOURNIQUET CUFF 18" REPRO RED 60-7070-103

## (undated) DEVICE — DRSG DRAIN 4X4" 7086

## (undated) DEVICE — SOL NACL 0.9% IRRIG 1000ML BOTTLE 2F7124

## (undated) DEVICE — CONNECTOR WATER VALVE PERFUSION PACK STR 020272801

## (undated) DEVICE — ESU GROUND PAD ADULT W/CORD E7507

## (undated) DEVICE — SU SILK 0 TIE 6X30" A306H

## (undated) DEVICE — PROBE LITHOTRIPTOR 3.4X396MM SHOCKPULSE BLUE SPL-PDBX340

## (undated) DEVICE — GLOVE PROTEXIS W/NEU-THERA 8.5  2D73TE85

## (undated) DEVICE — TUBING SUCTION 10'X3/16" N510

## (undated) DEVICE — SHEATH URETERAL ACCESS NAVIGATOR HD 12/14FRX36CM M0062502250

## (undated) DEVICE — CUP AND LID 2PK 2OZ STERILE  SSK9006A

## (undated) DEVICE — PUMP SYSTEM SINGLE ACTION M0067201000

## (undated) DEVICE — BASKET NITINOL TIPLESS HALO  1.5FRX120CM 554120

## (undated) DEVICE — BNDG ELASTIC 4" DBL LENGTH UNSTERILE 6611-14

## (undated) DEVICE — SU ETHILON 4-0 P-3 18" BLACK 699G

## (undated) DEVICE — CONNECTOR URETERAL CATH 14FRX30CM COOK G14230

## (undated) DEVICE — PACK GOWN 3/PK DISP XL SBA32GPFCB

## (undated) DEVICE — GUIDEWIRE SENSOR DUAL FLEX STR 0.035"X150CM M0066703080

## (undated) DEVICE — SOL NACL 0.9% IRRIG 3000ML BAG 2B7477

## (undated) DEVICE — Device

## (undated) DEVICE — BAG URINARY DRAIN LUBRISIL IC 4000ML LF 253509A

## (undated) DEVICE — NDL PERC ACCESS 18GX20CM M0067001220

## (undated) DEVICE — CATH URETERAL DUAL LUMEN 10FRX54CM M0064051000

## (undated) DEVICE — SPONGE LAP 18X18" X8435

## (undated) DEVICE — GLOVE PROTEXIS W/NEU-THERA 8.0  2D73TE80

## (undated) DEVICE — DRSG TEGADERM 4X4 3/4" 1626W

## (undated) DEVICE — ENDO SEAL BX PORT BPS-A

## (undated) DEVICE — BAG DRAIN BILIARY NEPHROSTOMY REMINGTON 600ML LF 600-D

## (undated) DEVICE — SYR 50ML LL W/O NDL 309653

## (undated) DEVICE — SUCTION MANIFOLD NEPTUNE 2 SYS 4 PORT 0702-020-000

## (undated) DEVICE — CATH ANGIO KUMPE 4.1FR 40CM KMP HNBR4.1-35-40-P-NS-KMP

## (undated) DEVICE — KIT CATH BALLOON NEPHROMAX 30FRX15CM M0062101640

## (undated) DEVICE — PACK LOWER EXTREMITY RIVERSIDE SOP32LEFSX

## (undated) DEVICE — GLOVE BIOGEL PI MICRO INDICATOR UNDERGLOVE SZ 8.0 48980

## (undated) DEVICE — BLADE SAW SAGITTAL STRK 25X75X0.89MM SYS 6 6125-089-075

## (undated) DEVICE — DRAPE C-ARM W/STRAPS 42X72" 07-CA104

## (undated) DEVICE — DRAPE LINGEMAN PERC PROCEDURE 1-0815

## (undated) DEVICE — CATH ANGIO SELECTIVE 5FRX65CM J BENTSON H787107341015

## (undated) DEVICE — NDL HOLDER AMPLATZ 090000

## (undated) DEVICE — PACK CYSTO UMMC CUSTOM

## (undated) DEVICE — DRSG GAUZE 4X8" NON21842

## (undated) DEVICE — SPECIMEN CONTAINER 5OZ STERILE 2600SA

## (undated) DEVICE — PAD CHUX UNDERPAD 23X24" 7136

## (undated) DEVICE — DRAPE STERI TOWEL SM 1000

## (undated) DEVICE — CATH URETERAL OPEN END 05FR 70CM 020015

## (undated) DEVICE — SU ETHILON 2-0 FS 18" 664H

## (undated) DEVICE — GUIDEWIRE AMPLATZ 0.035"X145CM  SUPER STIFF 640-104

## (undated) DEVICE — SU VICRYL+ 0 CT 36" DYED VCP358H

## (undated) DEVICE — SOL WATER IRRIG 1000ML BOTTLE 2F7114

## (undated) DEVICE — GLOVE BIOGEL PI SZ 8.0 40880

## (undated) DEVICE — LINEN ORTHO PACK 5446

## (undated) DEVICE — BAG URINARY DRAIN 4000ML LF 153509

## (undated) DEVICE — LASER FIBER HOLMIUM FLEXIVA 200UM M0068403910 840-391

## (undated) DEVICE — DECANTER TRANSFER DEVICE 2008S

## (undated) DEVICE — SUTURE VICRYL+ 2-0 CT-2 27" UND VCP269H

## (undated) DEVICE — BAG URINARY DRAIN LEG MEDIUM 19OZ LF 150719

## (undated) DEVICE — GOWN XLG DISP 9545

## (undated) DEVICE — DRAPE IOBAN C-SECTION W/POUCH 30X35" 6657

## (undated) DEVICE — SU SILK 2-0 TIE 12X30" A305H

## (undated) DEVICE — DRSG TEGADERM 2 3/8X2 3/4" 1624W

## (undated) DEVICE — DRAIN SKATER 08FRX35CM 755608035

## (undated) DEVICE — SU SILK 3-0 SH 30" K832H

## (undated) DEVICE — CATH URETERAL OPEN END 5FRX70CM M0064002010

## (undated) DEVICE — GUIDEWIRE AMPLATZ SUPER STIFF .035 X 145CM M0066401080

## (undated) DEVICE — DRAPE LAP W/ARMBOARD 29410

## (undated) DEVICE — SU ETHILON 3-0 PS-1 18" 1663H

## (undated) DEVICE — STRAP KNEE/BODY 31143004

## (undated) DEVICE — DRAPE STERI TOWEL LG 1010

## (undated) DEVICE — DRAPE STERI INCISE 24X14" 1040

## (undated) DEVICE — DRSG ABDOMINAL 07 1/2X8" 7197D

## (undated) DEVICE — SUCTION TIP YANKAUER W/O VENT K86

## (undated) DEVICE — STRAP UNIVERSAL POSITIONING 2-PIECE 4X47X76" 91-287

## (undated) DEVICE — INTRODUCER PEELAWAY SHEATH 16FRX38CM G02997 PLVW-18.0-38

## (undated) DEVICE — GUIDEWIRE SENSOR DUAL FLEX ANG 0.035"X150CM M0066703010

## (undated) DEVICE — KIT UROSTOMY POUCH 2PC 70MMX 2.75INCH 19904

## (undated) DEVICE — SYR 30ML LL W/O NDL 302832

## (undated) RX ORDER — FENTANYL CITRATE 0.05 MG/ML
INJECTION, SOLUTION INTRAMUSCULAR; INTRAVENOUS
Status: DISPENSED
Start: 2024-06-20

## (undated) RX ORDER — FENTANYL CITRATE 50 UG/ML
INJECTION, SOLUTION INTRAMUSCULAR; INTRAVENOUS
Status: DISPENSED
Start: 2021-09-10

## (undated) RX ORDER — VANCOMYCIN HYDROCHLORIDE 1 G/20ML
INJECTION, POWDER, LYOPHILIZED, FOR SOLUTION INTRAVENOUS
Status: DISPENSED
Start: 2024-06-20

## (undated) RX ORDER — LIDOCAINE HYDROCHLORIDE 20 MG/ML
INJECTION, SOLUTION EPIDURAL; INFILTRATION; INTRACAUDAL; PERINEURAL
Status: DISPENSED
Start: 2021-08-23

## (undated) RX ORDER — OXYCODONE HYDROCHLORIDE 5 MG/1
TABLET ORAL
Status: DISPENSED
Start: 2021-10-18

## (undated) RX ORDER — GLYCOPYRROLATE 0.2 MG/ML
INJECTION, SOLUTION INTRAMUSCULAR; INTRAVENOUS
Status: DISPENSED
Start: 2021-08-23

## (undated) RX ORDER — FENTANYL CITRATE 50 UG/ML
INJECTION, SOLUTION INTRAMUSCULAR; INTRAVENOUS
Status: DISPENSED
Start: 2021-06-19

## (undated) RX ORDER — FENTANYL CITRATE 50 UG/ML
INJECTION, SOLUTION INTRAMUSCULAR; INTRAVENOUS
Status: DISPENSED
Start: 2024-06-20

## (undated) RX ORDER — DEXAMETHASONE SODIUM PHOSPHATE 4 MG/ML
INJECTION, SOLUTION INTRA-ARTICULAR; INTRALESIONAL; INTRAMUSCULAR; INTRAVENOUS; SOFT TISSUE
Status: DISPENSED
Start: 2021-08-23

## (undated) RX ORDER — FENTANYL CITRATE-0.9 % NACL/PF 10 MCG/ML
PLASTIC BAG, INJECTION (ML) INTRAVENOUS
Status: DISPENSED
Start: 2021-08-23

## (undated) RX ORDER — FENTANYL CITRATE-0.9 % NACL/PF 10 MCG/ML
PLASTIC BAG, INJECTION (ML) INTRAVENOUS
Status: DISPENSED
Start: 2024-06-20

## (undated) RX ORDER — HYDROMORPHONE HYDROCHLORIDE 1 MG/ML
INJECTION, SOLUTION INTRAMUSCULAR; INTRAVENOUS; SUBCUTANEOUS
Status: DISPENSED
Start: 2024-06-20

## (undated) RX ORDER — FENTANYL CITRATE 50 UG/ML
INJECTION, SOLUTION INTRAMUSCULAR; INTRAVENOUS
Status: DISPENSED
Start: 2021-08-23

## (undated) RX ORDER — OXYCODONE HYDROCHLORIDE 5 MG/1
TABLET ORAL
Status: DISPENSED
Start: 2021-10-11

## (undated) RX ORDER — CEFAZOLIN SODIUM 2 G/100ML
INJECTION, SOLUTION INTRAVENOUS
Status: DISPENSED
Start: 2021-10-18

## (undated) RX ORDER — FENTANYL CITRATE 50 UG/ML
INJECTION, SOLUTION INTRAMUSCULAR; INTRAVENOUS
Status: DISPENSED
Start: 2022-04-14

## (undated) RX ORDER — EPHEDRINE SULFATE 50 MG/ML
INJECTION, SOLUTION INTRAMUSCULAR; INTRAVENOUS; SUBCUTANEOUS
Status: DISPENSED
Start: 2021-10-18

## (undated) RX ORDER — GLYCOPYRROLATE 0.2 MG/ML
INJECTION, SOLUTION INTRAMUSCULAR; INTRAVENOUS
Status: DISPENSED
Start: 2024-06-20

## (undated) RX ORDER — ONDANSETRON 2 MG/ML
INJECTION INTRAMUSCULAR; INTRAVENOUS
Status: DISPENSED
Start: 2024-06-20

## (undated) RX ORDER — LIDOCAINE HYDROCHLORIDE 10 MG/ML
INJECTION, SOLUTION INFILTRATION; PERINEURAL
Status: DISPENSED
Start: 2022-04-14

## (undated) RX ORDER — FENTANYL CITRATE-0.9 % NACL/PF 10 MCG/ML
PLASTIC BAG, INJECTION (ML) INTRAVENOUS
Status: DISPENSED
Start: 2021-10-18

## (undated) RX ORDER — HYDROMORPHONE HCL IN WATER/PF 6 MG/30 ML
PATIENT CONTROLLED ANALGESIA SYRINGE INTRAVENOUS
Status: DISPENSED
Start: 2021-10-18

## (undated) RX ORDER — BUPIVACAINE HYDROCHLORIDE 5 MG/ML
INJECTION, SOLUTION EPIDURAL; INTRACAUDAL
Status: DISPENSED
Start: 2021-08-23

## (undated) RX ORDER — ACETAMINOPHEN 500 MG
TABLET ORAL
Status: DISPENSED
Start: 2024-06-20

## (undated) RX ORDER — LIDOCAINE HYDROCHLORIDE AND EPINEPHRINE 10; 10 MG/ML; UG/ML
INJECTION, SOLUTION INFILTRATION; PERINEURAL
Status: DISPENSED
Start: 2021-08-23

## (undated) RX ORDER — ONDANSETRON 2 MG/ML
INJECTION INTRAMUSCULAR; INTRAVENOUS
Status: DISPENSED
Start: 2021-08-23

## (undated) RX ORDER — FENTANYL CITRATE 50 UG/ML
INJECTION, SOLUTION INTRAMUSCULAR; INTRAVENOUS
Status: DISPENSED
Start: 2021-10-18

## (undated) RX ORDER — EPHEDRINE SULFATE 50 MG/ML
INJECTION, SOLUTION INTRAMUSCULAR; INTRAVENOUS; SUBCUTANEOUS
Status: DISPENSED
Start: 2021-08-23

## (undated) RX ORDER — FENTANYL CITRATE 50 UG/ML
INJECTION, SOLUTION INTRAMUSCULAR; INTRAVENOUS
Status: DISPENSED
Start: 2021-10-11

## (undated) RX ORDER — KETOROLAC TROMETHAMINE 30 MG/ML
INJECTION, SOLUTION INTRAMUSCULAR; INTRAVENOUS
Status: DISPENSED
Start: 2024-06-20

## (undated) RX ORDER — AMPICILLIN 1 G/1
INJECTION, POWDER, FOR SOLUTION INTRAMUSCULAR; INTRAVENOUS
Status: DISPENSED
Start: 2021-09-10

## (undated) RX ORDER — PROPOFOL 10 MG/ML
INJECTION, EMULSION INTRAVENOUS
Status: DISPENSED
Start: 2021-08-23

## (undated) RX ORDER — EPHEDRINE SULFATE 50 MG/ML
INJECTION, SOLUTION INTRAMUSCULAR; INTRAVENOUS; SUBCUTANEOUS
Status: DISPENSED
Start: 2024-06-20

## (undated) RX ORDER — HYDROMORPHONE HCL IN WATER/PF 6 MG/30 ML
PATIENT CONTROLLED ANALGESIA SYRINGE INTRAVENOUS
Status: DISPENSED
Start: 2021-08-23

## (undated) RX ORDER — SODIUM CHLORIDE 9 MG/ML
INJECTION, SOLUTION INTRAVENOUS
Status: DISPENSED
Start: 2021-10-11

## (undated) RX ORDER — ACETAMINOPHEN 325 MG/1
TABLET ORAL
Status: DISPENSED
Start: 2021-08-23

## (undated) RX ORDER — LIDOCAINE HYDROCHLORIDE 10 MG/ML
INJECTION, SOLUTION EPIDURAL; INFILTRATION; INTRACAUDAL; PERINEURAL
Status: DISPENSED
Start: 2021-10-11

## (undated) RX ORDER — LIDOCAINE HYDROCHLORIDE 10 MG/ML
INJECTION, SOLUTION EPIDURAL; INFILTRATION; INTRACAUDAL; PERINEURAL
Status: DISPENSED
Start: 2021-09-10

## (undated) RX ORDER — AMPICILLIN 1 G/1
INJECTION, POWDER, FOR SOLUTION INTRAMUSCULAR; INTRAVENOUS
Status: DISPENSED
Start: 2021-10-11

## (undated) RX ORDER — OXYCODONE HYDROCHLORIDE 5 MG/1
TABLET ORAL
Status: DISPENSED
Start: 2021-08-23

## (undated) RX ORDER — PROPOFOL 10 MG/ML
INJECTION, EMULSION INTRAVENOUS
Status: DISPENSED
Start: 2024-06-20

## (undated) RX ORDER — PROPOFOL 10 MG/ML
INJECTION, EMULSION INTRAVENOUS
Status: DISPENSED
Start: 2021-10-18

## (undated) RX ORDER — TRANEXAMIC ACID 650 MG/1
TABLET ORAL
Status: DISPENSED
Start: 2024-06-20

## (undated) RX ORDER — DEXAMETHASONE SODIUM PHOSPHATE 4 MG/ML
INJECTION, SOLUTION INTRA-ARTICULAR; INTRALESIONAL; INTRAMUSCULAR; INTRAVENOUS; SOFT TISSUE
Status: DISPENSED
Start: 2021-10-18

## (undated) RX ORDER — SODIUM CHLORIDE 9 MG/ML
INJECTION, SOLUTION INTRAVENOUS
Status: DISPENSED
Start: 2021-09-10

## (undated) RX ORDER — BUPIVACAINE HYDROCHLORIDE 5 MG/ML
INJECTION, SOLUTION EPIDURAL; INTRACAUDAL
Status: DISPENSED
Start: 2024-06-20

## (undated) RX ORDER — DEXAMETHASONE SODIUM PHOSPHATE 4 MG/ML
INJECTION, SOLUTION INTRA-ARTICULAR; INTRALESIONAL; INTRAMUSCULAR; INTRAVENOUS; SOFT TISSUE
Status: DISPENSED
Start: 2024-06-20

## (undated) RX ORDER — ONDANSETRON 2 MG/ML
INJECTION INTRAMUSCULAR; INTRAVENOUS
Status: DISPENSED
Start: 2021-10-18

## (undated) RX ORDER — ESMOLOL HYDROCHLORIDE 10 MG/ML
INJECTION INTRAVENOUS
Status: DISPENSED
Start: 2021-08-23

## (undated) RX ORDER — OXYCODONE HYDROCHLORIDE 10 MG/1
TABLET ORAL
Status: DISPENSED
Start: 2024-06-20

## (undated) RX ORDER — ERTAPENEM 1 G/1
INJECTION, POWDER, LYOPHILIZED, FOR SOLUTION INTRAMUSCULAR; INTRAVENOUS
Status: DISPENSED
Start: 2021-08-23

## (undated) RX ORDER — CEFAZOLIN SODIUM/WATER 2 G/20 ML
SYRINGE (ML) INTRAVENOUS
Status: DISPENSED
Start: 2024-06-20